# Patient Record
Sex: MALE | Race: WHITE | NOT HISPANIC OR LATINO
[De-identification: names, ages, dates, MRNs, and addresses within clinical notes are randomized per-mention and may not be internally consistent; named-entity substitution may affect disease eponyms.]

---

## 2020-01-22 ENCOUNTER — RESULT REVIEW (OUTPATIENT)
Age: 75
End: 2020-01-22

## 2021-03-21 PROBLEM — Z00.00 ENCOUNTER FOR PREVENTIVE HEALTH EXAMINATION: Status: ACTIVE | Noted: 2021-03-21

## 2021-03-23 ENCOUNTER — APPOINTMENT (OUTPATIENT)
Dept: DISASTER EMERGENCY | Facility: CLINIC | Age: 76
End: 2021-03-23

## 2021-03-23 DIAGNOSIS — Z01.818 ENCOUNTER FOR OTHER PREPROCEDURAL EXAMINATION: ICD-10-CM

## 2021-03-24 LAB — SARS-COV-2 N GENE NPH QL NAA+PROBE: NOT DETECTED

## 2021-03-26 ENCOUNTER — OUTPATIENT (OUTPATIENT)
Dept: OUTPATIENT SERVICES | Facility: HOSPITAL | Age: 76
LOS: 1 days | Discharge: HOME | End: 2021-03-26
Payer: MEDICARE

## 2021-03-26 ENCOUNTER — TRANSCRIPTION ENCOUNTER (OUTPATIENT)
Age: 76
End: 2021-03-26

## 2021-03-26 VITALS
SYSTOLIC BLOOD PRESSURE: 129 MMHG | RESPIRATION RATE: 18 BRPM | HEART RATE: 69 BPM | DIASTOLIC BLOOD PRESSURE: 82 MMHG | OXYGEN SATURATION: 96 %

## 2021-03-26 VITALS — HEART RATE: 64 BPM | SYSTOLIC BLOOD PRESSURE: 118 MMHG | DIASTOLIC BLOOD PRESSURE: 57 MMHG | OXYGEN SATURATION: 100 %

## 2021-03-26 DIAGNOSIS — I27.20 PULMONARY HYPERTENSION, UNSPECIFIED: ICD-10-CM

## 2021-03-26 DIAGNOSIS — Z95.2 PRESENCE OF PROSTHETIC HEART VALVE: Chronic | ICD-10-CM

## 2021-03-26 LAB
ANION GAP SERPL CALC-SCNC: 15 MMOL/L — SIGNIFICANT CHANGE UP (ref 5–17)
APTT BLD: 41.9 SEC — HIGH (ref 27.5–35.5)
BUN SERPL-MCNC: 36 MG/DL — HIGH (ref 8–20)
CALCIUM SERPL-MCNC: 9.4 MG/DL — SIGNIFICANT CHANGE UP (ref 8.6–10.2)
CHLORIDE SERPL-SCNC: 98 MMOL/L — SIGNIFICANT CHANGE UP (ref 98–107)
CO2 SERPL-SCNC: 24 MMOL/L — SIGNIFICANT CHANGE UP (ref 22–29)
CREAT SERPL-MCNC: 1.68 MG/DL — HIGH (ref 0.5–1.3)
GLUCOSE SERPL-MCNC: 112 MG/DL — HIGH (ref 70–99)
HCT VFR BLD CALC: 44.6 % — SIGNIFICANT CHANGE UP (ref 39–50)
HGB BLD-MCNC: 13.9 G/DL — SIGNIFICANT CHANGE UP (ref 13–17)
INR BLD: 1.39 RATIO — HIGH (ref 0.88–1.16)
MCHC RBC-ENTMCNC: 30.4 PG — SIGNIFICANT CHANGE UP (ref 27–34)
MCHC RBC-ENTMCNC: 31.2 GM/DL — LOW (ref 32–36)
MCV RBC AUTO: 97.6 FL — SIGNIFICANT CHANGE UP (ref 80–100)
PLATELET # BLD AUTO: 141 K/UL — LOW (ref 150–400)
POTASSIUM SERPL-MCNC: 3.9 MMOL/L — SIGNIFICANT CHANGE UP (ref 3.5–5.3)
POTASSIUM SERPL-SCNC: 3.9 MMOL/L — SIGNIFICANT CHANGE UP (ref 3.5–5.3)
PROTHROM AB SERPL-ACNC: 15.9 SEC — HIGH (ref 10.6–13.6)
RBC # BLD: 4.57 M/UL — SIGNIFICANT CHANGE UP (ref 4.2–5.8)
RBC # FLD: 17.3 % — HIGH (ref 10.3–14.5)
SODIUM SERPL-SCNC: 137 MMOL/L — SIGNIFICANT CHANGE UP (ref 135–145)
WBC # BLD: 11.89 K/UL — HIGH (ref 3.8–10.5)
WBC # FLD AUTO: 11.89 K/UL — HIGH (ref 3.8–10.5)

## 2021-03-26 PROCEDURE — 93005 ELECTROCARDIOGRAM TRACING: CPT

## 2021-03-26 PROCEDURE — C1887: CPT

## 2021-03-26 PROCEDURE — 99153 MOD SED SAME PHYS/QHP EA: CPT

## 2021-03-26 PROCEDURE — 93453 R&L HRT CATH W/VENTRICLGRPHY: CPT

## 2021-03-26 PROCEDURE — 99152 MOD SED SAME PHYS/QHP 5/>YRS: CPT

## 2021-03-26 PROCEDURE — 93463 DRUG ADMIN & HEMODYNMIC MEAS: CPT

## 2021-03-26 PROCEDURE — C1769: CPT

## 2021-03-26 PROCEDURE — 36415 COLL VENOUS BLD VENIPUNCTURE: CPT

## 2021-03-26 PROCEDURE — 85027 COMPLETE CBC AUTOMATED: CPT

## 2021-03-26 PROCEDURE — 80048 BASIC METABOLIC PNL TOTAL CA: CPT

## 2021-03-26 PROCEDURE — 93010 ELECTROCARDIOGRAM REPORT: CPT

## 2021-03-26 PROCEDURE — 85610 PROTHROMBIN TIME: CPT

## 2021-03-26 PROCEDURE — 85730 THROMBOPLASTIN TIME PARTIAL: CPT

## 2021-03-26 PROCEDURE — C1889: CPT

## 2021-03-26 RX ORDER — FLUTICASONE PROPIONATE AND SALMETEROL 50; 250 UG/1; UG/1
1 POWDER ORAL; RESPIRATORY (INHALATION)
Qty: 0 | Refills: 0 | DISCHARGE

## 2021-03-26 RX ORDER — MIRTAZAPINE 45 MG/1
1 TABLET, ORALLY DISINTEGRATING ORAL
Qty: 0 | Refills: 0 | DISCHARGE

## 2021-03-26 RX ORDER — SPIRONOLACTONE 25 MG/1
1 TABLET, FILM COATED ORAL
Qty: 60 | Refills: 0
Start: 2021-03-26 | End: 2021-04-24

## 2021-03-26 RX ORDER — SPIRONOLACTONE 25 MG/1
25 TABLET, FILM COATED ORAL
Refills: 0 | Status: DISCONTINUED | OUTPATIENT
Start: 2021-03-26 | End: 2021-04-09

## 2021-03-26 RX ORDER — FUROSEMIDE 40 MG
1 TABLET ORAL
Qty: 0 | Refills: 0 | DISCHARGE

## 2021-03-26 NOTE — DISCHARGE NOTE PROVIDER - NSDCCPTREATMENT_GEN_ALL_CORE_FT
PRINCIPAL PROCEDURE  Procedure: Catheterization, heart, right, with cardiac output measurement  Findings and Treatment: pHTN

## 2021-03-26 NOTE — DISCHARGE NOTE PROVIDER - NSDCMRMEDTOKEN_GEN_ALL_CORE_FT
Advair Diskus 100 mcg-50 mcg inhalation powder: 1 puff(s) inhaled 2 times a day  Albuterol (Eqv-Proventil HFA) 90 mcg/inh inhalation aerosol: 2 puff(s) inhaled every 4 hours, As Needed  allopurinol 100 mg oral tablet: 1 tab(s) orally 2 times a day  amLODIPine 2.5 mg oral tablet: 1 tab(s) orally once a day  Aspir 81 oral delayed release tablet: 1 tab(s) orally once a day  atorvastatin 10 mg oral tablet: 1 tab(s) orally once a day  Centrum Silver oral tablet: 1 tab(s) orally once a day  Culturelle Advanced Immune Defense oral capsule: 1 cap(s) orally 2 times a day  furosemide 40 mg oral tablet: 1 tab(s) orally once a day  ipratropium-albuterol 0.5 mg-2.5 mg/3 mLinhalation solution: 3 milliliter(s) inhaled 4 times a day, As Needed  metoprolol tartrate 100 mg oral tablet: 1 tab(s) orally 2 times a day  tamsulosin 0.4 mg oral capsule: 1 cap(s) orally once a day  Vitamin D3 1000 intl units (25 mcg) oral capsule: orally once a day   Advair Diskus 100 mcg-50 mcg inhalation powder: 1 puff(s) inhaled 2 times a day  Albuterol (Eqv-Proventil HFA) 90 mcg/inh inhalation aerosol: 2 puff(s) inhaled every 4 hours, As Needed  allopurinol 100 mg oral tablet: 1 tab(s) orally 2 times a day  amLODIPine 2.5 mg oral tablet: 1 tab(s) orally once a day  Aspir 81 oral delayed release tablet: 1 tab(s) orally once a day  atorvastatin 10 mg oral tablet: 1 tab(s) orally once a day  BMP: Result to Dr. Jameel Arambula Silver oral tablet: 1 tab(s) orally once a day  Culturelle Advanced Immune Defense oral capsule: 1 cap(s) orally 2 times a day  ipratropium-albuterol 0.5 mg-2.5 mg/3 mLinhalation solution: 3 milliliter(s) inhaled 4 times a day, As Needed  metOLazone 2.5 mg oral tablet: 1 tab(s) orally once a day  take 30 minutes prior to torsemide  metoprolol tartrate 100 mg oral tablet: 1 tab(s) orally 2 times a day  spironolactone 25 mg oral tablet: 1 tab(s) orally 2 times a day  tamsulosin 0.4 mg oral capsule: 1 cap(s) orally once a day  torsemide 20 mg oral tablet: 1 tab(s) orally 2 times a day  Vitamin D3 1000 intl units (25 mcg) oral capsule: orally once a day

## 2021-03-26 NOTE — DISCHARGE NOTE PROVIDER - CARE PROVIDER_API CALL
GUZMAN BOATENG  [id number]  [address] [facility] [address]  [place] [address]  Phone: [phone]  Fax: [phone]  Follow Up Time:    GUZMAN BOATENG  [id number]  [address] [facility] [address]  [place] [address]  Phone: [phone]  Fax: [phone]  Follow Up Time:     Herson Jorgensen  Emergency Medicine  [address] [place] [address]  Phone: [phone]  Fax: [phone]  Follow Up Time:

## 2021-03-26 NOTE — DISCHARGE NOTE PROVIDER - PROVIDER TOKENS
PROVIDER:[TOKEN:[[id number]]] PROVIDER:[TOKEN:[[id number]]],PROVIDER:[TOKEN:[[id number]]]

## 2021-03-26 NOTE — H&P PST ADULT - ASSESSMENT
76 year old male with known pulmonary HTN with worsening of symptoms.  For RHC  76 year old male with known pulmonary HTN with worsening of symptoms.  For RHC     Pt had ISRAEL within 2 weeks, awaiting on results     76 year old male with known pulmonary HTN with worsening of symptoms.  For RHC     Pt had ISRAEL within 2 weeks, awaiting on results    ASA 3  Mallampati 2  GFR 39  Cr 1.6  Bleeding risk 1.5%

## 2021-03-26 NOTE — H&P PST ADULT - NSICDXPASTMEDICALHX_GEN_ALL_CORE_FT
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

## 2021-03-26 NOTE — DISCHARGE NOTE NURSING/CASE MANAGEMENT/SOCIAL WORK - PATIENT PORTAL LINK FT
You can access the FollowMyHealth Patient Portal offered by [facility] by registering at the following website: [email or web address]. By joining [facility]’s FollowMyHealth portal, you will also be able to view your health information using other applications (apps) compatible with our system.

## 2021-03-26 NOTE — DISCHARGE NOTE NURSING/CASE MANAGEMENT/SOCIAL WORK - NURSING SECTION COMPLETE
Patient/Caregiver provided printed discharge information. PROVIDER:[TOKEN:[[id number]]] PROVIDER:[TOKEN:[[id number]]],PROVIDER:[TOKEN:[[id number]]]

## 2021-03-26 NOTE — DISCHARGE NOTE PROVIDER - NSDCCPCAREPLAN_GEN_ALL_CORE_FT
PRINCIPAL DISCHARGE DIAGNOSIS  Diagnosis: Pulmonary hypertension  Assessment and Plan of Treatment:        PRINCIPAL DISCHARGE DIAGNOSIS  Diagnosis: Pulmonary hypertension  Assessment and Plan of Treatment: medical management

## 2021-03-26 NOTE — PROGRESS NOTE ADULT - SUBJECTIVE AND OBJECTIVE BOX
Nurse Practitioner Progress note:     INTERVAL HISTORY: 76 year old male with h/o HTN, HLD, aortic valve stenosis h/o TAVR, mod MR,  MCA CVA (9/20) s/p tPA and thrombectomy and pulmonary hypertension.   Last RVSP on ECHO was 71 mmHg.   Pt with worsening of SOB.  Now requiring NC oxygen and pitting LE edema. For RHC    TELEMETRY: SR 61 bpm    T(C): 36.5 (03-26-21 @ 09:02), Max: 36.5 (03-26-21 @ 09:02)  HR: 62 (03-26-21 @ 09:02) (62 - 64)  BP: 119/- (03-26-21 @ 11:30) (118/57 - 119/-)  RR: 28 (03-26-21 @ 09:02) (28 - 28)  SpO2: 99% (03-26-21 @ 09:02) (99% - 100%)  Wt(kg): --    PHYSICAL EXAM:  Appearance: Normal	  Cardiovascular: Normal S1 S2, No JVD, No murmurs, No edema  Respiratory: Lungs clear to auscultation	  Psychiatry: A & O x 3, Mood & affect appropriate  Neurologic: Non-focal, A&O X3.  No neuro deficits  Procedure Site: Right radial band and right brachial sheath benign.  No bleeding/hematoma/ecchymosis. + palp pulses.  Fingers warm and mobile.       MEDICATIONS DURING PROCEDURE:  versed 0.5 mg  fentanyl 25 mcg  heparin 4000 u  lasix 40 mg       PROCEDURE RESULTS:    ASSESSMENT/PLAN: 	  -Radial/brachial precautions  -D/C brachial sheath  -D/C radial band 1-2 hours  -Resume home meds  -Initiate  -Follow up with Dr. Sanchez       Nurse Practitioner Progress note:     INTERVAL HISTORY: 76 year old male with h/o HTN, HLD, aortic valve stenosis h/o TAVR, mod MR,  MCA CVA (9/20) s/p tPA and thrombectomy and pulmonary hypertension.   Last RVSP on ECHO was 71 mmHg.   Pt with worsening of SOB.  Now requiring NC oxygen and pitting LE edema. For RHC    TELEMETRY: SR 61 bpm    T(C): 36.5 (03-26-21 @ 09:02), Max: 36.5 (03-26-21 @ 09:02)  HR: 62 (03-26-21 @ 09:02) (62 - 64)  BP: 119/- (03-26-21 @ 11:30) (118/57 - 119/-)  RR: 28 (03-26-21 @ 09:02) (28 - 28)  SpO2: 99% (03-26-21 @ 09:02) (99% - 100%)  Wt(kg): --    PHYSICAL EXAM:  Appearance: Normal	  Cardiovascular: Normal S1 S2, No JVD, No murmurs, No edema  Respiratory: Lungs clear to auscultation	  Psychiatry: A & O x 3, Mood & affect appropriate  Neurologic: Non-focal, A&O X3.  No neuro deficits  Procedure Site: Right radial band and right brachial sheath benign.  No bleeding/hematoma/ecchymosis. + palp pulses.  Fingers warm and mobile.       MEDICATIONS DURING PROCEDURE:  versed 0.5 mg  fentanyl 25 mcg  heparin 4000 u  lasix 40 mg       PROCEDURE RESULTS:  < from: Cardiac Cath Lab - Adult (03.26.21 @ 10:20) >  HEMODYNAMICS: There is severe biventricular failure. There is severe  pulmonary hypertension. Following administration of inhaled nitric oxide (  80ppm), there was a minimal overall pulmonary hemodynamic response. A  prominant "v" wave is noted on the PCW tracing suggestive of MR or  Diastolic non-compliance.  COMPLICATIONS: No complications occurred during the cath lab visit.  DIAGNOSTIC IMPRESSIONS: Severe Pulmonary Hypertension, likely congestive,  with equivalent response to Shawna at 80ppm- PVR was 2.09WU pre and 1.59 post  Shawna. 2. Severe congestive heart failure. 3. A TAV in the aortic position  was noted moving in consonance with the surrounding structures and with  acceptable hemodynamics (Mean Aortic PG is 16mmHg and an PILAR over 1cm2).  DIAGNOSTIC RECOMMENDATIONS: Aggressive diuresis. 2. Would consider Cardio  MEMS for better fluid management in the out patient setting and to  minimize repeat hospitalizations. 3. Would re-assess severity of the MR  after CHF optimization.  INTERVENTIONAL IMPRESSIONS: Severe Pulmonary Hypertension, likely  congestive, with equivalent response to Shawna at 80ppm- PVR was 2.09WU pre  and 1.59 post Shawna. 2. Severe congestive heart failure. 3. A TAV in the  aortic position was noted moving in consonance with the surrounding  structures and with acceptable hemodynamics (Mean Aortic PG is 16mmHg and  an PILAR over 1cm2).  INTERVENTIONAL RECOMMENDATIONS: Aggressive diuresis. 2. Would consider  Cardio MEMS for better fluid management in the out patient setting and to  minimize repeat hospitalizations. 3. Would re-assess severity of the MR  after CHF optimization.    < end of copied text >      ASSESSMENT/PLAN: 	  -Radial/brachial precautions  -D/C brachial sheath  -D/C radial band 1-2 hours  -Resume home meds  -D/C lasix  -Initiate metolcizone, torsimide and spironolactone  -BMP on Monday if office  -Follow up with Dr. Sanchez/Jameel  -Discharge to home when criteria met

## 2021-03-26 NOTE — DISCHARGE NOTE PROVIDER - HOSPITAL COURSE
76 year old male with h/o HTN, HLD, aortic valve stenosis h/o TAVR, mod MR,  MCA CVA (9/20) s/p tPA and thrombectomy and pulmonary hypertension.   Pt with worsening of SOB.  Now requiring NC oxygen and pitting LE edema. Now s/p RHC which revealed...... 76 year old male with h/o HTN, HLD, aortic valve stenosis h/o TAVR, mod MR,  MCA CVA (9/20) s/p tPA and thrombectomy and pulmonary hypertension.   Pt with worsening of SOB requiring NC oxygen and pitting LE edema. Now s/p RHC which revealed severe pHTN.  Will add metolcizone, torsimide, spironolactone.  D/C lasix.  Will have BMP on monday in office.     < from: Cardiac Cath Lab - Adult (03.26.21 @ 10:20) >    INTERVENTIONAL IMPRESSIONS: Severe Pulmonary Hypertension, likely  congestive, with equivalent response to Shawna at 80ppm- PVR was 2.09WU pre  and 1.59 post Shawna. 2. Severe congestive heart failure. 3. A TAV in the  aortic position was noted moving in consonance with the surrounding  structures and with acceptable hemodynamics (Mean Aortic PG is 16mmHg and  an PILAR over 1cm2).  INTERVENTIONAL RECOMMENDATIONS: Aggressive diuresis. 2. Would consider  Cardio MEMS for better fluid management in the out patient setting and to  minimize repeat hospitalizations. 3. Would re-assess severity of the MR

## 2021-03-26 NOTE — H&P PST ADULT - HISTORY OF PRESENT ILLNESS
76 year old male with h/o HTN, HLD, aortic valve stenosis h/o TAVR, mod MR,  recent MCA CVA s/p tPA and thrombectomy and pulmonary hypertension.  Pt with worsening of SOB.  Last RVSP on ECHO was 71 mmHg.   76 year old male with h/o HTN, HLD, aortic valve stenosis h/o TAVR, mod MR,  MCA CVA (9/20) s/p tPA and thrombectomy and pulmonary hypertension.  Pt with worsening of SOB.  Last RVSP on ECHO was 71 mmHg.      ISRAEL: Mod MR (per chart, attemping to get report) 76 year old male with h/o HTN, HLD, aortic valve stenosis h/o TAVR, mod MR,  MCA CVA (9/20) s/p tPA and thrombectomy and pulmonary hypertension.   Last RVSP on ECHO was 71 mmHg.   Pt with worsening of SOB.  Now requiring NC oxygen and pitting LE edema.     ISRAEL: Mod MR (per chart, attemping to get report) 76 year old male with h/o HTN, HLD, aortic valve stenosis h/o TAVR, mod MR,  MCA CVA (9/20) s/p tPA and thrombectomy and pulmonary hypertension.   Last RVSP on ECHO was 71 mmHg.   Pt with worsening of SOB.  Now requiring NC oxygen and pitting LE edema.     3/8/21TEE:   Normal LVH  Mod MR   prosthetic valve mild regurgitation  No ASD/PFO

## 2021-06-11 PROBLEM — R73.03 PREDIABETES: Chronic | Status: ACTIVE | Noted: 2021-03-26

## 2021-06-11 PROBLEM — M10.9 GOUT, UNSPECIFIED: Chronic | Status: ACTIVE | Noted: 2021-03-26

## 2021-06-11 PROBLEM — Z86.79 PERSONAL HISTORY OF OTHER DISEASES OF THE CIRCULATORY SYSTEM: Chronic | Status: ACTIVE | Noted: 2021-03-26

## 2021-06-11 PROBLEM — I27.20 PULMONARY HYPERTENSION, UNSPECIFIED: Chronic | Status: ACTIVE | Noted: 2021-03-26

## 2021-06-11 PROBLEM — N18.9 CHRONIC KIDNEY DISEASE, UNSPECIFIED: Chronic | Status: ACTIVE | Noted: 2021-03-26

## 2021-06-11 PROBLEM — I25.10 ATHEROSCLEROTIC HEART DISEASE OF NATIVE CORONARY ARTERY WITHOUT ANGINA PECTORIS: Chronic | Status: ACTIVE | Noted: 2021-03-26

## 2021-06-11 PROBLEM — I34.0 NONRHEUMATIC MITRAL (VALVE) INSUFFICIENCY: Chronic | Status: ACTIVE | Noted: 2021-03-26

## 2021-06-11 PROBLEM — I63.9 CEREBRAL INFARCTION, UNSPECIFIED: Chronic | Status: ACTIVE | Noted: 2021-03-26

## 2021-06-11 PROBLEM — N40.0 BENIGN PROSTATIC HYPERPLASIA WITHOUT LOWER URINARY TRACT SYMPTOMS: Chronic | Status: ACTIVE | Noted: 2021-03-26

## 2021-06-11 PROBLEM — E78.5 HYPERLIPIDEMIA, UNSPECIFIED: Chronic | Status: ACTIVE | Noted: 2021-03-26

## 2021-06-11 PROBLEM — I10 ESSENTIAL (PRIMARY) HYPERTENSION: Chronic | Status: ACTIVE | Noted: 2021-03-26

## 2021-06-11 PROBLEM — I71.9 AORTIC ANEURYSM OF UNSPECIFIED SITE, WITHOUT RUPTURE: Chronic | Status: ACTIVE | Noted: 2021-03-26

## 2021-06-12 ENCOUNTER — INPATIENT (INPATIENT)
Facility: HOSPITAL | Age: 76
LOS: 27 days | Discharge: OTHER INSTITUTION | DRG: 266 | End: 2021-07-10
Attending: THORACIC SURGERY (CARDIOTHORACIC VASCULAR SURGERY) | Admitting: THORACIC SURGERY (CARDIOTHORACIC VASCULAR SURGERY)
Payer: MEDICARE

## 2021-06-12 VITALS
WEIGHT: 220.68 LBS | SYSTOLIC BLOOD PRESSURE: 124 MMHG | RESPIRATION RATE: 18 BRPM | OXYGEN SATURATION: 95 % | DIASTOLIC BLOOD PRESSURE: 56 MMHG | HEIGHT: 72 IN | TEMPERATURE: 98 F | HEART RATE: 88 BPM

## 2021-06-12 DIAGNOSIS — I25.10 ATHEROSCLEROTIC HEART DISEASE OF NATIVE CORONARY ARTERY WITHOUT ANGINA PECTORIS: ICD-10-CM

## 2021-06-12 DIAGNOSIS — I63.9 CEREBRAL INFARCTION, UNSPECIFIED: ICD-10-CM

## 2021-06-12 DIAGNOSIS — N40.1 BENIGN PROSTATIC HYPERPLASIA WITH LOWER URINARY TRACT SYMPTOMS: ICD-10-CM

## 2021-06-12 DIAGNOSIS — Z29.9 ENCOUNTER FOR PROPHYLACTIC MEASURES, UNSPECIFIED: ICD-10-CM

## 2021-06-12 DIAGNOSIS — N17.9 ACUTE KIDNEY FAILURE, UNSPECIFIED: ICD-10-CM

## 2021-06-12 DIAGNOSIS — R73.03 PREDIABETES: ICD-10-CM

## 2021-06-12 DIAGNOSIS — Z95.2 PRESENCE OF PROSTHETIC HEART VALVE: Chronic | ICD-10-CM

## 2021-06-12 DIAGNOSIS — T82.03XA LEAKAGE OF HEART VALVE PROSTHESIS, INITIAL ENCOUNTER: ICD-10-CM

## 2021-06-12 DIAGNOSIS — I34.0 NONRHEUMATIC MITRAL (VALVE) INSUFFICIENCY: ICD-10-CM

## 2021-06-12 DIAGNOSIS — Z86.79 PERSONAL HISTORY OF OTHER DISEASES OF THE CIRCULATORY SYSTEM: ICD-10-CM

## 2021-06-12 DIAGNOSIS — I71.9 AORTIC ANEURYSM OF UNSPECIFIED SITE, WITHOUT RUPTURE: ICD-10-CM

## 2021-06-12 LAB
A1C WITH ESTIMATED AVERAGE GLUCOSE RESULT: 5.9 % — HIGH (ref 4–5.6)
ABO RH CONFIRMATION: SIGNIFICANT CHANGE UP
ALBUMIN SERPL ELPH-MCNC: 3.9 G/DL — SIGNIFICANT CHANGE UP (ref 3.3–5.2)
ALP SERPL-CCNC: 141 U/L — HIGH (ref 40–120)
ALT FLD-CCNC: 75 U/L — HIGH
ANION GAP SERPL CALC-SCNC: 19 MMOL/L — HIGH (ref 5–17)
ANION GAP SERPL CALC-SCNC: 22 MMOL/L — HIGH (ref 5–17)
ANISOCYTOSIS BLD QL: SIGNIFICANT CHANGE UP
APTT BLD: 43.8 SEC — HIGH (ref 27.5–35.5)
AST SERPL-CCNC: 52 U/L — HIGH
BASOPHILS # BLD AUTO: 0 K/UL — SIGNIFICANT CHANGE UP (ref 0–0.2)
BASOPHILS NFR BLD AUTO: 0 % — SIGNIFICANT CHANGE UP (ref 0–2)
BILIRUB SERPL-MCNC: 1.8 MG/DL — SIGNIFICANT CHANGE UP (ref 0.4–2)
BLD GP AB SCN SERPL QL: SIGNIFICANT CHANGE UP
BUN SERPL-MCNC: 100.5 MG/DL — HIGH (ref 8–20)
BUN SERPL-MCNC: 97.6 MG/DL — HIGH (ref 8–20)
BURR CELLS BLD QL SMEAR: PRESENT — SIGNIFICANT CHANGE UP
CALCIUM SERPL-MCNC: 10 MG/DL — SIGNIFICANT CHANGE UP (ref 8.6–10.2)
CALCIUM SERPL-MCNC: 10.1 MG/DL — SIGNIFICANT CHANGE UP (ref 8.6–10.2)
CHLORIDE SERPL-SCNC: 97 MMOL/L — LOW (ref 98–107)
CHLORIDE SERPL-SCNC: 98 MMOL/L — SIGNIFICANT CHANGE UP (ref 98–107)
CO2 SERPL-SCNC: 19 MMOL/L — LOW (ref 22–29)
CO2 SERPL-SCNC: 19 MMOL/L — LOW (ref 22–29)
CREAT SERPL-MCNC: 4.21 MG/DL — HIGH (ref 0.5–1.3)
CREAT SERPL-MCNC: 4.44 MG/DL — HIGH (ref 0.5–1.3)
ELLIPTOCYTES BLD QL SMEAR: SLIGHT — SIGNIFICANT CHANGE UP
EOSINOPHIL # BLD AUTO: 0 K/UL — SIGNIFICANT CHANGE UP (ref 0–0.5)
EOSINOPHIL NFR BLD AUTO: 0 % — SIGNIFICANT CHANGE UP (ref 0–6)
ESTIMATED AVERAGE GLUCOSE: 123 MG/DL — HIGH (ref 68–114)
GIANT PLATELETS BLD QL SMEAR: PRESENT — SIGNIFICANT CHANGE UP
GLUCOSE SERPL-MCNC: 104 MG/DL — HIGH (ref 70–99)
GLUCOSE SERPL-MCNC: 95 MG/DL — SIGNIFICANT CHANGE UP (ref 70–99)
HCT VFR BLD CALC: 39.6 % — SIGNIFICANT CHANGE UP (ref 39–50)
HGB BLD-MCNC: 12.4 G/DL — LOW (ref 13–17)
INR BLD: 1.76 RATIO — HIGH (ref 0.88–1.16)
LYMPHOCYTES # BLD AUTO: 1.22 K/UL — SIGNIFICANT CHANGE UP (ref 1–3.3)
LYMPHOCYTES # BLD AUTO: 18.4 % — SIGNIFICANT CHANGE UP (ref 13–44)
MACROCYTES BLD QL: SLIGHT — SIGNIFICANT CHANGE UP
MAGNESIUM SERPL-MCNC: 2.6 MG/DL — SIGNIFICANT CHANGE UP (ref 1.6–2.6)
MANUAL SMEAR VERIFICATION: SIGNIFICANT CHANGE UP
MCHC RBC-ENTMCNC: 30.4 PG — SIGNIFICANT CHANGE UP (ref 27–34)
MCHC RBC-ENTMCNC: 31.3 GM/DL — LOW (ref 32–36)
MCV RBC AUTO: 97.1 FL — SIGNIFICANT CHANGE UP (ref 80–100)
MICROCYTES BLD QL: SLIGHT — SIGNIFICANT CHANGE UP
MONOCYTES # BLD AUTO: 0.35 K/UL — SIGNIFICANT CHANGE UP (ref 0–0.9)
MONOCYTES NFR BLD AUTO: 5.3 % — SIGNIFICANT CHANGE UP (ref 2–14)
NEUTROPHILS # BLD AUTO: 5.06 K/UL — SIGNIFICANT CHANGE UP (ref 1.8–7.4)
NEUTROPHILS NFR BLD AUTO: 76.3 % — SIGNIFICANT CHANGE UP (ref 43–77)
NRBC # BLD: 2 /100 — HIGH (ref 0–0)
NT-PROBNP SERPL-SCNC: HIGH PG/ML (ref 0–300)
PLAT MORPH BLD: NORMAL — SIGNIFICANT CHANGE UP
PLATELET # BLD AUTO: 112 K/UL — LOW (ref 150–400)
POIKILOCYTOSIS BLD QL AUTO: SLIGHT — SIGNIFICANT CHANGE UP
POLYCHROMASIA BLD QL SMEAR: SLIGHT — SIGNIFICANT CHANGE UP
POTASSIUM SERPL-MCNC: 4.3 MMOL/L — SIGNIFICANT CHANGE UP (ref 3.5–5.3)
POTASSIUM SERPL-MCNC: 4.6 MMOL/L — SIGNIFICANT CHANGE UP (ref 3.5–5.3)
POTASSIUM SERPL-SCNC: 4.3 MMOL/L — SIGNIFICANT CHANGE UP (ref 3.5–5.3)
POTASSIUM SERPL-SCNC: 4.6 MMOL/L — SIGNIFICANT CHANGE UP (ref 3.5–5.3)
PREALB SERPL-MCNC: 12 MG/DL — LOW (ref 18–38)
PROT SERPL-MCNC: 7.3 G/DL — SIGNIFICANT CHANGE UP (ref 6.6–8.7)
PROTHROM AB SERPL-ACNC: 19.9 SEC — HIGH (ref 10.6–13.6)
RBC # BLD: 4.08 M/UL — LOW (ref 4.2–5.8)
RBC # FLD: 20.8 % — HIGH (ref 10.3–14.5)
RBC BLD AUTO: ABNORMAL
SODIUM SERPL-SCNC: 136 MMOL/L — SIGNIFICANT CHANGE UP (ref 135–145)
SODIUM SERPL-SCNC: 138 MMOL/L — SIGNIFICANT CHANGE UP (ref 135–145)
TSH SERPL-MCNC: 2.31 UIU/ML — SIGNIFICANT CHANGE UP (ref 0.27–4.2)
WBC # BLD: 6.63 K/UL — SIGNIFICANT CHANGE UP (ref 3.8–10.5)
WBC # FLD AUTO: 6.63 K/UL — SIGNIFICANT CHANGE UP (ref 3.8–10.5)

## 2021-06-12 PROCEDURE — 99222 1ST HOSP IP/OBS MODERATE 55: CPT

## 2021-06-12 PROCEDURE — 93306 TTE W/DOPPLER COMPLETE: CPT | Mod: 26

## 2021-06-12 PROCEDURE — 93010 ELECTROCARDIOGRAM REPORT: CPT

## 2021-06-12 PROCEDURE — 99223 1ST HOSP IP/OBS HIGH 75: CPT

## 2021-06-12 PROCEDURE — 71045 X-RAY EXAM CHEST 1 VIEW: CPT | Mod: 26

## 2021-06-12 RX ORDER — FUROSEMIDE 40 MG
5 TABLET ORAL
Qty: 500 | Refills: 0 | Status: DISCONTINUED | OUTPATIENT
Start: 2021-06-12 | End: 2021-06-16

## 2021-06-12 RX ORDER — ALBUTEROL 90 UG/1
2 AEROSOL, METERED ORAL EVERY 6 HOURS
Refills: 0 | Status: DISCONTINUED | OUTPATIENT
Start: 2021-06-12 | End: 2021-06-23

## 2021-06-12 RX ORDER — MIDODRINE HYDROCHLORIDE 2.5 MG/1
5 TABLET ORAL THREE TIMES A DAY
Refills: 0 | Status: DISCONTINUED | OUTPATIENT
Start: 2021-06-12 | End: 2021-06-23

## 2021-06-12 RX ORDER — BUDESONIDE AND FORMOTEROL FUMARATE DIHYDRATE 160; 4.5 UG/1; UG/1
2 AEROSOL RESPIRATORY (INHALATION)
Refills: 0 | Status: DISCONTINUED | OUTPATIENT
Start: 2021-06-12 | End: 2021-06-23

## 2021-06-12 RX ORDER — FUROSEMIDE 40 MG
40 TABLET ORAL ONCE
Refills: 0 | Status: COMPLETED | OUTPATIENT
Start: 2021-06-12 | End: 2021-06-12

## 2021-06-12 RX ORDER — SODIUM CHLORIDE 9 MG/ML
3 INJECTION INTRAMUSCULAR; INTRAVENOUS; SUBCUTANEOUS EVERY 8 HOURS
Refills: 0 | Status: DISCONTINUED | OUTPATIENT
Start: 2021-06-12 | End: 2021-06-23

## 2021-06-12 RX ORDER — FINASTERIDE 5 MG/1
5 TABLET, FILM COATED ORAL DAILY
Refills: 0 | Status: DISCONTINUED | OUTPATIENT
Start: 2021-06-12 | End: 2021-06-23

## 2021-06-12 RX ORDER — FUROSEMIDE 40 MG
10 TABLET ORAL
Qty: 500 | Refills: 0 | Status: DISCONTINUED | OUTPATIENT
Start: 2021-06-12 | End: 2021-06-12

## 2021-06-12 RX ORDER — TAMSULOSIN HYDROCHLORIDE 0.4 MG/1
0.4 CAPSULE ORAL AT BEDTIME
Refills: 0 | Status: DISCONTINUED | OUTPATIENT
Start: 2021-06-12 | End: 2021-06-23

## 2021-06-12 RX ORDER — ASPIRIN/CALCIUM CARB/MAGNESIUM 324 MG
81 TABLET ORAL DAILY
Refills: 0 | Status: DISCONTINUED | OUTPATIENT
Start: 2021-06-12 | End: 2021-06-16

## 2021-06-12 RX ORDER — ATORVASTATIN CALCIUM 80 MG/1
10 TABLET, FILM COATED ORAL AT BEDTIME
Refills: 0 | Status: DISCONTINUED | OUTPATIENT
Start: 2021-06-12 | End: 2021-06-23

## 2021-06-12 RX ADMIN — SODIUM CHLORIDE 3 MILLILITER(S): 9 INJECTION INTRAMUSCULAR; INTRAVENOUS; SUBCUTANEOUS at 20:37

## 2021-06-12 RX ADMIN — Medication 81 MILLIGRAM(S): at 09:37

## 2021-06-12 RX ADMIN — Medication 2.5 MG/HR: at 21:13

## 2021-06-12 RX ADMIN — MIDODRINE HYDROCHLORIDE 5 MILLIGRAM(S): 2.5 TABLET ORAL at 17:49

## 2021-06-12 RX ADMIN — SODIUM CHLORIDE 3 MILLILITER(S): 9 INJECTION INTRAMUSCULAR; INTRAVENOUS; SUBCUTANEOUS at 12:34

## 2021-06-12 RX ADMIN — BUDESONIDE AND FORMOTEROL FUMARATE DIHYDRATE 2 PUFF(S): 160; 4.5 AEROSOL RESPIRATORY (INHALATION) at 08:36

## 2021-06-12 RX ADMIN — Medication 40 MILLIGRAM(S): at 02:00

## 2021-06-12 RX ADMIN — Medication 2.5 MG/HR: at 16:38

## 2021-06-12 RX ADMIN — ATORVASTATIN CALCIUM 10 MILLIGRAM(S): 80 TABLET, FILM COATED ORAL at 20:58

## 2021-06-12 RX ADMIN — MIDODRINE HYDROCHLORIDE 5 MILLIGRAM(S): 2.5 TABLET ORAL at 12:34

## 2021-06-12 RX ADMIN — Medication 5 MG/HR: at 03:08

## 2021-06-12 RX ADMIN — SODIUM CHLORIDE 3 MILLILITER(S): 9 INJECTION INTRAMUSCULAR; INTRAVENOUS; SUBCUTANEOUS at 05:15

## 2021-06-12 RX ADMIN — BUDESONIDE AND FORMOTEROL FUMARATE DIHYDRATE 2 PUFF(S): 160; 4.5 AEROSOL RESPIRATORY (INHALATION) at 20:20

## 2021-06-12 RX ADMIN — TAMSULOSIN HYDROCHLORIDE 0.4 MILLIGRAM(S): 0.4 CAPSULE ORAL at 20:58

## 2021-06-12 RX ADMIN — FINASTERIDE 5 MILLIGRAM(S): 5 TABLET, FILM COATED ORAL at 09:37

## 2021-06-12 RX ADMIN — MIDODRINE HYDROCHLORIDE 5 MILLIGRAM(S): 2.5 TABLET ORAL at 05:20

## 2021-06-12 NOTE — CONSULT NOTE ADULT - ASSESSMENT
Álvaro on CKD stage II  SCr 1.2 in 2020-> 1.7 in 02/2021 and has progressively worsened ever since  Álvaro on CKD stage II  TAVR failure    SCr 1.2 in 2020-> 1.7 in 02/2021 and has progressively worsened ever since   Álvaro on CKD- CRS   Non oliguric; off lasix  Will monitor off diuretics for now  No indication for RRT at this time  Monitor Scr, lytes UOP

## 2021-06-12 NOTE — H&P ADULT - HISTORY OF PRESENT ILLNESS
76 year old male patient with a medical history of MI in 1995 (angiogram, no stents placed), OPD (2L O2 at home), s/p TAVR (03/2019 at [facility]), gout, CKD, CVA (09/2020), pulmonary HTN, initially presented to [facility] 6/1/21 with RONNELL on CKD (likely cardiorenal syndrome), urinary retention due to noncompliance with medications, with hospital course complicated by cardiogenic shock, acute hypoxemic respiratory failure secondary to metabolic acidosis and respiratory alkalosis. Patient found to have TAVR failure with ISRAEL 6/10/21 showing EF 40-45%, Aortic valve prosthesis with Severe paravalvular leak and valvular AI, mild-moderate aortic stenosis, and moderate MR. Patient was transferred to [facility] under Dr. Campbell for further workup.     Imaging from [facility]:  6/1: CXR shwoing cardiomegaly, small b/l pleural effusions, moderate pulmonary vascular congestion.  6/1: CT Brain ordered for head trauma? showing age-related cerebral and cerebellar volume loss, mild chronic vascular ischemic changes, stable biparietal arachnoid cyst and stable midline posterior fossa arachnoid cyst.   6/1: US Abdomen for elevated LFTs showing mild hepatic steatosis, mild hepatomegaly, sludge in the gallbladder, no discrete gallstones, normal biliary ducts, mild echogenic right kidney which may be seen with medical renal dz, mildly complex Right upper pole 4cm cyst with subtle internal echoes may represent hemorrhagic/proteinaceous cyst, mildly complex right midpole 2cm cyst with thin internal linear septation.   6/3: Kidney and Bladder US showing no hydronephrosis, echogenic kidneys likely from medical renal dz, prostatomegaly, and thickening of the posterior wall of the bladder with trabeculations which could be due to bladder outlet obstruction.   6/7: Kidney and Bladder US: No hydronephrosis or shadowing renal calculi. Stable b/l renal cysts.   6/7: Lower Extremity Venous Doppler with no DVT noted.   6/8: TTE showing EF 47%, dilated IVC, dilation of the ascending aorta of 4.4cm, moderate-severe pulmonary HTN, moderate-severe TR, aortic valve with severe prosthesis regurgitation and moderate prosthesis stenosis  6/9: CT Chest showing emphysema and intersitial lung dz changes, stable b/l small pleural effusions, cardiomegaly.   6/11: ISRAEL showing EF 40-45%, Aortic valve prosthesis with Severe paravalvular leak and valvular AI, mild-moderate aortic stenosis, and moderate MR.  76 year old male patient with a medical history of MI in 1995 (angiogram, no stents placed), COPD (2L O2 at home), s/p TAVR (03/2019 at [facility]), gout, CKD, CVA (09/2020), pulmonary HTN, initially presented to [facility] 6/1/21 with RONNELL on CKD (likely cardiorenal syndrome), urinary retention due to noncompliance with medications, with hospital course complicated by cardiogenic shock, acute hypoxemic respiratory failure secondary to metabolic acidosis and respiratory alkalosis. Patient found to have TAVR failure with ISRAEL 6/10/21 showing EF 40-45%, Aortic valve prosthesis with Severe paravalvular leak and valvular AI, mild-moderate aortic stenosis, and moderate MR. Patient was transferred to [facility] under Dr. Campbell for further workup.     Imaging from [facility]:  6/1: CXR shwoing cardiomegaly, small b/l pleural effusions, moderate pulmonary vascular congestion.  6/1: CT Brain ordered for head trauma? showing age-related cerebral and cerebellar volume loss, mild chronic vascular ischemic changes, stable biparietal arachnoid cyst and stable midline posterior fossa arachnoid cyst.   6/1: US Abdomen for elevated LFTs showing mild hepatic steatosis, mild hepatomegaly, sludge in the gallbladder, no discrete gallstones, normal biliary ducts, mild echogenic right kidney which may be seen with medical renal dz, mildly complex Right upper pole 4cm cyst with subtle internal echoes may represent hemorrhagic/proteinaceous cyst, mildly complex right midpole 2cm cyst with thin internal linear septation.   6/3: Kidney and Bladder US showing no hydronephrosis, echogenic kidneys likely from medical renal dz, prostatomegaly, and thickening of the posterior wall of the bladder with trabeculations which could be due to bladder outlet obstruction.   6/7: Kidney and Bladder US: No hydronephrosis or shadowing renal calculi. Stable b/l renal cysts.   6/7: Lower Extremity Venous Doppler with no DVT noted.   6/8: TTE showing EF 47%, dilated IVC, dilation of the ascending aorta of 4.4cm, moderate-severe pulmonary HTN, moderate-severe TR, aortic valve with severe prosthesis regurgitation and moderate prosthesis stenosis  6/9: CT Chest showing emphysema and intersitial lung dz changes, stable b/l small pleural effusions, cardiomegaly.   6/11: ISRAEL showing EF 40-45%, Aortic valve prosthesis with Severe paravalvular leak and valvular AI, mild-moderate aortic stenosis, and moderate MR.

## 2021-06-12 NOTE — H&P ADULT - PROBLEM SELECTOR PLAN 5
Continue supportive measures.   Continue ASA and Statin for now.   Follow up LFTs, may nj to d/c statin.   Unable to start BB at this time as patient requiring midodrine to maintain BP.

## 2021-06-12 NOTE — H&P ADULT - PROBLEM SELECTOR PLAN 8
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   No residual deficits noted.

## 2021-06-12 NOTE — CHART NOTE - NSCHARTNOTEFT_GEN_A_CORE
pt seen and examined. VS/labs/CXR reviewed with Dr. Nix. Pt remains SOB, lasix gtt d/c'd overnight, however pt still with good urine output. renal and cardiology consults appreciated, will plan for empiric transfer to ICU for close monitoring of BP, urine output and renal function. maintain.  marie for stricts Is/Os. pt with fixed pulmonary HTN as noted on RHC in 3/2021 so jessica palacio will be helpful.  also with known biventricular failure. TTE pending. repeat chemistry this evening.    d/w Dr. Nix

## 2021-06-12 NOTE — H&P ADULT - NSICDXPASTMEDICALHX_GEN_ALL_CORE_FT
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

## 2021-06-12 NOTE — H&P ADULT - NEGATIVE NEUROLOGICAL SYMPTOMS
no transient paralysis/no generalized seizures/no focal seizures/no syncope/no headache/no loss of consciousness/no hemiparesis/no confusion/no facial palsy

## 2021-06-12 NOTE — H&P ADULT - PROBLEM SELECTOR PLAN 10
SCDs for DVT prophylaxis. Chemical AC held in preop setting until plan further discussed with surgeon this AM.     Plan to be discussed / reviewed with CT Surgery attending / team during AM rounds.

## 2021-06-12 NOTE — H&P ADULT - PROBLEM SELECTOR PLAN 6
6/3: Kidney and Bladder US showing no hydronephrosis, echogenic kidneys likely from medical renal dz, prostatomegaly, and thickening of the posterior wall of the bladder with trabeculations which could be due to bladder outlet obstruction.   6/7: Kidney and Bladder US: No hydronephrosis or shadowing renal calculi. Stable b/l renal cysts.   Nephrology consult to be called this AM.   Trend BUN/Cr while on lasix gtt.   Monitor urine output.

## 2021-06-12 NOTE — CONSULT NOTE ADULT - SUBJECTIVE AND OBJECTIVE BOX
[facility] DIVISION OF KIDNEY DISEASES AND HYPERTENSION -- INITIAL CONSULT NOTE  --------------------------------------------------------------------------------  HPI:    "76 year old male patient with a medical history of MI in 1995 (angiogram, no stents placed), COPD (2L O2 at home), s/p TAVR (03/2019 at [facility]), gout, CKD, CVA (09/2020), pulmonary HTN, initially presented to [facility] 6/1/21 with ÁLVARO on CKD (likely cardiorenal syndrome), urinary retention due to noncompliance with medications, with hospital course complicated by cardiogenic shock, acute hypoxemic respiratory failure secondary to metabolic acidosis and respiratory alkalosis. Patient found to have TAVR failure with ISRAEL 6/10/21 showing EF 40-45%, Aortic valve prosthesis with Severe paravalvular leak and valvular AI, mild-moderate aortic stenosis, and moderate MR. Patient was transferred to [facility] under Dr. Campbell for further workup."    Nephrology consulted for Álvaro on CKD.    PAST HISTORY  --------------------------------------------------------------------------------  PAST MEDICAL & SURGICAL HISTORY:  Pulmonary hypertension    Hypertension    Hyperlipidemia    H/O aortic valve stenosis  s/p TAVR 2019 at [facility]    CVA (cerebrovascular accident)  MCA CVA with tPA and thrombectomy    Chronic kidney disease    Prediabetes    Mitral valve regurgitation    CAD in native artery    Aneurysm of aortic root  thoracic aortic aneurysm, without ruptur    Benign prostatic hyperplasia    Gout    History of transcatheter aortic valve replacement (TAVR)      FAMILY HISTORY:  FH: lung cancer      PAST SOCIAL HISTORY:    ALLERGIES & MEDICATIONS  --------------------------------------------------------------------------------  Allergies    No Known Drug Allergies  strawberry (Anaphylaxis)    Intolerances      Standing Inpatient Medications  aspirin enteric coated 81 milliGRAM(s) Oral daily  atorvastatin 10 milliGRAM(s) Oral at bedtime  budesonide 160 MICROgram(s)/formoterol 4.5 MICROgram(s) Inhaler 2 Puff(s) Inhalation two times a day  finasteride 5 milliGRAM(s) Oral daily  midodrine. 5 milliGRAM(s) Oral three times a day  sodium chloride 0.9% lock flush 3 milliLiter(s) IV Push every 8 hours  tamsulosin 0.4 milliGRAM(s) Oral at bedtime    PRN Inpatient Medications  ALBUTerol    90 MICROgram(s) HFA Inhaler 2 Puff(s) Inhalation every 6 hours PRN      REVIEW OF SYSTEMS  --------------------------------------------------------------------------------  Gen: No weight changes, fatigue, fevers/chills, weakness  Skin: No rashes  Head/Eyes/Ears/Mouth: No headache; Normal hearing; Normal vision w/o blurriness; No sinus pain/discomfort, sore throat  Respiratory: No dyspnea, cough, wheezing, hemoptysis  CV: No chest pain, PND, orthopnea  GI: No abdominal pain, diarrhea, constipation, nausea, vomiting, melena, hematochezia  : No increased frequency, dysuria, hematuria, nocturia  MSK: No joint pain/swelling; no back pain; no edema  Neuro: No dizziness/lightheadedness, weakness, seizures, numbness, tingling  Heme: No easy bruising or bleeding  Endo: No heat/cold intolerance  Psych: No significant nervousness, anxiety, stress, depression    All other systems were reviewed and are negative, except as noted.    VITALS/PHYSICAL EXAM  --------------------------------------------------------------------------------  T(C): 36.6 (06-12-21 @ 05:16), Max: 36.6 (06-12-21 @ 05:16)  HR: 81 (06-12-21 @ 05:16) (81 - 88)  BP: 118/54 (06-12-21 @ 05:16) (118/54 - 127/57)  RR: 16 (06-12-21 @ 05:16) (16 - 18)  SpO2: 97% (06-12-21 @ 05:16) (95% - 97%)  Wt(kg): --  Height (cm): 182.9 (06-12-21 @ 00:32)  Weight (kg): 100.1 (06-12-21 @ 00:32)  BMI (kg/m2): 29.9 (06-12-21 @ 00:32)  BSA (m2): 2.22 (06-12-21 @ 00:32)      06-11-21 @ 07:01  -  06-12-21 @ 07:00  --------------------------------------------------------  IN: 120 mL / OUT: 450 mL / NET: -330 mL      Physical Exam:  	Gen: NAD, well-appearing  	HEENT: PERRL, supple neck, clear oropharynx  	Pulm: CTA B/L  	CV: RRR, S1S2; no rub  	Back: No spinal or CVA tenderness; no sacral edema  	Abd: +BS, soft, nontender/nondistended  	: No suprapubic tenderness  	UE: Warm, FROM, no clubbing, intact strength; no edema; no asterixis  	LE: Warm, FROM, no clubbing, intact strength; no edema  	Neuro: No focal deficits, intact gait  	Psych: Normal affect and mood  	Skin: Warm, without rashes  	Vascular access:    LABS/STUDIES  --------------------------------------------------------------------------------              12.4   6.63  >-----------<  112      [06-12-21 @ 01:55]              39.6     136  |  98  |  100.5  ----------------------------<  95      [06-12-21 @ 01:55]  4.6   |  19.0  |  4.44        Ca     10.1     [06-12-21 @ 01:55]    TPro  7.3  /  Alb  3.9  /  TBili  1.8  /  DBili  x   /  AST  52  /  ALT  75  /  AlkPhos  141  [06-12-21 @ 01:55]    PT/INR: PT 19.9 , INR 1.76       [06-12-21 @ 01:55]  PTT: 43.8       [06-12-21 @ 01:55]      Creatinine Trend:  SCr 4.44 [06-12 @ 01:55]        TSH 2.31      [06-12-21 @ 01:55]

## 2021-06-12 NOTE — CONSULT NOTE ADULT - SUBJECTIVE AND OBJECTIVE BOX
[facility] DIVISION OF KIDNEY DISEASES AND HYPERTENSION -- INITIAL CONSULT NOTE  --------------------------------------------------------------------------------  HPI:        PAST HISTORY  --------------------------------------------------------------------------------  PAST MEDICAL & SURGICAL HISTORY:  Pulmonary hypertension    Hypertension    Hyperlipidemia    H/O aortic valve stenosis  s/p TAVR 2019 at [place]    CVA (cerebrovascular accident)  MCA CVA with tPA and thrombectomy    Chronic kidney disease    Prediabetes    Mitral valve regurgitation    CAD in native artery    Aneurysm of aortic root  thoracic aortic aneurysm, without ruptur    Benign prostatic hyperplasia    Gout    History of transcatheter aortic valve replacement (TAVR)      FAMILY HISTORY:  FH: lung cancer      PAST SOCIAL HISTORY:    ALLERGIES & MEDICATIONS  --------------------------------------------------------------------------------  Allergies    No Known Drug Allergies  strawberry (Anaphylaxis)    Intolerances      Standing Inpatient Medications  aspirin enteric coated 81 milliGRAM(s) Oral daily  atorvastatin 10 milliGRAM(s) Oral at bedtime  budesonide 160 MICROgram(s)/formoterol 4.5 MICROgram(s) Inhaler 2 Puff(s) Inhalation two times a day  finasteride 5 milliGRAM(s) Oral daily  midodrine. 5 milliGRAM(s) Oral three times a day  sodium chloride 0.9% lock flush 3 milliLiter(s) IV Push every 8 hours  tamsulosin 0.4 milliGRAM(s) Oral at bedtime    PRN Inpatient Medications  ALBUTerol    90 MICROgram(s) HFA Inhaler 2 Puff(s) Inhalation every 6 hours PRN      REVIEW OF SYSTEMS  --------------------------------------------------------------------------------  Gen: No weight changes, fatigue, fevers/chills, weakness  Skin: No rashes  Head/Eyes/Ears/Mouth: No headache; Normal hearing; Normal vision w/o blurriness; No sinus pain/discomfort, sore throat  Respiratory: No dyspnea, cough, wheezing, hemoptysis  CV: No chest pain, PND, orthopnea  GI: No abdominal pain, diarrhea, constipation, nausea, vomiting, melena, hematochezia  : No increased frequency, dysuria, hematuria, nocturia  MSK: No joint pain/swelling; no back pain; no edema  Neuro: No dizziness/lightheadedness, weakness, seizures, numbness, tingling  Heme: No easy bruising or bleeding  Endo: No heat/cold intolerance  Psych: No significant nervousness, anxiety, stress, depression    All other systems were reviewed and are negative, except as noted.    VITALS/PHYSICAL EXAM  --------------------------------------------------------------------------------  T(C): 36.6 (06-12-21 @ 05:16), Max: 36.6 (06-12-21 @ 05:16)  HR: 81 (06-12-21 @ 05:16) (81 - 88)  BP: 118/54 (06-12-21 @ 05:16) (118/54 - 127/57)  RR: 16 (06-12-21 @ 05:16) (16 - 18)  SpO2: 97% (06-12-21 @ 05:16) (95% - 97%)  Wt(kg): --  Height (cm): 182.9 (06-12-21 @ 00:32)  Weight (kg): 100.1 (06-12-21 @ 00:32)  BMI (kg/m2): 29.9 (06-12-21 @ 00:32)  BSA (m2): 2.22 (06-12-21 @ 00:32)      06-11-21 @ 07:01  -  06-12-21 @ 07:00  --------------------------------------------------------  IN: 120 mL / OUT: 450 mL / NET: -330 mL    06-12-21 @ 07:01  -  06-12-21 @ 11:55  --------------------------------------------------------  IN: 0 mL / OUT: 350 mL / NET: -350 mL      Physical Exam:  	Gen: NAD, well-appearing  	HEENT: PERRL, supple neck, clear oropharynx  	Pulm: CTA B/L  	CV: RRR, S1S2; no rub  	Back: No spinal or CVA tenderness; no sacral edema  	Abd: +BS, soft, nontender/nondistended  	: No suprapubic tenderness  	UE: Warm, FROM, no clubbing, intact strength; no edema; no asterixis  	LE: Warm, FROM, no clubbing, intact strength; no edema  	Neuro: No focal deficits, intact gait  	Psych: Normal affect and mood  	Skin: Warm, without rashes  	Vascular access:    LABS/STUDIES  --------------------------------------------------------------------------------              12.4   6.63  >-----------<  112      [06-12-21 @ 01:55]              39.6     136  |  98  |  100.5  ----------------------------<  95      [06-12-21 @ 01:55]  4.6   |  19.0  |  4.44        Ca     10.1     [06-12-21 @ 01:55]    TPro  7.3  /  Alb  3.9  /  TBili  1.8  /  DBili  x   /  AST  52  /  ALT  75  /  AlkPhos  141  [06-12-21 @ 01:55]    PT/INR: PT 19.9 , INR 1.76       [06-12-21 @ 01:55]  PTT: 43.8       [06-12-21 @ 01:55]      Creatinine Trend:  SCr 4.44 [06-12 @ 01:55]        TSH 2.31      [06-12-21 @ 01:55]

## 2021-06-12 NOTE — CONSULT NOTE ADULT - SUBJECTIVE AND OBJECTIVE BOX
[facility] [id number]            [facility]                          [address] [place] [address]                       Phone: [phone]. Fax:[phone]                      ________________________________________________    HPI:  76 year old male patient with a medical history of MI in  (angiogram, no stents placed), COPD (2L O2 at home), s/p TAVR (2019 at [facility]), gout, CKD, CVA (2020), pulmonary HTN, initially presented to [facility] 21 with RONNELL on CKD (likely cardiorenal syndrome), urinary retention due to noncompliance with medications, with hospital course complicated by cardiogenic shock, acute hypoxemic respiratory failure secondary to metabolic acidosis and respiratory alkalosis. Patient found to have TAVR failure with ISRAEL 6/10/21 showing EF 40-45%, Aortic valve prosthesis with Severe paravalvular leak and valvular AI, mild-moderate aortic stenosis, and moderate MR. Patient was transferred to [facility] under Dr. Campbell for further workup.     Imaging from [facility]:  : CXR shwoing cardiomegaly, small b/l pleural effusions, moderate pulmonary vascular congestion.  : CT Brain ordered for head trauma? showing age-related cerebral and cerebellar volume loss, mild chronic vascular ischemic changes, stable biparietal arachnoid cyst and stable midline posterior fossa arachnoid cyst.   : US Abdomen for elevated LFTs showing mild hepatic steatosis, mild hepatomegaly, sludge in the gallbladder, no discrete gallstones, normal biliary ducts, mild echogenic right kidney which may be seen with medical renal dz, mildly complex Right upper pole 4cm cyst with subtle internal echoes may represent hemorrhagic/proteinaceous cyst, mildly complex right midpole 2cm cyst with thin internal linear septation.   6/3: Kidney and Bladder US showing no hydronephrosis, echogenic kidneys likely from medical renal dz, prostatomegaly, and thickening of the posterior wall of the bladder with trabeculations which could be due to bladder outlet obstruction.   : Kidney and Bladder US: No hydronephrosis or shadowing renal calculi. Stable b/l renal cysts.   : Lower Extremity Venous Doppler with no DVT noted.   : TTE showing EF 47%, dilated IVC, dilation of the ascending aorta of 4.4cm, moderate-severe pulmonary HTN, moderate-severe TR, aortic valve with severe prosthesis regurgitation and moderate prosthesis stenosis  : CT Chest showing emphysema and intersitial lung dz changes, stable b/l small pleural effusions, cardiomegaly.   : ISRAEL showing EF 40-45%, Aortic valve prosthesis with Severe paravalvular leak and valvular AI, mild-moderate aortic stenosis, and moderate MR.  (2021 01:36)    HOME MEDICATIONS:  Advair Diskus 100 mcg-50 mcg inhalation powder: 1 puff(s) inhaled 2 times a day (26 Mar 2021 15:32)  Albuterol (Eqv-Proventil HFA) 90 mcg/inh inhalation aerosol: 2 puff(s) inhaled every 4 hours, As Needed (26 Mar 2021 15:32)  allopurinol 100 mg oral tablet: 1 tab(s) orally 2 times a day (26 Mar 2021 15:32)  amLODIPine 2.5 mg oral tablet: 1 tab(s) orally once a day (26 Mar 2021 15:32)  Aspir 81 oral delayed release tablet: 1 tab(s) orally once a day (26 Mar 2021 15:32)  atorvastatin 10 mg oral tablet: 1 tab(s) orally once a day (26 Mar 2021 15:32)  Centrum Silver oral tablet: 1 tab(s) orally once a day (26 Mar 2021 15:32)  [facility] Advanced Immune Defense oral capsule: 1 cap(s) orally 2 times a day (26 Mar 2021 15:32)  ipratropium-albuterol 0.5 mg-2.5 mg/3 mLinhalation solution: 3 milliliter(s) inhaled 4 times a day, As Needed (26 Mar 2021 15:32)  metoprolol tartrate 100 mg oral tablet: 1 tab(s) orally 2 times a day (26 Mar 2021 15:32)  tamsulosin 0.4 mg oral capsule: 1 cap(s) orally once a day (26 Mar 2021 15:32)  Vitamin D3 1000 intl units (25 mcg) oral capsule: orally once a day (26 Mar 2021 15:32)      ROS: All review of systems negative unless indicated otherwise below.                         PHYSICAL EXAM:    GENERAL: NAD  NECK: severe JVD  NERVOUS SYSTEM:  Alert & Oriented X3, non focal neuro exam.   CHEST/LUNG: diminished lungs, No rales, rhonchi, wheezing, or rubs  HEART: Regular rate and rhythm; s1 and s2 auscultated, No murmurs, rubs, or gallops  ABDOMEN: Soft, Nontender, Nondistended; Bowel sounds present and normoactive.   EXTREMITIES:  2+ Peripheral Pulses, No clubbing, cyanosis. LE Edema +1 B/L        Vital Signs Last 24 Hrs  T(C): 36.4 (2021 12:33), Max: 36.6 (2021 05:16)  T(F): 97.5 (2021 12:33), Max: 97.8 (2021 05:16)  HR: 82 (2021 12:33) (80 - 88)  BP: 115/62 (2021 12:33) (115/62 - 127/57)  BP(mean): --  RR: 18 (2021 12:33) (16 - 18)  SpO2: 97% (2021 12:33) (95% - 97%)                                                   DAILY WEIGHTS - 48 HOUR TREND     Daily Weight in k.5 (2021 05:39)                             INTAKE AND OUTPUT - 48 HOUR TREND     21 @ 07:01  -  21 @ 07:00  --------------------------------------------------------  IN:  Total IN: 0 mL    OUT:    Indwelling Catheter - Urethral (mL): 450 mL  Total OUT: 450 mL    Total NET: -450 mL                                                           LAB RESULTS                 COMPLETE BLOOD COUNT( 2021 01:55 )                            12.4 g/dL<L>  6.63 K/uL )---------------( 112 K/uL<L>                        39.6 %      Automated Differential     Auto Basophil # - 0.00 K/uL  Auto Basophil % - 0.0 %  Auto Eosinophil # - 0.00 K/uL  Auto Eosinophil % - 0.0 %  Auto Immature Granulocyte # - X      Auto Immature Granulocyte % - X      Auto Lymphocyte # - 1.22 K/uL  Auto Lymphocyte % - 18.4 %  Auto Monocyte # - 0.35 K/uL  Auto Monocyte % - 5.3 %  Auto Neutrophil # - 5.06 K/uL  Auto Neutrophil % - 76.3 %                                  CHEMISTRY                 Basic Metabolic Panel (21 @ 01:55)    136  |  98  |  100.5<H>  ----------------------------<  95  4.6   |  19.0<L>  |  4.44<H>    Ca    10.1      2021 01:55                    Liver Functions (21 @ 01:55))  TPro  7.3  /  Alb  3.9  /  TBili  1.8  /  DBili  x   /  AST  52  /  ALT  75  /  AlkPhos  141     PT/INR/PTT ( 2021 01:55 )                        :                       :      19.9         :       43.8                  .        .                   .              .           .       1.76        .                                                                 Cardiac Enzymes   ( 2021 01:55 )  Troponin T  X    ,  CPK  X    , CKMB  X    , BNP 36967<H>                                       Current Admission Active Medications    ALBUTerol    90 MICROgram(s) HFA Inhaler 2 Puff(s) Inhalation every 6 hours PRN Shortness of Breath and/or Wheezing  aspirin enteric coated 81 milliGRAM(s) Oral daily  atorvastatin 10 milliGRAM(s) Oral at bedtime  budesonide 160 MICROgram(s)/formoterol 4.5 MICROgram(s) Inhaler 2 Puff(s) Inhalation two times a day  finasteride 5 milliGRAM(s) Oral daily  midodrine. 5 milliGRAM(s) Oral three times a day  sodium chloride 0.9% lock flush 3 milliLiter(s) IV Push every 8 hours  tamsulosin 0.4 milliGRAM(s) Oral at bedtime                          CARDIOLOGY RESULTS: Official Report/Preliminary Verbal Reports  < from: Cardiac Cath Lab - Adult (21 @ 10:20) >  HEMODYNAMICS: There is severe biventricular failure. There is severe  pulmonary hypertension. Following administration of inhaled nitric oxide (  80ppm), there was a minimal overall pulmonary hemodynamic response. A  prominant "v" wave is noted on the PCW tracing suggestive of MR or  Diastolic non-compliance.  COMPLICATIONS: No complications occurred during the cath lab visit.  DIAGNOSTIC IMPRESSIONS: Severe Pulmonary Hypertension, likely congestive,  with equivalent response to Shawna at 80ppm- PVR was 2.09WU pre and 1.59 post  Shawna. 2. Severe congestive heart failure. 3. A TAV in the aortic position  was noted moving in consonance with the surrounding structures and with  acceptable hemodynamics (Mean Aortic PG is 16mmHg and an PILAR over 1cm2).  DIAGNOSTIC RECOMMENDATIONS: Aggressive diuresis. 2. Would consider Cardio  MEMS for better fluid management in the out patient setting and to  minimize repeat hospitalizations. 3. Would re-assess severity of the MR  after CHF optimization.  INTERVENTIONAL IMPRESSIONS: Severe Pulmonary Hypertension, likely  congestive, with equivalent response to Shawna at 80ppm- PVR was 2.09WU pre  and 1.59 post Shawna. 2. Severe congestive heart failure. 3. A TAV in the  aortic position was noted moving in consonance with the surrounding  structures and with acceptable hemodynamics (Mean Aortic PG is 16mmHg and  an PILAR over 1cm2).  INTERVENTIONAL RECOMMENDATIONS: Aggressive diuresis. 2. Would consider  Cardio MEMS for better fluid management in the out patient setting and to  minimize repeat hospitalizations. 3. Would re-assess severity of the MR  after CHF optimization.  Prepared and signed by  Herson Jorgensen M.D.  Signed 2021 12:01:05    < end of copied text >                          CARDIOLOGY REVIEW: Personally visualized and reviewed  Telemetry:  1st degree 90s

## 2021-06-12 NOTE — H&P ADULT - NSHPSOCIALHISTORY_GEN_ALL_CORE
Lives with wife in a private ranch-style home with 3 stairs to get into home.   Ambulates independently / with a rolling walker PRN at baseline prior to recent hospitalization. Now patient unable to walk due to physical deconditioning / SOB.   Former smoker, quit 1980s, smoked for a total of 25 years, 2.5PPD (50 pack years).  Denies any active alcohol or drug use / abuse.

## 2021-06-12 NOTE — H&P ADULT - PROBLEM SELECTOR PLAN 1
ISRAEL 6/10/21 at [facility] showing EF 40-45%, Aortic valve prosthesis with Severe paravalvular leak and valvular AI, mild-moderate aortic stenosis, and moderate MR.  Admitted to [facility] 6/12/21 with CT Surgery Dr. Campbell.   Continuous telemetry, .   Continue lasix gtt.   Strict I/Os.  Maintain marie for strict urine output monitoring and urinary retention in the setting of BPH.   Continue flomax and proscar.   Supplement electrolytes to maintain K>4 and Mg>2.   Continue midodrine to support BP with aggressive diuresis.   Oxygenating well on 2L O2 via NC (On 2L Home O2).  Follow up CXR, labs, and further preop workup.   Plan to be discussed / reviewed with CT Surgery attending / team during AM rounds.

## 2021-06-12 NOTE — H&P ADULT - ASSESSMENT
76 year old male patient with a medical history of MI in 1995 (angiogram, no stents placed), OPD (2L O2 at home), s/p TAVR (03/2019 at [facility]), gout, CKD, CVA (09/2020), pulmonary HTN, initially presented to [facility] 6/1/21 with RONNELL on CKD (likely cardiorenal syndrome), urinary retention due to noncompliance with medications, with hospital course complicated by cardiogenic shock, acute hypoxemic respiratory failure secondary to metabolic acidosis and respiratory alkalosis. Patient found to have TAVR failure with ISRAEL 6/10/21 showing EF 40-45%, Aortic valve prosthesis with Severe paravalvular leak and valvular AI, mild-moderate aortic stenosis, and moderate MR. Patient was transferred to [facility] under Dr. Campbell for further workup.    76 year old male patient with a medical history of MI in 1995 (angiogram, no stents placed), COPD (2L O2 at home), s/p TAVR (03/2019 at [facility]), gout, CKD, CVA (09/2020), pulmonary HTN, initially presented to [facility] 6/1/21 with RONNELL on CKD (likely cardiorenal syndrome), urinary retention due to noncompliance with medications, with hospital course complicated by cardiogenic shock, acute hypoxemic respiratory failure secondary to metabolic acidosis and respiratory alkalosis. Patient found to have TAVR failure with ISRAEL 6/10/21 showing EF 40-45%, Aortic valve prosthesis with Severe paravalvular leak and valvular AI, mild-moderate aortic stenosis, and moderate MR. Patient was transferred to [facility] under Dr. Campbell for further workup.

## 2021-06-12 NOTE — CONSULT NOTE ADULT - ASSESSMENT
76 year old male patient with a medical history of MI in 1995 (angiogram, no stents placed), COPD (2L O2 at home), s/p TAVR (03/2019 at [facility]), gout, CKD, CVA (09/2020), pulmonary HTN, initially presented to [facility] 6/1/21 with RONNELL on CKD (likely cardiorenal syndrome), urinary retention due to noncompliance with medications, with hospital course complicated by cardiogenic shock, acute hypoxemic respiratory failure secondary to metabolic acidosis and respiratory alkalosis. Patient found to have TAVR failure with ISRAEL 6/10/21 showing EF 40-45%, Aortic valve prosthesis with Severe paravalvular leak and valvular AI, mild-moderate aortic stenosis, and moderate MR. Patient was transferred to [facility] under Dr. Campbell for further workup. Cardiology consulted for decompensated HF and severe pulmonary hypertension.     Imaging from [facility]:  6/1: CXR showing cardiomegaly, small b/l pleural effusions, moderate pulmonary vascular congestion.  6/1: CT Brain ordered for head trauma? showing age-related cerebral and cerebellar volume loss, mild chronic vascular ischemic changes, stable biparietal arachnoid cyst and stable midline posterior fossa arachnoid cyst.   6/1: US Abdomen for elevated LFTs showing mild hepatic steatosis, mild hepatomegaly, sludge in the gallbladder, no discrete gallstones, normal biliary ducts, mild echogenic right kidney which may be seen with medical renal dz, mildly complex Right upper pole 4cm cyst with subtle internal echoes may represent hemorrhagic/proteinaceous cyst, mildly complex right midpole 2cm cyst with thin internal linear septation.   6/3: Kidney and Bladder US showing no hydronephrosis, echogenic kidneys likely from medical renal dz, prostatomegaly, and thickening of the posterior wall of the bladder with trabeculations which could be due to bladder outlet obstruction.   6/7: Kidney and Bladder US: No hydronephrosis or shadowing renal calculi. Stable b/l renal cysts.   6/7: Lower Extremity Venous Doppler with no DVT noted.   6/8: TTE showing EF 47%, dilated IVC, dilation of the ascending aorta of 4.4cm, moderate-severe pulmonary HTN, moderate-severe TR, aortic valve with severe prosthesis regurgitation and moderate prosthesis stenosis  6/9: CT Chest showing emphysema and intersitial lung dz changes, stable b/l small pleural effusions, cardiomegaly.   6/11: ISRAEL showing EF 40-45%, Aortic valve prosthesis with Severe paravalvular leak and valvular AI, mild-moderate aortic stenosis, and moderate MR.     HFrEF  - EF 40-45%  -  Aortic valve prosthesis with Severe paravalvular leak and valvular AI  - volume overloaded on exam  - severe JVD and diminished lungs   - STAT echo pending  - pBNP [id number]  - elevated transaminitis 2/2 to hepatic steatosis   - patient needs diuresis but with severe RONNELL on CKD   - s/p diuresis with decent urine output but still overloaded  - hold further lasix for now as patient got x1 dose IV Lasix this AM   - creatinine 4.44 and GFR 12, recommend nephrology consult  - goal urine output is 1-2L net negative in 24h   - ideally would resume diuresis  - also need GDMT for n/o HFrEF   - start metoprolol 25mg BID   - cannot start ACE/ARB 2/2 RONNELL on CKD  - BP is soft and patient was cardiogenic shock in [place]  - will start metoprolol today and if BP stable will start hydralazine 10 TID w/ isordil 5mg TID tomorrow for afterload reduction    Severe Pulmonary HTN   - confirmed on [facility] march/2021   - likely congestive, with equivalent response to Shawna at 80ppm- PVR was 2.09WU pre  and 1.59 post Shawna.   - Hemodynamic tables   Pressures:  Nitric Oxide  Pressures:  -- Pulmonary Artery (S/D/M): 86/36/55  Pressures:  -- Pulmonary Capillary Wedge: 49/92/48  Pressures:  NO PHASE  Pressures:  -- Pulmonary Artery (S/D/M): 78/32/52  Pressures:  -- Pulmonary Capillary Wedge: 38/55/40  - sees Dr. Sanchez  - has not been on pulm HTN meds  - echo pending   - as per CTS may require transition to ICU for swan     RONNELL on CKD  - good urine output with AM Lasix  - hold diuresis for now  - need nephrology consult  - metabolic acidosis, HCO3 is 19 w/ anion gap   - recheck labs this afternoon     TAVR Prosthesis failure   - aortic valve with severe prosthesis regurgitation and moderate prosthesis stenosis  - CTS following  - will optimize for procedures from cardiac standpoint   - no known procedures as for now, will optimize for clearance when plan is formulated    Dispo  - Will follow  - plan based upon tolerance to meds, nephro consult, and repeat labs        76 year old male patient with a medical history of MI in 1995 (angiogram, no stents placed), COPD (2L O2 at home), s/p TAVR (03/2019 at [facility]), gout, CKD, CVA (09/2020), pulmonary HTN, initially presented to [facility] 6/1/21 with RONNELL on CKD (likely cardiorenal syndrome), urinary retention due to noncompliance with medications, with hospital course complicated by cardiogenic shock, acute hypoxemic respiratory failure secondary to metabolic acidosis and respiratory alkalosis. Patient found to have TAVR failure with ISRAEL 6/10/21 showing EF 40-45%, Aortic valve prosthesis with Severe paravalvular leak and valvular AI, mild-moderate aortic stenosis, and moderate MR. Patient was transferred to [facility] under Dr. Campbell for further workup. Cardiology consulted for decompensated HF and severe pulmonary hypertension.     Imaging from [facility]:  6/1: CXR showing cardiomegaly, small b/l pleural effusions, moderate pulmonary vascular congestion.  6/1: CT Brain ordered for head trauma? showing age-related cerebral and cerebellar volume loss, mild chronic vascular ischemic changes, stable biparietal arachnoid cyst and stable midline posterior fossa arachnoid cyst.   6/1: US Abdomen for elevated LFTs showing mild hepatic steatosis, mild hepatomegaly, sludge in the gallbladder, no discrete gallstones, normal biliary ducts, mild echogenic right kidney which may be seen with medical renal dz, mildly complex Right upper pole 4cm cyst with subtle internal echoes may represent hemorrhagic/proteinaceous cyst, mildly complex right midpole 2cm cyst with thin internal linear septation.   6/3: Kidney and Bladder US showing no hydronephrosis, echogenic kidneys likely from medical renal dz, prostatomegaly, and thickening of the posterior wall of the bladder with trabeculations which could be due to bladder outlet obstruction.   6/7: Kidney and Bladder US: No hydronephrosis or shadowing renal calculi. Stable b/l renal cysts.   6/7: Lower Extremity Venous Doppler with no DVT noted.   6/8: TTE showing EF 47%, dilated IVC, dilation of the ascending aorta of 4.4cm, moderate-severe pulmonary HTN, moderate-severe TR, aortic valve with severe prosthesis regurgitation and moderate prosthesis stenosis  6/9: CT Chest showing emphysema and intersitial lung dz changes, stable b/l small pleural effusions, cardiomegaly.   6/11: ISRAEL showing EF 40-45%, Aortic valve prosthesis with Severe paravalvular leak and valvular AI, mild-moderate aortic stenosis, and moderate MR.     HFrEF  - EF 40-45%  -  Aortic valve prosthesis with Severe paravalvular leak and valvular AI  - volume overloaded on exam  - severe JVD and diminished lungs   - STAT echo pending  - pBNP [id number]  - elevated transaminitis 2/2 to hepatic steatosis   - patient needs diuresis but with severe RONNELL on CKD   - s/p diuresis with decent urine output but still overloaded  - start IV Lasix infusion @ 5mg/hr   - creatinine 4.44 and GFR 12, recommend nephrology consult  - goal urine output is 1-2L net negative in 24h   - ideally would resume diuresis  - also need GDMT for n/o HFrEF   - start metoprolol 25mg BID   - cannot start ACE/ARB 2/2 RONNELL on CKD  - BP is soft and patient was cardiogenic shock in [place]  - will start metoprolol today and if BP stable will start hydralazine 10 TID w/ isordil 5mg TID tomorrow for afterload reduction    Severe Pulmonary HTN   - confirmed on [facility] march/2021   - likely congestive, with equivalent response to Shawna at 80ppm- PVR was 2.09WU pre  and 1.59 post Shawna.   - Hemodynamic tables   Pressures:  Nitric Oxide  Pressures:  -- Pulmonary Artery (S/D/M): 86/36/55  Pressures:  -- Pulmonary Capillary Wedge: 49/92/48  Pressures:  NO PHASE  Pressures:  -- Pulmonary Artery (S/D/M): 78/32/52  Pressures:  -- Pulmonary Capillary Wedge: 38/55/40  - sees Dr. Sanchez  - has not been on pulm HTN meds  - echo pending     RONNELL on CKD  - good urine output with AM Lasix  - start lasix infusion @ 5mg/hr   - need nephrology consult  - metabolic acidosis, HCO3 is 19 w/ anion gap   - recheck labs this afternoon     TAVR Prosthesis failure   - aortic valve with severe prosthesis regurgitation and moderate prosthesis stenosis  - CTS following  - will optimize for procedures from cardiac standpoint   - no known procedures as for now, will optimize for clearance when plan is formulated    Dispo  - Will follow  - plan based upon tolerance to meds, nephro consult, and repeat labs   - start lasix infusion, monitor I/O + electrolytes

## 2021-06-12 NOTE — H&P ADULT - NEGATIVE GASTROINTESTINAL SYMPTOMS
no nausea/no vomiting/no diarrhea/no change in bowel habits/no abdominal pain/no melena/no hematochezia

## 2021-06-12 NOTE — H&P ADULT - NSHPPHYSICALEXAM_GEN_ALL_CORE
Constitutional: NAD, lying in bed on O2 via NC.   Neuro: A+O x 3, non-focal, speech clear and intact  HEENT: NC/AT, PERRL, EOMI, anicteric sclerae, oral mucosa pink and moist  Neck: supple  CV: RRR, +S1S2, +murmur  Pulm/chest: +Scattered crackles throughout b/l lung fields, no accessory muscle use noted  Abd: soft, NT, ND, +BS  Ext: CRONIN x 4, +2 pitting LE edema b/l  Skin: warm, dry, perfused  Psych: calm, appropriate affect  Lemus in place draining light yellow urine.

## 2021-06-13 LAB
ALBUMIN SERPL ELPH-MCNC: 3.9 G/DL — SIGNIFICANT CHANGE UP (ref 3.3–5.2)
ALBUMIN SERPL ELPH-MCNC: 3.9 G/DL — SIGNIFICANT CHANGE UP (ref 3.3–5.2)
ALP SERPL-CCNC: 135 U/L — HIGH (ref 40–120)
ALP SERPL-CCNC: 137 U/L — HIGH (ref 40–120)
ALT FLD-CCNC: 58 U/L — HIGH
ALT FLD-CCNC: 65 U/L — HIGH
ANION GAP SERPL CALC-SCNC: 20 MMOL/L — HIGH (ref 5–17)
ANION GAP SERPL CALC-SCNC: 21 MMOL/L — HIGH (ref 5–17)
AST SERPL-CCNC: 38 U/L — SIGNIFICANT CHANGE UP
AST SERPL-CCNC: 43 U/L — HIGH
BILIRUB DIRECT SERPL-MCNC: 0.8 MG/DL — HIGH (ref 0–0.3)
BILIRUB INDIRECT FLD-MCNC: 1.1 MG/DL — HIGH (ref 0.2–1)
BILIRUB SERPL-MCNC: 2 MG/DL — SIGNIFICANT CHANGE UP (ref 0.4–2)
BILIRUB SERPL-MCNC: 2 MG/DL — SIGNIFICANT CHANGE UP (ref 0.4–2)
BUN SERPL-MCNC: 97.8 MG/DL — HIGH (ref 8–20)
BUN SERPL-MCNC: 99.2 MG/DL — HIGH (ref 8–20)
CALCIUM SERPL-MCNC: 10.1 MG/DL — SIGNIFICANT CHANGE UP (ref 8.6–10.2)
CALCIUM SERPL-MCNC: 10.2 MG/DL — SIGNIFICANT CHANGE UP (ref 8.6–10.2)
CHLORIDE SERPL-SCNC: 98 MMOL/L — SIGNIFICANT CHANGE UP (ref 98–107)
CHLORIDE SERPL-SCNC: 98 MMOL/L — SIGNIFICANT CHANGE UP (ref 98–107)
CO2 SERPL-SCNC: 18 MMOL/L — LOW (ref 22–29)
CO2 SERPL-SCNC: 20 MMOL/L — LOW (ref 22–29)
COVID-19 SPIKE DOMAIN AB INTERP: POSITIVE
COVID-19 SPIKE DOMAIN ANTIBODY RESULT: >250 U/ML — HIGH
CREAT SERPL-MCNC: 3.87 MG/DL — HIGH (ref 0.5–1.3)
CREAT SERPL-MCNC: 3.91 MG/DL — HIGH (ref 0.5–1.3)
GLUCOSE SERPL-MCNC: 112 MG/DL — HIGH (ref 70–99)
GLUCOSE SERPL-MCNC: 121 MG/DL — HIGH (ref 70–99)
HCT VFR BLD CALC: 38.7 % — LOW (ref 39–50)
HCV AB S/CO SERPL IA: 0.16 S/CO — SIGNIFICANT CHANGE UP (ref 0–0.99)
HCV AB SERPL-IMP: SIGNIFICANT CHANGE UP
HGB BLD-MCNC: 12.4 G/DL — LOW (ref 13–17)
INR BLD: 1.8 RATIO — HIGH (ref 0.88–1.16)
MAGNESIUM SERPL-MCNC: 2.5 MG/DL — SIGNIFICANT CHANGE UP (ref 1.6–2.6)
MCHC RBC-ENTMCNC: 30.3 PG — SIGNIFICANT CHANGE UP (ref 27–34)
MCHC RBC-ENTMCNC: 32 GM/DL — SIGNIFICANT CHANGE UP (ref 32–36)
MCV RBC AUTO: 94.6 FL — SIGNIFICANT CHANGE UP (ref 80–100)
MRSA PCR RESULT.: SIGNIFICANT CHANGE UP
NT-PROBNP SERPL-SCNC: HIGH PG/ML (ref 0–300)
PHOSPHATE SERPL-MCNC: 5 MG/DL — HIGH (ref 2.4–4.7)
PLATELET # BLD AUTO: 118 K/UL — LOW (ref 150–400)
POTASSIUM SERPL-MCNC: 3.8 MMOL/L — SIGNIFICANT CHANGE UP (ref 3.5–5.3)
POTASSIUM SERPL-MCNC: 4 MMOL/L — SIGNIFICANT CHANGE UP (ref 3.5–5.3)
POTASSIUM SERPL-SCNC: 3.8 MMOL/L — SIGNIFICANT CHANGE UP (ref 3.5–5.3)
POTASSIUM SERPL-SCNC: 4 MMOL/L — SIGNIFICANT CHANGE UP (ref 3.5–5.3)
PROT SERPL-MCNC: 7.3 G/DL — SIGNIFICANT CHANGE UP (ref 6.6–8.7)
PROT SERPL-MCNC: 7.4 G/DL — SIGNIFICANT CHANGE UP (ref 6.6–8.7)
PROTHROM AB SERPL-ACNC: 20.3 SEC — HIGH (ref 10.6–13.6)
RBC # BLD: 4.09 M/UL — LOW (ref 4.2–5.8)
RBC # FLD: 20.8 % — HIGH (ref 10.3–14.5)
S AUREUS DNA NOSE QL NAA+PROBE: DETECTED
SARS-COV-2 IGG+IGM SERPL QL IA: >250 U/ML — HIGH
SARS-COV-2 IGG+IGM SERPL QL IA: POSITIVE
SODIUM SERPL-SCNC: 136 MMOL/L — SIGNIFICANT CHANGE UP (ref 135–145)
SODIUM SERPL-SCNC: 138 MMOL/L — SIGNIFICANT CHANGE UP (ref 135–145)
WBC # BLD: 6.3 K/UL — SIGNIFICANT CHANGE UP (ref 3.8–10.5)
WBC # FLD AUTO: 6.3 K/UL — SIGNIFICANT CHANGE UP (ref 3.8–10.5)

## 2021-06-13 PROCEDURE — 99232 SBSQ HOSP IP/OBS MODERATE 35: CPT

## 2021-06-13 PROCEDURE — 99233 SBSQ HOSP IP/OBS HIGH 50: CPT

## 2021-06-13 PROCEDURE — 93880 EXTRACRANIAL BILAT STUDY: CPT | Mod: 26

## 2021-06-13 RX ORDER — MULTIVIT-MIN/FERROUS GLUCONATE 9 MG/15 ML
1 LIQUID (ML) ORAL DAILY
Refills: 0 | Status: DISCONTINUED | OUTPATIENT
Start: 2021-06-13 | End: 2021-06-23

## 2021-06-13 RX ORDER — ASCORBIC ACID 60 MG
500 TABLET,CHEWABLE ORAL DAILY
Refills: 0 | Status: DISCONTINUED | OUTPATIENT
Start: 2021-06-13 | End: 2021-06-23

## 2021-06-13 RX ORDER — SODIUM BICARBONATE 1 MEQ/ML
650 SYRINGE (ML) INTRAVENOUS EVERY 12 HOURS
Refills: 0 | Status: DISCONTINUED | OUTPATIENT
Start: 2021-06-13 | End: 2021-06-16

## 2021-06-13 RX ORDER — HEPARIN SODIUM 5000 [USP'U]/ML
5000 INJECTION INTRAVENOUS; SUBCUTANEOUS EVERY 12 HOURS
Refills: 0 | Status: DISCONTINUED | OUTPATIENT
Start: 2021-06-13 | End: 2021-06-16

## 2021-06-13 RX ORDER — POLYETHYLENE GLYCOL 3350 17 G/17G
17 POWDER, FOR SOLUTION ORAL DAILY
Refills: 0 | Status: DISCONTINUED | OUTPATIENT
Start: 2021-06-13 | End: 2021-06-23

## 2021-06-13 RX ORDER — SENNA PLUS 8.6 MG/1
2 TABLET ORAL AT BEDTIME
Refills: 0 | Status: DISCONTINUED | OUTPATIENT
Start: 2021-06-13 | End: 2021-06-23

## 2021-06-13 RX ORDER — PSYLLIUM SEED (WITH DEXTROSE)
1 POWDER (GRAM) ORAL
Refills: 0 | Status: DISCONTINUED | OUTPATIENT
Start: 2021-06-13 | End: 2021-06-23

## 2021-06-13 RX ADMIN — FINASTERIDE 5 MILLIGRAM(S): 5 TABLET, FILM COATED ORAL at 14:25

## 2021-06-13 RX ADMIN — Medication 500 MILLIGRAM(S): at 11:49

## 2021-06-13 RX ADMIN — Medication 1 TABLET(S): at 11:49

## 2021-06-13 RX ADMIN — POLYETHYLENE GLYCOL 3350 17 GRAM(S): 17 POWDER, FOR SOLUTION ORAL at 14:25

## 2021-06-13 RX ADMIN — BUDESONIDE AND FORMOTEROL FUMARATE DIHYDRATE 2 PUFF(S): 160; 4.5 AEROSOL RESPIRATORY (INHALATION) at 07:50

## 2021-06-13 RX ADMIN — SENNA PLUS 2 TABLET(S): 8.6 TABLET ORAL at 21:54

## 2021-06-13 RX ADMIN — Medication 1 PACKET(S): at 11:48

## 2021-06-13 RX ADMIN — MIDODRINE HYDROCHLORIDE 5 MILLIGRAM(S): 2.5 TABLET ORAL at 19:35

## 2021-06-13 RX ADMIN — SODIUM CHLORIDE 3 MILLILITER(S): 9 INJECTION INTRAMUSCULAR; INTRAVENOUS; SUBCUTANEOUS at 05:55

## 2021-06-13 RX ADMIN — ATORVASTATIN CALCIUM 10 MILLIGRAM(S): 80 TABLET, FILM COATED ORAL at 21:56

## 2021-06-13 RX ADMIN — MIDODRINE HYDROCHLORIDE 5 MILLIGRAM(S): 2.5 TABLET ORAL at 06:06

## 2021-06-13 RX ADMIN — Medication 81 MILLIGRAM(S): at 11:49

## 2021-06-13 RX ADMIN — HEPARIN SODIUM 5000 UNIT(S): 5000 INJECTION INTRAVENOUS; SUBCUTANEOUS at 19:35

## 2021-06-13 RX ADMIN — Medication 650 MILLIGRAM(S): at 21:54

## 2021-06-13 RX ADMIN — SODIUM CHLORIDE 3 MILLILITER(S): 9 INJECTION INTRAMUSCULAR; INTRAVENOUS; SUBCUTANEOUS at 20:26

## 2021-06-13 RX ADMIN — SODIUM CHLORIDE 3 MILLILITER(S): 9 INJECTION INTRAMUSCULAR; INTRAVENOUS; SUBCUTANEOUS at 14:29

## 2021-06-13 RX ADMIN — TAMSULOSIN HYDROCHLORIDE 0.4 MILLIGRAM(S): 0.4 CAPSULE ORAL at 21:55

## 2021-06-13 RX ADMIN — MIDODRINE HYDROCHLORIDE 5 MILLIGRAM(S): 2.5 TABLET ORAL at 11:49

## 2021-06-13 NOTE — PROGRESS NOTE ADULT - SUBJECTIVE AND OBJECTIVE BOX
[facility] DIVISION OF KIDNEY DISEASES AND HYPERTENSION -- FOLLOW UP NOTE  --------------------------------------------------------------------------------  Chief Complaint: kathy on ckd    24 hour events/subjective:  no acute event  pt seen and examined; denies any acute complaint      PAST HISTORY  --------------------------------------------------------------------------------  No significant changes to PMH, PSH, FHx, SHx, unless otherwise noted    ALLERGIES & MEDICATIONS  --------------------------------------------------------------------------------  Allergies    No Known Drug Allergies  strawberry (Anaphylaxis)    Intolerances      Standing Inpatient Medications  ascorbic acid 500 milliGRAM(s) Oral daily  aspirin enteric coated 81 milliGRAM(s) Oral daily  atorvastatin 10 milliGRAM(s) Oral at bedtime  budesonide 160 MICROgram(s)/formoterol 4.5 MICROgram(s) Inhaler 2 Puff(s) Inhalation two times a day  finasteride 5 milliGRAM(s) Oral daily  furosemide Infusion 5 mG/Hr IV Continuous <Continuous>  midodrine. 5 milliGRAM(s) Oral three times a day  multivitamin/minerals 1 Tablet(s) Oral daily  psyllium Powder 1 Packet(s) Oral two times a day  senna 2 Tablet(s) Oral at bedtime  sodium chloride 0.9% lock flush 3 milliLiter(s) IV Push every 8 hours  tamsulosin 0.4 milliGRAM(s) Oral at bedtime    PRN Inpatient Medications  ALBUTerol    90 MICROgram(s) HFA Inhaler 2 Puff(s) Inhalation every 6 hours PRN  bisacodyl 5 milliGRAM(s) Oral every 12 hours PRN  polyethylene glycol 3350 17 Gram(s) Oral daily PRN      REVIEW OF SYSTEMS  --------------------------------------------------------------------------------  Gen: No weight changes, fatigue, fevers/chills, weakness  Skin: No rashes  Head/Eyes/Ears/Mouth: No headache; Normal hearing; Normal vision w/o blurriness; No sinus pain/discomfort, sore throat  Respiratory: No dyspnea, cough, wheezing, hemoptysis  CV: No chest pain, PND, orthopnea  GI: No abdominal pain, diarrhea, constipation, nausea, vomiting, melena, hematochezia  : No increased frequency, dysuria, hematuria, nocturia  MSK: No joint pain/swelling; no back pain; no edema  Neuro: No dizziness/lightheadedness, weakness, seizures, numbness, tingling  Heme: No easy bruising or bleeding  Endo: No heat/cold intolerance  Psych: No significant nervousness, anxiety, stress, depression    All other systems were reviewed and are negative, except as noted.    VITALS/PHYSICAL EXAM  --------------------------------------------------------------------------------  T(C): 36.1 (06-13-21 @ 12:00), Max: 36.6 (06-12-21 @ 20:30)  HR: 80 (06-13-21 @ 14:00) (74 - 86)  BP: 126/62 (06-13-21 @ 14:00) (110/51 - 136/59)  RR: 9 (06-13-21 @ 14:00) (9 - 36)  SpO2: 98% (06-13-21 @ 14:00) (93% - 100%)  Wt(kg): --  Height (cm): 182.9 (06-12-21 @ 00:32)  Weight (kg): 100.1 (06-12-21 @ 00:32)  BMI (kg/m2): 29.9 (06-12-21 @ 00:32)  BSA (m2): 2.22 (06-12-21 @ 00:32)      06-12-21 @ 07:01  -  06-13-21 @ 07:00  --------------------------------------------------------  IN: 130 mL / OUT: 3415 mL / NET: -3285 mL    06-13-21 @ 07:01  -  06-13-21 @ 14:30  --------------------------------------------------------  IN: 12.5 mL / OUT: 270 mL / NET: -257.5 mL      Physical Exam:  	Gen: NAD  	HEENT: PERRL, supple neck, clear oropharynx  	Pulm: CTA B/L  	CV: RRR, S1S2; no rub  	Back: No spinal or CVA tenderness; no sacral edema  	Abd: +BS, soft, nontender/nondistended  	: No suprapubic tenderness  	UE: Warm, no edema; no asterixis  	LE: Warm, no edema  	Neuro: No focal deficits, intact gait  	Psych: Normal affect and mood  	Skin: Warm, 	    LABS/STUDIES  --------------------------------------------------------------------------------              12.4   6.30  >-----------<  118      [06-13-21 @ 03:05]              38.7     136  |  98  |  97.8  ----------------------------<  121      [06-13-21 @ 03:05]  4.0   |  18.0  |  3.91        Ca     10.1     [06-13-21 @ 03:05]      Mg     2.5     [06-13-21 @ 03:05]      Phos  5.0     [06-13-21 @ 03:05]    TPro  7.4  /  Alb  3.9  /  TBili  2.0  /  DBili  0.8  /  AST  43  /  ALT  65  /  AlkPhos  137  [06-13-21 @ 03:05]    PT/INR: PT 20.3 , INR 1.80       [06-13-21 @ 03:05]  PTT: 43.8       [06-12-21 @ 01:55]      Creatinine Trend:  SCr 3.91 [06-13 @ 03:05]  SCr 4.21 [06-12 @ 17:57]  SCr 4.44 [06-12 @ 01:55]        TSH 2.31      [06-12-21 @ 01:55]    HCV 0.16, Nonreact      [06-13-21 @ 08:39]

## 2021-06-13 NOTE — PROGRESS NOTE ADULT - PROBLEM SELECTOR PLAN 6
6/3: Kidney and Bladder US showing no hydronephrosis, echogenic kidneys likely from medical renal dz, prostatomegaly, and thickening of the posterior wall of the bladder with trabeculations which could be due to bladder outlet obstruction.   6/7: Kidney and Bladder US: No hydronephrosis or shadowing renal calculi. Stable b/l renal cysts.   Nephrology consulted  Trend BUN/Cr while on Lasix gtt.   Monitor urine output.

## 2021-06-13 NOTE — PROGRESS NOTE ADULT - SUBJECTIVE AND OBJECTIVE BOX
[facility] DIVISION OF KIDNEY DISEASES AND HYPERTENSION -- FOLLOW UP NOTE  --------------------------------------------------------------------------------  Chief Complaint:  kathy on ckd    24 hour events[facility]subjective[facility]  [facility]  [facility] on lasix [facility]tt        PAST HISTORY  --------------------------------------------------------------------------------  No significant changes to PMH, PSH, FHx, SHx, unless otherwise noted    ALLERGIES & MEDICATIONS  --------------------------------------------------------------------------------  Allergies    No Known Drug Allergies  strawberry (Anaphylaxis)    Intolerances      Standing Inpatient Medications  ascorbic acid 500 milliGRAM(s) Oral daily  aspirin enteric coated 81 milliGRAM(s) Oral daily  atorvastatin 10 milliGRAM(s) Oral at bedtime  budesonide 160 MICROgram(s)/formoterol 4.5 MICROgram(s) Inhaler 2 Puff(s) Inhalation two times a day  finasteride 5 milliGRAM(s) Oral daily  furosemide Infusion 5 mG/Hr IV Continuous <Continuous>  midodrine. 5 milliGRAM(s) Oral three times a day  multivitamin/minerals 1 Tablet(s) Oral daily  psyllium Powder 1 Packet(s) Oral two times a day  senna 2 Tablet(s) Oral at bedtime  sodium chloride 0.9% lock flush 3 milliLiter(s) IV Push every 8 hours  tamsulosin 0.4 milliGRAM(s) Oral at bedtime    PRN Inpatient Medications  ALBUTerol    90 MICROgram(s) HFA Inhaler 2 Puff(s) Inhalation every 6 hours PRN  bisacodyl 5 milliGRAM(s) Oral every 12 hours PRN  polyethylene glycol 3350 17 Gram(s) Oral daily PRN      REVIEW OF SYSTEMS  --------------------------------------------------------------------------------  Gen: No weight changes, fatigue, fevers/chills, weakness  Skin: No rashes  Head/Eyes/Ears/Mouth: No headache; Normal hearing; Normal vision w/o blurriness; No sinus pain/discomfort, sore throat  Respiratory: No dyspnea, cough, wheezing, hemoptysis  CV: No chest pain, PND, orthopnea  GI: No abdominal pain, diarrhea, constipation, nausea, vomiting, melena, hematochezia  : No increased frequency, dysuria, hematuria, nocturia  MSK: No joint pain/swelling; no back pain; no edema  Neuro: No dizziness/lightheadedness, weakness, seizures, numbness, tingling  Heme: No easy bruising or bleeding  Endo: No heat/cold intolerance  Psych: No significant nervousness, anxiety, stress, depression    All other systems were reviewed and are negative, except as noted.    VITALS/PHYSICAL EXAM  --------------------------------------------------------------------------------  T(C): 36.1 (06-13-21 @ 07:00), Max: 36.6 (06-12-21 @ 20:30)  HR: 83 (06-13-21 @ 07:51) (77 - 86)  BP: 120/56 (06-13-21 @ 06:00) (115/62 - 136/59)  RR: 22 (06-13-21 @ 06:00) (12 - 22)  SpO2: 100% (06-13-21 @ 07:51) (93% - 100%)  Wt(kg): --  Height (cm): 182.9 (06-12-21 @ 00:32)  Weight (kg): 100.1 (06-12-21 @ 00:32)  BMI (kg/m2): 29.9 (06-12-21 @ 00:32)  BSA (m2): 2.22 (06-12-21 @ 00:32)      06-12-21 @ 07:01  -  06-13-21 @ 07:00  --------------------------------------------------------  IN: 130 mL / OUT: 3415 mL / NET: -3285 mL      Physical Exam:  	Gen: NAD, well-appearing  	HEENT: PERRL, supple neck, clear oropharynx  	Pulm: CTA B/L  	CV: RRR, S1S2; no rub  	Back: No spinal or CVA tenderness; no sacral edema  	Abd: +BS, soft, nontender/nondistended  	: No suprapubic tenderness  	UE: Warm, FROM, no clubbing, intact strength; no edema; no asterixis  	LE: Warm, FROM, no clubbing, intact strength; no edema  	Neuro: No focal deficits, intact gait  	Psych: Normal affect and mood  	Skin: Warm, without rashes  	Vascular access:    LABS/STUDIES  --------------------------------------------------------------------------------              12.4   6.30  >-----------<  118      [06-13-21 @ 03:05]              38.7     136  |  98  |  97.8  ----------------------------<  121      [06-13-21 @ 03:05]  4.0   |  18.0  |  3.91        Ca     10.1     [06-13-21 @ 03:05]      Mg     2.5     [06-13-21 @ 03:05]      Phos  5.0     [06-13-21 @ 03:05]    TPro  7.4  /  Alb  3.9  /  TBili  2.0  /  DBili  0.8  /  AST  43  /  ALT  65  /  AlkPhos  137  [06-13-21 @ 03:05]    PT/INR: PT 20.3 , INR 1.80       [06-13-21 @ 03:05]  PTT: 43.8       [06-12-21 @ 01:55]      Creatinine Trend:  SCr 3.91 [06-13 @ 03:05]  SCr 4.21 [06-12 @ 17:57]  SCr 4.44 [06-12 @ 01:55]        TSH 2.31      [06-12-21 @ 01:55]

## 2021-06-13 NOTE — PROGRESS NOTE ADULT - SUBJECTIVE AND OBJECTIVE BOX
Subjective:  75yo very pleasant M resting comfortable, no c/o pain.      HPI:  76 year old male patient with a medical history of MI in  (angiogram, no stents placed), COPD (2L O2 at home), s/p TAVR (2019 at [facility]), gout, CKD, CVA (2020), pulmonary HTN, initially presented to [facility] 21 with RONNELL on CKD (likely cardiorenal syndrome), urinary retention due to noncompliance with medications, with hospital course complicated by cardiogenic shock, acute hypoxemic respiratory failure secondary to metabolic acidosis and respiratory alkalosis. Patient found to have TAVR failure with ISRAEL 6/10/21 showing EF 40-45%, Aortic valve prosthesis with Severe paravalvular leak and valvular AI, mild-moderate aortic stenosis, and moderate MR. Patient was transferred to [facility] under Dr. Campbell for further workup.     Imaging from [facility]:  : CXR shwoing cardiomegaly, small b/l pleural effusions, moderate pulmonary vascular congestion.  : CT Brain ordered for head trauma? showing age-related cerebral and cerebellar volume loss, mild chronic vascular ischemic changes, stable biparietal arachnoid cyst and stable midline posterior fossa arachnoid cyst.   : US Abdomen for elevated LFTs showing mild hepatic steatosis, mild hepatomegaly, sludge in the gallbladder, no discrete gallstones, normal biliary ducts, mild echogenic right kidney which may be seen with medical renal dz, mildly complex Right upper pole 4cm cyst with subtle internal echoes may represent hemorrhagic/proteinaceous cyst, mildly complex right midpole 2cm cyst with thin internal linear septation.   6/3: Kidney and Bladder US showing no hydronephrosis, echogenic kidneys likely from medical renal dz, prostatomegaly, and thickening of the posterior wall of the bladder with trabeculations which could be due to bladder outlet obstruction.   : Kidney and Bladder US: No hydronephrosis or shadowing renal calculi. Stable b/l renal cysts.   : Lower Extremity Venous Doppler with no DVT noted.   : TTE showing EF 47%, dilated IVC, dilation of the ascending aorta of 4.4cm, moderate-severe pulmonary HTN, moderate-severe TR, aortic valve with severe prosthesis regurgitation and moderate prosthesis stenosis  : CT Chest showing emphysema and intersitial lung dz changes, stable b/l small pleural effusions, cardiomegaly.   : ISRAEL showing EF 40-45%, Aortic valve prosthesis with Severe paravalvular leak and valvular AI, mild-moderate aortic stenosis, and moderate MR.  (2021 01:36)          PAST MEDICAL & SURGICAL HISTORY:  Pulmonary hypertension    Hypertension    Hyperlipidemia    H/O aortic valve stenosis  s/p TAVR 2019 at [place]    CVA (cerebrovascular accident)  MCA CVA with tPA and thrombectomy    Chronic kidney disease    Prediabetes    Mitral valve regurgitation    CAD in native artery    Aneurysm of aortic root  thoracic aortic aneurysm, without ruptur    Benign prostatic hyperplasia    Gout    History of transcatheter aortic valve replacement (TAVR)            MEDICATIONS  (STANDING):  aspirin enteric coated 81 milliGRAM(s) Oral daily  atorvastatin 10 milliGRAM(s) Oral at bedtime  budesonide 160 MICROgram(s)/formoterol 4.5 MICROgram(s) Inhaler 2 Puff(s) Inhalation two times a day  finasteride 5 milliGRAM(s) Oral daily  furosemide Infusion 5 mG/Hr (2.5 mL/Hr) IV Continuous <Continuous>  midodrine. 5 milliGRAM(s) Oral three times a day  sodium chloride 0.9% lock flush 3 milliLiter(s) IV Push every 8 hours  tamsulosin 0.4 milliGRAM(s) Oral at bedtime    MEDICATIONS  (PRN):  ALBUTerol    90 MICROgram(s) HFA Inhaler 2 Puff(s) Inhalation every 6 hours PRN Shortness of Breath and/or Wheezing          Allergies    No Known Drug Allergies  strawberry (Anaphylaxis)    Intolerances          WEIGHTS:  Daily     Daily Weight in k.5 (2021 05:39)  Admit Wt: Drug Dosing Weight  Height (cm): 182.9 (2021 00:32)  Weight (kg): 100.1 (2021 00:32)  BMI (kg/m2): 29.9 (2021 00:32)  BSA (m2): 2.22 (2021 00:32)  I&O's Summary    2021 07:01  -  2021 07:00  --------------------------------------------------------  IN: 120 mL / OUT: 450 mL / NET: -330 mL    2021 07:01  -  2021 02:12  --------------------------------------------------------  IN: 115 mL / OUT: 3000 mL / NET: -2885 mL        VITAL SIGNS:  ICU Vital Signs Last 24 Hrs  T(C): 36.6 (2021 00:17), Max: 36.6 (2021 05:16)  T(F): 97.8 (2021 00:17), Max: 97.8 (2021 05:16)  HR: 78 (:00) (77 - 86)  BP: 120/56 (2021 01:00) (115/62 - 136/59)  BP(mean): 81 (:00) (81 - 88)  ABP: --  ABP(mean): --  RR: 16 (2021 01:00) (15 - 18)  SpO2: 94% (2021 01:00) (93% - 99%)        All laboratory results, radiology and medications reviewed.    LABS:      138  |  97<L>  |  97.6<H>  ----------------------------<  104<H>  4.3   |  19.0<L>  |  4.21<H>    Ca    10.0      2021 17:57  Mg     2.6         TPro  7.3  /  Alb  3.9  /  TBili  1.8  /  DBili  x   /  AST  52<H>  /  ALT  75<H>  /  AlkPhos  141<H>                                   12.4   6.63  )-----------( 112      ( 2021 01:55 )             39.6          PT/INR - ( 2021 01:55 )   PT: 19.9 sec;   INR: 1.76 ratio         PTT - ( 2021 01:55 )  PTT:43.8 sec          CAPILLARY BLOOD GLUCOSE                 PHYSICAL EXAM:  General:  thin, no acute distress  Neurology:  alert and oriented X 4, nonfocal, no gross deficits  Respiratory:  clear to auscultation bilaterally  CV:  regular rate and rhythm, normal S1 S2  Abdomen:  soft, nontender, nondistended, positive bowel sounds  Extremities:  warm, well perfused, no edema +DP pulses

## 2021-06-13 NOTE — PROGRESS NOTE ADULT - PROBLEM SELECTOR PLAN 10
SCDs for DVT prophylaxis. Chemical AC held in preop setting until plan further discussed with surgeon this AM.   No GI prophylaxis required at this time.

## 2021-06-13 NOTE — PROGRESS NOTE ADULT - SUBJECTIVE AND OBJECTIVE BOX
[facility] RNOOPFJELX-AIAE-D            [facility]                          [address] [place] [address]                       Phone: [phone]. Fax:[phone]                      ________________________________________________    HPI:  76 year old male patient with a medical history of MI in  (angiogram, no stents placed), COPD (2L O2 at home), s/p TAVR (2019 at [facility]), gout, CKD, CVA (2020), pulmonary HTN, initially presented to [facility] 21 with RONNELL on CKD (likely cardiorenal syndrome), urinary retention due to noncompliance with medications, with hospital course complicated by cardiogenic shock, acute hypoxemic respiratory failure secondary to metabolic acidosis and respiratory alkalosis. Patient found to have TAVR failure with ISRAEL 6/10/21 showing EF 40-45%, Aortic valve prosthesis with Severe paravalvular leak and valvular AI, mild-moderate aortic stenosis, and moderate MR. Patient was transferred to [facility] under Dr. Campbell for further workup.     Imaging from [facility]:  : CXR shwoing cardiomegaly, small b/l pleural effusions, moderate pulmonary vascular congestion.  : CT Brain ordered for head trauma? showing age-related cerebral and cerebellar volume loss, mild chronic vascular ischemic changes, stable biparietal arachnoid cyst and stable midline posterior fossa arachnoid cyst.   : US Abdomen for elevated LFTs showing mild hepatic steatosis, mild hepatomegaly, sludge in the gallbladder, no discrete gallstones, normal biliary ducts, mild echogenic right kidney which may be seen with medical renal dz, mildly complex Right upper pole 4cm cyst with subtle internal echoes may represent hemorrhagic/proteinaceous cyst, mildly complex right midpole 2cm cyst with thin internal linear septation.   6/3: Kidney and Bladder US showing no hydronephrosis, echogenic kidneys likely from medical renal dz, prostatomegaly, and thickening of the posterior wall of the bladder with trabeculations which could be due to bladder outlet obstruction.   : Kidney and Bladder US: No hydronephrosis or shadowing renal calculi. Stable b/l renal cysts.   : Lower Extremity Venous Doppler with no DVT noted.   : TTE showing EF 47%, dilated IVC, dilation of the ascending aorta of 4.4cm, moderate-severe pulmonary HTN, moderate-severe TR, aortic valve with severe prosthesis regurgitation and moderate prosthesis stenosis  : CT Chest showing emphysema and intersitial lung dz changes, stable b/l small pleural effusions, cardiomegaly.   : ISRAEL showing EF 40-45%, Aortic valve prosthesis with Severe paravalvular leak and valvular AI, mild-moderate aortic stenosis, and moderate MR.  (2021 01:36)    HOME MEDICATIONS:  Advair Diskus 100 mcg-50 mcg inhalation powder: 1 puff(s) inhaled 2 times a day (26 Mar 2021 15:32)  Albuterol (Eqv-Proventil HFA) 90 mcg/inh inhalation aerosol: 2 puff(s) inhaled every 4 hours, As Needed (26 Mar 2021 15:32)  allopurinol 100 mg oral tablet: 1 tab(s) orally 2 times a day (26 Mar 2021 15:32)  amLODIPine 2.5 mg oral tablet: 1 tab(s) orally once a day (26 Mar 2021 15:32)  Aspir 81 oral delayed release tablet: 1 tab(s) orally once a day (26 Mar 2021 15:32)  atorvastatin 10 mg oral tablet: 1 tab(s) orally once a day (26 Mar 2021 15:32)  Centrum Silver oral tablet: 1 tab(s) orally once a day (26 Mar 2021 15:32)  [facility] Advanced Immune Defense oral capsule: 1 cap(s) orally 2 times a day (26 Mar 2021 15:32)  ipratropium-albuterol 0.5 mg-2.5 mg/3 mLinhalation solution: 3 milliliter(s) inhaled 4 times a day, As Needed (26 Mar 2021 15:32)  metoprolol tartrate 100 mg oral tablet: 1 tab(s) orally 2 times a day (26 Mar 2021 15:32)  tamsulosin 0.4 mg oral capsule: 1 cap(s) orally once a day (26 Mar 2021 15:32)  Vitamin D3 1000 intl units (25 mcg) oral capsule: orally once a day (26 Mar 2021 15:32)      ROS: All review of systems negative unless indicated otherwise below.                         PHYSICAL EXAM:    GENERAL: NAD  NECK: severe JVD  NERVOUS SYSTEM:  Alert & Oriented X3, non focal neuro exam.   CHEST/LUNG: diminished lungs, No rales, rhonchi, wheezing, or rubs  HEART: Regular rate and rhythm; s1 and s2 auscultated, No murmurs, rubs, or gallops  ABDOMEN: Soft, Nontender, Nondistended; Bowel sounds present and normoactive.   EXTREMITIES:  2+ Peripheral Pulses, No clubbing, cyanosis. LE Edema +1 B/L      Vital Signs Last 24 Hrs  T(C): 36.1 (2021 12:00), Max: 36.6 (2021 20:30)  T(F): 97 (2021 12:00), Max: 97.8 (2021 20:30)  HR: 76 (2021 16:00) (74 - 85)  BP: 113/58 (2021 16:00) (110/51 - 136/59)  BP(mean): 83 (2021 16:00) (74 - 89)  RR: 32 (2021 16:00) (9 - 38)  SpO2: 99% (2021 16:00) (93% - 100%)                                                   DAILY WEIGHTS - 48 HOUR TREND     Daily Weight in k.5 (2021 05:39)                             INTAKE AND OUTPUT - 48 HOUR TREND     21 @ 07:  -  21 @ 07:00  --------------------------------------------------------  IN:  Total IN: 0 mL    OUT:    Indwelling Catheter - Urethral (mL): 450 mL  Total OUT: 450 mL    Total NET: -450 mL      21 @ 07:01  -  21 @ 07:00  --------------------------------------------------------  IN:  Total IN: 0 mL    OUT:    Indwelling Catheter - Urethral (mL): 3415 mL  Total OUT: 3415 mL    Total NET: -3415 mL                                                           LAB RESULTS                 COMPLETE BLOOD COUNT( 2021 03:05 )                            12.4 g/dL<L>  6.30 K/uL )---------------( 118 K/uL<L>                        38.7 %<L>      Automated Differential     Auto Basophil # - X      Auto Basophil % - X      Auto Eosinophil # - X      Auto Eosinophil % - X      Auto Immature Granulocyte # - X      Auto Immature Granulocyte % - X      Auto Lymphocyte # - X      Auto Lymphocyte % - X      Auto Monocyte # - X      Auto Monocyte % - X      Auto Neutrophil # - X      Auto Neutrophil % - X                                      CHEMISTRY                 Basic Metabolic Panel (21 @ 03:05)    136  |  98  |  97.8<H>  ----------------------------<  121<H>  4.0   |  18.0<L>  |  3.91<H>    Ca    10.1      2021 03:05  Phos  5.0     06-  Mg     2.5     06-13                    Liver Functions (21 @ 03:05))  TPro  7.4  /  Alb  3.9  /  TBili  2.0  /  DBili  0.8  /  AST  43  /  ALT  65  /  AlkPhos  137     PT/INR/PTT ( 2021 03:05 )                        :                       :      20.3         :       X                     .        .                   .              .           .       1.80        .                                                                 Cardiac Enzymes   ( 2021 03:05 )  Troponin T  X    ,  CPK  X    , CKMB  X    , BNP 18735<H>    , ( 2021 01:55 )  Troponin T  X    ,  CPK  X    , CKMB  X    , BNP 30841<H>                             Current Admission Active Medications    ALBUTerol    90 MICROgram(s) HFA Inhaler 2 Puff(s) Inhalation every 6 hours PRN Shortness of Breath and/or Wheezing  ascorbic acid 500 milliGRAM(s) Oral daily  aspirin enteric coated 81 milliGRAM(s) Oral daily  atorvastatin 10 milliGRAM(s) Oral at bedtime  bisacodyl 5 milliGRAM(s) Oral every 12 hours PRN Constipation  budesonide 160 MICROgram(s)/formoterol 4.5 MICROgram(s) Inhaler 2 Puff(s) Inhalation two times a day  finasteride 5 milliGRAM(s) Oral daily  furosemide Infusion 5 mG/Hr (2.5 mL/Hr) IV Continuous <Continuous>  midodrine. 5 milliGRAM(s) Oral three times a day  multivitamin/minerals 1 Tablet(s) Oral daily  polyethylene glycol 3350 17 Gram(s) Oral daily PRN Constipation  psyllium Powder 1 Packet(s) Oral two times a day  senna 2 Tablet(s) Oral at bedtime  sodium chloride 0.9% lock flush 3 milliLiter(s) IV Push every 8 hours  tamsulosin 0.4 milliGRAM(s) Oral at bedtime                           CARDIOLOGY RESULTS: Official Report/Preliminary Verbal Reports  < from: Cardiac Cath Lab - Adult (21 @ 10:20) >  HEMODYNAMICS: There is severe biventricular failure. There is severe  pulmonary hypertension. Following administration of inhaled nitric oxide (  80ppm), there was a minimal overall pulmonary hemodynamic response. A  prominant "v" wave is noted on the PCW tracing suggestive of MR or  Diastolic non-compliance.  COMPLICATIONS: No complications occurred during the cath lab visit.  DIAGNOSTIC IMPRESSIONS: Severe Pulmonary Hypertension, likely congestive,  with equivalent response to Shawna at 80ppm- PVR was 2.09WU pre and 1.59 post  Shawna. 2. Severe congestive heart failure. 3. A TAV in the aortic position  was noted moving in consonance with the surrounding structures and with  acceptable hemodynamics (Mean Aortic PG is 16mmHg and an PILAR over 1cm2).  DIAGNOSTIC RECOMMENDATIONS: Aggressive diuresis. 2. Would consider Cardio  MEMS for better fluid management in the out patient setting and to  minimize repeat hospitalizations. 3. Would re-assess severity of the MR  after CHF optimization.  INTERVENTIONAL IMPRESSIONS: Severe Pulmonary Hypertension, likely  congestive, with equivalent response to Shawna at 80ppm- PVR was 2.09WU pre  and 1.59 post Shawna. 2. Severe congestive heart failure. 3. A TAV in the  aortic position was noted moving in consonance with the surrounding  structures and with acceptable hemodynamics (Mean Aortic PG is 16mmHg and  an PILAR over 1cm2).  INTERVENTIONAL RECOMMENDATIONS: Aggressive diuresis. 2. Would consider  Cardio MEMS for better fluid management in the out patient setting and to  minimize repeat hospitalizations. 3. Would re-assess severity of the MR  after CHF optimization.  Prepared and signed by  Herson Jorgensen M.D.  Signed 2021 12:01:05    < end of copied text >    < from: TTE Echo Complete w/ Contrast w/ Doppler (21 @ 15:04) >    Summary:   1. Left ventricular ejection fraction, by visual estimation, is 30 to 35%.   2. Moderately decreased global left ventricular systolic function.   3. Severely enlarged left atrium.   4. Severely enlarged right atrium.   5. The mitral in-flow pattern reveals no discernable A-wave, therefore no comment on diastolic function can be made.   6. Severely enlarged right ventricle.   7. Severely reduced RV systolic function.   8. Mild mitral annular calcification.   9. Moderate to severe mitral valve regurgitation.  10. The mitral valve leaflets are tethered which is due to reduced systolic function and elevated LVDP.  11. Severe tricuspid regurgitation.  12. Moderate aortic regurgitation.  13. TAVR in the aortic position.  14. Estimated pulmonary artery systolic pressure is 61.5 mmHg assuming a right atrial pressure of 15 mmHg, which is consistent with severe pulmonary hypertension.  15. Mitral valve mean gradient is 4.0 mmHg consistent with mild mitral stenosis.    MD Jareth Electronically signed on 2021 at 7:28:18 PM    < end of copied text >                            CARDIOLOGY REVIEW: Personally visualized and reviewed  Telemetry:  1st degree 90s

## 2021-06-13 NOTE — PROGRESS NOTE ADULT - ASSESSMENT
76 year old male patient with a medical history of MI in 1995 (angiogram, no stents placed), COPD (2L O2 at home), s/p TAVR (03/2019 at [facility]), gout, CKD, CVA (09/2020), pulmonary HTN, initially presented to [facility] 6/1/21 with RONNELL on CKD (likely cardiorenal syndrome), urinary retention due to noncompliance with medications, with hospital course complicated by cardiogenic shock, acute hypoxemic respiratory failure secondary to metabolic acidosis and respiratory alkalosis. Patient found to have TAVR failure with ISRAEL 6/10/21 showing EF 40-45%, Aortic valve prosthesis with Severe paravalvular leak and valvular AI, mild-moderate aortic stenosis, and moderate MR. Patient was transferred to [facility] under Dr. Campbell for further workup.

## 2021-06-13 NOTE — PROGRESS NOTE ADULT - ASSESSMENT
Álvaro on CKD stage II  TAVR failure  metabolic acidosis    SCr 1.2 in 2020-> 1.7 in 02/2021 and has progressively worsened ever since   Álvaro on CKD- CRS   Non oliguric on lasix gtt  Scr improving  Continue lasix gtt  Start sodium bicarb 650 mg bid  Monitor Scr, lytes UOP

## 2021-06-13 NOTE — PROGRESS NOTE ADULT - PROBLEM SELECTOR PLAN 1
ISRAEL 6/10/21 at [facility] showing EF 40-45%, Aortic valve prosthesis with Severe paravalvular leak and valvular AI, mild-moderate aortic stenosis, and moderate MR.  Admitted to [facility] 6/12/21 with CT Surgery Dr. Campbell.   Continuous telemetry, .   Continue Lasix gtt (transferred to ICU for close u/o monitoring)  Maintain Lemus for strict urine output monitoring and urinary retention in the setting of BPH.   Continue Flomax and Proscar.   Supplement electrolytes to maintain K>4 and Mg>2.   Continue midodrine to support BP with aggressive diuresis.   Oxygenating well on 2L O2 via NC (On 2L Home O2).

## 2021-06-13 NOTE — PROGRESS NOTE ADULT - ATTENDING COMMENTS
PT is more comfortable today. Not coughing as much.  Good urine ouput with low co2?? Labs and nephrology eval as per CT surgery.  Pt with paravalvular aortic valve TAVR and severe MR.  Cont with diuretics  Will need ISRAEL images to review ans see if closure of PVL is possible.

## 2021-06-13 NOTE — PROGRESS NOTE ADULT - ASSESSMENT
76 year old male patient with a medical history of MI in  (angiogram, no stents placed), COPD (2L O2 at home), s/p TAVR (2019 at [facility]), gout, CKD, CVA (2020), pulmonary HTN, initially presented to [facility] 21 with RONNELL on CKD (likely cardiorenal syndrome), urinary retention due to noncompliance with medications, with hospital course complicated by cardiogenic shock, acute hypoxemic respiratory failure secondary to metabolic acidosis and respiratory alkalosis. Patient found to have TAVR failure with ISRAEL 6/10/21 showing EF 40-45%, Aortic valve prosthesis with Severe paravalvular leak and valvular AI, mild-moderate aortic stenosis, and moderate MR. Patient was transferred to [facility] under Dr. Campbell for further workup. Cardiology consulted for decompensated HF and severe pulmonary hypertension.     Imaging from [facility]:  : CXR showing cardiomegaly, small b/l pleural effusions, moderate pulmonary vascular congestion.  : CT Brain ordered for head trauma? showing age-related cerebral and cerebellar volume loss, mild chronic vascular ischemic changes, stable biparietal arachnoid cyst and stable midline posterior fossa arachnoid cyst.   : US Abdomen for elevated LFTs showing mild hepatic steatosis, mild hepatomegaly, sludge in the gallbladder, no discrete gallstones, normal biliary ducts, mild echogenic right kidney which may be seen with medical renal dz, mildly complex Right upper pole 4cm cyst with subtle internal echoes may represent hemorrhagic/proteinaceous cyst, mildly complex right midpole 2cm cyst with thin internal linear septation.   6/3: Kidney and Bladder US showing no hydronephrosis, echogenic kidneys likely from medical renal dz, prostatomegaly, and thickening of the posterior wall of the bladder with trabeculations which could be due to bladder outlet obstruction.   : Kidney and Bladder US: No hydronephrosis or shadowing renal calculi. Stable b/l renal cysts.   : Lower Extremity Venous Doppler with no DVT noted.   : TTE showing EF 47%, dilated IVC, dilation of the ascending aorta of 4.4cm, moderate-severe pulmonary HTN, moderate-severe TR, aortic valve with severe prosthesis regurgitation and moderate prosthesis stenosis  : CT Chest showing emphysema and intersitial lung dz changes, stable b/l small pleural effusions, cardiomegaly.   : ISRAEL showing EF 40-45%, Aortic valve prosthesis with Severe paravalvular leak and valvular AI, mild-moderate aortic stenosis, and moderate MR.     HFrEF  - EF 40-45% -- > 30-35% on 21 echo  - severely reduced RV  - mod to severe MR  - Severe TR   -  Aortic valve prosthesis with Severe paravalvular leak and valvular AI  - volume overloaded on exam  - severe JVD and diminished lungs   - pBNP 34776  - elevated transaminitis 2/2 to hepatic steatosis   - patient needs diuresis but with severe RONNELL on CKD   - s/p diuresis with decent urine output but still overloaded  - continue IV Lasix infusion @ 5mg/hr   - goal urine output is 1-2L net negative in 24h   - also need GDMT for n/o HFrEF   - cannot tolerate antihypertensives 2/2 to orthostasis  - continue midodrine, hold afterload reduction and toprol   - patient was cardiogenic shock in [place], if no improvement may consider milrinone vs      Severe Pulmonary HTN   - confirmed on [facility] 2021   - likely congestive, with equivalent response to Shawna at 80ppm- PVR was 2.09WU pre  and 1.59 post Shawna.   - Hemodynamic tables   Pressures:  Nitric Oxide  Pressures:  -- Pulmonary Artery (S/D/M): 86/36/55  Pressures:  -- Pulmonary Capillary Wedge: 49/92/48  Pressures:  NO PHASE  Pressures:  -- Pulmonary Artery (S/D/M): 78/32/52  Pressures:  -- Pulmonary Capillary Wedge: 38/55/40  - sees Dr. Sanchez  - has not been on pulm HTN meds  - echo shows PASP 61.5 consistent with severe pulm HTN    RONNELL on CKD  - good urine output with AM Lasix  - start lasix infusion @ 5mg/hr   - need nephrology consult  - metabolic acidosis, HCO3 is 19 w/ anion gap   - recheck labs this afternoon     TAVR Prosthesis failure   - aortic valve with severe prosthesis regurgitation and moderate prosthesis stenosis  - CTS following  - will optimize for procedures from cardiac standpoint   - no known procedures as for now, will optimize for clearance when plan is formulated

## 2021-06-14 ENCOUNTER — TRANSCRIPTION ENCOUNTER (OUTPATIENT)
Age: 76
End: 2021-06-14

## 2021-06-14 LAB
ALBUMIN SERPL ELPH-MCNC: 3.9 G/DL — SIGNIFICANT CHANGE UP (ref 3.3–5.2)
ALP SERPL-CCNC: 127 U/L — HIGH (ref 40–120)
ALT FLD-CCNC: 62 U/L — HIGH
ANION GAP SERPL CALC-SCNC: 19 MMOL/L — HIGH (ref 5–17)
ANION GAP SERPL CALC-SCNC: 20 MMOL/L — HIGH (ref 5–17)
AST SERPL-CCNC: 67 U/L — HIGH
BILIRUB SERPL-MCNC: 2 MG/DL — SIGNIFICANT CHANGE UP (ref 0.4–2)
BUN SERPL-MCNC: 97.1 MG/DL — HIGH (ref 8–20)
BUN SERPL-MCNC: 98.9 MG/DL — HIGH (ref 8–20)
CALCIUM SERPL-MCNC: 10 MG/DL — SIGNIFICANT CHANGE UP (ref 8.6–10.2)
CALCIUM SERPL-MCNC: 10.2 MG/DL — SIGNIFICANT CHANGE UP (ref 8.6–10.2)
CHLORIDE SERPL-SCNC: 96 MMOL/L — LOW (ref 98–107)
CHLORIDE SERPL-SCNC: 96 MMOL/L — LOW (ref 98–107)
CO2 SERPL-SCNC: 20 MMOL/L — LOW (ref 22–29)
CO2 SERPL-SCNC: 21 MMOL/L — LOW (ref 22–29)
CREAT SERPL-MCNC: 3.81 MG/DL — HIGH (ref 0.5–1.3)
CREAT SERPL-MCNC: 3.85 MG/DL — HIGH (ref 0.5–1.3)
GLUCOSE SERPL-MCNC: 122 MG/DL — HIGH (ref 70–99)
GLUCOSE SERPL-MCNC: 129 MG/DL — HIGH (ref 70–99)
HCT VFR BLD CALC: 39.9 % — SIGNIFICANT CHANGE UP (ref 39–50)
HGB BLD-MCNC: 12.4 G/DL — LOW (ref 13–17)
INR BLD: 1.66 RATIO — HIGH (ref 0.88–1.16)
MAGNESIUM SERPL-MCNC: 2.5 MG/DL — SIGNIFICANT CHANGE UP (ref 1.6–2.6)
MCHC RBC-ENTMCNC: 29.6 PG — SIGNIFICANT CHANGE UP (ref 27–34)
MCHC RBC-ENTMCNC: 31.1 GM/DL — LOW (ref 32–36)
MCV RBC AUTO: 95.2 FL — SIGNIFICANT CHANGE UP (ref 80–100)
PHOSPHATE SERPL-MCNC: 5.1 MG/DL — HIGH (ref 2.4–4.7)
PLATELET # BLD AUTO: 105 K/UL — LOW (ref 150–400)
POTASSIUM SERPL-MCNC: 3.6 MMOL/L — SIGNIFICANT CHANGE UP (ref 3.5–5.3)
POTASSIUM SERPL-MCNC: 5.7 MMOL/L — HIGH (ref 3.5–5.3)
POTASSIUM SERPL-SCNC: 3.6 MMOL/L — SIGNIFICANT CHANGE UP (ref 3.5–5.3)
POTASSIUM SERPL-SCNC: 5.7 MMOL/L — HIGH (ref 3.5–5.3)
PREALB SERPL-MCNC: 14 MG/DL — LOW (ref 18–38)
PROT SERPL-MCNC: 7.6 G/DL — SIGNIFICANT CHANGE UP (ref 6.6–8.7)
PROTHROM AB SERPL-ACNC: 18.8 SEC — HIGH (ref 10.6–13.6)
RBC # BLD: 4.19 M/UL — LOW (ref 4.2–5.8)
RBC # FLD: 20.6 % — HIGH (ref 10.3–14.5)
SODIUM SERPL-SCNC: 136 MMOL/L — SIGNIFICANT CHANGE UP (ref 135–145)
SODIUM SERPL-SCNC: 136 MMOL/L — SIGNIFICANT CHANGE UP (ref 135–145)
WBC # BLD: 5.43 K/UL — SIGNIFICANT CHANGE UP (ref 3.8–10.5)
WBC # FLD AUTO: 5.43 K/UL — SIGNIFICANT CHANGE UP (ref 3.8–10.5)

## 2021-06-14 PROCEDURE — 99233 SBSQ HOSP IP/OBS HIGH 50: CPT

## 2021-06-14 PROCEDURE — 71045 X-RAY EXAM CHEST 1 VIEW: CPT | Mod: 26

## 2021-06-14 PROCEDURE — 99232 SBSQ HOSP IP/OBS MODERATE 35: CPT

## 2021-06-14 RX ORDER — PHYTONADIONE (VIT K1) 5 MG
5 TABLET ORAL DAILY
Refills: 0 | Status: DISCONTINUED | OUTPATIENT
Start: 2021-06-14 | End: 2021-06-23

## 2021-06-14 RX ORDER — MIRTAZAPINE 45 MG/1
7.5 TABLET, ORALLY DISINTEGRATING ORAL DAILY
Refills: 0 | Status: DISCONTINUED | OUTPATIENT
Start: 2021-06-14 | End: 2021-06-21

## 2021-06-14 RX ORDER — POTASSIUM CHLORIDE 20 MEQ
40 PACKET (EA) ORAL ONCE
Refills: 0 | Status: COMPLETED | OUTPATIENT
Start: 2021-06-14 | End: 2021-06-14

## 2021-06-14 RX ORDER — MEGESTROL ACETATE 40 MG/ML
400 SUSPENSION ORAL DAILY
Refills: 0 | Status: DISCONTINUED | OUTPATIENT
Start: 2021-06-14 | End: 2021-06-19

## 2021-06-14 RX ADMIN — Medication 2.5 MG/HR: at 21:22

## 2021-06-14 RX ADMIN — SODIUM CHLORIDE 3 MILLILITER(S): 9 INJECTION INTRAMUSCULAR; INTRAVENOUS; SUBCUTANEOUS at 21:22

## 2021-06-14 RX ADMIN — MIDODRINE HYDROCHLORIDE 5 MILLIGRAM(S): 2.5 TABLET ORAL at 17:43

## 2021-06-14 RX ADMIN — Medication 5 MILLIGRAM(S): at 16:57

## 2021-06-14 RX ADMIN — Medication 81 MILLIGRAM(S): at 11:14

## 2021-06-14 RX ADMIN — TAMSULOSIN HYDROCHLORIDE 0.4 MILLIGRAM(S): 0.4 CAPSULE ORAL at 21:21

## 2021-06-14 RX ADMIN — FINASTERIDE 5 MILLIGRAM(S): 5 TABLET, FILM COATED ORAL at 11:14

## 2021-06-14 RX ADMIN — Medication 1 TABLET(S): at 11:14

## 2021-06-14 RX ADMIN — SODIUM CHLORIDE 3 MILLILITER(S): 9 INJECTION INTRAMUSCULAR; INTRAVENOUS; SUBCUTANEOUS at 05:27

## 2021-06-14 RX ADMIN — Medication 1 PACKET(S): at 05:32

## 2021-06-14 RX ADMIN — HEPARIN SODIUM 5000 UNIT(S): 5000 INJECTION INTRAVENOUS; SUBCUTANEOUS at 17:43

## 2021-06-14 RX ADMIN — SODIUM CHLORIDE 3 MILLILITER(S): 9 INJECTION INTRAMUSCULAR; INTRAVENOUS; SUBCUTANEOUS at 15:09

## 2021-06-14 RX ADMIN — HEPARIN SODIUM 5000 UNIT(S): 5000 INJECTION INTRAVENOUS; SUBCUTANEOUS at 05:31

## 2021-06-14 RX ADMIN — BUDESONIDE AND FORMOTEROL FUMARATE DIHYDRATE 2 PUFF(S): 160; 4.5 AEROSOL RESPIRATORY (INHALATION) at 20:39

## 2021-06-14 RX ADMIN — MIDODRINE HYDROCHLORIDE 5 MILLIGRAM(S): 2.5 TABLET ORAL at 05:57

## 2021-06-14 RX ADMIN — MIDODRINE HYDROCHLORIDE 5 MILLIGRAM(S): 2.5 TABLET ORAL at 11:14

## 2021-06-14 RX ADMIN — BUDESONIDE AND FORMOTEROL FUMARATE DIHYDRATE 2 PUFF(S): 160; 4.5 AEROSOL RESPIRATORY (INHALATION) at 07:38

## 2021-06-14 RX ADMIN — Medication 40 MILLIEQUIVALENT(S): at 19:14

## 2021-06-14 RX ADMIN — Medication 650 MILLIGRAM(S): at 05:58

## 2021-06-14 RX ADMIN — Medication 650 MILLIGRAM(S): at 18:45

## 2021-06-14 RX ADMIN — ATORVASTATIN CALCIUM 10 MILLIGRAM(S): 80 TABLET, FILM COATED ORAL at 21:21

## 2021-06-14 RX ADMIN — Medication 500 MILLIGRAM(S): at 11:14

## 2021-06-14 NOTE — DIETITIAN INITIAL EVALUATION ADULT. - ETIOLOGY
Related to inadequate protein energy intake with increased needs in setting of Aortic valve prosthesis with Severe paravalvular leak and valvular AI, mild-moderate aortic stenosis, and moderate MR

## 2021-06-14 NOTE — DIETITIAN INITIAL EVALUATION ADULT. - PERTINENT LABORATORY DATA
06-14 Na136 mmol/L Glu 122 mg/dL<H> K+ 5.7 mmol/L<H> Cr  3.85 mg/dL<H> BUN 98.9 mg/dL<H> Phos 5.1 mg/dL<H> Alb 3.9 g/dL PAB 14 mg/dL<L>

## 2021-06-14 NOTE — PROGRESS NOTE ADULT - ASSESSMENT
76 year old male patient with a medical history of MI in 1995 (angiogram, no stents placed), COPD (2L O2 at home), s/p TAVR (03/2019 at [facility]), gout, CKD, CVA (09/2020), pulmonary HTN, initially presented to [facility] 6/1/21 with RONNELL on CKD (likely cardiorenal syndrome), urinary retention due to noncompliance with medications, with hospital course complicated by cardiogenic shock, acute hypoxemic respiratory failure secondary to metabolic acidosis and respiratory alkalosis. Patient found to have TAVR failure with ISRAEL 6/10/21 showing EF 40-45%, Aortic valve prosthesis with Severe paravalvular leak and valvular AI, mild-moderate aortic stenosis, and moderate MR. Patient was transferred to [facility] under Dr. Campbell for further workup.

## 2021-06-14 NOTE — PROGRESS NOTE ADULT - ATTENDING COMMENTS
Mr. Aldrich was seen and examined by me.  He is on diuretics. We will need further evaluation for correction of para-valvular leak.  We will perform a ISRAEL for further evaluation and plan for possible PVL closure.  DW CTICU team as well.

## 2021-06-14 NOTE — DIETITIAN NUTRITION RISK NOTIFICATION - UPON NUTRITIONAL ASSESSMENT BY THE REGISTERED DIETITIAN YOUR PATIENT WAS DETERMINED TO MEET CRITERIA/HAS EVIDENCE OF THE FOLLOWING DIAGNOSIS:
Severe protein-calorie malnutrition
Phillip Sorenson (MD)  Cardiovascular Disease; Nuclear Cardiology  [address] [place] [address]  Phone: [phone]  Fax: [phone]  Follow Up Time:

## 2021-06-14 NOTE — PROGRESS NOTE ADULT - ASSESSMENT
76 year old male patient with a medical history of MI in 1995 (angiogram, no stents placed), COPD (2L O2 at home), s/p TAVR (03/2019 at [facility]), gout, CKD, CVA (09/2020), pulmonary HTN, initially presented to [facility] 6/1/21 with RONNELL on CKD (likely cardiorenal syndrome), urinary retention due to noncompliance with medications, with hospital course complicated by cardiogenic shock, acute hypoxemic respiratory failure secondary to metabolic acidosis and respiratory alkalosis. Patient found to have TAVR failure with ISRAEL 6/10/21 showing EF 40-45%, Aortic valve prosthesis with Severe paravalvular leak and valvular AI, mild-moderate aortic stenosis, and moderate MR. Patient was transferred to [facility] under Dr. Campbell for further workup. Cardiology consulted for decompensated HF and severe pulmonary hypertension.     Imaging from [facility]:  6/11: ISRAEL showing EF 40-45%, Aortic valve prosthesis with Severe paravalvular leak and valvular AI, mild-moderate aortic stenosis, and moderate MR.       6/14 Pt denies chest pain, palpitations, SOB. Tele- NSR HR @ 80-90s, with some PVCs. Awaiting ISRAEL imaging from [place] or repeat echo in AM to determine surgical intervention plan. Cont. Lasix gtt.     HFrEF  - EF 40-45% -- > 30-35% on 6/12/21 echo  - severely reduced RV  - mod to severe MR  - Severe TR   -  Aortic valve prosthesis with Severe paravalvular leak and valvular AI  - In contact with [place] [facility] Echo department to get ISRAEL images, in unable to attain, will repeat ISRAEL in AM. Spoke to Maria- [phone]  - severe JVD and diminished lungs sounds  - pBNP [id number]  -Cont. IV Lasix gtt  - goal urine output is 1-2L net negative in 24h   - also need GDMT for n/o HFrEF   - continue midodrine, hold afterload reduction and toprol       Severe Pulmonary HTN   - confirmed on [facility] march/2021   - echo shows PASP 61.5 consistent with severe pulm HTN    RONNELL on CKD  - Cont. lasix infusion @ 5mg/hr   - Nephrology recs appreciated  - metabolic acidosis, HCO3 is 20 w/ anion gap       TAVR Prosthesis failure   - aortic valve with severe prosthesis regurgitation and moderate prosthesis stenosis  - CTS following  - will optimize for procedures from cardiac standpoint   - Awaiting ISRAEL imaging from [facility] or repeat echo in AM to determine surgical intervention plan

## 2021-06-14 NOTE — DIETITIAN NUTRITION RISK NOTIFICATION - TREATMENT: THE FOLLOWING DIET HAS BEEN RECOMMENDED
Diet, DASH/TLC:   Sodium & Cholesterol Restricted  Consistent Carbohydrate {No Snacks} (CSTCHO)  Supplement Feeding Modality:  Oral  Ensure Pudding Cans or Servings Per Day:  3       Frequency:  Three Times a day     Special Instructions for Nursing:  Sodium & Cholesterol Restricted (06-13-21 @ 08:30) [Active]

## 2021-06-14 NOTE — PROGRESS NOTE ADULT - PROBLEM SELECTOR PLAN 1
ISRAEL 6/10/21 at [facility] showing EF 40-45%, Aortic valve prosthesis with Severe paravalvular leak and valvular AI, mild-moderate aortic stenosis, and moderate MR.  Admitted to [facility] 6/12/21 with CT Surgery Dr. Campbell.   Continuous telemetry, .   Continue Lasix gtt (transferred to ICU for close u/o monitoring)  Maintain Lemus for strict urine output monitoring and urinary retention in the setting of BPH.   Continue Flomax and Proscar.   Supplement electrolytes to maintain K>4 and Mg>2.   Continue midodrine to support BP with aggressive diuresis.   Oxygenating well on 2L O2 via NC (On 2L Home O2).

## 2021-06-14 NOTE — PROGRESS NOTE ADULT - PROBLEM SELECTOR PLAN 6
6/3: Kidney and Bladder US showing no hydronephrosis, echogenic kidneys likely from medical renal dz, prostatomegaly, and thickening of the posterior wall of the bladder with trabeculations which could be due to bladder outlet obstruction.   6/7: Kidney and Bladder US: No hydronephrosis or shadowing renal calculi. Stable b/l renal cysts.   Nephrology consulted  Trend BUN/Cr while on Lasix gtt.   Monitor urine output. 6/3: Kidney and Bladder US showing no hydronephrosis, echogenic kidneys likely from medical renal dz, prostatomegaly, and thickening of the posterior wall of the bladder with trabeculations which could be due to bladder outlet obstruction.   6/7: Kidney and Bladder US: No hydronephrosis or shadowing renal calculi. Stable b/l renal cysts.   Nephrology consulted  Trend BUN/Cr while on Lasix gtt.   Monitor urine output.  Nephrology recs appreciate; will started sodium bicarb tabs

## 2021-06-14 NOTE — DIETITIAN INITIAL EVALUATION ADULT. - PROBLEM SELECTOR PLAN 1
ISRAEL 6/10/21 at [facility] showing EF 40-45%, Aortic valve prosthesis with Severe paravalvular leak and valvular AI, mild-moderate aortic stenosis, and moderate MR.  Admitted to [facility] 6/12/21 with CT Surgery Dr. Campbell.   Continuous telemetry, .   Continue lasix gtt.   Strict I/Os.  Maintain marie for strict urine output monitoring and urinary retention in the setting of BPH.   Continue flomax and proscar.   Supplement electrolytes to maintain K>4 and Mg>2.   Continue midodrine to support BP with aggressive diuresis.   Oxygenating well on 2L O2 via NC (On 2L Home O2).  Follow up CXR, labs, and further preop workup.   Plan to be discussed / reviewed with CT Surgery attending / team during AM rounds.

## 2021-06-14 NOTE — PROGRESS NOTE ADULT - SUBJECTIVE AND OBJECTIVE BOX
Brief Hospital Course:   6/11 Transfer from [place] for paravalvular leak. Patient was found to have acute on chronic renal failure, with superimposed cardiorenal syndrome   6/12 ECHO done showing EF 30-35%, reduced RV function, mild MAC, moderate to severe MR, severe TR, moderate AI, and severe PAH,     Significant recent/past 24 hr events: Patient is resting comfortably in bed. Patient does have elevation from baseline creatinine of 1.6, seen by renal. We have started sodium bicarbonate tabs as per their recommendation. Cardiology has seen and evaluated patient. Urine output remains adequate overnight on lasix gtt.      Subjective: no c/o incisional pain at this time. Denies CP, SOB, palpitations, N/V, other c/o.    Review of Systems      Patient is a 76y old  Male who presents with a chief complaint of TAVR Failure (13 Jun 2021 16:07)    HPI:  76 year old male patient with a medical history of MI in 1995 (angiogram, no stents placed), COPD (2L O2 at home), s/p TAVR (03/2019 at [facility]), gout, CKD, CVA (09/2020), pulmonary HTN, initially presented to [facility] 6/1/21 with RONNELL on CKD (likely cardiorenal syndrome), urinary retention due to noncompliance with medications, with hospital course complicated by cardiogenic shock, acute hypoxemic respiratory failure secondary to metabolic acidosis and respiratory alkalosis. Patient found to have TAVR failure with ISRAEL 6/10/21 showing EF 40-45%, Aortic valve prosthesis with Severe paravalvular leak and valvular AI, mild-moderate aortic stenosis, and moderate MR. Patient was transferred to [facility] under Dr. Campbell for further workup.     Imaging from [facility]:  6/1: CXR shwoing cardiomegaly, small b/l pleural effusions, moderate pulmonary vascular congestion.  6/1: CT Brain ordered for head trauma? showing age-related cerebral and cerebellar volume loss, mild chronic vascular ischemic changes, stable biparietal arachnoid cyst and stable midline posterior fossa arachnoid cyst.   6/1: US Abdomen for elevated LFTs showing mild hepatic steatosis, mild hepatomegaly, sludge in the gallbladder, no discrete gallstones, normal biliary ducts, mild echogenic right kidney which may be seen with medical renal dz, mildly complex Right upper pole 4cm cyst with subtle internal echoes may represent hemorrhagic/proteinaceous cyst, mildly complex right midpole 2cm cyst with thin internal linear septation.   6/3: Kidney and Bladder US showing no hydronephrosis, echogenic kidneys likely from medical renal dz, prostatomegaly, and thickening of the posterior wall of the bladder with trabeculations which could be due to bladder outlet obstruction.   6/7: Kidney and Bladder US: No hydronephrosis or shadowing renal calculi. Stable b/l renal cysts.   6/7: Lower Extremity Venous Doppler with no DVT noted.   6/8: TTE showing EF 47%, dilated IVC, dilation of the ascending aorta of 4.4cm, moderate-severe pulmonary HTN, moderate-severe TR, aortic valve with severe prosthesis regurgitation and moderate prosthesis stenosis  6/9: CT Chest showing emphysema and intersitial lung dz changes, stable b/l small pleural effusions, cardiomegaly.   6/11: ISRAEL showing EF 40-45%, Aortic valve prosthesis with Severe paravalvular leak and valvular AI, mild-moderate aortic stenosis, and moderate MR.  (12 Jun 2021 01:36)    PAST MEDICAL & SURGICAL HISTORY:  Pulmonary hypertension    Hypertension    Hyperlipidemia    H/O aortic valve stenosis  s/p TAVR 2019 at [place]    CVA (cerebrovascular accident)  MCA CVA with tPA and thrombectomy    Chronic kidney disease    Prediabetes    Mitral valve regurgitation    CAD in native artery    Aneurysm of aortic root  thoracic aortic aneurysm, without ruptur    Benign prostatic hyperplasia    Gout    History of transcatheter aortic valve replacement (TAVR)      FAMILY HISTORY:  FH: lung cancer        Vitals   ICU Vital Signs Last 24 Hrs  T(C): 36.4 (13 Jun 2021 23:00), Max: 36.4 (13 Jun 2021 23:00)  T(F): 97.5 (13 Jun 2021 23:00), Max: 97.5 (13 Jun 2021 23:00)  HR: 81 (13 Jun 2021 23:00) (74 - 85)  BP: 119/58 (13 Jun 2021 23:00) (108/52 - 130/59)  BP(mean): 84 (13 Jun 2021 23:00) (74 - 89)  ABP: --  ABP(mean): --  RR: 18 (13 Jun 2021 23:00) (9 - 38)  SpO2: 99% (13 Jun 2021 23:00) (87% - 100%)      VENT SETTINGS       I&O's Detail    12 Jun 2021 07:01  -  13 Jun 2021 07:00  --------------------------------------------------------  IN:    Furosemide: 30 mL    Oral Fluid: 100 mL  Total IN: 130 mL    OUT:    Indwelling Catheter - Urethral (mL): 3415 mL  Total OUT: 3415 mL    Total NET: -3285 mL      13 Jun 2021 07:01  -  14 Jun 2021 00:29  --------------------------------------------------------  IN:    Furosemide: 42.5 mL  Total IN: 42.5 mL    OUT:    Indwelling Catheter - Urethral (mL): 955 mL  Total OUT: 955 mL    Total NET: -912.5 mL          LABS                        12.4   6.30  )-----------( 118      ( 13 Jun 2021 03:05 )             38.7     06-13    138  |  98  |  99.2<H>  ----------------------------<  112<H>  3.8   |  20.0<L>  |  3.87<H>    Ca    10.2      13 Jun 2021 21:40  Phos  5.0     06-13  Mg     2.5     06-13    TPro  7.3  /  Alb  3.9  /  TBili  2.0  /  DBili  x   /  AST  38  /  ALT  58<H>  /  AlkPhos  135<H>  06-13    PT/INR - ( 13 Jun 2021 03:05 )   PT: 20.3 sec;   INR: 1.80 ratio         PTT - ( 12 Jun 2021 01:55 )  PTT:43.8 sec                MEDICATIONS  (STANDING):  ascorbic acid 500 milliGRAM(s) Oral daily  aspirin enteric coated 81 milliGRAM(s) Oral daily  atorvastatin 10 milliGRAM(s) Oral at bedtime  budesonide 160 MICROgram(s)/formoterol 4.5 MICROgram(s) Inhaler 2 Puff(s) Inhalation two times a day  finasteride 5 milliGRAM(s) Oral daily  furosemide Infusion 5 mG/Hr (2.5 mL/Hr) IV Continuous <Continuous>  heparin   Injectable 5000 Unit(s) SubCutaneous every 12 hours  midodrine. 5 milliGRAM(s) Oral three times a day  multivitamin/minerals 1 Tablet(s) Oral daily  psyllium Powder 1 Packet(s) Oral two times a day  senna 2 Tablet(s) Oral at bedtime  sodium bicarbonate 650 milliGRAM(s) Oral every 12 hours  sodium chloride 0.9% lock flush 3 milliLiter(s) IV Push every 8 hours  tamsulosin 0.4 milliGRAM(s) Oral at bedtime    MEDICATIONS  (PRN):  ALBUTerol    90 MICROgram(s) HFA Inhaler 2 Puff(s) Inhalation every 6 hours PRN Shortness of Breath and/or Wheezing  bisacodyl 5 milliGRAM(s) Oral every 12 hours PRN Constipation  polyethylene glycol 3350 17 Gram(s) Oral daily PRN Constipation    Allergies:  No Known Drug Allergies  strawberry (Anaphylaxis)      Physical Exam:     Neuro: A+O x 3, non-focal, speech clear and intact  HEENT:  NCAT, PERRL, EOMI. No conjuctival edema or icterus, no thrush  Pulm: CTA, good air entry, equal bilaterally, no rales/rhonchi/wheezing, no accessory muscle use noted  Chest:    CV: regular rate,  rhythm +S1S2, no murmur or rub noted  Abd: soft, NT, ND, + BS  Ext: CRONIN x 4, no edema, no cyanosis or clubbing, distal motor/neuro/circ intact  Skin: warm, dry, well perfused  Incisions: midsternal C/D/I/stable w/ dressing, LLE C/D/I w/ dressing and Ace wrap     Brief Hospital Course:   6/11 Transfer from [place] for paravalvular leak. Patient was found to have acute on chronic renal failure, with superimposed cardiorenal syndrome   6/12 ECHO done showing EF 30-35%, reduced RV function, mild MAC, moderate to severe MR, severe TR, moderate AI, and severe PAH,     Significant recent/past 24 hr events: Patient is resting comfortably in bed. Patient does have elevation from baseline creatinine of 1.6, seen by renal. We have started sodium bicarbonate tabs as per their recommendation. Cardiology has seen and evaluated patient. Urine output remains adequate overnight on lasix gtt.      Subjective: no c/o incisional pain at this time. Denies CP, SOB, palpitations, N/V, other c/o.    Review of Systems      Patient is a 76y old  Male who presents with a chief complaint of TAVR Failure (13 Jun 2021 16:07)    HPI:  76 year old male patient with a medical history of MI in 1995 (angiogram, no stents placed), COPD (2L O2 at home), s/p TAVR (03/2019 at [facility]), gout, CKD, CVA (09/2020), pulmonary HTN, initially presented to [facility] 6/1/21 with RONNELL on CKD (likely cardiorenal syndrome), urinary retention due to noncompliance with medications, with hospital course complicated by cardiogenic shock, acute hypoxemic respiratory failure secondary to metabolic acidosis and respiratory alkalosis. Patient found to have TAVR failure with ISRAEL 6/10/21 showing EF 40-45%, Aortic valve prosthesis with Severe paravalvular leak and valvular AI, mild-moderate aortic stenosis, and moderate MR. Patient was transferred to [facility] under Dr. Campbell for further workup.     Imaging from [facility]:  6/1: CXR shwoing cardiomegaly, small b/l pleural effusions, moderate pulmonary vascular congestion.  6/1: CT Brain ordered for head trauma? showing age-related cerebral and cerebellar volume loss, mild chronic vascular ischemic changes, stable biparietal arachnoid cyst and stable midline posterior fossa arachnoid cyst.   6/1: US Abdomen for elevated LFTs showing mild hepatic steatosis, mild hepatomegaly, sludge in the gallbladder, no discrete gallstones, normal biliary ducts, mild echogenic right kidney which may be seen with medical renal dz, mildly complex Right upper pole 4cm cyst with subtle internal echoes may represent hemorrhagic/proteinaceous cyst, mildly complex right midpole 2cm cyst with thin internal linear septation.   6/3: Kidney and Bladder US showing no hydronephrosis, echogenic kidneys likely from medical renal dz, prostatomegaly, and thickening of the posterior wall of the bladder with trabeculations which could be due to bladder outlet obstruction.   6/7: Kidney and Bladder US: No hydronephrosis or shadowing renal calculi. Stable b/l renal cysts.   6/7: Lower Extremity Venous Doppler with no DVT noted.   6/8: TTE showing EF 47%, dilated IVC, dilation of the ascending aorta of 4.4cm, moderate-severe pulmonary HTN, moderate-severe TR, aortic valve with severe prosthesis regurgitation and moderate prosthesis stenosis  6/9: CT Chest showing emphysema and intersitial lung dz changes, stable b/l small pleural effusions, cardiomegaly.   6/11: ISRAEL showing EF 40-45%, Aortic valve prosthesis with Severe paravalvular leak and valvular AI, mild-moderate aortic stenosis, and moderate MR.  (12 Jun 2021 01:36)    PAST MEDICAL & SURGICAL HISTORY:  Pulmonary hypertension    Hypertension    Hyperlipidemia    H/O aortic valve stenosis  s/p TAVR 2019 at [place]    CVA (cerebrovascular accident)  MCA CVA with tPA and thrombectomy    Chronic kidney disease    Prediabetes    Mitral valve regurgitation    CAD in native artery    Aneurysm of aortic root  thoracic aortic aneurysm, without ruptur    Benign prostatic hyperplasia    Gout    History of transcatheter aortic valve replacement (TAVR)      FAMILY HISTORY:  FH: lung cancer        Vitals   ICU Vital Signs Last 24 Hrs  T(C): 36.4 (13 Jun 2021 23:00), Max: 36.4 (13 Jun 2021 23:00)  T(F): 97.5 (13 Jun 2021 23:00), Max: 97.5 (13 Jun 2021 23:00)  HR: 81 (13 Jun 2021 23:00) (74 - 85)  BP: 119/58 (13 Jun 2021 23:00) (108/52 - 130/59)  BP(mean): 84 (13 Jun 2021 23:00) (74 - 89)  ABP: --  ABP(mean): --  RR: 18 (13 Jun 2021 23:00) (9 - 38)  SpO2: 99% (13 Jun 2021 23:00) (87% - 100%)      VENT SETTINGS       I&O's Detail    12 Jun 2021 07:01  -  13 Jun 2021 07:00  --------------------------------------------------------  IN:    Furosemide: 30 mL    Oral Fluid: 100 mL  Total IN: 130 mL    OUT:    Indwelling Catheter - Urethral (mL): 3415 mL  Total OUT: 3415 mL    Total NET: -3285 mL      13 Jun 2021 07:01  -  14 Jun 2021 00:29  --------------------------------------------------------  IN:    Furosemide: 42.5 mL  Total IN: 42.5 mL    OUT:    Indwelling Catheter - Urethral (mL): 955 mL  Total OUT: 955 mL    Total NET: -912.5 mL          LABS                        12.4   6.30  )-----------( 118      ( 13 Jun 2021 03:05 )             38.7     06-13    138  |  98  |  99.2<H>  ----------------------------<  112<H>  3.8   |  20.0<L>  |  3.87<H>    Ca    10.2      13 Jun 2021 21:40  Phos  5.0     06-13  Mg     2.5     06-13    TPro  7.3  /  Alb  3.9  /  TBili  2.0  /  DBili  x   /  AST  38  /  ALT  58<H>  /  AlkPhos  135<H>  06-13    PT/INR - ( 13 Jun 2021 03:05 )   PT: 20.3 sec;   INR: 1.80 ratio         PTT - ( 12 Jun 2021 01:55 )  PTT:43.8 sec                MEDICATIONS  (STANDING):  ascorbic acid 500 milliGRAM(s) Oral daily  aspirin enteric coated 81 milliGRAM(s) Oral daily  atorvastatin 10 milliGRAM(s) Oral at bedtime  budesonide 160 MICROgram(s)/formoterol 4.5 MICROgram(s) Inhaler 2 Puff(s) Inhalation two times a day  finasteride 5 milliGRAM(s) Oral daily  furosemide Infusion 5 mG/Hr (2.5 mL/Hr) IV Continuous <Continuous>  heparin   Injectable 5000 Unit(s) SubCutaneous every 12 hours  midodrine. 5 milliGRAM(s) Oral three times a day  multivitamin/minerals 1 Tablet(s) Oral daily  psyllium Powder 1 Packet(s) Oral two times a day  senna 2 Tablet(s) Oral at bedtime  sodium bicarbonate 650 milliGRAM(s) Oral every 12 hours  sodium chloride 0.9% lock flush 3 milliLiter(s) IV Push every 8 hours  tamsulosin 0.4 milliGRAM(s) Oral at bedtime    MEDICATIONS  (PRN):  ALBUTerol    90 MICROgram(s) HFA Inhaler 2 Puff(s) Inhalation every 6 hours PRN Shortness of Breath and/or Wheezing  bisacodyl 5 milliGRAM(s) Oral every 12 hours PRN Constipation  polyethylene glycol 3350 17 Gram(s) Oral daily PRN Constipation    Allergies:  No Known Drug Allergies  strawberry (Anaphylaxis)      Physical Exam:     Neuro: A+O x 3, non-focal, speech clear and intact  HEENT:  NCAT, PERRL, EOMI. No conjuctival edema or icterus, no thrush  Pulm: CTA, good air entry, equal bilaterally, no rales/rhonchi/wheezing, no accessory muscle use noted  Chest:    CV: regular rate,  rhythm +S1S2, no murmur or rub noted  Abd: soft, NT, ND, + BS  Ext: CRONIN x 4, no edema, no cyanosis or clubbing, distal motor/neuro/circ intact  Skin: warm, dry, well perfused

## 2021-06-14 NOTE — PROGRESS NOTE ADULT - ASSESSMENT
Álvaro on CKD stage II  TAVR failure  metabolic acidosis    SCr 1.2 in 2020-> 1.7 in 02/2021 and has progressively worsened ever since   Álvaro on CKD- CRS   Non oliguric on lasix gtt  Scr elevated/ stable  Continue lasix gtt  Plan for ISRAEL tomorrow

## 2021-06-14 NOTE — DIETITIAN INITIAL EVALUATION ADULT. - OTHER INFO
Pt is a 76 year old male patient with a medical history of MI in 1995 (angiogram, no stents placed), COPD (2L O2 at home), s/p TAVR (03/2019 at [facility]), gout, CKD, CVA (09/2020), pulmonary HTN, initially presented to [facility] 6/1/21 with RONNELL on CKD (likely cardiorenal syndrome), urinary retention due to noncompliance with medications, with hospital course complicated by cardiogenic shock, acute hypoxemic respiratory failure secondary to metabolic acidosis and respiratory alkalosis. Patient found to have TAVR failure with ISRAEL 6/10/21 showing EF 40-45%, Aortic valve prosthesis with Severe paravalvular leak and valvular AI, mild-moderate aortic stenosis, and moderate MR. Patient was transferred to [facility] under Dr. Campbell for further workup.   Pt reports having persistent lack of appetite and poor PO intake since February (4 months) secondary to "multiple medical problems". Pt reports difficulty swallowing, reports food gets stuck in throat, requires a lot of sauce/gravies (moist foods). Pt endorses 100# weight loss in this time. Pt reports drinking vanilla protein shakes at home, does not like chocolate. Agreeable to trying Vanilla Ensure. NFPE conducted.

## 2021-06-14 NOTE — PROGRESS NOTE ADULT - SUBJECTIVE AND OBJECTIVE BOX
[place] [facility]ARDIOLOGY-[facility]                                                               Office: [address] [address]                                                              Telephone: [phone]. Fax:[phone]                                                                             PROGRESS NOTE  Reason for follow up:   Overnight: No new events.   Update:     Subjective: "  ______________________"      	  Vitals:  T(C): 36.4 (06-14-21 @ 09:00), Max: 36.6 (06-14-21 @ 04:00)  HR: 88 (06-14-21 @ 10:00) (74 - 88)  BP: 126/61 (06-14-21 @ 10:00) (107/57 - 129/60)  RR: 25 (06-14-21 @ 10:00) (9 - 38)  SpO2: 100% (06-14-21 @ 10:00) (87% - 100%)  Wt(kg): --  I&O's Summary    13 Jun 2021 07:01 - 14 Jun 2021 07:00  --------------------------------------------------------  IN: 60 mL / OUT: 1425 mL / NET: -1365 mL    14 Jun 2021 07:01  -  14 Jun 2021 11:47  --------------------------------------------------------  IN: 310 mL / OUT: 325 mL / NET: -15 mL      Weight (kg): 100.1 (06-12 @ 00:32)      PHYSICAL EXAM:  Appearance: Comfortable. No acute distress  HEENT:  Head and neck: Atraumatic. Normocephalic.  Normal oral mucosa, PERRL, Neck is supple. No JVD, No carotid bruit.   Neurologic: A & O x 3, no focal deficits. EOMI.  Lymphatic: No cervical lymphadenopathy  Cardiovascular: Normal S1 S2, No murmur, rubs/gallops. No JVD, No edema  Respiratory: Lungs clear to auscultation  Gastrointestinal:  Soft, Non-tender, + BS  Lower Extremities: No edema  Psychiatry: Patient is calm. No agitation. Mood & affect appropriate  Skin: No rashes/ ecchymoses/cyanosis/ulcers visualized on the face, hands or feet.      CURRENT MEDICATIONS:  furosemide Infusion 5 mG/Hr IV Continuous <Continuous>  midodrine. 5 milliGRAM(s) Oral three times a day  tamsulosin 0.4 milliGRAM(s) Oral at bedtime    budesonide 160 MICROgram(s)/formoterol 4.5 MICROgram(s) Inhaler  psyllium Powder  senna  atorvastatin  finasteride  ascorbic acid  aspirin enteric coated  heparin   Injectable  multivitamin/minerals  sodium bicarbonate  sodium chloride 0.9% lock flush      DIAGNOSTIC TESTING:  [ ] Echocardiogram:   [ ]  Catheterization:  [ ] Stress Test:    OTHER: 	      LABS:	 	  CARDIAC MARKERS ( 13 Jun 2021 03:05 )  x     / x     / x     / x     / x      p-BNP 13 Jun 2021 03:05: 74114 pg/mL, CARDIAC MARKERS ( 12 Jun 2021 01:55 )  x     / x     / x     / x     / x      p-BNP 12 Jun 2021 01:55: 48547 pg/mL                          12.4   5.43  )-----------( 105      ( 14 Jun 2021 02:59 )             39.9     06-14    136  |  96<L>  |  98.9<H>  ----------------------------<  122<H>  5.7<H>   |  20.0<L>  |  3.85<H>    Ca    10.0      14 Jun 2021 02:59  Phos  5.1     06-14  Mg     2.5     06-14    TPro  7.6  /  Alb  3.9  /  TBili  2.0  /  DBili  x   /  AST  67<H>  /  ALT  62<H>  /  AlkPhos  127<H>  06-14    proBNP: Serum Pro-Brain Natriuretic Peptide: 36064 pg/mL (06-13 @ 03:05)  Serum Pro-Brain Natriuretic Peptide: 21523 pg/mL (06-12 @ 01:55)    Lipid Profile:   HgA1c:   TSH: Thyroid Stimulating Hormone, Serum: 2.31 uIU/mL        TELEMETRY: Reviewed    ECG:  Reviewed by me. 	                                                                      [place] [facility]ARDIOLOGY-[facility]                                                               Office: [address], [place] [address]                                                              Telephone: [phone]. Fax:[phone]                                                                             PROGRESS NOTE  Reason for follow up: TAVR Failure  Overnight: No new events.   Update: 6/14 Pt denies chest pain, palpitations, SOB. Tele- NSR HR @ 80-90s, with some PVCs. Awaiting [place] imaging from [place] or repeat echo in AM to determine surgical intervention plan. Cont. Lasix gtt.    Subjective: No new events    	  Vitals:  T(C): 36.4 (06-14-21 @ 09:00), Max: 36.6 (06-14-21 @ 04:00)  HR: 88 (06-14-21 @ 10:00) (74 - 88)  BP: 126/61 (06-14-21 @ 10:00) (107/57 - 129/60)  RR: 25 (06-14-21 @ 10:00) (9 - 38)  SpO2: 100% (06-14-21 @ 10:00) (87% - 100%)    I&O's Summary    13 Jun 2021 07:01  -  14 Jun 2021 07:00  --------------------------------------------------------  IN: 60 mL / OUT: 1425 mL / NET: -1365 mL    14 Jun 2021 07:01  -  14 Jun 2021 11:47  --------------------------------------------------------  IN: 310 mL / OUT: 325 mL / NET: -15 mL      Weight (kg): 100.1 (06-12 @ 00:32)      PHYSICAL EXAM:  Appearance: Comfortable. No acute distress  HEENT:  Head and neck: Atraumatic. Normocephalic.  Normal oral mucosa, PERRL, Neck is supple. No JVD, No carotid bruit.   Neurologic: A & O x 3, no focal deficits. EOMI.  Lymphatic: No cervical lymphadenopathy  Cardiovascular: Normal S1 S2, + sys. murmur  Respiratory: diminished lung sounds bilaterally  Gastrointestinal:  Soft, Non-tender, + BS  Lower Extremities: +1/+1 bilateral lower extremity pitting edema  Psychiatry: Patient is calm. No agitation. Mood & affect appropriate  Skin: No rashes/ ecchymoses/cyanosis/ulcers visualized on the face, hands or feet.      CURRENT MEDICATIONS:  furosemide Infusion 5 mG/Hr IV Continuous <Continuous>  midodrine. 5 milliGRAM(s) Oral three times a day  tamsulosin 0.4 milliGRAM(s) Oral at bedtime  budesonide 160 MICROgram(s)/formoterol 4.5 MICROgram(s) Inhaler  psyllium Powder  senna  atorvastatin  finasteride  ascorbic acid  aspirin enteric coated  heparin   Injectable  multivitamin/minerals  sodium bicarbonate  sodium chloride 0.9% lock flush      DIAGNOSTIC TESTING:    [ ] Echocardiogram: < from: TTE Echo Complete w/ Contrast w/ Doppler (06.12.21 @ 15:04) >  Summary:   1. Left ventricular ejection fraction, by visual estimation, is 30 to 35%.   2. Moderately decreased global left ventricular systolic function.   3. Severely enlarged left atrium.   4. Severely enlarged right atrium.   5. The mitral in-flow pattern reveals no discernable A-wave, therefore no comment on diastolic function can be made.   6. Severely enlarged right ventricle.   7. Severely reduced RV systolic function.   8. Mild mitral annular calcification.   9. Moderate to severe mitral valve regurgitation.  10. The mitral valve leaflets are tethered which is due to reduced systolic function and elevated LVDP.  11. Severe tricuspid regurgitation.  12. Moderate aortic regurgitation.  13. TAVR in the aortic position.  14. Estimated pulmonary artery systolic pressure is 61.5 mmHg assuming a right atrial pressure of 15 mmHg, which is consistent with severe pulmonary hypertension.  15. Mitral valve mean gradient is 4.0 mmHg consistent with mild mitral stenosis.        [ ]  Catheterization: < from: Cardiac Cath Lab - Adult (03.26.21 @ 10:20) >  DIAGNOSTIC IMPRESSIONS: Severe Pulmonary Hypertension, likely congestive,  with equivalent response to Shawna at 80ppm- PVR was 2.09WU pre and 1.59 post  Shawna. 2. Severe congestive heart failure. 3. A TAV in the aortic position  was noted moving in consonance with the surrounding structures and with  acceptable hemodynamics (Mean Aortic PG is 16mmHg and an PILAR over 1cm2).  DIAGNOSTIC RECOMMENDATIONS: Aggressive diuresis. 2. Would consider Cardio  MEMS for better fluid management in the out patient setting and to  minimize repeat hospitalizations. 3. Would re-assess severity of the MR  after CHF optimization.      OTHER: 	  US:< from: US Duplex Carotid Arteries Complete, Bilateral (06.13.21 @ 11:00) >  IMPRESSION:    Mild plaque in the carotid bulbs with no evidence of a hemodynamically significant internal carotid artery stenosis bilaterally.    Reversal of flow during diastole which can be seen in the setting of aortic regurgitation; clinical correlation is recommended.    LABS:	 	  CARDIAC MARKERS ( 13 Jun 2021 03:05 )  x     / x     / x     / x     / x      p-BNP 13 Jun 2021 03:05: 28262 pg/mL, CARDIAC MARKERS ( 12 Jun 2021 01:55 )  x     / x     / x     / x     / x      p-BNP 12 Jun 2021 01:55: 66470 pg/mL                          12.4   5.43  )-----------( 105      ( 14 Jun 2021 02:59 )             39.9     06-14    136  |  96<L>  |  98.9<H>  ----------------------------<  122<H>  5.7<H>   |  20.0<L>  |  3.85<H>    Ca    10.0      14 Jun 2021 02:59  Phos  5.1     06-14  Mg     2.5     06-14    TPro  7.6  /  Alb  3.9  /  TBili  2.0  /  DBili  x   /  AST  67<H>  /  ALT  62<H>  /  AlkPhos  127<H>  06-14    proBNP: Serum Pro-Brain Natriuretic Peptide: 33349 pg/mL (06-13 @ 03:05)  Serum Pro-Brain Natriuretic Peptide: 64901 pg/mL (06-12 @ 01:55)    TSH: Thyroid Stimulating Hormone, Serum: 2.31 uIU/mL        TELEMETRY:  NSR HR @ 80-90s, with some PVCs

## 2021-06-14 NOTE — PROGRESS NOTE ADULT - SUBJECTIVE AND OBJECTIVE BOX
[facility] DIVISION OF KIDNEY DISEASES AND HYPERTENSION -- FOLLOW UP NOTE  --------------------------------------------------------------------------------  Chief Complaint: kathy on ckd    24 hour events/subjective:  on lasix gtt  seen and exmined; denies any acute complaint        PAST HISTORY  --------------------------------------------------------------------------------  No significant changes to PMH, PSH, FHx, SHx, unless otherwise noted    ALLERGIES & MEDICATIONS  --------------------------------------------------------------------------------  Allergies    No Known Drug Allergies  strawberry (Anaphylaxis)    Intolerances      Standing Inpatient Medications  ascorbic acid 500 milliGRAM(s) Oral daily  aspirin enteric coated 81 milliGRAM(s) Oral daily  atorvastatin 10 milliGRAM(s) Oral at bedtime  budesonide 160 MICROgram(s)/formoterol 4.5 MICROgram(s) Inhaler 2 Puff(s) Inhalation two times a day  finasteride 5 milliGRAM(s) Oral daily  furosemide Infusion 5 mG/Hr IV Continuous <Continuous>  heparin   Injectable 5000 Unit(s) SubCutaneous every 12 hours  megestrol 400 milliGRAM(s) Oral daily  midodrine. 5 milliGRAM(s) Oral three times a day  mirtazapine 7.5 milliGRAM(s) Oral daily  multivitamin/minerals 1 Tablet(s) Oral daily  phytonadione   Solution 5 milliGRAM(s) Oral daily  psyllium Powder 1 Packet(s) Oral two times a day  senna 2 Tablet(s) Oral at bedtime  sodium bicarbonate 650 milliGRAM(s) Oral every 12 hours  sodium chloride 0.9% lock flush 3 milliLiter(s) IV Push every 8 hours  tamsulosin 0.4 milliGRAM(s) Oral at bedtime    PRN Inpatient Medications  ALBUTerol    90 MICROgram(s) HFA Inhaler 2 Puff(s) Inhalation every 6 hours PRN  bisacodyl 5 milliGRAM(s) Oral every 12 hours PRN  polyethylene glycol 3350 17 Gram(s) Oral daily PRN      REVIEW OF SYSTEMS  --------------------------------------------------------------------------------  Gen: No weight changes, fatigue, fevers/chills, weakness  Skin: No rashes  Head/Eyes/Ears/Mouth: No headache; Normal hearing; Normal vision w/o blurriness; No sinus pain/discomfort, sore throat  Respiratory: No dyspnea, cough, wheezing, hemoptysis  CV: No chest pain, PND, orthopnea  GI: No abdominal pain, diarrhea, constipation, nausea, vomiting, melena, hematochezia  : No increased frequency, dysuria, hematuria, nocturia  MSK: No joint pain/swelling; no back pain; no edema  Neuro: No dizziness/lightheadedness, weakness, seizures, numbness, tingling  Heme: No easy bruising or bleeding  Endo: No heat/cold intolerance  Psych: No significant nervousness, anxiety, stress, depression    All other systems were reviewed and are negative, except as noted.    VITALS/PHYSICAL EXAM  --------------------------------------------------------------------------------  T(C): 36.4 (06-14-21 @ 16:02), Max: 36.6 (06-14-21 @ 04:00)  HR: 75 (06-14-21 @ 19:00) (75 - 93)  BP: 117/58 (06-14-21 @ 19:00) (101/63 - 129/60)  RR: 34 (06-14-21 @ 19:00) (12 - 34)  SpO2: 99% (06-14-21 @ 19:00) (87% - 100%)  Wt(kg): --        06-13-21 @ 07:01  -  06-14-21 @ 07:00  --------------------------------------------------------  IN: 60 mL / OUT: 1425 mL / NET: -1365 mL    06-14-21 @ 07:01  -  06-14-21 @ 19:36  --------------------------------------------------------  IN: 1050 mL / OUT: 640 mL / NET: 410 mL      Physical Exam:  	Gen: NAD  	HEENT:  supple neck  	Pulm: CTA B/L  	CV: RRR, S1S2; no rub  	Back: No spinal or CVA tenderness; no sacral edema  	Abd: +BS, soft, nontender/nondistended  	: No suprapubic tenderness  	UE: Warm, no edema; no asterixis  	LE: Warm,  edema+  	Neuro: No focal deficits  	Psych: Normal affect and mood  	Skin: Warm,	    LABS/STUDIES  --------------------------------------------------------------------------------              12.4   5.43  >-----------<  105      [06-14-21 @ 02:59]              39.9     136  |  96  |  97.1  ----------------------------<  129      [06-14-21 @ 17:30]  3.6   |  21.0  |  3.81        Ca     10.2     [06-14-21 @ 17:30]      Mg     2.5     [06-14-21 @ 02:59]      Phos  5.1     [06-14-21 @ 02:59]    TPro  7.6  /  Alb  3.9  /  TBili  2.0  /  DBili  x   /  AST  67  /  ALT  62  /  AlkPhos  127  [06-14-21 @ 02:59]    PT/INR: PT 18.8 , INR 1.66       [06-14-21 @ 02:59]      Creatinine Trend:  SCr 3.81 [06-14 @ 17:30]  SCr 3.85 [06-14 @ 02:59]  SCr 3.87 [06-13 @ 21:40]  SCr 3.91 [06-13 @ 03:05]  SCr 4.21 [06-12 @ 17:57]        TSH 2.31      [06-12-21 @ 01:55]    HCV 0.16, Nonreact      [06-13-21 @ 08:39]

## 2021-06-15 ENCOUNTER — TRANSCRIPTION ENCOUNTER (OUTPATIENT)
Age: 76
End: 2021-06-15

## 2021-06-15 ENCOUNTER — RESULT REVIEW (OUTPATIENT)
Age: 76
End: 2021-06-15

## 2021-06-15 LAB
ALBUMIN SERPL ELPH-MCNC: 3.8 G/DL — SIGNIFICANT CHANGE UP (ref 3.3–5.2)
ALP SERPL-CCNC: 133 U/L — HIGH (ref 40–120)
ALT FLD-CCNC: 54 U/L — HIGH
ANION GAP SERPL CALC-SCNC: 19 MMOL/L — HIGH (ref 5–17)
AST SERPL-CCNC: 41 U/L — HIGH
BILIRUB SERPL-MCNC: 2 MG/DL — SIGNIFICANT CHANGE UP (ref 0.4–2)
BUN SERPL-MCNC: 96.3 MG/DL — HIGH (ref 8–20)
CALCIUM SERPL-MCNC: 10.2 MG/DL — SIGNIFICANT CHANGE UP (ref 8.6–10.2)
CHLORIDE SERPL-SCNC: 97 MMOL/L — LOW (ref 98–107)
CO2 SERPL-SCNC: 20 MMOL/L — LOW (ref 22–29)
CREAT SERPL-MCNC: 3.68 MG/DL — HIGH (ref 0.5–1.3)
GLUCOSE SERPL-MCNC: 111 MG/DL — HIGH (ref 70–99)
HCT VFR BLD CALC: 39.3 % — SIGNIFICANT CHANGE UP (ref 39–50)
HGB BLD-MCNC: 12.4 G/DL — LOW (ref 13–17)
INR BLD: 1.66 RATIO — HIGH (ref 0.88–1.16)
MAGNESIUM SERPL-MCNC: 2.5 MG/DL — SIGNIFICANT CHANGE UP (ref 1.6–2.6)
MCHC RBC-ENTMCNC: 29.9 PG — SIGNIFICANT CHANGE UP (ref 27–34)
MCHC RBC-ENTMCNC: 31.6 GM/DL — LOW (ref 32–36)
MCV RBC AUTO: 94.7 FL — SIGNIFICANT CHANGE UP (ref 80–100)
PHOSPHATE SERPL-MCNC: 4.2 MG/DL — SIGNIFICANT CHANGE UP (ref 2.4–4.7)
PLATELET # BLD AUTO: 104 K/UL — LOW (ref 150–400)
POTASSIUM SERPL-MCNC: 3.9 MMOL/L — SIGNIFICANT CHANGE UP (ref 3.5–5.3)
POTASSIUM SERPL-SCNC: 3.9 MMOL/L — SIGNIFICANT CHANGE UP (ref 3.5–5.3)
PROT SERPL-MCNC: 7.6 G/DL — SIGNIFICANT CHANGE UP (ref 6.6–8.7)
PROTHROM AB SERPL-ACNC: 18.8 SEC — HIGH (ref 10.6–13.6)
RBC # BLD: 4.15 M/UL — LOW (ref 4.2–5.8)
RBC # FLD: 20.4 % — HIGH (ref 10.3–14.5)
SODIUM SERPL-SCNC: 136 MMOL/L — SIGNIFICANT CHANGE UP (ref 135–145)
WBC # BLD: 5.72 K/UL — SIGNIFICANT CHANGE UP (ref 3.8–10.5)
WBC # FLD AUTO: 5.72 K/UL — SIGNIFICANT CHANGE UP (ref 3.8–10.5)

## 2021-06-15 PROCEDURE — 43239 EGD BIOPSY SINGLE/MULTIPLE: CPT

## 2021-06-15 PROCEDURE — 99232 SBSQ HOSP IP/OBS MODERATE 35: CPT

## 2021-06-15 PROCEDURE — 99223 1ST HOSP IP/OBS HIGH 75: CPT

## 2021-06-15 PROCEDURE — 99223 1ST HOSP IP/OBS HIGH 75: CPT | Mod: 25

## 2021-06-15 PROCEDURE — 88305 TISSUE EXAM BY PATHOLOGIST: CPT | Mod: 26

## 2021-06-15 PROCEDURE — 93325 DOPPLER ECHO COLOR FLOW MAPG: CPT | Mod: 26

## 2021-06-15 PROCEDURE — 99233 SBSQ HOSP IP/OBS HIGH 50: CPT

## 2021-06-15 PROCEDURE — 93312 ECHO TRANSESOPHAGEAL: CPT | Mod: 26

## 2021-06-15 PROCEDURE — 74176 CT ABD & PELVIS W/O CONTRAST: CPT | Mod: 26

## 2021-06-15 PROCEDURE — 88342 IMHCHEM/IMCYTCHM 1ST ANTB: CPT | Mod: 26

## 2021-06-15 PROCEDURE — 93320 DOPPLER ECHO COMPLETE: CPT | Mod: 26

## 2021-06-15 PROCEDURE — 71045 X-RAY EXAM CHEST 1 VIEW: CPT | Mod: 26

## 2021-06-15 PROCEDURE — 71250 CT THORAX DX C-: CPT | Mod: 26

## 2021-06-15 RX ORDER — SOD SULF/SODIUM/NAHCO3/KCL/PEG
1000 SOLUTION, RECONSTITUTED, ORAL ORAL ONCE
Refills: 0 | Status: DISCONTINUED | OUTPATIENT
Start: 2021-06-15 | End: 2021-06-15

## 2021-06-15 RX ORDER — SOD SULF/SODIUM/NAHCO3/KCL/PEG
2000 SOLUTION, RECONSTITUTED, ORAL ORAL ONCE
Refills: 0 | Status: COMPLETED | OUTPATIENT
Start: 2021-06-15 | End: 2021-06-15

## 2021-06-15 RX ADMIN — TAMSULOSIN HYDROCHLORIDE 0.4 MILLIGRAM(S): 0.4 CAPSULE ORAL at 21:10

## 2021-06-15 RX ADMIN — BUDESONIDE AND FORMOTEROL FUMARATE DIHYDRATE 2 PUFF(S): 160; 4.5 AEROSOL RESPIRATORY (INHALATION) at 08:26

## 2021-06-15 RX ADMIN — Medication 650 MILLIGRAM(S): at 18:11

## 2021-06-15 RX ADMIN — FINASTERIDE 5 MILLIGRAM(S): 5 TABLET, FILM COATED ORAL at 14:58

## 2021-06-15 RX ADMIN — HEPARIN SODIUM 5000 UNIT(S): 5000 INJECTION INTRAVENOUS; SUBCUTANEOUS at 18:11

## 2021-06-15 RX ADMIN — Medication 5 MILLIGRAM(S): at 14:58

## 2021-06-15 RX ADMIN — ATORVASTATIN CALCIUM 10 MILLIGRAM(S): 80 TABLET, FILM COATED ORAL at 21:11

## 2021-06-15 RX ADMIN — Medication 500 MILLIGRAM(S): at 14:58

## 2021-06-15 RX ADMIN — MIDODRINE HYDROCHLORIDE 5 MILLIGRAM(S): 2.5 TABLET ORAL at 21:09

## 2021-06-15 RX ADMIN — MIDODRINE HYDROCHLORIDE 5 MILLIGRAM(S): 2.5 TABLET ORAL at 14:58

## 2021-06-15 RX ADMIN — BUDESONIDE AND FORMOTEROL FUMARATE DIHYDRATE 2 PUFF(S): 160; 4.5 AEROSOL RESPIRATORY (INHALATION) at 20:34

## 2021-06-15 RX ADMIN — Medication 81 MILLIGRAM(S): at 14:57

## 2021-06-15 RX ADMIN — SODIUM CHLORIDE 3 MILLILITER(S): 9 INJECTION INTRAMUSCULAR; INTRAVENOUS; SUBCUTANEOUS at 14:48

## 2021-06-15 RX ADMIN — Medication 1 TABLET(S): at 14:57

## 2021-06-15 RX ADMIN — MIDODRINE HYDROCHLORIDE 5 MILLIGRAM(S): 2.5 TABLET ORAL at 05:49

## 2021-06-15 RX ADMIN — Medication 2000 MILLILITER(S): at 21:10

## 2021-06-15 RX ADMIN — HEPARIN SODIUM 5000 UNIT(S): 5000 INJECTION INTRAVENOUS; SUBCUTANEOUS at 05:49

## 2021-06-15 RX ADMIN — Medication 650 MILLIGRAM(S): at 05:49

## 2021-06-15 RX ADMIN — SODIUM CHLORIDE 3 MILLILITER(S): 9 INJECTION INTRAMUSCULAR; INTRAVENOUS; SUBCUTANEOUS at 21:11

## 2021-06-15 RX ADMIN — SODIUM CHLORIDE 3 MILLILITER(S): 9 INJECTION INTRAMUSCULAR; INTRAVENOUS; SUBCUTANEOUS at 05:47

## 2021-06-15 NOTE — PROGRESS NOTE ADULT - PROBLEM SELECTOR PLAN 1
[facility] 6/10/21 at [facility] showing EF 40-45%, Aortic valve prosthesis with Severe paravalvular leak and valvular AI, mild-moderate aortic stenosis, and moderate MR.  Admitted to [facility] 6/12/21 with CT Surgery Dr. Campbell.   Continuous telemetry, .   Continue Lasix gtt (transferred to ICU for close u/o monitoring)  Maintain Lemus for strict urine output monitoring and urinary retention in the setting of BPH.   Continue Flomax and Proscar.   Supplement electrolytes to maintain K>4 and Mg>2.   Continue midodrine to support BP with aggressive diuresis.   Oxygenating well on 2L O2 via NC (On 2L Home O2).    6/14 NPO after midnight for [place]  TAVR CTA ordered

## 2021-06-15 NOTE — PROGRESS NOTE ADULT - ASSESSMENT
76 year old male patient with a medical history of MI in 1995 (angiogram, no stents placed), COPD (2L O2 at home), s/p TAVR (03/2019 at [facility]), gout, CKD, CVA (09/2020), pulmonary HTN, initially presented to [facility] [place] 6/1/21 with RONNELL on CKD (likely cardiorenal syndrome), urinary retention due to noncompliance with medications, with hospital course complicated by cardiogenic shock, acute hypoxemic respiratory failure secondary to metabolic acidosis and respiratory alkalosis. Patient found to have TAVR failure with [place] 6/10/21 showing EF 40-45%, Aortic valve prosthesis with Severe paravalvular leak and valvular AI, mild-moderate aortic stenosis, and moderate MR. Patient was transferred to [facility] under Dr. Campbell for further workup. Cardiology consulted for decompensated HF and severe pulmonary hypertension.     Imaging from [facility] [place]:  6/11: [place]EL showing EF 40-45%, Aortic valve prosthesis with Severe paravalvular leak and valvular AI, mild-moderate aortic stenosis, and moderate MR.       6/15:  Pt denies chest pain, palpitations, SOB. Tele- NSR HR @ 80s. [facility]EL- preliminary results noted pericarditis. ID evaluation recommended. Blood cultures recommended. Will optimize for surgical intervention for Severe paravalvular leak and valvular AI.     HFrEF  - EF 40-45% -- > 30-35% on 6/12/21 echo  - severely reduced RV  - mod to severe MR  - Severe TR   -  Aortic valve prosthesis with Severe paravalvular leak and valvular AI  - Cont. IV Lasix gtt  - Cont. Strict I+O   -[facility]EL- preliminary results noted pericarditis.   -ID evaluation recommended, Blood cultures recommended  - Also need GDMT for n/o HFrEF   - Continue midodrine, hold afterload reduction and Toprol       Severe Pulmonary HTN   - confirmed on [facility] march/2021   - echo shows PASP 61.5 consistent with severe pulm HTN      RONNELL on CKD  - Cont. lasix infusion @ 5mg/hr   - Nephrology recs appreciated  - metabolic acidosis, HCO3 is 20 w/ anion gap       TAVR Prosthesis failure   - Aortic valve with severe prosthesis regurgitation and moderate prosthesis stenosis  - CTS following    - [place]EL- preliminary results noted pericarditis  -ID evaluation recommended  -Blood cultures recommended  -Will optimize for surgical intervention for Severe paravalvular leak and valvular AI

## 2021-06-15 NOTE — PROGRESS NOTE ADULT - ASSESSMENT
76 year old male patient with a medical history of MI in 1995 (angiogram, no stents placed), COPD (2L O2 at home), s/p TAVR (03/2019 at [facility]), gout, CKD, CVA (09/2020), pulmonary HTN, initially presented to [facility] 6/1/21 with RONNELL on CKD (likely cardiorenal syndrome), urinary retention due to noncompliance with medications, with hospital course complicated by cardiogenic shock, acute hypoxemic respiratory failure secondary to metabolic acidosis and respiratory alkalosis. Patient found to have TAVR failure with [place] 6/10/21 showing EF 40-45%, Aortic valve prosthesis with Severe paravalvular leak and valvular AI, mild-moderate aortic stenosis, and moderate MR. Patient was transferred to [facility] under Dr. Campbell for further workup.

## 2021-06-15 NOTE — PROGRESS NOTE ADULT - ATTENDING COMMENTS
Pt was seen and examined  plan of care dw np.  I spoke with Dr Ricci and Jessica. Pt with prior hx of bacteremia, no endocarditis. Also reports of having TEEs in the past without suggestion of endocarditis. He doesn't  appear septic. We have ordered blood cultures. We will try to get access to [place] for [facility]. He will need colonoscopy with strep Bovis. He is stable to proceed. We will need LHC with risk of having permanent kidney damage and need for HD. He also has RV failure, PVR is important to decide on plan for surgery.   I will follow with you.

## 2021-06-15 NOTE — CONSULT NOTE ADULT - SUBJECTIVE AND OBJECTIVE BOX
HISTORY OF PRESENT ILLNESS: This is a 76y old man with a past medical history significant for an MI in 1995 (angiogram, no stents placed), COPD (2L O2 at home), s/p TAVR (03/2019 at [facility]), gout, CKD, CVA (09/2020), pulmonary HTN, initially presented to [facility] 6/1/21 with RONNELL on CKD (likely cardiorenal syndrome), urinary retention due to noncompliance with medications, with hospital course complicated by cardiogenic shock, acute hypoxemic respiratory failure secondary to metabolic acidosis and respiratory alkalosis.  Patient found to have TAVR failure with ISRAEL 6/10/21 showing EF 40-45%, Aortic valve prosthesis with Severe paravalvular leak and valvular AI, mild-moderate aortic stenosis, and moderate MR. Patient was transferred to [facility] under Dr. Campbell for further workup.     Imaging from [facility]:  6/1: CXR showing cardiomegaly, small b/l pleural effusions, moderate pulmonary vascular congestion.  6/1: CT Brain ordered for head trauma? showing age-related cerebral and cerebellar volume loss, mild chronic vascular ischemic changes, stable biparietal arachnoid cyst and stable midline posterior fossa arachnoid cyst.   6/1: US Abdomen for elevated LFTs showing mild hepatic steatosis, mild hepatomegaly, sludge in the gallbladder, no discrete gallstones, normal biliary ducts, mild echogenic right kidney which may be seen with medical renal dz, mildly complex Right upper pole 4cm cyst with subtle internal echoes may represent hemorrhagic/proteinaceous cyst, mildly complex right midpole 2cm cyst with thin internal linear septation.   6/3: Kidney and Bladder US showing no hydronephrosis, echogenic kidneys likely from medical renal dz, prostatomegaly, and thickening of the posterior wall of the bladder with trabeculations which could be due to bladder outlet obstruction.   6/7: Kidney and Bladder US: No hydronephrosis or shadowing renal calculi. Stable b/l renal cysts.   6/7: Lower Extremity Venous Doppler with no DVT noted.   6/8: TTE showing EF 47%, dilated IVC, dilation of the ascending aorta of 4.4cm, moderate-severe pulmonary HTN, moderate-severe TR, aortic valve with severe prosthesis regurgitation and moderate prosthesis stenosis  6/9: CT Chest showing emphysema and intersitial lung dz changes, stable b/l small pleural effusions, cardiomegaly.   6/11: ISRAEL showing EF 40-45%, Aortic valve prosthesis with Severe paravalvular leak and valvular AI, mild-moderate aortic stenosis, and moderate MR.     GI consulted for a patient reported 100-lb weight loss since February.  Patient reports decreased appetite and dysphagia.  Reports having undergone an EGD in the remote past.  Had a colonoscopy at [facility] for Stre bovis bacteremia with Dr. Erlin Shoemaker that was notable for multiple large tubular adenomas.  Patient denies any melena or rectal bleeding.    ROS: A 14-point review of systems was completed and was otherwise negative save what was reported in the HPI.    PAST MEDICAL/SURGICAL HISTORY:  Pulmonary hypertension    Hypertension    Hyperlipidemia    H/O aortic valve stenosis  s/p TAVR 2019 at [place]    CVA (cerebrovascular accident)  MCA CVA with tPA and thrombectomy    Chronic kidney disease    Prediabetes    Mitral valve regurgitation    CAD in native artery    Aneurysm of aortic root  thoracic aortic aneurysm, without ruptur    Benign prostatic hyperplasia    Gout    History of transcatheter aortic valve replacement (TAVR)      SOCIAL HISTORY:  - TOBACCO: Denies  - ALCOHOL: Denies  - ILLICIT DRUG USE: Denies    FAMILY HISTORY:  No known history of gastrointestinal or liver disease;  FH: lung cancer    HOME MEDICATIONS:  Advair Diskus 100 mcg-50 mcg inhalation powder: 1 puff(s) inhaled 2 times a day (26 Mar 2021 15:32)  Albuterol (Eqv-Proventil HFA) 90 mcg/inh inhalation aerosol: 2 puff(s) inhaled every 4 hours, As Needed (26 Mar 2021 15:32)  allopurinol 100 mg oral tablet: 1 tab(s) orally 2 times a day (26 Mar 2021 15:32)  amLODIPine 2.5 mg oral tablet: 1 tab(s) orally once a day (26 Mar 2021 15:32)  Aspir 81 oral delayed release tablet: 1 tab(s) orally once a day (26 Mar 2021 15:32)  atorvastatin 10 mg oral tablet: 1 tab(s) orally once a day (26 Mar 2021 15:32)  Centrum Silver oral tablet: 1 tab(s) orally once a day (26 Mar 2021 15:32)  Culturelle Advanced Immune Defense oral capsule: 1 cap(s) orally 2 times a day (26 Mar 2021 15:32)  ipratropium-albuterol 0.5 mg-2.5 mg/3 mLinhalation solution: 3 milliliter(s) inhaled 4 times a day, As Needed (26 Mar 2021 15:32)  metoprolol tartrate 100 mg oral tablet: 1 tab(s) orally 2 times a day (26 Mar 2021 15:32)  tamsulosin 0.4 mg oral capsule: 1 cap(s) orally once a day (26 Mar 2021 15:32)  Vitamin D3 1000 intl units (25 mcg) oral capsule: orally once a day (26 Mar 2021 15:32)    INPATIENT MEDICATIONS:  MEDICATIONS  (STANDING):  ascorbic acid 500 milliGRAM(s) Oral daily  aspirin enteric coated 81 milliGRAM(s) Oral daily  atorvastatin 10 milliGRAM(s) Oral at bedtime  budesonide 160 MICROgram(s)/formoterol 4.5 MICROgram(s) Inhaler 2 Puff(s) Inhalation two times a day  finasteride 5 milliGRAM(s) Oral daily  furosemide Infusion 5 mG/Hr (2.5 mL/Hr) IV Continuous <Continuous>  heparin   Injectable 5000 Unit(s) SubCutaneous every 12 hours  megestrol 400 milliGRAM(s) Oral daily  midodrine. 5 milliGRAM(s) Oral three times a day  mirtazapine 7.5 milliGRAM(s) Oral daily  multivitamin/minerals 1 Tablet(s) Oral daily  phytonadione   Solution 5 milliGRAM(s) Oral daily  psyllium Powder 1 Packet(s) Oral two times a day  senna 2 Tablet(s) Oral at bedtime  sodium bicarbonate 650 milliGRAM(s) Oral every 12 hours  sodium chloride 0.9% lock flush 3 milliLiter(s) IV Push every 8 hours  tamsulosin 0.4 milliGRAM(s) Oral at bedtime    MEDICATIONS  (PRN):  ALBUTerol    90 MICROgram(s) HFA Inhaler 2 Puff(s) Inhalation every 6 hours PRN Shortness of Breath and/or Wheezing  bisacodyl 5 milliGRAM(s) Oral every 12 hours PRN Constipation  polyethylene glycol 3350 17 Gram(s) Oral daily PRN Constipation    ALLERGIES:  No Known Drug Allergies  strawberry (Anaphylaxis)    T(C): 36.4 (06-15-21 @ 08:00), Max: 37 (06-15-21 @ 00:00)  HR: 96 (06-15-21 @ 09:00) (75 - 117)  BP: 113/56 (06-15-21 @ 09:00) (96/48 - 129/58)  RR: 31 (06-15-21 @ 09:00) (10 - 34)  SpO2: 96% (06-15-21 @ 09:00) (90% - 100%)    06-14-21 @ 07:01  -  06-15-21 @ 07:00  --------------------------------------------------------  IN: 1080 mL / OUT: 1215 mL / NET: -135 mL    06-15-21 @ 07:01  -  06-15-21 @ 10:11  --------------------------------------------------------  IN: 5 mL / OUT: 90 mL / NET: -85 mL        PHYSICAL EXAM:  Constitutional: Well-developed, slight temporal wasting, in no apparent distress  Eyes: Sclerae anicteric, conjunctivae normal  ENMT: Mucus membranes moist, no oropharyngeal thrush noted  Neck: No thyroid nodules appreciated, no significant cervical or supraclavicular lymphadenopathy  Respiratory: Breathing nonlabored; clear to auscultation  Cardiovascular: Regular rate and rhythm  Gastrointestinal: Soft, nontender, nondistended, normoactive bowel sounds; no hepatosplenomegaly appreciated; no rebound tenderness or involuntary guarding  Extremities: No clubbing, cyanosis or edema  Neurological: Alert and oriented to person, place and time; no asterixis  Skin: No jaundice  Lymph Nodes: No significant lymphadenopathy  Musculoskeletal: No significant peripheral atrophy  Psychiatric: Affect and mood appropriate      LABS:             12.4   5.72  )-----------( 104      ( 06-15 @ 02:59 )             39.3                12.4   5.43  )-----------( 105      ( 06-14 @ 02:59 )             39.9                12.4   6.30  )-----------( 118      ( 06-13 @ 03:05 )             38.7       PT/INR - ( 15 Jhoan 2021 02:59 )   PT: 18.8 sec;   INR: 1.66 ratio           06-15    136  |  97<L>  |  96.3<H>  ----------------------------<  111<H>  3.9   |  20.0<L>  |  3.68<H>    Ca    10.2      15 Jhoan 2021 02:59  Phos  4.2     06-15  Mg     2.5     06-15    TPro  7.6  /  Alb  3.8  /  TBili  2.0  /  DBili  x   /  AST  41<H>  /  ALT  54<H>  /  AlkPhos  133<H>  06-15    LIVER FUNCTIONS - ( 15 Jhoan 2021 02:59 )  Alb: 3.8 g/dL / Pro: 7.6 g/dL / ALK PHOS: 133 U/L / ALT: 54 U/L / AST: 41 U/L / GGT: x

## 2021-06-15 NOTE — CONSULT NOTE ADULT - SUBJECTIVE AND OBJECTIVE BOX
INFECTIOUS DISEASES AND INTERNAL MEDICINE at [place]  =======================================================  Robson Wright MD  Diplomates American Board of Internal Medicine and Infectious Diseases  Telephone [phone]  Fax            [phone]  =======================================================    REYNA [id number]      HPI:  76 year old male patient with a medical history of MI in 1995 (angiogram, no stents placed), COPD (2L O2 at home), s/p TAVR (03/2019 at [facility]), gout, CKD, CVA (09/2020), pulmonary HTN, initially presented to [facility] 6/1/21 with RONNELL on CKD (likely cardiorenal syndrome), urinary retention due to noncompliance with medications, with hospital course complicated by cardiogenic shock, acute hypoxemic respiratory failure secondary to metabolic acidosis and respiratory alkalosis. Patient found to have TAVR failure with ISRAEL 6/10/21 showing EF 40-45%, Aortic valve prosthesis with Severe paravalvular leak and valvular AI, mild-moderate aortic stenosis, and moderate MR. Patient was transferred to [facility] under Dr. Campbell for further workup.     Imaging from [facility]:  6/1: CXR shwoing cardiomegaly, small b/l pleural effusions, moderate pulmonary vascular congestion.  6/1: CT Brain ordered for head trauma? showing age-related cerebral and cerebellar volume loss, mild chronic vascular ischemic changes, stable biparietal arachnoid cyst and stable midline posterior fossa arachnoid cyst.   6/1: US Abdomen for elevated LFTs showing mild hepatic steatosis, mild hepatomegaly, sludge in the gallbladder, no discrete gallstones, normal biliary ducts, mild echogenic right kidney which may be seen with medical renal dz, mildly complex Right upper pole 4cm cyst with subtle internal echoes may represent hemorrhagic/proteinaceous cyst, mildly complex right midpole 2cm cyst with thin internal linear septation.   6/3: Kidney and Bladder US showing no hydronephrosis, echogenic kidneys likely from medical renal dz, prostatomegaly, and thickening of the posterior wall of the bladder with trabeculations which could be due to bladder outlet obstruction.   6/7: Kidney and Bladder US: No hydronephrosis or shadowing renal calculi. Stable b/l renal cysts.   6/7: Lower Extremity Venous Doppler with no DVT noted.   6/8: TTE showing EF 47%, dilated IVC, dilation of the ascending aorta of 4.4cm, moderate-severe pulmonary HTN, moderate-severe TR, aortic valve with severe prosthesis regurgitation and moderate prosthesis stenosis  6/9: CT Chest showing emphysema and intersitial lung dz changes, stable b/l small pleural effusions, cardiomegaly.   6/11: ISRAEL showing EF 40-45%, Aortic valve prosthesis with Severe paravalvular leak and valvular AI, mild-moderate aortic stenosis, and moderate MR.  (12 Jun 2021 01:36)      PAST MEDICAL & SURGICAL HISTORY:  Pulmonary hypertension    Hypertension    Hyperlipidemia    H/O aortic valve stenosis  s/p TAVR 2019 at [place]    CVA (cerebrovascular accident)  MCA CVA with tPA and thrombectomy    Chronic kidney disease    Prediabetes    Mitral valve regurgitation    CAD in native artery    Aneurysm of aortic root  thoracic aortic aneurysm, without ruptur    Benign prostatic hyperplasia    Gout    History of transcatheter aortic valve replacement (TAVR)        ANTIBIOTICS      Allergies    No Known Drug Allergies  strawberry (Anaphylaxis)    Intolerances        SOCIAL HISTORY:     FAMILY HX   FAMILY HISTORY:  FH: lung cancer        Vital Signs Last 24 Hrs  T(C): 36.4 (15 Jhoan 2021 12:00), Max: 37 (15 Jhoan 2021 00:00)  T(F): 97.5 (15 Jhoan 2021 12:00), Max: 98.6 (15 Jhoan 2021 00:00)  HR: 84 (15 Jhoan 2021 14:15) (75 - 117)  BP: 111/56 (15 Jhoan 2021 14:00) (96/48 - 129/58)  BP(mean): 79 (15 Jhoan 2021 14:00) (69 - 86)  RR: 20 (15 Jhoan 2021 14:15) (10 - 34)  SpO2: 100% (15 Jhoan 2021 14:15) (90% - 100%)  Drug Dosing Weight  Height (cm): 182.9 (12 Jun 2021 00:32)  Weight (kg): 100.1 (12 Jun 2021 00:32)  BMI (kg/m2): 29.9 (12 Jun 2021 00:32)  BSA (m2): 2.22 (12 Jun 2021 00:32)      REVIEW OF SYSTEMS:    CONSTITUTIONAL:  As per HPI.    HEENT:  Eyes:  No diplopia or blurred vision. ENT:  No earache, sore throat or runny nose.    CARDIOVASCULAR:  No pressure, squeezing, strangling, tightness, heaviness or aching about the chest, neck, axilla or epigastrium.    RESPIRATORY:  No cough, shortness of breath, PND or orthopnea.    GASTROINTESTINAL:  No nausea, vomiting or diarrhea.    GENITOURINARY:  No dysuria, frequency or urgency.    MUSCULOSKELETAL:  As per HPI.    SKIN:  No change in skin, hair or nails.    NEUROLOGIC:  No paresthesias, fasciculations, seizures or weakness.                  PHYSICAL EXAMINATION:    GENERAL: The patient is a well-developed, well-nourished _____in no apparent distress. ___ is alert and oriented x3.    VITAL SIGNS: T(C): 36.4 (06-15-21 @ 12:00), Max: 37 (06-15-21 @ 00:00)  HR: 84 (06-15-21 @ 14:15) (75 - 117)  BP: 111/56 (06-15-21 @ 14:00) (96/48 - 129/58)  RR: 20 (06-15-21 @ 14:15) (10 - 34)  SpO2: 100% (06-15-21 @ 14:15) (90% - 100%)  Wt(kg): --    HEENT: Head is normocephalic and atraumatic.  ANICTERIC  NECK: Supple. No carotid bruits.  No lymphadenopathy or thyromegaly.    LUNGS:COARSE BREATH SOUNDS    HEART: Regular rate and rhythm without murmur.    ABDOMEN: Soft, nontender, and nondistended.  Positive bowel sounds.  No hepatosplenomegaly was noted. NO REBOUND NO GUARDING    EXTREMITIES: NO EDEMA NO ERYTHEMA    NEUROLOGIC: NON FOCAL      SKIN: No ulceration or induration present. NO RASH        BLOOD CULTURES       URINE CX          LABS:                        12.4   5.72  )-----------( 104      ( 15 Jhoan 2021 02:59 )             39.3     06-15    136  |  97<L>  |  96.3<H>  ----------------------------<  111<H>  3.9   |  20.0<L>  |  3.68<H>    Ca    10.2      15 Jhoan 2021 02:59  Phos  4.2     06-15  Mg     2.5     06-15    TPro  7.6  /  Alb  3.8  /  TBili  2.0  /  DBili  x   /  AST  41<H>  /  ALT  54<H>  /  AlkPhos  133<H>  06-15    PT/INR - ( 15 Jhoan 2021 02:59 )   PT: 18.8 sec;   INR: 1.66 ratio               RADIOLOGY & ADDITIONAL STUDIES:      ASSESSMENT/PLAN  76 year old male patient with a medical history of MI in 1995 (angiogram, no stents placed), COPD (2L O2 at home), s/p TAVR (03/2019 at [facility]), gout, CKD, CVA (09/2020), pulmonary HTN, initially presented to [facility] 6/1/21 with RONNELL on CKD (likely cardiorenal syndrome), urinary retention due to noncompliance with medications, with hospital course complicated by cardiogenic shock, acute hypoxemic respiratory failure secondary to metabolic acidosis and respiratory alkalosis. Patient found to have TAVR failure with ISRAEL 6/10/21 showing EF 40-45%, Aortic valve prosthesis with Severe paravalvular leak and valvular AI, mild-moderate aortic stenosis, and moderate MR. Patient was transferred to [facility] under Dr. Campbell for further workup.   ISRAEL DONE HERE WITH CONCERN FOR ENDOCARDITIS  PT DENIES FEVERS OR SWEATS  BUT HAS HAD SIGNIFICANT WEIGHT LOSS WITHOUT DIETING  PT HAS HX OF STREP BOVIS  BACTEREMIA   LAST YEAR AND RECEIVED   ROCEPHIN AT HOME FOR 6 WEEKS   BLOOD CX X2 6/7 WERE NEGATIVE   BLOOD CX X2 SETS SENT HERE TODAY  WILL D/W WITH CARDIOLOGY FURTHER   IN TERMS OF INITIATING ABX THERAPY  WILL FOLLOW UP WITH FURTHER RECOMMENDATIONS                LOVELY JEAN-BAPTISTE MD

## 2021-06-15 NOTE — PROGRESS NOTE ADULT - PROBLEM SELECTOR PLAN 6
6/3: Kidney and Bladder US showing no hydronephrosis, echogenic kidneys likely from medical renal dz, prostatomegaly, and thickening of the posterior wall of the bladder with trabeculations which could be due to bladder outlet obstruction.   6/7: Kidney and Bladder US: No hydronephrosis or shadowing renal calculi. Stable b/l renal cysts.   Nephrology consulted  Trend BUN/Cr while on Lasix gtt.   Monitor urine output.  Nephrology recs appreciate; will started sodium bicarb tabs

## 2021-06-15 NOTE — PROGRESS NOTE ADULT - SUBJECTIVE AND OBJECTIVE BOX
Brief Hospital Course:   6/11 Transfer from [place] for paravalvular leak. Patient was found to have acute on chronic renal failure, with superimposed cardiorenal syndrome   6/12 ECHO done showing EF 30-35%, reduced RV function, mild MAC, moderate to severe MR, severe TR, moderate AI, and severe PA    Significant recent/past 24 hr events: No events during the day, continue diuresis, making adequate urine. Patient is NPO for ISRAEL today.       Subjective: no c/o incisional pain at this time. Denies CP, SOB, palpitations, N/V, other c/o.    Review of Systems:     Patient is a 76y old  Male who presents with a chief complaint of TAVR Failure (14 Jun 2021 19:34)    HPI:  76 year old male patient with a medical history of MI in 1995 (angiogram, no stents placed), COPD (2L O2 at home), s/p TAVR (03/2019 at [facility]), gout, CKD, CVA (09/2020), pulmonary HTN, initially presented to [facility] 6/1/21 with RONNELL on CKD (likely cardiorenal syndrome), urinary retention due to noncompliance with medications, with hospital course complicated by cardiogenic shock, acute hypoxemic respiratory failure secondary to metabolic acidosis and respiratory alkalosis. Patient found to have TAVR failure with ISRAEL 6/10/21 showing EF 40-45%, Aortic valve prosthesis with Severe paravalvular leak and valvular AI, mild-moderate aortic stenosis, and moderate MR. Patient was transferred to [facility] under Dr. Campbell for further workup.     Imaging from [facility]:  6/1: CXR shwoing cardiomegaly, small b/l pleural effusions, moderate pulmonary vascular congestion.  6/1: CT Brain ordered for head trauma? showing age-related cerebral and cerebellar volume loss, mild chronic vascular ischemic changes, stable biparietal arachnoid cyst and stable midline posterior fossa arachnoid cyst.   6/1: US Abdomen for elevated LFTs showing mild hepatic steatosis, mild hepatomegaly, sludge in the gallbladder, no discrete gallstones, normal biliary ducts, mild echogenic right kidney which may be seen with medical renal dz, mildly complex Right upper pole 4cm cyst with subtle internal echoes may represent hemorrhagic/proteinaceous cyst, mildly complex right midpole 2cm cyst with thin internal linear septation.   6/3: Kidney and Bladder US showing no hydronephrosis, echogenic kidneys likely from medical renal dz, prostatomegaly, and thickening of the posterior wall of the bladder with trabeculations which could be due to bladder outlet obstruction.   6/7: Kidney and Bladder US: No hydronephrosis or shadowing renal calculi. Stable b/l renal cysts.   6/7: Lower Extremity Venous Doppler with no DVT noted.   6/8: TTE showing EF 47%, dilated IVC, dilation of the ascending aorta of 4.4cm, moderate-severe pulmonary HTN, moderate-severe TR, aortic valve with severe prosthesis regurgitation and moderate prosthesis stenosis  6/9: CT Chest showing emphysema and intersitial lung dz changes, stable b/l small pleural effusions, cardiomegaly.   6/11: ISRAEL showing EF 40-45%, Aortic valve prosthesis with Severe paravalvular leak and valvular AI, mild-moderate aortic stenosis, and moderate MR.  (12 Jun 2021 01:36)    PAST MEDICAL & SURGICAL HISTORY:  Pulmonary hypertension    Hypertension    Hyperlipidemia    H/O aortic valve stenosis  s/p TAVR 2019 at [place]    CVA (cerebrovascular accident)  MCA CVA with tPA and thrombectomy    Chronic kidney disease    Prediabetes    Mitral valve regurgitation    CAD in native artery    Aneurysm of aortic root  thoracic aortic aneurysm, without ruptur    Benign prostatic hyperplasia    Gout    History of transcatheter aortic valve replacement (TAVR)      FAMILY HISTORY:  FH: lung cancer        Vitals   ICU Vital Signs Last 24 Hrs  T(C): 37 (15 Jhoan 2021 00:00), Max: 37 (15 Jhoan 2021 00:00)  T(F): 98.6 (15 Jhoan 2021 00:00), Max: 98.6 (15 Jhoan 2021 00:00)  HR: 77 (15 Jhoan 2021 00:00) (75 - 117)  BP: 99/53 (15 Jhoan 2021 00:00) (99/53 - 129/60)  BP(mean): 74 (15 Jhoan 2021 00:00) (73 - 88)  ABP: --  ABP(mean): --  RR: 29 (15 Jhoan 2021 00:00) (12 - 34)  SpO2: 98% (15 Jhoan 2021 00:00) (90% - 100%)      VENT SETTINGS       I&O's Detail    13 Jun 2021 07:01  -  14 Jun 2021 07:00  --------------------------------------------------------  IN:    Furosemide: 60 mL  Total IN: 60 mL    OUT:    Indwelling Catheter - Urethral (mL): 1425 mL  Total OUT: 1425 mL    Total NET: -1365 mL      14 Jun 2021 07:01  -  15 Jun 2021 00:50  --------------------------------------------------------  IN:    Furosemide: 45 mL    Oral Fluid: 1020 mL  Total IN: 1065 mL    OUT:    Indwelling Catheter - Urethral (mL): 845 mL  Total OUT: 845 mL    Total NET: 220 mL          LABS                        12.4   5.43  )-----------( 105      ( 14 Jun 2021 02:59 )             39.9     06-14    136  |  96<L>  |  97.1<H>  ----------------------------<  129<H>  3.6   |  21.0<L>  |  3.81<H>    Ca    10.2      14 Jun 2021 17:30  Phos  5.1     06-14  Mg     2.5     06-14    TPro  7.6  /  Alb  3.9  /  TBili  2.0  /  DBili  x   /  AST  67<H>  /  ALT  62<H>  /  AlkPhos  127<H>  06-14    PT/INR - ( 14 Jun 2021 02:59 )   PT: 18.8 sec;   INR: 1.66 ratio                         MEDICATIONS  (STANDING):  ascorbic acid 500 milliGRAM(s) Oral daily  aspirin enteric coated 81 milliGRAM(s) Oral daily  atorvastatin 10 milliGRAM(s) Oral at bedtime  budesonide 160 MICROgram(s)/formoterol 4.5 MICROgram(s) Inhaler 2 Puff(s) Inhalation two times a day  finasteride 5 milliGRAM(s) Oral daily  furosemide Infusion 5 mG/Hr (2.5 mL/Hr) IV Continuous <Continuous>  heparin   Injectable 5000 Unit(s) SubCutaneous every 12 hours  megestrol 400 milliGRAM(s) Oral daily  midodrine. 5 milliGRAM(s) Oral three times a day  mirtazapine 7.5 milliGRAM(s) Oral daily  multivitamin/minerals 1 Tablet(s) Oral daily  phytonadione   Solution 5 milliGRAM(s) Oral daily  psyllium Powder 1 Packet(s) Oral two times a day  senna 2 Tablet(s) Oral at bedtime  sodium bicarbonate 650 milliGRAM(s) Oral every 12 hours  sodium chloride 0.9% lock flush 3 milliLiter(s) IV Push every 8 hours  tamsulosin 0.4 milliGRAM(s) Oral at bedtime    MEDICATIONS  (PRN):  ALBUTerol    90 MICROgram(s) HFA Inhaler 2 Puff(s) Inhalation every 6 hours PRN Shortness of Breath and/or Wheezing  bisacodyl 5 milliGRAM(s) Oral every 12 hours PRN Constipation  polyethylene glycol 3350 17 Gram(s) Oral daily PRN Constipation    Allergies:  No Known Drug Allergies  strawberry (Anaphylaxis)      Physical Exam:     Neuro: A+O x 3, non-focal, speech clear and intact  HEENT:  NCAT, PERRL, EOMI. No conjuctival edema or icterus, no thrush.  No ETT or NGT/OGT  Neck:  RIJ introducer with site C/D/I. supple, trachea midline, no tracheostomy  Pulm: CTA, good air entry, equal bilaterally, no rales/rhonchi/wheezing, no accessory muscle use noted  Chest:        mediastinal CT and left pleural CT all with dressings intact and no air leak, no subQ emphysema       +PW (settings: VVI, rate: , mA: , threshold: , sensitivity: 2.0 0.8)  CV: regular rate,  rhythm    , +S1S2, no murmur or rub noted  Abd: soft, NT, ND, + BS      Ext: CRONIN x 4, no edema, no cyanosis or clubbing, distal motor/neuro/circ intact  Skin: warm, dry, well perfused  Incisions: midsternal C/D/I/stable w/ dressing, LLE C/D/I w/ dressing and Ace wrap     Brief Hospital Course:   6/11 Transfer from [place] for paravalvular leak. Patient was found to have acute on chronic renal failure, with superimposed cardiorenal syndrome   6/12 ECHO done showing EF 30-35%, reduced RV function, mild MAC, moderate to severe MR, severe TR, moderate AI, and severe PA    Significant recent/past 24 hr events: No events during the day, continue diuresis, making adequate urine. Patient is NPO for ISRAEL today.       Subjective: no c/o incisional pain at this time. Denies CP, SOB, palpitations, N/V, other c/o.    Review of Systems:     Patient is a 76y old  Male who presents with a chief complaint of TAVR Failure (14 Jun 2021 19:34)    HPI:  76 year old male patient with a medical history of MI in 1995 (angiogram, no stents placed), COPD (2L O2 at home), s/p TAVR (03/2019 at [facility]), gout, CKD, CVA (09/2020), pulmonary HTN, initially presented to [facility] 6/1/21 with RONNELL on CKD (likely cardiorenal syndrome), urinary retention due to noncompliance with medications, with hospital course complicated by cardiogenic shock, acute hypoxemic respiratory failure secondary to metabolic acidosis and respiratory alkalosis. Patient found to have TAVR failure with ISRAEL 6/10/21 showing EF 40-45%, Aortic valve prosthesis with Severe paravalvular leak and valvular AI, mild-moderate aortic stenosis, and moderate MR. Patient was transferred to [facility] under Dr. Campbell for further workup.     Imaging from [facility]:  6/1: CXR shwoing cardiomegaly, small b/l pleural effusions, moderate pulmonary vascular congestion.  6/1: CT Brain ordered for head trauma? showing age-related cerebral and cerebellar volume loss, mild chronic vascular ischemic changes, stable biparietal arachnoid cyst and stable midline posterior fossa arachnoid cyst.   6/1: US Abdomen for elevated LFTs showing mild hepatic steatosis, mild hepatomegaly, sludge in the gallbladder, no discrete gallstones, normal biliary ducts, mild echogenic right kidney which may be seen with medical renal dz, mildly complex Right upper pole 4cm cyst with subtle internal echoes may represent hemorrhagic/proteinaceous cyst, mildly complex right midpole 2cm cyst with thin internal linear septation.   6/3: Kidney and Bladder US showing no hydronephrosis, echogenic kidneys likely from medical renal dz, prostatomegaly, and thickening of the posterior wall of the bladder with trabeculations which could be due to bladder outlet obstruction.   6/7: Kidney and Bladder US: No hydronephrosis or shadowing renal calculi. Stable b/l renal cysts.   6/7: Lower Extremity Venous Doppler with no DVT noted.   6/8: TTE showing EF 47%, dilated IVC, dilation of the ascending aorta of 4.4cm, moderate-severe pulmonary HTN, moderate-severe TR, aortic valve with severe prosthesis regurgitation and moderate prosthesis stenosis  6/9: CT Chest showing emphysema and intersitial lung dz changes, stable b/l small pleural effusions, cardiomegaly.   6/11: ISRAEL showing EF 40-45%, Aortic valve prosthesis with Severe paravalvular leak and valvular AI, mild-moderate aortic stenosis, and moderate MR.  (12 Jun 2021 01:36)    PAST MEDICAL & SURGICAL HISTORY:  Pulmonary hypertension    Hypertension    Hyperlipidemia    H/O aortic valve stenosis  s/p TAVR 2019 at [place]    CVA (cerebrovascular accident)  MCA CVA with tPA and thrombectomy    Chronic kidney disease    Prediabetes    Mitral valve regurgitation    CAD in native artery    Aneurysm of aortic root  thoracic aortic aneurysm, without ruptur    Benign prostatic hyperplasia    Gout    History of transcatheter aortic valve replacement (TAVR)      FAMILY HISTORY:  FH: lung cancer        Vitals   ICU Vital Signs Last 24 Hrs  T(C): 37 (15 Jhoan 2021 00:00), Max: 37 (15 Jhoan 2021 00:00)  T(F): 98.6 (15 Jhoan 2021 00:00), Max: 98.6 (15 Jhoan 2021 00:00)  HR: 77 (15 Jhoan 2021 00:00) (75 - 117)  BP: 99/53 (15 Jhoan 2021 00:00) (99/53 - 129/60)  BP(mean): 74 (15 Jhoan 2021 00:00) (73 - 88)  ABP: --  ABP(mean): --  RR: 29 (15 Jhoan 2021 00:00) (12 - 34)  SpO2: 98% (15 Jhoan 2021 00:00) (90% - 100%)      VENT SETTINGS       I&O's Detail    13 Jun 2021 07:01  -  14 Jun 2021 07:00  --------------------------------------------------------  IN:    Furosemide: 60 mL  Total IN: 60 mL    OUT:    Indwelling Catheter - Urethral (mL): 1425 mL  Total OUT: 1425 mL    Total NET: -1365 mL      14 Jun 2021 07:01  -  15 Jun 2021 00:50  --------------------------------------------------------  IN:    Furosemide: 45 mL    Oral Fluid: 1020 mL  Total IN: 1065 mL    OUT:    Indwelling Catheter - Urethral (mL): 845 mL  Total OUT: 845 mL    Total NET: 220 mL          LABS                        12.4   5.43  )-----------( 105      ( 14 Jun 2021 02:59 )             39.9     06-14    136  |  96<L>  |  97.1<H>  ----------------------------<  129<H>  3.6   |  21.0<L>  |  3.81<H>    Ca    10.2      14 Jun 2021 17:30  Phos  5.1     06-14  Mg     2.5     06-14    TPro  7.6  /  Alb  3.9  /  TBili  2.0  /  DBili  x   /  AST  67<H>  /  ALT  62<H>  /  AlkPhos  127<H>  06-14    PT/INR - ( 14 Jun 2021 02:59 )   PT: 18.8 sec;   INR: 1.66 ratio                         MEDICATIONS  (STANDING):  ascorbic acid 500 milliGRAM(s) Oral daily  aspirin enteric coated 81 milliGRAM(s) Oral daily  atorvastatin 10 milliGRAM(s) Oral at bedtime  budesonide 160 MICROgram(s)/formoterol 4.5 MICROgram(s) Inhaler 2 Puff(s) Inhalation two times a day  finasteride 5 milliGRAM(s) Oral daily  furosemide Infusion 5 mG/Hr (2.5 mL/Hr) IV Continuous <Continuous>  heparin   Injectable 5000 Unit(s) SubCutaneous every 12 hours  megestrol 400 milliGRAM(s) Oral daily  midodrine. 5 milliGRAM(s) Oral three times a day  mirtazapine 7.5 milliGRAM(s) Oral daily  multivitamin/minerals 1 Tablet(s) Oral daily  phytonadione   Solution 5 milliGRAM(s) Oral daily  psyllium Powder 1 Packet(s) Oral two times a day  senna 2 Tablet(s) Oral at bedtime  sodium bicarbonate 650 milliGRAM(s) Oral every 12 hours  sodium chloride 0.9% lock flush 3 milliLiter(s) IV Push every 8 hours  tamsulosin 0.4 milliGRAM(s) Oral at bedtime    MEDICATIONS  (PRN):  ALBUTerol    90 MICROgram(s) HFA Inhaler 2 Puff(s) Inhalation every 6 hours PRN Shortness of Breath and/or Wheezing  bisacodyl 5 milliGRAM(s) Oral every 12 hours PRN Constipation  polyethylene glycol 3350 17 Gram(s) Oral daily PRN Constipation    Allergies:  No Known Drug Allergies  strawberry (Anaphylaxis)    Physical Exam:     Neuro: A+O x 3, non-focal, speech clear and intact  HEENT:  NCAT, PERRL, EOMI. No conjuctival edema or icterus, no thrush  Pulm: CTA, good air entry, equal bilaterally, no rales/rhonchi/wheezing, no accessory muscle use noted  Chest:    CV: regular rate,  rhythm +S1S2, no murmur or rub noted  Abd: soft, NT, ND, + BS  Ext: CRONIN x 4, no edema, no cyanosis or clubbing, distal motor/neuro/circ intact  Skin: warm, dry, well perfused

## 2021-06-15 NOTE — PROGRESS NOTE ADULT - SUBJECTIVE AND OBJECTIVE BOX
[place] [facility]                                                               Office: [address] [address]                                                              Telephone: [phone]. Fax:[phone]                                                                             PROGRESS NOTE  Reason for follow up: tavr fAILURE  Overnight: No new events.   Update: 6/15:  Pt denies chest pain, palpitations, SOB. Tele- NSR HR @ 80s. ISRAEL- preliminary results noted pericarditis. ID evaluation recommended. Blood cultures recommended. Will optimize for surgical intervention for Severe paravalvular leak and valvular AI.     Subjective: No new events    	  Vitals:  T(C): 36.4 (06-15-21 @ 12:00), Max: 37 (06-15-21 @ 00:00)  HR: 84 (06-15-21 @ 14:15) (75 - 117)  BP: 111/56 (06-15-21 @ 14:00) (96/48 - 129/58)  RR: 20 (06-15-21 @ 14:15) (10 - 34)  SpO2: 100% (06-15-21 @ 14:15) (90% - 100%)    I&O's Summary    14 Jun 2021 07:01  -  15 Jhoan 2021 07:00  --------------------------------------------------------  IN: 1080 mL / OUT: 1215 mL / NET: -135 mL    15 Jhoan 2021 07:01  -  15 Jhoan 2021 14:49  --------------------------------------------------------  IN: 20 mL / OUT: 345 mL / NET: -325 mL      Weight (kg): 100.1 (06-12 @ 00:32)      PHYSICAL EXAM:  Appearance: Comfortable. No acute distress  HEENT:  Head and neck: Atraumatic. Normocephalic.  Normal oral mucosa, PERRL, Neck is supple. No JVD, No carotid bruit.   Neurologic: A & O x 3, no focal deficits. EOMI.  Lymphatic: No cervical lymphadenopathy  Cardiovascular: Normal S1 S2, + sys. murmur  Respiratory: diminished lung sounds bilaterally  Gastrointestinal:  Soft, Non-tender, + BS  Lower Extremities: +1/+1 bilateral lower extremity pitting edema  Psychiatry: Patient is calm. No agitation. Mood & affect appropriate  Skin: No rashes/ ecchymoses/cyanosis/ulcers visualized on the face, hands or feet    CURRENT MEDICATIONS:  furosemide Infusion 5 mG/Hr IV Continuous <Continuous>  midodrine. 5 milliGRAM(s) Oral three times a day  tamsulosin 0.4 milliGRAM(s) Oral at bedtime  budesonide 160 MICROgram(s)/formoterol 4.5 MICROgram(s) Inhaler  mirtazapine  psyllium Powder  senna  atorvastatin  finasteride  megestrol  ascorbic acid  aspirin enteric coated  heparin   Injectable  multivitamin/minerals  phytonadione   Solution  sodium bicarbonate  sodium chloride 0.9% lock flush      DIAGNOSTIC TESTING:    [ ] Echocardiogram: < from: TTE Echo Complete w/ Contrast w/ Doppler (06.12.21 @ 15:04) >  Summary:   1. Left ventricular ejection fraction, by visual estimation, is 30 to 35%.   2. Moderately decreased global left ventricular systolic function.   3. Severely enlarged left atrium.   4. Severely enlarged right atrium.   5. The mitral in-flow pattern reveals no discernable A-wave, therefore no comment on diastolic function can be made.   6. Severely enlarged right ventricle.   7. Severely reduced RV systolic function.   8. Mild mitral annular calcification.   9. Moderate to severe mitral valve regurgitation.  10. The mitral valve leaflets are tethered which is due to reduced systolic function and elevated LVDP.  11. Severe tricuspid regurgitation.  12. Moderate aortic regurgitation.  13. TAVR in the aortic position.  14. Estimated pulmonary artery systolic pressure is 61.5 mmHg assuming a right atrial pressure of 15 mmHg, which is consistent with severe pulmonary hypertension.  15. Mitral valve mean gradient is 4.0 mmHg consistent with mild mitral stenosis.      OTHER: 	    xRAY:< from: Xray Chest 1 View- PORTABLE-Routine (Xray Chest 1 View- PORTABLE-Routine in AM.) (06.15.21 @ 06:12) >  IMPRESSION:  Mild congestive changes.      LABS:	 	  CARDIAC MARKERS ( 13 Jun 2021 03:05 )  x     / x     / x     / x     / x      p-BNP 13 Jun 2021 03:05: 21176 pg/mL                          12.4   5.72  )-----------( 104      ( 15 Jhoan 2021 02:59 )             39.3     06-15    136  |  97<L>  |  96.3<H>  ----------------------------<  111<H>  3.9   |  20.0<L>  |  3.68<H>    Ca    10.2      15 Jhoan 2021 02:59  Phos  4.2     06-15  Mg     2.5     06-15    TPro  7.6  /  Alb  3.8  /  TBili  2.0  /  DBili  x   /  AST  41<H>  /  ALT  54<H>  /  AlkPhos  133<H>  06-15    proBNP: Serum Pro-Brain Natriuretic Peptide: 89131 pg/mL (06-13 @ 03:05)  Serum Pro-Brain Natriuretic Peptide: 50213 pg/mL (06-12 @ 01:55)      TSH: Thyroid Stimulating Hormone, Serum: 2.31 uIU/mL        TELEMETRY: NSR HR @ 80s

## 2021-06-16 LAB
ALBUMIN SERPL ELPH-MCNC: 4.2 G/DL — SIGNIFICANT CHANGE UP (ref 3.3–5.2)
ALP SERPL-CCNC: 135 U/L — HIGH (ref 40–120)
ALT FLD-CCNC: 39 U/L — SIGNIFICANT CHANGE UP
ALT FLD-CCNC: 48 U/L — HIGH
ANION GAP SERPL CALC-SCNC: 18 MMOL/L — HIGH (ref 5–17)
ANION GAP SERPL CALC-SCNC: 23 MMOL/L — HIGH (ref 5–17)
AST SERPL-CCNC: 36 U/L — SIGNIFICANT CHANGE UP
BILIRUB SERPL-MCNC: 2.1 MG/DL — HIGH (ref 0.4–2)
BUN SERPL-MCNC: 74.9 MG/DL — HIGH (ref 8–20)
BUN SERPL-MCNC: 96.2 MG/DL — HIGH (ref 8–20)
CALCIUM SERPL-MCNC: 10.5 MG/DL — HIGH (ref 8.6–10.2)
CALCIUM SERPL-MCNC: 9.8 MG/DL — SIGNIFICANT CHANGE UP (ref 8.6–10.2)
CHLORIDE SERPL-SCNC: 96 MMOL/L — LOW (ref 98–107)
CHLORIDE SERPL-SCNC: 99 MMOL/L — SIGNIFICANT CHANGE UP (ref 98–107)
CO2 SERPL-SCNC: 20 MMOL/L — LOW (ref 22–29)
CO2 SERPL-SCNC: 22 MMOL/L — SIGNIFICANT CHANGE UP (ref 22–29)
CREAT SERPL-MCNC: 2.85 MG/DL — HIGH (ref 0.5–1.3)
CREAT SERPL-MCNC: 3.54 MG/DL — HIGH (ref 0.5–1.3)
GLUCOSE SERPL-MCNC: 112 MG/DL — HIGH (ref 70–99)
GLUCOSE SERPL-MCNC: 114 MG/DL — HIGH (ref 70–99)
HCT VFR BLD CALC: 41.6 % — SIGNIFICANT CHANGE UP (ref 39–50)
HGB BLD-MCNC: 13.1 G/DL — SIGNIFICANT CHANGE UP (ref 13–17)
INR BLD: 1.47 RATIO — HIGH (ref 0.88–1.16)
MAGNESIUM SERPL-MCNC: 2.4 MG/DL — SIGNIFICANT CHANGE UP (ref 1.6–2.6)
MCHC RBC-ENTMCNC: 29.8 PG — SIGNIFICANT CHANGE UP (ref 27–34)
MCHC RBC-ENTMCNC: 31.5 GM/DL — LOW (ref 32–36)
MCV RBC AUTO: 94.5 FL — SIGNIFICANT CHANGE UP (ref 80–100)
PHOSPHATE SERPL-MCNC: 4.3 MG/DL — SIGNIFICANT CHANGE UP (ref 2.4–4.7)
PLATELET # BLD AUTO: 85 K/UL — LOW (ref 150–400)
POTASSIUM SERPL-MCNC: 3.8 MMOL/L — SIGNIFICANT CHANGE UP (ref 3.5–5.3)
POTASSIUM SERPL-MCNC: 4.1 MMOL/L — SIGNIFICANT CHANGE UP (ref 3.5–5.3)
POTASSIUM SERPL-SCNC: 3.8 MMOL/L — SIGNIFICANT CHANGE UP (ref 3.5–5.3)
POTASSIUM SERPL-SCNC: 4.1 MMOL/L — SIGNIFICANT CHANGE UP (ref 3.5–5.3)
PROT SERPL-MCNC: 8.1 G/DL — SIGNIFICANT CHANGE UP (ref 6.6–8.7)
PROTHROM AB SERPL-ACNC: 16.7 SEC — HIGH (ref 10.6–13.6)
RBC # BLD: 4.4 M/UL — SIGNIFICANT CHANGE UP (ref 4.2–5.8)
RBC # FLD: 20.6 % — HIGH (ref 10.3–14.5)
SODIUM SERPL-SCNC: 139 MMOL/L — SIGNIFICANT CHANGE UP (ref 135–145)
SODIUM SERPL-SCNC: 139 MMOL/L — SIGNIFICANT CHANGE UP (ref 135–145)
WBC # BLD: 7.62 K/UL — SIGNIFICANT CHANGE UP (ref 3.8–10.5)
WBC # FLD AUTO: 7.62 K/UL — SIGNIFICANT CHANGE UP (ref 3.8–10.5)

## 2021-06-16 PROCEDURE — 99232 SBSQ HOSP IP/OBS MODERATE 35: CPT

## 2021-06-16 PROCEDURE — 45378 DIAGNOSTIC COLONOSCOPY: CPT

## 2021-06-16 PROCEDURE — 99233 SBSQ HOSP IP/OBS HIGH 50: CPT

## 2021-06-16 PROCEDURE — 71045 X-RAY EXAM CHEST 1 VIEW: CPT | Mod: 26

## 2021-06-16 PROCEDURE — 71045 X-RAY EXAM CHEST 1 VIEW: CPT | Mod: 26,77

## 2021-06-16 PROCEDURE — 74018 RADEX ABDOMEN 1 VIEW: CPT | Mod: 26

## 2021-06-16 RX ORDER — POTASSIUM CHLORIDE 20 MEQ
20 PACKET (EA) ORAL ONCE
Refills: 0 | Status: COMPLETED | OUTPATIENT
Start: 2021-06-16 | End: 2021-06-16

## 2021-06-16 RX ORDER — HYDROMORPHONE HYDROCHLORIDE 2 MG/ML
0.5 INJECTION INTRAMUSCULAR; INTRAVENOUS; SUBCUTANEOUS ONCE
Refills: 0 | Status: DISCONTINUED | OUTPATIENT
Start: 2021-06-16 | End: 2021-06-16

## 2021-06-16 RX ADMIN — BUDESONIDE AND FORMOTEROL FUMARATE DIHYDRATE 2 PUFF(S): 160; 4.5 AEROSOL RESPIRATORY (INHALATION) at 07:59

## 2021-06-16 RX ADMIN — SODIUM CHLORIDE 3 MILLILITER(S): 9 INJECTION INTRAMUSCULAR; INTRAVENOUS; SUBCUTANEOUS at 05:10

## 2021-06-16 RX ADMIN — TAMSULOSIN HYDROCHLORIDE 0.4 MILLIGRAM(S): 0.4 CAPSULE ORAL at 22:58

## 2021-06-16 RX ADMIN — Medication 20 MILLIEQUIVALENT(S): at 18:51

## 2021-06-16 RX ADMIN — SODIUM CHLORIDE 3 MILLILITER(S): 9 INJECTION INTRAMUSCULAR; INTRAVENOUS; SUBCUTANEOUS at 21:50

## 2021-06-16 RX ADMIN — SODIUM CHLORIDE 3 MILLILITER(S): 9 INJECTION INTRAMUSCULAR; INTRAVENOUS; SUBCUTANEOUS at 13:22

## 2021-06-16 RX ADMIN — BUDESONIDE AND FORMOTEROL FUMARATE DIHYDRATE 2 PUFF(S): 160; 4.5 AEROSOL RESPIRATORY (INHALATION) at 20:54

## 2021-06-16 RX ADMIN — MIDODRINE HYDROCHLORIDE 5 MILLIGRAM(S): 2.5 TABLET ORAL at 22:57

## 2021-06-16 RX ADMIN — ATORVASTATIN CALCIUM 10 MILLIGRAM(S): 80 TABLET, FILM COATED ORAL at 22:58

## 2021-06-16 RX ADMIN — Medication 650 MILLIGRAM(S): at 05:29

## 2021-06-16 RX ADMIN — FINASTERIDE 5 MILLIGRAM(S): 5 TABLET, FILM COATED ORAL at 22:58

## 2021-06-16 RX ADMIN — MIRTAZAPINE 7.5 MILLIGRAM(S): 45 TABLET, ORALLY DISINTEGRATING ORAL at 22:58

## 2021-06-16 RX ADMIN — SENNA PLUS 2 TABLET(S): 8.6 TABLET ORAL at 22:57

## 2021-06-16 NOTE — PROGRESS NOTE ADULT - ASSESSMENT
76 year old male patient with a medical history of MI in 1995 (angiogram, no stents placed), COPD (2L O2 at home), s/p TAVR (03/2019 at [facility]), gout, CKD, CVA (09/2020), pulmonary HTN, initially presented to [facility] 6/1/21 with RONNELL on CKD (likely cardiorenal syndrome), urinary retention due to noncompliance with medications, with hospital course complicated by cardiogenic shock, acute hypoxemic respiratory failure secondary to metabolic acidosis and respiratory alkalosis. Patient found to have TAVR failure with ISRAEL 6/10/21 showing EF 40-45%, Aortic valve prosthesis with Severe paravalvular leak and valvular AI, mild-moderate aortic stenosis, and moderate MR. Patient was transferred to [facility] under Dr. Campbell for further workup.   6/11 Transfer from [place] for paravalvular leak. Patient was found to have acute on chronic renal failure, with superimposed cardiorenal syndrome   6/12 TTE done showing EF 30-35%, reduced RV function, mild MAC, moderate to severe MR, severe TR, moderate AI, and severe PA  6/15 EGD - GI feels there is concern for gastric Ca. 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

## 2021-06-16 NOTE — PROGRESS NOTE ADULT - SUBJECTIVE AND OBJECTIVE BOX
[place] [facility] Practice                                                               Office: [address] [address]                                                              Telephone: [phone]. Fax:[phone]                                                                             PROGRESS NOTE  Reason for follow up:   Overnight: No new events.   Update:     Subjective: No new events    	  Vitals:  T(C): 37 (06-16-21 @ 14:00), Max: 37 (06-16-21 @ 04:00)  HR: 87 (06-16-21 @ 15:00) (80 - 97)  BP: 108/57 (06-16-21 @ 15:00) (104/54 - 129/58)  RR: 19 (06-16-21 @ 15:00) (10 - 45)  SpO2: 97% (06-16-21 @ 15:00) (73% - 100%)    I&O's Summary    15 Jhoan 2021 07:01  -  16 Jun 2021 07:00  --------------------------------------------------------  IN: 2180 mL / OUT: 1260 mL / NET: 920 mL    16 Jun 2021 07:01  -  16 Jun 2021 16:04  --------------------------------------------------------  IN: 20 mL / OUT: 360 mL / NET: -340 mL      Weight (kg): 100.1 (06-12 @ 00:32)      PHYSICAL EXAM:  Appearance: Comfortable. No acute distress  HEENT:  Head and neck: Atraumatic. Normocephalic.  Normal oral mucosa, PERRL, Neck is supple. No JVD, No carotid bruit.   Neurologic: A & O x 3, no focal deficits. EOMI.  Lymphatic: No cervical lymphadenopathy  Cardiovascular: Normal S1 S2, No murmur, rubs/gallops. No JVD, No edema  Respiratory: Lungs clear to auscultation  Gastrointestinal:  Soft, Non-tender, + BS  Lower Extremities: +2/+2 pitting edema  Psychiatry: Patient is calm. No agitation. Mood & affect appropriate  Skin: No rashes/ ecchymoses/cyanosis/ulcers visualized on the face, hands or feet       CURRENT MEDICATIONS:  furosemide Infusion 5 mG/Hr IV Continuous <Continuous>  midodrine. 5 milliGRAM(s) Oral three times a day  tamsulosin 0.4 milliGRAM(s) Oral at bedtime  budesonide 160 MICROgram(s)/formoterol 4.5 MICROgram(s) Inhaler  mirtazapine  psyllium Powder  senna  atorvastatin  finasteride  megestrol  ascorbic acid  multivitamin/minerals  phytonadione   Solution  sodium bicarbonate  sodium chloride 0.9% lock flush      DIAGNOSTIC TESTING:    [ ] Echocardiogram: < from: ISRAEL Echo Doppler (06.15.21 @ 11:34) >    Summary:   1. Technically good study.   2. Moderately decreased global left ventricular systolic function.   3. Left ventricular ejection fraction, by visual estimation, is 30 to 35%.   4. Left atrial enlargement.   5. Moderately enlarged right ventricle.   6. Right atrial enlargement.   7. Mild prolapse of the posterior leaflet, moderate mitral valve regurgitation.   8. S/P TAVR. There is a mobile echo density attached to the ventricular side of the prosthetic aortic valve. Severe aortic valve regurgitation is seen, with what appears to be damage to one of the leaflets. Patient with prior Hx of step Bovis bacteremia. Findings are highly suspicious for endocarditis. Recommend ID consult, blood and urine cultures.   9. Mild-moderate tricuspid regurgitation.  10. Mild pulmonic valve regurgitation.  11. Trivial pericardial effusion.  12. Findings DW MAVERICK Osuna-NP.          [ ]  Catheterization: < from: Cardiac Cath Lab - Adult (03.26.21 @ 10:20) >  INTERVENTIONAL IMPRESSIONS: Severe Pulmonary Hypertension, likely  congestive, with equivalent response to Shawna at 80ppm- PVR was 2.09WU pre  and 1.59 post Shawna. 2. Severe congestive heart failure. 3. A TAV in the  aortic position was noted moving in consonance with the surrounding  structures and with acceptable hemodynamics (Mean Aortic PG is 16mmHg and  an PILAR over 1cm2).  INTERVENTIONAL RECOMMENDATIONS: Aggressive diuresis. 2. Would consider  Cardio MEMS for better fluid management in the out patient setting and to  minimize repeat hospitalizations. 3. Would re-assess severity of the MR  after CHF optimization.            OTHER: 	      LABS:	 	                            13.1   7.62  )-----------( 85       ( 16 Jun 2021 03:13 )             41.6     06-16    139  |  96<L>  |  96.2<H>  ----------------------------<  114<H>  3.8   |  20.0<L>  |  3.54<H>    Ca    10.5<H>      16 Jun 2021 03:13  Phos  4.3     06-16  Mg     2.4     06-16    TPro  8.1  /  Alb  4.2  /  TBili  2.1<H>  /  DBili  x   /  AST  36  /  ALT  48<H>  /  AlkPhos  135<H>  06-16    proBNP: Serum Pro-Brain Natriuretic Peptide: 11838 pg/mL (06-13 @ 03:05)  Serum Pro-Brain Natriuretic Peptide: 94815 pg/mL (06-12 @ 01:55)    Lipid Profile:   HgA1c:   TSH: Thyroid Stimulating Hormone, Serum: 2.31 uIU/mL        TELEMETRY: Reviewed    ECG:  Reviewed by me. 	                                                                      [place] [facility] Practice                                                               Office: [address] [address]                                                              Telephone: [phone]. Fax:[phone]                                                                             PROGRESS NOTE  Reason for follow up: TAVR Failure  Overnight: No new events.   Update:     Subjective: No new events    	  Vitals:  T(C): 37 (06-16-21 @ 14:00), Max: 37 (06-16-21 @ 04:00)  HR: 87 (06-16-21 @ 15:00) (80 - 97)  BP: 108/57 (06-16-21 @ 15:00) (104/54 - 129/58)  RR: 19 (06-16-21 @ 15:00) (10 - 45)  SpO2: 97% (06-16-21 @ 15:00) (73% - 100%)    I&O's Summary    15 Jhoan 2021 07:01  -  16 Jun 2021 07:00  --------------------------------------------------------  IN: 2180 mL / OUT: 1260 mL / NET: 920 mL    16 Jun 2021 07:01  -  16 Jun 2021 16:04  --------------------------------------------------------  IN: 20 mL / OUT: 360 mL / NET: -340 mL      Weight (kg): 100.1 (06-12 @ 00:32)      PHYSICAL EXAM:  Appearance: Comfortable. No acute distress  HEENT:  Head and neck: Atraumatic. Normocephalic.  Normal oral mucosa, PERRL, Neck is supple. No JVD, No carotid bruit.   Neurologic: A & O x 3, no focal deficits. EOMI.  Lymphatic: No cervical lymphadenopathy  Cardiovascular: Normal S1 S2, No murmur, rubs/gallops. No JVD, No edema  Respiratory: Lungs clear to auscultation  Gastrointestinal:  Soft, Non-tender, + BS  Lower Extremities: +2/+2 pitting edema  Psychiatry: Patient is calm. No agitation. Mood & affect appropriate  Skin: No rashes/ ecchymoses/cyanosis/ulcers visualized on the face, hands or feet       CURRENT MEDICATIONS:  furosemide Infusion 5 mG/Hr IV Continuous <Continuous>  midodrine. 5 milliGRAM(s) Oral three times a day  tamsulosin 0.4 milliGRAM(s) Oral at bedtime  budesonide 160 MICROgram(s)/formoterol 4.5 MICROgram(s) Inhaler  mirtazapine  psyllium Powder  senna  atorvastatin  finasteride  megestrol  ascorbic acid  multivitamin/minerals  phytonadione   Solution  sodium bicarbonate  sodium chloride 0.9% lock flush      DIAGNOSTIC TESTING:    [ ] Echocardiogram: < from: ISRAEL Echo Doppler (06.15.21 @ 11:34) >    Summary:   1. Technically good study.   2. Moderately decreased global left ventricular systolic function.   3. Left ventricular ejection fraction, by visual estimation, is 30 to 35%.   4. Left atrial enlargement.   5. Moderately enlarged right ventricle.   6. Right atrial enlargement.   7. Mild prolapse of the posterior leaflet, moderate mitral valve regurgitation.   8. S/P TAVR. There is a mobile echo density attached to the ventricular side of the prosthetic aortic valve. Severe aortic valve regurgitation is seen, with what appears to be damage to one of the leaflets. Patient with prior Hx of step Bovis bacteremia. Findings are highly suspicious for endocarditis. Recommend ID consult, blood and urine cultures.   9. Mild-moderate tricuspid regurgitation.  10. Mild pulmonic valve regurgitation.  11. Trivial pericardial effusion.  12. Findings DW MAVERICK Osuna-NP.          [ ]  Catheterization: < from: Cardiac Cath Lab - Adult (03.26.21 @ 10:20) >  INTERVENTIONAL IMPRESSIONS: Severe Pulmonary Hypertension, likely  congestive, with equivalent response to Shawna at 80ppm- PVR was 2.09WU pre  and 1.59 post Shawna. 2. Severe congestive heart failure. 3. A TAV in the  aortic position was noted moving in consonance with the surrounding  structures and with acceptable hemodynamics (Mean Aortic PG is 16mmHg and  an PILAR over 1cm2).  INTERVENTIONAL RECOMMENDATIONS: Aggressive diuresis. 2. Would consider  Cardio MEMS for better fluid management in the out patient setting and to  minimize repeat hospitalizations. 3. Would re-assess severity of the MR  after CHF optimization.            OTHER:   	  CT:< from: CT Abdomen and Pelvis w/ Oral Cont (06.15.21 @ 16:53) >  IMPRESSION: Focal wall thickening at the level of the cecum may be secondary to colitis. Tumor cannot be excluded and further evaluation is recommended  TAVR.  Emphysema.  Small bilateral pleural effusions  Multiple bilateral renal cysts.  Small amount of pelvic ascites  Moderate vascular calcifications    Xray:< from: Xray Chest 1 View- PORTABLE-Routine (Xray Chest 1 View- PORTABLE-Routine .) (06.16.21 @ 12:20) >  IMPRESSION:  No acute radiographic findings, in particular no pneumoperitoneum or pneumothorax      LABS:	 	                            13.1   7.62  )-----------( 85       ( 16 Jun 2021 03:13 )             41.6     06-16    139  |  96<L>  |  96.2<H>  ----------------------------<  114<H>  3.8   |  20.0<L>  |  3.54<H>    Ca    10.5<H>      16 Jun 2021 03:13  Phos  4.3     06-16  Mg     2.4     06-16    TPro  8.1  /  Alb  4.2  /  TBili  2.1<H>  /  DBili  x   /  AST  36  /  ALT  48<H>  /  AlkPhos  135<H>  06-16    proBNP: Serum Pro-Brain Natriuretic Peptide: 19470 pg/mL (06-13 @ 03:05)  Serum Pro-Brain Natriuretic Peptide: 49196 pg/mL (06-12 @ 01:55)      TSH: Thyroid Stimulating Hormone, Serum: 2.31 uIU/mL        TELEMETRY: NSR HR @ 80s   	                                                                      [place] [facility] Practice                                                               Office: [address], [place] [address]                                                              Telephone: [phone]. Fax:[phone]                                                                             PROGRESS NOTE  Reason for follow up: TAVR Failure  Overnight: No new events.   Update: 6/16:  Pt denies chest pain, palpitations, SOB. Tele- NSR HR @ 80s. Awaiting ISRAEL collaterals from [place] for comparison. Eventual R+LHC when optimized from renal perspective.    Subjective: No new events    	  Vitals:  T(C): 37 (06-16-21 @ 14:00), Max: 37 (06-16-21 @ 04:00)  HR: 87 (06-16-21 @ 15:00) (80 - 97)  BP: 108/57 (06-16-21 @ 15:00) (104/54 - 129/58)  RR: 19 (06-16-21 @ 15:00) (10 - 45)  SpO2: 97% (06-16-21 @ 15:00) (73% - 100%)    I&O's Summary    15 Jhoan 2021 07:01  -  16 Jun 2021 07:00  --------------------------------------------------------  IN: 2180 mL / OUT: 1260 mL / NET: 920 mL    16 Jun 2021 07:01  -  16 Jun 2021 16:04  --------------------------------------------------------  IN: 20 mL / OUT: 360 mL / NET: -340 mL      Weight (kg): 100.1 (06-12 @ 00:32)      PHYSICAL EXAM:  Appearance: Comfortable. No acute distress  HEENT:  Head and neck: Atraumatic. Normocephalic.  Normal oral mucosa, PERRL, Neck is supple. No JVD, No carotid bruit.   Neurologic: A & O x 3, no focal deficits. EOMI.  Lymphatic: No cervical lymphadenopathy  Cardiovascular: Normal S1 S2, No murmur, rubs/gallops. No JVD, No edema  Respiratory: Lungs clear to auscultation  Gastrointestinal:  Soft, Non-tender, + BS  Lower Extremities: +2/+2 pitting edema  Psychiatry: Patient is calm. No agitation. Mood & affect appropriate  Skin: No rashes/ ecchymoses/cyanosis/ulcers visualized on the face, hands or feet       CURRENT MEDICATIONS:  furosemide Infusion 5 mG/Hr IV Continuous <Continuous>  midodrine. 5 milliGRAM(s) Oral three times a day  tamsulosin 0.4 milliGRAM(s) Oral at bedtime  budesonide 160 MICROgram(s)/formoterol 4.5 MICROgram(s) Inhaler  mirtazapine  psyllium Powder  senna  atorvastatin  finasteride  megestrol  ascorbic acid  multivitamin/minerals  phytonadione   Solution  sodium bicarbonate  sodium chloride 0.9% lock flush      DIAGNOSTIC TESTING:    [ ] Echocardiogram: < from: ISRAEL Echo Doppler (06.15.21 @ 11:34) >    Summary:   1. Technically good study.   2. Moderately decreased global left ventricular systolic function.   3. Left ventricular ejection fraction, by visual estimation, is 30 to 35%.   4. Left atrial enlargement.   5. Moderately enlarged right ventricle.   6. Right atrial enlargement.   7. Mild prolapse of the posterior leaflet, moderate mitral valve regurgitation.   8. S/P TAVR. There is a mobile echo density attached to the ventricular side of the prosthetic aortic valve. Severe aortic valve regurgitation is seen, with what appears to be damage to one of the leaflets. Patient with prior Hx of step Bovis bacteremia. Findings are highly suspicious for endocarditis. Recommend ID consult, blood and urine cultures.   9. Mild-moderate tricuspid regurgitation.  10. Mild pulmonic valve regurgitation.  11. Trivial pericardial effusion.  12. Findings DW MAVERICK Osuna-NP.          [ ]  Catheterization: < from: Cardiac Cath Lab - Adult (03.26.21 @ 10:20) >  INTERVENTIONAL IMPRESSIONS: Severe Pulmonary Hypertension, likely  congestive, with equivalent response to Shawna at 80ppm- PVR was 2.09WU pre  and 1.59 post Shawna. 2. Severe congestive heart failure. 3. A TAV in the  aortic position was noted moving in consonance with the surrounding  structures and with acceptable hemodynamics (Mean Aortic PG is 16mmHg and  an PILAR over 1cm2).  INTERVENTIONAL RECOMMENDATIONS: Aggressive diuresis. 2. Would consider  Cardio MEMS for better fluid management in the out patient setting and to  minimize repeat hospitalizations. 3. Would re-assess severity of the MR  after CHF optimization.            OTHER:   	  CT:< from: CT Abdomen and Pelvis w/ Oral Cont (06.15.21 @ 16:53) >  IMPRESSION: Focal wall thickening at the level of the cecum may be secondary to colitis. Tumor cannot be excluded and further evaluation is recommended  TAVR.  Emphysema.  Small bilateral pleural effusions  Multiple bilateral renal cysts.  Small amount of pelvic ascites  Moderate vascular calcifications    Xray:< from: Xray Chest 1 View- PORTABLE-Routine (Xray Chest 1 View- PORTABLE-Routine .) (06.16.21 @ 12:20) >  IMPRESSION:  No acute radiographic findings, in particular no pneumoperitoneum or pneumothorax      LABS:	 	                            13.1   7.62  )-----------( 85       ( 16 Jun 2021 03:13 )             41.6     06-16    139  |  96<L>  |  96.2<H>  ----------------------------<  114<H>  3.8   |  20.0<L>  |  3.54<H>    Ca    10.5<H>      16 Jun 2021 03:13  Phos  4.3     06-16  Mg     2.4     06-16    TPro  8.1  /  Alb  4.2  /  TBili  2.1<H>  /  DBili  x   /  AST  36  /  ALT  48<H>  /  AlkPhos  135<H>  06-16    proBNP: Serum Pro-Brain Natriuretic Peptide: 35468 pg/mL (06-13 @ 03:05)  Serum Pro-Brain Natriuretic Peptide: 52943 pg/mL (06-12 @ 01:55)      TSH: Thyroid Stimulating Hormone, Serum: 2.31 uIU/mL        TELEMETRY: NSR HR @ 80s

## 2021-06-16 NOTE — PROGRESS NOTE ADULT - ASSESSMENT
76 year old male patient with a medical history of MI in 1995 (angiogram, no stents placed), COPD (2L O2 at home), s/p TAVR (03/2019 at [facility]), gout, CKD, CVA (09/2020), pulmonary HTN, initially presented to [facility] 6/1/21 with RONNELL on CKD (likely cardiorenal syndrome), urinary retention due to noncompliance with medications, with hospital course complicated by cardiogenic shock, acute hypoxemic respiratory failure secondary to metabolic acidosis and respiratory alkalosis. Patient found to have TAVR failure with ISRAEL 6/10/21 showing EF 40-45%, Aortic valve prosthesis with Severe paravalvular leak and valvular AI, mild-moderate aortic stenosis, and moderate MR. Patient was transferred to [facility] under Dr. Campbell for further workup.   ISRAEL DONE HERE WITH CONCERN FOR ENDOCARDITIS  PT DENIES FEVERS OR SWEATS  BUT HAS HAD SIGNIFICANT WEIGHT LOSS WITHOUT DIETING  PT HAS HX OF STREP BOVIS  BACTEREMIA   LAST YEAR AND RECEIVED   ROCEPHIN AT HOME FOR 6 WEEKS   BLOOD CX X2 6/7 WERE NEGATIVE   BLOOD CX X2 SETS SENT HERE SO FAR NEG   WILL RECOMMEND REPEAT BLOOD CX X2 SETS AGAIN  PT NON TOXIC AFEBRILE  WILL HOLD ABX   EGD BIOPSIES ARE PENDING  FOR COLONOSCOPY TODAY  WOULD LIKE TO AOVID LONG TERM IV ABX UNLESS  POSS BACTEREMIA  ? MARANTIC ENDOCARDITIS   WILL FOLLOW UP   IF SPOKES THEN VANCO/ROCEPHIN  SPOKE TO CTICU PA     Immediate family member

## 2021-06-16 NOTE — PROGRESS NOTE ADULT - ASSESSMENT
76 year old male patient with a medical history of MI in 1995 (angiogram, no stents placed), COPD (2L O2 at home), s/p TAVR (03/2019 at [facility]), gout, CKD, CVA (09/2020), pulmonary HTN, initially presented to [facility] 6/1/21 with RONNELL on CKD (likely cardiorenal syndrome), urinary retention due to noncompliance with medications, with hospital course complicated by cardiogenic shock, acute hypoxemic respiratory failure secondary to metabolic acidosis and respiratory alkalosis. Patient found to have TAVR failure with ISRAEL 6/10/21 showing EF 40-45%, Aortic valve prosthesis with Severe paravalvular leak and valvular AI, mild-moderate aortic stenosis, and moderate MR. Patient was transferred to [facility] under Dr. Campbell for further workup. Cardiology consulted for decompensated HF and severe pulmonary hypertension.     Imaging from [facility]:  6/11: ISRAEL showing EF 40-45%, Aortic valve prosthesis with Severe paravalvular leak and valvular AI, mild-moderate aortic stenosis, and moderate MR.       6/16:  Pt denies chest pain, palpitations, SOB. Tele- NSR HR @ 80s. ISRAEL- preliminary results noted pericarditis. ID evaluation recommended. Blood cultures recommended. Will optimize for surgical intervention for Severe paravalvular leak and valvular AI.     HFrEF  - EF 40-45% -- > 30-35% on 6/12/21 echo  - severely reduced RV  - mod to severe MR  - Severe TR   -  Aortic valve prosthesis with Severe paravalvular leak and valvular AI  - Cont. IV Lasix gtt  - Cont. Strict I+O   -ISRAEL- preliminary results noted pericarditis.   -ID evaluation recommended, Blood cultures recommended  - Also need GDMT for n/o HFrEF   - Continue midodrine, hold afterload reduction and Toprol       Severe Pulmonary HTN   - confirmed on [facility] march/2021   - echo shows PASP 61.5 consistent with severe pulm HTN      RONNELL on CKD  - Cont. lasix infusion @ 5mg/hr   - Nephrology recs appreciated  - metabolic acidosis, HCO3 is 20 w/ anion gap       TAVR Prosthesis failure   - Aortic valve with severe prosthesis regurgitation and moderate prosthesis stenosis  - CTS following    - ISRAEL- preliminary results noted pericarditis  -ID evaluation recommended  -Blood cultures recommended  -Will optimize for surgical intervention for Severe paravalvular leak and valvular AI        76 year old male patient with a medical history of MI in 1995 (angiogram, no stents placed), COPD (2L O2 at home), s/p TAVR (03/2019 at [facility]), gout, CKD, CVA (09/2020), pulmonary HTN, initially presented to [facility] 6/1/21 with RONNELL on CKD (likely cardiorenal syndrome), urinary retention due to noncompliance with medications, with hospital course complicated by cardiogenic shock, acute hypoxemic respiratory failure secondary to metabolic acidosis and respiratory alkalosis. Patient found to have TAVR failure with ISRAEL 6/10/21 showing EF 40-45%, Aortic valve prosthesis with Severe paravalvular leak and valvular AI, mild-moderate aortic stenosis, and moderate MR. Patient was transferred to [facility] under Dr. Campbell for further workup. Cardiology consulted for decompensated HF and severe pulmonary hypertension.     Imaging from [facility]:  6/11: ISRAEL showing EF 40-45%, Aortic valve prosthesis with Severe paravalvular leak and valvular AI, mild-moderate aortic stenosis, and moderate MR.       6/16:  Pt denies chest pain, palpitations, SOB. Tele- NSR HR @ 80s. Awaiting TTE collaterals from [place]. Eventual R+[facility] when renal optimized    HFrEF  - EF 40-45% -- > 30-35% on 6/12/21 echo  - severely reduced RV  - mod to severe MR  - Severe TR   - Aortic valve prosthesis with Severe paravalvular leak and valvular AI  - Cont. IV Lasix gtt  - Cont. Strict I+O   -ISRAEL- preliminary results noted possible endocarditis  -ID evaluation recommended, Blood cultures recommended  - Also need GDMT for n/o HFrEF   - Continue midodrine, hold afterload reduction and Toprol       Severe Pulmonary HTN   - confirmed on [facility] march/2021   - echo shows PASP 61.5 consistent with severe pulm HTN      RONNELL on CKD  - Cont. lasix infusion @ 5mg/hr   - Nephrology recs appreciated  - metabolic acidosis, HCO3 is 20 w/ anion gap       TAVR Prosthesis failure   - Aortic valve with severe prosthesis regurgitation and moderate prosthesis stenosis  - CTS following   - ISRAEL- preliminary results noted pericarditis  -ID evaluation recommended  -Blood cultures recommended  -Will optimize for surgical intervention for Severe paravalvular leak and valvular AI        76 year old male patient with a medical history of MI in 1995 (angiogram, no stents placed), COPD (2L O2 at home), s/p TAVR (03/2019 at [facility]), gout, CKD, CVA (09/2020), pulmonary HTN, initially presented to [facility] 6/1/21 with RONNELL on CKD (likely cardiorenal syndrome), urinary retention due to noncompliance with medications, with hospital course complicated by cardiogenic shock, acute hypoxemic respiratory failure secondary to metabolic acidosis and respiratory alkalosis. Patient found to have TAVR failure with ISRAEL 6/10/21 showing EF 40-45%, Aortic valve prosthesis with Severe paravalvular leak and valvular AI, mild-moderate aortic stenosis, and moderate MR. Patient was transferred to [facility] under Dr. Campbell for further workup. Cardiology consulted for decompensated HF and severe pulmonary hypertension.     Imaging from [facility]:  6/11: ISRAEL showing EF 40-45%, Aortic valve prosthesis with Severe paravalvular leak and valvular AI, mild-moderate aortic stenosis, and moderate MR.       6/16:  Pt denies chest pain, palpitations, SOB. Tele- NSR HR @ 80s. Awaiting ISRAEL collaterals from [place] for comparison. Eventual R+LHC when optimized from renal perspective.     HFrEF  - EF 40-45% -- > 30-35% on 6/12/21 echo  - severely reduced RV  - mod to severe MR  - Severe TR   - Aortic valve prosthesis with Severe paravalvular leak and valvular AI  - Cont. IV Lasix gtt  - Cont. Strict I+O   -ISRAEL- preliminary results noted possible endocarditis  -ID evaluation recommended, Blood cultures recommended  - Also need GDMT for n/o HFrEF   - Continue midodrine, hold afterload reduction and Toprol       Severe Pulmonary HTN   - confirmed on [facility] march/2021   - Echo shows PASP 61.5 consistent with severe pulm HTN      RONNELL on CKD  - Cont. lasix infusion @ 5mg/hr   - Nephrology recs appreciated  - metabolic acidosis, HCO3 is 20 w/ anion gap       TAVR Prosthesis failure   - Aortic valve with severe prosthesis regurgitation and moderate prosthesis stenosis  - CTS following   -ISRAEL- preliminary results noted endocarditis  -ID evaluation appreciated  -Blood cultures completed, pending results  -Eventual R+LHC, needs to be optimized from renal perspective

## 2021-06-16 NOTE — PROGRESS NOTE ADULT - PROBLEM SELECTOR PLAN 1
ISRAEL 6/10/21 at [facility] showing EF 40-45%, Aortic valve prosthesis with Severe paravalvular leak and valvular AI, mild-moderate aortic stenosis, and moderate MR.  Admitted to [facility] 6/12/21 with CT Surgery Dr. Campbell.   Continuous telemetry, .   Continue Lasix gtt (transferred to ICU for close u/o monitoring)  Maintain Lemus for strict urine output monitoring and urinary retention in the setting of BPH.   Continue Flomax and Proscar.   Supplement electrolytes to maintain K>4 and Mg>2.   Continue midodrine to support BP with aggressive diuresis.   Oxygenating well on 2L O2 via NC (On 2L Home O2).      TAVR CTA ordered

## 2021-06-16 NOTE — PROVIDER CONTACT NOTE (EICU) - BACKGROUND
asked by CT ICU ACP Tanya for K beau 20 meq x2 doses for hypokalemia. order placed as requested. primary team to repeat BMP at interval.
Last BMP was at 3AM. K 3.8, Cr 3.54.  Patient received potassium 20 meq IV at 9AM.  Lasix gtt d/dwain at 4pm today.

## 2021-06-16 NOTE — PROGRESS NOTE ADULT - SUBJECTIVE AND OBJECTIVE BOX
Subjective: Denies CP, SOB, palpitations, N/V, other c/o.    T(C): 36.3 (06-16-21 @ 00:26), Max: 36.9 (06-15-21 @ 04:00)  HR: 87 (06-16-21 @ 00:00) (79 - 97)  BP: 117/56 (06-16-21 @ 00:00) (96/48 - 129/58)  RR: 20 (06-16-21 @ 00:00) (10 - 45)  SpO2: 99% (06-16-21 @ 00:00) (94% - 100%)    I&O's Detail    14 Jun 2021 07:01  -  15 Jhoan 2021 07:00  --------------------------------------------------------  IN:    Furosemide: 60 mL    Oral Fluid: 1020 mL  Total IN: 1080 mL    OUT:    Indwelling Catheter - Urethral (mL): 1215 mL  Total OUT: 1215 mL    Total NET: -135 mL      15 Jhoan 2021 07:01  -  16 Jun 2021 01:39  --------------------------------------------------------  IN:    Furosemide: 47.5 mL    Oral Fluid: 2120 mL  Total IN: 2167.5 mL    OUT:    Indwelling Catheter - Urethral (mL): 890 mL  Total OUT: 890 mL    Total NET: 1277.5 mL          LABS: All Lab data reviewed and analyzed                        12.4   5.72  )-----------( 104      ( 15 Jhoan 2021 02:59 )             39.3     06-15    136  |  97<L>  |  96.3<H>  ----------------------------<  111<H>  3.9   |  20.0<L>  |  3.68<H>    Ca    10.2      15 Jhoan 2021 02:59  Phos  4.2     06-15  Mg     2.5     06-15    TPro  7.6  /  Alb  3.8  /  TBili  2.0  /  DBili  x   /  AST  41<H>  /  ALT  54<H>  /  AlkPhos  133<H>  06-15    PT/INR - ( 15 Jhoan 2021 02:59 )   PT: 18.8 sec;   INR: 1.66 ratio      RADIOLOGY: - Reviewed and analyzed     HOSPITAL MEDICATIONS: All medications reviewed and analyzed    ascorbic acid 500 milliGRAM(s) Oral daily  aspirin enteric coated 81 milliGRAM(s) Oral daily  atorvastatin 10 milliGRAM(s) Oral at bedtime  budesonide 160 MICROgram(s)/formoterol 4.5 MICROgram(s) Inhaler 2 Puff(s) Inhalation two times a day  finasteride 5 milliGRAM(s) Oral daily  furosemide Infusion 5 mG/Hr (2.5 mL/Hr) IV Continuous <Continuous>  heparin   Injectable 5000 Unit(s) SubCutaneous every 12 hours  megestrol 400 milliGRAM(s) Oral daily  midodrine. 5 milliGRAM(s) Oral three times a day  mirtazapine 7.5 milliGRAM(s) Oral daily  multivitamin/minerals 1 Tablet(s) Oral daily  phytonadione   Solution 5 milliGRAM(s) Oral daily  psyllium Powder 1 Packet(s) Oral two times a day  senna 2 Tablet(s) Oral at bedtime  sodium bicarbonate 650 milliGRAM(s) Oral every 12 hours  sodium chloride 0.9% lock flush 3 milliLiter(s) IV Push every 8 hours  tamsulosin 0.4 milliGRAM(s) Oral at bedtime    MEDICATIONS  (PRN):  ALBUTerol    90 MICROgram(s) HFA Inhaler 2 Puff(s) Inhalation every 6 hours PRN Shortness of Breath and/or Wheezing  bisacodyl 5 milliGRAM(s) Oral every 12 hours PRN Constipation  polyethylene glycol 3350 17 Gram(s) Oral daily PRN Constipation    Neuro: A+O x 3, non-focal, speech clear and intact  HEENT: PERRL, EOMI, oral mucosa pink and moist  Neck: supple, no JVD  CV: regular rate, regular rhythm, +S1S2, holosystolic murmur 2+  Pulm/chest: lung sounds CTA and equal bilaterally, no accessory muscle use noted  Abd: soft, NT, ND, +BS  Ext: CRONIN x 4, no C/C/E  Skin: warm, well perfused     Case including assessment/plan of care discussed with   CT surgery attending.  Critical Care time:  35    minutes of noncontinuos critical care time spent evaluating/treating, reviewing imaging, labs, discussing case with multidisciplinary team, discussing plans/goals of care with patient/family to prevent further life threatening depreciation of the patient's condition. Non-inclusive is procedure time.     76yMale with PMH     EGD with biopsy    PAST MEDICAL & SURGICAL HISTORY:  Pulmonary hypertension    Hypertension    Hyperlipidemia    H/O aortic valve stenosis  s/p TAVR 2019 at [place]    CVA (cerebrovascular accident)  MCA CVA with tPA and thrombectomy    Chronic kidney disease    Prediabetes    Mitral valve regurgitation    CAD in native artery    Aneurysm of aortic root  thoracic aortic aneurysm, without ruptur    Benign prostatic hyperplasia    Gout    History of transcatheter aortic valve replacement (TAVR)    76 year old male patient with a medical history of MI in 1995 (angiogram, no stents placed), COPD (2L O2 at home), s/p TAVR (03/2019 at [facility]), gout, CKD, CVA (09/2020), pulmonary HTN, initially presented to [facility] 6/1/21 with RONNELL on CKD (likely cardiorenal syndrome), urinary retention due to noncompliance with medications, with hospital course complicated by cardiogenic shock, acute hypoxemic respiratory failure secondary to metabolic acidosis and respiratory alkalosis. Patient found to have TAVR failure with ISRAEL 6/10/21 showing EF 40-45%, Aortic valve prosthesis with Severe paravalvular leak and valvular AI, mild-moderate aortic stenosis, and moderate MR. Patient was transferred to [facility] under Dr. Campbell for further workup.   6/11 Transfer from [place] for paravalvular leak. Patient was found to have acute on chronic renal failure, with superimposed cardiorenal syndrome   6/12 TTE done showing EF 30-35%, reduced RV function, mild MAC, moderate to severe MR, severe TR, moderate AI, and severe PA  6/15 EGD - GI feels there is concern for gastric Ca. 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  Plan: ISRAEL 6/10/21 at [facility] showing EF 40-45%, Aortic valve prosthesis with Severe paravalvular leak and valvular AI, mild-moderate aortic stenosis, and moderate MR.  Admitted to [facility] 6/12/21 with CT Surgery Dr. Campbell.   Continuous telemetry, .   On  Lasix gtt (transferred to ICU for close u/o monitoring)  Maintain Lemus for strict urine output monitoring and urinary retention in the setting of BPH.   Continue Flomax and Proscar.   Supplement electrolytes to maintain K>4 and Mg>2.   On  midodrine to support BP with aggressive diuresis.   Oxygenating well on 2L O2 via NC (On 2L Home O2).      TAVR CTA ordered.      Problem/Plan - 2:  ·  Problem: H/O aortic valve stenosis.  Plan: S/p TAVR at [facility] in 2019.      Problem/Plan - 3:  ·  Problem: Mitral valve regurgitation.  Plan: Plan as above.      Problem/Plan - 4:  ·  Problem: Aneurysm of aortic root.  Plan: Ascending aorta measuring 4.4cm on TTE at [facility] on 6/8/21.      Problem/Plan - 5:  ·  Problem: CAD in native artery.  Plan: On  supportive measures.   On  ASA and Statin for now.      Problem/Plan - 6:  Problem: Acute kidney injury superimposed on chronic kidney disease. Plan: 6/3: Kidney and Bladder US showing no hydronephrosis, echogenic kidneys likely from medical renal dz, prostatomegaly, and thickening of the posterior wall of the bladder with trabeculations which could be due to bladder outlet obstruction.   6/7: Kidney and Bladder US: No hydronephrosis or shadowing renal calculi. Stable b/l renal cysts.      Problem/Plan - 7:  ·  Problem: Benign prostatic hyperplasia with urinary retention.  Plan: Continue Flomax and Proscar.  Lemus remains in place.      Problem/Plan - 8:  ·  Problem: CVA (cerebrovascular accident).  Plan: CVA (09/2020)  CT Brain 6/1 showing age-related cerebral and cerebellar volume loss, mild chronic vascular ischemic changes, stable biparietal arachnoid cyst and stable midline posterior fossa arachnoid cyst.        Problem/Plan - 9:  ·  Problem: Prediabetes.  Plan: HA1c 5/9.   Diabetic/ consistent carb diet ordered.

## 2021-06-16 NOTE — PROGRESS NOTE ADULT - SUBJECTIVE AND OBJECTIVE BOX
Subjective: Denies CP, SOB, palpitations, N/V, other c/o.    T(C): 36.3 (06-16-21 @ 00:26), Max: 36.9 (06-15-21 @ 04:00)  HR: 87 (06-16-21 @ 00:00) (79 - 97)  BP: 117/56 (06-16-21 @ 00:00) (96/48 - 129/58)  ABP: --  ABP(mean): --  RR: 20 (06-16-21 @ 00:00) (10 - 45)  SpO2: 99% (06-16-21 @ 00:00) (94% - 100%)  Wt(kg): --  CVP(mm Hg): --  CO: --  CI: --  PA: --       I&O's Detail    14 Jun 2021 07:01  -  15 Jhoan 2021 07:00  --------------------------------------------------------  IN:    Furosemide: 60 mL    Oral Fluid: 1020 mL  Total IN: 1080 mL    OUT:    Indwelling Catheter - Urethral (mL): 1215 mL  Total OUT: 1215 mL    Total NET: -135 mL      15 Jhoan 2021 07:01  -  16 Jun 2021 01:39  --------------------------------------------------------  IN:    Furosemide: 47.5 mL    Oral Fluid: 2120 mL  Total IN: 2167.5 mL    OUT:    Indwelling Catheter - Urethral (mL): 890 mL  Total OUT: 890 mL    Total NET: 1277.5 mL          LABS: All Lab data reviewed and analyzed                        12.4   5.72  )-----------( 104      ( 15 Jhoan 2021 02:59 )             39.3     06-15    136  |  97<L>  |  96.3<H>  ----------------------------<  111<H>  3.9   |  20.0<L>  |  3.68<H>    Ca    10.2      15 Jhoan 2021 02:59  Phos  4.2     06-15  Mg     2.5     06-15    TPro  7.6  /  Alb  3.8  /  TBili  2.0  /  DBili  x   /  AST  41<H>  /  ALT  54<H>  /  AlkPhos  133<H>  06-15    PT/INR - ( 15 Jhoan 2021 02:59 )   PT: 18.8 sec;   INR: 1.66 ratio                 CAPILLARY BLOOD GLUCOSE               RADIOLOGY: - Reviewed and analyzed   CXR: pending    HOSPITAL MEDICATIONS: All medications reviewed and analyzed  MEDICATIONS  (STANDING):  ascorbic acid 500 milliGRAM(s) Oral daily  aspirin enteric coated 81 milliGRAM(s) Oral daily  atorvastatin 10 milliGRAM(s) Oral at bedtime  budesonide 160 MICROgram(s)/formoterol 4.5 MICROgram(s) Inhaler 2 Puff(s) Inhalation two times a day  finasteride 5 milliGRAM(s) Oral daily  furosemide Infusion 5 mG/Hr (2.5 mL/Hr) IV Continuous <Continuous>  heparin   Injectable 5000 Unit(s) SubCutaneous every 12 hours  megestrol 400 milliGRAM(s) Oral daily  midodrine. 5 milliGRAM(s) Oral three times a day  mirtazapine 7.5 milliGRAM(s) Oral daily  multivitamin/minerals 1 Tablet(s) Oral daily  phytonadione   Solution 5 milliGRAM(s) Oral daily  psyllium Powder 1 Packet(s) Oral two times a day  senna 2 Tablet(s) Oral at bedtime  sodium bicarbonate 650 milliGRAM(s) Oral every 12 hours  sodium chloride 0.9% lock flush 3 milliLiter(s) IV Push every 8 hours  tamsulosin 0.4 milliGRAM(s) Oral at bedtime    MEDICATIONS  (PRN):  ALBUTerol    90 MICROgram(s) HFA Inhaler 2 Puff(s) Inhalation every 6 hours PRN Shortness of Breath and/or Wheezing  bisacodyl 5 milliGRAM(s) Oral every 12 hours PRN Constipation  polyethylene glycol 3350 17 Gram(s) Oral daily PRN Constipation          Neuro: A+O x 3, non-focal, speech clear and intact  HEENT: PERRL, EOMI, oral mucosa pink and moist  Neck: supple, no JVD  CV: regular rate, regular rhythm, +S1S2, holosystolic murmur 2+  Pulm/chest: lung sounds CTA and equal bilaterally, no accessory muscle use noted  Abd: soft, NT, ND, +BS  Ext: CRONIN x 4, no C/C/E  Skin: warm, well perfused       Case including assessment/plan of care discussed with   CT surgery attending.  Critical Care time:  35    minutes of noncontinuos critical care time spent evaluating/treating, reviewing imaging, labs, discussing case with multidisciplinary team, discussing plans/goals of care with patient/family to prevent further life threatening depreciation of the patient's condition. Non-inclusive is procedure time.       76yMale with PMH     EGD with biopsy        PAST MEDICAL & SURGICAL HISTORY:  Pulmonary hypertension    Hypertension    Hyperlipidemia    H/O aortic valve stenosis  s/p TAVR 2019 at [place]    CVA (cerebrovascular accident)  MCA CVA with tPA and thrombectomy    Chronic kidney disease    Prediabetes    Mitral valve regurgitation    CAD in native artery    Aneurysm of aortic root  thoracic aortic aneurysm, without ruptur    Benign prostatic hyperplasia    Gout    History of transcatheter aortic valve replacement (TAVR)

## 2021-06-16 NOTE — PROGRESS NOTE ADULT - ATTENDING COMMENTS
Spoke with pt and daughter in length.  We are still waiting for CDs from [facility].  Pt for HD today. Will need left and right heart cath.   Cont with diuretics  I agree with [email or web address].

## 2021-06-16 NOTE — PROGRESS NOTE ADULT - SUBJECTIVE AND OBJECTIVE BOX
INFECTIOUS DISEASES AND INTERNAL MEDICINE at [facility]  =======================================================  Robson Wright MD  Diplomates American Board of Internal Medicine and Infectious Diseases  Telephone [phone]  Fax            [phone]  =======================================================    REYNA BOB [id number]    Follow up: ?ENDOCARDITIS    Allergies:  No Known Drug Allergies  strawberry (Anaphylaxis)      Medications:  ALBUTerol    90 MICROgram(s) HFA Inhaler 2 Puff(s) Inhalation every 6 hours PRN  ascorbic acid 500 milliGRAM(s) Oral daily  atorvastatin 10 milliGRAM(s) Oral at bedtime  bisacodyl 5 milliGRAM(s) Oral every 12 hours PRN  budesonide 160 MICROgram(s)/formoterol 4.5 MICROgram(s) Inhaler 2 Puff(s) Inhalation two times a day  finasteride 5 milliGRAM(s) Oral daily  furosemide Infusion 5 mG/Hr IV Continuous <Continuous>  megestrol 400 milliGRAM(s) Oral daily  midodrine. 5 milliGRAM(s) Oral three times a day  mirtazapine 7.5 milliGRAM(s) Oral daily  multivitamin/minerals 1 Tablet(s) Oral daily  phytonadione   Solution 5 milliGRAM(s) Oral daily  polyethylene glycol 3350 17 Gram(s) Oral daily PRN  psyllium Powder 1 Packet(s) Oral two times a day  senna 2 Tablet(s) Oral at bedtime  sodium bicarbonate 650 milliGRAM(s) Oral every 12 hours  sodium chloride 0.9% lock flush 3 milliLiter(s) IV Push every 8 hours  tamsulosin 0.4 milliGRAM(s) Oral at bedtime    SOCIAL       FAMILY   FAMILY HISTORY:  FH: lung cancer      REVIEW OF SYSTEMS:  CONSTITUTIONAL:  No Fever or chills  HEENT:   No diplopia or blurred vision.  No earache, sore throat or runny nose.  CARDIOVASCULAR:  No pressure, squeezing, strangling, tightness, heaviness or aching about the chest, neck, axilla or epigastrium.  RESPIRATORY:  No cough, shortness of breath, PND or orthopnea.  GASTROINTESTINAL:  No nausea, vomiting or diarrhea.  GENITOURINARY:  No dysuria, frequency or urgency. No Blood in urine  MUSCULOSKELETAL:   moves all joints  SKIN:  No change in skin, hair or nails.  NEUROLOGIC:  No paresthesias, fasciculations, seizures or weakness.  PSYCHIATRIC:  No disorder of thought or mood.  ENDOCRINE:  No heat or cold intolerance, polyuria or polydipsia.  HEMATOLOGICAL:  No easy bruising or bleeding.            Physical Exam:  I Vital Signs Last 24 Hrs  T(C): 36.4 (16 Jun 2021 08:00), Max: 37 (16 Jun 2021 04:00)  T(F): 97.5 (16 Jun 2021 08:00), Max: 98.6 (16 Jun 2021 04:00)  HR: 85 (16 Jun 2021 08:01) (80 - 97)  BP: 111/55 (16 Jun 2021 07:00) (102/56 - 129/58)  BP(mean): 77 (16 Jun 2021 07:00) (72 - 86)  RR: 26 (16 Jun 2021 07:00) (10 - 45)  SpO2: 93% (16 Jun 2021 08:01) (73% - 100%)    GEN: NAD,   HEENT: normocephalic and atraumatic. EOMI. EMMIE.    NECK: Supple. No carotid bruits.  No lymphadenopathy or thyromegaly.  LUNGS: Clear to auscultation.  HEART: Regular rate and rhythm without murmur.  ABDOMEN: Soft, nontender, and nondistended.  Positive bowel sounds.    : No CVA tenderness  EXTREMITIES: Without any cyanosis, clubbing, rash, lesions or edema.  MSK: no joint swelling  NEUROLOGIC: Cranial nerves II through XII are grossly intact.  PSYCHIATRIC: Appropriate affect .  SKIN: No ulceration or induration present.        Labs:  Vitals:  =     =======================================================  Current Antibiotics:    Other medications:  ascorbic acid 500 milliGRAM(s) Oral daily  atorvastatin 10 milliGRAM(s) Oral at bedtime  budesonide 160 MICROgram(s)/formoterol 4.5 MICROgram(s) Inhaler 2 Puff(s) Inhalation two times a day  finasteride 5 milliGRAM(s) Oral daily  furosemide Infusion 5 mG/Hr IV Continuous <Continuous>  megestrol 400 milliGRAM(s) Oral daily  midodrine. 5 milliGRAM(s) Oral three times a day  mirtazapine 7.5 milliGRAM(s) Oral daily  multivitamin/minerals 1 Tablet(s) Oral daily  phytonadione   Solution 5 milliGRAM(s) Oral daily  psyllium Powder 1 Packet(s) Oral two times a day  senna 2 Tablet(s) Oral at bedtime  sodium bicarbonate 650 milliGRAM(s) Oral every 12 hours  sodium chloride 0.9% lock flush 3 milliLiter(s) IV Push every 8 hours  tamsulosin 0.4 milliGRAM(s) Oral at bedtime      =======================================================  Labs:                              13.1   7.62  )-----------( 85       ( 16 Jun 2021 03:13 )             41.6   06-16    139  |  96<L>  |  96.2<H>  ----------------------------<  114<H>  3.8   |  20.0<L>  |  3.54<H>    Ca    10.5<H>      16 Jun 2021 03:13  Phos  4.3     06-16  Mg     2.4     06-16    TPro  8.1  /  Alb  4.2  /  TBili  2.1<H>  /  DBili  x   /  AST  36  /  ALT  48<H>  /  AlkPhos  135<H>  06-16

## 2021-06-17 DIAGNOSIS — R79.89 OTHER SPECIFIED ABNORMAL FINDINGS OF BLOOD CHEMISTRY: ICD-10-CM

## 2021-06-17 DIAGNOSIS — K29.60 OTHER GASTRITIS WITHOUT BLEEDING: ICD-10-CM

## 2021-06-17 DIAGNOSIS — R63.4 ABNORMAL WEIGHT LOSS: ICD-10-CM

## 2021-06-17 LAB
ALBUMIN SERPL ELPH-MCNC: 4.1 G/DL — SIGNIFICANT CHANGE UP (ref 3.3–5.2)
ALP SERPL-CCNC: 121 U/L — HIGH (ref 40–120)
ALT FLD-CCNC: 41 U/L — HIGH
ANION GAP SERPL CALC-SCNC: 18 MMOL/L — HIGH (ref 5–17)
AST SERPL-CCNC: 31 U/L — SIGNIFICANT CHANGE UP
BILIRUB SERPL-MCNC: 2.5 MG/DL — HIGH (ref 0.4–2)
BUN SERPL-MCNC: 62.6 MG/DL — HIGH (ref 8–20)
CALCIUM SERPL-MCNC: 10.1 MG/DL — SIGNIFICANT CHANGE UP (ref 8.6–10.2)
CHLORIDE SERPL-SCNC: 97 MMOL/L — LOW (ref 98–107)
CO2 SERPL-SCNC: 22 MMOL/L — SIGNIFICANT CHANGE UP (ref 22–29)
CREAT SERPL-MCNC: 2.77 MG/DL — HIGH (ref 0.5–1.3)
GLUCOSE SERPL-MCNC: 110 MG/DL — HIGH (ref 70–99)
HAV IGM SER-ACNC: SIGNIFICANT CHANGE UP
HBV CORE AB SER-ACNC: SIGNIFICANT CHANGE UP
HBV CORE IGM SER-ACNC: SIGNIFICANT CHANGE UP
HBV SURFACE AB SER-ACNC: <3 MIU/ML — LOW
HBV SURFACE AG SER-ACNC: SIGNIFICANT CHANGE UP
HCT VFR BLD CALC: 40 % — SIGNIFICANT CHANGE UP (ref 39–50)
HCV AB S/CO SERPL IA: 0.15 S/CO — SIGNIFICANT CHANGE UP (ref 0–0.99)
HCV AB SERPL-IMP: SIGNIFICANT CHANGE UP
HGB BLD-MCNC: 12.5 G/DL — LOW (ref 13–17)
INR BLD: 1.58 RATIO — HIGH (ref 0.88–1.16)
MAGNESIUM SERPL-MCNC: 2.3 MG/DL — SIGNIFICANT CHANGE UP (ref 1.6–2.6)
MCHC RBC-ENTMCNC: 30.1 PG — SIGNIFICANT CHANGE UP (ref 27–34)
MCHC RBC-ENTMCNC: 31.3 GM/DL — LOW (ref 32–36)
MCV RBC AUTO: 96.4 FL — SIGNIFICANT CHANGE UP (ref 80–100)
PHOSPHATE SERPL-MCNC: 3 MG/DL — SIGNIFICANT CHANGE UP (ref 2.4–4.7)
PLATELET # BLD AUTO: 90 K/UL — LOW (ref 150–400)
POTASSIUM SERPL-MCNC: 3.9 MMOL/L — SIGNIFICANT CHANGE UP (ref 3.5–5.3)
POTASSIUM SERPL-SCNC: 3.9 MMOL/L — SIGNIFICANT CHANGE UP (ref 3.5–5.3)
PROCALCITONIN SERPL-MCNC: 0.24 NG/ML — HIGH (ref 0.02–0.1)
PROT SERPL-MCNC: 7.8 G/DL — SIGNIFICANT CHANGE UP (ref 6.6–8.7)
PROTHROM AB SERPL-ACNC: 17.9 SEC — HIGH (ref 10.6–13.6)
RBC # BLD: 4.15 M/UL — LOW (ref 4.2–5.8)
RBC # FLD: 20.8 % — HIGH (ref 10.3–14.5)
SODIUM SERPL-SCNC: 137 MMOL/L — SIGNIFICANT CHANGE UP (ref 135–145)
SURGICAL PATHOLOGY STUDY: SIGNIFICANT CHANGE UP
WBC # BLD: 6.56 K/UL — SIGNIFICANT CHANGE UP (ref 3.8–10.5)
WBC # FLD AUTO: 6.56 K/UL — SIGNIFICANT CHANGE UP (ref 3.8–10.5)

## 2021-06-17 PROCEDURE — 70450 CT HEAD/BRAIN W/O DYE: CPT | Mod: 26

## 2021-06-17 PROCEDURE — 99291 CRITICAL CARE FIRST HOUR: CPT

## 2021-06-17 PROCEDURE — 99222 1ST HOSP IP/OBS MODERATE 55: CPT

## 2021-06-17 PROCEDURE — 71045 X-RAY EXAM CHEST 1 VIEW: CPT | Mod: 26

## 2021-06-17 PROCEDURE — 90937 HEMODIALYSIS REPEATED EVAL: CPT

## 2021-06-17 PROCEDURE — 99232 SBSQ HOSP IP/OBS MODERATE 35: CPT

## 2021-06-17 PROCEDURE — 99233 SBSQ HOSP IP/OBS HIGH 50: CPT

## 2021-06-17 RX ORDER — PANTOPRAZOLE SODIUM 20 MG/1
40 TABLET, DELAYED RELEASE ORAL
Refills: 0 | Status: DISCONTINUED | OUTPATIENT
Start: 2021-06-17 | End: 2021-06-23

## 2021-06-17 RX ADMIN — TAMSULOSIN HYDROCHLORIDE 0.4 MILLIGRAM(S): 0.4 CAPSULE ORAL at 22:25

## 2021-06-17 RX ADMIN — SODIUM CHLORIDE 3 MILLILITER(S): 9 INJECTION INTRAMUSCULAR; INTRAVENOUS; SUBCUTANEOUS at 05:58

## 2021-06-17 RX ADMIN — MIDODRINE HYDROCHLORIDE 5 MILLIGRAM(S): 2.5 TABLET ORAL at 07:47

## 2021-06-17 RX ADMIN — Medication 500 MILLIGRAM(S): at 11:48

## 2021-06-17 RX ADMIN — MIRTAZAPINE 7.5 MILLIGRAM(S): 45 TABLET, ORALLY DISINTEGRATING ORAL at 22:25

## 2021-06-17 RX ADMIN — ATORVASTATIN CALCIUM 10 MILLIGRAM(S): 80 TABLET, FILM COATED ORAL at 22:25

## 2021-06-17 RX ADMIN — PANTOPRAZOLE SODIUM 40 MILLIGRAM(S): 20 TABLET, DELAYED RELEASE ORAL at 06:12

## 2021-06-17 RX ADMIN — Medication 1 PACKET(S): at 06:12

## 2021-06-17 RX ADMIN — MIDODRINE HYDROCHLORIDE 5 MILLIGRAM(S): 2.5 TABLET ORAL at 22:25

## 2021-06-17 RX ADMIN — BUDESONIDE AND FORMOTEROL FUMARATE DIHYDRATE 2 PUFF(S): 160; 4.5 AEROSOL RESPIRATORY (INHALATION) at 08:18

## 2021-06-17 RX ADMIN — SODIUM CHLORIDE 3 MILLILITER(S): 9 INJECTION INTRAMUSCULAR; INTRAVENOUS; SUBCUTANEOUS at 16:50

## 2021-06-17 RX ADMIN — SENNA PLUS 2 TABLET(S): 8.6 TABLET ORAL at 22:25

## 2021-06-17 RX ADMIN — MEGESTROL ACETATE 400 MILLIGRAM(S): 40 SUSPENSION ORAL at 11:49

## 2021-06-17 RX ADMIN — Medication 1 TABLET(S): at 11:49

## 2021-06-17 RX ADMIN — Medication 5 MILLIGRAM(S): at 11:49

## 2021-06-17 RX ADMIN — FINASTERIDE 5 MILLIGRAM(S): 5 TABLET, FILM COATED ORAL at 11:48

## 2021-06-17 RX ADMIN — SODIUM CHLORIDE 3 MILLILITER(S): 9 INJECTION INTRAMUSCULAR; INTRAVENOUS; SUBCUTANEOUS at 22:25

## 2021-06-17 NOTE — CHART NOTE - NSCHARTNOTEFT_GEN_A_CORE
Source: Patient [ ]  Family [ ]   other [x ] pt sleeping    Current Diet: Diet, Consistent Carbohydrate Full Liquid (06-17-21 @ 08:01)  Diet, Consistent Carbohydrate/No Snacks:   DASH/TLC {Sodium & Cholesterol Restricted} (DASH) (06-16-21 @ 12:04)    PO intake:  Pt with fair po intake on fulls.  Pt consumed ~50% at lunch per tray observation at bedside.    Current Weight:   (6/12) 219.3 lbs    % Weight Change - no new wt to assess, will continue to monitor.  Aware pt with 2+ mild generalized edema noted per documentation    Pertinent Medications: MEDICATIONS  (STANDING):  ascorbic acid 500 milliGRAM(s) Oral daily  atorvastatin 10 milliGRAM(s) Oral at bedtime  budesonide 160 MICROgram(s)/formoterol 4.5 MICROgram(s) Inhaler 2 Puff(s) Inhalation two times a day  finasteride 5 milliGRAM(s) Oral daily  megestrol 400 milliGRAM(s) Oral daily  midodrine. 5 milliGRAM(s) Oral three times a day  mirtazapine 7.5 milliGRAM(s) Oral daily  multivitamin/minerals 1 Tablet(s) Oral daily  pantoprazole    Tablet 40 milliGRAM(s) Oral two times a day  phytonadione   Solution 5 milliGRAM(s) Oral daily  psyllium Powder 1 Packet(s) Oral two times a day  senna 2 Tablet(s) Oral at bedtime  sodium chloride 0.9% lock flush 3 milliLiter(s) IV Push every 8 hours  tamsulosin 0.4 milliGRAM(s) Oral at bedtime    MEDICATIONS  (PRN):  ALBUTerol    90 MICROgram(s) HFA Inhaler 2 Puff(s) Inhalation every 6 hours PRN Shortness of Breath and/or Wheezing  bisacodyl 5 milliGRAM(s) Oral every 12 hours PRN Constipation  polyethylene glycol 3350 17 Gram(s) Oral daily PRN Constipation    Pertinent Labs: CBC Full  -  ( 17 Jun 2021 03:09 )  WBC Count : 6.56 K/uL  RBC Count : 4.15 M/uL  Hemoglobin : 12.5 g/dL  Hematocrit : 40.0 %  Platelet Count - Automated : 90 K/uL  Mean Cell Volume : 96.4 fl  Mean Cell Hemoglobin : 30.1 pg  Mean Cell Hemoglobin Concentration : 31.3 gm/dL  06-17 Na137 mmol/L Glu 110 mg/dL<H> K+ 3.9 mmol/L Cr  2.77 mg/dL<H> BUN 62.6 mg/dL<H> Phos 3.0 mg/dL Alb 4.1 g/dL PAB n/a       Skin: no skin breakdown noted     Nutrition focused physical exam conducted - found signs of malnutrition [ ]absent [x ]present    Subcutaneous fat loss: [x ] Orbital fat pads region, [x ]Buccal fat region, [ ]Triceps region,  [ ]Ribs region    Muscle wasting: [x ]Temples region, [x ]Clavicle region, [x ]Shoulder region, [ ]Scapula region, [ ]Interosseous region,  [ ]thigh region, [ ]Calf region    Current Nutrition Diagnosis: Pt remains at nutrition risk secondary to severe protein calorie malnutrition (acute on chronic) related to inability to consume sufficient protein energy intake in setting of aortic valve prosthesis with severe paravalvular leak and valvular AI, mild-moderate aortic stenosis, moderate MR and now with erosive gastritis s/p colonoscopy as evidenced by pt meeting <50% estimated energy intake > 5 days and severe muscle wasting and fat loss.   Pt also with 31% unintentional wt loss x 4 months prior to admission noted.  Pt currently receiving full liquids with fair po intake at meals.    Recommendations:   1. Advance diet to DASH/TLC, cons cho when medically feasible  2. RX: Glucerna TID  3. Continue with MVI, Vit C daily  4. Monitor daily wts     Monitoring and Evaluation:   [x ] PO intake [x ] Tolerance to diet prescription [X] Weights  [X] Follow up per protocol [X] Labs:

## 2021-06-17 NOTE — CHART NOTE - NSCHARTNOTEFT_GEN_A_CORE
CTS ACP Addendum    Briefly, 76 year old male patient with a medical history of MI in 1995 (angiogram, no stents placed), COPD (2L O2 at home), s/p TAVR (03/2019 at [facility]), gout, CKD, CVA (09/2020), pulmonary HTN, initially presented to [facility] 6/1/21 with RONNELL on CKD (likely cardiorenal syndrome), urinary retention due to noncompliance with medications, with hospital course complicated by cardiogenic shock, acute hypoxemic respiratory failure secondary to metabolic acidosis and respiratory alkalosis. Patient found to have TAVR failure with ISRAEL 6/10/21 showing EF 40-45%, Aortic valve prosthesis with Severe paravalvular leak and valvular AI, mild-moderate aortic stenosis, and moderate MR. Patient was transferred to [facility] under Dr. Campbell for further workup.   6/11 Transfer from [place] for paravalvular leak. Patient was found to have acute on chronic renal failure, with superimposed cardiorenal syndrome   6/12 TTE done showing EF 30-35%, reduced RV function, mild MAC, moderate to severe MR, severe TR, moderate AI, and severe PA  6/15 EGD - GI feels there is concern for gastric Ca. 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 FIrst HD via L IJ HDC 6/17 noncon head CT: negative    Patient seen and examined. Notes, flowsheets, medications, radiologic images and labs reviewed. VSS, 2L NC, labs stable aside from Plts 90. Creat stable 2.7. s/p first HD yesterday -450 off.     Plan:  - Head CT neg, neuro intact, stable  - Pulm stable on 2 L NC, wean as shen  - Cards for cath tomorrow with Dr Jorgensen  - Midodrine 5 BID, no BB at this time  - Ultimately for open AVR (if can prove active endocarditis) vs TAVR if no sign of active valve infection  - Heme ASA/Lov held with TCP, Vit K daily for elevated INR  - ID Vanco/Roceph if fever and repeat BCx  - BCx 6/15 negative, follow up 6/16 BCx  - Clear diet, adv as shen    Case discussed with Dr. Campbell

## 2021-06-17 NOTE — PROGRESS NOTE ADULT - PROBLEM SELECTOR PLAN 10
Pantoprazole for GI prophylaxis  SCDs for DVT prophylaxis Pantoprazole for GI prophylaxis  SCDs for DVT prophylaxis    11.  Duodinitis  Seen on EGD 6/15 - Pantoprazole started    12.  Diverticulosis  Seen on Colonoscopy 6/16, mild

## 2021-06-17 NOTE — PROGRESS NOTE ADULT - ATTENDING COMMENTS
pt seen and examined. Cultures are negative thus far  I reviewed his ISRAEL images from 2/21, 3/21 as well as last week from [facility].  He did have mild AI but no evidence of endocarditis on imaging from february and march but there is increase in AI and echo density on the aortic valve prosthesis.   Findings nad images were reviewed with Dr. Campbell.

## 2021-06-17 NOTE — PROGRESS NOTE ADULT - SUBJECTIVE AND OBJECTIVE BOX
Subjective:  77yo M resting comfortable, no c/o pain.    HPI:  76 year old male patient with a medical history of MI in 1995 (angiogram, no stents placed), COPD (2L O2 at home), s/p TAVR (03/2019 at [facility]), gout, CKD, CVA (09/2020), pulmonary HTN, initially presented to [facility] 6/1/21 with RONNELL on CKD (likely cardiorenal syndrome), urinary retention due to noncompliance with medications, with hospital course complicated by cardiogenic shock, acute hypoxemic respiratory failure secondary to metabolic acidosis and respiratory alkalosis. Patient found to have TAVR failure with ISRAEL 6/10/21 showing EF 40-45%, Aortic valve prosthesis with Severe paravalvular leak and valvular AI, mild-moderate aortic stenosis, and moderate MR. Patient was transferred to [facility] under Dr. Campbell for further workup.     Imaging from [facility]:    6/1: CXR showing  cardiomegaly, small b/l pleural effusions, moderate pulmonary vascular congestion.  6/1: CT Brain ordered for head trauma? showing age-related cerebral and cerebellar volume loss, mild chronic vascular ischemic changes, stable biparietal arachnoid cyst and stable midline posterior fossa arachnoid cyst.   6/1: US Abdomen for elevated LFTs showing mild hepatic steatosis, mild hepatomegaly, sludge in the gallbladder, no discrete gallstones, normal biliary ducts, mild echogenic right kidney which may be seen with medical renal dz, mildly complex Right upper pole 4cm cyst with subtle internal echoes may represent hemorrhagic/proteinaceous cyst, mildly complex right midpole 2cm cyst with thin internal linear septation.   6/3: Kidney and Bladder US showing no hydronephrosis, echogenic kidneys likely from medical renal dz, prostatomegaly, and thickening of the posterior wall of the bladder with trabeculations which could be due to bladder outlet obstruction.   6/7: Kidney and Bladder US: No hydronephrosis or shadowing renal calculi. Stable b/l renal cysts.   6/7: Lower Extremity Venous Doppler with no DVT noted.   6/8: TTE showing EF 47%, dilated IVC, dilation of the ascending aorta of 4.4cm, moderate-severe pulmonary HTN, moderate-severe TR, aortic valve with severe prosthesis regurgitation and moderate prosthesis stenosis  6/9: CT Chest showing emphysema and intersitial lung dz changes, stable b/l small pleural effusions, cardiomegaly.   6/11: ISRAEL showing EF 40-45%, Aortic valve prosthesis with Severe paravalvular leak and valvular AI, mild-moderate aortic stenosis, and moderate MR.  (12 Jun 2021 01:36)    PAST MEDICAL & SURGICAL HISTORY:    Pulmonary hypertension    Hypertension    Hyperlipidemia    H/O aortic valve stenosis  s/p TAVR 2019 at [place]    CVA (cerebrovascular accident)  MCA CVA with tPA and thrombectomy    Chronic kidney disease    Prediabetes    Mitral valve regurgitation    CAD in native artery    Aneurysm of aortic root  thoracic aortic aneurysm, without ruptur    Benign prostatic hyperplasia    Gout    History of transcatheter aortic valve replacement (TAVR)    MEDICATIONS  (STANDING):  ascorbic acid 500 milliGRAM(s) Oral daily  atorvastatin 10 milliGRAM(s) Oral at bedtime  budesonide 160 MICROgram(s)/formoterol 4.5 MICROgram(s) Inhaler 2 Puff(s) Inhalation two times a day  finasteride 5 milliGRAM(s) Oral daily  megestrol 400 milliGRAM(s) Oral daily  midodrine. 5 milliGRAM(s) Oral three times a day  mirtazapine 7.5 milliGRAM(s) Oral daily  multivitamin/minerals 1 Tablet(s) Oral daily  phytonadione   Solution 5 milliGRAM(s) Oral daily  psyllium Powder 1 Packet(s) Oral two times a day  senna 2 Tablet(s) Oral at bedtime  sodium chloride 0.9% lock flush 3 milliLiter(s) IV Push every 8 hours  tamsulosin 0.4 milliGRAM(s) Oral at bedtime    MEDICATIONS  (PRN):  ALBUTerol    90 MICROgram(s) HFA Inhaler 2 Puff(s) Inhalation every 6 hours PRN Shortness of Breath and/or Wheezing  bisacodyl 5 milliGRAM(s) Oral every 12 hours PRN Constipation  polyethylene glycol 3350 17 Gram(s) Oral daily PRN Constipation    Allergies    No Known Drug Allergies  strawberry (Anaphylaxis)    WEIGHTS:    Admit Wt: Drug Dosing Weight  Height (cm): 182.9 (12 Jun 2021 00:32)  Weight (kg): 100.1 (12 Jun 2021 00:32)  BMI (kg/m2): 29.9 (12 Jun 2021 00:32)  BSA (m2): 2.22 (12 Jun 2021 00:32)    I&O's Summary    15 Jhoan 2021 07:01  -  16 Jun 2021 07:00  --------------------------------------------------------  IN: 2180 mL / OUT: 1260 mL / NET: 920 mL    16 Jun 2021 07:01  -  17 Jun 2021 02:17  --------------------------------------------------------  IN: 122.5 mL / OUT: 1080 mL / NET: -957.5 mL    VITAL SIGNS:    ICU Vital Signs Last 24 Hrs,    T(C): 36.6 (17 Jun 2021 00:05), Max: 37 (16 Jun 2021 04:00)  T(F): 97.8 (17 Jun 2021 00:05), Max: 98.6 (16 Jun 2021 04:00)  HR: 88 (17 Jun 2021 00:00) (81 - 88)  BP: 112/54 (17 Jun 2021 00:00) (98/56 - 129/58)  BP(mean): 78 (17 Jun 2021 00:00) (72 - 84)  RR: 25 (17 Jun 2021 00:00) (10 - 30)  SpO2: 92% (17 Jun 2021 00:00) (89% - 100%)    All laboratory results, radiology and medications reviewed.    LABS:  06-16    139  |  99  |  74.9<H>  ----------------------------<  112<H>  4.1   |  22.0  |  2.85<H>    Ca    9.8      16 Jun 2021 21:20  Phos  4.3     06-16  Mg     2.4     06-16    TPro  x   /  Alb  x   /  TBili  x   /  DBili  x   /  AST  x   /  ALT  39  /  AlkPhos  x   06-16                               13.1   7.62  )-----------( 85       ( 16 Jun 2021 03:13 )             41.6          PT/INR - ( 16 Jun 2021 03:13 )   PT: 16.7 sec;   INR: 1.47 ratio      Bilirubin Total, Serum: 2.1 mg/dL (06-16 @ 03:13)    PHYSICAL EXAM:  General:  well nourished, no acute distress  Neurology:  alert and oriented X 4, nonfocal, no gross deficits  Respiratory:  clear to auscultation bilaterally  CV:  regular rate and rhythm, normal S1 S2  Abdomen:  soft, nontender, nondistended, positive bowel sounds  Extremities:  warm, well perfused, 2+ edema +DP pulses    Review of Systems:   · Negative General Symptoms	no fever  · Skin/Breast	negative  · Ophthalmologic	not applicable  · ENMT	negative  · Negative Respiratory and Thorax Symptoms	no wheezing; no cough  · Negative Cardiovascular Symptoms	no chest pain  · Cardiovascular Symptoms	peripheral edema  · Negative Gastrointestinal Symptoms	no nausea; no vomiting; no diarrhea; no constipation  · Genitourinary	negative  · Musculoskeletal	negative  · Neurological	negative  · Psychiatric	negative  · Hematology/Lymphatics	negative  · Endocrine	negative  · Allergic/ Immunologic:	negative    76 year old male patient with a medical history of MI in 1995 (angiogram, no stents placed), COPD (2L O2 at home), s/p TAVR (03/2019 at [facility]), gout, CKD, CVA (09/2020), pulmonary HTN, initially presented to [facility] 6/1/21 with RONNELL on CKD (likely cardiorenal syndrome), urinary retention due to noncompliance with medications, with hospital course complicated by cardiogenic shock, acute hypoxemic respiratory failure secondary to metabolic acidosis and respiratory alkalosis. Patient found to have TAVR failure with ISRAEL 6/10/21 showing EF 40-45%, Aortic valve prosthesis with Severe paravalvular leak and valvular AI, mild-moderate aortic stenosis, and moderate MR. Patient was transferred to [facility] under Dr. Campbell for further workup.   6/11 Transfer from [place] for paravalvular leak. Patient was found to have acute on chronic renal failure, with superimposed cardiorenal syndrome   6/12 TTE done showing EF 30-35%, reduced RV function, mild MAC, moderate to severe MR, severe TR, moderate AI, and severe PA  6/15 EGD - GI feels there is concern for gastric Ca. 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  Plan: ISRAEL 6/10/21 at [facility] showing EF 40-45%, Aortic valve prosthesis with Severe paravalvular leak and valvular AI, mild-moderate aortic stenosis, and moderate MR.  Admitted to [facility] 6/12/21 with CT Surgery Dr. Campbell.   ISRAEL 6/15/21 concern for a mobile echo density attached to the ventricular side of the prosthetic aortic valve with  Severe aortic valve regurgitation seen.   Maintain Lemus for strict urine output monitoring and urinary retention in the setting of BPH.   Supplement electrolytes to maintain K>4 and Mg>2 mg..   On  midodrine to support BP with aggressive diuresis.   Oxygenating well on 2L O2 via NC (On 2L Home O2).      Problem/Plan - 2:  ·  Problem: H/O aortic valve stenosis.  Plan: S/p TAVR at [facility] in 2019.      Problem/Plan - 3:  ·  Problem: Mitral valve regurgitation.  Plan: Plan as above.      Problem/Plan - 4:  ·  Problem: Aneurysm of aortic root.  Plan: Ascending aorta measuring 4.4cm on TTE at [facility] on 6/8/21.      Problem/Plan - 5:  ·  Problem: CAD in native artery.  Plan: Continue supportive measures.   On ASA and Statin,      Problem/Plan - 6:  Problem: Acute kidney injury superimposed on chronic kidney disease. Plan: 6/16 first HD treatment with 500cc fluid removal and clearance     Problem/Plan - 7:  ·  Problem: Benign prostatic hyperplasia with urinary retention.   Lemus remains in place.      Problem/Plan - 8:  ·  Problem: CVA (cerebrovascular accident).  Plan: CVA (09/2020)  CT Brain 6/1 showing age-related cerebral and cerebellar volume loss, mild chronic vascular ischemic changes, stable biparietal arachnoid cyst and stable midline posterior fossa arachnoid cyst.   No residual deficits noted.      Problem/Plan - 9:  ·  Problem: Prediabetes.  Plan: HA1c 5/9.   Diabetic/ consistent carb diet ordered.       Duodinitis:    Seen on EGD 6/15 - Pantoprazole started    Diverticulosis  Seen on Colonoscopy 6/16, mild.

## 2021-06-17 NOTE — PROGRESS NOTE ADULT - SUBJECTIVE AND OBJECTIVE BOX
INFECTIOUS DISEASES AND INTERNAL MEDICINE at [place]  =======================================================  Robson Wright MD  Diplomates American Board of Internal Medicine and Infectious Diseases  Telephone [phone]  Fax            [phone]  =======================================================    REYNA BOB [id number]    Follow up: ?ENDOCARDITIS    Allergies:  No Known Drug Allergies  strawberry (Anaphylaxis)      Medications:  ALBUTerol    90 MICROgram(s) HFA Inhaler 2 Puff(s) Inhalation every 6 hours PRN  ascorbic acid 500 milliGRAM(s) Oral daily  atorvastatin 10 milliGRAM(s) Oral at bedtime  bisacodyl 5 milliGRAM(s) Oral every 12 hours PRN  budesonide 160 MICROgram(s)/formoterol 4.5 MICROgram(s) Inhaler 2 Puff(s) Inhalation two times a day  finasteride 5 milliGRAM(s) Oral daily  furosemide Infusion 5 mG/Hr IV Continuous <Continuous>  megestrol 400 milliGRAM(s) Oral daily  midodrine. 5 milliGRAM(s) Oral three times a day  mirtazapine 7.5 milliGRAM(s) Oral daily  multivitamin/minerals 1 Tablet(s) Oral daily  phytonadione   Solution 5 milliGRAM(s) Oral daily  polyethylene glycol 3350 17 Gram(s) Oral daily PRN  psyllium Powder 1 Packet(s) Oral two times a day  senna 2 Tablet(s) Oral at bedtime  sodium bicarbonate 650 milliGRAM(s) Oral every 12 hours  sodium chloride 0.9% lock flush 3 milliLiter(s) IV Push every 8 hours  tamsulosin 0.4 milliGRAM(s) Oral at bedtime    SOCIAL       FAMILY   FAMILY HISTORY:  FH: lung cancer      REVIEW OF SYSTEMS:  CONSTITUTIONAL:  No Fever or chills  HEENT:   No diplopia or blurred vision.  No earache, sore throat or runny nose.  CARDIOVASCULAR:  No pressure, squeezing, strangling, tightness, heaviness or aching about the chest, neck, axilla or epigastrium.  RESPIRATORY:  No cough, shortness of breath, PND or orthopnea.  GASTROINTESTINAL:  No nausea, vomiting or diarrhea.  GENITOURINARY:  No dysuria, frequency or urgency. No Blood in urine  MUSCULOSKELETAL:   moves all joints  SKIN:  No change in skin, hair or nails.  NEUROLOGIC:  No paresthesias, fasciculations, seizures or weakness.  PSYCHIATRIC:  No disorder of thought or mood.  ENDOCRINE:  No heat or cold intolerance, polyuria or polydipsia.  HEMATOLOGICAL:  No easy bruising or bleeding.            Physical Exam:  I  Vital Signs Last 24 Hrs  T(C): 36.3 (17 Jun 2021 08:00), Max: 37 (16 Jun 2021 14:00)  T(F): 97.4 (17 Jun 2021 08:00), Max: 98.6 (16 Jun 2021 14:00)  HR: 88 (17 Jun 2021 09:39) (81 - 91)  BP: 117/54 (17 Jun 2021 09:39) (98/56 - 138/59)  BP(mean): 85 (17 Jun 2021 09:00) (72 - 85)  RR: 20 (17 Jun 2021 09:39) (14 - 71)  SpO2: 98% (17 Jun 2021 09:39) (89% - 100%)    GEN: NAD,   HEENT: normocephalic and atraumatic. EOMI. EMMIE.    NECK: Supple. No carotid bruits.  No lymphadenopathy or thyromegaly.  LUNGS: Clear to auscultation.  HEART: Regular rate and rhythm without murmur.  ABDOMEN: Soft, nontender, and nondistended.  Positive bowel sounds.    : No CVA tenderness  EXTREMITIES: Without any cyanosis, clubbing, rash, lesions or edema.  MSK: no joint swelling  NEUROLOGIC: Cranial nerves II through XII are grossly intact.  PSYCHIATRIC: Appropriate affect .  SKIN: No ulceration or induration present.        Labs:  Vitals:  =     =======================================================  Current Antibiotics:    Other medications:  ascorbic acid 500 milliGRAM(s) Oral daily  atorvastatin 10 milliGRAM(s) Oral at bedtime  budesonide 160 MICROgram(s)/formoterol 4.5 MICROgram(s) Inhaler 2 Puff(s) Inhalation two times a day  finasteride 5 milliGRAM(s) Oral daily  furosemide Infusion 5 mG/Hr IV Continuous <Continuous>  megestrol 400 milliGRAM(s) Oral daily  midodrine. 5 milliGRAM(s) Oral three times a day  mirtazapine 7.5 milliGRAM(s) Oral daily  multivitamin/minerals 1 Tablet(s) Oral daily  phytonadione   Solution 5 milliGRAM(s) Oral daily  psyllium Powder 1 Packet(s) Oral two times a day  senna 2 Tablet(s) Oral at bedtime  sodium bicarbonate 650 milliGRAM(s) Oral every 12 hours  sodium chloride 0.9% lock flush 3 milliLiter(s) IV Push every 8 hours  tamsulosin 0.4 milliGRAM(s) Oral at bedtime      =======================================================  Labs:                                                      12.5   6.56  )-----------( 90       ( 17 Jun 2021 03:09 )             40.0   06-17    137  |  97<L>  |  62.6<H>  ----------------------------<  110<H>  3.9   |  22.0  |  2.77<H>    Ca    10.1      17 Jun 2021 03:09  Phos  3.0     06-17  Mg     2.3     06-17    TPro  7.8  /  Alb  4.1  /  TBili  2.5<H>  /  DBili  x   /  AST  31  /  ALT  41<H>  /  AlkPhos  121<H>  06-17

## 2021-06-17 NOTE — PROGRESS NOTE ADULT - PROBLEM SELECTOR PLAN 1
doubt due to GI pathology and probably related to decompensated heart disease with marked renal insufficiency, etc

## 2021-06-17 NOTE — PROGRESS NOTE ADULT - PROBLEM SELECTOR PLAN 1
ISRAEL 6/10/21 at [facility] showing EF 40-45%, Aortic valve prosthesis with Severe paravalvular leak and valvular AI, mild-moderate aortic stenosis, and moderate MR.  Admitted to [facility] 6/12/21 with CT Surgery Dr. Campbell.   ISRAEL 6/15/21 concern for a mobile echo density attached to the ventricular side of the prosthetic aortic valve with  Severe aortic valve regurgitation seen.  Blood and urine Cxs sent, ID consulted.   If pt spikes Temps, start Vanco and Rocephin per ID  Continuous telemetry, .   Maintain Lemus for strict urine output monitoring and urinary retention in the setting of BPH.   Continue Flomax and Proscar.   Supplement electrolytes to maintain K>4 and Mg>2.   Continue midodrine to support BP with aggressive diuresis.   Oxygenating well on 2L O2 via NC (On 2L Home O2).

## 2021-06-17 NOTE — PROGRESS NOTE ADULT - SUBJECTIVE AND OBJECTIVE BOX
Subjective:  77yo M resting comfortable, no c/o pain.      HPI:  76 year old male patient with a medical history of MI in 1995 (angiogram, no stents placed), COPD (2L O2 at home), s/p TAVR (03/2019 at [facility]), gout, CKD, CVA (09/2020), pulmonary HTN, initially presented to [facility] 6/1/21 with RONNELL on CKD (likely cardiorenal syndrome), urinary retention due to noncompliance with medications, with hospital course complicated by cardiogenic shock, acute hypoxemic respiratory failure secondary to metabolic acidosis and respiratory alkalosis. Patient found to have TAVR failure with ISRAEL 6/10/21 showing EF 40-45%, Aortic valve prosthesis with Severe paravalvular leak and valvular AI, mild-moderate aortic stenosis, and moderate MR. Patient was transferred to [facility] under Dr. Campbell for further workup.     Imaging from [facility]:  6/1: CXR shwoing cardiomegaly, small b/l pleural effusions, moderate pulmonary vascular congestion.  6/1: CT Brain ordered for head trauma? showing age-related cerebral and cerebellar volume loss, mild chronic vascular ischemic changes, stable biparietal arachnoid cyst and stable midline posterior fossa arachnoid cyst.   6/1: US Abdomen for elevated LFTs showing mild hepatic steatosis, mild hepatomegaly, sludge in the gallbladder, no discrete gallstones, normal biliary ducts, mild echogenic right kidney which may be seen with medical renal dz, mildly complex Right upper pole 4cm cyst with subtle internal echoes may represent hemorrhagic/proteinaceous cyst, mildly complex right midpole 2cm cyst with thin internal linear septation.   6/3: Kidney and Bladder US showing no hydronephrosis, echogenic kidneys likely from medical renal dz, prostatomegaly, and thickening of the posterior wall of the bladder with trabeculations which could be due to bladder outlet obstruction.   6/7: Kidney and Bladder US: No hydronephrosis or shadowing renal calculi. Stable b/l renal cysts.   6/7: Lower Extremity Venous Doppler with no DVT noted.   6/8: TTE showing EF 47%, dilated IVC, dilation of the ascending aorta of 4.4cm, moderate-severe pulmonary HTN, moderate-severe TR, aortic valve with severe prosthesis regurgitation and moderate prosthesis stenosis  6/9: CT Chest showing emphysema and intersitial lung dz changes, stable b/l small pleural effusions, cardiomegaly.   6/11: ISRAEL showing EF 40-45%, Aortic valve prosthesis with Severe paravalvular leak and valvular AI, mild-moderate aortic stenosis, and moderate MR.  (12 Jun 2021 01:36)          PAST MEDICAL & SURGICAL HISTORY:  Pulmonary hypertension    Hypertension    Hyperlipidemia    H/O aortic valve stenosis  s/p TAVR 2019 at [place]    CVA (cerebrovascular accident)  MCA CVA with tPA and thrombectomy    Chronic kidney disease    Prediabetes    Mitral valve regurgitation    CAD in native artery    Aneurysm of aortic root  thoracic aortic aneurysm, without ruptur    Benign prostatic hyperplasia    Gout    History of transcatheter aortic valve replacement (TAVR)            MEDICATIONS  (STANDING):  ascorbic acid 500 milliGRAM(s) Oral daily  atorvastatin 10 milliGRAM(s) Oral at bedtime  budesonide 160 MICROgram(s)/formoterol 4.5 MICROgram(s) Inhaler 2 Puff(s) Inhalation two times a day  finasteride 5 milliGRAM(s) Oral daily  megestrol 400 milliGRAM(s) Oral daily  midodrine. 5 milliGRAM(s) Oral three times a day  mirtazapine 7.5 milliGRAM(s) Oral daily  multivitamin/minerals 1 Tablet(s) Oral daily  phytonadione   Solution 5 milliGRAM(s) Oral daily  psyllium Powder 1 Packet(s) Oral two times a day  senna 2 Tablet(s) Oral at bedtime  sodium chloride 0.9% lock flush 3 milliLiter(s) IV Push every 8 hours  tamsulosin 0.4 milliGRAM(s) Oral at bedtime    MEDICATIONS  (PRN):  ALBUTerol    90 MICROgram(s) HFA Inhaler 2 Puff(s) Inhalation every 6 hours PRN Shortness of Breath and/or Wheezing  bisacodyl 5 milliGRAM(s) Oral every 12 hours PRN Constipation  polyethylene glycol 3350 17 Gram(s) Oral daily PRN Constipation          Allergies    No Known Drug Allergies  strawberry (Anaphylaxis)    Intolerances          WEIGHTS:  Daily     Daily   Admit Wt: Drug Dosing Weight  Height (cm): 182.9 (12 Jun 2021 00:32)  Weight (kg): 100.1 (12 Jun 2021 00:32)  BMI (kg/m2): 29.9 (12 Jun 2021 00:32)  BSA (m2): 2.22 (12 Jun 2021 00:32)  I&O's Summary    15 Jhoan 2021 07:01  -  16 Jun 2021 07:00  --------------------------------------------------------  IN: 2180 mL / OUT: 1260 mL / NET: 920 mL    16 Jun 2021 07:01  -  17 Jun 2021 02:17  --------------------------------------------------------  IN: 122.5 mL / OUT: 1080 mL / NET: -957.5 mL        VITAL SIGNS:  ICU Vital Signs Last 24 Hrs  T(C): 36.6 (17 Jun 2021 00:05), Max: 37 (16 Jun 2021 04:00)  T(F): 97.8 (17 Jun 2021 00:05), Max: 98.6 (16 Jun 2021 04:00)  HR: 88 (17 Jun 2021 00:00) (81 - 88)  BP: 112/54 (17 Jun 2021 00:00) (98/56 - 129/58)  BP(mean): 78 (17 Jun 2021 00:00) (72 - 84)  ABP: --  ABP(mean): --  RR: 25 (17 Jun 2021 00:00) (10 - 30)  SpO2: 92% (17 Jun 2021 00:00) (89% - 100%)        All laboratory results, radiology and medications reviewed.    LABS:  06-16    139  |  99  |  74.9<H>  ----------------------------<  112<H>  4.1   |  22.0  |  2.85<H>    Ca    9.8      16 Jun 2021 21:20  Phos  4.3     06-16  Mg     2.4     06-16    TPro  x   /  Alb  x   /  TBili  x   /  DBili  x   /  AST  x   /  ALT  39  /  AlkPhos  x   06-16                                 13.1   7.62  )-----------( 85       ( 16 Jun 2021 03:13 )             41.6          PT/INR - ( 16 Jun 2021 03:13 )   PT: 16.7 sec;   INR: 1.47 ratio           Bilirubin Total, Serum: 2.1 mg/dL (06-16 @ 03:13)          CAPILLARY BLOOD GLUCOSE                 PHYSICAL EXAM:  General:  well nourished, no acute distress  Neurology:  alert and oriented X 4, nonfocal, no gross deficits  Respiratory:  clear to auscultation bilaterally  CV:  regular rate and rhythm, normal S1 S2  Abdomen:  soft, nontender, nondistended, positive bowel sounds  Extremities:  warm, well perfused, 2+ edema +DP pulses

## 2021-06-17 NOTE — PROGRESS NOTE ADULT - PROBLEM SELECTOR PLAN 3
continue pantoprazole BID and await path to be certain no neoplasia although no abnormal wall thickening of stomach on cat scan

## 2021-06-17 NOTE — PROGRESS NOTE ADULT - SUBJECTIVE AND OBJECTIVE BOX
[place] [facility]                                                               Office: [address], [place] [address]                                                              Telephone: [phone]. Fax:[phone]                                                                             PROGRESS NOTE  Reason for follow up: TAVR Failure  Overnight: No new events.   Update: 6/17:  Pt denies chest pain, palpitations, SOB. Tele- NSR HR @ 80s. Awaiting ISRAEL collaterals from [facility] for comparison. Eventual R+LHC when optimized from renal perspective.     Subjective: No new events     	  Vitals:  T(C): 36.4 (06-17-21 @ 19:00), Max: 36.6 (06-17-21 @ 00:05)  HR: 88 (06-17-21 @ 19:00) (81 - 91)  BP: 103/55 (06-17-21 @ 19:00) (101/52 - 138/59)  RR: 18 (06-17-21 @ 19:00) (15 - 71)  SpO2: 100% (06-17-21 @ 19:00) (89% - 100%)    I&O's Summary    16 Jun 2021 07:01  -  17 Jun 2021 07:00  --------------------------------------------------------  IN: 122.5 mL / OUT: 1465 mL / NET: -1342.5 mL    17 Jun 2021 07:01  -  17 Jun 2021 19:48  --------------------------------------------------------  IN: 100 mL / OUT: 525 mL / NET: -425 mL    PHYSICAL EXAM:  Appearance: Comfortable. No acute distress  HEENT:  Head and neck: Atraumatic. Normocephalic.  Normal oral mucosa, PERRL, Neck is supple. No JVD, No carotid bruit.   Neurologic: A & O x 3, no focal deficits. EOMI.  Lymphatic: No cervical lymphadenopathy  Cardiovascular: Normal S1 S2, No murmur, rubs/gallops. No JVD, No edema  Respiratory: Lungs clear to auscultation  Gastrointestinal:  Soft, Non-tender, + BS  Lower Extremities: +2/+2 pitting edema  Psychiatry: Patient is calm. No agitation. Mood & affect appropriate  Skin: No rashes/ ecchymoses/cyanosis/ulcers visualized on the face, hands or feet    CURRENT MEDICATIONS:  midodrine. 5 milliGRAM(s) Oral three times a day  tamsulosin 0.4 milliGRAM(s) Oral at bedtime  budesonide 160 MICROgram(s)/formoterol 4.5 MICROgram(s) Inhaler  mirtazapine  pantoprazole    Tablet  psyllium Powder  senna  atorvastatin  finasteride  megestrol  ascorbic acid  multivitamin/minerals  phytonadione   Solution  sodium chloride 0.9% lock flush      DIAGNOSTIC TESTING:    [ ] Echocardiogram: < from: ISRAEL Echo Doppler (06.15.21 @ 11:34) >  Summary:   1. Technically good study.   2. Moderately decreased global left ventricular systolic function.   3. Left ventricular ejection fraction, by visual estimation, is 30 to 35%.   4. Left atrial enlargement.   5. Moderately enlarged right ventricle.   6. Right atrial enlargement.   7. Mild prolapse of the posterior leaflet, moderate mitral valve regurgitation.   8. S/P TAVR. There is a mobile echo density attached to the ventricular side of the prosthetic aortic valve. Severe aortic valve regurgitation is seen, with what appears to be damage to one of the leaflets. Patient with prior Hx of step Bovis bacteremia. Findings are highly suspicious for endocarditis. Recommend ID consult, blood and urine cultures.   9. Mild-moderate tricuspid regurgitation.  10. Mild pulmonic valve regurgitation.  11. Trivial pericardial effusion.  12. Findings DW MAVERICK Osuna-NP.          [ ]  Catheterization: < from: Cardiac Cath Lab - Adult (03.26.21 @ 10:20) >  INTERVENTIONAL IMPRESSIONS: Severe Pulmonary Hypertension, likely  congestive, with equivalent response to Shawna at 80ppm- PVR was 2.09WU pre  and 1.59 post Shawna. 2. Severe congestive heart failure. 3. A TAV in the  aortic position was noted moving in consonance with the surrounding  structures and with acceptable hemodynamics (Mean Aortic PG is 16mmHg and  an PILAR over 1cm2).  INTERVENTIONAL RECOMMENDATIONS: Aggressive diuresis. 2. Would consider  Cardio MEMS for better fluid management in the out patient setting and to  minimize repeat hospitalizations. 3. Would re-assess severity of the MR  after CHF optimization.        OTHER: 	    CT:< from: CT Head No Cont (06.17.21 @ 11:05) >  Impression:  1. Unremarkable noncontrast CT scan of the brain.      LABS:	 	                            12.5   6.56  )-----------( 90       ( 17 Jun 2021 03:09 )             40.0     06-17    137  |  97<L>  |  62.6<H>  ----------------------------<  110<H>  3.9   |  22.0  |  2.77<H>    Ca    10.1      17 Jun 2021 03:09  Phos  3.0     06-17  Mg     2.3     06-17    TPro  7.8  /  Alb  4.1  /  TBili  2.5<H>  /  DBili  x   /  AST  31  /  ALT  41<H>  /  AlkPhos  121<H>  06-17    proBNP: Serum Pro-Brain Natriuretic Peptide: 64662 pg/mL (06-13 @ 03:05)      TSH: Thyroid Stimulating Hormone, Serum: 2.31 uIU/mL        TELEMETRY: NSR HR @ 80s  	                                                                      [place] [facility]                                                               Office: [address] [place] [address]                                                              Telephone: [phone]. Fax:[phone]                                                                             PROGRESS NOTE  Reason for follow up: TAVR Failure  Overnight: No new events.   Update: 6/17:  Pt denies chest pain, palpitations, SOB. Tele- NSR HR @ 80s. Blood cultures- no growth for 48 hours. Eventual R+LHC when optimized from renal perspective. Cont. current medication regimen.     Subjective: No new events     	  Vitals:  T(C): 36.4 (06-17-21 @ 19:00), Max: 36.6 (06-17-21 @ 00:05)  HR: 88 (06-17-21 @ 19:00) (81 - 91)  BP: 103/55 (06-17-21 @ 19:00) (101/52 - 138/59)  RR: 18 (06-17-21 @ 19:00) (15 - 71)  SpO2: 100% (06-17-21 @ 19:00) (89% - 100%)    I&O's Summary    16 Jun 2021 07:01  -  17 Jun 2021 07:00  --------------------------------------------------------  IN: 122.5 mL / OUT: 1465 mL / NET: -1342.5 mL    17 Jun 2021 07:01  -  17 Jun 2021 19:48  --------------------------------------------------------  IN: 100 mL / OUT: 525 mL / NET: -425 mL    PHYSICAL EXAM:  Appearance: Comfortable. No acute distress  HEENT:  Head and neck: Atraumatic. Normocephalic.  Normal oral mucosa, PERRL, Neck is supple. No JVD, No carotid bruit.   Neurologic: A & O x 3, no focal deficits. EOMI.  Lymphatic: No cervical lymphadenopathy  Cardiovascular: Normal S1 S2, No murmur, rubs/gallops. No JVD, No edema  Respiratory: Lungs clear to auscultation  Gastrointestinal:  Soft, Non-tender, + BS  Lower Extremities: +2/+2 pitting edema  Psychiatry: Patient is calm. No agitation. Mood & affect appropriate  Skin: ecchymosis on anterior chest wall    CURRENT MEDICATIONS:  midodrine. 5 milliGRAM(s) Oral three times a day  tamsulosin 0.4 milliGRAM(s) Oral at bedtime  budesonide 160 MICROgram(s)/formoterol 4.5 MICROgram(s) Inhaler  mirtazapine  pantoprazole    Tablet  psyllium Powder  senna  atorvastatin  finasteride  megestrol  ascorbic acid  multivitamin/minerals  phytonadione   Solution  sodium chloride 0.9% lock flush      DIAGNOSTIC TESTING:    [ ] Echocardiogram: < from: ISRAEL Echo Doppler (06.15.21 @ 11:34) >  Summary:   1. Technically good study.   2. Moderately decreased global left ventricular systolic function.   3. Left ventricular ejection fraction, by visual estimation, is 30 to 35%.   4. Left atrial enlargement.   5. Moderately enlarged right ventricle.   6. Right atrial enlargement.   7. Mild prolapse of the posterior leaflet, moderate mitral valve regurgitation.   8. S/P TAVR. There is a mobile echo density attached to the ventricular side of the prosthetic aortic valve. Severe aortic valve regurgitation is seen, with what appears to be damage to one of the leaflets. Patient with prior Hx of step Bovis bacteremia. Findings are highly suspicious for endocarditis. Recommend ID consult, blood and urine cultures.   9. Mild-moderate tricuspid regurgitation.  10. Mild pulmonic valve regurgitation.  11. Trivial pericardial effusion.  12. Findings DW MAVERICK Osuna-NP.          [ ]  Catheterization: < from: Cardiac Cath Lab - Adult (03.26.21 @ 10:20) >  INTERVENTIONAL IMPRESSIONS: Severe Pulmonary Hypertension, likely  congestive, with equivalent response to Shawna at 80ppm- PVR was 2.09WU pre  and 1.59 post Shawna. 2. Severe congestive heart failure. 3. A TAV in the  aortic position was noted moving in consonance with the surrounding  structures and with acceptable hemodynamics (Mean Aortic PG is 16mmHg and  an PILAR over 1cm2).  INTERVENTIONAL RECOMMENDATIONS: Aggressive diuresis. 2. Would consider  Cardio MEMS for better fluid management in the out patient setting and to  minimize repeat hospitalizations. 3. Would re-assess severity of the MR  after CHF optimization.        OTHER: 	    CT:< from: CT Head No Cont (06.17.21 @ 11:05) >  Impression:  1. Unremarkable noncontrast CT scan of the brain.      LABS:	 	                            12.5   6.56  )-----------( 90       ( 17 Jun 2021 03:09 )             40.0     06-17    137  |  97<L>  |  62.6<H>  ----------------------------<  110<H>  3.9   |  22.0  |  2.77<H>    Ca    10.1      17 Jun 2021 03:09  Phos  3.0     06-17  Mg     2.3     06-17    TPro  7.8  /  Alb  4.1  /  TBili  2.5<H>  /  DBili  x   /  AST  31  /  ALT  41<H>  /  AlkPhos  121<H>  06-17    proBNP: Serum Pro-Brain Natriuretic Peptide: 13158 pg/mL (06-13 @ 03:05)      TSH: Thyroid Stimulating Hormone, Serum: 2.31 uIU/mL        TELEMETRY: NSR HR @ 80s

## 2021-06-17 NOTE — BH CONSULTATION LIAISON ASSESSMENT NOTE - SUMMARY
75 yo wwm, lives alone,, retired, no formal PPH, tx psych admissions, PMH  CVA (2020), pulmonary HTN, with hospital course complicated by cardiogenic shock, acute hypoxemic respiratory failure secondary to metabolic acidosis and respiratory alkalosis, referred to psych for depression.  Pt reports he is sad and frustrated with ongoing medical condition and not knowing what the medical teams, plan or "prognosis ", feels he is in dark and is despondent about his condition. Pt made statement that sometimes he feels he wants to be with wife who  in Feb or pancreatic cancer., but claims he would never do that to his grandchildren.  Pt speaks endearingly of family and of his relationship with them who are  very supportive.  Pt on Remeron 7.5 mg hs and discussed increasing to 15 mg and is agreeable.  Pt denies SI/HI/AH delusions.  Pt tearful at times when describing his life with his wife.  No acute condition which requires psych admission.  Psych to follow.  Increase Remeron to 15 hs.

## 2021-06-17 NOTE — BH CONSULTATION LIAISON ASSESSMENT NOTE - NSBHCHARTREVIEWINVESTIGATE_PSY_A_CORE FT
Ventricular Rate 83 BPM    Atrial Rate 83 BPM    P-R Interval 324 ms    QRS Duration 128 ms    Q-T Interval 398 ms    QTC Calculation(Bazett) 467 ms    P Axis 19 degrees    R Axis -24 degrees    T Axis 90 degrees    Diagnosis Line Sinus rhythm [id number] degree A-V block with Premature atrial complexes  Possible Left atrial enlargement  Non-specific intra-ventricular conduction block    Confirmed by EDITA BANKS ([id number]) on 6/13/2021 8:25:18 AM

## 2021-06-17 NOTE — BH CONSULTATION LIAISON ASSESSMENT NOTE - CURRENT MEDICATION
MEDICATIONS  (STANDING):  ascorbic acid 500 milliGRAM(s) Oral daily  atorvastatin 10 milliGRAM(s) Oral at bedtime  budesonide 160 MICROgram(s)/formoterol 4.5 MICROgram(s) Inhaler 2 Puff(s) Inhalation two times a day  finasteride 5 milliGRAM(s) Oral daily  megestrol 400 milliGRAM(s) Oral daily  midodrine. 5 milliGRAM(s) Oral three times a day  mirtazapine 7.5 milliGRAM(s) Oral daily  multivitamin/minerals 1 Tablet(s) Oral daily  pantoprazole    Tablet 40 milliGRAM(s) Oral two times a day  phytonadione   Solution 5 milliGRAM(s) Oral daily  psyllium Powder 1 Packet(s) Oral two times a day  senna 2 Tablet(s) Oral at bedtime  sodium chloride 0.9% lock flush 3 milliLiter(s) IV Push every 8 hours  tamsulosin 0.4 milliGRAM(s) Oral at bedtime    MEDICATIONS  (PRN):  ALBUTerol    90 MICROgram(s) HFA Inhaler 2 Puff(s) Inhalation every 6 hours PRN Shortness of Breath and/or Wheezing  bisacodyl 5 milliGRAM(s) Oral every 12 hours PRN Constipation  polyethylene glycol 3350 17 Gram(s) Oral daily PRN Constipation

## 2021-06-17 NOTE — BH CONSULTATION LIAISON ASSESSMENT NOTE - NSBHREFERDETAILS_PSY_A_CORE_FT
History of Present Illness:  Reason for Admission: TAVR Failure  History of Present Illness:   76 year old male patient with a medical history of MI in 1995 (angiogram, no stents placed), COPD (2L O2 at home), s/p TAVR (03/2019 at [facility]), gout, CKD, CVA (09/2020), pulmonary HTN, initially presented to [facility] 6/1/21 with RONNELL on CKD (likely cardiorenal syndrome), urinary retention due to noncompliance with medications, with hospital course complicated by cardiogenic shock, acute hypoxemic respiratory failure secondary to metabolic acidosis and respiratory alkalosis. Patient found to have TAVR failure with ISRAEL 6/10/21 showing EF 40-45%, Aortic valve prosthesis with Severe paravalvular leak and valvular AI, mild-moderate aortic stenosis, and moderate MR. Patient was transferred to [facility] under Dr. Campbell for further workup.   psych consult requested due to grief and depression

## 2021-06-17 NOTE — PROGRESS NOTE ADULT - ASSESSMENT
76 year old male patient with a medical history of MI in 1995 (angiogram, no stents placed), COPD (2L O2 at home), s/p TAVR (03/2019 at [facility]), gout, CKD, CVA (09/2020), pulmonary HTN, initially presented to [facility] 6/1/21 with RONNELL on CKD (likely cardiorenal syndrome), urinary retention due to noncompliance with medications, with hospital course complicated by cardiogenic shock, acute hypoxemic respiratory failure secondary to metabolic acidosis and respiratory alkalosis. Patient found to have TAVR failure with ISRAEL 6/10/21 showing EF 40-45%, Aortic valve prosthesis with Severe paravalvular leak and valvular AI, mild-moderate aortic stenosis, and moderate MR. Patient was transferred to [facility] under Dr. Campbell for further workup.   ISRAEL DONE HERE WITH CONCERN FOR ENDOCARDITIS  PT DENIES FEVERS OR SWEATS  BUT HAS HAD SIGNIFICANT WEIGHT LOSS WITHOUT DIETING  PT HAS HX OF STREP BOVIS  BACTEREMIA   LAST YEAR AND RECEIVED   ROCEPHIN AT HOME FOR 6 WEEKS   BLOOD CX X2 6/7 WERE NEGATIVE   BLOOD CX X2 SETS SENT HERE   NEG   WILL RECOMMEND REPEAT BLOOD CX 6/16 X pending  PT NON TOXIC AFEBRILE  WILL HOLD ABX   EGD BIOPSIES ARE PENDING  s/p COLONOSCOPY   WOULD LIKE TO AOVID LONG TERM IV ABX UNLESS  POSS BACTEREMIA  ? MARANTIC ENDOCARDITIS   WILL FOLLOW UP   IF SPOKES THEN VANCO/ROCEPHIN  SPOKE TO CT  PA

## 2021-06-17 NOTE — BH CONSULTATION LIAISON ASSESSMENT NOTE - NSBHCHARTREVIEWLAB_PSY_A_CORE FT
Comprehensive Metabolic Panel in AM (06.17.21 @ 03:09)   Sodium, Serum: 137 mmol/L   Potassium, Serum: 3.9 mmol/L   Chloride, Serum: 97 mmol/L   Carbon Dioxide, Serum: 22.0 mmol/L   Anion Gap, Serum: 18 mmol/L   Blood Urea Nitrogen, Serum: 62.6 mg/dL   Creatinine, Serum: 2.77 mg/dL   Glucose, Serum: 110 mg/dL   Calcium, Total Serum: 10.1 mg/dL   Protein Total, Serum: 7.8 g/dL   Albumin, Serum: 4.1 g/dL   Bilirubin Total, Serum: 2.5 mg/dL   Alkaline Phosphatase, Serum: 121 U/L   Aspartate Aminotransferase (AST/SGOT): 31 U/L   Alanine Aminotransferase (ALT/SGPT): 41 U/L   eGFR if Non : 22: Interpretative comment   The units for eGFR are mL/min/1.73M2 (normalized body surface area). The   eGFR is calculated from a serum creatinine using the CKD-EPI equation.   Other variables required for calculation are race, age and sex. Among   patients with chronic kidney disease (CKD), the eGFR is useful in   determining the stage of disease according to KDOQI CKD classification.   All eGFR results are reported numerically with the following   interpretation.   GFR With Without   (ml/min/1.73 m2) Kidney Damage Kidney Damage

## 2021-06-17 NOTE — BH CONSULTATION LIAISON ASSESSMENT NOTE - NSBHCHARTREVIEWVS_PSY_A_CORE FT
Vital Signs Last 24 Hrs  T(C): 36.3 (17 Jun 2021 08:00), Max: 37 (16 Jun 2021 14:00)  T(F): 97.4 (17 Jun 2021 08:00), Max: 98.6 (16 Jun 2021 14:00)  HR: 88 (17 Jun 2021 09:39) (81 - 91)  BP: 117/54 (17 Jun 2021 09:39) (98/56 - 138/59)  BP(mean): 85 (17 Jun 2021 09:00) (72 - 85)  RR: 20 (17 Jun 2021 09:39) (14 - 71)  SpO2: 98% (17 Jun 2021 09:39) (89% - 100%)

## 2021-06-17 NOTE — BH CONSULTATION LIAISON ASSESSMENT NOTE - NSICDXPASTMEDICALHX_GEN_ALL_CORE_FT
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

## 2021-06-17 NOTE — PROGRESS NOTE ADULT - ASSESSMENT
76 year old male patient with a medical history of MI in 1995 (angiogram, no stents placed), COPD (2L O2 at home), s/p TAVR (03/2019 at [facility]), gout, CKD, CVA (09/2020), pulmonary HTN, initially presented to [facility] 6/1/21 with RONNELL on CKD (likely cardiorenal syndrome), urinary retention due to noncompliance with medications, with hospital course complicated by cardiogenic shock, acute hypoxemic respiratory failure secondary to metabolic acidosis and respiratory alkalosis. Patient found to have TAVR failure with ISRAEL 6/10/21 showing EF 40-45%, Aortic valve prosthesis with Severe paravalvular leak and valvular AI, mild-moderate aortic stenosis, and moderate MR. Patient was transferred to [facility] under Dr. Campbell for further workup. Cardiology consulted for decompensated HF and severe pulmonary hypertension.     Imaging from [facility]:  6/11: ISRAEL showing EF 40-45%, Aortic valve prosthesis with Severe paravalvular leak and valvular AI, mild-moderate aortic stenosis, and moderate MR.       6/17:  Pt denies chest pain, palpitations, SOB. Tele- NSR HR @ 80s. Awaiting ISRAEL collaterals from [place] for comparison. Eventual R+LHC when optimized from renal perspective.     HFrEF  - EF 40-45% -- > 30-35% on 6/12/21 echo  - severely reduced RV  - mod to severe MR  - Severe TR   - Aortic valve prosthesis with Severe paravalvular leak and valvular AI  - Cont. IV Lasix gtt  - Cont. Strict I+O   -ISRAEL- preliminary results noted possible endocarditis  -ID evaluation recommended, Blood cultures recommended  - Also need GDMT for n/o HFrEF   - Continue midodrine, hold afterload reduction and Toprol       Severe Pulmonary HTN   - confirmed on C march/2021   - Echo shows PASP 61.5 consistent with severe pulm HTN      RONNELL on CKD  - Cont. lasix infusion @ 5mg/hr   - Nephrology recs appreciated  - metabolic acidosis, HCO3 is 20 w/ anion gap       TAVR Prosthesis failure   - Aortic valve with severe prosthesis regurgitation and moderate prosthesis stenosis  - CTS following   -ISRAEL- preliminary results noted endocarditis  -ID evaluation appreciated  -Blood cultures completed, pending results  -Eventual R+LHC, needs to be optimized from renal perspective       76 year old male patient with a medical history of MI in 1995 (angiogram, no stents placed), COPD (2L O2 at home), s/p TAVR (03/2019 at [facility]), gout, CKD, CVA (09/2020), pulmonary HTN, initially presented to [facility] 6/1/21 with RONNELL on CKD (likely cardiorenal syndrome), urinary retention due to noncompliance with medications, with hospital course complicated by cardiogenic shock, acute hypoxemic respiratory failure secondary to metabolic acidosis and respiratory alkalosis. Patient found to have TAVR failure with ISRAEL 6/10/21 showing EF 40-45%, Aortic valve prosthesis with Severe paravalvular leak and valvular AI, mild-moderate aortic stenosis, and moderate MR. Patient was transferred to [facility] under Dr. Campbell for further workup. Cardiology consulted for decompensated HF and severe pulmonary hypertension.     Imaging from [facility]:  6/11: ISRAEL showing EF 40-45%, Aortic valve prosthesis with Severe paravalvular leak and valvular AI, mild-moderate aortic stenosis, and moderate MR.       6/17:  Pt denies chest pain, palpitations, SOB. Tele- NSR HR @ 80s. Blood cultures- no growth for 48 hours. Eventual R+LHC when optimized from renal perspective. Cont. current medication regimen.     HFrEF  - EF 40-45% -- > 30-35% on 6/12/21 echo  - severely reduced RV  - mod to severe MR  - Severe TR   - Aortic valve prosthesis with Severe paravalvular leak and valvular AI  -ISRAEL- preliminary results noted possible endocarditis  -ID evaluation recommended, Blood cultures recommended  - Also need GDMT for n/o HFrEF   - Continue midodrine, hold afterload reduction and Toprol       Severe Pulmonary HTN   - confirmed on [facility] march/2021   - Echo shows PASP 61.5 consistent with severe pulm HTN      RONNELL on CKD  - Nephrology recs appreciated  - metabolic acidosis, HCO3 is 20 w/ anion gap       TAVR Prosthesis failure   - Aortic valve with severe prosthesis regurgitation and moderate prosthesis stenosis  - CTS following   -ISRAEL- preliminary results noted endocarditis  -ID evaluation appreciated  -Blood cultures completed, pending results  -Eventual R+LHC, needs to be optimized from renal perspective

## 2021-06-17 NOTE — PROGRESS NOTE ADULT - ASSESSMENT
76 year old male patient with a medical history of MI in 1995 (angiogram, no stents placed), COPD (2L O2 at home), s/p TAVR (03/2019 at [facility]), gout, CKD, CVA (09/2020), pulmonary HTN, initially presented to [facility] 6/1/21 with RONNELL on CKD (likely cardiorenal syndrome), urinary retention due to noncompliance with medications, with hospital course complicated by cardiogenic shock, acute hypoxemic respiratory failure secondary to metabolic acidosis and respiratory alkalosis. Patient found to have TAVR failure with ISRAEL 6/10/21 showing EF 40-45%, Aortic valve prosthesis with Severe paravalvular leak and valvular AI, mild-moderate aortic stenosis, and moderate MR. Patient was transferred to [facility] under Dr. Campbell for further workup.   6/11 Transfer from [facility] for paravalvular leak. Patient was found to have acute on chronic renal failure, with superimposed cardiorenal syndrome   6/12 TTE done showing EF 30-35%, reduced RV function, mild MAC, moderate to severe MR, severe TR, moderate AI, and severe PA  6/15 EGD - GI feels there is concern for gastric Ca. 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

## 2021-06-17 NOTE — PROGRESS NOTE ADULT - SUBJECTIVE AND OBJECTIVE BOX
Pt seen and examined f/u for weight loss and elevated LFTs as well as abnormal cat scan of cecum    Yesterday the colonoscopy was normal except for some diverticulosis. Pathology from the EGD showing duodenitis and diffuse erosive gastritis still pending. denies any abdominal pain nausea or vomiting. C/O weakness, malaise and SOB-all ongoing. LFTs without isgnificant change    REVIEW OF SYSTEMS:    CONSTITUTIONAL: No fever, weight loss, or fatigue  EYES: No eye pain, visual disturbances, or discharge  ENMT:  No difficulty hearing, tinnitus, vertigo; No sinus or throat pain  RESPIRATORY: No cough, wheezing, chills or hemoptysis; No shortness of breath  CARDIOVASCULAR: No chest pain, palpitations, dizziness, or leg swelling  GASTROINTESTINAL: as above    MEDICATIONS:  MEDICATIONS  (STANDING):  ascorbic acid 500 milliGRAM(s) Oral daily  atorvastatin 10 milliGRAM(s) Oral at bedtime  budesonide 160 MICROgram(s)/formoterol 4.5 MICROgram(s) Inhaler 2 Puff(s) Inhalation two times a day  finasteride 5 milliGRAM(s) Oral daily  megestrol 400 milliGRAM(s) Oral daily  midodrine. 5 milliGRAM(s) Oral three times a day  mirtazapine 7.5 milliGRAM(s) Oral daily  multivitamin/minerals 1 Tablet(s) Oral daily  pantoprazole    Tablet 40 milliGRAM(s) Oral two times a day  phytonadione   Solution 5 milliGRAM(s) Oral daily  psyllium Powder 1 Packet(s) Oral two times a day  senna 2 Tablet(s) Oral at bedtime  sodium chloride 0.9% lock flush 3 milliLiter(s) IV Push every 8 hours  tamsulosin 0.4 milliGRAM(s) Oral at bedtime    MEDICATIONS  (PRN):  ALBUTerol    90 MICROgram(s) HFA Inhaler 2 Puff(s) Inhalation every 6 hours PRN Shortness of Breath and/or Wheezing  bisacodyl 5 milliGRAM(s) Oral every 12 hours PRN Constipation  polyethylene glycol 3350 17 Gram(s) Oral daily PRN Constipation      Allergies    No Known Drug Allergies  strawberry (Anaphylaxis)    Intolerances        Vital Signs Last 24 Hrs  T(C): 36.3 (17 Jun 2021 08:00), Max: 37 (16 Jun 2021 14:00)  T(F): 97.4 (17 Jun 2021 08:00), Max: 98.6 (16 Jun 2021 14:00)  HR: 88 (17 Jun 2021 09:39) (81 - 91)  BP: 117/54 (17 Jun 2021 09:39) (98/56 - 138/59)  BP(mean): 85 (17 Jun 2021 09:00) (72 - 85)  RR: 20 (17 Jun 2021 09:39) (14 - 71)  SpO2: 98% (17 Jun 2021 09:39) (89% - 100%)    06-16 @ 07:01  -  06-17 @ 07:00  --------------------------------------------------------  IN: 122.5 mL / OUT: 1465 mL / NET: -1342.5 mL    06-17 @ 07:01  -  06-17 @ 11:20  --------------------------------------------------------  IN: 100 mL / OUT: 25 mL / NET: 75 mL        PHYSICAL EXAM:    General: Well developed; well nourished; in no acute distress, slightly tachypneic  HEENT: MMM, conjunctiva and sclera clear  Gastrointestinal:Abdomen: Soft non-tender non-distended; Normal bowel sounds; No hepatosplenomegaly  Extremities: no cyanosis, clubbing or edema.  Skin: Warm and dry. No obvious rash    LABS:      CBC Full  -  ( 17 Jun 2021 03:09 )  WBC Count : 6.56 K/uL  RBC Count : 4.15 M/uL  Hemoglobin : 12.5 g/dL  Hematocrit : 40.0 %  Platelet Count - Automated : 90 K/uL  Mean Cell Volume : 96.4 fl  Mean Cell Hemoglobin : 30.1 pg  Mean Cell Hemoglobin Concentration : 31.3 gm/dL  Auto Neutrophil # : x  Auto Lymphocyte # : x  Auto Monocyte # : x  Auto Eosinophil # : x  Auto Basophil # : x  Auto Neutrophil % : x  Auto Lymphocyte % : x  Auto Monocyte % : x  Auto Eosinophil % : x  Auto Basophil % : x    06-17    137  |  97<L>  |  62.6<H>  ----------------------------<  110<H>  3.9   |  22.0  |  2.77<H>    Ca    10.1      17 Jun 2021 03:09  Phos  3.0     06-17  Mg     2.3     06-17    TPro  7.8  /  Alb  4.1  /  TBili  2.5<H>  /  DBili  x   /  AST  31  /  ALT  41<H>  /  AlkPhos  121<H>  06-17    PT/INR - ( 17 Jun 2021 03:09 )   PT: 17.9 sec;   INR: 1.58 ratio

## 2021-06-17 NOTE — BH CONSULTATION LIAISON ASSESSMENT NOTE - HPI (INCLUDE ILLNESS QUALITY, SEVERITY, DURATION, TIMING, CONTEXT, MODIFYING FACTORS, ASSOCIATED SIGNS AND SYMPTOMS)
77 yo wwm, lives alone,, retired, no formal PPH, tx psych admissions, PMH  CVA (2020), pulmonary HTN, with hospital course complicated by cardiogenic shock, acute hypoxemic respiratory failure secondary to metabolic acidosis and respiratory alkalosis, referred to psych for depression.  Pt reports he is sad and frustrated with ongoing medical condition and not knowing what the medical teams, plan or "prognosis ", feels he is in dark and is despondent about his condition. Pt made statement that sometimes he feels he wants to be with wife who  in Feb or pancreatic cancer., but claims he would never do that to his grandchildren.  Pt speaks endearingly of family and of his relationship with them who are  very supportive.  Pt on Remeron 7.5 mg hs and discussed increasing to 15 mg and is agreeable.  Pt denies SI/HI/AH delusions.  Pt tearful at times when describing his life with his wife.  No acute condition which requires psych admission.  Psych to follow

## 2021-06-18 DIAGNOSIS — R74.01 ELEVATION OF LEVELS OF LIVER TRANSAMINASE LEVELS: ICD-10-CM

## 2021-06-18 LAB
ALBUMIN SERPL ELPH-MCNC: 3.7 G/DL — SIGNIFICANT CHANGE UP (ref 3.3–5.2)
ALP SERPL-CCNC: 108 U/L — SIGNIFICANT CHANGE UP (ref 40–120)
ALT FLD-CCNC: 35 U/L — SIGNIFICANT CHANGE UP
ANION GAP SERPL CALC-SCNC: 15 MMOL/L — SIGNIFICANT CHANGE UP (ref 5–17)
AST SERPL-CCNC: 32 U/L — SIGNIFICANT CHANGE UP
BILIRUB SERPL-MCNC: 2.5 MG/DL — HIGH (ref 0.4–2)
BUN SERPL-MCNC: 42.7 MG/DL — HIGH (ref 8–20)
CALCIUM SERPL-MCNC: 9.8 MG/DL — SIGNIFICANT CHANGE UP (ref 8.6–10.2)
CHLORIDE SERPL-SCNC: 96 MMOL/L — LOW (ref 98–107)
CO2 SERPL-SCNC: 25 MMOL/L — SIGNIFICANT CHANGE UP (ref 22–29)
CREAT SERPL-MCNC: 2.56 MG/DL — HIGH (ref 0.5–1.3)
GLUCOSE SERPL-MCNC: 115 MG/DL — HIGH (ref 70–99)
HCT VFR BLD CALC: 37.6 % — LOW (ref 39–50)
HGB BLD-MCNC: 11.7 G/DL — LOW (ref 13–17)
INR BLD: 1.86 RATIO — HIGH (ref 0.88–1.16)
MAGNESIUM SERPL-MCNC: 2.1 MG/DL — SIGNIFICANT CHANGE UP (ref 1.6–2.6)
MCHC RBC-ENTMCNC: 30.4 PG — SIGNIFICANT CHANGE UP (ref 27–34)
MCHC RBC-ENTMCNC: 31.1 GM/DL — LOW (ref 32–36)
MCV RBC AUTO: 97.7 FL — SIGNIFICANT CHANGE UP (ref 80–100)
PHOSPHATE SERPL-MCNC: 2.9 MG/DL — SIGNIFICANT CHANGE UP (ref 2.4–4.7)
PLATELET # BLD AUTO: 105 K/UL — LOW (ref 150–400)
POTASSIUM SERPL-MCNC: 4.8 MMOL/L — SIGNIFICANT CHANGE UP (ref 3.5–5.3)
POTASSIUM SERPL-SCNC: 4.8 MMOL/L — SIGNIFICANT CHANGE UP (ref 3.5–5.3)
PROT SERPL-MCNC: 7.2 G/DL — SIGNIFICANT CHANGE UP (ref 6.6–8.7)
PROTHROM AB SERPL-ACNC: 20.9 SEC — HIGH (ref 10.6–13.6)
RBC # BLD: 3.85 M/UL — LOW (ref 4.2–5.8)
RBC # FLD: 21 % — HIGH (ref 10.3–14.5)
SARS-COV-2 RNA SPEC QL NAA+PROBE: SIGNIFICANT CHANGE UP
SODIUM SERPL-SCNC: 136 MMOL/L — SIGNIFICANT CHANGE UP (ref 135–145)
WBC # BLD: 6.78 K/UL — SIGNIFICANT CHANGE UP (ref 3.8–10.5)
WBC # FLD AUTO: 6.78 K/UL — SIGNIFICANT CHANGE UP (ref 3.8–10.5)

## 2021-06-18 PROCEDURE — 99233 SBSQ HOSP IP/OBS HIGH 50: CPT

## 2021-06-18 PROCEDURE — 99232 SBSQ HOSP IP/OBS MODERATE 35: CPT

## 2021-06-18 PROCEDURE — 71045 X-RAY EXAM CHEST 1 VIEW: CPT | Mod: 26

## 2021-06-18 RX ADMIN — SODIUM CHLORIDE 3 MILLILITER(S): 9 INJECTION INTRAMUSCULAR; INTRAVENOUS; SUBCUTANEOUS at 20:58

## 2021-06-18 RX ADMIN — Medication 500 MILLIGRAM(S): at 18:35

## 2021-06-18 RX ADMIN — SODIUM CHLORIDE 3 MILLILITER(S): 9 INJECTION INTRAMUSCULAR; INTRAVENOUS; SUBCUTANEOUS at 13:19

## 2021-06-18 RX ADMIN — PANTOPRAZOLE SODIUM 40 MILLIGRAM(S): 20 TABLET, DELAYED RELEASE ORAL at 18:35

## 2021-06-18 RX ADMIN — MIRTAZAPINE 7.5 MILLIGRAM(S): 45 TABLET, ORALLY DISINTEGRATING ORAL at 20:54

## 2021-06-18 RX ADMIN — FINASTERIDE 5 MILLIGRAM(S): 5 TABLET, FILM COATED ORAL at 18:35

## 2021-06-18 RX ADMIN — TAMSULOSIN HYDROCHLORIDE 0.4 MILLIGRAM(S): 0.4 CAPSULE ORAL at 20:54

## 2021-06-18 RX ADMIN — Medication 1 TABLET(S): at 18:35

## 2021-06-18 RX ADMIN — MIDODRINE HYDROCHLORIDE 5 MILLIGRAM(S): 2.5 TABLET ORAL at 08:14

## 2021-06-18 RX ADMIN — MIDODRINE HYDROCHLORIDE 5 MILLIGRAM(S): 2.5 TABLET ORAL at 20:54

## 2021-06-18 RX ADMIN — Medication 5 MILLIGRAM(S): at 20:54

## 2021-06-18 RX ADMIN — SODIUM CHLORIDE 3 MILLILITER(S): 9 INJECTION INTRAMUSCULAR; INTRAVENOUS; SUBCUTANEOUS at 06:12

## 2021-06-18 RX ADMIN — BUDESONIDE AND FORMOTEROL FUMARATE DIHYDRATE 2 PUFF(S): 160; 4.5 AEROSOL RESPIRATORY (INHALATION) at 09:24

## 2021-06-18 RX ADMIN — BUDESONIDE AND FORMOTEROL FUMARATE DIHYDRATE 2 PUFF(S): 160; 4.5 AEROSOL RESPIRATORY (INHALATION) at 21:02

## 2021-06-18 RX ADMIN — ATORVASTATIN CALCIUM 10 MILLIGRAM(S): 80 TABLET, FILM COATED ORAL at 20:54

## 2021-06-18 RX ADMIN — MEGESTROL ACETATE 400 MILLIGRAM(S): 40 SUSPENSION ORAL at 20:53

## 2021-06-18 RX ADMIN — PANTOPRAZOLE SODIUM 40 MILLIGRAM(S): 20 TABLET, DELAYED RELEASE ORAL at 06:11

## 2021-06-18 NOTE — PROGRESS NOTE ADULT - SUBJECTIVE AND OBJECTIVE BOX
INFECTIOUS DISEASES AND INTERNAL MEDICINE at [facility]  =======================================================  Robson Wright MD  Diplomates American Board of Internal Medicine and Infectious Diseases  Telephone [phone]  Fax            [phone]  =======================================================    REYNA BOB [id number]    Follow up: ?ENDOCARDITIS    Allergies:  No Known Drug Allergies  strawberry (Anaphylaxis)      Medications:  ALBUTerol    90 MICROgram(s) HFA Inhaler 2 Puff(s) Inhalation every 6 hours PRN  ascorbic acid 500 milliGRAM(s) Oral daily  atorvastatin 10 milliGRAM(s) Oral at bedtime  bisacodyl 5 milliGRAM(s) Oral every 12 hours PRN  budesonide 160 MICROgram(s)/formoterol 4.5 MICROgram(s) Inhaler 2 Puff(s) Inhalation two times a day  finasteride 5 milliGRAM(s) Oral daily  furosemide Infusion 5 mG/Hr IV Continuous <Continuous>  megestrol 400 milliGRAM(s) Oral daily  midodrine. 5 milliGRAM(s) Oral three times a day  mirtazapine 7.5 milliGRAM(s) Oral daily  multivitamin/minerals 1 Tablet(s) Oral daily  phytonadione   Solution 5 milliGRAM(s) Oral daily  polyethylene glycol 3350 17 Gram(s) Oral daily PRN  psyllium Powder 1 Packet(s) Oral two times a day  senna 2 Tablet(s) Oral at bedtime  sodium bicarbonate 650 milliGRAM(s) Oral every 12 hours  sodium chloride 0.9% lock flush 3 milliLiter(s) IV Push every 8 hours  tamsulosin 0.4 milliGRAM(s) Oral at bedtime    SOCIAL       FAMILY   FAMILY HISTORY:  FH: lung cancer      REVIEW OF SYSTEMS:  CONSTITUTIONAL:  No Fever or chills  HEENT:   No diplopia or blurred vision.  No earache, sore throat or runny nose.  CARDIOVASCULAR:  No pressure, squeezing, strangling, tightness, heaviness or aching about the chest, neck, axilla or epigastrium.  RESPIRATORY:  No cough, shortness of breath, PND or orthopnea.  GASTROINTESTINAL:  No nausea, vomiting or diarrhea.  GENITOURINARY:  No dysuria, frequency or urgency. No Blood in urine  MUSCULOSKELETAL:   moves all joints  SKIN:  No change in skin, hair or nails.  NEUROLOGIC:  No paresthesias, fasciculations, seizures or weakness.  PSYCHIATRIC:  No disorder of thought or mood.  ENDOCRINE:  No heat or cold intolerance, polyuria or polydipsia.  HEMATOLOGICAL:  No easy bruising or bleeding.            Physical Exam:  I   Vital Signs Last 24 Hrs  T(C): 36.4 (18 Jun 2021 08:16), Max: 36.7 (17 Jun 2021 22:24)  T(F): 97.5 (18 Jun 2021 08:16), Max: 98 (17 Jun 2021 22:24)  HR: 88 (18 Jun 2021 08:16) (87 - 90)  BP: 101/57 (18 Jun 2021 08:16) (96/51 - 138/59)  BP(mean): 85 (17 Jun 2021 09:00) (85 - 85)  RR: 19 (18 Jun 2021 08:16) (18 - 21)  SpO2: 97% (18 Jun 2021 08:16) (97% - 100%)    GEN: NAD,   HEENT: normocephalic and atraumatic. EOMI. EMMIE.    NECK: Supple. No carotid bruits.  No lymphadenopathy or thyromegaly.  LUNGS: Clear to auscultation.  HEART: Regular rate and rhythm without murmur.  ABDOMEN: Soft, nontender, and nondistended.  Positive bowel sounds.    : No CVA tenderness  EXTREMITIES: Without any cyanosis, clubbing, rash, lesions or edema.  MSK: no joint swelling  NEUROLOGIC: Cranial nerves II through XII are grossly intact.  PSYCHIATRIC: Appropriate affect .  SKIN: No ulceration or induration present.        Labs:  Vitals:  =     =======================================================  Current Antibiotics:    Other medications:  ascorbic acid 500 milliGRAM(s) Oral daily  atorvastatin 10 milliGRAM(s) Oral at bedtime  budesonide 160 MICROgram(s)/formoterol 4.5 MICROgram(s) Inhaler 2 Puff(s) Inhalation two times a day  finasteride 5 milliGRAM(s) Oral daily  furosemide Infusion 5 mG/Hr IV Continuous <Continuous>  megestrol 400 milliGRAM(s) Oral daily  midodrine. 5 milliGRAM(s) Oral three times a day  mirtazapine 7.5 milliGRAM(s) Oral daily  multivitamin/minerals 1 Tablet(s) Oral daily  phytonadione   Solution 5 milliGRAM(s) Oral daily  psyllium Powder 1 Packet(s) Oral two times a day  senna 2 Tablet(s) Oral at bedtime  sodium bicarbonate 650 milliGRAM(s) Oral every 12 hours  sodium chloride 0.9% lock flush 3 milliLiter(s) IV Push every 8 hours  tamsulosin 0.4 milliGRAM(s) Oral at bedtime      =======================================================  Labs:                                                      12.5   6.56  )-----------( 90       ( 17 Jun 2021 03:09 )             40.0   06-17    137  |  97<L>  |  62.6<H>  ----------------------------<  110<H>  3.9   |  22.0  |  2.77<H>    Ca    10.1      17 Jun 2021 03:09  Phos  3.0     06-17  Mg     2.3     06-17    TPro  7.8  /  Alb  4.1  /  TBili  2.5<H>  /  DBili  x   /  AST  31  /  ALT  41<H>  /  AlkPhos  121<H>  06-17

## 2021-06-18 NOTE — PROGRESS NOTE ADULT - ATTENDING COMMENTS
pt seen and examined  plan of care dw pt and np.  He will have cath in preparation for surgery. Blood cultures negative this AM  Pt understood possibility of needing life long HD.   EDVIN Campbell and CT surgery team.

## 2021-06-18 NOTE — CHART NOTE - NSCHARTNOTEFT_GEN_A_CORE
76 year old male patient with a medical history of MI in 1995 (angiogram, no stents placed), COPD (2L O2 at home), s/p TAVR (03/2019 at [facility]), gout, CKD, CVA (09/2020), pulmonary HTN, initially presented to [facility] 6/1/21 with RONNELL on CKD (likely cardiorenal syndrome), urinary retention due to noncompliance with medications, with hospital course complicated by cardiogenic shock, acute hypoxemic respiratory failure secondary to metabolic acidosis and respiratory alkalosis, and acute on chronic combined diastolic and systolic heart failure. Patient found to have TAVR failure with ISRAEL 6/10/21 showing EF 40-45%, Aortic valve prosthesis with Severe paravalvular leak and valvular AI, mild-moderate aortic stenosis, and moderate MR. Patient was transferred to [facility] under Dr. Campbell for further workup. TTE 6/12 showing EF 30-35%, reduced RV function, mild MAC, moderate to severe MR, severe TR, moderate AI, and severe PA. Patient was challenged with lasix for fluid overload, with worsening RONNELL on CKD and cardiorenal syndrome, ultimately requiring HD via Left IJ dialysis catheter. Concern for recent reported 100lb weight loss, decreased appetite, and dysphagia with EGD showing duodenitis and diffuse erosive gastritis, and colonoscopy showing diverticulosis. Repeat ISRAEL performed during EGD showing an EF of 35%, moderate MR, moderate TR, s/p TAVR with a mobile echo density attached to the ventricular side of the prosthetic aortic valve, Severe AI. With prior Hx of step Bovis bacteremia, findings are highly suspicious for endocarditis. Blood cultures from 6/15 remain negative to date. Plan for Cardiac Cath later today with Dr. Jorgensen.    Plan:  Plan for cath today.  Blood cultures still pending.  Surgical plan to be determined.  Hematology consult called and pending for autoelevated INR (uptrending today 1.86).    Gastric biopsies pending results.  GI following.   Per cardiology, may need dental eval .  Will discuss with Dr. Campbell.

## 2021-06-18 NOTE — PROGRESS NOTE ADULT - SUBJECTIVE AND OBJECTIVE BOX
[facility] DIVISION OF KIDNEY DISEASES AND HYPERTENSION -- FOLLOW UP NOTE  --------------------------------------------------------------------------------  Chief Complaint:  ESRD HD  24 hour events/subjective:  Seen/examined  HD 3 tomorrow;      PAST HISTORY  --------------------------------------------------------------------------------  No significant changes to PMH, PSH, FHx, SHx, unless otherwise noted    ALLERGIES & MEDICATIONS  --------------------------------------------------------------------------------  Allergies    No Known Drug Allergies  strawberry (Anaphylaxis)    Intolerances      Standing Inpatient Medications  ascorbic acid 500 milliGRAM(s) Oral daily  atorvastatin 10 milliGRAM(s) Oral at bedtime  budesonide 160 MICROgram(s)/formoterol 4.5 MICROgram(s) Inhaler 2 Puff(s) Inhalation two times a day  finasteride 5 milliGRAM(s) Oral daily  megestrol 400 milliGRAM(s) Oral daily  midodrine. 5 milliGRAM(s) Oral three times a day  mirtazapine 7.5 milliGRAM(s) Oral daily  multivitamin/minerals 1 Tablet(s) Oral daily  pantoprazole    Tablet 40 milliGRAM(s) Oral two times a day  phytonadione   Solution 5 milliGRAM(s) Oral daily  psyllium Powder 1 Packet(s) Oral two times a day  senna 2 Tablet(s) Oral at bedtime  sodium chloride 0.9% lock flush 3 milliLiter(s) IV Push every 8 hours  tamsulosin 0.4 milliGRAM(s) Oral at bedtime    PRN Inpatient Medications  ALBUTerol    90 MICROgram(s) HFA Inhaler 2 Puff(s) Inhalation every 6 hours PRN  bisacodyl 5 milliGRAM(s) Oral every 12 hours PRN  polyethylene glycol 3350 17 Gram(s) Oral daily PRN      REVIEW OF SYSTEMS  --------------------------------------------------------------------------------  Gen: No weight changes, fatigue, fevers/chills, weakness  Skin: No rashes  Head/Eyes/Ears/Mouth: No headache; Normal hearing; Normal vision w/o blurriness; No sinus pain/discomfort, sore throat  Respiratory: No dyspnea, cough, wheezing, hemoptysis  CV: No chest pain, PND, orthopnea  GI: No abdominal pain, diarrhea, constipation, nausea, vomiting, melena, hematochezia  : No increased frequency, dysuria, hematuria, nocturia  MSK: No joint pain/swelling; no back pain; no edema  Neuro: No dizziness/lightheadedness, weakness, seizures, numbness, tingling  Heme: No easy bruising or bleeding  Endo: No heat/cold intolerance  Psych: No significant nervousness, anxiety, stress, depression    All other systems were reviewed and are negative, except as noted.    VITALS/PHYSICAL EXAM  --------------------------------------------------------------------------------  T(C): 36.9 (06-18-21 @ 12:40), Max: 36.9 (06-18-21 @ 12:40)  HR: 85 (06-18-21 @ 13:11) (85 - 90)  BP: 111/56 (06-18-21 @ 12:40) (96/51 - 122/69)  RR: 18 (06-18-21 @ 13:11) (16 - 19)  SpO2: 97% (06-18-21 @ 13:11) (97% - 100%)  Wt(kg): --        06-17-21 @ 07:01  -  06-18-21 @ 07:00  --------------------------------------------------------  IN: 580 mL / OUT: 525 mL / NET: 55 mL    06-18-21 @ 07:01  -  06-18-21 @ 15:37  --------------------------------------------------------  IN: 0 mL / OUT: 400 mL / NET: -400 mL      Physical Exam:  	Gen: NAD   	HEENT: PERRL, supple neck, clear oropharynx  	Pulm: CTA B/L  	CV: RRR, S1S2; no rub  	Back: No spinal or CVA tenderness; no sacral edema  	Abd: +BS, soft, nontender/nondistended  	: No suprapubic tenderness  	UE: Warm, FROM, no clubbing, intact strength; no edema; no asterixis  	LE: Warm, FROM, no clubbing, intact strength; no edema  	Neuro: No focal deficits, intact gait  	Psych: Normal affect and mood  	Skin: Warm, without rashes  	Vascular access:    LABS/STUDIES  --------------------------------------------------------------------------------              11.7   6.78  >-----------<  105      [06-18-21 @ 06:16]              37.6     136  |  96  |  42.7  ----------------------------<  115      [06-18-21 @ 06:16]  4.8   |  25.0  |  2.56        Ca     9.8     [06-18-21 @ 06:16]      Mg     2.1     [06-18-21 @ 06:16]      Phos  2.9     [06-18-21 @ 06:16]    TPro  7.2  /  Alb  3.7  /  TBili  2.5  /  DBili  x   /  AST  32  /  ALT  35  /  AlkPhos  108  [06-18-21 @ 06:16]    PT/INR: PT 20.9 , INR 1.86       [06-18-21 @ 06:16]      Creatinine Trend:  SCr 2.56 [06-18 @ 06:16]  SCr 2.77 [06-17 @ 03:09]  SCr 2.85 [06-16 @ 21:20]  SCr 3.54 [06-16 @ 03:13]  SCr 3.68 [06-15 @ 02:59]        TSH 2.31      [06-12-21 @ 01:55]    HBsAb <3.0      [06-17-21 @ 05:13]  HBsAg Nonreact      [06-17-21 @ 05:13]  HBcAb Nonreact      [06-17-21 @ 05:13]  HCV 0.15, Nonreact      [06-17-21 @ 05:13]

## 2021-06-18 NOTE — PROGRESS NOTE ADULT - ASSESSMENT
76 year old male patient with a medical history of MI in 1995 (angiogram, no stents placed), COPD (2L O2 at home), s/p TAVR (03/2019 at [facility]), gout, CKD, CVA (09/2020), pulmonary HTN, initially presented to [facility] 6/1/21 with RONNELL on CKD (likely cardiorenal syndrome), urinary retention due to noncompliance with medications, with hospital course complicated by cardiogenic shock, acute hypoxemic respiratory failure secondary to metabolic acidosis and respiratory alkalosis. Patient found to have TAVR failure with ISRAEL 6/10/21 showing EF 40-45%, Aortic valve prosthesis with Severe paravalvular leak and valvular AI, mild-moderate aortic stenosis, and moderate MR. Patient was transferred to [facility] under Dr. Campbell for further workup. Cardiology consulted for decompensated HF and severe pulmonary hypertension.     Imaging from [facility]:  6/11: ISRAEL showing EF 40-45%, Aortic valve prosthesis with Severe paravalvular leak and valvular AI, mild-moderate aortic stenosis, and moderate MR.   6/17:  Pt denies chest pain, palpitations, SOB. Tele- NSR HR @ 80s. Blood cultures- no growth for 48 hours. Eventual R+LHC when optimized from renal perspective. Cont. current medication regimen.   6/18/2021:   Plan for cardiac cath today.  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   Then ISRAEL done here with concern for endocarditis.  ID following as well.    HFrEF  - EF 40-45% -- > 30-35% on 6/12/21 echo  - severely reduced RV  - mod to severe MR  - Severe TR   - Aortic valve prosthesis with Severe paravalvular leak and valvular AI  -ISRAEL- preliminary results noted possible endocarditis  -ID following.     - Continue midodrine, hold afterload reduction and Toprol     Severe Pulmonary HTN   - confirmed on RHC march/2021   - Echo shows PASP 61.5 consistent with severe pulm HTN    RONNELL on CKD - creatinine today 42.7/2.56  - Nephrology recs appreciated  - metabolic acidosis, HCO3 is 20 w/ anion gap   -After surgery may require Long term continuos dialysis.      TAVR Prosthesis failure   - Aortic valve with severe prosthesis regurgitation and moderate prosthesis stenosis  - CTS following   -ISRAEL- preliminary results noted endocarditis  -ID evaluation appreciated  -Blood cultures completed, pending results  -Eventual R+LHC, needs to be optimized from renal perspective - plan is for cardiac cath today.       76 year old male patient with a medical history of MI in 1995 (angiogram, no stents placed), COPD (2L O2 at home), s/p TAVR (03/2019 at [facility]), gout, CKD, CVA (09/2020), pulmonary HTN, initially presented to [facility] 6/1/21 with RONNELL on CKD (likely cardiorenal syndrome), urinary retention due to noncompliance with medications, with hospital course complicated by cardiogenic shock, acute hypoxemic respiratory failure secondary to metabolic acidosis and respiratory alkalosis. Patient found to have TAVR failure with ISRAEL 6/10/21 showing EF 40-45%, Aortic valve prosthesis with Severe paravalvular leak and valvular AI, mild-moderate aortic stenosis, and moderate MR. Patient was transferred to [facility] under Dr. Campbell for further workup. Cardiology consulted for decompensated HF and severe pulmonary hypertension.     Imaging from [facility]:  6/11: ISRAEL showing EF 40-45%, Aortic valve prosthesis with Severe paravalvular leak and valvular AI, mild-moderate aortic stenosis, and moderate MR.   6/17:  Pt denies chest pain, palpitations, SOB. Tele- NSR HR @ 80s. Blood cultures- no growth for 48 hours. Eventual R+LHC when optimized from renal perspective. Cont. current medication regimen.   6/18/2021:   Plan for cardiac cath today.  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   Then ISRAEL done here with concern for endocarditis.  ID following as well.    HFrEF  - EF 40-45% -- > 30-35% on 6/12/21 echo  - severely reduced RV  - mod to severe MR  - Severe TR   - Aortic valve prosthesis with Severe paravalvular leak and valvular AI  -ISRAEL- preliminary results noted possible endocarditis  -ID following.     - Continue midodrine, hold afterload reduction and Toprol     Severe Pulmonary HTN   - confirmed on RHC march/2021   - Echo shows PASP 61.5 consistent with severe pulm HTN    RONNELL on CKD - creatinine today 42.7/2.56  - Nephrology recs appreciated  - metabolic acidosis, HCO3 is 20 w/ anion gap   -After surgery may require Long term continuos dialysis.      TAVR Prosthesis failure   - Aortic valve with severe prosthesis regurgitation and moderate prosthesis stenosis  - CTS following   -ISRAEL- preliminary results noted endocarditis  -ID evaluation appreciated  -Blood cultures completed, pending results  -Eventual R+LHC, needs to be optimized from renal perspective - plan is for cardiac cath today.      Cardiology NP addendum:  -Agree with above   -For RHC/LHC with Dr. Jorgensen today  -Consent to be obtained by MD  ASA 3  Mallampati  2  BRA 2.7%

## 2021-06-18 NOTE — CONSULT NOTE ADULT - ASSESSMENT
76 year old male patient with a medical history of MI in 1995 (angiogram, no stents placed), COPD (2L O2 at home), s/p TAVR (03/2019 at [facility]), gout, CKD, CVA (09/2020), pulmonary HTN, initially presented to [facility] 6/1/21 with RONNELL on CKD (likely cardiorenal syndrome), urinary retention due to noncompliance with medications, with hospital course complicated by cardiogenic shock, acute hypoxemic respiratory failure secondary to metabolic acidosis and respiratory alkalosis. Patient found to have TAVR failure with ISRAEL 6/10/21 showing EF 40-45%, Aortic valve prosthesis with Severe paravalvular leak and valvular AI, mild-moderate aortic stenosis, and moderate MR. Patient was transferred to [facility] under Dr. Campbell for further workup.     Hematology consult requested for coagulopathy.  Per review of labs:  6/16 - PT 16.7 / INR 1.47  6/18 PT 20.9  / INR 1.8  Platelets 80s - 100s range    Assessment:  Elevated PT / INR possibly secondary to underlying liver disease given mild hepatomegaly / hepatic steatosis on 6/1 US abd from [facility], vs. vitamin K deficiency, or factor VII deficiency. However, INR<2, is usually not associated with increased risk of perioperative bleeding.  Thrombocytopenia - also likely in setting of liver disease  Renal disease independently can be associated with platelet dysfunction    Plan:  Already has received 3 doses of vitamin K 5 mg  If INR continues to rise > 2, consider PT based mixing studying       76 year old male patient with a medical history of MI in 1995 (angiogram, no stents placed), COPD (2L O2 at home), s/p TAVR (03/2019 at [facility]), gout, CKD, CVA (09/2020), pulmonary HTN, initially presented to [facility] 6/1/21 with RONNELL on CKD (likely cardiorenal syndrome), urinary retention due to noncompliance with medications, with hospital course complicated by cardiogenic shock, acute hypoxemic respiratory failure secondary to metabolic acidosis and respiratory alkalosis. Patient found to have TAVR failure with ISRAEL 6/10/21 showing EF 40-45%, Aortic valve prosthesis with Severe paravalvular leak and valvular AI, mild-moderate aortic stenosis, and moderate MR. Patient was transferred to [facility] under Dr. Campbell for further workup.     Hematology consult requested for coagulopathy.  Per review of labs:  6/16 - PT 16.7 / INR 1.47  6/18 PT 20.9  / INR 1.8  Platelets 80s - 100s range    Assessment:  Elevated PT / INR possibly secondary to underlying liver disease / passive congestion of liver. He had mild hepatomegaly / hepatic steatosis on 6/1 US abd from [facility]. Other possibilities include vitamin K deficiency, or factor VII deficiency. However, INR<2, is usually not associated with increased risk of perioperative bleeding.  Thrombocytopenia - also likely in setting of liver disease  Renal disease independently can be associated with platelet dysfunction    Plan:  Already has received 3 doses of vitamin K 5 mg  If INR continues to rise > 2, consider PT based mixing studying

## 2021-06-18 NOTE — PROGRESS NOTE ADULT - SUBJECTIVE AND OBJECTIVE BOX
[place] [facility]                                                        Office: [address] [address]                                                       Telephone: [phone]. Fax:[phone]                                                                             PROGRESS NOTE   Reason for follow up:   TAVR Failure                             Overnight: No new events.   Update:   Plan for cardiac cath today.  ISRAEL image:  Aneurysm of aortic root - Ascending aorta measuring 4.4cm on TTE at [facility] on 6/8/21.   Then ISRAEL done here with concern for endocarditis.  ID following as well.     Subjective: "I just found out I am going for a cardiac cath today"   Complains of:  Nothing  Review of symptoms: Cardiac:  No chest pain. No dyspnea. No palpitations.  Respiratory: no cough. No dyspnea  Gastrointestinal: No diarrhea. No abdominal pain. No bleeding.     Past medical history: No updates.   Chronic conditions: PAST MEDICAL & SURGICAL HISTORY:  Pulmonary hypertension  Hypertension  Hyperlipidemia  H/O aortic valve stenosis  s/p TAVR 2019 at [facility]  CVA (cerebrovascular accident)  MCA CVA with tPA and thrombectomy  Chronic kidney disease  Prediabetes  Mitral valve regurgitation  CAD in native artery  Aneurysm of aortic root  thoracic aortic aneurysm, without ruptur  Benign prostatic hyperplasia  Gout  History of transcatheter aortic valve replacement (TAVR)  	  Vitals:  T(C): 36.4 (06-18-21 @ 08:16), Max: 36.7 (06-17-21 @ 22:24)  HR: 87 (06-18-21 @ 09:25) (87 - 90)  BP: 101/57 (06-18-21 @ 08:16) (96/51 - 122/69)  RR: 19 (06-18-21 @ 08:16) (18 - 20)  SpO2: 98% (06-18-21 @ 09:25) (97% - 100%)  I&O's Summary  17 Jun 2021 07:01  -  18 Jun 2021 07:00  --------------------------------------------------------  IN: 580 mL / OUT: 525 mL / NET: 55 mL    PHYSICAL EXAM:  Appearance: Comfortable. No acute distress  HEENT:  Head and neck: Atraumatic.  Left IJ dialysis catheter noted,  Normocephalic.  Normal oral mucosa, PERRL, Neck is supple. No JVD.  Neurologic: A & O x 3, no focal deficits.   Lymphatic: No cervical lymphadenopathy  Cardiovascular: Normal S1 S2, No murmur, rubs/gallops. No JVD, trace edema  Respiratory: Upper airway with slight wheeze on expirations, diminished bilateral bases.    Gastrointestinal:  Soft, Non-tender, + BS  Lower Extremities: trace edema  Psychiatry: Patient is calm. No agitation. Mood & affect appropriate  Skin: No rash, warm and dry.      CURRENT MEDICATIONS:  midodrine. 5 milliGRAM(s) Oral three times a day  tamsulosin 0.4 milliGRAM(s) Oral at bedtime  budesonide 160 MICROgram(s)/formoterol 4.5 MICROgram(s) Inhaler  mirtazapine  pantoprazole    Tablet  psyllium Powder  senna  atorvastatin  finasteride  megestrol  ascorbic acid  multivitamin/minerals  phytonadione   Solution  sodium chloride 0.9% lock flush    LABS:	 	                       11.7   6.78  )-----------( 105      ( 18 Jun 2021 06:16 )             37.6   06-18  136  |  96<L>  |  42.7<H>  ----------------------------<  115<H>  4.8   |  25.0  |  2.56<H>  Ca    9.8      18 Jun 2021 06:16  Phos  2.9     06-18  Mg     2.1     06-18  TPro  7.2  /  Alb  3.7  /  TBili  2.5<H>  /  DBili  x   /  AST  32  /  ALT  35  /  AlkPhos  108  06-18    TELEMETRY: Reviewed

## 2021-06-18 NOTE — PROGRESS NOTE ADULT - PROBLEM SELECTOR PLAN 1
Most likely due to passive congestion secondary to severe cardiac disease, with marked renal insufficiency.   As per Dr Campbell Cardio-thoracic, patient NPO for Cath today  Patient should follow up with GI within the next 4 to 6 weeks.

## 2021-06-18 NOTE — PROGRESS NOTE ADULT - SUBJECTIVE AND OBJECTIVE BOX
Patient is a 76y old Male who presents with a chief complaint of TAVR Failure (17 Jun 2021 19:48)    PAST MEDICAL & SURGICAL HISTORY:  Pulmonary hypertension  Hypertension  Hyperlipidemia  H/O aortic valve stenosis s/p TAVR 2019 at [place]  CVA (cerebrovascular accident)  MCA CVA with tPA and thrombectomy  Chronic kidney disease  Prediabetes  Mitral valve regurgitation  CAD in native artery  Aneurysm of aortic root, thoracic aortic aneurysm, without ruptur  Benign prostatic hyperplasia  Gout  History of transcatheter aortic valve replacement (TAVR)    FAMILY HISTORY:  FH: lung cancer    Brief Hospital Course: Patient initially presented to [facility] 6/1/21 with RONNELL on CKD (likely cardiorenal syndrome), urinary retention due to noncompliance with medications, with hospital course complicated by cardiogenic shock, acute hypoxemic respiratory failure secondary to metabolic acidosis and respiratory alkalosis, and acute on chronic combined diastolic and systolic heart failure. Patient found to have TAVR failure with ISRAEL 6/10/21 showing EF 40-45%, Aortic valve prosthesis with Severe paravalvular leak and valvular AI, mild-moderate aortic stenosis, and moderate MR. Patient was transferred to [facility] under Dr. Campbell for further workup. TTE 6/12 showing EF 30-35%, reduced RV function, mild MAC, moderate to severe MR, severe TR, moderate AI, and severe PA. Patient was challenged with lasix for fluid overload, with worsening RONNELL on CKD and cardiorenal syndrome, ultimately requiring HD via Left IJ dialysis catheter. Concern for recent reported 100lb weight loss, decreased appetite, and dysphagia with EGD showing duodenitis and diffuse erosive gastritis, and colonoscopy showing diverticulosis. Repeat ISRAEL performed during EGD showing an EF of 35%, moderate MR, moderate TR, s/p TAVR with a mobile echo density attached to the ventricular side of the prosthetic aortic valve, Severe AI. With prior Hx of step Bovis bacteremia, findings are highly suspicious for endocarditis. Blood cultures from 6/15 remain negative to date. Plan for Cardiac Cath later today with Dr. Jorgensen.    Significant recent/past 24 hr events: No events reported overnight.     Subjective: Patient sitting up in bed in no acute distress. States he just wants to settle in and rest right now. +SOB. Denies fevers, chills, lightheadedness, dizziness, HA, CP, palpitations, SOB, cough, abdominal pain, N/V, diarrhea, numbness/tingling in extremities, or any other acute complaints.    MEDICATIONS  (STANDING):  ascorbic acid 500 milliGRAM(s) Oral daily  atorvastatin 10 milliGRAM(s) Oral at bedtime  budesonide 160 MICROgram(s)/formoterol 4.5 MICROgram(s) Inhaler 2 Puff(s) Inhalation two times a day  finasteride 5 milliGRAM(s) Oral daily  megestrol 400 milliGRAM(s) Oral daily  midodrine. 5 milliGRAM(s) Oral three times a day  mirtazapine 7.5 milliGRAM(s) Oral daily  multivitamin/minerals 1 Tablet(s) Oral daily  pantoprazole    Tablet 40 milliGRAM(s) Oral two times a day  phytonadione   Solution 5 milliGRAM(s) Oral daily  psyllium Powder 1 Packet(s) Oral two times a day  senna 2 Tablet(s) Oral at bedtime  sodium chloride 0.9% lock flush 3 milliLiter(s) IV Push every 8 hours  tamsulosin 0.4 milliGRAM(s) Oral at bedtime    MEDICATIONS  (PRN):  ALBUTerol    90 MICROgram(s) HFA Inhaler 2 Puff(s) Inhalation every 6 hours PRN Shortness of Breath and/or Wheezing  bisacodyl 5 milliGRAM(s) Oral every 12 hours PRN Constipation  polyethylene glycol 3350 17 Gram(s) Oral daily PRN Constipation    Allergies:  No Known Drug Allergies  strawberry (Anaphylaxis)    Vitals   T(C): 36.7 (17 Jun 2021 22:24), Max: 36.7 (17 Jun 2021 22:24)  T(F): 98 (17 Jun 2021 22:24), Max: 98 (17 Jun 2021 22:24)  HR: 90 (17 Jun 2021 22:24) (84 - 90)  BP: 96/51 (17 Jun 2021 22:24) (96/51 - 138/59)  BP(mean): 85 (17 Jun 2021 09:00) (75 - 85)  RR: 18 (17 Jun 2021 22:24) (18 - 71)  SpO2: 98% (17 Jun 2021 22:24) (92% - 100%)    I&O's Detail    16 Jun 2021 07:01  -  17 Jun 2021 07:00  --------------------------------------------------------  IN:    Furosemide: 22.5 mL    Oral Fluid: 100 mL  Total IN: 122.5 mL    OUT:    Indwelling Catheter - Urethral (mL): 1065 mL    Other (mL): 400 mL  Total OUT: 1465 mL    Total NET: -1342.5 mL      17 Jun 2021 07:01  -  18 Jun 2021 01:46  --------------------------------------------------------  IN:    Oral Fluid: 340 mL  Total IN: 340 mL    OUT:    Indwelling Catheter - Urethral (mL): 25 mL    Other (mL): 500 mL  Total OUT: 525 mL    Total NET: -185 mL    Physical Exam  Neuro: A+O x 3, non-focal, speech clear and intact  HEENT:  NCAT, PERRL, EOMI. No conjuctival edema or icterus, no thrush.    Neck:  Supple, trachea midline, LIJ Dialysis catheter  Pulm: +Diminished BSs b/l, no rales/rhonchi/wheezing, no accessory muscle use noted  CV: regular rate, regular rhythm, +S1S2  Abd: soft, NT, ND, + BS  Ext: CRONIN x 4, +1 LE edema, no cyanosis or clubbing, distal motor/neuro/circ intact  Skin: warm, dry, perfused    LABS                        12.5   6.56  )-----------( 90       ( 17 Jun 2021 03:09 )             40.0     06-17    137  |  97<L>  |  62.6<H>  ----------------------------<  110<H>  3.9   |  22.0  |  2.77<H>    Ca    10.1      17 Jun 2021 03:09  Phos  3.0     06-17  Mg     2.3     06-17    TPro  7.8  /  Alb  4.1  /  TBili  2.5<H>  /  DBili  x   /  AST  31  /  ALT  41<H>  /  AlkPhos  121<H>  06-17    PT/INR - ( 17 Jun 2021 03:09 )   PT: 17.9 sec;   INR: 1.58 ratio        Last CXR:  < from: Xray Chest 1 View- PORTABLE-Routine (06.17.21 @ 07:15) >  FINDINGS:  A LEFT IJ catheter tip in brachycephalic vein.  The lungs show residual LEFT greater than RIGHT pleural effusions layering along posterior thoracic wall. No large airspace consolidation... No pneumothorax.  The  heart is enlarged in transverse diameter. No hilar mass.  Visualized osseous structures are intact.  IMPRESSION:   Residual LEFT greater than RIGHT pleural effusions..  < end of copied text >

## 2021-06-18 NOTE — PROGRESS NOTE ADULT - ASSESSMENT
76 year old male patient with a medical history of MI in 1995 (angiogram, no stents placed), COPD (2L O2 at home), s/p TAVR (03/2019 at [facility]), gout, CKD, CVA (09/2020), pulmonary HTN, initially presented to [facility] 6/1/21 with RONNELL on CKD (likely cardiorenal syndrome), urinary retention due to noncompliance with medications, with hospital course complicated by cardiogenic shock, acute hypoxemic respiratory failure secondary to metabolic acidosis and respiratory alkalosis, and acute on chronic combined diastolic and systolic heart failure. Patient found to have TAVR failure with ISRAEL 6/10/21 showing EF 40-45%, Aortic valve prosthesis with Severe paravalvular leak and valvular AI, mild-moderate aortic stenosis, and moderate MR. Patient was transferred to [facility] under Dr. Campbell for further workup. TTE 6/12 showing EF 30-35%, reduced RV function, mild MAC, moderate to severe MR, severe TR, moderate AI, and severe PA. Patient was challenged with lasix for fluid overload, with worsening RONNELL on CKD and cardiorenal syndrome, ultimately requiring HD via Left IJ dialysis catheter. Concern for recent reported 100lb weight loss, decreased appetite, and dysphagia with EGD showing duodenitis and diffuse erosive gastritis, and colonoscopy showing diverticulosis. Repeat ISRAEL performed during EGD showing an EF of 35%, moderate MR, moderate TR, s/p TAVR with a mobile echo density attached to the ventricular side of the prosthetic aortic valve, Severe AI. With prior Hx of step Bovis bacteremia, findings are highly suspicious for endocarditis. Blood cultures from 6/15 remain negative to date. Plan for Cardiac Cath later today with Dr. Jorgensen.

## 2021-06-18 NOTE — PROGRESS NOTE ADULT - ASSESSMENT
A/P: 76 year old male patient with a medical history of MI in 1995 (angiogram, no stents placed), COPD (2L O2 at home), s/p TAVR (03/2019 at [facility]), gout, CKD, CVA (09/2020), pulmonary HTN, initially presented to [facility] 6/1/21 with RONNELL on CKD (likely cardiorenal syndrome), urinary retention due to noncompliance with medications, with hospital course complicated by cardiogenic shock, acute hypoxemic respiratory failure secondary to metabolic acidosis and respiratory alkalosis. Patient found to have TAVR failure with ISRAEL 6/10/21 showing EF 40-45%, Aortic valve prosthesis with Severe paravalvular leak and valvular AI, mild-moderate aortic stenosis, and moderate MR. Patient was transferred to [facility] under Dr. Campbell for further workup. Cardiology consulted for decompensated HF and severe pulmonary hypertension. Now s/p LHC in preparation for possible TAVIV next week with CT surgery.  LHC revealed Moderate 40-50% stenosis in the proximal LAD, unchanged from 2/2019 (Insignificant iFR in 2/2019); No significant CAD involving the LM, LCX and RCA.; A TAV is noted to be moving in consonance with the surrounding structures.  -Bedrest x 2 hours post removal of RFA #5 fr sheath then OOB as tolerated  -No coronary intervention required  -Additional plan as per CT surgery team

## 2021-06-18 NOTE — PROGRESS NOTE ADULT - PROBLEM SELECTOR PLAN 10
Pantoprazole for GI prophylaxis.  SCDs for DVT prophylaxis.    11.  Duodinitis  Seen on EGD 6/15 - Pantoprazole started.    12.  Diverticulosis  Seen on Colonoscopy 6/16, mild

## 2021-06-18 NOTE — PROGRESS NOTE ADULT - SUBJECTIVE AND OBJECTIVE BOX
Patient is a 76y old  Male who presents with a chief complaint of TAVR Failure (18 Jun 2021 11:25)      INTERVAL HPI/OVERNIGHT EVENTS: Patient seeing and evaluated at bedside, reporting no complains, Dr Campbell at bedside, patient schedule for Cath today. Denies nausea, vomiting, abdominal pain, chest pain, shortness of breath, hematemesis, hematochezia, melena.    MEDICATIONS  (STANDING):  ascorbic acid 500 milliGRAM(s) Oral daily  atorvastatin 10 milliGRAM(s) Oral at bedtime  budesonide 160 MICROgram(s)/formoterol 4.5 MICROgram(s) Inhaler 2 Puff(s) Inhalation two times a day  finasteride 5 milliGRAM(s) Oral daily  megestrol 400 milliGRAM(s) Oral daily  midodrine. 5 milliGRAM(s) Oral three times a day  mirtazapine 7.5 milliGRAM(s) Oral daily  multivitamin/minerals 1 Tablet(s) Oral daily  pantoprazole    Tablet 40 milliGRAM(s) Oral two times a day  phytonadione   Solution 5 milliGRAM(s) Oral daily  psyllium Powder 1 Packet(s) Oral two times a day  senna 2 Tablet(s) Oral at bedtime  sodium chloride 0.9% lock flush 3 milliLiter(s) IV Push every 8 hours  tamsulosin 0.4 milliGRAM(s) Oral at bedtime    MEDICATIONS  (PRN):  ALBUTerol    90 MICROgram(s) HFA Inhaler 2 Puff(s) Inhalation every 6 hours PRN Shortness of Breath and/or Wheezing  bisacodyl 5 milliGRAM(s) Oral every 12 hours PRN Constipation  polyethylene glycol 3350 17 Gram(s) Oral daily PRN Constipation      Allergies    No Known Drug Allergies  strawberry (Anaphylaxis)    Intolerances        Review of Systems:      Vital Signs Last 24 Hrs  T(C): 36.4 (18 Jun 2021 08:16), Max: 36.7 (17 Jun 2021 22:24)  T(F): 97.5 (18 Jun 2021 08:16), Max: 98 (17 Jun 2021 22:24)  HR: 87 (18 Jun 2021 09:25) (87 - 90)  BP: 101/57 (18 Jun 2021 08:16) (96/51 - 122/69)  BP(mean): --  RR: 19 (18 Jun 2021 08:16) (18 - 19)  SpO2: 98% (18 Jun 2021 09:25) (97% - 100%)    PHYSICAL EXAM:      LABS:                        11.7   6.78  )-----------( 105      ( 18 Jun 2021 06:16 )             37.6     06-18    136  |  96<L>  |  42.7<H>  ----------------------------<  115<H>  4.8   |  25.0  |  2.56<H>    Ca    9.8      18 Jun 2021 06:16  Phos  2.9     06-18  Mg     2.1     06-18    TPro  7.2  /  Alb  3.7  /  TBili  2.5<H>  /  DBili  x   /  AST  32  /  ALT  35  /  AlkPhos  108  06-18    PT/INR - ( 18 Jun 2021 06:16 )   PT: 20.9 sec;   INR: 1.86 ratio             LIVER FUNCTIONS - ( 18 Jun 2021 06:16 )  Alb: 3.7 g/dL / Pro: 7.2 g/dL / ALK PHOS: 108 U/L / ALT: 35 U/L / AST: 32 U/L / GGT: x             RADIOLOGY & ADDITIONAL TESTS:   Patient is a 76y old  Male who presents with a chief complaint of TAVR Failure (18 Jun 2021 11:25)      INTERVAL HPI/OVERNIGHT EVENTS: Patient seeing and evaluated at bedside, reporting no complains, Dr Campbell at bedside, patient schedule for Cath today, NPO. Denies nausea, vomiting, abdominal pain, chest pain, shortness of breath, hematemesis, hematochezia, melena.    MEDICATIONS  (STANDING):  ascorbic acid 500 milliGRAM(s) Oral daily  atorvastatin 10 milliGRAM(s) Oral at bedtime  budesonide 160 MICROgram(s)/formoterol 4.5 MICROgram(s) Inhaler 2 Puff(s) Inhalation two times a day  finasteride 5 milliGRAM(s) Oral daily  megestrol 400 milliGRAM(s) Oral daily  midodrine. 5 milliGRAM(s) Oral three times a day  mirtazapine 7.5 milliGRAM(s) Oral daily  multivitamin/minerals 1 Tablet(s) Oral daily  pantoprazole    Tablet 40 milliGRAM(s) Oral two times a day  phytonadione   Solution 5 milliGRAM(s) Oral daily  psyllium Powder 1 Packet(s) Oral two times a day  senna 2 Tablet(s) Oral at bedtime  sodium chloride 0.9% lock flush 3 milliLiter(s) IV Push every 8 hours  tamsulosin 0.4 milliGRAM(s) Oral at bedtime    MEDICATIONS  (PRN):  ALBUTerol    90 MICROgram(s) HFA Inhaler 2 Puff(s) Inhalation every 6 hours PRN Shortness of Breath and/or Wheezing  bisacodyl 5 milliGRAM(s) Oral every 12 hours PRN Constipation  polyethylene glycol 3350 17 Gram(s) Oral daily PRN Constipation    Allergies    No Known Drug Allergies  strawberry (Anaphylaxis)    Intolerances    Review of Systems:  CONSTITUTIONAL: No fever, weight loss, or fatigue  EYES: No eye pain, visual disturbances, or discharge  ENMT:  No difficulty hearing, tinnitus, vertigo; No sinus or throat pain  RESPIRATORY: No cough, wheezing, chills or hemoptysis; No shortness of breath  CARDIOVASCULAR: No chest pain, palpitations, dizziness, or leg swelling  GASTROINTESTINAL: as above    Vital Signs Last 24 Hrs  T(C): 36.4 (18 Jun 2021 08:16), Max: 36.7 (17 Jun 2021 22:24)  T(F): 97.5 (18 Jun 2021 08:16), Max: 98 (17 Jun 2021 22:24)  HR: 87 (18 Jun 2021 09:25) (87 - 90)  BP: 101/57 (18 Jun 2021 08:16) (96/51 - 122/69)  BP(mean): --  RR: 19 (18 Jun 2021 08:16) (18 - 19)  SpO2: 98% (18 Jun 2021 09:25) (97% - 100%)    PHYSICAL EXAM:  General: Elderly looking man, well developed; well nourished; in no acute distress.  HEENT: MMM, conjunctiva and sclera clear  Gastrointestinal: Abdomen: Soft non-tender non-distended; Normal bowel sounds; No hepatosplenomegaly  Extremities: no cyanosis, clubbing or edema.  Skin: Warm and dry. No obvious rash    LABS:                        11.7   6.78  )-----------( 105      ( 18 Jun 2021 06:16 )             37.6     06-18    136  |  96<L>  |  42.7<H>  ----------------------------<  115<H>  4.8   |  25.0  |  2.56<H>    Ca    9.8      18 Jun 2021 06:16  Phos  2.9     06-18  Mg     2.1     06-18    TPro  7.2  /  Alb  3.7  /  TBili  2.5<H>  /  DBili  x   /  AST  32  /  ALT  35  /  AlkPhos  108  06-18    PT/INR - ( 18 Jun 2021 06:16 )   PT: 20.9 sec;   INR: 1.86 ratio         LIVER FUNCTIONS - ( 18 Jun 2021 06:16 )  Alb: 3.7 g/dL / Pro: 7.2 g/dL / ALK PHOS: 108 U/L / ALT: 35 U/L / AST: 32 U/L / GGT: x                Patient is a 76y old  Male who presents with a chief complaint of TAVR Failure (18 Jun 2021 11:25)      INTERVAL HPI/OVERNIGHT EVENTS: Patient seeing and evaluated at bedside, reporting no complains, Dr Campbell at bedside, patient schedule for Cath today, NPO. Denies nausea, vomiting, abdominal pain, chest pain, shortness of breath, hematemesis, hematochezia, melena. The pathology from the EGD was negative for tumor.    MEDICATIONS  (STANDING):  ascorbic acid 500 milliGRAM(s) Oral daily  atorvastatin 10 milliGRAM(s) Oral at bedtime  budesonide 160 MICROgram(s)/formoterol 4.5 MICROgram(s) Inhaler 2 Puff(s) Inhalation two times a day  finasteride 5 milliGRAM(s) Oral daily  megestrol 400 milliGRAM(s) Oral daily  midodrine. 5 milliGRAM(s) Oral three times a day  mirtazapine 7.5 milliGRAM(s) Oral daily  multivitamin/minerals 1 Tablet(s) Oral daily  pantoprazole    Tablet 40 milliGRAM(s) Oral two times a day  phytonadione   Solution 5 milliGRAM(s) Oral daily  psyllium Powder 1 Packet(s) Oral two times a day  senna 2 Tablet(s) Oral at bedtime  sodium chloride 0.9% lock flush 3 milliLiter(s) IV Push every 8 hours  tamsulosin 0.4 milliGRAM(s) Oral at bedtime    MEDICATIONS  (PRN):  ALBUTerol    90 MICROgram(s) HFA Inhaler 2 Puff(s) Inhalation every 6 hours PRN Shortness of Breath and/or Wheezing  bisacodyl 5 milliGRAM(s) Oral every 12 hours PRN Constipation  polyethylene glycol 3350 17 Gram(s) Oral daily PRN Constipation    Allergies    No Known Drug Allergies  strawberry (Anaphylaxis)    Intolerances    Review of Systems:  CONSTITUTIONAL: No fever, weight loss, or fatigue  EYES: No eye pain, visual disturbances, or discharge  ENMT:  No difficulty hearing, tinnitus, vertigo; No sinus or throat pain  RESPIRATORY: No cough, wheezing, chills or hemoptysis; No shortness of breath  CARDIOVASCULAR: No chest pain, palpitations, dizziness, or leg swelling  GASTROINTESTINAL: as above    Vital Signs Last 24 Hrs  T(C): 36.4 (18 Jun 2021 08:16), Max: 36.7 (17 Jun 2021 22:24)  T(F): 97.5 (18 Jun 2021 08:16), Max: 98 (17 Jun 2021 22:24)  HR: 87 (18 Jun 2021 09:25) (87 - 90)  BP: 101/57 (18 Jun 2021 08:16) (96/51 - 122/69)  BP(mean): --  RR: 19 (18 Jun 2021 08:16) (18 - 19)  SpO2: 98% (18 Jun 2021 09:25) (97% - 100%)    PHYSICAL EXAM:  General: Elderly looking man, well developed; well nourished; in no acute distress.  HEENT: MMM, conjunctiva and sclera clear  Gastrointestinal: Abdomen: Soft non-tender non-distended; Normal bowel sounds; No hepatosplenomegaly  Extremities: no cyanosis, clubbing or edema.  Skin: Warm and dry. No obvious rash    LABS:                        11.7   6.78  )-----------( 105      ( 18 Jun 2021 06:16 )             37.6     06-18    136  |  96<L>  |  42.7<H>  ----------------------------<  115<H>  4.8   |  25.0  |  2.56<H>    Ca    9.8      18 Jun 2021 06:16  Phos  2.9     06-18  Mg     2.1     06-18    TPro  7.2  /  Alb  3.7  /  TBili  2.5<H>  /  DBili  x   /  AST  32  /  ALT  35  /  AlkPhos  108  06-18    PT/INR - ( 18 Jun 2021 06:16 )   PT: 20.9 sec;   INR: 1.86 ratio         LIVER FUNCTIONS - ( 18 Jun 2021 06:16 )  Alb: 3.7 g/dL / Pro: 7.2 g/dL / ALK PHOS: 108 U/L / ALT: 35 U/L / AST: 32 U/L / GGT: x

## 2021-06-18 NOTE — PROGRESS NOTE ADULT - ASSESSMENT
76 year old male patient with a medical history of MI in 1995 (angiogram, no stents placed), COPD (2L O2 at home), s/p TAVR (03/2019 at [facility]), gout, CKD, CVA (09/2020), pulmonary HTN, initially presented to [facility] 6/1/21 with RONNELL on CKD (likely cardiorenal syndrome), urinary retention due to noncompliance with medications, with hospital course complicated by cardiogenic shock, acute hypoxemic respiratory failure secondary to metabolic acidosis and respiratory alkalosis. Patient found to have TAVR failure with ISRAEL 6/10/21 showing EF 40-45%, Aortic valve prosthesis with Severe paravalvular leak and valvular AI, mild-moderate aortic stenosis, and moderate MR. Patient was transferred to [facility] under Dr. Campbell for further workup.   ISRAEL DONE HERE WITH CONCERN FOR ENDOCARDITIS  PT DENIES FEVERS OR SWEATS  BUT HAS HAD SIGNIFICANT WEIGHT LOSS WITHOUT DIETING  PT HAS HX OF STREP BOVIS  BACTEREMIA   LAST YEAR AND RECEIVED   ROCEPHIN AT HOME FOR 6 WEEKS   BLOOD CX X2 6/7 WERE NEGATIVE   BLOOD CX X2 SETS SENT HERE 6/15    NEG    REPEAT BLOOD CX PENDING  PT NON TOXIC AFEBRILE  WILL HOLD ABX   EGD BIOPSIES ARE PENDING  s/p COLONOSCOPY   WOULD LIKE TO AVOID LONG TERM IV ABX UNLESS  POSS BACTEREMIA      WILL FOLLOW UP   IF SPOKES THEN VANCO/ROCEPHIN  SPOKE TO JOSETTE SALINAS

## 2021-06-18 NOTE — PROGRESS NOTE ADULT - PROBLEM SELECTOR PLAN 1
ISRAEL 6/10/21 at [facility] showing EF 40-45%, Aortic valve prosthesis with Severe paravalvular leak and valvular AI, mild-moderate aortic stenosis, and moderate MR.  Admitted to [facility] 6/12/21 with CT Surgery Dr. Campbell.   ISRAEL 6/15/21 concern for a mobile echo density attached to the ventricular side of the prosthetic aortic valve with  Severe aortic valve regurgitation seen.  Blood Cultures from 6/7, 6/15 remain negative to date. ID input appreciated.    If pt spikes Temps, start Vanco and Rocephin per ID.   Patient remains nontoxic, afebrile, holding antibiotics. ?Marantic endocarditis?  Plan for Cardiac cath later today with Dr. Jorgensen.   Plan for TAVR vs Open surgery pending cath results.   Continue midodrine to support BP.  Oxygenating well on 2L O2 via NC (On 2L Home O2).

## 2021-06-18 NOTE — PROGRESS NOTE ADULT - PROBLEM SELECTOR PLAN 6
New to HD.  Left IJ HD catheter placed 6/16.   6/16 and 6/17 s/p HD treatment with 500cc fluid removal and clearance.  Renal team following.

## 2021-06-18 NOTE — CONSULT NOTE ADULT - SUBJECTIVE AND OBJECTIVE BOX
REASON FOR CONSULTATION:     HPI:  76 year old male patient with a medical history of MI in 1995 (angiogram, no stents placed), COPD (2L O2 at home), s/p TAVR (03/2019 at [facility]), gout, CKD, CVA (09/2020), pulmonary HTN, initially presented to [facility] 6/1/21 with RONNELL on CKD (likely cardiorenal syndrome), urinary retention due to noncompliance with medications, with hospital course complicated by cardiogenic shock, acute hypoxemic respiratory failure secondary to metabolic acidosis and respiratory alkalosis. Patient found to have TAVR failure with ISRAEL 6/10/21 showing EF 40-45%, Aortic valve prosthesis with Severe paravalvular leak and valvular AI, mild-moderate aortic stenosis, and moderate MR. Patient was transferred to [facility] under Dr. Campbell for further workup.     Imaging from [facility]:  6/1: CXR shwoing cardiomegaly, small b/l pleural effusions, moderate pulmonary vascular congestion.  6/1: CT Brain ordered for head trauma? showing age-related cerebral and cerebellar volume loss, mild chronic vascular ischemic changes, stable biparietal arachnoid cyst and stable midline posterior fossa arachnoid cyst.   6/1: US Abdomen for elevated LFTs showing mild hepatic steatosis, mild hepatomegaly, sludge in the gallbladder, no discrete gallstones, normal biliary ducts, mild echogenic right kidney which may be seen with medical renal dz, mildly complex Right upper pole 4cm cyst with subtle internal echoes may represent hemorrhagic/proteinaceous cyst, mildly complex right midpole 2cm cyst with thin internal linear septation.   6/3: Kidney and Bladder US showing no hydronephrosis, echogenic kidneys likely from medical renal dz, prostatomegaly, and thickening of the posterior wall of the bladder with trabeculations which could be due to bladder outlet obstruction.   6/7: Kidney and Bladder US: No hydronephrosis or shadowing renal calculi. Stable b/l renal cysts.   6/7: Lower Extremity Venous Doppler with no DVT noted.   6/8: TTE showing EF 47%, dilated IVC, dilation of the ascending aorta of 4.4cm, moderate-severe pulmonary HTN, moderate-severe TR, aortic valve with severe prosthesis regurgitation and moderate prosthesis stenosis  6/9: CT Chest showing emphysema and intersitial lung dz changes, stable b/l small pleural effusions, cardiomegaly.   6/11: ISRAEL showing EF 40-45%, Aortic valve prosthesis with Severe paravalvular leak and valvular AI, mild-moderate aortic stenosis, and moderate MR.  (12 Jun 2021 01:36)      REVIEW OF SYSTEMS:  Constitutional, Eyes, ENT, Cardiovascular, Respiratory, Gastrointestinal, Genitourinary, Musculoskeletal, Integumentary, Neurological, Psychiatric, Endocrine, Heme/Lymph, and Allergic/Immunologic review of systems are otherwise negative except as noted in the HPI.    PAST MEDICAL & SURGICAL HISTORY:  Pulmonary hypertension    Hypertension    Hyperlipidemia    H/O aortic valve stenosis  s/p TAVR 2019 at [place]    CVA (cerebrovascular accident)  MCA CVA with tPA and thrombectomy    Chronic kidney disease    Prediabetes    Mitral valve regurgitation    CAD in native artery    Aneurysm of aortic root  thoracic aortic aneurysm, without ruptur    Benign prostatic hyperplasia    Gout    History of transcatheter aortic valve replacement (TAVR)        FAMILY HISTORY:  FH: lung cancer        SOCIAL HISTORY:    Allergies    No Known Drug Allergies  strawberry (Anaphylaxis)    Intolerances        MEDICATIONS  (STANDING):  ascorbic acid 500 milliGRAM(s) Oral daily  atorvastatin 10 milliGRAM(s) Oral at bedtime  budesonide 160 MICROgram(s)/formoterol 4.5 MICROgram(s) Inhaler 2 Puff(s) Inhalation two times a day  finasteride 5 milliGRAM(s) Oral daily  megestrol 400 milliGRAM(s) Oral daily  midodrine. 5 milliGRAM(s) Oral three times a day  mirtazapine 7.5 milliGRAM(s) Oral daily  multivitamin/minerals 1 Tablet(s) Oral daily  pantoprazole    Tablet 40 milliGRAM(s) Oral two times a day  phytonadione   Solution 5 milliGRAM(s) Oral daily  psyllium Powder 1 Packet(s) Oral two times a day  senna 2 Tablet(s) Oral at bedtime  sodium chloride 0.9% lock flush 3 milliLiter(s) IV Push every 8 hours  tamsulosin 0.4 milliGRAM(s) Oral at bedtime    MEDICATIONS  (PRN):  ALBUTerol    90 MICROgram(s) HFA Inhaler 2 Puff(s) Inhalation every 6 hours PRN Shortness of Breath and/or Wheezing  bisacodyl 5 milliGRAM(s) Oral every 12 hours PRN Constipation  polyethylene glycol 3350 17 Gram(s) Oral daily PRN Constipation      Vital Signs Last 24 Hrs  T(C): 36.4 (18 Jun 2021 08:16), Max: 36.7 (17 Jun 2021 22:24)  T(F): 97.5 (18 Jun 2021 08:16), Max: 98 (17 Jun 2021 22:24)  HR: 87 (18 Jun 2021 09:25) (87 - 90)  BP: 101/57 (18 Jun 2021 08:16) (96/51 - 122/69)  BP(mean): --  RR: 19 (18 Jun 2021 08:16) (18 - 19)  SpO2: 98% (18 Jun 2021 09:25) (97% - 100%)    PHYSICAL EXAM:    GENERAL: NAD, well-groomed, well-developed  HEAD:  Atraumatic, Normocephalic  EYES: EOMI, PERRLA, conjunctiva and sclera clear  ENMT: No tonsillar erythema, exudates, or enlargement; Moist mucous membranes, Good dentition, No lesions  NECK: Supple, No JVD, Normal thyroid  NERVOUS SYSTEM:  Alert & Oriented X3, Good concentration; Motor Strength 5/5 B/L upper and lower extremities; DTRs 2+ intact and symmetric  CHEST/LUNG: Clear to auscultation bilaterally; No rales, rhonchi, wheezing, or rubs  HEART: Regular rate and rhythm; No murmurs, rubs, or gallops  ABDOMEN: Soft, Nontender, Nondistended; Bowel sounds present  EXTREMITIES:  2+ Peripheral Pulses, No clubbing, cyanosis, or edema  LYMPH: No lymphadenopathy noted  SKIN: No rashes or lesions      LABS:                        11.7   6.78  )-----------( 105      ( 18 Jun 2021 06:16 )             37.6     06-18    136  |  96<L>  |  42.7<H>  ----------------------------<  115<H>  4.8   |  25.0  |  2.56<H>    Ca    9.8      18 Jun 2021 06:16  Phos  2.9     06-18  Mg     2.1     06-18    TPro  7.2  /  Alb  3.7  /  TBili  2.5<H>  /  DBili  x   /  AST  32  /  ALT  35  /  AlkPhos  108  06-18    PT/INR - ( 18 Jun 2021 06:16 )   PT: 20.9 sec;   INR: 1.86 ratio           RADIOLOGY & ADDITIONAL STUDIES:    < from: CT Chest No Cont (06.15.21 @ 16:52) >   EXAM:  CT ABDOMEN AND PELVIS OC                         EXAM:  CT CHEST                          PROCEDURE DATE:  06/15/2021          INTERPRETATION:  CLINICAL INFORMATION: Large amount of weight loss and valvular heart disease    COMPARISON: Chest x-ray of earlier today. Outside CT chest of 2/15/2021    CONTRAST/COMPLICATIONS:  IV Contrast: NONE  Oral Contrast: Smoothie Readi-Cat 2  Complications: None reported at time of study completion    PROCEDURE:  CT of the Chest, Abdomen and Pelvis wasperformed.  Sagittal and coronal reformats were performed.    FINDINGS:  CHEST:  LUNGS AND LARGE AIRWAYS: Patent central airways. No pulmonary nodules. There is moderate emphysema with bulla.  PLEURA: Small bilateral pleural effusions.  VESSELS: Atherosclerotic changes of the aorta and coronary arteries.  HEART: Cardiomegaly. No pericardial effusion. TAVR  MEDIASTINUM AND MARTI: No lymphadenopathy.  CHEST WALL AND LOWER NECK: Within normal limits.    ABDOMEN AND PELVIS:  LIVER: Within normal limits.  BILE DUCTS: Normal caliber.  GALLBLADDER: Within normal limits.  SPLEEN: Within normal limits.  PANCREAS: Within normal limits.  ADRENALS: Within normal limits.  KIDNEYS/URETERS: Multiple bilateral renal cysts left more than right. Largest cyst juwan very large exophytic left lateral cyst measuring up to 11 cm.    BLADDER: Lemus catheter.  REPRODUCTIVE ORGANS: Prostate is enlarged.    BOWEL: No bowel obstruction. Appendix is normal. There is wall thickening of the cecum suggesting a nonspecificcolitis. Given the history tumor cannot be excluded  PERITONEUM: Small amount of pelvic ascites.  VESSELS: Atherosclerotic changes.  RETROPERITONEUM/LYMPH NODES: No lymphadenopathy.  ABDOMINAL WALL: Within normal limits.  BONES: Degenerative changes.    IMPRESSION: Focal wall thickening at the level of the cecum may be secondary to colitis. Tumor cannot be excluded and further evaluation is recommended  TAVR.  Emphysema.  Small bilateral pleural effusions  Multiple bilateral renal cysts.  Small amount of pelvic ascites  Moderate vascular calcifications    < end of copied text >   REASON FOR CONSULTATION:     HPI:  76 year old male patient with a medical history of MI in 1995 (angiogram, no stents placed), COPD (2L O2 at home), s/p TAVR (03/2019 at [facility]), gout, CKD, CVA (09/2020), pulmonary HTN, initially presented to [facility] 6/1/21 with RONNELL on CKD (likely cardiorenal syndrome), urinary retention due to noncompliance with medications, with hospital course complicated by cardiogenic shock, acute hypoxemic respiratory failure secondary to metabolic acidosis and respiratory alkalosis. Patient found to have TAVR failure with ISRAEL 6/10/21 showing EF 40-45%, Aortic valve prosthesis with Severe paravalvular leak and valvular AI, mild-moderate aortic stenosis, and moderate MR. Patient was transferred to [facility] under Dr. Campbell for further workup.     Imaging from [facility]:  6/1: CXR shwoing cardiomegaly, small b/l pleural effusions, moderate pulmonary vascular congestion.  6/1: CT Brain ordered for head trauma? showing age-related cerebral and cerebellar volume loss, mild chronic vascular ischemic changes, stable biparietal arachnoid cyst and stable midline posterior fossa arachnoid cyst.   6/1: US Abdomen for elevated LFTs showing mild hepatic steatosis, mild hepatomegaly, sludge in the gallbladder, no discrete gallstones, normal biliary ducts, mild echogenic right kidney which may be seen with medical renal dz, mildly complex Right upper pole 4cm cyst with subtle internal echoes may represent hemorrhagic/proteinaceous cyst, mildly complex right midpole 2cm cyst with thin internal linear septation.   6/3: Kidney and Bladder US showing no hydronephrosis, echogenic kidneys likely from medical renal dz, prostatomegaly, and thickening of the posterior wall of the bladder with trabeculations which could be due to bladder outlet obstruction.   6/7: Kidney and Bladder US: No hydronephrosis or shadowing renal calculi. Stable b/l renal cysts.   6/7: Lower Extremity Venous Doppler with no DVT noted.   6/8: TTE showing EF 47%, dilated IVC, dilation of the ascending aorta of 4.4cm, moderate-severe pulmonary HTN, moderate-severe TR, aortic valve with severe prosthesis regurgitation and moderate prosthesis stenosis  6/9: CT Chest showing emphysema and intersitial lung dz changes, stable b/l small pleural effusions, cardiomegaly.   6/11: ISRAEL showing EF 40-45%, Aortic valve prosthesis with Severe paravalvular leak and valvular AI, mild-moderate aortic stenosis, and moderate MR.  (12 Jun 2021 01:36)      REVIEW OF SYSTEMS:  Constitutional, Eyes, ENT, Cardiovascular, Respiratory, Gastrointestinal, Genitourinary, Musculoskeletal, Integumentary, Neurological, Psychiatric, Endocrine, Heme/Lymph, and Allergic/Immunologic review of systems are otherwise negative except as noted in the HPI.    PAST MEDICAL & SURGICAL HISTORY:  Pulmonary hypertension    Hypertension    Hyperlipidemia    H/O aortic valve stenosis  s/p TAVR 2019 at [place]    CVA (cerebrovascular accident)  MCA CVA with tPA and thrombectomy    Chronic kidney disease    Prediabetes    Mitral valve regurgitation    CAD in native artery    Aneurysm of aortic root  thoracic aortic aneurysm, without ruptur    Benign prostatic hyperplasia    Gout    History of transcatheter aortic valve replacement (TAVR)        FAMILY HISTORY:  FH: lung cancer        SOCIAL HISTORY:    Allergies    No Known Drug Allergies  strawberry (Anaphylaxis)    Intolerances        MEDICATIONS  (STANDING):  ascorbic acid 500 milliGRAM(s) Oral daily  atorvastatin 10 milliGRAM(s) Oral at bedtime  budesonide 160 MICROgram(s)/formoterol 4.5 MICROgram(s) Inhaler 2 Puff(s) Inhalation two times a day  finasteride 5 milliGRAM(s) Oral daily  megestrol 400 milliGRAM(s) Oral daily  midodrine. 5 milliGRAM(s) Oral three times a day  mirtazapine 7.5 milliGRAM(s) Oral daily  multivitamin/minerals 1 Tablet(s) Oral daily  pantoprazole    Tablet 40 milliGRAM(s) Oral two times a day  phytonadione   Solution 5 milliGRAM(s) Oral daily  psyllium Powder 1 Packet(s) Oral two times a day  senna 2 Tablet(s) Oral at bedtime  sodium chloride 0.9% lock flush 3 milliLiter(s) IV Push every 8 hours  tamsulosin 0.4 milliGRAM(s) Oral at bedtime    MEDICATIONS  (PRN):  ALBUTerol    90 MICROgram(s) HFA Inhaler 2 Puff(s) Inhalation every 6 hours PRN Shortness of Breath and/or Wheezing  bisacodyl 5 milliGRAM(s) Oral every 12 hours PRN Constipation  polyethylene glycol 3350 17 Gram(s) Oral daily PRN Constipation      Vital Signs Last 24 Hrs  T(C): 36.4 (18 Jun 2021 08:16), Max: 36.7 (17 Jun 2021 22:24)  T(F): 97.5 (18 Jun 2021 08:16), Max: 98 (17 Jun 2021 22:24)  HR: 87 (18 Jun 2021 09:25) (87 - 90)  BP: 101/57 (18 Jun 2021 08:16) (96/51 - 122/69)  BP(mean): --  RR: 19 (18 Jun 2021 08:16) (18 - 19)  SpO2: 98% (18 Jun 2021 09:25) (97% - 100%)    PHYSICAL EXAM:    GENERAL: NAD,   HEAD:  Atraumatic,   EYES: EOMI,  NECK: Supple,  NERVOUS SYSTEM:  awake  CHEST/LUNG: Clear  HEART: S1/S2  ABDOMEN: Soft, Nontender  LYMPH: No lymphadenopathy noted  SKIN: No rashes or lesions      LABS:                        11.7   6.78  )-----------( 105      ( 18 Jun 2021 06:16 )             37.6     06-18    136  |  96<L>  |  42.7<H>  ----------------------------<  115<H>  4.8   |  25.0  |  2.56<H>    Ca    9.8      18 Jun 2021 06:16  Phos  2.9     06-18  Mg     2.1     06-18    TPro  7.2  /  Alb  3.7  /  TBili  2.5<H>  /  DBili  x   /  AST  32  /  ALT  35  /  AlkPhos  108  06-18    PT/INR - ( 18 Jun 2021 06:16 )   PT: 20.9 sec;   INR: 1.86 ratio           RADIOLOGY & ADDITIONAL STUDIES:    < from: CT Chest No Cont (06.15.21 @ 16:52) >   EXAM:  CT ABDOMEN AND PELVIS OC                         EXAM:  CT CHEST                          PROCEDURE DATE:  06/15/2021          INTERPRETATION:  CLINICAL INFORMATION: Large amount of weight loss and valvular heart disease    COMPARISON: Chest x-ray of earlier today. Outside CT chest of 2/15/2021    CONTRAST/COMPLICATIONS:  IV Contrast: NONE  Oral Contrast: Smoothie Readi-Cat 2  Complications: None reported at time of study completion    PROCEDURE:  CT of the Chest, Abdomen and Pelvis wasperformed.  Sagittal and coronal reformats were performed.    FINDINGS:  CHEST:  LUNGS AND LARGE AIRWAYS: Patent central airways. No pulmonary nodules. There is moderate emphysema with bulla.  PLEURA: Small bilateral pleural effusions.  VESSELS: Atherosclerotic changes of the aorta and coronary arteries.  HEART: Cardiomegaly. No pericardial effusion. TAVR  MEDIASTINUM AND MARTI: No lymphadenopathy.  CHEST WALL AND LOWER NECK: Within normal limits.    ABDOMEN AND PELVIS:  LIVER: Within normal limits.  BILE DUCTS: Normal caliber.  GALLBLADDER: Within normal limits.  SPLEEN: Within normal limits.  PANCREAS: Within normal limits.  ADRENALS: Within normal limits.  KIDNEYS/URETERS: Multiple bilateral renal cysts left more than right. Largest cyst juwan very large exophytic left lateral cyst measuring up to 11 cm.    BLADDER: Lemus catheter.  REPRODUCTIVE ORGANS: Prostate is enlarged.    BOWEL: No bowel obstruction. Appendix is normal. There is wall thickening of the cecum suggesting a nonspecificcolitis. Given the history tumor cannot be excluded  PERITONEUM: Small amount of pelvic ascites.  VESSELS: Atherosclerotic changes.  RETROPERITONEUM/LYMPH NODES: No lymphadenopathy.  ABDOMINAL WALL: Within normal limits.  BONES: Degenerative changes.    IMPRESSION: Focal wall thickening at the level of the cecum may be secondary to colitis. Tumor cannot be excluded and further evaluation is recommended  TAVR.  Emphysema.  Small bilateral pleural effusions  Multiple bilateral renal cysts.  Small amount of pelvic ascites  Moderate vascular calcifications    < end of copied text >

## 2021-06-18 NOTE — PROGRESS NOTE ADULT - ATTENDING COMMENTS
The pathology was negative for H Pylori or tumor. He should continue on pantoprazole and f/u with Dr Lopez in 4-6 weeks as outpatient. Will see only prn your request while in hospital.

## 2021-06-18 NOTE — PROGRESS NOTE ADULT - SUBJECTIVE AND OBJECTIVE BOX
Cardiology NP post procedure note:    -s/p LHC via RFA by Dr. Jorgensen:   < from: Cardiac Cath Lab - Adult (21 @ 13:53) >  DIAGNOSTIC IMPRESSIONS: Moderate 40-50% stenosis in the proximal LAD,  unchanged from 2019 (Insignificant iFR in 2019). 2. No significant CAD  involving the LM, LCX and RCA. 3. A TAV is noted to be moving in  consonance with the surrounding structures.   < end of copied text >    MEDICATIONS  (STANDING):  ascorbic acid 500 milliGRAM(s) Oral daily  atorvastatin 10 milliGRAM(s) Oral at bedtime  budesonide 160 MICROgram(s)/formoterol 4.5 MICROgram(s) Inhaler 2 Puff(s) Inhalation two times a day  finasteride 5 milliGRAM(s) Oral daily  megestrol 400 milliGRAM(s) Oral daily  midodrine. 5 milliGRAM(s) Oral three times a day  mirtazapine 7.5 milliGRAM(s) Oral daily  multivitamin/minerals 1 Tablet(s) Oral daily  pantoprazole    Tablet 40 milliGRAM(s) Oral two times a day  phytonadione   Solution 5 milliGRAM(s) Oral daily  psyllium Powder 1 Packet(s) Oral two times a day  senna 2 Tablet(s) Oral at bedtime  sodium chloride 0.9% lock flush 3 milliLiter(s) IV Push every 8 hours  tamsulosin 0.4 milliGRAM(s) Oral at bedtime    MEDICATIONS  (PRN):  ALBUTerol    90 MICROgram(s) HFA Inhaler 2 Puff(s) Inhalation every 6 hours PRN Shortness of Breath and/or Wheezing  bisacodyl 5 milliGRAM(s) Oral every 12 hours PRN Constipation  polyethylene glycol 3350 17 Gram(s) Oral daily PRN Constipation      Allergies:  No Known Drug Allergies  strawberry (Anaphylaxis)      PAST MEDICAL & SURGICAL HISTORY:  Pulmonary hypertension    Hypertension    Hyperlipidemia    H/O aortic valve stenosis  s/p TAVR  at [place]    CVA (cerebrovascular accident)  MCA CVA with tPA and thrombectomy    Chronic kidney disease    Prediabetes    Mitral valve regurgitation    CAD in native artery    Aneurysm of aortic root  thoracic aortic aneurysm, without ruptur    Benign prostatic hyperplasia    Gout    History of transcatheter aortic valve replacement (TAVR)        Vital Signs Last 24 Hrs  T(C): 36.9 (2021 12:40), Max: 36.9 (2021 12:40)  T(F): 98.4 (2021 12:40), Max: 98.4 (2021 12:40)  HR: 85 (2021 13:11) (85 - 90)  BP: 111/56 (2021 12:40) (96/51 - 122/69)  BP(mean): --  RR: 18 (2021 13:11) (16 - 19)  SpO2: 97% (2021 13:11) (97% - 100%)    Physical Exam:  Constitutional: NAD, AAOx3  Cardiovascular: +S1S2 RRR  Pulmonary: CTA b/l, unlabored  GI: soft NTND +BS  Extremities: trace pedal edema, +distal pulses b/l; LIJ dialysis catheter noted  Neuro: non focal, CRONIN x4  Procedure site: RFA site benign without hematoma/bleeding; no bruit; +right PP    LABS:                        11.7   6.78  )-----------( 105      ( 2021 06:16 )             37.6     06-18    136  |  96<L>  |  42.7<H>  ----------------------------<  115<H>  4.8   |  25.0  |  2.56<H>    Ca    9.8      2021 06:16  Phos  2.9     06-18  Mg     2.1     06-18    TPro  7.2  /  Alb  3.7  /  TBili  2.5<H>  /  DBili  x   /  AST  32  /  ALT  35  /  AlkPhos  108  06-18    PT/INR - ( 2021 06:16 )   PT: 20.9 sec;   INR: 1.86 ratio        RADIOLOGY & ADDITIONAL TESTS:  < from: Cardiac Cath Lab - Adult (21 @ 13:53) >    [facility]  Department of Cardiology  [address] [place] [id number]  [phone]  Cath Lab Report -- Comprehensive Report  Patient: REYNA BOB  Study date: 2021  Account number: [id number]  MR number: [id number]  : 1945  Gender: Male  Race: O  Case Physician(s):  Herson Jorgensen M.D.  Referring Physician:  Jarod Campbell  INDICATIONS: Acute on chronic systolic congestive heart failure. Aortic  valve insufficiency. pre-TAVIV evaluation of CAD  HISTORY: The patient has a history of coronary artery disease. The patient  has hypertension, dialysis-treated renal failure, and medication-treated  dyslipidemia.  PROCEDURE:  --  Right coronary angiography.  --  Left coronary angiography.  --  Sonosite.  TECHNIQUE: The risks and alternatives of the procedures and conscious  sedation were explained to the patient and informed consent was obtained.  Cardiac catheterization performed electively. Cardiac catheterization  performed urgently. Cath lab room 1.  Local anesthetic given. Right femoral artery access. Right coronary artery  angiography. The vessel was injected utilizing a catheter. Left coronary  artery angiography. The vessel was injected utilizing a catheter.  Sonosite. RADIATION EXPOSURE: 3.5 min.  CONTRAST GIVEN: Omnipaque 45 ml.  MEDICATIONS GIVEN: Midazolam, 0.5 mg, IV. Fentanyl, 25 mcg, IV. 1%  Lidocaine, 10 ml, subcutaneously. 0.9NS, 50 ml, IV.  VENTRICLES: EF by echo was 35 %.  CORONARY VESSELS: The coronary circulation isright dominant.  LM:   --  LM: Normal.  LAD:   --  Proximal LAD: There was a tubular 50 % stenosis.  CX:   --  Circumflex: Angiography showed minor luminal irregularities with  no flow limiting lesions.  RCA:   --  RCA: Angiography showed minor luminal irregularities with no  flow limiting lesions.  COMPLICATIONS: No complications occurred during the cath lab visit.  DIAGNOSTIC IMPRESSIONS: Moderate 40-50% stenosis in the proximal LAD,  unchanged from 2019 (Insignificant iFR in 2019). 2. No significant CAD  involving the LM, LCX and RCA. 3. A TAV is noted to be moving in  consonance with the surrounding structures. 4. Severe central Prosthetic  valve Aortic Insufficiency is noted on a ISRAEL and the LVEF was estimated  [id number]%.  DIAGNOSTIC RECOMMENDATIONS: GDMT. 2. RFM. 3. Possible TAVIV to address the  prosthetic AI, centrally mediated.  INTERVENTIONAL IMPRESSIONS: Moderate 40-50% stenosis in the proximal LAD,  unchanged from 2019 (Insignificant iFR in 2019). 2. No significant CAD  involving the LM, LCX and RCA. 3. A TAV is noted to be moving in  consonance with the surrounding structures. 4. Severe central Prosthetic  valve Aortic Insufficiency is noted on a ISRAEL and the LVEF was estimated  [id number]%.  INTERVENTIONAL RECOMMENDATIONS: GDMT. 2. RFM. 3. Possible TAVIV to address  the prosthetic AI, centrally mediated.  Prepared and signed by  Herson Jorgensen M.D.  Signed 2021 14:34:38  HEMODYNAMIC TABLES  Pressures:  Baseline  Pressures:  - HR: 68  Pressures:  - Rhythm:  Pressures:  -- Aortic Pressure (S/D/M): 107/53/75  Outputs:  Baseline  Outputs:  -- CALCULATIONS[age]  Outputs:  -- CALCULATIONS: Body Surface Area: 2.22  Outputs:  -- CALCULATIONS: Height in cm: 183.00  Outputs:  -- CALCULATIONS: Sex: Male  Outputs:  -- CALCULATIONS: Weight in k.80  Outputs:  -- OUTPUTS: O2 consumption: 277.45  Outputs:  -- OUTPUTS: Vo2 Indexed: 125.00    < end of copied text >

## 2021-06-19 LAB
ALBUMIN SERPL ELPH-MCNC: 3.7 G/DL — SIGNIFICANT CHANGE UP (ref 3.3–5.2)
ALP SERPL-CCNC: 109 U/L — SIGNIFICANT CHANGE UP (ref 40–120)
ALT FLD-CCNC: 32 U/L — SIGNIFICANT CHANGE UP
ANION GAP SERPL CALC-SCNC: 18 MMOL/L — HIGH (ref 5–17)
AST SERPL-CCNC: 31 U/L — SIGNIFICANT CHANGE UP
BILIRUB SERPL-MCNC: 2.5 MG/DL — HIGH (ref 0.4–2)
BUN SERPL-MCNC: 56.1 MG/DL — HIGH (ref 8–20)
CALCIUM SERPL-MCNC: 9.8 MG/DL — SIGNIFICANT CHANGE UP (ref 8.6–10.2)
CHLORIDE SERPL-SCNC: 97 MMOL/L — LOW (ref 98–107)
CO2 SERPL-SCNC: 20 MMOL/L — LOW (ref 22–29)
CREAT SERPL-MCNC: 3.5 MG/DL — HIGH (ref 0.5–1.3)
GLUCOSE SERPL-MCNC: 107 MG/DL — HIGH (ref 70–99)
HCT VFR BLD CALC: 39.6 % — SIGNIFICANT CHANGE UP (ref 39–50)
HGB BLD-MCNC: 12 G/DL — LOW (ref 13–17)
INR BLD: 1.78 RATIO — HIGH (ref 0.88–1.16)
INR BLD: 1.82 RATIO — HIGH (ref 0.88–1.16)
MAGNESIUM SERPL-MCNC: 2.3 MG/DL — SIGNIFICANT CHANGE UP (ref 1.6–2.6)
MCHC RBC-ENTMCNC: 29.7 PG — SIGNIFICANT CHANGE UP (ref 27–34)
MCHC RBC-ENTMCNC: 30.3 GM/DL — LOW (ref 32–36)
MCV RBC AUTO: 98 FL — SIGNIFICANT CHANGE UP (ref 80–100)
PLATELET # BLD AUTO: 125 K/UL — LOW (ref 150–400)
POTASSIUM SERPL-MCNC: 4.2 MMOL/L — SIGNIFICANT CHANGE UP (ref 3.5–5.3)
POTASSIUM SERPL-SCNC: 4.2 MMOL/L — SIGNIFICANT CHANGE UP (ref 3.5–5.3)
PROT SERPL-MCNC: 7.1 G/DL — SIGNIFICANT CHANGE UP (ref 6.6–8.7)
PROTHROM AB SERPL-ACNC: 20.1 SEC — HIGH (ref 10.6–13.6)
PROTHROM AB SERPL-ACNC: 20.5 SEC — HIGH (ref 10.6–13.6)
RBC # BLD: 4.04 M/UL — LOW (ref 4.2–5.8)
RBC # FLD: 21.4 % — HIGH (ref 10.3–14.5)
SODIUM SERPL-SCNC: 135 MMOL/L — SIGNIFICANT CHANGE UP (ref 135–145)
WBC # BLD: 8.95 K/UL — SIGNIFICANT CHANGE UP (ref 3.8–10.5)
WBC # FLD AUTO: 8.95 K/UL — SIGNIFICANT CHANGE UP (ref 3.8–10.5)

## 2021-06-19 PROCEDURE — 99223 1ST HOSP IP/OBS HIGH 75: CPT

## 2021-06-19 PROCEDURE — 75574 CT ANGIO HRT W/3D IMAGE: CPT | Mod: 26

## 2021-06-19 PROCEDURE — 90937 HEMODIALYSIS REPEATED EVAL: CPT

## 2021-06-19 PROCEDURE — 99232 SBSQ HOSP IP/OBS MODERATE 35: CPT

## 2021-06-19 PROCEDURE — 74174 CTA ABD&PLVS W/CONTRAST: CPT | Mod: 26

## 2021-06-19 PROCEDURE — 99233 SBSQ HOSP IP/OBS HIGH 50: CPT

## 2021-06-19 RX ORDER — MEGESTROL ACETATE 40 MG/ML
400 SUSPENSION ORAL DAILY
Refills: 0 | Status: DISCONTINUED | OUTPATIENT
Start: 2021-06-19 | End: 2021-06-23

## 2021-06-19 RX ORDER — ENOXAPARIN SODIUM 100 MG/ML
30 INJECTION SUBCUTANEOUS DAILY
Refills: 0 | Status: DISCONTINUED | OUTPATIENT
Start: 2021-06-20 | End: 2021-06-21

## 2021-06-19 RX ORDER — ASPIRIN/CALCIUM CARB/MAGNESIUM 324 MG
81 TABLET ORAL DAILY
Refills: 0 | Status: DISCONTINUED | OUTPATIENT
Start: 2021-06-19 | End: 2021-06-23

## 2021-06-19 RX ADMIN — Medication 500 MILLIGRAM(S): at 10:07

## 2021-06-19 RX ADMIN — MIDODRINE HYDROCHLORIDE 5 MILLIGRAM(S): 2.5 TABLET ORAL at 16:09

## 2021-06-19 RX ADMIN — TAMSULOSIN HYDROCHLORIDE 0.4 MILLIGRAM(S): 0.4 CAPSULE ORAL at 21:26

## 2021-06-19 RX ADMIN — FINASTERIDE 5 MILLIGRAM(S): 5 TABLET, FILM COATED ORAL at 10:07

## 2021-06-19 RX ADMIN — SODIUM CHLORIDE 3 MILLILITER(S): 9 INJECTION INTRAMUSCULAR; INTRAVENOUS; SUBCUTANEOUS at 13:21

## 2021-06-19 RX ADMIN — SODIUM CHLORIDE 3 MILLILITER(S): 9 INJECTION INTRAMUSCULAR; INTRAVENOUS; SUBCUTANEOUS at 06:29

## 2021-06-19 RX ADMIN — MIDODRINE HYDROCHLORIDE 5 MILLIGRAM(S): 2.5 TABLET ORAL at 10:07

## 2021-06-19 RX ADMIN — Medication 81 MILLIGRAM(S): at 16:09

## 2021-06-19 RX ADMIN — MEGESTROL ACETATE 400 MILLIGRAM(S): 40 SUSPENSION ORAL at 16:09

## 2021-06-19 RX ADMIN — MIRTAZAPINE 7.5 MILLIGRAM(S): 45 TABLET, ORALLY DISINTEGRATING ORAL at 21:26

## 2021-06-19 RX ADMIN — PANTOPRAZOLE SODIUM 40 MILLIGRAM(S): 20 TABLET, DELAYED RELEASE ORAL at 16:09

## 2021-06-19 RX ADMIN — MIDODRINE HYDROCHLORIDE 5 MILLIGRAM(S): 2.5 TABLET ORAL at 21:26

## 2021-06-19 RX ADMIN — SENNA PLUS 2 TABLET(S): 8.6 TABLET ORAL at 21:26

## 2021-06-19 RX ADMIN — Medication 1 TABLET(S): at 10:07

## 2021-06-19 RX ADMIN — ATORVASTATIN CALCIUM 10 MILLIGRAM(S): 80 TABLET, FILM COATED ORAL at 21:26

## 2021-06-19 RX ADMIN — SODIUM CHLORIDE 3 MILLILITER(S): 9 INJECTION INTRAMUSCULAR; INTRAVENOUS; SUBCUTANEOUS at 21:25

## 2021-06-19 RX ADMIN — BUDESONIDE AND FORMOTEROL FUMARATE DIHYDRATE 2 PUFF(S): 160; 4.5 AEROSOL RESPIRATORY (INHALATION) at 09:46

## 2021-06-19 RX ADMIN — PANTOPRAZOLE SODIUM 40 MILLIGRAM(S): 20 TABLET, DELAYED RELEASE ORAL at 06:29

## 2021-06-19 RX ADMIN — Medication 5 MILLIGRAM(S): at 16:09

## 2021-06-19 NOTE — PROGRESS NOTE ADULT - SUBJECTIVE AND OBJECTIVE BOX
[facility] DIVISION OF KIDNEY DISEASES AND HYPERTENSION -- HEMODIALYSIS NOTE  --------------------------------------------------------------------------------  Chief Complaint: ESRD/Ongoing hemodialysis requirement    24 hour events/subjective:  Seen/examined  On HD today      PAST HISTORY  --------------------------------------------------------------------------------  No significant changes to PMH, PSH, FHx, SHx, unless otherwise noted    ALLERGIES & MEDICATIONS  --------------------------------------------------------------------------------  Allergies    No Known Drug Allergies  strawberry (Anaphylaxis)    Intolerances      Standing Inpatient Medications  ascorbic acid 500 milliGRAM(s) Oral daily  aspirin enteric coated 81 milliGRAM(s) Oral daily  atorvastatin 10 milliGRAM(s) Oral at bedtime  budesonide 160 MICROgram(s)/formoterol 4.5 MICROgram(s) Inhaler 2 Puff(s) Inhalation two times a day  finasteride 5 milliGRAM(s) Oral daily  megestrol Suspension 400 milliGRAM(s) Oral daily  midodrine. 5 milliGRAM(s) Oral three times a day  mirtazapine 7.5 milliGRAM(s) Oral daily  multivitamin/minerals 1 Tablet(s) Oral daily  pantoprazole    Tablet 40 milliGRAM(s) Oral two times a day  phytonadione   Solution 5 milliGRAM(s) Oral daily  psyllium Powder 1 Packet(s) Oral two times a day  senna 2 Tablet(s) Oral at bedtime  sodium chloride 0.9% lock flush 3 milliLiter(s) IV Push every 8 hours  tamsulosin 0.4 milliGRAM(s) Oral at bedtime    PRN Inpatient Medications  ALBUTerol    90 MICROgram(s) HFA Inhaler 2 Puff(s) Inhalation every 6 hours PRN  bisacodyl 5 milliGRAM(s) Oral every 12 hours PRN  polyethylene glycol 3350 17 Gram(s) Oral daily PRN      REVIEW OF SYSTEMS  --------------------------------------------------------------------------------  Gen: No weight changes, fatigue, fevers/chills, weakness  Skin: No rashes  Head/Eyes/Ears/Mouth: No headache; Normal hearing; Normal vision w/o blurriness; No sinus pain/discomfort, sore throat  Respiratory: No dyspnea, cough, wheezing, hemoptysis  CV: No chest pain, PND, orthopnea  GI: No abdominal pain, diarrhea, constipation, nausea, vomiting, melena, hematochezia  : No increased frequency, dysuria, hematuria, nocturia  MSK: No joint pain/swelling; no back pain; no edema  Neuro: No dizziness/lightheadedness, weakness, seizures, numbness, tingling  Heme: No easy bruising or bleeding  Endo: No heat/cold intolerance  Psych: No significant nervousness, anxiety, stress, depression    All other systems were reviewed and are negative, except as noted.    VITALS/PHYSICAL EXAM  --------------------------------------------------------------------------------  T(C): 36.7 (06-19-21 @ 11:14), Max: 36.7 (06-19-21 @ 11:14)  HR: 80 (06-19-21 @ 11:14) (80 - 93)  BP: 104/50 (06-19-21 @ 11:14) (104/50 - 118/57)  RR: 17 (06-19-21 @ 11:14) (9 - 19)  SpO2: 100% (06-19-21 @ 11:14) (96% - 100%)  Wt(kg): --        06-18-21 @ 07:01  -  06-19-21 @ 07:00  --------------------------------------------------------  IN: 240 mL / OUT: 500 mL / NET: -260 mL      Physical Exam:  	Gen: NAD   	HEENT: PERRL, supple neck, clear oropharynx  	Pulm: CTA B/L  	CV: RRR, S1S2; no rub  	Back: No spinal or CVA tenderness; no sacral edema  	Abd: +BS, soft, nontender/nondistended  	: No suprapubic tenderness  	UE: Warm, FROM, no clubbing, intact strength; no edema; no asterixis  	LE: Warm, FROM, no clubbing, intact strength; no edema  	Neuro: No focal deficits, intact gait  	Psych: Normal affect and mood  	Skin: Warm, without rashes  	Vascular access:    LABS/STUDIES  --------------------------------------------------------------------------------              12.0   8.95  >-----------<  125      [06-19-21 @ 06:25]              39.6     135  |  97  |  56.1  ----------------------------<  107      [06-19-21 @ 06:26]  4.2   |  20.0  |  3.50        Ca     9.8     [06-19-21 @ 06:26]      Mg     2.3     [06-19-21 @ 06:26]      Phos  2.9     [06-18-21 @ 06:16]    TPro  7.1  /  Alb  3.7  /  TBili  2.5  /  DBili  x   /  AST  31  /  ALT  32  /  AlkPhos  109  [06-19-21 @ 06:26]    PT/INR: PT 20.5 , INR 1.82       [06-19-21 @ 13:14]      TSH 2.31      [06-12-21 @ 01:55]    HBsAb <3.0      [06-17-21 @ 05:13]  HBsAg Nonreact      [06-17-21 @ 05:13]  HBcAb Nonreact      [06-17-21 @ 05:13]  HCV 0.15, Nonreact      [06-17-21 @ 05:13]

## 2021-06-19 NOTE — PROGRESS NOTE ADULT - PROBLEM SELECTOR PLAN 1
ISRAEL 6/10/21 at [facility] showing EF 40-45%, Aortic valve prosthesis with Severe paravalvular leak and valvular AI, mild-moderate aortic stenosis, and moderate MR.  Admitted to [facility] 6/12/21 with CT Surgery Dr. Campbell.   ISRAEL 6/15/21 concern for a mobile echo density attached to the ventricular side of the prosthetic aortic valve with  Severe aortic valve regurgitation seen.  Blood Cultures from 6/7, 6/15 remain negative to date. ID input appreciated.    If pt spikes Temps, start Vanco and Rocephin per ID.   Otherwise, monitoring patient off of antibiotics as he remains nontoxic, afebrile, without leukocytosis. ?Marantic endocarditis?  Cardiac cath 6/18 without significant CAD.   CT C/A/P ordered for 6/19 in preparation for possible valve-in-valve TAVR 6/23/21.  Cardiology recommending dental evaluation prior to surgery.  Continue midodrine to support BP.  Oxygenating well on 2L O2 via NC (On 2L Home O2).

## 2021-06-19 NOTE — PROGRESS NOTE ADULT - ASSESSMENT
Procedure: Left heart catheterization    1. CAD, S/P LHC: 50% pLAD stenosis  GDMT:        DAPT: Aspirin 81 mg daily.       Statin: Lipitor 10 mg daily       Beta Blocker: Patient hypotensive       Other Antianginals: N/A       Aggressive cardiovascular risk reduction and lifestyle modification.    2. TAVR Prosthesis failure  ·	CT Surgery for possible valve-in-valve TAVR 6/23/21.  ·	Dental evaluation prior to surgery.  ·	All blood cultures negative, possible marantic endocarditis.    3. HFrEF  ·	Unable to initiate GDMT secondary to hypotension, on midodrine now.    4. CRF on HD  ·	HD as per nephrology    5. Hypotension  ·	Midodrine 5 mg TID

## 2021-06-19 NOTE — CONSULT NOTE ADULT - ASSESSMENT
Imp--Pt with possible sleep apnea.  In view of cardiac disease, could have both obstructive and central (Cheyne-enrique)  Probably was worse when he was 100 lbs heavier.    Plan--Would defer empiric rx of sleep-disordered breathing because of uncertainty of exact diagnosis.  Would use supplemental O2.  After TAVR and d/c, will see pt in office to arrange sleep studies.

## 2021-06-19 NOTE — CHART NOTE - NSCHARTNOTEFT_GEN_A_CORE
CTS ACP Addendum    Briefly, 76M h/o MI, COPD, AS s/p TAVR 2019, gout, CKD, CVA, pulm HTN p/w RONNELL on CKD with urinary retention, cardiogenic shock, acute hypoxemic resp failure. ISRAEL at [place] revealed severe AI and mod MR and patient was transferred to [facility] for surgical evaluation. Noting a recent significant weight loss, a GI workup was completed, including endoscopy and colonoscopy which revealed gastritis. Biopsies were negative for malignancy. There was a suspicion of prior/active endocarditis (prior strep bovis infection) but blood cultures thusfar are negative. Cardiac cath yesterday negative for CAD.    Patient seen and examined. Notes, flowsheets, medications, radiologic images and labs reviewed. Vitals WNL,     Plan:  -     Case discussed with  CTS ACP Addendum    Briefly, 76M h/o MI, COPD, AS s/p TAVR 2019, gout, CKD, CVA, pulm HTN p/w RONNELL on CKD with urinary retention, cardiogenic shock, acute hypoxemic resp failure. ISRAEL at [place] revealed severe AI and mod MR and patient was transferred to [facility] for surgical evaluation. Noting a recent significant weight loss, a GI workup was completed, including endoscopy and colonoscopy which revealed gastritis. Biopsies were negative for malignancy. There was a suspicion of prior/active endocarditis (prior strep bovis infection) but blood cultures thusfar are negative. Cardiac cath yesterday negative for CAD.    Patient seen and examined. Notes, flowsheets, medications, radiologic images and labs reviewed. Vitals WNL, Exam: lung clear, HF regular, systolic and diastolic murmur, +1 edema. Labs: Creat 3.5. BCx negative from 6/16    Plan:  - TAVR CTA done today  - Dialyzed today  - Tentative plan for V-in-V TAVR 6/23    Case discussed with Dr. Campbell CTS ACP Addendum    Briefly, 76M h/o MI, COPD, AS s/p TAVR 2019, gout, CKD, CVA, pulm HTN p/w RONNELL on CKD with urinary retention, cardiogenic shock, acute hypoxemic resp failure. ISRAEL at [place] revealed severe AI and mod MR and patient was transferred to [facility] for surgical evaluation. Noting a recent significant weight loss, a GI workup was completed, including endoscopy and colonoscopy which revealed gastritis. Biopsies were negative for malignancy. There was a suspicion of prior/active endocarditis (prior strep bovis infection) but blood cultures thusfar are negative. Cardiac cath yesterday negative for CAD.    Patient seen and examined. Notes, flowsheets, medications, radiologic images and labs reviewed. Vitals WNL, Exam: lung clear, HF regular, systolic and diastolic murmur, +1 edema. Labs: Creat 3.5. BCx negative from 6/16    Plan:  - TAVR CTA done today  - Dialyzed today  - Tentative plan for V-in-V TAVR 6/23  - PT/PTT mixing study pending in [facility] lab  - ASA 81 added, Lovenox tomorrow  - Trend labs    Case discussed with Dr. Campbell

## 2021-06-19 NOTE — PROGRESS NOTE ADULT - PROBLEM SELECTOR PLAN 10
Pantoprazole for GI prophylaxis.  SCDs for DVT prophylaxis.    11.  Duodinitis  Seen on EGD 6/15 - Pantoprazole started.    12.  Diverticulosis  Seen on Colonoscopy 6/16, mild.    13. Autoelevated INR  Hematology consult appreciated for autoelevated INR with Vit K use suggesting possible liver diease, now recommending PT based mixing study if INR >2.    Plan to be discussed / reviewed with CT Surgery attending / team during AM rounds.

## 2021-06-19 NOTE — PROGRESS NOTE ADULT - ASSESSMENT
76 year old male patient with a medical history of MI in 1995 (angiogram, no stents placed), COPD (2L O2 at home), s/p TAVR (03/2019 at [facility]), gout, CKD, CVA (09/2020), pulmonary HTN, initially presented to [facility] 6/1/21 with RONNELL on CKD (likely cardiorenal syndrome), urinary retention due to noncompliance with medications, with hospital course complicated by cardiogenic shock, acute hypoxemic respiratory failure secondary to metabolic acidosis and respiratory alkalosis, and acute on chronic combined diastolic and systolic heart failure. Patient found to have TAVR failure with ISRAEL 6/10/21 showing EF 40-45%, Aortic valve prosthesis with Severe paravalvular leak and valvular AI, mild-moderate aortic stenosis, and moderate MR. Patient was transferred to [facility] under Dr. Campbell for further workup. TTE 6/12 showing EF 30-35%, reduced RV function, mild MAC, moderate to severe MR, severe TR, moderate AI, and severe PA. Patient was challenged with lasix for fluid overload, with worsening RONNELL on CKD and cardiorenal syndrome, ultimately progressing to ESRD requiring HD via Left IJ dialysis catheter. Concern for recent reported 100lb weight loss, decreased appetite, and dysphagia with EGD showing duodenitis and diffuse erosive gastritis, and colonoscopy showing diverticulosis. EGD biopsies negative for H. Pylori or carcinoma. Repeat ISRAEL performed during EGD showing an EF of 35%, moderate MR, moderate TR, s/p TAVR with a mobile echo density attached to the ventricular side of the prosthetic aortic valve, Severe AI. With prior Hx of step Bovis bacteremia, findings are suspicious for endocarditis. Blood cultures from 6,7 and 6/15 remain negative to date. ID monitoring patient off of antibiotics as he remains afebrile, nontoxic appearing, with no leukocytosis. Cardiac Cath 6/18 without significant CAD. CT C/A/P ordered for 6/19 in preparation for possible valve-in-valve TAVR 6/23/21. Cardiology recommending dental evaluation prior to surgery. Hematology consult appreciated for autoelevated INR with Vit K use suggesting possible liver diease now recommending PT based mixing study if INR >2.

## 2021-06-19 NOTE — PROGRESS NOTE ADULT - PROBLEM SELECTOR PLAN 6
ESRD requiring HD.  Left IJ HD catheter placed 6/16.   6/16 and 6/17 s/p HD treatment with 500cc fluid removal and clearance.  Renal team following.  HD later today.

## 2021-06-19 NOTE — CONSULT NOTE ADULT - SUBJECTIVE AND OBJECTIVE BOX
PULMONARY CONSULT NOTE      REYNA [id number]    Patient is a 76y old  Male who presents with a chief complaint of TAVR Failure (19 Jun 2021 04:23)      INTERVAL HPI/OVERNIGHT EVENTS: Patient initially presented to [facility] 6/1/21 with RONNELL on CKD (likely cardiorenal syndrome), urinary retention due to noncompliance with medications, with hospital course complicated by cardiogenic shock, acute hypoxemic respiratory failure secondary to metabolic acidosis and respiratory alkalosis, and acute on chronic combined diastolic and systolic heart failure. Patient found to have TAVR failure with ISRAEL 6/10/21 showing EF 40-45%, Aortic valve prosthesis with Severe paravalvular leak and valvular AI, mild-moderate aortic stenosis, and moderate MR. Patient was transferred to [facility] under Dr. Campbell for further workup. TTE 6/12 showing EF 30-35%, reduced RV function, mild MAC, moderate to severe MR, severe TR, moderate AI, and severe PA. Patient was challenged with lasix for fluid overload, with worsening RONNELL on CKD and cardiorenal syndrome, ultimately progressing to ESRD requiring HD via Left IJ dialysis catheter. Concern for recent reported 100lb weight loss, decreased appetite, and dysphagia with EGD showing duodenitis and diffuse erosive gastritis, and colonoscopy showing diverticulosis. EGD biopsies negative for H. Pylori or carcinoma. Repeat ISRAEL performed during EGD showing an EF of 35%, moderate MR, moderate TR, s/p TAVR with a mobile echo density attached to the ventricular side of the prosthetic aortic valve, Severe AI. With prior Hx of step Bovis bacteremia, findings are suspicious for endocarditis. Blood cultures from 6,7 and 6/15 remain negative to date. ID monitoring patient off of antibiotics as he remains afebrile, nontoxic appearing, with no leukocytosis. Cardiac Cath 6/18 without significant CAD. CT C/A/P ordered for 6/19 in preparation for possible valve-in-valve TAVR 6/23/21. Cardiology recommending dental evaluation prior to surgery. Hematology consult appreciated for autoelevated INR with Vit K use suggesting possible liver disease, now recommending PT based mixing study if INR >2.     Significant recent/past 24 hr events: Patient with likely undiagnosed WILLEM with CPAP started last night for frequent desats with an SpO2 in the 70s.     Pt denies ever knowing about snoring.  Lost his wife 3 months ago.  Reports h/o excessive daytime somnolence.      MEDICATIONS  (STANDING):  ascorbic acid 500 milliGRAM(s) Oral daily  aspirin enteric coated 81 milliGRAM(s) Oral daily  atorvastatin 10 milliGRAM(s) Oral at bedtime  budesonide 160 MICROgram(s)/formoterol 4.5 MICROgram(s) Inhaler 2 Puff(s) Inhalation two times a day  finasteride 5 milliGRAM(s) Oral daily  megestrol 400 milliGRAM(s) Oral daily  midodrine. 5 milliGRAM(s) Oral three times a day  mirtazapine 7.5 milliGRAM(s) Oral daily  multivitamin/minerals 1 Tablet(s) Oral daily  pantoprazole    Tablet 40 milliGRAM(s) Oral two times a day  phytonadione   Solution 5 milliGRAM(s) Oral daily  psyllium Powder 1 Packet(s) Oral two times a day  senna 2 Tablet(s) Oral at bedtime  sodium chloride 0.9% lock flush 3 milliLiter(s) IV Push every 8 hours  tamsulosin 0.4 milliGRAM(s) Oral at bedtime      MEDICATIONS  (PRN):  ALBUTerol    90 MICROgram(s) HFA Inhaler 2 Puff(s) Inhalation every 6 hours PRN Shortness of Breath and/or Wheezing  bisacodyl 5 milliGRAM(s) Oral every 12 hours PRN Constipation  polyethylene glycol 3350 17 Gram(s) Oral daily PRN Constipation      Allergies    No Known Drug Allergies  strawberry (Anaphylaxis)    Intolerances        PAST MEDICAL & SURGICAL HISTORY:  Pulmonary hypertension    Hypertension    Hyperlipidemia    H/O aortic valve stenosis  s/p TAVR 2019 at [place]    CVA (cerebrovascular accident)  MCA CVA with tPA and thrombectomy    Chronic kidney disease    Prediabetes    Mitral valve regurgitation    CAD in native artery    Aneurysm of aortic root  thoracic aortic aneurysm, without ruptur    Benign prostatic hyperplasia    Gout    History of transcatheter aortic valve replacement (TAVR)        FAMILY HISTORY:  FH: lung cancer        SOCIAL HISTORY  Smoking History: nonsmoker    REVIEW OF SYSTEMS:    CONSTITUTIONAL:  As per HPI.    HEENT:  Eyes:  No diplopia or blurred vision. ENT:  No earache, sore throat or runny nose.    CARDIOVASCULAR:  No pressure, squeezing, tightness, heaviness or aching about the chest; no palpitations.    RESPIRATORY:  Per HPI    GASTROINTESTINAL:  No nausea, vomiting or diarrhea.    GENITOURINARY:  No dysuria, frequency or urgency.    MUSCULOSKELETAL:  No joint pains    SKIN:  No new lesions.    NEUROLOGIC:  No paresthesias, fasciculations, seizures or weakness.    PSYCHIATRIC:  No disorder of thought or mood.    ENDOCRINE:  No heat or cold intolerance, polyuria or polydipsia.    HEMATOLOGICAL:  No easy bruising or bleeding.     Vital Signs Last 24 Hrs  T(C): 36.2 (19 Jun 2021 07:42), Max: 36.9 (18 Jun 2021 12:40)  T(F): 97.2 (19 Jun 2021 07:42), Max: 98.4 (18 Jun 2021 12:40)  HR: 89 (19 Jun 2021 09:47) (83 - 93)  BP: 108/49 (19 Jun 2021 07:42) (104/54 - 118/57)  BP(mean): --  RR: 18 (19 Jun 2021 07:42) (9 - 19)  SpO2: 98% (19 Jun 2021 09:47) (96% - 100%)    PHYSICAL EXAMINATION:    GENERAL: The patient is a well-developed, well-nourished _WM____in no apparent distress.     HEENT: Head is normocephalic and atraumatic. Extraocular muscles are intact. Mucous membranes are moist. Shallow post pharynx.    NECK: Supple.     LUNGS: scattered crackles    HEART: Regular rate and rhythm without murmur.    ABDOMEN: Soft, nontender, and nondistended.  No hepatosplenomegaly is noted.    EXTREMITIES: Without any cyanosis, clubbing, rash, lesions or edema.    NEUROLOGIC: Grossly intact.    SKIN: No ulceration or induration present.      LABS:                        12.0   8.95  )-----------( 125      ( 19 Jun 2021 06:25 )             39.6     06-19    135  |  97<L>  |  56.1<H>  ----------------------------<  107<H>  4.2   |  20.0<L>  |  3.50<H>    Ca    9.8      19 Jun 2021 06:26  Phos  2.9     06-18  Mg     2.3     06-19    TPro  7.1  /  Alb  3.7  /  TBili  2.5<H>  /  DBili  x   /  AST  31  /  ALT  32  /  AlkPhos  109  06-19    PT/INR - ( 19 Jun 2021 06:24 )   PT: 20.1 sec;   INR: 1.78 ratio                         Procalcitonin, Serum: 0.24 ng/mL (06-17-21 @ 09:12)      MICROBIOLOGY:    RADIOLOGY & ADDITIONAL STUDIES:< from: Xray Chest 1 View- PORTABLE-Routine (Xray Chest 1 View- PORTABLE-Routine in AM.) (06.18.21 @ 06:18) >  IMPRESSION:  Mild pulmonary venous congestion, unchanged.    Clearing of atelectasis at the bases    < end of copied text >

## 2021-06-19 NOTE — PROGRESS NOTE ADULT - ASSESSMENT
76 year old male patient with a medical history of MI in 1995 (angiogram, no stents placed), COPD (2L O2 at home), s/p TAVR (03/2019 at [facility]), gout, CKD, CVA (09/2020), pulmonary HTN, initially presented to [facility] 6/1/21 with RONNELL on CKD (likely cardiorenal syndrome), urinary retention due to noncompliance with medications, with hospital course complicated by cardiogenic shock, acute hypoxemic respiratory failure secondary to metabolic acidosis and respiratory alkalosis. Patient found to have TAVR failure with ISRAEL 6/10/21 showing EF 40-45%, Aortic valve prosthesis with Severe paravalvular leak and valvular AI, mild-moderate aortic stenosis, and moderate MR. Patient was transferred to [facility] under Dr. Campbell for further workup.   ISRAEL DONE HERE WITH CONCERN FOR ENDOCARDITIS  PT DENIES FEVERS OR SWEATS  BUT HAS HAD SIGNIFICANT WEIGHT LOSS WITHOUT DIETING  PT HAS HX OF STREP BOVIS  BACTEREMIA   LAST YEAR AND RECEIVED   ROCEPHIN AT HOME FOR 6 WEEKS   BLOOD CX X2 6/7 WERE NEGATIVE   BLOOD CX X2 SETS SENT HERE 6/15    NEG    REPEAT BLOOD CX PENDING; negative so far    PT NON TOXIC AFEBRILE  continue to HOLD ABX     EGD BIOPSIES ARE PENDING  s/p COLONOSCOPY     WOULD LIKE TO AVOID LONG TERM IV ABX UNLESS  POSS BACTEREMIA      WILL FOLLOW UP   IF SPIKES THEN VANCO/ROCEPHIN      pending heart surgery on Wednesday; no contraindication from ID perspective   - please send cultures    - pre-op antibiotics as per surgical service.

## 2021-06-19 NOTE — PROGRESS NOTE ADULT - SUBJECTIVE AND OBJECTIVE BOX
Patient is a 76y old Male who presents with a chief complaint of TAVR Failure (17 Jun 2021 19:48)    PAST MEDICAL & SURGICAL HISTORY:  Pulmonary hypertension  Hypertension  Hyperlipidemia  H/O aortic valve stenosis s/p TAVR 2019 at [place]  CVA (cerebrovascular accident)  MCA CVA with tPA and thrombectomy  Chronic kidney disease  Prediabetes  Mitral valve regurgitation  CAD in native artery  Aneurysm of aortic root, thoracic aortic aneurysm, without ruptur  Benign prostatic hyperplasia  Gout  History of transcatheter aortic valve replacement (TAVR)    FAMILY HISTORY:  FH: lung cancer    Brief Hospital Course: Patient initially presented to [facility] 6/1/21 with RONNELL on CKD (likely cardiorenal syndrome), urinary retention due to noncompliance with medications, with hospital course complicated by cardiogenic shock, acute hypoxemic respiratory failure secondary to metabolic acidosis and respiratory alkalosis, and acute on chronic combined diastolic and systolic heart failure. Patient found to have TAVR failure with ISRAEL 6/10/21 showing EF 40-45%, Aortic valve prosthesis with Severe paravalvular leak and valvular AI, mild-moderate aortic stenosis, and moderate MR. Patient was transferred to [facility] under Dr. Campbell for further workup. TTE 6/12 showing EF 30-35%, reduced RV function, mild MAC, moderate to severe MR, severe TR, moderate AI, and severe PA. Patient was challenged with lasix for fluid overload, with worsening RONNELL on CKD and cardiorenal syndrome, ultimately progressing to ESRD requiring HD via Left IJ dialysis catheter. Concern for recent reported 100lb weight loss, decreased appetite, and dysphagia with EGD showing duodenitis and diffuse erosive gastritis, and colonoscopy showing diverticulosis. EGD biopsies negative for H. Pylori or carcinoma. Repeat ISRAEL performed during EGD showing an EF of 35%, moderate MR, moderate TR, s/p TAVR with a mobile echo density attached to the ventricular side of the prosthetic aortic valve, Severe AI. With prior Hx of step Bovis bacteremia, findings are suspicious for endocarditis. Blood cultures from 6,7 and 6/15 remain negative to date. ID monitoring patient off of antibiotics as he remains afebrile, nontoxic appearing, with no leukocytosis. Cardiac Cath 6/18 without significant CAD. CT C/A/P ordered for 6/19 in preparation for possible valve-in-valve TAVR 6/23/21. Cardiology recommending dental evaluation prior to surgery. Hematology consult appreciated for autoelevated INR with Vit K use suggesting possible liver disease, now recommending PT based mixing study if INR >2.     Significant recent/past 24 hr events: Patient with likely undiagnosed WILLEM with CPAP started last night for frequent desats with an SpO2 in the 70s.     Subjective: Patient sitting up in bed in no acute distress. Denies fevers, chills, lightheadedness, dizziness, HA, CP, palpitations, SOB, cough, abdominal pain, N/V, diarrhea, numbness/tingling in extremities, or any other acute complaints.    MEDICATIONS  (STANDING):  ascorbic acid 500 milliGRAM(s) Oral daily  atorvastatin 10 milliGRAM(s) Oral at bedtime  budesonide 160 MICROgram(s)/formoterol 4.5 MICROgram(s) Inhaler 2 Puff(s) Inhalation two times a day  finasteride 5 milliGRAM(s) Oral daily  megestrol 400 milliGRAM(s) Oral daily  midodrine. 5 milliGRAM(s) Oral three times a day  mirtazapine 7.5 milliGRAM(s) Oral daily  multivitamin/minerals 1 Tablet(s) Oral daily  pantoprazole    Tablet 40 milliGRAM(s) Oral two times a day  phytonadione   Solution 5 milliGRAM(s) Oral daily  psyllium Powder 1 Packet(s) Oral two times a day  senna 2 Tablet(s) Oral at bedtime  sodium chloride 0.9% lock flush 3 milliLiter(s) IV Push every 8 hours  tamsulosin 0.4 milliGRAM(s) Oral at bedtime    MEDICATIONS  (PRN):  ALBUTerol    90 MICROgram(s) HFA Inhaler 2 Puff(s) Inhalation every 6 hours PRN Shortness of Breath and/or Wheezing  bisacodyl 5 milliGRAM(s) Oral every 12 hours PRN Constipation  polyethylene glycol 3350 17 Gram(s) Oral daily PRN Constipation    Allergies:  No Known Drug Allergies  strawberry (Anaphylaxis)    Vitals   T(C): 36.2 (18 Jun 2021 20:50), Max: 36.9 (18 Jun 2021 12:40)  T(F): 97.2 (18 Jun 2021 20:50), Max: 98.4 (18 Jun 2021 12:40)  HR: 91 (18 Jun 2021 20:50) (83 - 91)  BP: 109/54 (18 Jun 2021 20:50) (101/57 - 122/69)  RR: 19 (18 Jun 2021 20:50) (9 - 19)  SpO2: 96% (18 Jun 2021 20:50) (96% - 100%)    I&O's Detail    17 Jun 2021 07:01  -  18 Jun 2021 07:00  --------------------------------------------------------  IN:    Oral Fluid: 580 mL  Total IN: 580 mL    OUT:    Indwelling Catheter - Urethral (mL): 25 mL    Other (mL): 500 mL  Total OUT: 525 mL    Total NET: 55 mL      18 Jun 2021 07:01  -  19 Jun 2021 04:23  --------------------------------------------------------  IN:    Oral Fluid: 240 mL  Total IN: 240 mL    OUT:    Voided (mL): 400 mL  Total OUT: 400 mL    Total NET: -160 mL    Physical Exam  Neuro: A+O x 3, non-focal, speech clear and intact  HEENT:  NCAT, PERRL, EOMI. No conjuctival edema or icterus, no thrush.    Neck:  Supple, trachea midline, LIJ Dialysis catheter  Pulm: +Diminished BSs b/l, no rales/rhonchi/wheezing, no accessory muscle use noted  CV: regular rate, regular rhythm, +S1S2  Abd: soft, NT, ND, + BS  Ext: CRONIN x 4, +1 LE edema, no cyanosis or clubbing, distal motor/neuro/circ intact  Skin: warm, dry, perfused    LABS                        11.7   6.78  )-----------( 105      ( 18 Jun 2021 06:16 )             37.6     06-18    136  |  96<L>  |  42.7<H>  ----------------------------<  115<H>  4.8   |  25.0  |  2.56<H>    Ca    9.8      18 Jun 2021 06:16  Phos  2.9     06-18  Mg     2.1     06-18    TPro  7.2  /  Alb  3.7  /  TBili  2.5<H>  /  DBili  x   /  AST  32  /  ALT  35  /  AlkPhos  108  06-18    PT/INR - ( 18 Jun 2021 06:16 )   PT: 20.9 sec;   INR: 1.86 ratio        Last CXR:  < from: Xray Chest 1 View- PORTABLE-Routine (Xray Chest 1 View- PORTABLE-Routine in AM.) (06.18.21 @ 06:18) >  Two expiratory/kyphotic views are compared to 6/17/2021 and demonstrate a left IJ line with the tip in the brachiocephalic vein and no pneumothorax, unchanged.  Mild cardiomegaly and mild, central pulmonary venous congestion with no gross consolidation, effusion, pneumothorax or acute osseous finding.  Patient is post however.  IMPRESSION:  Mild pulmonary venous congestion, unchanged.  Clearing of atelectasis at the bases  < end of copied text >

## 2021-06-19 NOTE — PROGRESS NOTE ADULT - SUBJECTIVE AND OBJECTIVE BOX
Department of Cardiology                                                                  [facility] [id number]                                                            Telephone: [phone]. Fax:[phone]                                                                           Cardiac Catheterization Note       Subjective:  76y  Male who had a left heart catheterization which showed:  VENTRICLES: EF by echo was 35 %.  CORONARY VESSELS: The coronary circulation is right dominant.  LM:     --  LM: Normal.  LAD:     --  Proximal LAD: There was a tubular 50 % stenosis.  CX:     --  Circumflex: Angiography showed minor luminal irregularities with no flow limiting lesions.  RCA:     --  RCA: Angiography showed minor luminal irregularities with no flow limiting lesions.         Access: Right femoral artery       Hemostasis: Manual Compression       Total Contrast: 45 mL Omnipaque    PAST MEDICAL & SURGICAL HISTORY:  Pulmonary hypertension  Hypertension  Hyperlipidemia  H/O aortic valve stenosis: s/p TAVR 2019 at [place]  CVA (cerebrovascular accident): MCA CVA with tPA and thrombectomy  Chronic kidney disease  Prediabetes  Mitral valve regurgitation  CAD in native artery  Aneurysm of aortic root: thoracic aortic aneurysm, without ruptur  Benign prostatic hyperplasia  Gout  History of transcatheter aortic valve replacement (TAVR)    FAMILY HISTORY:  FH: lung cancer    Home Medications:  Advair Diskus 100 mcg-50 mcg inhalation powder: 1 puff(s) inhaled 2 times a day (26 Mar 2021 15:32)  Albuterol (Eqv-Proventil HFA) 90 mcg/inh inhalation aerosol: 2 puff(s) inhaled every 4 hours, As Needed (26 Mar 2021 15:32)  allopurinol 100 mg oral tablet: 1 tab(s) orally 2 times a day (26 Mar 2021 15:32)  amLODIPine 2.5 mg oral tablet: 1 tab(s) orally once a day (26 Mar 2021 15:32)  Aspir 81 oral delayed release tablet: 1 tab(s) orally once a day (26 Mar 2021 15:32)  atorvastatin 10 mg oral tablet: 1 tab(s) orally once a day (26 Mar 2021 15:32)  Centrum Silver oral tablet: 1 tab(s) orally once a day (26 Mar 2021 15:32)  Culturelle Advanced Immune Defense oral capsule: 1 cap(s) orally 2 times a day (26 Mar 2021 15:32)  ipratropium-albuterol 0.5 mg-2.5 mg/3 mLinhalation solution: 3 milliliter(s) inhaled 4 times a day, As Needed (26 Mar 2021 15:32)  metoprolol tartrate 100 mg oral tablet: 1 tab(s) orally 2 times a day (26 Mar 2021 15:32)  tamsulosin 0.4 mg oral capsule: 1 cap(s) orally once a day (26 Mar 2021 15:32)  Vitamin D3 1000 intl units (25 mcg) oral capsule: orally once a day (26 Mar 2021 15:32)    HPI:  76 year old male patient with a medical history of MI in 1995 (angiogram, no stents placed), COPD (2L O2 at home), s/p TAVR (03/2019 at [facility]), gout, CKD, CVA (09/2020), pulmonary HTN, initially presented to [facility] 6/1/21 with RONNELL on CKD (likely cardiorenal syndrome), urinary retention due to noncompliance with medications, with hospital course complicated by cardiogenic shock, acute hypoxemic respiratory failure secondary to metabolic acidosis and respiratory alkalosis. Patient found to have TAVR failure with ISRAEL 6/10/21 showing EF 40-45%, Aortic valve prosthesis with Severe paravalvular leak and valvular AI, mild-moderate aortic stenosis, and moderate MR. Patient was transferred to [facility] under Dr. Campbell for further workup.     Imaging from [facility]:  6/1: CXR showing cardiomegaly, small b/l pleural effusions, moderate pulmonary vascular congestion.  6/1: CT Brain ordered for head trauma? showing age-related cerebral and cerebellar volume loss, mild chronic vascular ischemic changes, stable biparietal arachnoid cyst and stable midline posterior fossa arachnoid cyst.   6/1: US Abdomen for elevated LFTs showing mild hepatic steatosis, mild hepatomegaly, sludge in the gallbladder, no discrete gallstones, normal biliary ducts, mild echogenic right kidney which may be seen with medical renal dz, mildly complex Right upper pole 4cm cyst with subtle internal echoes may represent hemorrhagic/proteinaceous cyst, mildly complex right midpole 2cm cyst with thin internal linear septation.   6/3: Kidney and Bladder US showing no hydronephrosis, echogenic kidneys likely from medical renal dz, prostatomegaly, and thickening of the posterior wall of the bladder with trabeculations which could be due to bladder outlet obstruction.   6/7: Kidney and Bladder US: No hydronephrosis or shadowing renal calculi. Stable b/l renal cysts.   6/7: Lower Extremity Venous Doppler with no DVT noted.   6/8: TTE showing EF 47%, dilated IVC, dilation of the ascending aorta of 4.4cm, moderate-severe pulmonary HTN, moderate-severe TR, aortic valve with severe prosthesis regurgitation and moderate prosthesis stenosis  6/9: CT Chest showing emphysema and intersitial lung dz changes, stable b/l small pleural effusions, cardiomegaly.   6/11: ISRAEL showing EF 40-45%, Aortic valve prosthesis with Severe paravalvular leak and valvular AI, mild-moderate aortic stenosis, and moderate MR.  (12 Jun 2021 01:36)    General: No fatigue, no fevers/chills  Respiratory: No dyspnea, no cough, no wheeze  CV: No chest pain, no palpitations  Abd: No nausea  Neuro: No headache, no dizziness    Allergies:   ·	No Known Drug Allergies  ·	strawberry (Anaphylaxis)    Objective:  Vital Signs Last 24 Hrs  T(C): 36.8 (19 Jun 2021 14:34), Max: 36.8 (19 Jun 2021 14:34)  T(F): 98.2 (19 Jun 2021 14:34), Max: 98.2 (19 Jun 2021 14:34)  HR: 96 (19 Jun 2021 14:34) (80 - 96)  BP: 115/67 (19 Jun 2021 14:34) (104/50 - 115/67)  RR: 18 (19 Jun 2021 14:34) (17 - 19)  SpO2: 99% (19 Jun 2021 14:34) (96% - 100%)    General: Awake, alert, speech clear, in no acute distress.  Chest: CTA, S1, S2, no murmur.  Abdomen, Soft, nondistended  Right Groin: Soft, no bleeding, no hematoma.  Extremities: No edema, + distal pulses.                          12.0   8.95  )-----------( 125      ( 19 Jun 2021 06:25 )             39.6     06-19    135  |  97    |  56.1  ----------------------------<  107  4.2   |  20.0  |  3.50    Ca    9.8      19 Jun 2021 06:26  Phos  2.9     06-18  Mg     2.3     06-19    TPro  7.1  /  Alb  3.7  /  TBili  2.5  /  DBili  x   /  AST  31  /  ALT  32  /  AlkPhos  109  06-19    PT/INR - ( 19 Jun 2021 13:14 )   PT: 20.5 sec;   INR: 1.82 ratio

## 2021-06-19 NOTE — PROGRESS NOTE ADULT - SUBJECTIVE AND OBJECTIVE BOX
INFECTIOUS DISEASES AND INTERNAL MEDICINE at [facility]  =======================================================  Robson Wright MD  Diplomates American Board of Internal Medicine and Infectious Diseases  Telephone [phone]  Fax            [phone]  =======================================================    REYNA BOB [id number]    Follow up: valve vegetation    no fevers  seen at dialysis      Allergies:  No Known Drug Allergies  strawberry (Anaphylaxis)         SOCIAL       FAMILY   FAMILY HISTORY:  FH: lung cancer      REVIEW OF SYSTEMS:  CONSTITUTIONAL:  No Fever or chills  HEENT:   No diplopia or blurred vision.  No earache, sore throat or runny nose.  CARDIOVASCULAR:  No pressure, squeezing, strangling, tightness, heaviness or aching about the chest, neck, axilla or epigastrium.  RESPIRATORY:  No cough, shortness of breath, PND or orthopnea.  GASTROINTESTINAL:  No nausea, vomiting or diarrhea.  GENITOURINARY:  No dysuria, frequency or urgency. No Blood in urine  MUSCULOSKELETAL:   moves all joints  SKIN:  No change in skin, hair or nails.  NEUROLOGIC:  No paresthesias, fasciculations, seizures or weakness.  PSYCHIATRIC:  No disorder of thought or mood.  ENDOCRINE:  No heat or cold intolerance, polyuria or polydipsia.  HEMATOLOGICAL:  No easy bruising or bleeding.           Physical Exam:     GEN: NAD,   HEENT: normocephalic and atraumatic. EOMI. EMMIE.    NECK: Supple. No carotid bruits.  No lymphadenopathy or thyromegaly.  LUNGS: Clear to auscultation.  HEART: Regular rate and rhythm + murmur.  ABDOMEN: Soft, nontender, and nondistended.  Positive bowel sounds.    : No CVA tenderness  EXTREMITIES: Without any cyanosis, clubbing, rash, lesions or edema.  MSK: no joint swelling  NEUROLOGIC: Cranial nerves II through XII are grossly intact.  PSYCHIATRIC: Appropriate affect .  SKIN: No ulceration or induration present.      Vitals:  ============  T(F): 98.2 (19 Jun 2021 14:34), Max: 98.2 (19 Jun 2021 14:34)  HR: 96 (19 Jun 2021 14:34)  BP: 115/67 (19 Jun 2021 14:34)  RR: 18 (19 Jun 2021 14:34)  SpO2: 99% (19 Jun 2021 14:34) (96% - 100%)  temp max in last 48H T(F): , Max: 98.4 (06-18-21 @ 12:40)    =======================================================  Current Antibiotics:    Other medications:  ascorbic acid 500 milliGRAM(s) Oral daily  aspirin enteric coated 81 milliGRAM(s) Oral daily  atorvastatin 10 milliGRAM(s) Oral at bedtime  budesonide 160 MICROgram(s)/formoterol 4.5 MICROgram(s) Inhaler 2 Puff(s) Inhalation two times a day  finasteride 5 milliGRAM(s) Oral daily  megestrol Suspension 400 milliGRAM(s) Oral daily  midodrine. 5 milliGRAM(s) Oral three times a day  mirtazapine 7.5 milliGRAM(s) Oral daily  multivitamin/minerals 1 Tablet(s) Oral daily  pantoprazole    Tablet 40 milliGRAM(s) Oral two times a day  phytonadione   Solution 5 milliGRAM(s) Oral daily  psyllium Powder 1 Packet(s) Oral two times a day  senna 2 Tablet(s) Oral at bedtime  sodium chloride 0.9% lock flush 3 milliLiter(s) IV Push every 8 hours  tamsulosin 0.4 milliGRAM(s) Oral at bedtime      =======================================================  Labs:                        12.0   8.95  )-----------( 125      ( 19 Jun 2021 06:25 )             39.6     06-19    135  |  97<L>  |  56.1<H>  ----------------------------<  107<H>  4.2   |  20.0<L>  |  3.50<H>    Ca    9.8      19 Jun 2021 06:26  Phos  2.9     06-18  Mg     2.3     06-19    TPro  7.1  /  Alb  3.7  /  TBili  2.5<H>  /  DBili  x   /  AST  31  /  ALT  32  /  AlkPhos  109  06-19      Culture - Blood (collected 06-16-21 @ 17:23)  Source: .Blood Blood    Culture - Blood (collected 06-16-21 @ 14:41)  Source: .Blood Blood-Peripheral    Culture - Blood (collected 06-16-21 @ 14:40)  Source: .Blood Blood-Peripheral    Culture - Blood (collected 06-15-21 @ 12:30)  Source: .Blood Blood-Peripheral    Culture - Blood (collected 06-15-21 @ 12:29)  Source: .Blood Blood-Peripheral         Procalcitonin, Serum: 0.24 ng/mL (06-17-21 @ 09:12)      COVID-19 PCR: NotDetec (06-18-21 @ 08:13)

## 2021-06-20 LAB
ANION GAP SERPL CALC-SCNC: 20 MMOL/L — HIGH (ref 5–17)
APTT 50/50 2HOUR INCUB: 33.5 SEC — SIGNIFICANT CHANGE UP (ref 27.5–36.5)
APTT BLD: 33 SEC — SIGNIFICANT CHANGE UP (ref 27.5–36.5)
APTT BLD: 40.8 SEC — HIGH (ref 27.5–35.5)
APTT BLD: 41.5 SEC — HIGH (ref 27.5–35.5)
BUN SERPL-MCNC: 50 MG/DL — HIGH (ref 8–20)
CALCIUM SERPL-MCNC: 9.6 MG/DL — SIGNIFICANT CHANGE UP (ref 8.6–10.2)
CHLORIDE SERPL-SCNC: 94 MMOL/L — LOW (ref 98–107)
CO2 SERPL-SCNC: 19 MMOL/L — LOW (ref 22–29)
CREAT SERPL-MCNC: 3.8 MG/DL — HIGH (ref 0.5–1.3)
CULTURE RESULTS: SIGNIFICANT CHANGE UP
CULTURE RESULTS: SIGNIFICANT CHANGE UP
FIBRINOGEN PPP-MCNC: 360 MG/DL — SIGNIFICANT CHANGE UP (ref 290–520)
GLUCOSE SERPL-MCNC: 108 MG/DL — HIGH (ref 70–99)
HCT VFR BLD CALC: 37.2 % — LOW (ref 39–50)
HGB BLD-MCNC: 11.7 G/DL — LOW (ref 13–17)
INR BLD: 1.93 RATIO — HIGH (ref 0.88–1.16)
MAGNESIUM SERPL-MCNC: 2.3 MG/DL — SIGNIFICANT CHANGE UP (ref 1.6–2.6)
MCHC RBC-ENTMCNC: 29.9 PG — SIGNIFICANT CHANGE UP (ref 27–34)
MCHC RBC-ENTMCNC: 31.5 GM/DL — LOW (ref 32–36)
MCV RBC AUTO: 95.1 FL — SIGNIFICANT CHANGE UP (ref 80–100)
PAT CTL 2H: 32.8 SEC — SIGNIFICANT CHANGE UP (ref 27.5–36.5)
PLATELET # BLD AUTO: 129 K/UL — LOW (ref 150–400)
POTASSIUM SERPL-MCNC: 4.6 MMOL/L — SIGNIFICANT CHANGE UP (ref 3.5–5.3)
POTASSIUM SERPL-SCNC: 4.6 MMOL/L — SIGNIFICANT CHANGE UP (ref 3.5–5.3)
PROTHROM AB SERPL-ACNC: 21.7 SEC — HIGH (ref 10.6–13.6)
PT 100%: 20.1 SEC — HIGH (ref 10.6–13.6)
PT 50/50: 13.4 SEC — SIGNIFICANT CHANGE UP (ref 10.6–14.7)
RBC # BLD: 3.91 M/UL — LOW (ref 4.2–5.8)
RBC # FLD: 21.5 % — HIGH (ref 10.3–14.5)
SODIUM SERPL-SCNC: 133 MMOL/L — LOW (ref 135–145)
SPECIMEN SOURCE: SIGNIFICANT CHANGE UP
SPECIMEN SOURCE: SIGNIFICANT CHANGE UP
THROMBIN TIME: 23.2 SEC — SIGNIFICANT CHANGE UP (ref 16–25)
WBC # BLD: 8.24 K/UL — SIGNIFICANT CHANGE UP (ref 3.8–10.5)
WBC # FLD AUTO: 8.24 K/UL — SIGNIFICANT CHANGE UP (ref 3.8–10.5)

## 2021-06-20 PROCEDURE — 99233 SBSQ HOSP IP/OBS HIGH 50: CPT

## 2021-06-20 PROCEDURE — 71045 X-RAY EXAM CHEST 1 VIEW: CPT | Mod: 26

## 2021-06-20 PROCEDURE — 99232 SBSQ HOSP IP/OBS MODERATE 35: CPT

## 2021-06-20 RX ORDER — FUROSEMIDE 40 MG
80 TABLET ORAL ONCE
Refills: 0 | Status: DISCONTINUED | OUTPATIENT
Start: 2021-06-20 | End: 2021-06-20

## 2021-06-20 RX ADMIN — SODIUM CHLORIDE 3 MILLILITER(S): 9 INJECTION INTRAMUSCULAR; INTRAVENOUS; SUBCUTANEOUS at 05:16

## 2021-06-20 RX ADMIN — SENNA PLUS 2 TABLET(S): 8.6 TABLET ORAL at 22:02

## 2021-06-20 RX ADMIN — ENOXAPARIN SODIUM 30 MILLIGRAM(S): 100 INJECTION SUBCUTANEOUS at 11:47

## 2021-06-20 RX ADMIN — MIRTAZAPINE 7.5 MILLIGRAM(S): 45 TABLET, ORALLY DISINTEGRATING ORAL at 22:01

## 2021-06-20 RX ADMIN — MIDODRINE HYDROCHLORIDE 5 MILLIGRAM(S): 2.5 TABLET ORAL at 22:01

## 2021-06-20 RX ADMIN — PANTOPRAZOLE SODIUM 40 MILLIGRAM(S): 20 TABLET, DELAYED RELEASE ORAL at 05:14

## 2021-06-20 RX ADMIN — Medication 1 TABLET(S): at 11:46

## 2021-06-20 RX ADMIN — SODIUM CHLORIDE 3 MILLILITER(S): 9 INJECTION INTRAMUSCULAR; INTRAVENOUS; SUBCUTANEOUS at 21:50

## 2021-06-20 RX ADMIN — ATORVASTATIN CALCIUM 10 MILLIGRAM(S): 80 TABLET, FILM COATED ORAL at 22:01

## 2021-06-20 RX ADMIN — MIDODRINE HYDROCHLORIDE 5 MILLIGRAM(S): 2.5 TABLET ORAL at 18:54

## 2021-06-20 RX ADMIN — MIDODRINE HYDROCHLORIDE 5 MILLIGRAM(S): 2.5 TABLET ORAL at 10:04

## 2021-06-20 RX ADMIN — SODIUM CHLORIDE 3 MILLILITER(S): 9 INJECTION INTRAMUSCULAR; INTRAVENOUS; SUBCUTANEOUS at 17:44

## 2021-06-20 RX ADMIN — BUDESONIDE AND FORMOTEROL FUMARATE DIHYDRATE 2 PUFF(S): 160; 4.5 AEROSOL RESPIRATORY (INHALATION) at 20:33

## 2021-06-20 RX ADMIN — Medication 500 MILLIGRAM(S): at 11:47

## 2021-06-20 RX ADMIN — Medication 5 MILLIGRAM(S): at 11:48

## 2021-06-20 RX ADMIN — BUDESONIDE AND FORMOTEROL FUMARATE DIHYDRATE 2 PUFF(S): 160; 4.5 AEROSOL RESPIRATORY (INHALATION) at 08:18

## 2021-06-20 RX ADMIN — TAMSULOSIN HYDROCHLORIDE 0.4 MILLIGRAM(S): 0.4 CAPSULE ORAL at 22:01

## 2021-06-20 RX ADMIN — MEGESTROL ACETATE 400 MILLIGRAM(S): 40 SUSPENSION ORAL at 11:47

## 2021-06-20 RX ADMIN — FINASTERIDE 5 MILLIGRAM(S): 5 TABLET, FILM COATED ORAL at 11:47

## 2021-06-20 RX ADMIN — Medication 81 MILLIGRAM(S): at 11:46

## 2021-06-20 RX ADMIN — PANTOPRAZOLE SODIUM 40 MILLIGRAM(S): 20 TABLET, DELAYED RELEASE ORAL at 18:55

## 2021-06-20 NOTE — PROGRESS NOTE ADULT - ATTENDING COMMENTS
76 year old male patient with a medical history of MI in 1995 (angiogram, no stents placed), COPD (2L O2 at home), s/p TAVR (03/2019 at [facility]), gout, CKD, CVA (09/2020), pulmonary HTN, initially presented to [facility] 6/1/21 with RONNELL on CKD (likely cardiorenal syndrome), urinary retention due to noncompliance with medications, with hospital course complicated by cardiogenic shock, acute hypoxemic respiratory failure secondary to metabolic acidosis and respiratory alkalosis. Patient found to have TAVR failure with ISRAEL 6/10/21 showing EF 40-45%, Aortic valve prosthesis with Severe paravalvular leak and valvular AI, mild-moderate aortic stenosis, and moderate MR.     Patients ISRAEL findings and Cath findings noted.    Patient had 4 beat WCT early am tody    Patient seen and examined by me.    I concur with the above note room air

## 2021-06-20 NOTE — PROGRESS NOTE ADULT - SUBJECTIVE AND OBJECTIVE BOX
Patient is a 76y old Male who presents with a chief complaint of TAVR Failure (17 Jun 2021 19:48)    PAST MEDICAL & SURGICAL HISTORY:  Pulmonary hypertension  Hypertension  Hyperlipidemia  H/O aortic valve stenosis s/p TAVR 2019 at [place]  CVA (cerebrovascular accident)  MCA CVA with tPA and thrombectomy  Chronic kidney disease  Prediabetes  Mitral valve regurgitation  CAD in native artery  Aneurysm of aortic root, thoracic aortic aneurysm, without ruptur  Benign prostatic hyperplasia  Gout  History of transcatheter aortic valve replacement (TAVR)    FAMILY HISTORY:  FH: lung cancer    Brief Hospital Course: Patient initially presented to [facility] 6/1/21 with RONNELL on CKD (likely cardiorenal syndrome), urinary retention due to noncompliance with medications, with hospital course complicated by cardiogenic shock, acute hypoxemic respiratory failure secondary to metabolic acidosis and respiratory alkalosis, and acute on chronic combined diastolic and systolic heart failure. Patient found to have TAVR failure with ISRAEL 6/10/21 showing EF 40-45%, Aortic valve prosthesis with Severe paravalvular leak and valvular AI, mild-moderate aortic stenosis, and moderate MR. Patient was transferred to [facility] under Dr. Campbell for further workup. TTE 6/12 showing EF 30-35%, reduced RV function, mild MAC, moderate to severe MR, severe TR, moderate AI, and severe PA. Patient was challenged with lasix for fluid overload, with worsening RONNELL on CKD and cardiorenal syndrome, ultimately progressing to ESRD requiring HD via Left IJ dialysis catheter. Concern for recent reported 100lb weight loss, decreased appetite, and dysphagia with EGD showing duodenitis and diffuse erosive gastritis, and colonoscopy showing diverticulosis. EGD biopsies negative for H. Pylori or carcinoma. Repeat ISRAEL performed during EGD showing an EF of 35%, moderate MR, moderate TR, s/p TAVR with a mobile echo density attached to the ventricular side of the prosthetic aortic valve, Severe AI. With prior Hx of step Bovis bacteremia, findings are suspicious for endocarditis. Blood cultures from 6,7 and 6/15 remain negative to date. ID monitoring patient off of antibiotics as he remains afebrile, nontoxic appearing, with no leukocytosis. Cardiac Cath 6/18 without significant CAD. CT C/A/P completed 6/19 in preparation for possible valve-in-valve TAVR 6/23/21. Dental evaluation deferred until after surgery. Hematology consult appreciated for autoelevated INR with Vit K use suggesting possible liver disease, PT based mixing study sent to lab, pending results. Patient with possible sleep apnea, desatting nightly, with Pulmonary consult recommending supplemental oxygen and sleep study as an outpatient.      Significant recent/past 24 hr events: No events reported overnight.     Subjective: Patient sitting up in bed in no acute distress. "I'm not doing dialysis anymore. This is no way to live." +Fatigue. Denies fevers, chills, lightheadedness, dizziness, HA, CP, palpitations, cough, abdominal pain, N/V, diarrhea, numbness/tingling in extremities, or any other acute complaints.    MEDICATIONS  (STANDING):  ascorbic acid 500 milliGRAM(s) Oral daily  aspirin enteric coated 81 milliGRAM(s) Oral daily  atorvastatin 10 milliGRAM(s) Oral at bedtime  budesonide 160 MICROgram(s)/formoterol 4.5 MICROgram(s) Inhaler 2 Puff(s) Inhalation two times a day  enoxaparin Injectable 30 milliGRAM(s) SubCutaneous daily  finasteride 5 milliGRAM(s) Oral daily  megestrol Suspension 400 milliGRAM(s) Oral daily  midodrine. 5 milliGRAM(s) Oral three times a day  mirtazapine 7.5 milliGRAM(s) Oral daily  multivitamin/minerals 1 Tablet(s) Oral daily  pantoprazole    Tablet 40 milliGRAM(s) Oral two times a day  phytonadione   Solution 5 milliGRAM(s) Oral daily  psyllium Powder 1 Packet(s) Oral two times a day  senna 2 Tablet(s) Oral at bedtime  sodium chloride 0.9% lock flush 3 milliLiter(s) IV Push every 8 hours  tamsulosin 0.4 milliGRAM(s) Oral at bedtime    MEDICATIONS  (PRN):  ALBUTerol    90 MICROgram(s) HFA Inhaler 2 Puff(s) Inhalation every 6 hours PRN Shortness of Breath and/or Wheezing  bisacodyl 5 milliGRAM(s) Oral every 12 hours PRN Constipation  polyethylene glycol 3350 17 Gram(s) Oral daily PRN Constipation    Allergies:  No Known Drug Allergies  strawberry (Anaphylaxis)    Vitals   T(C): 36.3 (19 Jun 2021 21:24), Max: 36.8 (19 Jun 2021 14:34)  T(F): 97.4 (19 Jun 2021 21:24), Max: 98.2 (19 Jun 2021 14:34)  HR: 87 (19 Jun 2021 21:24) (80 - 96)  BP: 103/53 (19 Jun 2021 21:24) (103/53 - 115/67)  RR: 19 (19 Jun 2021 21:24) (17 - 19)  SpO2: 99% (19 Jun 2021 21:24) (97% - 100%)    I&O's Detail    18 Jun 2021 07:01  -  19 Jun 2021 07:00  --------------------------------------------------------  IN:    Oral Fluid: 240 mL  Total IN: 240 mL    OUT:    Voided (mL): 500 mL  Total OUT: 500 mL    Total NET: -260 mL      19 Jun 2021 07:01  -  20 Jun 2021 02:48  --------------------------------------------------------  IN:    Oral Fluid: 240 mL  Total IN: 240 mL    OUT:  Total OUT: 0 mL    Total NET: 240 mL    Physical Exam  Neuro: A+O x 3, non-focal, speech clear and intact  HEENT:  NCAT, PERRL, EOMI. No conjuctival edema or icterus, no thrush.    Neck:  Supple, trachea midline, LIJ Dialysis catheter  Pulm: +Diminished BSs b/l, no rales/rhonchi/wheezing, no accessory muscle use noted  CV: regular rate, regular rhythm, +S1S2  Abd: soft, NT, ND, + BS  Ext: CRONIN x 4, +1 LE edema, no cyanosis or clubbing, distal motor/neuro/circ intact  Skin: warm, dry, perfused    LABS                                   12.0   8.95  )-----------( 125      ( 19 Jun 2021 06:25 )             39.6     06-19    135  |  97<L>  |  56.1<H>  ----------------------------<  107<H>  4.2   |  20.0<L>  |  3.50<H>    Ca    9.8      19 Jun 2021 06:26  Phos  2.9     06-18  Mg     2.3     06-19    TPro  7.1  /  Alb  3.7  /  TBili  2.5<H>  /  DBili  x   /  AST  31  /  ALT  32  /  AlkPhos  109  06-19    PT/INR - ( 19 Jun 2021 21:36 )   PT: 20.5 sec;   INR: 1.82 ratio        Last CXR:  < from: Xray Chest 1 View- PORTABLE-Routine (Xray Chest 1 View- PORTABLE-Routine in AM.) (06.18.21 @ 06:18) >  Two expiratory/kyphotic views are compared to 6/17/2021 and demonstrate a left IJ line with the tip in the brachiocephalic vein and no pneumothorax, unchanged.  Mild cardiomegaly and mild, central pulmonary venous congestion with no gross consolidation, effusion, pneumothorax or acute osseous finding.  Patient is post however.  IMPRESSION:  Mild pulmonary venous congestion, unchanged.  Clearing of atelectasis at the bases  < end of copied text >

## 2021-06-20 NOTE — PROGRESS NOTE ADULT - PROBLEM SELECTOR PLAN 10
Pantoprazole for GI prophylaxis.  SCDs for DVT prophylaxis.    11.  Duodinitis  Seen on EGD 6/15 - Pantoprazole started.    12.  Diverticulosis  Seen on Colonoscopy 6/16, mild.    13. Autoelevated INR  Hematology consult appreciated for autoelevated INR with Vit K use suggesting possible liver diease,  PT based mixing study sent to lab, results pending.     Plan to be discussed / reviewed with CT Surgery attending / team during AM rounds.

## 2021-06-20 NOTE — PROGRESS NOTE ADULT - PROBLEM SELECTOR PLAN 6
ESRD requiring HD.  Left IJ HD catheter placed 6/16.   Renal team following.  Last HD 6/19.  Patient reports that he is not going to continue HD.  Plan to discuss further treatment plans with patient and attending later today.

## 2021-06-20 NOTE — PROGRESS NOTE ADULT - SUBJECTIVE AND OBJECTIVE BOX
[place] [facility]                                                        Office: [address] [address]                                                       Telephone: [phone]. Fax:[phone]                                                                             PROGRESS NOTE   Reason for follow up:  TAVR Failure                            Overnight: No new events.   Update:   6/20/2021:  Telemetry with 1 degree AV block, bun / creatinine 50.8/3.80 -slight increase noted - continue monitoring labs   Subjective: "Can I back to bed, I am tired."   Complains of: Nothing  Review of symptoms: Cardiac:  No chest pain. No dyspnea. No palpitations.  Respiratory: no cough. No dyspnea  Gastrointestinal: No diarrhea. No abdominal pain. No bleeding.     Past medical history: No updates.   Chronic conditions:  PAST MEDICAL & SURGICAL HISTORY:  Pulmonary hypertension  Hypertension  Hyperlipidemia  H/O aortic valve stenosis  s/p TAVR 2019 at [place]  CVA (cerebrovascular accident)  MCA CVA with tPA and thrombectomy  Chronic kidney disease  Prediabetes  Mitral valve regurgitation  CAD in native artery  Aneurysm of aortic root  thoracic aortic aneurysm, without ruptur  Benign prostatic hyperplasia  Gout  History of transcatheter aortic valve replacement (TAVR)  	  Vitals:  T(C): 36.4 (06-20-21 @ 10:56), Max: 36.8 (06-19-21 @ 14:34)  HR: 84 (06-20-21 @ 10:56) (84 - 96)  BP: 96/53 (06-20-21 @ 10:56) (96/53 - 127/53)  RR: 18 (06-20-21 @ 10:56) (18 - 19)  SpO2: 95% (06-20-21 @ 10:56) (95% - 100%)  I&O's Summary  19 Jun 2021 07:01  -  20 Jun 2021 07:00  --------------------------------------------------------  IN: 240 mL / OUT: 100 mL / NET: 140 mL      PHYSICAL EXAM:  Appearance: Comfortable. No acute distress, frail ill appearing elderly male.    HEENT:  Head and neck: Atraumatic. Normocephalic. + for IJ for dialysis wln, normal oral mucosa, PERRL, Neck is supple. No JVD, No carotid bruit.   Neurologic: A & O x 3, no focal deficits.  Lymphatic: No cervical lymphadenopathy  Cardiovascular: Normal S1 S2, No murmur, rubs/gallops. No JVD, +2 pitting edema  Respiratory: Lungs clear to auscultation  Gastrointestinal:  Soft, Non-tender, + BS  Lower Extremities: +  pitting edema  Psychiatry: Patient is calm. No agitation. Mood & affect appropriate  Skin: No rash, warm and dry.      CURRENT MEDICATIONS:  midodrine. 5 milliGRAM(s) Oral three times a day  tamsulosin 0.4 milliGRAM(s) Oral at bedtime  budesonide 160 MICROgram(s)/formoterol 4.5 MICROgram(s) Inhaler  mirtazapine  pantoprazole    Tablet  psyllium Powder  senna  atorvastatin  finasteride  megestrol Suspension  ascorbic acid  aspirin enteric coated  enoxaparin Injectable  multivitamin/minerals  phytonadione   Solution  sodium chloride 0.9% lock flush    LABS:	 	                      11.7   8.24  )-----------( 129      ( 20 Jun 2021 06:46 )             37.2   06-20  133<L>  |  94<L>  |  50.0<H>  ----------------------------<  108<H>  4.6   |  19.0<L>  |  3.80<H>  Ca    9.6      20 Jun 2021 06:46  Mg     2.3     06-20  TPro  7.1  /  Alb  3.7  /  TBili  2.5<H>  /  DBili  x   /  AST  31  /  ALT  32  /  AlkPhos  109  06-19    TELEMETRY: Reviewed                                                                    [place] [facility]                                                        Office: [address] [address]                                                       Telephone: [phone]. Fax:[phone]                                                                             PROGRESS NOTE   Reason for follow up:  TAVR Failure                            Overnight: No new events.   Update:   6/20/2021:  Telemetry with 1 degree AV block, bun / creatinine 50.8/3.80 -slight increase noted - continue monitoring labs   Subjective: "Can I back to bed, I am tired."   Complains of: Nothing  Review of symptoms: Cardiac:  No chest pain. No dyspnea. No palpitations.  Respiratory: no cough. No dyspnea  Gastrointestinal: No diarrhea. No abdominal pain. No bleeding.     Past medical history: No updates.   Chronic conditions:  PAST MEDICAL & SURGICAL HISTORY:  Pulmonary hypertension  Hypertension  Hyperlipidemia  H/O aortic valve stenosis  s/p TAVR 2019 at [place]  CVA (cerebrovascular accident)  MCA CVA with tPA and thrombectomy  Chronic kidney disease  Prediabetes  Mitral valve regurgitation  CAD in native artery  Aneurysm of aortic root  thoracic aortic aneurysm, without ruptur  Benign prostatic hyperplasia  Gout  History of transcatheter aortic valve replacement (TAVR)  	  Vitals:  T(C): 36.4 (06-20-21 @ 10:56), Max: 36.8 (06-19-21 @ 14:34)  HR: 84 (06-20-21 @ 10:56) (84 - 96)  BP: 96/53 (06-20-21 @ 10:56) (96/53 - 127/53)  RR: 18 (06-20-21 @ 10:56) (18 - 19)  SpO2: 95% (06-20-21 @ 10:56) (95% - 100%)  I&O's Summary  19 Jun 2021 07:01  -  20 Jun 2021 07:00  --------------------------------------------------------  IN: 240 mL / OUT: 100 mL / NET: 140 mL      PHYSICAL EXAM:  Appearance: Comfortable. No acute distress, frail ill appearing elderly male.    HEENT:  Head and neck: Atraumatic. Normocephalic. + for IJ for dialysis wln, normal oral mucosa, PERRL, Neck is supple. No JVD, No carotid bruit.   Neurologic: A & O x 3, no focal deficits.  Lymphatic: No cervical lymphadenopathy  Cardiovascular: Normal S1 S2, No murmur, rubs/gallops. No JVD, +2 pitting edema  Respiratory: Lungs clear to auscultation  Gastrointestinal:  Soft, Non-tender, + BS  Lower Extremities: +  pitting edema  Psychiatry: Patient is calm. No agitation. Mood & affect appropriate  Skin: No rash, warm and dry.      CURRENT MEDICATIONS:  midodrine. 5 milliGRAM(s) Oral three times a day  tamsulosin 0.4 milliGRAM(s) Oral at bedtime  budesonide 160 MICROgram(s)/formoterol 4.5 MICROgram(s) Inhaler  mirtazapine  pantoprazole    Tablet  psyllium Powder  senna  atorvastatin  finasteride  megestrol Suspension  ascorbic acid  aspirin enteric coated  enoxaparin Injectable  multivitamin/minerals  phytonadione   Solution  sodium chloride 0.9% lock flush    LABS:	 	                      11.7   8.24  )-----------( 129      ( 20 Jun 2021 06:46 )             37.2   06-20  133<L>  |  94<L>  |  50.0<H>  ----------------------------<  108<H>  4.6   |  19.0<L>  |  3.80<H>  Ca    9.6      20 Jun 2021 06:46  Mg     2.3     06-20  TPro  7.1  /  Alb  3.7  /  TBili  2.5<H>  /  DBili  x   /  AST  31  /  ALT  32  /  AlkPhos  109  06-19    TELEMETRY: Reviewed  - 4 beat WCT

## 2021-06-20 NOTE — PROGRESS NOTE ADULT - ASSESSMENT
76 year old male patient with a medical history of MI in 1995 (angiogram, no stents placed), COPD (2L O2 at home), s/p TAVR (03/2019 at [facility]), gout, CKD, CVA (09/2020), pulmonary HTN, initially presented to [facility] 6/1/21 with RONNELL on CKD (likely cardiorenal syndrome), urinary retention due to noncompliance with medications, with hospital course complicated by cardiogenic shock, acute hypoxemic respiratory failure secondary to metabolic acidosis and respiratory alkalosis, and acute on chronic combined diastolic and systolic heart failure. Patient found to have TAVR failure with ISRAEL 6/10/21 showing EF 40-45%, Aortic valve prosthesis with Severe paravalvular leak and valvular AI, mild-moderate aortic stenosis, and moderate MR. Patient was transferred to [facility] under Dr. Campbell for further workup. TTE 6/12 showing EF 30-35%, reduced RV function, mild MAC, moderate to severe MR, severe TR, moderate AI, and severe PA. Patient was challenged with lasix for fluid overload, with worsening RONNELL on CKD and cardiorenal syndrome, ultimately progressing to ESRD requiring HD via Left IJ dialysis catheter. Concern for recent reported 100lb weight loss, decreased appetite, and dysphagia with EGD showing duodenitis and diffuse erosive gastritis, and colonoscopy showing diverticulosis. EGD biopsies negative for H. Pylori or carcinoma. Repeat ISRAEL performed during EGD showing an EF of 35%, moderate MR, moderate TR, s/p TAVR with a mobile echo density attached to the ventricular side of the prosthetic aortic valve, Severe AI. With prior Hx of step Bovis bacteremia, findings are suspicious for endocarditis. Blood cultures from 6,7 and 6/15 remain negative to date. ID monitoring patient off of antibiotics as he remains afebrile, nontoxic appearing, with no leukocytosis. Cardiac Cath 6/18 without significant CAD. CT C/A/P completed 6/19 in preparation for possible valve-in-valve TAVR 6/23/21. Dental evaluation deferred until after surgery. Hematology consult appreciated for autoelevated INR with Vit K use suggesting possible liver disease, PT based mixing study sent to lab, pending results. Patient with possible sleep apnea, desatting nightly, with Pulmonary consult recommending supplemental oxygen and sleep study as an outpatient.

## 2021-06-20 NOTE — PROGRESS NOTE ADULT - PROBLEM SELECTOR PLAN 1
ISRAEL 6/10/21 at [facility] showing EF 40-45%, Aortic valve prosthesis with Severe paravalvular leak and valvular AI, mild-moderate aortic stenosis, and moderate MR.  Admitted to [facility] 6/12/21 with CT Surgery Dr. Campbell.   ISRAEL 6/15/21 concern for a mobile echo density attached to the ventricular side of the prosthetic aortic valve with  Severe aortic valve regurgitation seen.  Blood Cultures from 6/7, 6/15 remain negative to date. ID input appreciated.    If pt spikes Temps, start Vanco and Rocephin per ID.   Otherwise, monitoring patient off of antibiotics as he remains nontoxic, afebrile, without leukocytosis. ?Marantic endocarditis?  Cardiac cath 6/18 without significant CAD.   CT C/A/P completed 6/19 in preparation for possible valve-in-valve TAVR 6/23/21.  Deferring dental evaluation until after TAVR surgery.  Continue midodrine to support BP.  Oxygenating well on 2L O2 via NC (On 2L Home O2).

## 2021-06-20 NOTE — PROGRESS NOTE ADULT - SUBJECTIVE AND OBJECTIVE BOX
[facility] DIVISION OF KIDNEY DISEASES AND HYPERTENSION -- FOLLOW UP NOTE  --------------------------------------------------------------------------------  Chief Complaint:  ESRD HD  24 hour events/subjective:  Seen/examined  HD Tues      PAST HISTORY  --------------------------------------------------------------------------------  No significant changes to PMH, PSH, FHx, SHx, unless otherwise noted    ALLERGIES & MEDICATIONS  --------------------------------------------------------------------------------  Allergies    No Known Drug Allergies  strawberry (Anaphylaxis)    Intolerances      Standing Inpatient Medications  ascorbic acid 500 milliGRAM(s) Oral daily  aspirin enteric coated 81 milliGRAM(s) Oral daily  atorvastatin 10 milliGRAM(s) Oral at bedtime  budesonide 160 MICROgram(s)/formoterol 4.5 MICROgram(s) Inhaler 2 Puff(s) Inhalation two times a day  enoxaparin Injectable 30 milliGRAM(s) SubCutaneous daily  finasteride 5 milliGRAM(s) Oral daily  megestrol Suspension 400 milliGRAM(s) Oral daily  midodrine. 5 milliGRAM(s) Oral three times a day  mirtazapine 7.5 milliGRAM(s) Oral daily  multivitamin/minerals 1 Tablet(s) Oral daily  pantoprazole    Tablet 40 milliGRAM(s) Oral two times a day  phytonadione   Solution 5 milliGRAM(s) Oral daily  psyllium Powder 1 Packet(s) Oral two times a day  senna 2 Tablet(s) Oral at bedtime  sodium chloride 0.9% lock flush 3 milliLiter(s) IV Push every 8 hours  tamsulosin 0.4 milliGRAM(s) Oral at bedtime    PRN Inpatient Medications  ALBUTerol    90 MICROgram(s) HFA Inhaler 2 Puff(s) Inhalation every 6 hours PRN  bisacodyl 5 milliGRAM(s) Oral every 12 hours PRN  polyethylene glycol 3350 17 Gram(s) Oral daily PRN      REVIEW OF SYSTEMS  --------------------------------------------------------------------------------  Gen: No weight changes, fatigue, fevers/chills, weakness  Skin: No rashes  Head/Eyes/Ears/Mouth: No headache; Normal hearing; Normal vision w/o blurriness; No sinus pain/discomfort, sore throat  Respiratory: No dyspnea, cough, wheezing, hemoptysis  CV: No chest pain, PND, orthopnea  GI: No abdominal pain, diarrhea, constipation, nausea, vomiting, melena, hematochezia  : No increased frequency, dysuria, hematuria, nocturia  MSK: No joint pain/swelling; no back pain; no edema  Neuro: No dizziness/lightheadedness, weakness, seizures, numbness, tingling  Heme: No easy bruising or bleeding  Endo: No heat/cold intolerance  Psych: No significant nervousness, anxiety, stress, depression    All other systems were reviewed and are negative, except as noted.    VITALS/PHYSICAL EXAM  --------------------------------------------------------------------------------  T(C): 36.4 (06-20-21 @ 05:13), Max: 36.8 (06-19-21 @ 14:34)  HR: 86 (06-20-21 @ 05:13) (80 - 96)  BP: 127/53 (06-20-21 @ 05:13) (103/53 - 127/53)  RR: 19 (06-20-21 @ 05:13) (17 - 19)  SpO2: 100% (06-20-21 @ 05:13) (99% - 100%)  Wt(kg): --        06-19-21 @ 07:01  -  06-20-21 @ 07:00  --------------------------------------------------------  IN: 240 mL / OUT: 100 mL / NET: 140 mL      Physical Exam:  	Gen: NAD   	HEENT: PERRL, supple neck, clear oropharynx  	Pulm: CTA B/L  	CV: RRR, S1S2; no rub  	Back: No spinal or CVA tenderness; no sacral edema  	Abd: +BS, soft, nontender/nondistended  	: No suprapubic tenderness  	UE: Warm, FROM, no clubbing, intact strength; no edema; no asterixis  	LE: Warm, FROM, no clubbing, intact strength; no edema  	Neuro: No focal deficits, intact gait  	Psych: Normal affect and mood  	Skin: Warm, without rashes  	Vascular access:    LABS/STUDIES  --------------------------------------------------------------------------------              11.7   8.24  >-----------<  129      [06-20-21 @ 06:46]              37.2     133  |  94  |  50.0  ----------------------------<  108      [06-20-21 @ 06:46]  4.6   |  19.0  |  3.80        Ca     9.6     [06-20-21 @ 06:46]      Mg     2.3     [06-20-21 @ 06:46]    TPro  7.1  /  Alb  3.7  /  TBili  2.5  /  DBili  x   /  AST  31  /  ALT  32  /  AlkPhos  109  [06-19-21 @ 06:26]    PT/INR: PT 21.7 , INR 1.93       [06-20-21 @ 06:46]  PTT: 40.8       [06-20-21 @ 06:46]      Creatinine Trend:  SCr 3.80 [06-20 @ 06:46]  SCr 3.50 [06-19 @ 06:26]  SCr 2.56 [06-18 @ 06:16]  SCr 2.77 [06-17 @ 03:09]  SCr 2.85 [06-16 @ 21:20]        TSH 2.31      [06-12-21 @ 01:55]    HBsAb <3.0      [06-17-21 @ 05:13]  HBsAg Nonreact      [06-17-21 @ 05:13]  HBcAb Nonreact      [06-17-21 @ 05:13]  HCV 0.15, Nonreact      [06-17-21 @ 05:13]

## 2021-06-20 NOTE — PROGRESS NOTE ADULT - ASSESSMENT
76 year old male patient with a medical history of MI in 1995 (angiogram, no stents placed), COPD (2L O2 at home), s/p TAVR (03/2019 at [facility]), gout, CKD, CVA (09/2020), pulmonary HTN, initially presented to [facility] 6/1/21 with RONNELL on CKD (likely cardiorenal syndrome), urinary retention due to noncompliance with medications, with hospital course complicated by cardiogenic shock, acute hypoxemic respiratory failure secondary to metabolic acidosis and respiratory alkalosis. Patient found to have TAVR failure with ISRAEL 6/10/21 showing EF 40-45%, Aortic valve prosthesis with Severe paravalvular leak and valvular AI, mild-moderate aortic stenosis, and moderate MR. Patient was transferred to [facility] under Dr. Campbell for further workup. Cardiology consulted for decompensated HF and severe pulmonary hypertension.     Imaging from [facility]:  6/11: ISRAEL showing EF 40-45%, Aortic valve prosthesis with Severe paravalvular leak and valvular AI, mild-moderate aortic stenosis, and moderate MR.   6/17:  Pt denies chest pain, palpitations, SOB. Tele- NSR HR @ 80s. Blood cultures- no growth for 48 hours. Eventual R+LHC when optimized from renal perspective. Cont. current medication regimen.   6/18/2021:   Plan for cardiac cath today.  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   Then ISRAEL done here with concern for endocarditis.  ID following as well.   6/20/2021:  Telemetry with 1 degree AV block, bun / creatinine 50.8/3.80 -slight increase noted - continue monitoring labs      HFrEF   - EF 40-45% -- > 30-35% on 6/12/21 echo  - severely reduced RV  - mod to severe MR  - Severe TR   - Aortic valve prosthesis with Severe paravalvular leak and valvular AI  -ISRAEL- preliminary results noted possible endocarditis  -ID following.     - Continue midodrine, hold afterload reduction and Toprol     Severe Pulmonary HTN   - confirmed on RHC march/2021   - Echo shows PASP 61.5 consistent with severe pulm HTN    RONNELL on CKD - creatinine today 42.7/2.56  - Nephrology recs appreciated  - metabolic acidosis, HCO3 is 20 w/ anion gap   -After surgery may require Long term continuos dialysis.      TAVR Prosthesis failure   - Aortic valve with severe prosthesis regurgitation and moderate prosthesis stenosis  - CTS following   -ISRAEL- preliminary results noted endocarditis  -ID evaluation appreciated  -S/P cardiac cath.     76 year old male patient with a medical history of MI in 1995 (angiogram, no stents placed), COPD (2L O2 at home), s/p TAVR (03/2019 at [facility]), gout, CKD, CVA (09/2020), pulmonary HTN, initially presented to [facility] 6/1/21 with RONNELL on CKD (likely cardiorenal syndrome), urinary retention due to noncompliance with medications, with hospital course complicated by cardiogenic shock, acute hypoxemic respiratory failure secondary to metabolic acidosis and respiratory alkalosis. Patient found to have TAVR failure with ISRAEL 6/10/21 showing EF 40-45%, Aortic valve prosthesis with Severe paravalvular leak and valvular AI, mild-moderate aortic stenosis, and moderate MR. Patient was transferred to [facility] under Dr. Campbell for further workup. Cardiology consulted for decompensated HF and severe pulmonary hypertension.     Imaging from [facility]:  6/11: ISRAEL showing EF 40-45%, Aortic valve prosthesis with Severe paravalvular leak and valvular AI, mild-moderate aortic stenosis, and moderate MR.   6/17:  Pt denies chest pain, palpitations, SOB. Tele- NSR HR @ 80s. Blood cultures- no growth for 48 hours. Eventual R+LHC when optimized from renal perspective. Cont. current medication regimen.   6/18/2021:   Plan for cardiac cath today.  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   Then ISRAEL done here with concern for endocarditis.  ID following as well.   6/20/2021:  Telemetry with 1 degree AV block, bun / creatinine 50.8/3.80 -slight increase noted - continue monitoring labs      HFrEF   - EF 40-45% -- > 30-35% on 6/12/21 echo  - severely reduced RV  - mod to severe MR  - Severe TR   - Aortic valve prosthesis with Severe paravalvular leak and valvular AI  -ISRAEL- preliminary results noted possible endocarditis  -ID following.     - Continue midodrine, hold afterload reduction and Toprol     Severe Pulmonary HTN   - confirmed on RHC march/2021   - Echo shows PASP 61.5 consistent with severe pulm HTN    RONNELL on CKD - creatinine today 42.7/2.56  - Nephrology recs appreciated  - metabolic acidosis, HCO3 is 20 w/ anion gap   -After surgery may require Long term continuos dialysis.      TAVR Prosthesis failure   - Aortic valve with severe prosthesis regurgitation and moderate prosthesis stenosis  - CTS following   -ISRAEL- preliminary results noted endocarditis  -ID evaluation appreciated  -S/P cardiac cath.      WCT- 4 Beat run early on 6/20/2021

## 2021-06-21 LAB
ALBUMIN SERPL ELPH-MCNC: 3.9 G/DL — SIGNIFICANT CHANGE UP (ref 3.3–5.2)
ALP SERPL-CCNC: 132 U/L — HIGH (ref 40–120)
ALT FLD-CCNC: 42 U/L — HIGH
ANION GAP SERPL CALC-SCNC: 21 MMOL/L — HIGH (ref 5–17)
APTT BLD: 40.8 SEC — HIGH (ref 27.5–35.5)
AST SERPL-CCNC: 54 U/L — HIGH
BILIRUB SERPL-MCNC: 2.7 MG/DL — HIGH (ref 0.4–2)
BUN SERPL-MCNC: 67.7 MG/DL — HIGH (ref 8–20)
CALCIUM SERPL-MCNC: 10.3 MG/DL — HIGH (ref 8.6–10.2)
CHLORIDE SERPL-SCNC: 93 MMOL/L — LOW (ref 98–107)
CO2 SERPL-SCNC: 20 MMOL/L — LOW (ref 22–29)
CREAT SERPL-MCNC: 5.09 MG/DL — HIGH (ref 0.5–1.3)
CULTURE RESULTS: SIGNIFICANT CHANGE UP
GAS PNL BLDA: SIGNIFICANT CHANGE UP
GLUCOSE SERPL-MCNC: 107 MG/DL — HIGH (ref 70–99)
HCT VFR BLD CALC: 38.7 % — LOW (ref 39–50)
HGB BLD-MCNC: 12.3 G/DL — LOW (ref 13–17)
INR BLD: 1.83 RATIO — HIGH (ref 0.88–1.16)
MCHC RBC-ENTMCNC: 30.8 PG — SIGNIFICANT CHANGE UP (ref 27–34)
MCHC RBC-ENTMCNC: 31.8 GM/DL — LOW (ref 32–36)
MCV RBC AUTO: 96.8 FL — SIGNIFICANT CHANGE UP (ref 80–100)
PLATELET # BLD AUTO: 118 K/UL — LOW (ref 150–400)
POTASSIUM SERPL-MCNC: 5.5 MMOL/L — HIGH (ref 3.5–5.3)
POTASSIUM SERPL-SCNC: 5.5 MMOL/L — HIGH (ref 3.5–5.3)
PROT SERPL-MCNC: 7.5 G/DL — SIGNIFICANT CHANGE UP (ref 6.6–8.7)
PROTHROM AB SERPL-ACNC: 20.6 SEC — HIGH (ref 10.6–13.6)
RBC # BLD: 4 M/UL — LOW (ref 4.2–5.8)
RBC # FLD: 21.8 % — HIGH (ref 10.3–14.5)
SODIUM SERPL-SCNC: 134 MMOL/L — LOW (ref 135–145)
SPECIMEN SOURCE: SIGNIFICANT CHANGE UP
WBC # BLD: 8.49 K/UL — SIGNIFICANT CHANGE UP (ref 3.8–10.5)
WBC # FLD AUTO: 8.49 K/UL — SIGNIFICANT CHANGE UP (ref 3.8–10.5)

## 2021-06-21 PROCEDURE — 99231 SBSQ HOSP IP/OBS SF/LOW 25: CPT

## 2021-06-21 PROCEDURE — 99232 SBSQ HOSP IP/OBS MODERATE 35: CPT

## 2021-06-21 PROCEDURE — 71045 X-RAY EXAM CHEST 1 VIEW: CPT | Mod: 26

## 2021-06-21 PROCEDURE — 90937 HEMODIALYSIS REPEATED EVAL: CPT

## 2021-06-21 RX ORDER — ALBUMIN HUMAN 25 %
50 VIAL (ML) INTRAVENOUS
Refills: 0 | Status: COMPLETED | OUTPATIENT
Start: 2021-06-21 | End: 2021-06-21

## 2021-06-21 RX ORDER — CHLORHEXIDINE GLUCONATE 213 G/1000ML
15 SOLUTION TOPICAL
Refills: 0 | Status: DISCONTINUED | OUTPATIENT
Start: 2021-06-21 | End: 2021-06-23

## 2021-06-21 RX ORDER — CALCIUM GLUCONATE 100 MG/ML
2 VIAL (ML) INTRAVENOUS ONCE
Refills: 0 | Status: COMPLETED | OUTPATIENT
Start: 2021-06-21 | End: 2021-06-21

## 2021-06-21 RX ORDER — CEFUROXIME AXETIL 250 MG
1500 TABLET ORAL ONCE
Refills: 0 | Status: COMPLETED | OUTPATIENT
Start: 2021-06-23 | End: 2021-06-23

## 2021-06-21 RX ORDER — MIRTAZAPINE 45 MG/1
15 TABLET, ORALLY DISINTEGRATING ORAL DAILY
Refills: 0 | Status: DISCONTINUED | OUTPATIENT
Start: 2021-06-21 | End: 2021-06-23

## 2021-06-21 RX ORDER — TUBERCULIN PURIFIED PROTEIN DERIVATIVE 5 [IU]/.1ML
5 INJECTION, SOLUTION INTRADERMAL ONCE
Refills: 0 | Status: COMPLETED | OUTPATIENT
Start: 2021-06-21 | End: 2021-06-21

## 2021-06-21 RX ORDER — ACETAMINOPHEN 500 MG
1000 TABLET ORAL ONCE
Refills: 0 | Status: COMPLETED | OUTPATIENT
Start: 2021-06-21 | End: 2021-06-21

## 2021-06-21 RX ORDER — ACETAMINOPHEN 500 MG
650 TABLET ORAL EVERY 6 HOURS
Refills: 0 | Status: DISCONTINUED | OUTPATIENT
Start: 2021-06-21 | End: 2021-06-23

## 2021-06-21 RX ORDER — CHLORHEXIDINE GLUCONATE 213 G/1000ML
1 SOLUTION TOPICAL
Refills: 0 | Status: DISCONTINUED | OUTPATIENT
Start: 2021-06-21 | End: 2021-06-23

## 2021-06-21 RX ADMIN — TAMSULOSIN HYDROCHLORIDE 0.4 MILLIGRAM(S): 0.4 CAPSULE ORAL at 21:01

## 2021-06-21 RX ADMIN — BUDESONIDE AND FORMOTEROL FUMARATE DIHYDRATE 2 PUFF(S): 160; 4.5 AEROSOL RESPIRATORY (INHALATION) at 08:10

## 2021-06-21 RX ADMIN — PANTOPRAZOLE SODIUM 40 MILLIGRAM(S): 20 TABLET, DELAYED RELEASE ORAL at 06:15

## 2021-06-21 RX ADMIN — SODIUM CHLORIDE 3 MILLILITER(S): 9 INJECTION INTRAMUSCULAR; INTRAVENOUS; SUBCUTANEOUS at 06:08

## 2021-06-21 RX ADMIN — Medication 81 MILLIGRAM(S): at 17:38

## 2021-06-21 RX ADMIN — MIDODRINE HYDROCHLORIDE 5 MILLIGRAM(S): 2.5 TABLET ORAL at 17:38

## 2021-06-21 RX ADMIN — BUDESONIDE AND FORMOTEROL FUMARATE DIHYDRATE 2 PUFF(S): 160; 4.5 AEROSOL RESPIRATORY (INHALATION) at 19:58

## 2021-06-21 RX ADMIN — MIDODRINE HYDROCHLORIDE 5 MILLIGRAM(S): 2.5 TABLET ORAL at 21:01

## 2021-06-21 RX ADMIN — Medication 1000 MILLIGRAM(S): at 12:45

## 2021-06-21 RX ADMIN — FINASTERIDE 5 MILLIGRAM(S): 5 TABLET, FILM COATED ORAL at 17:38

## 2021-06-21 RX ADMIN — MIDODRINE HYDROCHLORIDE 5 MILLIGRAM(S): 2.5 TABLET ORAL at 09:38

## 2021-06-21 RX ADMIN — PANTOPRAZOLE SODIUM 40 MILLIGRAM(S): 20 TABLET, DELAYED RELEASE ORAL at 21:01

## 2021-06-21 RX ADMIN — CHLORHEXIDINE GLUCONATE 15 MILLILITER(S): 213 SOLUTION TOPICAL at 21:01

## 2021-06-21 RX ADMIN — SODIUM CHLORIDE 3 MILLILITER(S): 9 INJECTION INTRAMUSCULAR; INTRAVENOUS; SUBCUTANEOUS at 21:01

## 2021-06-21 RX ADMIN — Medication 1 TABLET(S): at 17:37

## 2021-06-21 RX ADMIN — SODIUM CHLORIDE 3 MILLILITER(S): 9 INJECTION INTRAMUSCULAR; INTRAVENOUS; SUBCUTANEOUS at 15:16

## 2021-06-21 RX ADMIN — Medication 200 GRAM(S): at 12:37

## 2021-06-21 RX ADMIN — Medication 400 MILLIGRAM(S): at 12:15

## 2021-06-21 RX ADMIN — Medication 50 MILLILITER(S): at 12:49

## 2021-06-21 RX ADMIN — Medication 50 MILLILITER(S): at 11:59

## 2021-06-21 RX ADMIN — Medication 500 MILLIGRAM(S): at 17:37

## 2021-06-21 RX ADMIN — MEGESTROL ACETATE 400 MILLIGRAM(S): 40 SUSPENSION ORAL at 17:38

## 2021-06-21 RX ADMIN — Medication 50 MILLILITER(S): at 11:00

## 2021-06-21 RX ADMIN — ATORVASTATIN CALCIUM 10 MILLIGRAM(S): 80 TABLET, FILM COATED ORAL at 21:01

## 2021-06-21 RX ADMIN — Medication 1 PACKET(S): at 17:39

## 2021-06-21 RX ADMIN — Medication 5 MILLIGRAM(S): at 18:23

## 2021-06-21 RX ADMIN — CHLORHEXIDINE GLUCONATE 1 APPLICATION(S): 213 SOLUTION TOPICAL at 20:47

## 2021-06-21 RX ADMIN — SENNA PLUS 2 TABLET(S): 8.6 TABLET ORAL at 21:01

## 2021-06-21 NOTE — PROGRESS NOTE ADULT - SUBJECTIVE AND OBJECTIVE BOX
Patient with elevated PT/ PTT     6/21/21:  PT 20.6, PTT 40.8 INR 1.8  PTT 40.8 ,   PT 50/50 mix with correction to 13.4, PTT correction to 33s PTT control at 32    3/26/21: PT 15.9, PTT 41.9, INR 1.3    --- possibility of a factor deficiency vs lupus ac.   ---Send Factor Levels, Factors 2, 7 , 9, 10, 12, ,11, 8, 5, and 13  --- Send Lupus Anticoagulant with ( DRVVT, and silica clotting time)   -- Send Cardiolipin ab Ig M, IgG, IgA, B2 glycoprotein Ig M, IgG, IgA    All of the above tests need patient to be off heparin products

## 2021-06-21 NOTE — PROGRESS NOTE ADULT - PROBLEM SELECTOR PLAN 10
Pantoprazole for GI prophylaxis.  SCDs for DVT prophylaxis.    11.  Duodinitis  Seen on EGD 6/15 - Pantoprazole started.    12.  Diverticulosis  Seen on Colonoscopy 6/16, mild.    13. Autoelevated INR  Hematology consult appreciated for autoelevated INR with Vit K use suggesting possible liver diease,  PT based mixing study sent to lab, resulted. Pending hematology input regarding results.    Plan to be discussed / reviewed with CT Surgery attending / team during AM rounds.

## 2021-06-21 NOTE — PROGRESS NOTE ADULT - ASSESSMENT
76 year old male patient with a medical history of MI in 1995 (angiogram, no stents placed), COPD (2L O2 at home), s/p TAVR (03/2019 at [facility]), gout, CKD, CVA (09/2020), pulmonary HTN, initially presented to [facility] 6/1/21 with RONNELL on CKD (likely cardiorenal syndrome), urinary retention due to noncompliance with medications, with hospital course complicated by cardiogenic shock, acute hypoxemic respiratory failure secondary to metabolic acidosis and respiratory alkalosis, and acute on chronic combined diastolic and systolic heart failure. Patient found to have TAVR failure with ISRAEL 6/10/21 showing EF 40-45%, Aortic valve prosthesis with Severe paravalvular leak and valvular AI, mild-moderate aortic stenosis, and moderate MR. Patient was transferred to [facility] under Dr. Campbell for further workup. TTE 6/12 showing EF 30-35%, reduced RV function, mild MAC, moderate to severe MR, severe TR, moderate AI, and severe PA. Patient was challenged with lasix for fluid overload, with worsening RONNELL on CKD and cardiorenal syndrome, ultimately progressing to ESRD requiring HD via Left IJ dialysis catheter. Concern for recent reported 100lb weight loss, decreased appetite, and dysphagia with EGD showing duodenitis and diffuse erosive gastritis, and colonoscopy showing diverticulosis. EGD biopsies negative for H. Pylori or carcinoma. Repeat ISRAEL performed during EGD showing an EF of 35%, moderate MR, moderate TR, s/p TAVR with a mobile echo density attached to the ventricular side of the prosthetic aortic valve, Severe AI. With prior Hx of step Bovis bacteremia, findings are suspicious for endocarditis. Blood cultures from 6,7 and 6/15 remain negative to date. ID monitoring patient off of antibiotics as he remains afebrile, nontoxic appearing, with no leukocytosis. Cardiac Cath 6/18 without significant CAD. CT C/A/P completed 6/19 in preparation for possible valve-in-valve TAVR 6/23/21. Dental evaluation deferred until after surgery. Hematology consult appreciated for autoelevated INR with Vit K use suggesting possible liver disease, PT based mixing study sent to lab, pending results. Patient with possible sleep apnea, desatting nightly, with Pulmonary consult recommending supplemental oxygen and sleep study as an outpatient.

## 2021-06-21 NOTE — PROGRESS NOTE ADULT - PROBLEM SELECTOR PLAN 1
ISRAEL 6/10/21 at [facility] showing EF 40-45%, Aortic valve prosthesis with Severe paravalvular leak and valvular AI, mild-moderate aortic stenosis, and moderate MR.  Admitted to [facility] 6/12/21 with CT Surgery Dr. Campbell.   ISRAEL 6/15/21 concern for a mobile echo density attached to the ventricular side of the prosthetic aortic valve with  Severe aortic valve regurgitation seen.  Blood Cultures from 6/7, 6/15 remain negative to date. ID input appreciated.    If pt spikes Temps, start Vanco and Rocephin per ID.   Otherwise, monitoring patient off of antibiotics as he remains nontoxic, afebrile, without leukocytosis. ?Marantic endocarditis?  Cardiac cath 6/18 without significant CAD.   CT C/A/P completed 6/19 in preparation for possible valve-in-valve TAVR 6/23/21. Formal radiology read pending.  Deferring dental evaluation until after TAVR surgery.  Continue midodrine to support BP.  Oxygenating well on 2L O2 via NC (On 2L Home O2).

## 2021-06-21 NOTE — PROGRESS NOTE ADULT - SUBJECTIVE AND OBJECTIVE BOX
SUBJECTIVE:  Cardiology NP F/U note:  SP: TAVR Fail  "feels Lousy, "weak"   denies complaints of chest pain/sob/dizziness/palps      	  MEDICATIONS  (STANDING):  ascorbic acid 500 milliGRAM(s) Oral daily  aspirin enteric coated 81 milliGRAM(s) Oral daily  atorvastatin 10 milliGRAM(s) Oral at bedtime  budesonide 160 MICROgram(s)/formoterol 4.5 MICROgram(s) Inhaler 2 Puff(s) Inhalation two times a day  enoxaparin Injectable 30 milliGRAM(s) SubCutaneous daily  finasteride 5 milliGRAM(s) Oral daily  megestrol Suspension 400 milliGRAM(s) Oral daily  midodrine. 5 milliGRAM(s) Oral three times a day  mirtazapine 7.5 milliGRAM(s) Oral daily  multivitamin/minerals 1 Tablet(s) Oral daily  pantoprazole    Tablet 40 milliGRAM(s) Oral two times a day  phytonadione   Solution 5 milliGRAM(s) Oral daily  psyllium Powder 1 Packet(s) Oral two times a day  senna 2 Tablet(s) Oral at bedtime  sodium chloride 0.9% lock flush 3 milliLiter(s) IV Push every 8 hours  tamsulosin 0.4 milliGRAM(s) Oral at bedtime    MEDICATIONS  (PRN):  ALBUTerol    90 MICROgram(s) HFA Inhaler 2 Puff(s) Inhalation every 6 hours PRN Shortness of Breath and/or Wheezing  bisacodyl 5 milliGRAM(s) Oral every 12 hours PRN Constipation  polyethylene glycol 3350 17 Gram(s) Oral daily PRN Constipation      PHYSICAL EXAM:    T(C): 36.3 (21 @ 06:08), Max: 36.6 (21 @ 17:15)  HR: 86 (21 @ 09:38) (83 - 90)  BP: 103/52 (21 @ 09:38) (96/53 - 113/56)  RR: 18 (21 @ 06:08) (18 - 18)  SpO2: 100% (21 @ 08:11) (93% - 100%)  Wt(kg): --    I&O's Summary    2021 07:01  -  2021 07:00  --------------------------------------------------------  IN: 200 mL / OUT: 0 mL / NET: 200 mL        Daily     Daily Weight in k.1 (2021 06:19)    Appearance: Appears weak, NAD comfortable   Cardiovascular: Normal S1 S2RRR 84 , No JVD, 3/6 SHANNAN  murmurs, LSB   Respiratory: Lungs clear to auscultation	Left neck permacath noted in place   Psychiatry: A & O x 3, Mood & affect appropriate  Gastrointestinal:  Soft, Non-tender, + BS	  Skin: warm and dry  Neurologic: Non-focal  Extremities: Normal range of motion,: 1+ edema bilaterally  Vascular: Peripheral pulses palpable 2+ bilaterally    TELEMETRY: RSR first degree AVB 84 no events on tele 	    ECG:  	  RADIOLOGY:   DIAGNOSTIC TESTING:  [ ] Echocardiogram:  < from: ISRAEL Echo Doppler (06.15.21 @ 11:34) >  Technically good study.   2. Moderately decreased global left ventricular systolic function.   3. Left ventricular ejection fraction, by visual estimation, is 30 to 35%.   4. Left atrial enlargement.   5. Moderately enlarged right ventricle.   6. Right atrial enlargement.   7. Mild prolapse of the posterior leaflet, moderate mitral valve regurgitation.   8. S/P TAVR. There is a mobile echo density attached to the ventricular side of the prosthetic aortic valve. Severe aortic valve regurgitation is seen, with what appears to be damage to one of the leaflets. Patient with prior Hx of step Bovis bacteremia. Findings are highly suspicious for endocarditis. Recommend ID consult, blood and urine cultures.   9. Mild-moderate tricuspid regurgitation.  10. Mild pulmonic valve regurgitation.  11. Trivial pericardial effusion.  12. Findings DW MAVERICK Osuna-NP.    [ ]  Catheterization:  < from: Cardiac Cath Lab - Adult (21 @ 13:53) >  DIAGNOSTIC IMPRESSIONS: Moderate 40-50% stenosis in the proximal LAD,  unchanged from 2019 (Insignificant iFR in 2019). 2. No significant CAD  involving the LM, LCX and RCA. 3. A TAV is noted to be moving in  consonance with the surrounding structures. 4. Severe central Prosthetic  valve Aortic Insufficiency is noted on a ISRAEL and the LVEF was estimated  [id number]%.  DIAGNOSTIC RECOMMENDATIONS: GDMT. 2. RFM. 3. Possible TAVIV to address the  prosthetic AI, centrally mediated.  INTERVENTIONAL IMPRESSIONS: Moderate 40-50% stenosis in the proximal LAD,  unchanged from 2019 (Insignificant iFR in 2019). 2. No significant CAD  involving the LM, LCX and RCA. 3. A TAV is noted to be moving in  consonance with the surrounding structures. 4. Severe central Prosthetic  valve Aortic Insufficiency is noted on a ISRAEL and the LVEF was estimated  [id number]%.  INTERVENTIONAL RECOMMENDATIONS: GDMT. 2. RFM. 3. Possible TAVIV to address  the prosthetic AI, centrally mediated.  Prepared and signed by  Herson Jorgensen M.D.  	 	    CARDIAC MARKERS:                                  12.3   8.49  )-----------( 118      ( 2021 05:52 )             38.7         134<L>  |  93<L>  |  67.7<H>  ----------------------------<  107<H>  5.5<H>   |  20.0<L>  |  5.09<H>    Ca    10.3<H>      2021 05:52  Mg     2.3         TPro  7.5  /  Alb  3.9  /  TBili  2.7<H>  /  DBili  x   /  AST  54<H>  /  ALT  42<H>  /  AlkPhos  132<H>        ASSESSMENT:  76 year old male patient with a medical history of MI in  (angiogram, no stents placed), COPD (2L O2 at home), s/p TAVR (2019 at [facility]), gout, CKD, CVA (2020), pulmonary HTN, initially presented to [facility] 21 with RONNELL on CKD (likely cardiorenal syndrome), urinary retention due to noncompliance with medications, with hospital course complicated by cardiogenic shock, acute hypoxemic respiratory failure secondary to metabolic acidosis and respiratory alkalosis. Patient found to have TAVR failure with ISRAEL 6/10/21 showing EF 40-45%, Aortic valve prosthesis with Severe paravalvular leak and valvular AI, mild-moderate aortic stenosis, and moderate MR. Patient was transferred to [facility] under Dr. Shannan for further workup. Cardiology consulted for decompensated HF and severe pulmonary hypertension.       HFrEF   - EF 40-45% -- > 30-35% on 21 echo  - Aortic valve prosthesis with Severe paravalvular leak and valvular AI  Severe Pulmonary HTN   ESRD on HD   TAVR Prosthesis failure   Hemodynamically stable   Plan:  continue current meds and management ASA/ statin/ midrodrine   CTS/ Renal F/u noted  Surgical intervention for failed TAVTR possibly  21

## 2021-06-21 NOTE — PROGRESS NOTE ADULT - SUBJECTIVE AND OBJECTIVE BOX
Patient was seen and evaluated on dialysis.   Patient is tolerating the procedure well.   T(C): 36.6 (06-21-21 @ 09:54), Max: 36.6 (06-20-21 @ 17:15)  HR: 83 (06-21-21 @ 11:15) (83 - 90)  BP: 100/51 (06-21-21 @ 11:15) (97/55 - 113/56)  Continue dialysis:   Dialyzer:   revaclear 300 QB: 400 ml QD: 500ml  Goal UF 2 kg over _3 Hours with sodium modeling and albumin infusion

## 2021-06-21 NOTE — PROGRESS NOTE ADULT - SUBJECTIVE AND OBJECTIVE BOX
INFECTIOUS DISEASES AND INTERNAL MEDICINE at [place]  =======================================================  Robson Wright MD  Diplomates American Board of Internal Medicine and Infectious Diseases  Telephone [phone]  Fax            [phone]  =======================================================    REYNA BOB [id number]    Follow up: valve vegetation    no fevers  transferred to CTICU for further care  getting  dialysis        Allergies:  No Known Drug Allergies  strawberry (Anaphylaxis)         SOCIAL       FAMILY   FAMILY HISTORY:  FH: lung cancer      REVIEW OF SYSTEMS:  CONSTITUTIONAL:  No Fever or chills  HEENT:   No diplopia or blurred vision.  No earache, sore throat or runny nose.  CARDIOVASCULAR:  No pressure, squeezing, strangling, tightness, heaviness or aching about the chest, neck, axilla or epigastrium.  RESPIRATORY:  No cough, shortness of breath, PND or orthopnea.  GASTROINTESTINAL:  No nausea, vomiting or diarrhea.  GENITOURINARY:  No dysuria, frequency or urgency. No Blood in urine  MUSCULOSKELETAL:   moves all joints  SKIN:  No change in skin, hair or nails.  NEUROLOGIC:  No paresthesias, fasciculations, seizures or weakness.  PSYCHIATRIC:  No disorder of thought or mood.  ENDOCRINE:  No heat or cold intolerance, polyuria or polydipsia.  HEMATOLOGICAL:  No easy bruising or bleeding.           Physical Exam:     GEN: NAD,   HEENT: normocephalic and atraumatic. EOMI. EMMIE.    NECK: Supple. No carotid bruits.  No lymphadenopathy or thyromegaly.  LUNGS: Clear to auscultation.  HEART: Regular rate and rhythm + murmur.  ABDOMEN: Soft, nontender, and nondistended.  Positive bowel sounds.    : No CVA tenderness  EXTREMITIES: Without any cyanosis, clubbing, rash, lesions or edema.  MSK: no joint swelling  NEUROLOGIC: Cranial nerves II through XII are grossly intact.  PSYCHIATRIC: Appropriate affect .  SKIN: No ulceration or induration present.         Vitals:  ============  T(F): 97.7 (21 Jun 2021 13:28), Max: 97.9 (20 Jun 2021 17:15)  HR: 84 (21 Jun 2021 15:00)  BP: 99/54 (21 Jun 2021 15:00)  RR: 35 (21 Jun 2021 15:00)  SpO2: 100% (21 Jun 2021 15:00) (92% - 100%)  temp max in last 48H T(F): , Max: 97.9 (06-20-21 @ 17:15)    =======================================================  Current Antibiotics:    Other medications:  acetaminophen  IVPB .. 1000 milliGRAM(s) IV Intermittent once  ascorbic acid 500 milliGRAM(s) Oral daily  aspirin enteric coated 81 milliGRAM(s) Oral daily  atorvastatin 10 milliGRAM(s) Oral at bedtime  budesonide 160 MICROgram(s)/formoterol 4.5 MICROgram(s) Inhaler 2 Puff(s) Inhalation two times a day  calcium gluconate IVPB 2 Gram(s) IV Intermittent once  enoxaparin Injectable 30 milliGRAM(s) SubCutaneous daily  finasteride 5 milliGRAM(s) Oral daily  megestrol Suspension 400 milliGRAM(s) Oral daily  midodrine. 5 milliGRAM(s) Oral three times a day  mirtazapine 15 milliGRAM(s) Oral daily  multivitamin/minerals 1 Tablet(s) Oral daily  pantoprazole    Tablet 40 milliGRAM(s) Oral two times a day  phytonadione   Solution 5 milliGRAM(s) Oral daily  psyllium Powder 1 Packet(s) Oral two times a day  senna 2 Tablet(s) Oral at bedtime  sodium chloride 0.9% lock flush 3 milliLiter(s) IV Push every 8 hours  tamsulosin 0.4 milliGRAM(s) Oral at bedtime      =======================================================  Labs:                        12.3   8.49  )-----------( 118      ( 21 Jun 2021 05:52 )             38.7     06-21    134<L>  |  93<L>  |  67.7<H>  ----------------------------<  107<H>  5.5<H>   |  20.0<L>  |  5.09<H>    Ca    10.3<H>      21 Jun 2021 05:52  Mg     2.3     06-20    TPro  7.5  /  Alb  3.9  /  TBili  2.7<H>  /  DBili  x   /  AST  54<H>  /  ALT  42<H>  /  AlkPhos  132<H>  06-21      Culture - Blood (collected 06-16-21 @ 17:23)  Source: .Blood Blood    Culture - Blood (collected 06-16-21 @ 14:41)  Source: .Blood Blood-Peripheral  Final Report (06-21-21 @ 15:00):    No growth at 5 days.    Culture - Blood (collected 06-16-21 @ 14:40)  Source: .Blood Blood-Peripheral  Final Report (06-21-21 @ 15:00):    No growth at 5 days.    Culture - Blood (collected 06-15-21 @ 12:30)  Source: .Blood Blood-Peripheral  Final Report (06-20-21 @ 13:00):    No growth at 5 days.    Culture - Blood (collected 06-15-21 @ 12:29)  Source: .Blood Blood-Peripheral  Final Report (06-20-21 @ 13:00):    No growth at 5 days.            Procalcitonin, Serum: 0.24 ng/mL (06-17-21 @ 09:12)      COVID-19 PCR: NotDetec (06-18-21 @ 08:13)

## 2021-06-21 NOTE — PROGRESS NOTE ADULT - ATTENDING COMMENTS
pt seen and examined  no significant CAD on [facility].   Plan for TAVR this week.   Pt is volume overloaded, asked ICU staff to initiate him on diuretic therapy.  Blood cultures are negative  We will follow with you.

## 2021-06-21 NOTE — PROGRESS NOTE ADULT - PROBLEM SELECTOR PLAN 6
ESRD requiring HD.  Left IJ HD catheter placed 6/16.   Renal team following.  Last HD 6/19.  Next session scheduled for today 6/21

## 2021-06-21 NOTE — PROGRESS NOTE ADULT - ASSESSMENT
76 year old male patient with a medical history of MI in 1995 (angiogram, no stents placed), COPD (2L O2 at home), s/p TAVR (03/2019 at [facility]), gout, CKD, CVA (09/2020), pulmonary HTN, initially presented to [facility] 6/1/21 with RONNELL on CKD (likely cardiorenal syndrome), urinary retention due to noncompliance with medications, with hospital course complicated by cardiogenic shock, acute hypoxemic respiratory failure secondary to metabolic acidosis and respiratory alkalosis. Patient found to have TAVR failure with ISRAEL 6/10/21 showing EF 40-45%, Aortic valve prosthesis with Severe paravalvular leak and valvular AI, mild-moderate aortic stenosis, and moderate MR. Patient was transferred to [facility] under Dr. Campbell for further workup.   ISRAEL DONE HERE WITH CONCERN FOR ENDOCARDITIS  PT DENIES FEVERS OR SWEATS  BUT HAS HAD SIGNIFICANT WEIGHT LOSS WITHOUT DIETING  PT HAS HX OF STREP BOVIS  BACTEREMIA   LAST YEAR AND RECEIVED   ROCEPHIN AT HOME FOR 6 WEEKS   BLOOD CX X2 6/7 WERE NEGATIVE   BLOOD CX X2 SETS SENT HERE 6/15    NEG    REPEAT BLOOD CX PENDING; negative so far    PT NON TOXIC AFEBRILE  continue to HOLD ABX     EGD BIOPSIES ARE PENDING  s/p COLONOSCOPY     WOULD LIKE TO AVOID LONG TERM IV ABX UNLESS  POSS BACTEREMIA      spoke to MICRO LAB - asked to hold initial blood cultures fro 14 days.     WILL FOLLOW UP   IF SPIKES THEN VANCO/ROCEPHIN      pending heart surgery on Wednesday; no contraindication from ID perspective   - please send cultures    - pre-op antibiotics as per surgical service.

## 2021-06-21 NOTE — PROGRESS NOTE ADULT - SUBJECTIVE AND OBJECTIVE BOX
Subjective - patient seen and evaluated bedside. Sitting comfortably in bed. Denies CP, SOB, HA, dizziness, n/v/d. Noted to have mildly labored appearing breathing. Patient states "I have been breathing like this for over 30 years. My breathing feels good."    Review of Systems: negative x 10 systems except as noted above    Brief summary:  76yMale w/ severe bioprosthetic AI s/p TAVR in 2019, suspicion for prosthetic valve endocarditis. given recent past history of strep bovis infection.     Significant/[id number] events:  INR continues to rise for unknown reason. LFT stable.     PAST MEDICAL & SURGICAL HISTORY:  Pulmonary hypertension    Hypertension    Hyperlipidemia    H/O aortic valve stenosis  s/p TAVR 2019 at [place]    CVA (cerebrovascular accident)  MCA CVA with tPA and thrombectomy    Chronic kidney disease    Prediabetes    Mitral valve regurgitation    CAD in native artery    Aneurysm of aortic root  thoracic aortic aneurysm, without ruptur    Benign prostatic hyperplasia    Gout    History of transcatheter aortic valve replacement (TAVR)          ALBUTerol    90 MICROgram(s) HFA Inhaler 2 Puff(s) Inhalation every 6 hours PRN  ascorbic acid 500 milliGRAM(s) Oral daily  aspirin enteric coated 81 milliGRAM(s) Oral daily  atorvastatin 10 milliGRAM(s) Oral at bedtime  bisacodyl 5 milliGRAM(s) Oral every 12 hours PRN  budesonide 160 MICROgram(s)/formoterol 4.5 MICROgram(s) Inhaler 2 Puff(s) Inhalation two times a day  enoxaparin Injectable 30 milliGRAM(s) SubCutaneous daily  finasteride 5 milliGRAM(s) Oral daily  megestrol Suspension 400 milliGRAM(s) Oral daily  midodrine. 5 milliGRAM(s) Oral three times a day  mirtazapine 7.5 milliGRAM(s) Oral daily  multivitamin/minerals 1 Tablet(s) Oral daily  pantoprazole    Tablet 40 milliGRAM(s) Oral two times a day  phytonadione   Solution 5 milliGRAM(s) Oral daily  polyethylene glycol 3350 17 Gram(s) Oral daily PRN  psyllium Powder 1 Packet(s) Oral two times a day  senna 2 Tablet(s) Oral at bedtime  sodium chloride 0.9% lock flush 3 milliLiter(s) IV Push every 8 hours  tamsulosin 0.4 milliGRAM(s) Oral at bedtime  MEDICATIONS  (PRN):  ALBUTerol    90 MICROgram(s) HFA Inhaler 2 Puff(s) Inhalation every 6 hours PRN Shortness of Breath and/or Wheezing  bisacodyl 5 milliGRAM(s) Oral every 12 hours PRN Constipation  polyethylene glycol 3350 17 Gram(s) Oral daily PRN Constipation      Daily     Daily Weight in k.9 (2021 05:14)                              11.7   8.24  )-----------( 129      ( 2021 06:46 )             37.2   0620    133<L>  |  94<L>  |  50.0<H>  ----------------------------<  108<H>  4.6   |  19.0<L>  |  3.80<H>    Ca    9.6      2021 06:46  Mg     2.3     20    TPro  7.1  /  Alb  3.7  /  TBili  2.5<H>  /  DBili  x   /  AST  31  /  ALT  32  /  AlkPhos  109  -19      PT/INR - ( 2021 06:46 )   PT: 21.7 sec;   INR: 1.93 ratio         PTT - ( 2021 06:46 )  PTT:40.8 sec      Objective:  T(C): 36.6 (21 @ 21:52), Max: 36.6 (21 @ 17:15)  HR: 90 (21 @ 21:52) (83 - 90)  BP: 106/63 (21 @ 21:52) (96/53 - 127/53)  RR: 18 (21 @ 21:52) (18 - 19)  SpO2: 100% (21 @ 21:52) (93% - 100%)  Wt(kg): --CAPILLARY BLOOD GLUCOSE      I&O's Summary    2021 07:01  -  2021 07:00  --------------------------------------------------------  IN: 240 mL / OUT: 100 mL / NET: 140 mL    2021 07:01  -  2021 01:17  --------------------------------------------------------  IN: 200 mL / OUT: 0 mL / NET: 200 mL        Physical Exam  Neuro: A+O x 3, non-focal, speech clear and intact  Pulm: CTA, equal bilaterally. mild appearing labored breathing.   CV: RRR,  +S1S2  Abd: soft, NT, ND, +BS  Ext: +DP Pulses b/l, no edema    Imaging:  CXR:  1< from: Xray Chest 1 View- PORTABLE-Routine (Xray Chest 1 View- PORTABLE-Routine in AM.) (21 @ 04:42) >      INTERPRETATION:  HISTORY: Admitting Dxs: T82.03XA LEAKAGE OF HEART VALVE PROSTHE; preop; preop;  TECHNIQUE: Portable frontal view of the chest, 1 view.  COMPARISON: 2021.  FINDINGS:  Left central line in the innominate vein region.  HEART:  Enlarged  LUNGS: free of consolidation or effusion.  BONES: degenerative changes        IMPRESSION:    clear lungs    < end of copied text >

## 2021-06-22 ENCOUNTER — TRANSCRIPTION ENCOUNTER (OUTPATIENT)
Age: 76
End: 2021-06-22

## 2021-06-22 DIAGNOSIS — G47.33 OBSTRUCTIVE SLEEP APNEA (ADULT) (PEDIATRIC): ICD-10-CM

## 2021-06-22 DIAGNOSIS — R79.1 ABNORMAL COAGULATION PROFILE: ICD-10-CM

## 2021-06-22 DIAGNOSIS — I35.8 OTHER NONRHEUMATIC AORTIC VALVE DISORDERS: ICD-10-CM

## 2021-06-22 DIAGNOSIS — N18.6 END STAGE RENAL DISEASE: ICD-10-CM

## 2021-06-22 DIAGNOSIS — I50.43 ACUTE ON CHRONIC COMBINED SYSTOLIC (CONGESTIVE) AND DIASTOLIC (CONGESTIVE) HEART FAILURE: ICD-10-CM

## 2021-06-22 LAB
ALBUMIN SERPL ELPH-MCNC: 4.2 G/DL — SIGNIFICANT CHANGE UP (ref 3.3–5.2)
ALBUMIN SERPL ELPH-MCNC: 4.3 G/DL — SIGNIFICANT CHANGE UP (ref 3.3–5.2)
ALP SERPL-CCNC: 126 U/L — HIGH (ref 40–120)
ALP SERPL-CCNC: 130 U/L — HIGH (ref 40–120)
ALT FLD-CCNC: 45 U/L — HIGH
ALT FLD-CCNC: 53 U/L — HIGH
ANION GAP SERPL CALC-SCNC: 18 MMOL/L — HIGH (ref 5–17)
ANION GAP SERPL CALC-SCNC: 23 MMOL/L — HIGH (ref 5–17)
APTT BLD: 39.2 SEC — HIGH (ref 27.5–35.5)
AST SERPL-CCNC: 59 U/L — HIGH
AST SERPL-CCNC: 67 U/L — HIGH
BILIRUB SERPL-MCNC: 3.3 MG/DL — HIGH (ref 0.4–2)
BILIRUB SERPL-MCNC: 3.4 MG/DL — HIGH (ref 0.4–2)
BLD GP AB SCN SERPL QL: SIGNIFICANT CHANGE UP
BUN SERPL-MCNC: 34.9 MG/DL — HIGH (ref 8–20)
BUN SERPL-MCNC: 55.3 MG/DL — HIGH (ref 8–20)
CALCIUM SERPL-MCNC: 10.2 MG/DL — SIGNIFICANT CHANGE UP (ref 8.6–10.2)
CALCIUM SERPL-MCNC: 9.5 MG/DL — SIGNIFICANT CHANGE UP (ref 8.6–10.2)
CHLORIDE SERPL-SCNC: 93 MMOL/L — LOW (ref 98–107)
CHLORIDE SERPL-SCNC: 93 MMOL/L — LOW (ref 98–107)
CO2 SERPL-SCNC: 20 MMOL/L — LOW (ref 22–29)
CO2 SERPL-SCNC: 24 MMOL/L — SIGNIFICANT CHANGE UP (ref 22–29)
CREAT SERPL-MCNC: 3.13 MG/DL — HIGH (ref 0.5–1.3)
CREAT SERPL-MCNC: 4.9 MG/DL — HIGH (ref 0.5–1.3)
DRVVT 50/50: 41.6 SEC — SIGNIFICANT CHANGE UP
DRVVT SCREEN TO CONFIRM RATIO: SIGNIFICANT CHANGE UP
FACT II INHIB PPP-ACNC: 77 % — LOW (ref 80–135)
FACT IX PPP CHRO-ACNC: 79 % — LOW (ref 80–165)
FACT V ACT/NOR PPP: 31 % — LOW (ref 50–150)
FACT VII ACT/NOR PPP: 27 % — LOW (ref 50–165)
FACT VIII ACT/NOR PPP: 223 % — HIGH (ref 60–125)
FACT X ACT/NOR PPP: 42 % — LOW (ref 70–170)
FACT XII ACT/NOR PPP: 30 % — LOW (ref 70–145)
FACT XIIA PPP-ACNC: 51 % — SIGNIFICANT CHANGE UP (ref 45–150)
GLUCOSE SERPL-MCNC: 86 MG/DL — SIGNIFICANT CHANGE UP (ref 70–99)
GLUCOSE SERPL-MCNC: 91 MG/DL — SIGNIFICANT CHANGE UP (ref 70–99)
HCT VFR BLD CALC: 38.6 % — LOW (ref 39–50)
HCT VFR BLD CALC: 39.7 % — SIGNIFICANT CHANGE UP (ref 39–50)
HGB BLD-MCNC: 12 G/DL — LOW (ref 13–17)
HGB BLD-MCNC: 12.3 G/DL — LOW (ref 13–17)
INR BLD: 1.89 RATIO — HIGH (ref 0.88–1.16)
LA NT DPL PPP QL: 57.1 SEC — SIGNIFICANT CHANGE UP
MAGNESIUM SERPL-MCNC: 2.2 MG/DL — SIGNIFICANT CHANGE UP (ref 1.6–2.6)
MCHC RBC-ENTMCNC: 29.9 PG — SIGNIFICANT CHANGE UP (ref 27–34)
MCHC RBC-ENTMCNC: 30.2 PG — SIGNIFICANT CHANGE UP (ref 27–34)
MCHC RBC-ENTMCNC: 31 GM/DL — LOW (ref 32–36)
MCHC RBC-ENTMCNC: 31.1 GM/DL — LOW (ref 32–36)
MCV RBC AUTO: 96.3 FL — SIGNIFICANT CHANGE UP (ref 80–100)
MCV RBC AUTO: 97.5 FL — SIGNIFICANT CHANGE UP (ref 80–100)
NORMALIZED SCT PPP-RTO: 0.47 RATIO — SIGNIFICANT CHANGE UP (ref 0–1.16)
NORMALIZED SCT PPP-RTO: SIGNIFICANT CHANGE UP
PLATELET # BLD AUTO: 100 K/UL — LOW (ref 150–400)
PLATELET # BLD AUTO: 110 K/UL — LOW (ref 150–400)
POTASSIUM SERPL-MCNC: 4.2 MMOL/L — SIGNIFICANT CHANGE UP (ref 3.5–5.3)
POTASSIUM SERPL-MCNC: 4.9 MMOL/L — SIGNIFICANT CHANGE UP (ref 3.5–5.3)
POTASSIUM SERPL-SCNC: 4.2 MMOL/L — SIGNIFICANT CHANGE UP (ref 3.5–5.3)
POTASSIUM SERPL-SCNC: 4.9 MMOL/L — SIGNIFICANT CHANGE UP (ref 3.5–5.3)
PROT SERPL-MCNC: 7.6 G/DL — SIGNIFICANT CHANGE UP (ref 6.6–8.7)
PROT SERPL-MCNC: 7.7 G/DL — SIGNIFICANT CHANGE UP (ref 6.6–8.7)
PROTHROM AB SERPL-ACNC: 21.3 SEC — HIGH (ref 10.6–13.6)
RBC # BLD: 4.01 M/UL — LOW (ref 4.2–5.8)
RBC # BLD: 4.07 M/UL — LOW (ref 4.2–5.8)
RBC # FLD: 21.5 % — HIGH (ref 10.3–14.5)
RBC # FLD: 21.9 % — HIGH (ref 10.3–14.5)
SARS-COV-2 RNA SPEC QL NAA+PROBE: SIGNIFICANT CHANGE UP
SODIUM SERPL-SCNC: 135 MMOL/L — SIGNIFICANT CHANGE UP (ref 135–145)
SODIUM SERPL-SCNC: 136 MMOL/L — SIGNIFICANT CHANGE UP (ref 135–145)
WBC # BLD: 8.43 K/UL — SIGNIFICANT CHANGE UP (ref 3.8–10.5)
WBC # BLD: 8.73 K/UL — SIGNIFICANT CHANGE UP (ref 3.8–10.5)
WBC # FLD AUTO: 8.43 K/UL — SIGNIFICANT CHANGE UP (ref 3.8–10.5)
WBC # FLD AUTO: 8.73 K/UL — SIGNIFICANT CHANGE UP (ref 3.8–10.5)

## 2021-06-22 PROCEDURE — 99233 SBSQ HOSP IP/OBS HIGH 50: CPT | Mod: 57

## 2021-06-22 PROCEDURE — 99232 SBSQ HOSP IP/OBS MODERATE 35: CPT

## 2021-06-22 PROCEDURE — 71045 X-RAY EXAM CHEST 1 VIEW: CPT | Mod: 26

## 2021-06-22 PROCEDURE — 90937 HEMODIALYSIS REPEATED EVAL: CPT

## 2021-06-22 RX ORDER — HALOPERIDOL DECANOATE 100 MG/ML
5 INJECTION INTRAMUSCULAR ONCE
Refills: 0 | Status: COMPLETED | OUTPATIENT
Start: 2021-06-22 | End: 2021-06-22

## 2021-06-22 RX ORDER — TUBERCULIN PURIFIED PROTEIN DERIVATIVE 5 [IU]/.1ML
5 INJECTION, SOLUTION INTRADERMAL ONCE
Refills: 0 | Status: COMPLETED | OUTPATIENT
Start: 2021-06-22 | End: 2021-06-22

## 2021-06-22 RX ORDER — OXYCODONE HYDROCHLORIDE 5 MG/1
5 TABLET ORAL EVERY 4 HOURS
Refills: 0 | Status: DISCONTINUED | OUTPATIENT
Start: 2021-06-22 | End: 2021-06-23

## 2021-06-22 RX ORDER — HALOPERIDOL DECANOATE 100 MG/ML
10 INJECTION INTRAMUSCULAR ONCE
Refills: 0 | Status: COMPLETED | OUTPATIENT
Start: 2021-06-22 | End: 2021-06-22

## 2021-06-22 RX ADMIN — HALOPERIDOL DECANOATE 10 MILLIGRAM(S): 100 INJECTION INTRAMUSCULAR at 22:15

## 2021-06-22 RX ADMIN — TUBERCULIN PURIFIED PROTEIN DERIVATIVE 5 UNIT(S): 5 INJECTION, SOLUTION INTRADERMAL at 18:33

## 2021-06-22 RX ADMIN — SODIUM CHLORIDE 3 MILLILITER(S): 9 INJECTION INTRAMUSCULAR; INTRAVENOUS; SUBCUTANEOUS at 21:02

## 2021-06-22 RX ADMIN — CHLORHEXIDINE GLUCONATE 15 MILLILITER(S): 213 SOLUTION TOPICAL at 05:57

## 2021-06-22 RX ADMIN — Medication 650 MILLIGRAM(S): at 06:50

## 2021-06-22 RX ADMIN — Medication 1 TABLET(S): at 11:00

## 2021-06-22 RX ADMIN — Medication 650 MILLIGRAM(S): at 08:00

## 2021-06-22 RX ADMIN — Medication 81 MILLIGRAM(S): at 11:00

## 2021-06-22 RX ADMIN — Medication 500 MILLIGRAM(S): at 11:00

## 2021-06-22 RX ADMIN — FINASTERIDE 5 MILLIGRAM(S): 5 TABLET, FILM COATED ORAL at 11:00

## 2021-06-22 RX ADMIN — PANTOPRAZOLE SODIUM 40 MILLIGRAM(S): 20 TABLET, DELAYED RELEASE ORAL at 05:57

## 2021-06-22 RX ADMIN — OXYCODONE HYDROCHLORIDE 5 MILLIGRAM(S): 5 TABLET ORAL at 16:00

## 2021-06-22 RX ADMIN — MIDODRINE HYDROCHLORIDE 5 MILLIGRAM(S): 2.5 TABLET ORAL at 08:27

## 2021-06-22 RX ADMIN — MEGESTROL ACETATE 400 MILLIGRAM(S): 40 SUSPENSION ORAL at 11:00

## 2021-06-22 RX ADMIN — HALOPERIDOL DECANOATE 5 MILLIGRAM(S): 100 INJECTION INTRAMUSCULAR at 21:34

## 2021-06-22 RX ADMIN — Medication 650 MILLIGRAM(S): at 13:05

## 2021-06-22 RX ADMIN — SODIUM CHLORIDE 3 MILLILITER(S): 9 INJECTION INTRAMUSCULAR; INTRAVENOUS; SUBCUTANEOUS at 05:56

## 2021-06-22 RX ADMIN — CHLORHEXIDINE GLUCONATE 1 APPLICATION(S): 213 SOLUTION TOPICAL at 05:57

## 2021-06-22 RX ADMIN — BUDESONIDE AND FORMOTEROL FUMARATE DIHYDRATE 2 PUFF(S): 160; 4.5 AEROSOL RESPIRATORY (INHALATION) at 08:50

## 2021-06-22 RX ADMIN — SODIUM CHLORIDE 3 MILLILITER(S): 9 INJECTION INTRAMUSCULAR; INTRAVENOUS; SUBCUTANEOUS at 13:05

## 2021-06-22 RX ADMIN — Medication 5 MILLIGRAM(S): at 13:05

## 2021-06-22 RX ADMIN — Medication 1 PACKET(S): at 05:57

## 2021-06-22 RX ADMIN — OXYCODONE HYDROCHLORIDE 5 MILLIGRAM(S): 5 TABLET ORAL at 14:31

## 2021-06-22 RX ADMIN — MIRTAZAPINE 15 MILLIGRAM(S): 45 TABLET, ORALLY DISINTEGRATING ORAL at 11:00

## 2021-06-22 RX ADMIN — Medication 650 MILLIGRAM(S): at 14:31

## 2021-06-22 NOTE — PROGRESS NOTE ADULT - ASSESSMENT
76M with a PMH of MI in 1995 (angiogram, no stents placed), COPD (2L O2 at home), severe AS s/p TAVR (03/2019 at [facility]), gout, CKD, CVA (no deficits, 09/2020), pulmonary HTN, initially presented to [facility] 6/1/21 with RONNELL on CKD, hospital course significant for cardiogenic shock, acute hypoxemic respiratory failure, and acute on chronic combined diastolic and systolic heart failure. ISRAEL 6/10/21 revealed EF 40-45%, Aortic valve prosthesis with Severe paravalvular leak and valvular AI, mild-moderate aortic stenosis, and moderate MR. Patient was transferred to [facility] under Dr. Campbell for further workup of TAVR failure.     Inpatient hospital course has been significant for:  1. 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 RONNELL on CKD progressed to ESRD requiring HD   3. Unintentional Weight loss / Dysphagia / Anorexia work up revealing duodenitis and diffuse erosive gastritis on EGD (biopsies negative for H. Pylori or carcinoma); colonoscopy showing diverticulosis  4. R/O AV Endocarditis   5. Auto-elevated INR   6. R/O WILLEM   7. Adjustment disorder (evaluated by Behavioral Health)    Patient is now preop for a valve in ace TAVR 6/23/21 with Dr. Campbell.  76M with a PMH of MI in 1995 (angiogram, no stents placed), COPD (2L O2 at home), severe AS s/p TAVR (03/2019 at [facility]), gout, CKD, CVA (no deficits, 09/2020), pulmonary HTN, initially presented to [facility] 6/1/21 with RONNELL on CKD, hospital course significant for cardiogenic shock, acute hypoxemic respiratory failure, and acute on chronic combined diastolic and systolic heart failure. ISRAEL revealed severe AI. Patient was transferred to [facility] under Dr. Campbell for further workup of bioprosthetic AI.     Inpatient hospital course has been significant for:  1. 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 RONNELL on CKD progressed to ESRD requiring HD   3. Unintentional Weight loss / Dysphagia / Anorexia work up revealing duodenitis and diffuse erosive gastritis on EGD (biopsies negative for H. Pylori or carcinoma); colonoscopy showing diverticulosis  4. R/O AV Endocarditis   5. Auto-elevated INR   6. R/O WILLEM   7. Adjustment disorder (evaluated by Behavioral Health)    Patient is now preop for a valve in ace TAVR 6/23/21 with Dr. Campbell.

## 2021-06-22 NOTE — PROGRESS NOTE ADULT - PROBLEM SELECTOR PLAN 6
Unintentional 100lb Weight loss / Dysphagia / Anorexia work up revealing-  duodenitis and diffuse erosive gastritis on EGD (biopsies negative for H. Pylori or carcinoma) - on protonix  colonoscopy showing diverticulosis

## 2021-06-22 NOTE — CHART NOTE - NSCHARTNOTEFT_GEN_A_CORE
Source: Patient [x ]  Family [ ]   other [x ] EMR and staff     Current Diet: Diet, NPO:   NPO for Procedure/Test     NPO Start Date: 22-Jun-2021,   NPO Start Time: 23:59  Except Medications (06-21-21 @ 19:48)  Diet, Consistent Carbohydrate/No Snacks:   DASH/TLC {Sodium & Cholesterol Restricted} (DASH) (06-21-21 @ 18:28)    PO intake:  < 50% [x ]   50-75%  [ ]   %  [ ]  other :    Source for PO intake [x ] Patient [ ] family [x ] chart [ ] staff [ ] other    Current Weight:   6/21: 98 kg   6/19: 96.7 kg   6/12: 99Kg     % Weight Change: Slight fluctuations in weight noted; will continue to monitor weights for trends. (Generalized edema noted)     Pertinent Medications: MEDICATIONS  (STANDING):  ascorbic acid 500 milliGRAM(s) Oral daily  aspirin enteric coated 81 milliGRAM(s) Oral daily  atorvastatin 10 milliGRAM(s) Oral at bedtime  budesonide 160 MICROgram(s)/formoterol 4.5 MICROgram(s) Inhaler 2 Puff(s) Inhalation two times a day  chlorhexidine 0.12% Liquid 15 milliLiter(s) Swish and Spit two times a day  chlorhexidine 4% Liquid 1 Application(s) Topical two times a day  finasteride 5 milliGRAM(s) Oral daily  megestrol Suspension 400 milliGRAM(s) Oral daily  midodrine. 5 milliGRAM(s) Oral three times a day  mirtazapine 15 milliGRAM(s) Oral daily  multivitamin/minerals 1 Tablet(s) Oral daily  pantoprazole    Tablet 40 milliGRAM(s) Oral two times a day  phytonadione   Solution 5 milliGRAM(s) Oral daily  psyllium Powder 1 Packet(s) Oral two times a day  senna 2 Tablet(s) Oral at bedtime  sodium chloride 0.9% lock flush 3 milliLiter(s) IV Push every 8 hours  tamsulosin 0.4 milliGRAM(s) Oral at bedtime    MEDICATIONS  (PRN):  acetaminophen   Tablet .. 650 milliGRAM(s) Oral every 6 hours PRN Mild Pain (1 - 3)  ALBUTerol    90 MICROgram(s) HFA Inhaler 2 Puff(s) Inhalation every 6 hours PRN Shortness of Breath and/or Wheezing  bisacodyl 5 milliGRAM(s) Oral every 12 hours PRN Constipation  polyethylene glycol 3350 17 Gram(s) Oral daily PRN Constipation    Pertinent Labs: CBC Full  -  ( 22 Jun 2021 03:10 )  WBC Count : 8.43 K/uL  RBC Count : 4.07 M/uL  Hemoglobin : 12.3 g/dL  Hematocrit : 39.7 %  Platelet Count - Automated : 110 K/uL  Mean Cell Volume : 97.5 fl  Mean Cell Hemoglobin : 30.2 pg  Mean Cell Hemoglobin Concentration : 31.0 gm/dL  Auto Neutrophil # : x  Auto Lymphocyte # : x  Auto Monocyte # : x  Auto Eosinophil # : x  Auto Basophil # : x  Auto Neutrophil % : x  Auto Lymphocyte % : x  Auto Monocyte % : x  Auto Eosinophil % : x  Auto Basophil % : x        Skin: Surgical site R groin, Wound to coccyx noted.     Nutrition focused physical exam previously conducted - found signs of malnutrition [ ]absent [x ]present    Subcutaneous fat loss: Severe [x ] Orbital fat pads region, [x ]Buccal fat region, [x]Triceps region,  [x ]Ribs region    Muscle wasting: Severe [x ]Temples region, [x ]Clavicle region, [x ]Shoulder region, [ ]Scapula region, [ ]Interosseous region,  [ ]thigh region, [ ]Calf region    Estimated Needs:   [x ] no change since previous assessment  [ ] recalculated:     Brief summary:  76 year old male patient with a medical history of MI in 1995 (angiogram, no stents placed), COPD (2L O2 at home), severe AS s/p TAVR (03/2019 at [facility]), gout, CKD, CVA w/o residual deficits (09/2020), pulmonary HTN, initially presented to [facility] 6/1/21 with RONNELL on CKD, urinary retention due to noncompliance with medications, hospital course complicated by cardiogenic shock, acute hypoxemic respiratory failure, and acute on chronic combined diastolic and systolic heart failure. ISRAEL 6/10/21 revealed EF 40-45%, Aortic valve prosthesis with Severe paravalvular leak and valvular AI, mild-moderate aortic stenosis, and moderate MR. Patient was transferred to [facility] under Dr. Campbell for further workup of TAVR failure.   Inpatient hospital course has been significant for:  1. 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 RONNELL on CKD progressed to ESRD requiring HD   3. Unintentional Weight loss / Dysphagia / Anorexia work up revealing duodenitis and diffuse erosive gastritis on EGD (biopsies negative for H. Pylori or carcinoma); colonoscopy showing diverticulosis  4. R/O AV Endocarditis   5. Auto-elevated INR   6. R/O WILLEM   7. Adjustment disorder (evaluated by Behavioral Health)  Patient is now preop for a valve in ace TAVR 6/23/21 with Dr. Campbell.     Current Nutrition Diagnosis:  Pt remains at high nutrition risk secondary to severe protein calorie malnutrition (acute on chronic) related to inability to consume sufficient protein energy intake in setting of aortic valve prosthesis with severe paravalvular leak and valvular AI, mild-moderate aortic stenosis, moderate MR and now with erosive gastritis s/p colonoscopy as evidenced by pt meeting <50% estimated energy intake > 5 days and severe muscle wasting and fat loss.   Pt also with 31% unintentional wt loss x 4 months prior to admission noted. Pt very upset during visit; breakfast tray untouched. Pt reports not eating at all. No preferences offered at this time. Encouragement provided. Recommendations below:     Recommendations:   1. Change MVI to Nephro-melissa daily   2. Check weight daily to monitor trends   3. Encourage with meals   4. Add Nepro shakes TID     Monitoring and Evaluation:   [x ] PO intake [ x] Tolerance to diet prescription [X] Weights  [X] Follow up per protocol [X] Labs:

## 2021-06-22 NOTE — PROGRESS NOTE ADULT - PROBLEM SELECTOR PLAN 1
-ISRAEL 6/10/21 at [facility] showed EF 40-45%, Aortic valve prosthesis with Severe paravalvular leak and valvular AI, mild-moderate aortic stenosis, and moderate MR.  - Transferred to [facility] for surgical evaluation of TAVR failure  -Preop work up completed: Cardiac cath 6/18 without significant CAD. CT C/A/P completed 6/19 (Formal radiology read pending)  -Deferring dental evaluation until after TAVR surgery.  -Continue midodrine to support BP.  -Continue GI ppx with Protonix and Senna, DVT ppx with SCD boots  -Preop orders in for OR 6/23 -ISRAEL 6/10/21 at [facility] showed EF 40-45%, Aortic valve prosthesis with Severe paravalvular leak and valvular AI, mild-moderate aortic stenosis, and moderate MR.  - Transferred to [facility] for surgical evaluation of bioprosthetic AI  -ISRAEL 6/15 showed severe AI, no PVL  -Preop work up completed: Cardiac cath 6/18 without significant CAD. CT C/A/P completed 6/19 (Formal radiology read pending)  -Deferring dental evaluation until after TAVR surgery.  -Continue midodrine to support BP.  -Continue GI ppx with Protonix and Senna, DVT ppx with SCD boots  -Preop orders in for OR 6/23

## 2021-06-22 NOTE — PROGRESS NOTE ADULT - PROBLEM SELECTOR PLAN 10
-Desats overnight as low as 80-82%  -Appears to be a mouth breather - on venti mask overnight to maintain sats  -Seen by Pulm: Would defer empiric rx of sleep-disordered breathing because of uncertainty of exact diagnosis.  Would use supplemental O2. Will require outpatient sleep studies.    Problem 11: Adjustment Disorder   -Increased Remeron per recommendations from Behavioral Health on 6/17    Problem 12: COPD  -On home O2, continue nasal cannula

## 2021-06-22 NOTE — PROGRESS NOTE ADULT - ASSESSMENT
76 year old male patient with a medical history of MI in 1995 (angiogram, no stents placed), COPD (2L O2 at home), s/p TAVR (03/2019 at [facility]), gout, CKD, CVA (09/2020), pulmonary HTN, initially presented to [facility] 6/1/21 with RONNELL on CKD (likely cardiorenal syndrome), urinary retention due to noncompliance with medications, with hospital course complicated by cardiogenic shock, acute hypoxemic respiratory failure secondary to metabolic acidosis and respiratory alkalosis. Patient found to have TAVR failure with ISRAEL 6/10/21 showing EF 40-45%, Aortic valve prosthesis with Severe paravalvular leak and valvular AI, mild-moderate aortic stenosis, and moderate MR. Patient was transferred to [facility] under Dr. Campbell for further workup. Cardiology consulted for decompensated HF and severe pulmonary hypertension.     TAVR Prosthesis failure   - Aortic valve with severe prosthesis regurgitation and moderate prosthesis stenosis  - CTS following   - Blood Cultures negative.   - Plan for TAVR tomorrow.   - NPO after midnight.     HFrEF  - EF 40-45% -- > 30-35% on 6/12/21 echo  - severely reduced RV  - mod to severe MR  - Severe TR   - Aortic valve prosthesis with Severe paravalvular leak and valvular AI  - Continue HD for fluid management.   - Unable to add GDMT due to hypotension requiring midodrine.     Severe Pulmonary HTN   - confirmed on [facility] march/2021   - Echo shows PASP 61.5 consistent with severe pulm HTN    Assessment and recommendations are final when note is signed by the attending.

## 2021-06-22 NOTE — PROGRESS NOTE ADULT - SUBJECTIVE AND OBJECTIVE BOX
[facility]                                                               Office: [address] [address]                                                              Telephone: [phone]. Fax:[phone]                                                                             PROGRESS NOTE  Reason for follow up: TAVR failure  Update: Patient is out of bed to chair, awaiting HD today. Patient states that he feels weak and tired today. Plan for TAVR tomorrow.     Review of symptoms:   Cardiac:  No chest pain. No dyspnea. No palpitations.  Respiratory: No cough. No dyspnea  Gastrointestinal: No diarrhea. No abdominal pain. No bleeding.     Past medical history: No updates.   	  Vitals:  T(C): 36.9 (06-22-21 @ 08:00), Max: 36.9 (06-22-21 @ 08:00)  HR: 82 (06-22-21 @ 10:00) (80 - 94)  BP: 112/64 (06-22-21 @ 10:00) (94/51 - 119/57)  RR: 16 (06-22-21 @ 10:00) (16 - 59)  SpO2: 100% (06-22-21 @ 10:00) (92% - 100%)  Wt(kg): --  I&O's Summary    21 Jun 2021 07:01  -  22 Jun 2021 07:00  --------------------------------------------------------  IN: 680 mL / OUT: 2000 mL / NET: -1320 mL      PHYSICAL EXAM:  Appearance: Appears weak, NAD comfortable   Cardiovascular: Normal S1 S2RRR 84 , No JVD, 3/6 SHANNAN  murmurs, LSB   Respiratory: Coarse rales bilaterally, Left neck permacath noted in place   Psychiatry: A & O x 3, Mood & affect appropriate  Gastrointestinal:  Soft, Non-tender, + BS	  Skin: warm and dry  Neurologic: Non-focal  Extremities: Normal range of motion,: 1+ edema bilaterally  Vascular: Peripheral pulses palpable 2+ bilaterally        CURRENT MEDICATIONS:  midodrine. 5 milliGRAM(s) Oral three times a day  tamsulosin 0.4 milliGRAM(s) Oral at bedtime    budesonide 160 MICROgram(s)/formoterol 4.5 MICROgram(s) Inhaler  mirtazapine  pantoprazole    Tablet  psyllium Powder  senna  atorvastatin  finasteride  megestrol Suspension  ascorbic acid  aspirin enteric coated  chlorhexidine 0.12% Liquid  chlorhexidine 4% Liquid  multivitamin/minerals  phytonadione   Solution  sodium chloride 0.9% lock flush      DIAGNOSTIC TESTING:  [ ] Echocardiogram:   < from: ISRAEL Echo Doppler (06.15.21 @ 11:34) >  PHYSICIAN INTERPRETATION:  Left Ventricle:  Global LV systolic function was moderately decreased. Left ventricular ejection fraction, by visual estimation, is 30 to 35%.  Right Ventricle: The right ventricular size is moderately enlarged.  Left Atrium: Left atrial enlargement.  Right Atrium: Right atrial enlargement.  Pericardium: Trivial pericardial effusion is present.  Mitral Valve: Mild prolapse of the posterior leaflet, moderatemitral valve regurgitation.  Tricuspid Valve: The tricuspid valve is normal in structure. Mild-moderate tricuspid regurgitation is visualized.  Aortic Valve: S/P TAVR. There is a mobile echo density attached to the ventricular side of the prosthetic aortic valve. Severe aortic valve regurgitation is seen, with what appears to be damage to one of the leaflets. Patient with prior Hx of step Bovis bacteremia. Findings are highly suspicious for endocarditis. Recommend ID consult, blood and urine cultures.  Pulmonic Valve: Structurally normal pulmonic valve, with normal leaflet excursion. Mild pulmonic valve regurgitation.  Aorta: Aortic root measured at Sinus of Valsalva is normal.  Shunts: There is no evidence of a patent foramen ovale.      Summary:   1. Technically good study.   2. Moderately decreased global left ventricular systolic function.   3. Left ventricular ejection fraction, by visual estimation, is 30 to 35%.   4. Left atrial enlargement.   5. Moderately enlarged right ventricle.   6. Right atrial enlargement.   7. Mild prolapse of the posterior leaflet, moderate mitral valve regurgitation.   8. S/P TAVR. There is a mobile echo density attached to the ventricular side of the prosthetic aortic valve. Severe aortic valve regurgitation is seen, with what appears to be damage to one of the leaflets. Patient with prior Hx of step Bovis bacteremia. Findings are highly suspicious for endocarditis. Recommend ID consult, blood and urine cultures.   9. Mild-moderate tricuspid regurgitation.  10. Mild pulmonic valve regurgitation.  11. Trivial pericardial effusion.  12. Findings MAVERICK Swan-BELLA.    MD Sharda Electronically signed on 6/15/2021 at 3:01:49 PM    < end of copied text >    [ ]  Catheterization:    < from: Cardiac Cath Lab - Adult (06.18.21 @ 13:53) >  VENTRICLES: EF by echo was 35 %.  CORONARY VESSELS: The coronary circulation isright dominant.  LM:   --  LM: Normal.  LAD:   --  Proximal LAD: There was a tubular 50 % stenosis.  CX:   --  Circumflex: Angiography showed minor luminal irregularities with  no flow limiting lesions.  RCA:   --  RCA: Angiography showed minor luminal irregularities with no  flow limiting lesions.  COMPLICATIONS: No complications occurred during the cath lab visit.  DIAGNOSTIC IMPRESSIONS: Moderate 40-50% stenosis in the proximal LAD,  unchanged from 2/2019 (Insignificant iFR in 2/2019). 2. No significant CAD  involving the LM, LCX and RCA. 3. A TAV is noted to be moving in  consonance with the surrounding structures. 4. Severe central Prosthetic  valve Aortic Insufficiency is noted on a ISRAEL and the LVEF was estimated  [id number]%.  DIAGNOSTIC RECOMMENDATIONS: GDMT. 2. RFM. 3. Possible TAVIV to address the  prosthetic AI, centrally mediated.  INTERVENTIONAL IMPRESSIONS: Moderate 40-50% stenosis in the proximal LAD,  unchanged from 2/2019 (Insignificant iFR in 2/2019). 2. No significant CAD  involving the LM, LCX and RCA. 3. A TAV is noted to be moving in  consonance with the surrounding structures. 4. Severe central Prosthetic  valve Aortic Insufficiency is noted on a ISRAEL and the LVEF was estimated  [id number]%.  INTERVENTIONAL RECOMMENDATIONS: GDMT. 2. RFM. 3. Possible TAVIV to address  the prosthetic AI, centrally mediated.  Prepared and signed by  Herson Jorgensen M.D.  Signed 06/18/2021 14:34:38    < end of copied text >    OTHER: 	      LABS:	 	                            12.3   8.43  )-----------( 110      ( 22 Jun 2021 03:10 )             39.7     06-22    136  |  93<L>  |  55.3<H>  ----------------------------<  91  4.9   |  20.0<L>  |  4.90<H>    Ca    10.2      22 Jun 2021 03:10    TPro  7.6  /  Alb  4.2  /  TBili  3.4<H>  /  DBili  x   /  AST  59<H>  /  ALT  45<H>  /  AlkPhos  130<H>  06-22    proBNP:   Lipid Profile:   HgA1c:   TSH:

## 2021-06-22 NOTE — PROGRESS NOTE ADULT - SUBJECTIVE AND OBJECTIVE BOX
Brief summary:  76 year old male patient with a medical history of MI in  (angiogram, no stents placed), COPD (2L O2 at home), severe AS s/p TAVR (2019 at [facility]), gout, CKD, CVA w/o residual deficits (2020), pulmonary HTN, initially presented to [facility] 21 with RONNELL on CKD, urinary retention due to noncompliance with medications, hospital course complicated by cardiogenic shock, acute hypoxemic respiratory failure, and acute on chronic combined diastolic and systolic heart failure. ISRAEL 6/10/21 revealed EF 40-45%, Aortic valve prosthesis with Severe paravalvular leak and valvular AI, mild-moderate aortic stenosis, and moderate MR. Patient was transferred to [facility] under Dr. Campbell for further workup of TAVR failure.     Overnight events:  Patient desats overnight. Attempted CPAP however did not tolerate. On venti mask overnight without issue.  Patient denies acute pain with radiating or aggravating factors. He states that he has "been better."  He denies chest pain, shortness of breath, palpitations, headache, dizziness, nausea, or vomiting.     PAST MEDICAL & SURGICAL HISTORY:  Pulmonary hypertension    Hypertension    Hyperlipidemia    H/O aortic valve stenosis  s/p TAVR  at [place]    CVA (cerebrovascular accident)  MCA CVA with tPA and thrombectomy    Chronic kidney disease    Prediabetes    Mitral valve regurgitation    CAD in native artery    Aneurysm of aortic root  thoracic aortic aneurysm, without ruptur    Benign prostatic hyperplasia    Gout    History of transcatheter aortic valve replacement (TAVR)      Medications:  acetaminophen   Tablet .. 650 milliGRAM(s) Oral every 6 hours PRN  ALBUTerol    90 MICROgram(s) HFA Inhaler 2 Puff(s) Inhalation every 6 hours PRN  ascorbic acid 500 milliGRAM(s) Oral daily  aspirin enteric coated 81 milliGRAM(s) Oral daily  atorvastatin 10 milliGRAM(s) Oral at bedtime  bisacodyl 5 milliGRAM(s) Oral every 12 hours PRN  budesonide 160 MICROgram(s)/formoterol 4.5 MICROgram(s) Inhaler 2 Puff(s) Inhalation two times a day  chlorhexidine 0.12% Liquid 15 milliLiter(s) Swish and Spit two times a day  chlorhexidine 4% Liquid 1 Application(s) Topical two times a day  finasteride 5 milliGRAM(s) Oral daily  megestrol Suspension 400 milliGRAM(s) Oral daily  midodrine. 5 milliGRAM(s) Oral three times a day  mirtazapine 15 milliGRAM(s) Oral daily  multivitamin/minerals 1 Tablet(s) Oral daily  pantoprazole    Tablet 40 milliGRAM(s) Oral two times a day  phytonadione   Solution 5 milliGRAM(s) Oral daily  polyethylene glycol 3350 17 Gram(s) Oral daily PRN  psyllium Powder 1 Packet(s) Oral two times a day  senna 2 Tablet(s) Oral at bedtime  sodium chloride 0.9% lock flush 3 milliLiter(s) IV Push every 8 hours  tamsulosin 0.4 milliGRAM(s) Oral at bedtime    MEDICATIONS  (PRN):  acetaminophen   Tablet .. 650 milliGRAM(s) Oral every 6 hours PRN Mild Pain (1 - 3)  ALBUTerol    90 MICROgram(s) HFA Inhaler 2 Puff(s) Inhalation every 6 hours PRN Shortness of Breath and/or Wheezing  bisacodyl 5 milliGRAM(s) Oral every 12 hours PRN Constipation  polyethylene glycol 3350 17 Gram(s) Oral daily PRN Constipation    Daily Weight in k.1 (2021 06:19)    ABG - ( 2021 10:55 )  pH, Arterial: 7.440 pH, Blood: x     /  pCO2: 33    /  pO2: 135   / HCO3: 22    / Base Excess: -1.8  /  SaO2: 100.0                           12.3   8.49  )-----------( 118      ( 2021 05:52 )             38.7   06-    134<L>  |  93<L>  |  67.7<H>  ----------------------------<  107<H>  5.5<H>   |  20.0<L>  |  5.09<H>    Ca    10.3<H>      2021 05:52  Mg     2.3     06-20    TPro  7.5  /  Alb  3.9  /  TBili  2.7<H>  /  DBili  x   /  AST  54<H>  /  ALT  42<H>  /  AlkPhos  132<H>      PT/INR - ( 2021 05:52 )   PT: 20.6 sec;   INR: 1.83 ratio    PTT - ( 2021 05:52 )  PTT:40.8 sec    Objective:  T(C): 36.4 (21 @ 16:08), Max: 36.6 (21 @ 09:54)  HR: 93 (21 @ 01:00) (83 - 94)  BP: 112/53 (21 @ 01:00) (96/53 - 119/57)  RR: 29 (21 @ 01:00) (16 - 59)  SpO2: 96% (21 @ 01:00) (92% - 100%)    I&O's Summary    2021 07:01  -  2021 07:00  --------------------------------------------------------  IN: 200 mL / OUT: 0 mL / NET: 200 mL    2021 07:01  -  2021 01:12  --------------------------------------------------------  IN: 440 mL / OUT: 2000 mL / NET: -1560 mL      Physical Exam  Neuro: A+O x 3, non-focal, speech clear and intact  Pulm: Coarse breath sounds bilaterally  CV: RRR, loud systolic ejection murmur, +S1S2  Abd: soft, NT, ND, +BS  Ext: +DP Pulses b/l, +PT pulses, +1 pitting LE edema  Skin: stage 2 coccyx ulcer           Brief summary:  76 year old male patient with a medical history of MI in  (angiogram, no stents placed), COPD (2L O2 at home), severe AS s/p TAVR (2019 at [facility]), gout, CKD, CVA w/o residual deficits (2020), pulmonary HTN, initially presented to [facility] 21 with RONNELL on CKD, urinary retention due to noncompliance with medications, hospital course complicated by cardiogenic shock, acute hypoxemic respiratory failure, and acute on chronic combined diastolic and systolic heart failure. ISRAEL 6/10/21 revealed EF 40-45%, Aortic valve prosthesis with Severe paravalvular leak and valvular AI, mild-moderate aortic stenosis, and moderate MR. Patient was transferred to [facility] under Dr. Campbell for further workup of bioprosthetic AI.     Overnight events:  Patient desats overnight. Attempted CPAP however did not tolerate. On venti mask overnight without issue.  Patient denies acute pain with radiating or aggravating factors. He states that he has "been better."  He denies chest pain, shortness of breath, palpitations, headache, dizziness, nausea, or vomiting.     PAST MEDICAL & SURGICAL HISTORY:  Pulmonary hypertension    Hypertension    Hyperlipidemia    H/O aortic valve stenosis  s/p TAVR  at [place]    CVA (cerebrovascular accident)  MCA CVA with tPA and thrombectomy    Chronic kidney disease    Prediabetes    Mitral valve regurgitation    CAD in native artery    Aneurysm of aortic root  thoracic aortic aneurysm, without ruptur    Benign prostatic hyperplasia    Gout    History of transcatheter aortic valve replacement (TAVR)      Medications:  acetaminophen   Tablet .. 650 milliGRAM(s) Oral every 6 hours PRN  ALBUTerol    90 MICROgram(s) HFA Inhaler 2 Puff(s) Inhalation every 6 hours PRN  ascorbic acid 500 milliGRAM(s) Oral daily  aspirin enteric coated 81 milliGRAM(s) Oral daily  atorvastatin 10 milliGRAM(s) Oral at bedtime  bisacodyl 5 milliGRAM(s) Oral every 12 hours PRN  budesonide 160 MICROgram(s)/formoterol 4.5 MICROgram(s) Inhaler 2 Puff(s) Inhalation two times a day  chlorhexidine 0.12% Liquid 15 milliLiter(s) Swish and Spit two times a day  chlorhexidine 4% Liquid 1 Application(s) Topical two times a day  finasteride 5 milliGRAM(s) Oral daily  megestrol Suspension 400 milliGRAM(s) Oral daily  midodrine. 5 milliGRAM(s) Oral three times a day  mirtazapine 15 milliGRAM(s) Oral daily  multivitamin/minerals 1 Tablet(s) Oral daily  pantoprazole    Tablet 40 milliGRAM(s) Oral two times a day  phytonadione   Solution 5 milliGRAM(s) Oral daily  polyethylene glycol 3350 17 Gram(s) Oral daily PRN  psyllium Powder 1 Packet(s) Oral two times a day  senna 2 Tablet(s) Oral at bedtime  sodium chloride 0.9% lock flush 3 milliLiter(s) IV Push every 8 hours  tamsulosin 0.4 milliGRAM(s) Oral at bedtime    MEDICATIONS  (PRN):  acetaminophen   Tablet .. 650 milliGRAM(s) Oral every 6 hours PRN Mild Pain (1 - 3)  ALBUTerol    90 MICROgram(s) HFA Inhaler 2 Puff(s) Inhalation every 6 hours PRN Shortness of Breath and/or Wheezing  bisacodyl 5 milliGRAM(s) Oral every 12 hours PRN Constipation  polyethylene glycol 3350 17 Gram(s) Oral daily PRN Constipation    Daily Weight in k.1 (2021 06:19)    ABG - ( 2021 10:55 )  pH, Arterial: 7.440 pH, Blood: x     /  pCO2: 33    /  pO2: 135   / HCO3: 22    / Base Excess: -1.8  /  SaO2: 100.0                           12.3   8.49  )-----------( 118      ( 2021 05:52 )             38.7   06-    134<L>  |  93<L>  |  67.7<H>  ----------------------------<  107<H>  5.5<H>   |  20.0<L>  |  5.09<H>    Ca    10.3<H>      2021 05:52  Mg     2.3     06    TPro  7.5  /  Alb  3.9  /  TBili  2.7<H>  /  DBili  x   /  AST  54<H>  /  ALT  42<H>  /  AlkPhos  132<H>      PT/INR - ( 2021 05:52 )   PT: 20.6 sec;   INR: 1.83 ratio    PTT - ( 2021 05:52 )  PTT:40.8 sec    Objective:  T(C): 36.4 (21 @ 16:08), Max: 36.6 (21 @ 09:54)  HR: 93 (21 @ 01:00) (83 - 94)  BP: 112/53 (21 @ 01:00) (96/53 - 119/57)  RR: 29 (21 @ 01:00) (16 - 59)  SpO2: 96% (21 @ 01:00) (92% - 100%)    I&O's Summary    2021 07:  -  2021 07:00  --------------------------------------------------------  IN: 200 mL / OUT: 0 mL / NET: 200 mL    2021 07:01  -  2021 01:12  --------------------------------------------------------  IN: 440 mL / OUT: 2000 mL / NET: -1560 mL      Physical Exam  Neuro: A+O x 3, non-focal, speech clear and intact  Pulm: Coarse breath sounds bilaterally  CV: RRR, loud systolic ejection murmur, +S1S2  Abd: soft, NT, ND, +BS  Ext: +DP Pulses b/l, +PT pulses, +1 pitting LE edema  Skin: stage 2 coccyx ulcer

## 2021-06-22 NOTE — PROGRESS NOTE ADULT - ATTENDING COMMENTS
pt seen and examined.  pt on diuretic therapy. RV dysfunction as well  as LV dilation and dysfunction.   Plan for TAVR in AM.   I agree with a/p.

## 2021-06-22 NOTE — PROGRESS NOTE ADULT - PROBLEM SELECTOR PLAN 3
Allowing for volume removal with HD  Not on beta blocker given Midodrine requirement  Daily CXR while inpatient   Cardiology following

## 2021-06-22 NOTE — PROGRESS NOTE ADULT - ATTENDING COMMENTS
Patient seen and examined   Events over night noted.  Increased lactate this morning.  ? sepsis versus  acute decompensation of his biventricular failure.  S-G placed.  Reviewed by myself, Magnolia Finn and Jameel.  Appears to be decompensation from recent dialysis and relative volume depletion.    Discussed with family at bedside personally this morning     TAVR Today

## 2021-06-22 NOTE — PROGRESS NOTE ADULT - ASSESSMENT
1) Álvaro on CKD  2) TAVR prosthesis failure  3) CHFrEF  4) Anemia of renal dx    Plan for HD today  UF 2- 2.5 kg as tolerated  Plan for TAVR tomorrow

## 2021-06-22 NOTE — PROGRESS NOTE ADULT - PROBLEM SELECTOR PLAN 4
Cardiac cath 6/18 without significant CAD  Continue ASA and Statin   Unable to start BB at this time as patient requiring midodrine to maintain BP  Cardiology following

## 2021-06-22 NOTE — PROGRESS NOTE ADULT - SUBJECTIVE AND OBJECTIVE BOX
[facility] DIVISION OF KIDNEY DISEASES AND HYPERTENSION -- FOLLOW UP NOTE  --------------------------------------------------------------------------------  Chief Complaint: RONNELL/Ongoing hemodialysis requirement    24 hour events/subjective:  s/p HD yesterday;  2 L UF        PAST HISTORY  --------------------------------------------------------------------------------  No significant changes to PMH, PSH, FHx, SHx, unless otherwise noted    ALLERGIES & MEDICATIONS  --------------------------------------------------------------------------------  Allergies    No Known Drug Allergies  strawberry (Anaphylaxis)    Intolerances      Standing Inpatient Medications  ascorbic acid 500 milliGRAM(s) Oral daily  aspirin enteric coated 81 milliGRAM(s) Oral daily  atorvastatin 10 milliGRAM(s) Oral at bedtime  budesonide 160 MICROgram(s)/formoterol 4.5 MICROgram(s) Inhaler 2 Puff(s) Inhalation two times a day  chlorhexidine 0.12% Liquid 15 milliLiter(s) Swish and Spit two times a day  chlorhexidine 4% Liquid 1 Application(s) Topical two times a day  finasteride 5 milliGRAM(s) Oral daily  megestrol Suspension 400 milliGRAM(s) Oral daily  midodrine. 5 milliGRAM(s) Oral three times a day  mirtazapine 15 milliGRAM(s) Oral daily  multivitamin/minerals 1 Tablet(s) Oral daily  pantoprazole    Tablet 40 milliGRAM(s) Oral two times a day  phytonadione   Solution 5 milliGRAM(s) Oral daily  psyllium Powder 1 Packet(s) Oral two times a day  senna 2 Tablet(s) Oral at bedtime  sodium chloride 0.9% lock flush 3 milliLiter(s) IV Push every 8 hours  tamsulosin 0.4 milliGRAM(s) Oral at bedtime    PRN Inpatient Medications  acetaminophen   Tablet .. 650 milliGRAM(s) Oral every 6 hours PRN  ALBUTerol    90 MICROgram(s) HFA Inhaler 2 Puff(s) Inhalation every 6 hours PRN  bisacodyl 5 milliGRAM(s) Oral every 12 hours PRN  polyethylene glycol 3350 17 Gram(s) Oral daily PRN      REVIEW OF SYSTEMS  --------------------------------------------------------------------------------  Gen: No weight changes, fatigue, fevers/chills, weakness  Skin: No rashes  Head/Eyes/Ears/Mouth: No headache  Respiratory: No dyspnea, cough,  CV: No chest pain, orthopnea  GI: No abdominal pain, diarrhea, constipation, nausea, vomiting,  MSK: No joint pain  Neuro: No dizziness/lightheadedness, weakness  Heme: No bleeding  Psych: No significant nervousness, anxiety, stress, depression    All other systems were reviewed and are negative, except as noted.    VITALS/PHYSICAL EXAM  --------------------------------------------------------------------------------  T(C): 36.9 (06-22-21 @ 08:00), Max: 36.9 (06-22-21 @ 08:00)  HR: 83 (06-22-21 @ 11:00) (80 - 94)  BP: 105/51 (06-22-21 @ 11:00) (94/51 - 119/57)  RR: 29 (06-22-21 @ 11:00) (16 - 59)  SpO2: 100% (06-22-21 @ 11:45) (92% - 100%)  Wt(kg): --        06-21-21 @ 07:01  -  06-22-21 @ 07:00  --------------------------------------------------------  IN: 680 mL / OUT: 2000 mL / NET: -1320 mL    06-22-21 @ 07:01  -  06-22-21 @ 12:37  --------------------------------------------------------  IN: 240 mL / OUT: 0 mL / NET: 240 mL      Physical Exam:  	Gen: NAD,   	HEENT: PERRL, supple neck,  	Pulm: CTA B/L  	CV: RRR, S1S2; no rub  	Abd: +BS, soft, nontender  	UE: Warm, intact strength; no asterixis  	LE: Warm, + edema  	Neuro: No focal deficits  	Psych: Normal affect and mood  	Skin: Warm, without rashes  	Vascular access: RIJ non [facility]    LABS/STUDIES  --------------------------------------------------------------------------------              12.3   8.43  >-----------<  110      [06-22-21 @ 03:10]              39.7     136  |  93  |  55.3  ----------------------------<  91      [06-22-21 @ 03:10]  4.9   |  20.0  |  4.90        Ca     10.2     [06-22-21 @ 03:10]    TPro  7.6  /  Alb  4.2  /  TBili  3.4  /  DBili  x   /  AST  59  /  ALT  45  /  AlkPhos  130  [06-22-21 @ 03:10]    PT/INR: PT 21.3 , INR 1.89       [06-22-21 @ 03:10]  PTT: 39.2       [06-22-21 @ 03:10]      TSH 2.31      [06-12-21 @ 01:55]    HBsAb <3.0      [06-17-21 @ 05:13]  HBsAg Nonreact      [06-17-21 @ 05:13]  HBcAb Nonreact      [06-17-21 @ 05:13]  HCV 0.15, Nonreact      [06-17-21 @ 05:13]

## 2021-06-22 NOTE — PROGRESS NOTE ADULT - PROBLEM SELECTOR PROBLEM 1
Paravalvular leak of prosthetic heart valve, initial encounter Nonrheumatic aortic valve insufficiency

## 2021-06-22 NOTE — PROGRESS NOTE ADULT - PROBLEM SELECTOR PLAN 2
-ISRAEL 6/15/21 concern for a mobile echo density attached to the ventricular side of the prosthetic aortic valve with  Severe aortic valve regurgitation seen.  -Given history of strep bovis bacteremia in 2020, patient was worked up to r/o endocarditis of AV  -Blood Cultures from 6/7, 6/15, 6/16 remain negative to date. ID input appreciated.    - Per ID, If pt spikes Temps, start Vanco and Rocephin. Otherwise, monitoring patient off of antibiotics as he remains nontoxic, afebrile, without leukocytosis.   - Low suspicion for endocarditis at this time

## 2021-06-23 ENCOUNTER — APPOINTMENT (OUTPATIENT)
Dept: CARDIOTHORACIC SURGERY | Facility: HOSPITAL | Age: 76
End: 2021-06-23

## 2021-06-23 DIAGNOSIS — I35.1 NONRHEUMATIC AORTIC (VALVE) INSUFFICIENCY: ICD-10-CM

## 2021-06-23 LAB
ALBUMIN SERPL ELPH-MCNC: 3.6 G/DL — SIGNIFICANT CHANGE UP (ref 3.3–5.2)
ALBUMIN SERPL ELPH-MCNC: 4 G/DL — SIGNIFICANT CHANGE UP (ref 3.3–5.2)
ALP SERPL-CCNC: 109 U/L — SIGNIFICANT CHANGE UP (ref 40–120)
ALP SERPL-CCNC: 119 U/L — SIGNIFICANT CHANGE UP (ref 40–120)
ALT FLD-CCNC: 58 U/L — HIGH
ALT FLD-CCNC: 60 U/L — HIGH
ANION GAP SERPL CALC-SCNC: 20 MMOL/L — HIGH (ref 5–17)
ANION GAP SERPL CALC-SCNC: 25 MMOL/L — HIGH (ref 5–17)
APTT BLD: 43.3 SEC — HIGH (ref 27.5–35.5)
APTT BLD: 49.5 SEC — HIGH (ref 27.5–35.5)
AST SERPL-CCNC: 82 U/L — HIGH
AST SERPL-CCNC: 85 U/L — HIGH
BASE EXCESS BLDA CALC-SCNC: 1.4 MMOL/L — SIGNIFICANT CHANGE UP (ref -2–3)
BASE EXCESS BLDV CALC-SCNC: 0.9 MMOL/L — SIGNIFICANT CHANGE UP (ref -2–3)
BASE EXCESS BLDV CALC-SCNC: 2.2 MMOL/L — SIGNIFICANT CHANGE UP (ref -2–3)
BASE EXCESS BLDV CALC-SCNC: 3.6 MMOL/L — HIGH (ref -2–3)
BILIRUB SERPL-MCNC: 3.1 MG/DL — HIGH (ref 0.4–2)
BILIRUB SERPL-MCNC: 3.4 MG/DL — HIGH (ref 0.4–2)
BLD GP AB SCN SERPL QL: SIGNIFICANT CHANGE UP
BLOOD GAS COMMENTS, VENOUS: SIGNIFICANT CHANGE UP
BUN SERPL-MCNC: 48.1 MG/DL — HIGH (ref 8–20)
BUN SERPL-MCNC: 53.1 MG/DL — HIGH (ref 8–20)
CA-I SERPL-SCNC: 1.06 MMOL/L — LOW (ref 1.15–1.33)
CA-I SERPL-SCNC: 1.13 MMOL/L — LOW (ref 1.15–1.33)
CA-I SERPL-SCNC: 1.15 MMOL/L — SIGNIFICANT CHANGE UP (ref 1.15–1.33)
CALCIUM SERPL-MCNC: 9.8 MG/DL — SIGNIFICANT CHANGE UP (ref 8.6–10.2)
CALCIUM SERPL-MCNC: 9.9 MG/DL — SIGNIFICANT CHANGE UP (ref 8.6–10.2)
CARDIOLIPIN AB SER-ACNC: NEGATIVE — SIGNIFICANT CHANGE UP
CHLORIDE BLDV-SCNC: 96 MMOL/L — LOW (ref 98–107)
CHLORIDE BLDV-SCNC: 98 MMOL/L — SIGNIFICANT CHANGE UP (ref 98–107)
CHLORIDE BLDV-SCNC: 98 MMOL/L — SIGNIFICANT CHANGE UP (ref 98–107)
CHLORIDE SERPL-SCNC: 94 MMOL/L — LOW (ref 98–107)
CHLORIDE SERPL-SCNC: 94 MMOL/L — LOW (ref 98–107)
CO2 SERPL-SCNC: 15 MMOL/L — LOW (ref 22–29)
CO2 SERPL-SCNC: 24 MMOL/L — SIGNIFICANT CHANGE UP (ref 22–29)
COHGB MFR BLDA: 2.1 % — SIGNIFICANT CHANGE UP
CREAT SERPL-MCNC: 4.38 MG/DL — HIGH (ref 0.5–1.3)
CREAT SERPL-MCNC: 4.41 MG/DL — HIGH (ref 0.5–1.3)
FACT XIII ACT/NOR PPP CHRO: 57 % — SIGNIFICANT CHANGE UP (ref 51–163)
FIBRINOGEN PPP-MCNC: 194 MG/DL — LOW (ref 290–520)
GAS PNL BLDA: SIGNIFICANT CHANGE UP
GAS PNL BLDV: 133 MMOL/L — LOW (ref 136–145)
GAS PNL BLDV: 135 MMOL/L — LOW (ref 136–145)
GAS PNL BLDV: 135 MMOL/L — LOW (ref 136–145)
GAS PNL BLDV: SIGNIFICANT CHANGE UP
GLUCOSE BLDC GLUCOMTR-MCNC: 108 MG/DL — HIGH (ref 70–99)
GLUCOSE BLDC GLUCOMTR-MCNC: 110 MG/DL — HIGH (ref 70–99)
GLUCOSE BLDC GLUCOMTR-MCNC: 115 MG/DL — HIGH (ref 70–99)
GLUCOSE BLDC GLUCOMTR-MCNC: 119 MG/DL — HIGH (ref 70–99)
GLUCOSE BLDC GLUCOMTR-MCNC: 119 MG/DL — HIGH (ref 70–99)
GLUCOSE BLDC GLUCOMTR-MCNC: 125 MG/DL — HIGH (ref 70–99)
GLUCOSE BLDC GLUCOMTR-MCNC: 135 MG/DL — HIGH (ref 70–99)
GLUCOSE BLDC GLUCOMTR-MCNC: 162 MG/DL — HIGH (ref 70–99)
GLUCOSE BLDV-MCNC: 103 MG/DL — HIGH (ref 70–99)
GLUCOSE BLDV-MCNC: 129 MG/DL — HIGH (ref 70–99)
GLUCOSE BLDV-MCNC: 137 MG/DL — HIGH (ref 70–99)
GLUCOSE SERPL-MCNC: 137 MG/DL — HIGH (ref 70–99)
GLUCOSE SERPL-MCNC: 91 MG/DL — SIGNIFICANT CHANGE UP (ref 70–99)
HCO3 BLDA-SCNC: 26 MMOL/L — SIGNIFICANT CHANGE UP (ref 21–28)
HCO3 BLDV-SCNC: 26 MMOL/L — SIGNIFICANT CHANGE UP (ref 22–29)
HCO3 BLDV-SCNC: 27 MMOL/L — SIGNIFICANT CHANGE UP (ref 22–29)
HCO3 BLDV-SCNC: 28 MMOL/L — SIGNIFICANT CHANGE UP (ref 22–29)
HCT VFR BLD CALC: 34 % — LOW (ref 39–50)
HCT VFR BLD CALC: 36.6 % — LOW (ref 39–50)
HCT VFR BLDA CALC: 32 % — LOW (ref 39–51)
HCT VFR BLDA CALC: 33 % — LOW (ref 39–51)
HCT VFR BLDA CALC: 36 % — LOW (ref 39–51)
HGB BLD CALC-MCNC: 10.6 G/DL — LOW (ref 12.6–17.4)
HGB BLD CALC-MCNC: 11 G/DL — LOW (ref 12.6–17.4)
HGB BLD CALC-MCNC: 11.9 G/DL — LOW (ref 12.6–17.4)
HGB BLD-MCNC: 10.8 G/DL — LOW (ref 13–17)
HGB BLD-MCNC: 12 G/DL — LOW (ref 13–17)
HGB BLDA-MCNC: 11.2 G/DL — LOW (ref 12.6–17.4)
HOROWITZ INDEX BLDA+IHG-RTO: SIGNIFICANT CHANGE UP
HOROWITZ INDEX BLDV+IHG-RTO: SIGNIFICANT CHANGE UP
INR BLD: 2.01 RATIO — HIGH (ref 0.88–1.16)
INR BLD: 2.09 RATIO — HIGH (ref 0.88–1.16)
LACTATE BLDV-MCNC: 1.5 MMOL/L — SIGNIFICANT CHANGE UP (ref 0.5–2)
LACTATE BLDV-MCNC: 1.6 MMOL/L — SIGNIFICANT CHANGE UP (ref 0.5–2)
LACTATE BLDV-MCNC: 2.5 MMOL/L — HIGH (ref 0.5–2)
MAGNESIUM SERPL-MCNC: 2.2 MG/DL — SIGNIFICANT CHANGE UP (ref 1.6–2.6)
MCHC RBC-ENTMCNC: 30.1 PG — SIGNIFICANT CHANGE UP (ref 27–34)
MCHC RBC-ENTMCNC: 31.2 PG — SIGNIFICANT CHANGE UP (ref 27–34)
MCHC RBC-ENTMCNC: 31.8 GM/DL — LOW (ref 32–36)
MCHC RBC-ENTMCNC: 32.8 GM/DL — SIGNIFICANT CHANGE UP (ref 32–36)
MCV RBC AUTO: 94.7 FL — SIGNIFICANT CHANGE UP (ref 80–100)
MCV RBC AUTO: 95.1 FL — SIGNIFICANT CHANGE UP (ref 80–100)
METHGB MFR BLDA: 1.1 % — SIGNIFICANT CHANGE UP
OXYHGB MFR BLDA: 97 % — HIGH (ref 90–95)
PCO2 BLDA: 41 MMHG — SIGNIFICANT CHANGE UP (ref 35–48)
PCO2 BLDV: 41 MMHG — LOW (ref 42–55)
PCO2 BLDV: 44 MMHG — SIGNIFICANT CHANGE UP (ref 42–55)
PCO2 BLDV: 45 MMHG — SIGNIFICANT CHANGE UP (ref 42–55)
PH BLDA: 7.41 — SIGNIFICANT CHANGE UP (ref 7.35–7.45)
PH BLDV: 7.38 — SIGNIFICANT CHANGE UP (ref 7.32–7.43)
PH BLDV: 7.39 — SIGNIFICANT CHANGE UP (ref 7.32–7.43)
PH BLDV: 7.44 — HIGH (ref 7.32–7.43)
PLATELET # BLD AUTO: 63 K/UL — LOW (ref 150–400)
PLATELET # BLD AUTO: 80 K/UL — LOW (ref 150–400)
PO2 BLDA: 220 MMHG — HIGH (ref 83–108)
PO2 BLDV: 58 MMHG — HIGH (ref 25–45)
PO2 BLDV: 62 MMHG — HIGH (ref 25–45)
PO2 BLDV: <42 MMHG — SIGNIFICANT CHANGE UP (ref 25–45)
POTASSIUM BLDV-SCNC: 3.6 MMOL/L — SIGNIFICANT CHANGE UP (ref 3.5–5.1)
POTASSIUM BLDV-SCNC: 3.9 MMOL/L — SIGNIFICANT CHANGE UP (ref 3.5–5.1)
POTASSIUM BLDV-SCNC: 4.2 MMOL/L — SIGNIFICANT CHANGE UP (ref 3.5–5.1)
POTASSIUM SERPL-MCNC: 4 MMOL/L — SIGNIFICANT CHANGE UP (ref 3.5–5.3)
POTASSIUM SERPL-MCNC: 4.5 MMOL/L — SIGNIFICANT CHANGE UP (ref 3.5–5.3)
POTASSIUM SERPL-SCNC: 4 MMOL/L — SIGNIFICANT CHANGE UP (ref 3.5–5.3)
POTASSIUM SERPL-SCNC: 4.5 MMOL/L — SIGNIFICANT CHANGE UP (ref 3.5–5.3)
PROT SERPL-MCNC: 6.6 G/DL — SIGNIFICANT CHANGE UP (ref 6.6–8.7)
PROT SERPL-MCNC: 7.4 G/DL — SIGNIFICANT CHANGE UP (ref 6.6–8.7)
PROTHROM AB SERPL-ACNC: 22.6 SEC — HIGH (ref 10.6–13.6)
PROTHROM AB SERPL-ACNC: 23.4 SEC — HIGH (ref 10.6–13.6)
RBC # BLD: 3.59 M/UL — LOW (ref 4.2–5.8)
RBC # BLD: 3.85 M/UL — LOW (ref 4.2–5.8)
RBC # FLD: 21.2 % — HIGH (ref 10.3–14.5)
RBC # FLD: 21.5 % — HIGH (ref 10.3–14.5)
SAO2 % BLDA: 100 % — HIGH (ref 94–98)
SAO2 % BLDV: 48.4 % — SIGNIFICANT CHANGE UP
SAO2 % BLDV: 89.2 % — SIGNIFICANT CHANGE UP
SAO2 % BLDV: 92 % — SIGNIFICANT CHANGE UP
SODIUM SERPL-SCNC: 134 MMOL/L — LOW (ref 135–145)
SODIUM SERPL-SCNC: 138 MMOL/L — SIGNIFICANT CHANGE UP (ref 135–145)
WBC # BLD: 10.5 K/UL — SIGNIFICANT CHANGE UP (ref 3.8–10.5)
WBC # BLD: 8.93 K/UL — SIGNIFICANT CHANGE UP (ref 3.8–10.5)
WBC # FLD AUTO: 10.5 K/UL — SIGNIFICANT CHANGE UP (ref 3.8–10.5)
WBC # FLD AUTO: 8.93 K/UL — SIGNIFICANT CHANGE UP (ref 3.8–10.5)

## 2021-06-23 PROCEDURE — 71045 X-RAY EXAM CHEST 1 VIEW: CPT | Mod: 26,77

## 2021-06-23 PROCEDURE — 99231 SBSQ HOSP IP/OBS SF/LOW 25: CPT

## 2021-06-23 PROCEDURE — 71045 X-RAY EXAM CHEST 1 VIEW: CPT | Mod: 26,76

## 2021-06-23 PROCEDURE — 36620 INSERTION CATHETER ARTERY: CPT | Mod: LT

## 2021-06-23 PROCEDURE — 99232 SBSQ HOSP IP/OBS MODERATE 35: CPT | Mod: 25

## 2021-06-23 PROCEDURE — 93010 ELECTROCARDIOGRAM REPORT: CPT

## 2021-06-23 PROCEDURE — 93306 TTE W/DOPPLER COMPLETE: CPT | Mod: 26

## 2021-06-23 PROCEDURE — 33362 REPLACE AORTIC VALVE OPEN: CPT | Mod: 62,Q0

## 2021-06-23 PROCEDURE — 99233 SBSQ HOSP IP/OBS HIGH 50: CPT

## 2021-06-23 PROCEDURE — 93355 ECHO TRANSESOPHAGEAL (TEE): CPT

## 2021-06-23 RX ORDER — ALBUMIN HUMAN 25 %
250 VIAL (ML) INTRAVENOUS ONCE
Refills: 0 | Status: COMPLETED | OUTPATIENT
Start: 2021-06-23 | End: 2021-06-23

## 2021-06-23 RX ORDER — PROPOFOL 10 MG/ML
5 INJECTION, EMULSION INTRAVENOUS
Qty: 1000 | Refills: 0 | Status: DISCONTINUED | OUTPATIENT
Start: 2021-06-23 | End: 2021-06-26

## 2021-06-23 RX ORDER — MILRINONE LACTATE 1 MG/ML
0.5 INJECTION, SOLUTION INTRAVENOUS
Qty: 20 | Refills: 0 | Status: DISCONTINUED | OUTPATIENT
Start: 2021-06-23 | End: 2021-06-23

## 2021-06-23 RX ORDER — VASOPRESSIN 20 [USP'U]/ML
0.04 INJECTION INTRAVENOUS
Qty: 50 | Refills: 0 | Status: DISCONTINUED | OUTPATIENT
Start: 2021-06-23 | End: 2021-06-25

## 2021-06-23 RX ORDER — POTASSIUM CHLORIDE 20 MEQ
10 PACKET (EA) ORAL
Refills: 0 | Status: DISCONTINUED | OUTPATIENT
Start: 2021-06-23 | End: 2021-06-23

## 2021-06-23 RX ORDER — DOBUTAMINE HCL 250MG/20ML
2.5 VIAL (ML) INTRAVENOUS
Qty: 500 | Refills: 0 | Status: DISCONTINUED | OUTPATIENT
Start: 2021-06-23 | End: 2021-06-23

## 2021-06-23 RX ORDER — FENTANYL CITRATE 50 UG/ML
50 INJECTION INTRAVENOUS
Refills: 0 | Status: DISCONTINUED | OUTPATIENT
Start: 2021-06-23 | End: 2021-06-23

## 2021-06-23 RX ORDER — POTASSIUM CHLORIDE 20 MEQ
10 PACKET (EA) ORAL ONCE
Refills: 0 | Status: COMPLETED | OUTPATIENT
Start: 2021-06-23 | End: 2021-06-23

## 2021-06-23 RX ORDER — AMIODARONE HYDROCHLORIDE 400 MG/1
1 TABLET ORAL
Qty: 900 | Refills: 0 | Status: COMPLETED | OUTPATIENT
Start: 2021-06-23 | End: 2021-06-24

## 2021-06-23 RX ORDER — NOREPINEPHRINE BITARTRATE/D5W 8 MG/250ML
0.05 PLASTIC BAG, INJECTION (ML) INTRAVENOUS
Qty: 8 | Refills: 0 | Status: DISCONTINUED | OUTPATIENT
Start: 2021-06-23 | End: 2021-06-23

## 2021-06-23 RX ORDER — INSULIN HUMAN 100 [IU]/ML
2 INJECTION, SOLUTION SUBCUTANEOUS
Qty: 50 | Refills: 0 | Status: DISCONTINUED | OUTPATIENT
Start: 2021-06-23 | End: 2021-06-25

## 2021-06-23 RX ORDER — CHLORHEXIDINE GLUCONATE 213 G/1000ML
5 SOLUTION TOPICAL
Refills: 0 | Status: DISCONTINUED | OUTPATIENT
Start: 2021-06-23 | End: 2021-06-29

## 2021-06-23 RX ORDER — CHLORHEXIDINE GLUCONATE 213 G/1000ML
15 SOLUTION TOPICAL EVERY 12 HOURS
Refills: 0 | Status: DISCONTINUED | OUTPATIENT
Start: 2021-06-23 | End: 2021-06-23

## 2021-06-23 RX ORDER — SODIUM CHLORIDE 9 MG/ML
1000 INJECTION INTRAMUSCULAR; INTRAVENOUS; SUBCUTANEOUS
Refills: 0 | Status: DISCONTINUED | OUTPATIENT
Start: 2021-06-23 | End: 2021-07-04

## 2021-06-23 RX ORDER — POTASSIUM CHLORIDE 20 MEQ
10 PACKET (EA) ORAL ONCE
Refills: 0 | Status: DISCONTINUED | OUTPATIENT
Start: 2021-06-23 | End: 2021-06-23

## 2021-06-23 RX ORDER — HALOPERIDOL DECANOATE 100 MG/ML
5 INJECTION INTRAMUSCULAR ONCE
Refills: 0 | Status: DISCONTINUED | OUTPATIENT
Start: 2021-06-23 | End: 2021-06-23

## 2021-06-23 RX ORDER — AMIODARONE HYDROCHLORIDE 400 MG/1
150 TABLET ORAL ONCE
Refills: 0 | Status: COMPLETED | OUTPATIENT
Start: 2021-06-23 | End: 2021-06-23

## 2021-06-23 RX ORDER — DOBUTAMINE HCL 250MG/20ML
5 VIAL (ML) INTRAVENOUS
Qty: 500 | Refills: 0 | Status: DISCONTINUED | OUTPATIENT
Start: 2021-06-23 | End: 2021-06-23

## 2021-06-23 RX ORDER — PANTOPRAZOLE SODIUM 20 MG/1
40 TABLET, DELAYED RELEASE ORAL ONCE
Refills: 0 | Status: COMPLETED | OUTPATIENT
Start: 2021-06-23 | End: 2021-06-23

## 2021-06-23 RX ORDER — LIDOCAINE HCL 20 MG/ML
100 VIAL (ML) INJECTION ONCE
Refills: 0 | Status: COMPLETED | OUTPATIENT
Start: 2021-06-23 | End: 2021-06-23

## 2021-06-23 RX ORDER — NOREPINEPHRINE BITARTRATE/D5W 8 MG/250ML
0.05 PLASTIC BAG, INJECTION (ML) INTRAVENOUS
Qty: 8 | Refills: 0 | Status: DISCONTINUED | OUTPATIENT
Start: 2021-06-23 | End: 2021-06-27

## 2021-06-23 RX ORDER — LIDOCAINE HCL 20 MG/ML
0.5 VIAL (ML) INJECTION
Qty: 2 | Refills: 0 | Status: DISCONTINUED | OUTPATIENT
Start: 2021-06-23 | End: 2021-06-26

## 2021-06-23 RX ORDER — DEXTROSE 50 % IN WATER 50 %
25 SYRINGE (ML) INTRAVENOUS
Refills: 0 | Status: DISCONTINUED | OUTPATIENT
Start: 2021-06-23 | End: 2021-06-25

## 2021-06-23 RX ORDER — MEPERIDINE HYDROCHLORIDE 50 MG/ML
25 INJECTION INTRAMUSCULAR; INTRAVENOUS; SUBCUTANEOUS ONCE
Refills: 0 | Status: DISCONTINUED | OUTPATIENT
Start: 2021-06-23 | End: 2021-06-24

## 2021-06-23 RX ORDER — ACETAMINOPHEN 500 MG
1000 TABLET ORAL ONCE
Refills: 0 | Status: COMPLETED | OUTPATIENT
Start: 2021-06-23 | End: 2021-06-24

## 2021-06-23 RX ORDER — CEFUROXIME AXETIL 250 MG
1500 TABLET ORAL EVERY 8 HOURS
Refills: 0 | Status: COMPLETED | OUTPATIENT
Start: 2021-06-23 | End: 2021-06-24

## 2021-06-23 RX ORDER — PANTOPRAZOLE SODIUM 20 MG/1
40 TABLET, DELAYED RELEASE ORAL DAILY
Refills: 0 | Status: DISCONTINUED | OUTPATIENT
Start: 2021-06-24 | End: 2021-06-24

## 2021-06-23 RX ORDER — SODIUM BICARBONATE 1 MEQ/ML
50 SYRINGE (ML) INTRAVENOUS
Refills: 0 | Status: COMPLETED | OUTPATIENT
Start: 2021-06-23 | End: 2021-06-23

## 2021-06-23 RX ORDER — MILRINONE LACTATE 1 MG/ML
0.4 INJECTION, SOLUTION INTRAVENOUS
Qty: 20 | Refills: 0 | Status: DISCONTINUED | OUTPATIENT
Start: 2021-06-23 | End: 2021-06-24

## 2021-06-23 RX ORDER — DEXTROSE 50 % IN WATER 50 %
50 SYRINGE (ML) INTRAVENOUS
Refills: 0 | Status: DISCONTINUED | OUTPATIENT
Start: 2021-06-23 | End: 2021-06-25

## 2021-06-23 RX ADMIN — Medication 100 MILLIGRAM(S): at 14:17

## 2021-06-23 RX ADMIN — Medication 7.5 MG/MIN: at 15:10

## 2021-06-23 RX ADMIN — PROPOFOL 3 MICROGRAM(S)/KG/MIN: 10 INJECTION, EMULSION INTRAVENOUS at 22:06

## 2021-06-23 RX ADMIN — SODIUM CHLORIDE 10 MILLILITER(S): 9 INJECTION INTRAMUSCULAR; INTRAVENOUS; SUBCUTANEOUS at 15:12

## 2021-06-23 RX ADMIN — Medication 125 MILLILITER(S): at 19:33

## 2021-06-23 RX ADMIN — PROPOFOL 3 MICROGRAM(S)/KG/MIN: 10 INJECTION, EMULSION INTRAVENOUS at 15:11

## 2021-06-23 RX ADMIN — CHLORHEXIDINE GLUCONATE 1 APPLICATION(S): 213 SOLUTION TOPICAL at 06:28

## 2021-06-23 RX ADMIN — INSULIN HUMAN 2 UNIT(S)/HR: 100 INJECTION, SOLUTION SUBCUTANEOUS at 15:11

## 2021-06-23 RX ADMIN — PANTOPRAZOLE SODIUM 40 MILLIGRAM(S): 20 TABLET, DELAYED RELEASE ORAL at 14:21

## 2021-06-23 RX ADMIN — Medication 100 MILLIGRAM(S): at 19:47

## 2021-06-23 RX ADMIN — Medication 50 MILLIEQUIVALENT(S): at 07:44

## 2021-06-23 RX ADMIN — Medication 125 MILLILITER(S): at 21:38

## 2021-06-23 RX ADMIN — Medication 100 MILLIGRAM(S): at 06:36

## 2021-06-23 RX ADMIN — Medication 15 MICROGRAM(S)/KG/MIN: at 06:26

## 2021-06-23 RX ADMIN — MILRINONE LACTATE 12 MICROGRAM(S)/KG/MIN: 1 INJECTION, SOLUTION INTRAVENOUS at 21:00

## 2021-06-23 RX ADMIN — Medication 50 MILLIEQUIVALENT(S): at 09:01

## 2021-06-23 RX ADMIN — INSULIN HUMAN 2 UNIT(S)/HR: 100 INJECTION, SOLUTION SUBCUTANEOUS at 21:37

## 2021-06-23 RX ADMIN — MILRINONE LACTATE 15 MICROGRAM(S)/KG/MIN: 1 INJECTION, SOLUTION INTRAVENOUS at 18:29

## 2021-06-23 RX ADMIN — AMIODARONE HYDROCHLORIDE 618 MILLIGRAM(S): 400 TABLET ORAL at 14:19

## 2021-06-23 RX ADMIN — Medication 9.38 MICROGRAM(S)/KG/MIN: at 15:12

## 2021-06-23 RX ADMIN — CHLORHEXIDINE GLUCONATE 15 MILLILITER(S): 213 SOLUTION TOPICAL at 18:29

## 2021-06-23 RX ADMIN — SODIUM CHLORIDE 3 MILLILITER(S): 9 INJECTION INTRAMUSCULAR; INTRAVENOUS; SUBCUTANEOUS at 06:28

## 2021-06-23 RX ADMIN — SODIUM CHLORIDE 5 MILLILITER(S): 9 INJECTION INTRAMUSCULAR; INTRAVENOUS; SUBCUTANEOUS at 15:12

## 2021-06-23 RX ADMIN — Medication 100 MILLIEQUIVALENT(S): at 15:21

## 2021-06-23 RX ADMIN — Medication 125 MILLILITER(S): at 22:35

## 2021-06-23 RX ADMIN — AMIODARONE HYDROCHLORIDE 33.3 MG/MIN: 400 TABLET ORAL at 15:10

## 2021-06-23 RX ADMIN — CHLORHEXIDINE GLUCONATE 5 MILLILITER(S): 213 SOLUTION TOPICAL at 18:29

## 2021-06-23 RX ADMIN — Medication 125 MILLILITER(S): at 18:28

## 2021-06-23 NOTE — PROGRESS NOTE ADULT - SUBJECTIVE AND OBJECTIVE BOX
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   Patient is planned for TAVR, minimally invasive, less chance of bleeding   If bleeding can give FFP, if severe can consider Sheldon 7

## 2021-06-23 NOTE — BRIEF OPERATIVE NOTE - NSICDXBRIEFPROCEDURE_GEN_ALL_CORE_FT
PROCEDURES:  EGD with biopsy 15-Jhoan-2021 11:44:18  JUDITH Lopez  
PROCEDURES:  Flexible colonoscopy 16-Jun-2021 12:53:34  Jerry Sanchez  
PROCEDURES:  TAVR, open femoral artery approach 23-Jun-2021 13:55:42 Left femoral cutdown 29MM Waters rudy valve in valve, with use of right radial Hatteras Device Jaz Harding

## 2021-06-23 NOTE — PROGRESS NOTE ADULT - ATTENDING COMMENTS
pt seen and examined on 6/23/.  Floated SGC with CTICU-PA.  PADP was 24 mmhg.   Pt had haldol and sleepy.  Plan for TAVR.

## 2021-06-23 NOTE — PROGRESS NOTE ADULT - ASSESSMENT
1) Álvaro on CKD  2) TAVR prosthesis failure  3) CHFrEF  4) Anemia of renal dx    HD tomorrow  TAVR today

## 2021-06-23 NOTE — CHART NOTE - NSCHARTNOTEFT_GEN_A_CORE
Commercial 29mm Waters Hector 3 Ultra TAVR via left femoral artery cutdown  , STS/ACC TVT Registry Patient ID# [id number].

## 2021-06-23 NOTE — PROGRESS NOTE ADULT - SUBJECTIVE AND OBJECTIVE BOX
[place] [facility]                                                               Office: [address] [address]                                                              Telephone: [phone]. Fax:[phone]                                                                             PROGRESS NOTE  Reason for follow up: TAVR Failure  Update: Patient received Haldol overnight, and is still very lethargic this morning. Patient with swan placed overnight, at time of exam CVP 6, PA 56/26/37 with CI 1.5 and CO 3.4. Patient also with short runs of Vtach overnight. Lactate was 5.00 this morning and is now 2.50. Started on dobutamine.     Past medical history: No updates.   	  Vitals:  T(C): 36.3 (06-23-21 @ 09:00), Max: 36.6 (06-22-21 @ 15:50)  HR: 102 (06-23-21 @ 09:00) (81 - 102)  BP: 102/51 (06-23-21 @ 05:00) (92/52 - 119/59)  RR: 25 (06-23-21 @ 09:00) (10 - 60)  SpO2: 99% (06-23-21 @ 09:00) (89% - 100%)  Wt(kg): --  I&O's Summary    22 Jun 2021 07:01  -  23 Jun 2021 07:00  --------------------------------------------------------  IN: 300 mL / OUT: 1500 mL / NET: -1200 mL    23 Jun 2021 07:01  -  23 Jun 2021 10:04  --------------------------------------------------------  IN: 45 mL / OUT: 0 mL / NET: 45 mL      PHYSICAL EXAM:  Appearance: Lethargic  Cardiovascular: Normal S1 S2RRR 84 , No JVD, 3/6 SHANNAN  murmurs, LSB   Respiratory: Coarse rales bilaterally, Left neck permacath noted in place, right swan-devi  Psychiatry: A & O x 1, lethargic   Gastrointestinal:  Soft, Non-tender, + BS	  Skin: warm and dry  Neurologic: Non-focal  Extremities: Normal range of motion,: 1+ edema bilaterally  Vascular: Peripheral pulses palpable 2+ bilaterally        CURRENT MEDICATIONS:  DOBUTamine Infusion 5 MICROgram(s)/kG/Min IV Continuous <Continuous>  midodrine. 5 milliGRAM(s) Oral three times a day  tamsulosin 0.4 milliGRAM(s) Oral at bedtime    budesonide 160 MICROgram(s)/formoterol 4.5 MICROgram(s) Inhaler  mirtazapine  pantoprazole    Tablet  psyllium Powder  senna  atorvastatin  finasteride  megestrol Suspension  ascorbic acid  aspirin enteric coated  chlorhexidine 0.12% Liquid  chlorhexidine 4% Liquid  multivitamin/minerals  phytonadione   Solution  sodium chloride 0.9% lock flush      DIAGNOSTIC TESTING:  DIAGNOSTIC TESTING:  [ ] Echocardiogram:   < from: [facility] Echo Doppler (06.15.21 @ 11:34) >  PHYSICIAN INTERPRETATION:  Left Ventricle:  Global LV systolic function was moderately decreased. Left ventricular ejection fraction, by visual estimation, is 30 to 35%.  Right Ventricle: The right ventricular size is moderately enlarged.  Left Atrium: Left atrial enlargement.  Right Atrium: Right atrial enlargement.  Pericardium: Trivial pericardial effusion is present.  Mitral Valve: Mild prolapse of the posterior leaflet, moderatemitral valve regurgitation.  Tricuspid Valve: The tricuspid valve is normal in structure. Mild-moderate tricuspid regurgitation is visualized.  Aortic Valve: S/P TAVR. There is a mobile echo density attached to the ventricular side of the prosthetic aortic valve. Severe aortic valve regurgitation is seen, with what appears to be damage to one of the leaflets. Patient with prior Hx of step Bovis bacteremia. Findings are highly suspicious for endocarditis. Recommend ID consult, blood and urine cultures.  Pulmonic Valve: Structurally normal pulmonic valve, with normal leaflet excursion. Mild pulmonic valve regurgitation.  Aorta: Aortic root measured at Sinus of Valsalva is normal.  Shunts: There is no evidence of a patent foramen ovale.      Summary:   1. Technically good study.   2. Moderately decreased global left ventricular systolic function.   3. Left ventricular ejection fraction, by visual estimation, is 30 to 35%.   4. Left atrial enlargement.   5. Moderately enlarged right ventricle.   6. Right atrial enlargement.   7. Mild prolapse of the posterior leaflet, moderate mitral valve regurgitation.   8. S/P TAVR. There is a mobile echo density attached to the ventricular side of the prosthetic aortic valve. Severe aortic valve regurgitation is seen, with what appears to be damage to one of the leaflets. Patient with prior Hx of step Bovis bacteremia. Findings are highly suspicious for endocarditis. Recommend ID consult, blood and urine cultures.   9. Mild-moderate tricuspid regurgitation.  10. Mild pulmonic valve regurgitation.  11. Trivial pericardial effusion.  12. Findings DW MAVERICK Osuna-NP.    MD Sharda Electronically signed on 6/15/2021 at 3:01:49 PM    < end of copied text >    [ ]  Catheterization:    < from: Cardiac Cath Lab - Adult (06.18.21 @ 13:53) >  VENTRICLES: EF by echo was 35 %.  CORONARY VESSELS: The coronary circulation isright dominant.  LM:   --  LM: Normal.  LAD:   --  Proximal LAD: There was a tubular 50 % stenosis.  CX:   --  Circumflex: Angiography showed minor luminal irregularities with  no flow limiting lesions.  RCA:   --  RCA: Angiography showed minor luminal irregularities with no  flow limiting lesions.  COMPLICATIONS: No complications occurred during the cath lab visit.  DIAGNOSTIC IMPRESSIONS: Moderate 40-50% stenosis in the proximal LAD,  unchanged from 2/2019 (Insignificant iFR in 2/2019). 2. No significant CAD  involving the LM, LCX and RCA. 3. A TAV is noted to be moving in  consonance with the surrounding structures. 4. Severe central Prosthetic  valve Aortic Insufficiency is noted on a ISRAEL and the LVEF was estimated  [id number]%.  DIAGNOSTIC RECOMMENDATIONS: GDMT. 2. RFM. 3. Possible TAVIV to address the  prosthetic AI, centrally mediated.  INTERVENTIONAL IMPRESSIONS: Moderate 40-50% stenosis in the proximal LAD,  unchanged from 2/2019 (Insignificant iFR in 2/2019). 2. No significant CAD  involving the LM, LCX and RCA. 3. A TAV is noted to be moving in  consonance with the surrounding structures. 4. Severe central Prosthetic  valve Aortic Insufficiency is noted on a ISRAEL and the LVEF was estimated  [id number]%.  INTERVENTIONAL RECOMMENDATIONS: GDMT. 2. RFM. 3. Possible TAVIV to address  the prosthetic AI, centrally mediated.  Prepared and signed by  Herson Jorgensen M.D.  Signed 06/18/2021 14:34:38    < end of copied text >        LABS:	 	                            12.0   10.50 )-----------( 80       ( 23 Jun 2021 06:05 )             36.6     06-23    134<L>  |  94<L>  |  48.1<H>  ----------------------------<  91  4.5   |  15.0<L>  |  4.41<H>    Ca    9.9      23 Jun 2021 06:05  Mg     2.2     06-22    TPro  7.4  /  Alb  4.0  /  TBili  3.4<H>  /  DBili  x   /  AST  85<H>  /  ALT  60<H>  /  AlkPhos  119  06-23    proBNP:   Lipid Profile:   HgA1c:   TSH:       TELEMETRY: Reviewed  SR, PVCs, NSVT

## 2021-06-23 NOTE — BRIEF OPERATIVE NOTE - COMMENTS
No evidence of any colon pathology.  Advance diet as tolerated
Nodular, moderately severe gastritis of unknown etiology with moderately severe duodenitis.  Mucosa biopsied.  Given the appearance of linitis plastica, his weight loss and other complaints, I am concerned this is a gastric cancer.  The tissue was biopsied, but it should be noted that biopsies of these lesions is often superficial, and bite-on-bite biopsies are often needed to make the diagnosis.  This was not done, as the patient had subcutaneous heparin for DVT prophylaxis this morning.  No primary tumor was seen.  - pantoprazole 40 mg BID  - follow up pathology  - based on biopsy results, may require second look EGD when more clinically stable
No qualified resident was available to assist in this case. I have personally first assisted the Cardiothoracic Surgeon listed in this brief op note throughout the entirety of this case.   Patient was transported to CTICU intubated on Nitric oxide of 40 PPM, Primacor, Dobutamine and levophed drips.
1-2 drinks

## 2021-06-23 NOTE — PROGRESS NOTE ADULT - SUBJECTIVE AND OBJECTIVE BOX
[facility] DIVISION OF KIDNEY DISEASES AND HYPERTENSION -- FOLLOW UP NOTE  --------------------------------------------------------------------------------  Chief Complaint:  ESRD HD    24 hour events/subjective:  Pt for TAVR today;      PAST HISTORY  --------------------------------------------------------------------------------  No significant changes to PMH, PSH, FHx, SHx, unless otherwise noted    ALLERGIES & MEDICATIONS  --------------------------------------------------------------------------------  Allergies    No Known Drug Allergies  strawberry (Anaphylaxis)    Intolerances      Standing Inpatient Medications    PRN Inpatient Medications      REVIEW OF SYSTEMS  --------------------------------------------------------------------------------  Unable to obtain    VITALS/PHYSICAL EXAM  --------------------------------------------------------------------------------  T(C): 36.3 (06-23-21 @ 09:00), Max: 36.6 (06-22-21 @ 15:50)  HR: 102 (06-23-21 @ 10:00) (81 - 102)  BP: 102/51 (06-23-21 @ 05:00) (92/52 - 119/59)  RR: 25 (06-23-21 @ 10:00) (10 - 60)  SpO2: 98% (06-23-21 @ 10:00) (89% - 100%)  Wt(kg): --        06-22-21 @ 07:01  -  06-23-21 @ 07:00  --------------------------------------------------------  IN: 300 mL / OUT: 1500 mL / NET: -1200 mL    06-23-21 @ 07:01  -  06-23-21 @ 13:07  --------------------------------------------------------  IN: 45 mL / OUT: 0 mL / NET: 45 mL      Physical Exam:  	Gen: NAD,   	HEENT: PERRL, supple neck,  	Pulm: CTA B/L  	CV: RRR, S1S2; no rub  	Abd: +BS, soft, nontender  	UE: Warm, intact strength; no asterixis  	LE: Warm, + edema  	Neuro: No focal deficits  	Psych: Normal affect and mood  	Skin: Warm, without rashes  	Vascular access: RIJ non [facility]    LABS/STUDIES  --------------------------------------------------------------------------------              12.0   10.50 >-----------<  80       [06-23-21 @ 06:05]              36.6     134  |  94  |  48.1  ----------------------------<  91      [06-23-21 @ 06:05]  4.5   |  15.0  |  4.41        Ca     9.9     [06-23-21 @ 06:05]      Mg     2.2     [06-22-21 @ 20:14]    TPro  7.4  /  Alb  4.0  /  TBili  3.4  /  DBili  x   /  AST  85  /  ALT  60  /  AlkPhos  119  [06-23-21 @ 06:05]    PT/INR: PT 22.6 , INR 2.01       [06-23-21 @ 06:05]  PTT: 43.3       [06-23-21 @ 06:05]      Creatinine Trend:  SCr 4.41 [06-23 @ 06:05]  SCr 3.13 [06-22 @ 20:14]  SCr 4.90 [06-22 @ 03:10]  SCr 5.09 [06-21 @ 05:52]  SCr 3.80 [06-20 @ 06:46]        TSH 2.31      [06-12-21 @ 01:55]    HBsAb <3.0      [06-17-21 @ 05:13]  HBsAg Nonreact      [06-17-21 @ 05:13]  HBcAb Nonreact      [06-17-21 @ 05:13]  HCV 0.15, Nonreact      [06-17-21 @ 05:13]

## 2021-06-23 NOTE — PROGRESS NOTE ADULT - ASSESSMENT
76 year old male patient with a medical history of MI in 1995 (angiogram, no stents placed), COPD (2L O2 at home), s/p TAVR (03/2019 at [facility]), gout, CKD, CVA (09/2020), pulmonary HTN, initially presented to [facility] 6/1/21 with RONNELL on CKD (likely cardiorenal syndrome), urinary retention due to noncompliance with medications, with hospital course complicated by cardiogenic shock, acute hypoxemic respiratory failure secondary to metabolic acidosis and respiratory alkalosis. Patient found to have TAVR failure with ISRAEL 6/10/21 showing EF 40-45%, Aortic valve prosthesis with Severe paravalvular leak and valvular AI, mild-moderate aortic stenosis, and moderate MR. Patient was transferred to [facility] under Dr. Campbell for further workup. Cardiology consulted for decompensated HF and severe pulmonary hypertension.     TAVR Prosthesis failure   - Aortic valve with severe prosthesis regurgitation and moderate prosthesis stenosis  - CTS following   - Blood Cultures negative.   - Plan for TAVR today       HFrEF  - EF 40-45% -- > 30-35% on 6/12/21 echo  - severely reduced RV  - mod to severe MR  - Severe TR   - Aortic valve prosthesis with Severe paravalvular leak and valvular AI.   - Patient decompensated overnight, now on dobutamine.   - Increased ventricular ectopy with dobutamine, continue to monitor.   - Will monitor closely after TAVR procedure.  - Continue HD for fluid management.   - Unable to add GDMT due to hypotension requiring midodrine.     Severe Pulmonary HTN   - confirmed on [facility] march/2021   - Echo shows PASP 61.5 consistent with severe pulm HTN    Assessment and recommendations are final when note is signed by the attending.

## 2021-06-23 NOTE — BRIEF OPERATIVE NOTE - NSICDXBRIEFPOSTOP_GEN_ALL_CORE_FT
POST-OP DIAGNOSIS:  Diverticulosis of colon 16-Jun-2021 12:53:51  Jerry Sanchez  Internal hemorrhoids 16-Jun-2021 12:53:58  Jerry Sanchez  
POST-OP DIAGNOSIS:  Duodenitis 15-Jhoan-2021 11:45:13  JUDITH Lopez  Gastritis 15-Jhoan-2021 11:45:23  JUDITH Lopez  Multiple gastric erosions 15-Jhoan-2021 11:45:37  JUDITH Lopez  
POST-OP DIAGNOSIS:  Duodenitis 15-Jhoan-2021 11:45:13  JUDITH Lopez  Gastritis 15-Jhoan-2021 11:45:23  JUDITH Lopez  Multiple gastric erosions 15-Jhoan-2021 11:45:37  JUDITH Lopez  Diverticulosis of colon 16-Jun-2021 12:53:51  Jerry Sanchez  Internal hemorrhoids 16-Jun-2021 12:53:58  Jerry Sanchez

## 2021-06-24 LAB
ALBUMIN SERPL ELPH-MCNC: 4 G/DL — SIGNIFICANT CHANGE UP (ref 3.3–5.2)
ALP SERPL-CCNC: 91 U/L — SIGNIFICANT CHANGE UP (ref 40–120)
ALT FLD-CCNC: 45 U/L — HIGH
ANION GAP SERPL CALC-SCNC: 16 MMOL/L — SIGNIFICANT CHANGE UP (ref 5–17)
ANION GAP SERPL CALC-SCNC: 19 MMOL/L — HIGH (ref 5–17)
APTT BLD: 35.5 SEC — SIGNIFICANT CHANGE UP (ref 27.5–35.5)
AST SERPL-CCNC: 61 U/L — HIGH
BASE EXCESS BLDV CALC-SCNC: 2.1 MMOL/L — SIGNIFICANT CHANGE UP (ref -2–3)
BILIRUB SERPL-MCNC: 3.9 MG/DL — HIGH (ref 0.4–2)
BLOOD GAS COMMENTS, VENOUS: SIGNIFICANT CHANGE UP
BUN SERPL-MCNC: 60.3 MG/DL — HIGH (ref 8–20)
BUN SERPL-MCNC: 66.2 MG/DL — HIGH (ref 8–20)
CA-I SERPL-SCNC: 1.12 MMOL/L — LOW (ref 1.15–1.33)
CALCIUM SERPL-MCNC: 9.4 MG/DL — SIGNIFICANT CHANGE UP (ref 8.6–10.2)
CALCIUM SERPL-MCNC: 9.6 MG/DL — SIGNIFICANT CHANGE UP (ref 8.6–10.2)
CHLORIDE BLDV-SCNC: 97 MMOL/L — LOW (ref 98–107)
CHLORIDE SERPL-SCNC: 94 MMOL/L — LOW (ref 98–107)
CHLORIDE SERPL-SCNC: 95 MMOL/L — LOW (ref 98–107)
CO2 SERPL-SCNC: 23 MMOL/L — SIGNIFICANT CHANGE UP (ref 22–29)
CO2 SERPL-SCNC: 25 MMOL/L — SIGNIFICANT CHANGE UP (ref 22–29)
CREAT SERPL-MCNC: 4.77 MG/DL — HIGH (ref 0.5–1.3)
CREAT SERPL-MCNC: 5 MG/DL — HIGH (ref 0.5–1.3)
GAS PNL BLDA: SIGNIFICANT CHANGE UP
GAS PNL BLDV: 132 MMOL/L — LOW (ref 136–145)
GAS PNL BLDV: SIGNIFICANT CHANGE UP
GLUCOSE BLDC GLUCOMTR-MCNC: 101 MG/DL — HIGH (ref 70–99)
GLUCOSE BLDC GLUCOMTR-MCNC: 107 MG/DL — HIGH (ref 70–99)
GLUCOSE BLDC GLUCOMTR-MCNC: 108 MG/DL — HIGH (ref 70–99)
GLUCOSE BLDC GLUCOMTR-MCNC: 112 MG/DL — HIGH (ref 70–99)
GLUCOSE BLDC GLUCOMTR-MCNC: 116 MG/DL — HIGH (ref 70–99)
GLUCOSE BLDC GLUCOMTR-MCNC: 118 MG/DL — HIGH (ref 70–99)
GLUCOSE BLDC GLUCOMTR-MCNC: 123 MG/DL — HIGH (ref 70–99)
GLUCOSE BLDC GLUCOMTR-MCNC: 125 MG/DL — HIGH (ref 70–99)
GLUCOSE BLDC GLUCOMTR-MCNC: 129 MG/DL — HIGH (ref 70–99)
GLUCOSE BLDC GLUCOMTR-MCNC: 132 MG/DL — HIGH (ref 70–99)
GLUCOSE BLDC GLUCOMTR-MCNC: 132 MG/DL — HIGH (ref 70–99)
GLUCOSE BLDC GLUCOMTR-MCNC: 136 MG/DL — HIGH (ref 70–99)
GLUCOSE BLDC GLUCOMTR-MCNC: 138 MG/DL — HIGH (ref 70–99)
GLUCOSE BLDC GLUCOMTR-MCNC: 141 MG/DL — HIGH (ref 70–99)
GLUCOSE BLDC GLUCOMTR-MCNC: 143 MG/DL — HIGH (ref 70–99)
GLUCOSE BLDC GLUCOMTR-MCNC: 152 MG/DL — HIGH (ref 70–99)
GLUCOSE BLDC GLUCOMTR-MCNC: 97 MG/DL — SIGNIFICANT CHANGE UP (ref 70–99)
GLUCOSE BLDV-MCNC: 139 MG/DL — HIGH (ref 70–99)
GLUCOSE SERPL-MCNC: 102 MG/DL — HIGH (ref 70–99)
GLUCOSE SERPL-MCNC: 125 MG/DL — HIGH (ref 70–99)
HCO3 BLDV-SCNC: 27 MMOL/L — SIGNIFICANT CHANGE UP (ref 22–29)
HCT VFR BLD CALC: 28.7 % — LOW (ref 39–50)
HCT VFR BLD CALC: 29.8 % — LOW (ref 39–50)
HCT VFR BLDA CALC: 28 % — LOW (ref 39–51)
HGB BLD CALC-MCNC: 9.4 G/DL — LOW (ref 12.6–17.4)
HGB BLD-MCNC: 9.2 G/DL — LOW (ref 13–17)
HGB BLD-MCNC: 9.5 G/DL — LOW (ref 13–17)
HOROWITZ INDEX BLDV+IHG-RTO: SIGNIFICANT CHANGE UP
INR BLD: 1.63 RATIO — HIGH (ref 0.88–1.16)
INR BLD: 1.83 RATIO — HIGH (ref 0.88–1.16)
LACTATE BLDV-MCNC: 1.1 MMOL/L — SIGNIFICANT CHANGE UP (ref 0.5–2)
MAGNESIUM SERPL-MCNC: 2.7 MG/DL — HIGH (ref 1.6–2.6)
MCHC RBC-ENTMCNC: 30.4 PG — SIGNIFICANT CHANGE UP (ref 27–34)
MCHC RBC-ENTMCNC: 30.5 PG — SIGNIFICANT CHANGE UP (ref 27–34)
MCHC RBC-ENTMCNC: 31.9 GM/DL — LOW (ref 32–36)
MCHC RBC-ENTMCNC: 32.1 GM/DL — SIGNIFICANT CHANGE UP (ref 32–36)
MCV RBC AUTO: 95 FL — SIGNIFICANT CHANGE UP (ref 80–100)
MCV RBC AUTO: 95.2 FL — SIGNIFICANT CHANGE UP (ref 80–100)
PCO2 BLDV: 41 MMHG — LOW (ref 42–55)
PH BLDV: 7.42 — SIGNIFICANT CHANGE UP (ref 7.32–7.43)
PLATELET # BLD AUTO: 50 K/UL — LOW (ref 150–400)
PLATELET # BLD AUTO: 55 K/UL — LOW (ref 150–400)
PO2 BLDV: <42 MMHG — SIGNIFICANT CHANGE UP (ref 25–45)
POTASSIUM BLDV-SCNC: 4.1 MMOL/L — SIGNIFICANT CHANGE UP (ref 3.5–5.1)
POTASSIUM SERPL-MCNC: 3.9 MMOL/L — SIGNIFICANT CHANGE UP (ref 3.5–5.3)
POTASSIUM SERPL-MCNC: 4.1 MMOL/L — SIGNIFICANT CHANGE UP (ref 3.5–5.3)
POTASSIUM SERPL-SCNC: 3.9 MMOL/L — SIGNIFICANT CHANGE UP (ref 3.5–5.3)
POTASSIUM SERPL-SCNC: 4.1 MMOL/L — SIGNIFICANT CHANGE UP (ref 3.5–5.3)
PROT SERPL-MCNC: 6.7 G/DL — SIGNIFICANT CHANGE UP (ref 6.6–8.7)
PROTHROM AB SERPL-ACNC: 18.5 SEC — HIGH (ref 10.6–13.6)
PROTHROM AB SERPL-ACNC: 20.6 SEC — HIGH (ref 10.6–13.6)
RBC # BLD: 3.02 M/UL — LOW (ref 4.2–5.8)
RBC # BLD: 3.13 M/UL — LOW (ref 4.2–5.8)
RBC # FLD: 21.2 % — HIGH (ref 10.3–14.5)
RBC # FLD: 21.2 % — HIGH (ref 10.3–14.5)
SAO2 % BLDV: 75.1 % — SIGNIFICANT CHANGE UP
SODIUM SERPL-SCNC: 136 MMOL/L — SIGNIFICANT CHANGE UP (ref 135–145)
SODIUM SERPL-SCNC: 136 MMOL/L — SIGNIFICANT CHANGE UP (ref 135–145)
WBC # BLD: 12.85 K/UL — HIGH (ref 3.8–10.5)
WBC # BLD: 9.68 K/UL — SIGNIFICANT CHANGE UP (ref 3.8–10.5)
WBC # FLD AUTO: 12.85 K/UL — HIGH (ref 3.8–10.5)
WBC # FLD AUTO: 9.68 K/UL — SIGNIFICANT CHANGE UP (ref 3.8–10.5)

## 2021-06-24 PROCEDURE — 99232 SBSQ HOSP IP/OBS MODERATE 35: CPT

## 2021-06-24 PROCEDURE — 99291 CRITICAL CARE FIRST HOUR: CPT

## 2021-06-24 PROCEDURE — 93010 ELECTROCARDIOGRAM REPORT: CPT

## 2021-06-24 PROCEDURE — 99233 SBSQ HOSP IP/OBS HIGH 50: CPT

## 2021-06-24 PROCEDURE — 71045 X-RAY EXAM CHEST 1 VIEW: CPT | Mod: 26

## 2021-06-24 RX ORDER — MAGNESIUM SULFATE 500 MG/ML
2 VIAL (ML) INJECTION ONCE
Refills: 0 | Status: COMPLETED | OUTPATIENT
Start: 2021-06-24 | End: 2021-06-24

## 2021-06-24 RX ORDER — CALCIUM GLUCONATE 100 MG/ML
1 VIAL (ML) INTRAVENOUS ONCE
Refills: 0 | Status: COMPLETED | OUTPATIENT
Start: 2021-06-24 | End: 2021-06-24

## 2021-06-24 RX ORDER — POTASSIUM CHLORIDE 20 MEQ
20 PACKET (EA) ORAL ONCE
Refills: 0 | Status: COMPLETED | OUTPATIENT
Start: 2021-06-24 | End: 2021-06-24

## 2021-06-24 RX ORDER — MIRTAZAPINE 45 MG/1
15 TABLET, ORALLY DISINTEGRATING ORAL DAILY
Refills: 0 | Status: DISCONTINUED | OUTPATIENT
Start: 2021-06-24 | End: 2021-07-06

## 2021-06-24 RX ORDER — PANTOPRAZOLE SODIUM 20 MG/1
40 TABLET, DELAYED RELEASE ORAL EVERY 12 HOURS
Refills: 0 | Status: DISCONTINUED | OUTPATIENT
Start: 2021-06-24 | End: 2021-07-01

## 2021-06-24 RX ORDER — ALBUMIN HUMAN 25 %
250 VIAL (ML) INTRAVENOUS ONCE
Refills: 0 | Status: COMPLETED | OUTPATIENT
Start: 2021-06-24 | End: 2021-06-24

## 2021-06-24 RX ORDER — TAMSULOSIN HYDROCHLORIDE 0.4 MG/1
0.4 CAPSULE ORAL AT BEDTIME
Refills: 0 | Status: DISCONTINUED | OUTPATIENT
Start: 2021-06-24 | End: 2021-06-26

## 2021-06-24 RX ORDER — PSYLLIUM SEED (WITH DEXTROSE)
1 POWDER (GRAM) ORAL
Refills: 0 | Status: DISCONTINUED | OUTPATIENT
Start: 2021-06-24 | End: 2021-07-10

## 2021-06-24 RX ORDER — AMIODARONE HYDROCHLORIDE 400 MG/1
1 TABLET ORAL
Qty: 900 | Refills: 0 | Status: DISCONTINUED | OUTPATIENT
Start: 2021-06-24 | End: 2021-06-25

## 2021-06-24 RX ORDER — MILRINONE LACTATE 1 MG/ML
0.12 INJECTION, SOLUTION INTRAVENOUS
Qty: 20 | Refills: 0 | Status: DISCONTINUED | OUTPATIENT
Start: 2021-06-24 | End: 2021-06-24

## 2021-06-24 RX ORDER — MILRINONE LACTATE 1 MG/ML
0.12 INJECTION, SOLUTION INTRAVENOUS
Qty: 20 | Refills: 0 | Status: DISCONTINUED | OUTPATIENT
Start: 2021-06-24 | End: 2021-07-02

## 2021-06-24 RX ORDER — BUDESONIDE AND FORMOTEROL FUMARATE DIHYDRATE 160; 4.5 UG/1; UG/1
2 AEROSOL RESPIRATORY (INHALATION)
Refills: 0 | Status: DISCONTINUED | OUTPATIENT
Start: 2021-06-24 | End: 2021-07-10

## 2021-06-24 RX ORDER — FINASTERIDE 5 MG/1
5 TABLET, FILM COATED ORAL DAILY
Refills: 0 | Status: DISCONTINUED | OUTPATIENT
Start: 2021-06-24 | End: 2021-06-26

## 2021-06-24 RX ORDER — ALBUTEROL 90 UG/1
2 AEROSOL, METERED ORAL EVERY 6 HOURS
Refills: 0 | Status: DISCONTINUED | OUTPATIENT
Start: 2021-06-24 | End: 2021-07-10

## 2021-06-24 RX ORDER — SENNA PLUS 8.6 MG/1
2 TABLET ORAL AT BEDTIME
Refills: 0 | Status: DISCONTINUED | OUTPATIENT
Start: 2021-06-24 | End: 2021-07-10

## 2021-06-24 RX ORDER — MIDODRINE HYDROCHLORIDE 2.5 MG/1
10 TABLET ORAL EVERY 8 HOURS
Refills: 0 | Status: DISCONTINUED | OUTPATIENT
Start: 2021-06-24 | End: 2021-07-03

## 2021-06-24 RX ORDER — MILRINONE LACTATE 1 MG/ML
0.3 INJECTION, SOLUTION INTRAVENOUS
Qty: 20 | Refills: 0 | Status: DISCONTINUED | OUTPATIENT
Start: 2021-06-24 | End: 2021-06-24

## 2021-06-24 RX ORDER — ATORVASTATIN CALCIUM 80 MG/1
10 TABLET, FILM COATED ORAL AT BEDTIME
Refills: 0 | Status: DISCONTINUED | OUTPATIENT
Start: 2021-06-24 | End: 2021-07-10

## 2021-06-24 RX ORDER — LIDOCAINE HCL 20 MG/ML
1 VIAL (ML) INJECTION
Qty: 2 | Refills: 0 | Status: DISCONTINUED | OUTPATIENT
Start: 2021-06-24 | End: 2021-06-24

## 2021-06-24 RX ORDER — MILRINONE LACTATE 1 MG/ML
0.25 INJECTION, SOLUTION INTRAVENOUS
Qty: 20 | Refills: 0 | Status: DISCONTINUED | OUTPATIENT
Start: 2021-06-24 | End: 2021-06-24

## 2021-06-24 RX ADMIN — Medication 9.38 MICROGRAM(S)/KG/MIN: at 21:40

## 2021-06-24 RX ADMIN — Medication 50 GRAM(S): at 17:15

## 2021-06-24 RX ADMIN — MIRTAZAPINE 15 MILLIGRAM(S): 45 TABLET, ORALLY DISINTEGRATING ORAL at 13:01

## 2021-06-24 RX ADMIN — MIDODRINE HYDROCHLORIDE 10 MILLIGRAM(S): 2.5 TABLET ORAL at 13:01

## 2021-06-24 RX ADMIN — PANTOPRAZOLE SODIUM 40 MILLIGRAM(S): 20 TABLET, DELAYED RELEASE ORAL at 03:58

## 2021-06-24 RX ADMIN — BUDESONIDE AND FORMOTEROL FUMARATE DIHYDRATE 2 PUFF(S): 160; 4.5 AEROSOL RESPIRATORY (INHALATION) at 21:00

## 2021-06-24 RX ADMIN — MIDODRINE HYDROCHLORIDE 10 MILLIGRAM(S): 2.5 TABLET ORAL at 21:41

## 2021-06-24 RX ADMIN — Medication 1 PACKET(S): at 18:38

## 2021-06-24 RX ADMIN — TUBERCULIN PURIFIED PROTEIN DERIVATIVE 5 UNIT(S): 5 INJECTION, SOLUTION INTRADERMAL at 20:48

## 2021-06-24 RX ADMIN — FINASTERIDE 5 MILLIGRAM(S): 5 TABLET, FILM COATED ORAL at 13:01

## 2021-06-24 RX ADMIN — BUDESONIDE AND FORMOTEROL FUMARATE DIHYDRATE 2 PUFF(S): 160; 4.5 AEROSOL RESPIRATORY (INHALATION) at 08:51

## 2021-06-24 RX ADMIN — MILRINONE LACTATE 3.75 MICROGRAM(S)/KG/MIN: 1 INJECTION, SOLUTION INTRAVENOUS at 17:21

## 2021-06-24 RX ADMIN — Medication 7.5 MG/MIN: at 21:40

## 2021-06-24 RX ADMIN — TAMSULOSIN HYDROCHLORIDE 0.4 MILLIGRAM(S): 0.4 CAPSULE ORAL at 21:41

## 2021-06-24 RX ADMIN — Medication 1 PACKET(S): at 10:21

## 2021-06-24 RX ADMIN — Medication 125 MILLILITER(S): at 06:44

## 2021-06-24 RX ADMIN — Medication 100 GRAM(S): at 10:52

## 2021-06-24 RX ADMIN — Medication 100 MILLIEQUIVALENT(S): at 17:24

## 2021-06-24 RX ADMIN — Medication 100 MILLIGRAM(S): at 10:52

## 2021-06-24 RX ADMIN — Medication 100 MILLIGRAM(S): at 04:24

## 2021-06-24 RX ADMIN — MILRINONE LACTATE 9.01 MICROGRAM(S)/KG/MIN: 1 INJECTION, SOLUTION INTRAVENOUS at 04:09

## 2021-06-24 RX ADMIN — Medication 1000 MILLIGRAM(S): at 14:17

## 2021-06-24 RX ADMIN — Medication 7.5 MG/MIN: at 02:40

## 2021-06-24 RX ADMIN — ATORVASTATIN CALCIUM 10 MILLIGRAM(S): 80 TABLET, FILM COATED ORAL at 21:42

## 2021-06-24 RX ADMIN — Medication 400 MILLIGRAM(S): at 13:17

## 2021-06-24 RX ADMIN — Medication 9.38 MICROGRAM(S)/KG/MIN: at 02:26

## 2021-06-24 RX ADMIN — AMIODARONE HYDROCHLORIDE 33.3 MG/MIN: 400 TABLET ORAL at 05:44

## 2021-06-24 RX ADMIN — PANTOPRAZOLE SODIUM 40 MILLIGRAM(S): 20 TABLET, DELAYED RELEASE ORAL at 18:38

## 2021-06-24 RX ADMIN — AMIODARONE HYDROCHLORIDE 33.3 MG/MIN: 400 TABLET ORAL at 21:41

## 2021-06-24 RX ADMIN — Medication 9.38 MICROGRAM(S)/KG/MIN: at 11:37

## 2021-06-24 RX ADMIN — SENNA PLUS 2 TABLET(S): 8.6 TABLET ORAL at 21:42

## 2021-06-24 RX ADMIN — Medication 125 MILLILITER(S): at 04:10

## 2021-06-24 RX ADMIN — PROPOFOL 3 MICROGRAM(S)/KG/MIN: 10 INJECTION, EMULSION INTRAVENOUS at 21:40

## 2021-06-24 RX ADMIN — INSULIN HUMAN 2 UNIT(S)/HR: 100 INJECTION, SOLUTION SUBCUTANEOUS at 05:44

## 2021-06-24 RX ADMIN — MILRINONE LACTATE 12 MICROGRAM(S)/KG/MIN: 1 INJECTION, SOLUTION INTRAVENOUS at 00:04

## 2021-06-24 RX ADMIN — VASOPRESSIN 2.4 UNIT(S)/MIN: 20 INJECTION INTRAVENOUS at 00:04

## 2021-06-24 RX ADMIN — CHLORHEXIDINE GLUCONATE 5 MILLILITER(S): 213 SOLUTION TOPICAL at 06:06

## 2021-06-24 RX ADMIN — VASOPRESSIN 2.4 UNIT(S)/MIN: 20 INJECTION INTRAVENOUS at 11:37

## 2021-06-24 RX ADMIN — CHLORHEXIDINE GLUCONATE 5 MILLILITER(S): 213 SOLUTION TOPICAL at 17:15

## 2021-06-24 NOTE — PROGRESS NOTE ADULT - SUBJECTIVE AND OBJECTIVE BOX
[facility] DIVISION OF KIDNEY DISEASES AND HYPERTENSION -- FOLLOW UP NOTE  --------------------------------------------------------------------------------  Chief Complaint: kathy on ckd    24 hour events/subjective:  s/pTAVR, now requiring both pressor and inotropic support.   recurrent VT requiring IV amiodarone and lidocaine         PAST HISTORY  --------------------------------------------------------------------------------  No significant changes to PMH, PSH, FHx, SHx, unless otherwise noted    ALLERGIES & MEDICATIONS  --------------------------------------------------------------------------------  Allergies    No Known Drug Allergies  strawberry (Anaphylaxis)    Intolerances      Standing Inpatient Medications  aMIOdarone Infusion 1 mG/Min IV Continuous <Continuous>  atorvastatin 10 milliGRAM(s) Oral at bedtime  budesonide 160 MICROgram(s)/formoterol 4.5 MICROgram(s) Inhaler 2 Puff(s) Inhalation two times a day  chlorhexidine 0.12% Liquid 5 milliLiter(s) Oral Mucosa two times a day  dextrose 50% Injectable 50 milliLiter(s) IV Push every 15 minutes  dextrose 50% Injectable 25 milliLiter(s) IV Push every 15 minutes  finasteride 5 milliGRAM(s) Oral daily  insulin regular Infusion 2 Unit(s)/Hr IV Continuous <Continuous>  lidocaine   Infusion 1 mG/Min IV Continuous <Continuous>  meperidine     Injectable 25 milliGRAM(s) IV Push once  midodrine 10 milliGRAM(s) Oral every 8 hours  milrinone Infusion 0.125 MICROgram(s)/kG/Min IV Continuous <Continuous>  mirtazapine 15 milliGRAM(s) Oral daily  norepinephrine Infusion 0.05 MICROgram(s)/kG/Min IV Continuous <Continuous>  pantoprazole  Injectable 40 milliGRAM(s) IV Push every 12 hours  propofol Infusion 5 MICROgram(s)/kG/Min IV Continuous <Continuous>  psyllium Powder 1 Packet(s) Oral two times a day  senna 2 Tablet(s) Oral at bedtime  sodium chloride 0.9%. 1000 milliLiter(s) IV Continuous <Continuous>  sodium chloride 0.9%. 1000 milliLiter(s) IV Continuous <Continuous>  tamsulosin 0.4 milliGRAM(s) Oral at bedtime  vasopressin Infusion 0.04 Unit(s)/Min IV Continuous <Continuous>    PRN Inpatient Medications  ALBUTerol    90 MICROgram(s) HFA Inhaler 2 Puff(s) Inhalation every 6 hours PRN      REVIEW OF SYSTEMS  unable to obtain    VITALS/PHYSICAL EXAM  --------------------------------------------------------------------------------  T(C): 37.2 (06-24-21 @ 11:00), Max: 37.2 (06-24-21 @ 10:00)  HR: 109 (06-24-21 @ 13:15) (79 - 115)  BP: --  RR: 24 (06-24-21 @ 13:15) (10 - 47)  SpO2: 100% (06-24-21 @ 13:15) (89% - 100%)  Wt(kg): --        06-23-21 @ 07:01  -  06-24-21 @ 07:00  --------------------------------------------------------  IN: 3411.1 mL / OUT: 335 mL / NET: 3076.1 mL    06-24-21 @ 07:01  -  06-24-21 @ 13:58  --------------------------------------------------------  IN: 957.4 mL / OUT: 45 mL / NET: 912.4 mL      Physical Exam:  	Gen: intubated/ sedated  	HEENT: ett to vent  	Pulm: mechanical bs  	CV: RRR, S1S2; no rub  	Back: no sacral edema  	Abd: +BS, soft, nontender/nondistended  	: marie+  	UE: Warm, no edema  	LE: Warm, no edema  	Neuro: sedated  	Skin: Warm  	Vascular access:    LABS/STUDIES  --------------------------------------------------------------------------------              9.2    12.85 >-----------<  50       [06-24-21 @ 12:25]              28.7     136  |  94  |  66.2  ----------------------------<  125      [06-24-21 @ 12:25]  3.9   |  23.0  |  5.00        Ca     9.6     [06-24-21 @ 12:25]      Mg     2.7     [06-24-21 @ 03:19]    TPro  6.7  /  Alb  4.0  /  TBili  3.9  /  DBili  x   /  AST  61  /  ALT  45  /  AlkPhos  91  [06-24-21 @ 12:25]    PT/INR: PT 18.5 , INR 1.63       [06-24-21 @ 12:25]  PTT: 35.5       [06-24-21 @ 12:25]      Creatinine Trend:  SCr 5.00 [06-24 @ 12:25]  SCr 4.77 [06-24 @ 03:19]  SCr 4.38 [06-23 @ 14:24]  SCr 4.41 [06-23 @ 06:05]  SCr 3.13 [06-22 @ 20:14]        TSH 2.31      [06-12-21 @ 01:55]    HBsAb <3.0      [06-17-21 @ 05:13]  HBsAg Nonreact      [06-17-21 @ 05:13]  HBcAb Nonreact      [06-17-21 @ 05:13]  HCV 0.15, Nonreact      [06-17-21 @ 05:13]

## 2021-06-24 NOTE — PROGRESS NOTE ADULT - PROBLEM SELECTOR PLAN 10
Currently on full vent support with Shawna    Problem 11: Adjustment Disorder   Will resume Remeron per recommendations from Behavioral Health on 6/17 when able    Problem 12: COPD  On home O2

## 2021-06-24 NOTE — PROGRESS NOTE ADULT - SUBJECTIVE AND OBJECTIVE BOX
[facility]                                                               Office: [address] [address]                                                              Telephone: [phone]. Fax:[phone]                                                                             PROGRESS NOTE  Reason for follow up: TAVR Failure  Update: Patient is POD #1 from TAVR requiring both pressor and inotropic support. Patient with recurrent VT requiring IV amiodarone and lidocaine at this time.       Review of symptoms: Unable to obtain.      Past medical history: No updates.   	  Vitals:  T(C): 37.2 (06-24-21 @ 11:00), Max: 37.2 (06-24-21 @ 10:00)  HR: 109 (06-24-21 @ 11:30) (79 - 115)  BP: --  RR: 24 (06-24-21 @ 11:30) (10 - 47)  SpO2: 100% (06-24-21 @ 11:30) (89% - 100%)  Wt(kg): --  I&O's Summary    23 Jun 2021 07:01  -  24 Jun 2021 07:00  --------------------------------------------------------  IN: 3411.1 mL / OUT: 335 mL / NET: 3076.1 mL    24 Jun 2021 07:01  -  24 Jun 2021 12:03  --------------------------------------------------------  IN: 957.4 mL / OUT: 45 mL / NET: 912.4 mL      PHYSICAL EXAM:  Appearance: Intubated, sedated  HEENT:  Head and neck: Atraumatic. Normocephalic.  Normal oral mucosa, PERRL, Neck is supple. No JVD, No carotid bruit.   Neurologic: Intubated, sedated  Lymphatic: No cervical lymphadenopathy  Cardiovascular: Normal S1 S2, No murmur, rubs/gallops. No JVD, No edema  Respiratory: Coarse breath sounds b/l, Left neck permacath noted in place, right swan-devi  Gastrointestinal:  Soft, Non-tender, + BS  Lower Extremities: + edema  Psychiatry: Patient is calm. No agitation. Mood & affect appropriate  Skin: No rashes/ecchymoses/cyanosis/ulcers visualized on the face, hands or feet.      CURRENT MEDICATIONS:  aMIOdarone Infusion 1 mG/Min IV Continuous <Continuous>  lidocaine   Infusion 1 mG/Min IV Continuous <Continuous>  midodrine 10 milliGRAM(s) Oral every 8 hours  milrinone Infusion 0.125 MICROgram(s)/kG/Min IV Continuous <Continuous>  norepinephrine Infusion 0.05 MICROgram(s)/kG/Min IV Continuous <Continuous>  tamsulosin 0.4 milliGRAM(s) Oral at bedtime    budesonide 160 MICROgram(s)/formoterol 4.5 MICROgram(s) Inhaler  acetaminophen  IVPB ..  meperidine     Injectable  mirtazapine  propofol Infusion  pantoprazole  Injectable  psyllium Powder  senna  atorvastatin  dextrose 50% Injectable  dextrose 50% Injectable  finasteride  insulin regular Infusion  vasopressin Infusion  chlorhexidine 0.12% Liquid  sodium chloride 0.9%.  sodium chloride 0.9%.      DIAGNOSTIC TESTING:  [ ] Echocardiogram:   < from: ISRAEL Echo Doppler (06.15.21 @ 11:34) >  PHYSICIAN INTERPRETATION:  Left Ventricle:  Global LV systolic function was moderately decreased. Left ventricular ejection fraction, by visual estimation, is 30 to 35%.  Right Ventricle: The right ventricular size is moderately enlarged.  Left Atrium: Left atrial enlargement.  Right Atrium: Right atrial enlargement.  Pericardium: Trivial pericardial effusion is present.  Mitral Valve: Mild prolapse of the posterior leaflet, moderatemitral valve regurgitation.  Tricuspid Valve: The tricuspid valve is normal in structure. Mild-moderate tricuspid regurgitation is visualized.  Aortic Valve: S/P TAVR. There is a mobile echo density attached to the ventricular side of the prosthetic aortic valve. Severe aortic valve regurgitation is seen, with what appears to be damage to one of the leaflets. Patient with prior Hx of step Bovis bacteremia. Findings are highly suspicious for endocarditis. Recommend ID consult, blood and urine cultures.  Pulmonic Valve: Structurally normal pulmonic valve, with normal leaflet excursion. Mild pulmonic valve regurgitation.  Aorta: Aortic root measured at Sinus of Valsalva is normal.  Shunts: There is no evidence of a patent foramen ovale.      Summary:   1. Technically good study.   2. Moderately decreased global left ventricular systolic function.   3. Left ventricular ejection fraction, by visual estimation, is 30 to 35%.   4. Left atrial enlargement.   5. Moderately enlarged right ventricle.   6. Right atrial enlargement.   7. Mild prolapse of the posterior leaflet, moderate mitral valve regurgitation.   8. S/P TAVR. There is a mobile echo density attached to the ventricular side of the prosthetic aortic valve. Severe aortic valve regurgitation is seen, with what appears to be damage to one of the leaflets. Patient with prior Hx of step Bovis bacteremia. Findings are highly suspicious for endocarditis. Recommend ID consult, blood and urine cultures.   9. Mild-moderate tricuspid regurgitation.  10. Mild pulmonic valve regurgitation.  11. Trivial pericardial effusion.  12. Findings DW MAVERICK Osuna-NP.    MD Sharda Electronically signed on 6/15/2021 at 3:01:49 PM    < end of copied text >    [ ]  Catheterization:    < from: Cardiac Cath Lab - Adult (06.18.21 @ 13:53) >  VENTRICLES: EF by echo was 35 %.  CORONARY VESSELS: The coronary circulation isright dominant.  LM:   --  LM: Normal.  LAD:   --  Proximal LAD: There was a tubular 50 % stenosis.  CX:   --  Circumflex: Angiography showed minor luminal irregularities with  no flow limiting lesions.  RCA:   --  RCA: Angiography showed minor luminal irregularities with no  flow limiting lesions.  COMPLICATIONS: No complications occurred during the cath lab visit.  DIAGNOSTIC IMPRESSIONS: Moderate 40-50% stenosis in the proximal LAD,  unchanged from 2/2019 (Insignificant iFR in 2/2019). 2. No significant CAD  involving the LM, LCX and RCA. 3. A TAV is noted to be moving in  consonance with the surrounding structures. 4. Severe central Prosthetic  valve Aortic Insufficiency is noted on a ISRAEL and the LVEF was estimated  [id number]%.  DIAGNOSTIC RECOMMENDATIONS: GDMT. 2. RFM. 3. Possible TAVIV to address the  prosthetic AI, centrally mediated.  INTERVENTIONAL IMPRESSIONS: Moderate 40-50% stenosis in the proximal LAD,  unchanged from 2/2019 (Insignificant iFR in 2/2019). 2. No significant CAD  involving the LM, LCX and RCA. 3. A TAV is noted to be moving in  consonance with the surrounding structures. 4. Severe central Prosthetic  valve Aortic Insufficiency is noted on a ISRAEL and the LVEF was estimated  [id number]%.  INTERVENTIONAL RECOMMENDATIONS: GDMT. 2. RFM. 3. Possible TAVIV to address  the prosthetic AI, centrally mediated.  Prepared and signed by  Herson Jorgensen M.D.  Signed 06/18/2021 14:34:38    < end of copied text >      LABS:	 	                            9.5    9.68  )-----------( 55       ( 24 Jun 2021 03:19 )             29.8     06-24    136  |  95<L>  |  60.3<H>  ----------------------------<  102<H>  4.1   |  25.0  |  4.77<H>    Ca    9.4      24 Jun 2021 03:19  Mg     2.7     06-24    TPro  6.6  /  Alb  3.6  /  TBili  3.1<H>  /  DBili  x   /  AST  82<H>  /  ALT  58<H>  /  AlkPhos  109  06-23    proBNP:   Lipid Profile:   HgA1c:   TSH:

## 2021-06-24 NOTE — PROGRESS NOTE ADULT - ASSESSMENT
76 year old male patient with a medical history of MI in 1995 (angiogram, no stents placed), COPD (2L O2 at home), s/p TAVR (03/2019 at [facility]), gout, CKD, CVA (09/2020), pulmonary HTN, initially presented to [facility] 6/1/21 with RONNELL on CKD (likely cardiorenal syndrome), urinary retention due to noncompliance with medications, with hospital course complicated by cardiogenic shock, acute hypoxemic respiratory failure secondary to metabolic acidosis and respiratory alkalosis. Patient found to have TAVR failure with ISRAEL 6/10/21 showing EF 40-45%, Aortic valve prosthesis with Severe paravalvular leak and valvular AI, mild-moderate aortic stenosis, and moderate MR. Patient was transferred to [facility] under Dr. Campbell for further workup.   ISRAEL DONE HERE WITH CONCERN FOR ENDOCARDITIS  PT DENIES FEVERS OR SWEATS  BUT HAS HAD SIGNIFICANT WEIGHT LOSS WITHOUT DIETING  PT HAS HX OF STREP BOVIS  BACTEREMIA   LAST YEAR AND RECEIVED   ROCEPHIN AT HOME FOR 6 WEEKS               s/p COLONOSCOPY     Blood cultures from admission remain negative      spoke to MICRO LAB - asked to hold initial blood cultures fro 14 days.     Will follow    s/p TAVR on 6/23    continue to observe off antibiotics    WILL FOLLOW UP   IF SPIKES THEN VANCO/ROCEPHIN

## 2021-06-24 NOTE — PROGRESS NOTE ADULT - ASSESSMENT
76M with a PMH of MI in 1995 (angiogram, no stents placed), COPD (2L O2 at home), severe AS s/p TAVR (03/2019 at [facility]), gout, CKD, CVA (no deficits, 09/2020), pulmonary HTN, initially presented to [facility] 6/1/21 with RONNELL on CKD, hospital course significant for cardiogenic shock, acute hypoxemic respiratory failure, and acute on chronic combined diastolic and systolic heart failure. ISRAEL revealed severe AI. Patient was transferred to [facility] under Dr. Campbell for further workup of bioprosthetic AI.     Inpatient hospital course has been significant for:  1. 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 RONNELL on CKD progressed to ESRD requiring HD   3. Unintentional Weight loss / Dysphagia / Anorexia work up revealing duodenitis and diffuse erosive esophagitis, gastritis on EGD (biopsies negative for H. Pylori or carcinoma); colonoscopy showing diverticulosis  4. R/O AV Endocarditis   5. Auto-elevated INR (followed by Hematology)  6. R/O WILLEM   7. Adjustment disorder (evaluated by Behavioral Health)    Patient is now s/p valve in valve TAVR on 6/23/21 with Dr. Campbell.

## 2021-06-24 NOTE — PROGRESS NOTE ADULT - SUBJECTIVE AND OBJECTIVE BOX
Subjective:  75yo M s/p TAVR, intubated and sedated on Shawna.      HPI:  76 year old male patient with a medical history of MI in  (angiogram, no stents placed), COPD (2L O2 at home), s/p TAVR (2019 at [facility]), gout, CKD, CVA (2020), pulmonary HTN, initially presented to [facility] 21 with RONNELL on CKD (likely cardiorenal syndrome), urinary retention due to noncompliance with medications, with hospital course complicated by cardiogenic shock, acute hypoxemic respiratory failure secondary to metabolic acidosis and respiratory alkalosis. Patient found to have TAVR failure with ISRAEL 6/10/21 showing EF 40-45%, Aortic valve prosthesis with Severe paravalvular leak and valvular AI, mild-moderate aortic stenosis, and moderate MR. Patient was transferred to [facility] under Dr. Campbell for further workup.     Imaging from [facility]:  : CXR shwoing cardiomegaly, small b/l pleural effusions, moderate pulmonary vascular congestion.  : CT Brain ordered for head trauma? showing age-related cerebral and cerebellar volume loss, mild chronic vascular ischemic changes, stable biparietal arachnoid cyst and stable midline posterior fossa arachnoid cyst.   : US Abdomen for elevated LFTs showing mild hepatic steatosis, mild hepatomegaly, sludge in the gallbladder, no discrete gallstones, normal biliary ducts, mild echogenic right kidney which may be seen with medical renal dz, mildly complex Right upper pole 4cm cyst with subtle internal echoes may represent hemorrhagic/proteinaceous cyst, mildly complex right midpole 2cm cyst with thin internal linear septation.   6/3: Kidney and Bladder US showing no hydronephrosis, echogenic kidneys likely from medical renal dz, prostatomegaly, and thickening of the posterior wall of the bladder with trabeculations which could be due to bladder outlet obstruction.   : Kidney and Bladder US: No hydronephrosis or shadowing renal calculi. Stable b/l renal cysts.   : Lower Extremity Venous Doppler with no DVT noted.   : TTE showing EF 47%, dilated IVC, dilation of the ascending aorta of 4.4cm, moderate-severe pulmonary HTN, moderate-severe TR, aortic valve with severe prosthesis regurgitation and moderate prosthesis stenosis  : CT Chest showing emphysema and intersitial lung dz changes, stable b/l small pleural effusions, cardiomegaly.   : ISRAEL showing EF 40-45%, Aortic valve prosthesis with Severe paravalvular leak and valvular AI, mild-moderate aortic stenosis, and moderate MR.  (2021 01:36)          PAST MEDICAL & SURGICAL HISTORY:  Pulmonary hypertension    Hypertension    Hyperlipidemia    H/O aortic valve stenosis  s/p TAVR 2019 at [place]    CVA (cerebrovascular accident)  [facility] CVA with tPA and thrombectomy    Chronic kidney disease    Prediabetes    Mitral valve regurgitation    CAD in native artery    Aneurysm of aortic root  thoracic aortic aneurysm, without ruptur    Benign prostatic hyperplasia    Gout    History of transcatheter aortic valve replacement (TAVR)            MEDICATIONS  (STANDING):  acetaminophen  IVPB .. 1000 milliGRAM(s) IV Intermittent once  aMIOdarone Infusion 1 mG/Min (33.3 mL/Hr) IV Continuous <Continuous>  cefuroxime  IVPB 1500 milliGRAM(s) IV Intermittent every 8 hours  chlorhexidine 0.12% Liquid 5 milliLiter(s) Oral Mucosa two times a day  dextrose 50% Injectable 25 milliLiter(s) IV Push every 15 minutes  dextrose 50% Injectable 50 milliLiter(s) IV Push every 15 minutes  insulin regular Infusion 2 Unit(s)/Hr (2 mL/Hr) IV Continuous <Continuous>  lidocaine   Infusion 1 mG/Min (7.5 mL/Hr) IV Continuous <Continuous>  meperidine     Injectable 25 milliGRAM(s) IV Push once  milrinone Infusion 0.4 MICROgram(s)/kG/Min (12 mL/Hr) IV Continuous <Continuous>  norepinephrine Infusion 0.05 MICROgram(s)/kG/Min (9.38 mL/Hr) IV Continuous <Continuous>  pantoprazole    Tablet 40 milliGRAM(s) Oral daily  propofol Infusion 5 MICROgram(s)/kG/Min (3 mL/Hr) IV Continuous <Continuous>  sodium chloride 0.9%. 1000 milliLiter(s) (10 mL/Hr) IV Continuous <Continuous>  sodium chloride 0.9%. 1000 milliLiter(s) (5 mL/Hr) IV Continuous <Continuous>  vasopressin Infusion 0.04 Unit(s)/Min (2.4 mL/Hr) IV Continuous <Continuous>    MEDICATIONS  (PRN):          Allergies    No Known Drug Allergies  strawberry (Anaphylaxis)    Intolerances          WEIGHTS:  Daily     Daily Weight in k.1 (2021 14:00)  Admit Wt: Drug Dosing Weight  Height (cm): 182.9 (2021 00:32)  Weight (kg): 100.1 (2021 00:32)  BMI (kg/m2): 29.9 (2021 00:32)  BSA (m2): 2.22 (2021 00:32)  I&O's Summary    2021 07:01  -  2021 07:00  --------------------------------------------------------  IN: 350 mL / OUT: 1500 mL / NET: -1150 mL    2021 07:01  -  2021 02:06  --------------------------------------------------------  IN: 2250.1 mL / OUT: 305 mL / NET: 1945.1 mL        VITAL SIGNS:  ICU Vital Signs Last 24 Hrs  T(C): 35.8 (2021 01:00), Max: 36.9 (2021 20:00)  T(F): 96.4 (2021 01:00), Max: 98.4 (2021 20:00)  HR: 87 (:45) (79 - 115)  BP: 102/51 (2021 05:00) (102/51 - 119/59)  BP(mean): 71 (2021 05:00) (71 - 85)  ABP: 118/50 (2021 01:45) (0/-2 - 171/72)  ABP(mean): 72 (:45) (-1 - 107)  RR: 30 (:45) (10 - 39)  SpO2: 100% (:45) (89% - 100%)        All laboratory results, radiology and medications reviewed.    LABS:      138  |  94<L>  |  53.1<H>  ----------------------------<  137<H>  4.0   |  24.0  |  4.38<H>    Ca    9.8      2021 14:24  Mg     2.2         TPro  6.6  /  Alb  3.6  /  TBili  3.1<H>  /  DBili  x   /  AST  82<H>  /  ALT  58<H>  /  AlkPhos  109                                   10.8   8.93  )-----------( 63       ( 2021 14:24 )             34.0          PT/INR - ( 2021 14:24 )   PT: 23.4 sec;   INR: 2.09 ratio         PTT - ( 2021 14:24 )  PTT:49.5 sec  Bilirubin Total, Serum: 3.1 mg/dL ( @ 14:24)  Bilirubin Total, Serum: 3.4 mg/dL ( @ 06:05)    ABG - ( 2021 16:44 )  pH, Arterial: 7.410 pH, Blood: x     /  pCO2: 43    /  pO2: 232   / HCO3: 27    / Base Excess: 2.7   /  SaO2: 99.7                  CAPILLARY BLOOD GLUCOSE      POCT Blood Glucose.: 97 mg/dL (2021 01:40)  POCT Blood Glucose.: 101 mg/dL (2021 00:25)  POCT Blood Glucose.: 119 mg/dL (2021 23:30)  POCT Blood Glucose.: 110 mg/dL (2021 22:36)  POCT Blood Glucose.: 108 mg/dL (2021 21:29)  POCT Blood Glucose.: 119 mg/dL (2021 20:32)  POCT Blood Glucose.: 115 mg/dL (2021 19:09)  POCT Blood Glucose.: 125 mg/dL (2021 18:16)  POCT Blood Glucose.: 135 mg/dL (2021 16:08)  POCT Blood Glucose.: 162 mg/dL (2021 15:18)             PHYSICAL EXAM:  General:  no acute distress  Neurology:  intubated, sedated, unable to fully assess  Respiratory:  clear to auscultation bilaterally  CV:  sinus tachycardia  Abdomen:  soft, nondistended, positive bowel sounds  Extremities:  warm, well perfused, trace edema +DP pulses

## 2021-06-24 NOTE — PROGRESS NOTE ADULT - PROBLEM SELECTOR PLAN 1
Now s/p Valve in Valve TAVR with Dr. Campbell on 6/23  Post op inotropic and pressor support needed  Weaning Primacor to 0.25 mcg/kg/min  Levo/Vaso to maintain MAP > 65  Continue Shawna, weaning to 20 PPM, PA pressures 50s/20s  Consider resuming midodrine to support BP.  Continue GI ppx with Protonix and Senna, DVT ppx with SCD boots Now s/p Valve in Valve TAVR with Dr. Campbell on 6/23  Post op inotropic and pressor support needed  Runs of SVT post op requiring Lidocaine and Amio gtts for stability  Weaning Primacor to 0.25 mcg/kg/min  Levo/Vaso to maintain MAP > 65  Continue Shawna, weaning to 20 PPM, PA pressures 50s/20s  Consider resuming midodrine to support BP.  Continue GI ppx with Protonix and Senna, DVT ppx with SCD boots

## 2021-06-24 NOTE — PROGRESS NOTE ADULT - SUBJECTIVE AND OBJECTIVE BOX
INFECTIOUS DISEASES AND INTERNAL MEDICINE at [place]  =======================================================  Robson Wright MD  Diplomates American Board of Internal Medicine and Infectious Diseases  Telephone [phone]  Fax            [phone]  =======================================================    REYNA BOB [id number]    Follow up: valve vegetation    s/p TAVR on 6/23/21  no fevers  cultures from blood remains negative      Allergies:  No Known Drug Allergies  strawberry (Anaphylaxis)         SOCIAL       FAMILY   FAMILY HISTORY:  FH: lung cancer      REVIEW OF SYSTEMS:  CONSTITUTIONAL:  No Fever or chills  HEENT:   No diplopia or blurred vision.  No earache, sore throat or runny nose.  CARDIOVASCULAR:  No pressure, squeezing, strangling, tightness, heaviness or aching about the chest, neck, axilla or epigastrium.  RESPIRATORY:  No cough, shortness of breath, PND or orthopnea.  GASTROINTESTINAL:  No nausea, vomiting or diarrhea.  GENITOURINARY:  No dysuria, frequency or urgency. No Blood in urine  MUSCULOSKELETAL:   moves all joints  SKIN:  No change in skin, hair or nails.  NEUROLOGIC:  No paresthesias, fasciculations, seizures or weakness.  PSYCHIATRIC:  No disorder of thought or mood.  ENDOCRINE:  No heat or cold intolerance, polyuria or polydipsia.  HEMATOLOGICAL:  No easy bruising or bleeding.           Physical Exam:     GEN: NAD,   HEENT: normocephalic and atraumatic. EOMI. EMMIE.    NECK: Supple. No carotid bruits.  No lymphadenopathy or thyromegaly.  LUNGS: Clear to auscultation.  HEART: Regular rate and rhythm + murmur.  ABDOMEN: Soft, nontender, and nondistended.  Positive bowel sounds.    : No CVA tenderness  EXTREMITIES: Without any cyanosis, clubbing, rash, lesions or edema.  MSK: no joint swelling  NEUROLOGIC: Cranial nerves II through XII are grossly intact.  PSYCHIATRIC: Appropriate affect .  SKIN: No ulceration or induration present.      Vitals:  ============  T(F): 98.8 (24 Jun 2021 09:00), Max: 98.8 (24 Jun 2021 08:00)  HR: 109 (24 Jun 2021 09:30)  BP: --  RR: 25 (24 Jun 2021 09:30)  SpO2: 100% (24 Jun 2021 09:30) (89% - 100%)  temp max in last 48H T(F): , Max: 98.8 (06-24-21 @ 08:00)    =======================================================  Current Antibiotics:  cefuroxime  IVPB 1500 milliGRAM(s) IV Intermittent every 8 hours    Other medications:  acetaminophen  IVPB .. 1000 milliGRAM(s) IV Intermittent once  aMIOdarone Infusion 1 mG/Min IV Continuous <Continuous>  atorvastatin 10 milliGRAM(s) Oral at bedtime  budesonide 160 MICROgram(s)/formoterol 4.5 MICROgram(s) Inhaler 2 Puff(s) Inhalation two times a day  chlorhexidine 0.12% Liquid 5 milliLiter(s) Oral Mucosa two times a day  dextrose 50% Injectable 50 milliLiter(s) IV Push every 15 minutes  dextrose 50% Injectable 25 milliLiter(s) IV Push every 15 minutes  finasteride 5 milliGRAM(s) Oral daily  insulin regular Infusion 2 Unit(s)/Hr IV Continuous <Continuous>  lidocaine   Infusion 1 mG/Min IV Continuous <Continuous>  meperidine     Injectable 25 milliGRAM(s) IV Push once  milrinone Infusion 0.125 MICROgram(s)/kG/Min IV Continuous <Continuous>  mirtazapine 15 milliGRAM(s) Oral daily  norepinephrine Infusion 0.05 MICROgram(s)/kG/Min IV Continuous <Continuous>  pantoprazole  Injectable 40 milliGRAM(s) IV Push every 12 hours  propofol Infusion 5 MICROgram(s)/kG/Min IV Continuous <Continuous>  psyllium Powder 1 Packet(s) Oral two times a day  senna 2 Tablet(s) Oral at bedtime  sodium chloride 0.9%. 1000 milliLiter(s) IV Continuous <Continuous>  sodium chloride 0.9%. 1000 milliLiter(s) IV Continuous <Continuous>  tamsulosin 0.4 milliGRAM(s) Oral at bedtime  vasopressin Infusion 0.04 Unit(s)/Min IV Continuous <Continuous>      =======================================================  Labs:                        9.5    9.68  )-----------( 55       ( 24 Jun 2021 03:19 )             29.8     06-24    136  |  95<L>  |  60.3<H>  ----------------------------<  102<H>  4.1   |  25.0  |  4.77<H>    Ca    9.4      24 Jun 2021 03:19  Mg     2.7     06-24    TPro  6.6  /  Alb  3.6  /  TBili  3.1<H>  /  DBili  x   /  AST  82<H>  /  ALT  58<H>  /  AlkPhos  109  06-23      Culture - Blood (collected 06-16-21 @ 17:23)  Source: .Blood Blood  Final Report (06-21-21 @ 19:00):    No growth at 5 days.    Culture - Blood (collected 06-16-21 @ 14:41)  Source: .Blood Blood-Peripheral  Final Report (06-21-21 @ 15:00):    No growth at 5 days.    Culture - Blood (collected 06-16-21 @ 14:40)  Source: .Blood Blood-Peripheral  Final Report (06-21-21 @ 15:00):    No growth at 5 days.    Culture - Blood (collected 06-15-21 @ 12:30)  Source: .Blood Blood-Peripheral  Final Report (06-20-21 @ 13:00):    No growth at 5 days.    Culture - Blood (collected 06-15-21 @ 12:29)  Source: .Blood Blood-Peripheral  Final Report (06-20-21 @ 13:00):    No growth at 5 days.     Procalcitonin, Serum: 0.24 ng/mL (06-17-21 @ 09:12)      COVID-19 PCR: NotDetec (06-22-21 @ 13:29)  COVID-19 PCR: NotDetec (06-18-21 @ 08:13)

## 2021-06-24 NOTE — PROGRESS NOTE ADULT - ASSESSMENT
76 year old male patient with a medical history of MI in 1995 (angiogram, no stents placed), COPD (2L O2 at home), s/p TAVR (03/2019 at [facility]), gout, CKD, CVA (09/2020), pulmonary HTN, initially presented to [facility] 6/1/21 with RONNELL on CKD (likely cardiorenal syndrome), urinary retention due to noncompliance with medications, with hospital course complicated by cardiogenic shock, acute hypoxemic respiratory failure secondary to metabolic acidosis and respiratory alkalosis. Patient found to have TAVR failure with ISRAEL 6/10/21 showing EF 40-45%, Aortic valve prosthesis with Severe paravalvular leak and valvular AI, mild-moderate aortic stenosis, and moderate MR. Patient was transferred to [facility] under Dr. Campbell for further workup. Cardiology consulted for decompensated HF and severe pulmonary hypertension.     TAVR Prosthesis failure   - Now POD #1 from repeat TAVR.   - In Cardiogenic shock requiring IV pressor and inotropic support.   - Try to wean off primacor first as pt with Vtach and  it is proarrhythmic.   - Wean levophed as tolerated.   - HD tomorrow.     HFrEF  - EF 40-45% -- > 30-35% on 6/12/21 echo  - severely reduced RV  - mod to severe MR  - Severe TR   - Aortic valve prosthesis with Severe paravalvular leak and valvular AI now s/p repeat TAVR.   - Patient with continuously high PA pressures noted.   - Repeated limited echo to evaluate RV function.   - Continue HD for fluid management.   - Unable to add GDMT due to hypotension.    Severe Pulmonary HTN   - confirmed on [facility] march/2021   - Echo shows PASP 61.5 consistent with severe pulm HTN  - Patient with continuously high PA pressures noted on swan.  - Repeated limited echo to evaluate RV function.     Ventricular Tachycardia  - While on primacor and levophed.   - Continue Amiodarone and lidocaine gtt.   - Wean off primacor first.       Assessment and recommendations are final when note is signed by the attending.

## 2021-06-24 NOTE — PROGRESS NOTE ADULT - PROBLEM SELECTOR PLAN 6
Unintentional 100lb Weight loss / Dysphagia / Anorexia work up revealing-  duodenitis and diffuse erosive esophagitis, gastritis on EGD (biopsies negative for H. Pylori or carcinoma) - on protonix  colonoscopy showing diverticulosis

## 2021-06-24 NOTE — PROGRESS NOTE ADULT - ATTENDING COMMENTS
pt seen and examined on 6/24. Intubated, pt with NSVT on amio and also lidocaine. Advised CTICUPA to DC primacor which can cause arrhythmia, TTE from last night was reviewed as well.  Plan to wake pt and extubate.   Pt with elevated PADP and will need HD.   I will follow with you.

## 2021-06-24 NOTE — PROGRESS NOTE ADULT - ASSESSMENT
1) Álvaro on CKD  2) TAVR prosthesis failure s/p TAVR; POD1  3) CHFrEF  4) Anemia of renal dx    No indication for RRT  Will do CVVHDF if worsening lytes,acidosis    zeke SALINAS

## 2021-06-25 LAB
ANION GAP SERPL CALC-SCNC: 17 MMOL/L — SIGNIFICANT CHANGE UP (ref 5–17)
APTT BLD: 39.8 SEC — HIGH (ref 27.5–35.5)
BASOPHILS # BLD AUTO: 0.02 K/UL — SIGNIFICANT CHANGE UP (ref 0–0.2)
BASOPHILS NFR BLD AUTO: 0.2 % — SIGNIFICANT CHANGE UP (ref 0–2)
BUN SERPL-MCNC: 53.9 MG/DL — HIGH (ref 8–20)
BUN SERPL-MCNC: 70.5 MG/DL — HIGH (ref 8–20)
BUN SERPL-MCNC: 72.1 MG/DL — HIGH (ref 8–20)
CALCIUM SERPL-MCNC: 9.1 MG/DL — SIGNIFICANT CHANGE UP (ref 8.6–10.2)
CALCIUM SERPL-MCNC: 9.1 MG/DL — SIGNIFICANT CHANGE UP (ref 8.6–10.2)
CALCIUM SERPL-MCNC: 9.3 MG/DL — SIGNIFICANT CHANGE UP (ref 8.6–10.2)
CHLORIDE SERPL-SCNC: 93 MMOL/L — LOW (ref 98–107)
CHLORIDE SERPL-SCNC: 94 MMOL/L — LOW (ref 98–107)
CHLORIDE SERPL-SCNC: 98 MMOL/L — SIGNIFICANT CHANGE UP (ref 98–107)
CO2 SERPL-SCNC: 21 MMOL/L — LOW (ref 22–29)
CO2 SERPL-SCNC: 22 MMOL/L — SIGNIFICANT CHANGE UP (ref 22–29)
CO2 SERPL-SCNC: 23 MMOL/L — SIGNIFICANT CHANGE UP (ref 22–29)
CREAT SERPL-MCNC: 3.97 MG/DL — HIGH (ref 0.5–1.3)
CREAT SERPL-MCNC: 5.05 MG/DL — HIGH (ref 0.5–1.3)
CREAT SERPL-MCNC: 5.22 MG/DL — HIGH (ref 0.5–1.3)
EOSINOPHIL # BLD AUTO: 0 K/UL — SIGNIFICANT CHANGE UP (ref 0–0.5)
EOSINOPHIL NFR BLD AUTO: 0 % — SIGNIFICANT CHANGE UP (ref 0–6)
FIBRINOGEN PPP-MCNC: 176 MG/DL — LOW (ref 290–520)
GLUCOSE BLDC GLUCOMTR-MCNC: 136 MG/DL — HIGH (ref 70–99)
GLUCOSE BLDC GLUCOMTR-MCNC: 140 MG/DL — HIGH (ref 70–99)
GLUCOSE BLDC GLUCOMTR-MCNC: 141 MG/DL — HIGH (ref 70–99)
GLUCOSE BLDC GLUCOMTR-MCNC: 141 MG/DL — HIGH (ref 70–99)
GLUCOSE BLDC GLUCOMTR-MCNC: 143 MG/DL — HIGH (ref 70–99)
GLUCOSE BLDC GLUCOMTR-MCNC: 146 MG/DL — HIGH (ref 70–99)
GLUCOSE BLDC GLUCOMTR-MCNC: 77 MG/DL — SIGNIFICANT CHANGE UP (ref 70–99)
GLUCOSE BLDC GLUCOMTR-MCNC: 96 MG/DL — SIGNIFICANT CHANGE UP (ref 70–99)
GLUCOSE SERPL-MCNC: 134 MG/DL — HIGH (ref 70–99)
GLUCOSE SERPL-MCNC: 137 MG/DL — HIGH (ref 70–99)
GLUCOSE SERPL-MCNC: 91 MG/DL — SIGNIFICANT CHANGE UP (ref 70–99)
HCT VFR BLD CALC: 27.5 % — LOW (ref 39–50)
HCT VFR BLD CALC: 27.6 % — LOW (ref 39–50)
HCT VFR BLD CALC: 28.1 % — LOW (ref 39–50)
HCT VFR BLD CALC: 29.2 % — LOW (ref 39–50)
HEPARIN-PF4 AB RESULT: <0.6 U/ML — SIGNIFICANT CHANGE UP (ref 0–0.9)
HGB BLD-MCNC: 8.6 G/DL — LOW (ref 13–17)
HGB BLD-MCNC: 8.7 G/DL — LOW (ref 13–17)
HGB BLD-MCNC: 9.1 G/DL — LOW (ref 13–17)
HGB BLD-MCNC: 9.2 G/DL — LOW (ref 13–17)
IMM GRANULOCYTES NFR BLD AUTO: 0.6 % — SIGNIFICANT CHANGE UP (ref 0–1.5)
INR BLD: 1.56 RATIO — HIGH (ref 0.88–1.16)
LYMPHOCYTES # BLD AUTO: 0.69 K/UL — LOW (ref 1–3.3)
LYMPHOCYTES # BLD AUTO: 5.3 % — LOW (ref 13–44)
MAGNESIUM SERPL-MCNC: 2.8 MG/DL — HIGH (ref 1.6–2.6)
MAGNESIUM SERPL-MCNC: 3.2 MG/DL — HIGH (ref 1.6–2.6)
MCHC RBC-ENTMCNC: 29.8 PG — SIGNIFICANT CHANGE UP (ref 27–34)
MCHC RBC-ENTMCNC: 30.1 PG — SIGNIFICANT CHANGE UP (ref 27–34)
MCHC RBC-ENTMCNC: 30.2 PG — SIGNIFICANT CHANGE UP (ref 27–34)
MCHC RBC-ENTMCNC: 30.5 PG — SIGNIFICANT CHANGE UP (ref 27–34)
MCHC RBC-ENTMCNC: 31.2 GM/DL — LOW (ref 32–36)
MCHC RBC-ENTMCNC: 31.2 GM/DL — LOW (ref 32–36)
MCHC RBC-ENTMCNC: 31.6 GM/DL — LOW (ref 32–36)
MCHC RBC-ENTMCNC: 32.7 GM/DL — SIGNIFICANT CHANGE UP (ref 32–36)
MCV RBC AUTO: 93 FL — SIGNIFICANT CHANGE UP (ref 80–100)
MCV RBC AUTO: 95.2 FL — SIGNIFICANT CHANGE UP (ref 80–100)
MCV RBC AUTO: 95.7 FL — SIGNIFICANT CHANGE UP (ref 80–100)
MCV RBC AUTO: 96.8 FL — SIGNIFICANT CHANGE UP (ref 80–100)
MONOCYTES # BLD AUTO: 1.01 K/UL — HIGH (ref 0–0.9)
MONOCYTES NFR BLD AUTO: 7.7 % — SIGNIFICANT CHANGE UP (ref 2–14)
NEUTROPHILS # BLD AUTO: 11.26 K/UL — HIGH (ref 1.8–7.4)
NEUTROPHILS NFR BLD AUTO: 86.2 % — HIGH (ref 43–77)
PF4 HEPARIN CMPLX AB SER-ACNC: NEGATIVE — SIGNIFICANT CHANGE UP
PHOSPHATE SERPL-MCNC: 3.6 MG/DL — SIGNIFICANT CHANGE UP (ref 2.4–4.7)
PHOSPHATE SERPL-MCNC: 3.9 MG/DL — SIGNIFICANT CHANGE UP (ref 2.4–4.7)
PLATELET # BLD AUTO: 26 K/UL — LOW (ref 150–400)
PLATELET # BLD AUTO: 26 K/UL — LOW (ref 150–400)
PLATELET # BLD AUTO: 35 K/UL — LOW (ref 150–400)
PLATELET # BLD AUTO: 37 K/UL — LOW (ref 150–400)
POTASSIUM SERPL-MCNC: 3.9 MMOL/L — SIGNIFICANT CHANGE UP (ref 3.5–5.3)
POTASSIUM SERPL-MCNC: 4 MMOL/L — SIGNIFICANT CHANGE UP (ref 3.5–5.3)
POTASSIUM SERPL-MCNC: 4.2 MMOL/L — SIGNIFICANT CHANGE UP (ref 3.5–5.3)
POTASSIUM SERPL-SCNC: 3.9 MMOL/L — SIGNIFICANT CHANGE UP (ref 3.5–5.3)
POTASSIUM SERPL-SCNC: 4 MMOL/L — SIGNIFICANT CHANGE UP (ref 3.5–5.3)
POTASSIUM SERPL-SCNC: 4.2 MMOL/L — SIGNIFICANT CHANGE UP (ref 3.5–5.3)
PROTHROM AB SERPL-ACNC: 17.7 SEC — HIGH (ref 10.6–13.6)
RBC # BLD: 2.85 M/UL — LOW (ref 4.2–5.8)
RBC # BLD: 2.89 M/UL — LOW (ref 4.2–5.8)
RBC # BLD: 3.02 M/UL — LOW (ref 4.2–5.8)
RBC # BLD: 3.05 M/UL — LOW (ref 4.2–5.8)
RBC # FLD: 21.1 % — HIGH (ref 10.3–14.5)
RBC # FLD: 21.3 % — HIGH (ref 10.3–14.5)
RBC # FLD: 21.3 % — HIGH (ref 10.3–14.5)
RBC # FLD: 21.6 % — HIGH (ref 10.3–14.5)
SODIUM SERPL-SCNC: 133 MMOL/L — LOW (ref 135–145)
SODIUM SERPL-SCNC: 133 MMOL/L — LOW (ref 135–145)
SODIUM SERPL-SCNC: 136 MMOL/L — SIGNIFICANT CHANGE UP (ref 135–145)
WBC # BLD: 12.82 K/UL — HIGH (ref 3.8–10.5)
WBC # BLD: 13 K/UL — HIGH (ref 3.8–10.5)
WBC # BLD: 13.29 K/UL — HIGH (ref 3.8–10.5)
WBC # BLD: 13.53 K/UL — HIGH (ref 3.8–10.5)
WBC # FLD AUTO: 12.82 K/UL — HIGH (ref 3.8–10.5)
WBC # FLD AUTO: 13 K/UL — HIGH (ref 3.8–10.5)
WBC # FLD AUTO: 13.29 K/UL — HIGH (ref 3.8–10.5)
WBC # FLD AUTO: 13.53 K/UL — HIGH (ref 3.8–10.5)

## 2021-06-25 PROCEDURE — 99232 SBSQ HOSP IP/OBS MODERATE 35: CPT

## 2021-06-25 PROCEDURE — 99233 SBSQ HOSP IP/OBS HIGH 50: CPT

## 2021-06-25 PROCEDURE — 71045 X-RAY EXAM CHEST 1 VIEW: CPT | Mod: 26

## 2021-06-25 PROCEDURE — 93010 ELECTROCARDIOGRAM REPORT: CPT

## 2021-06-25 PROCEDURE — 99291 CRITICAL CARE FIRST HOUR: CPT

## 2021-06-25 RX ORDER — AMIODARONE HYDROCHLORIDE 400 MG/1
0.5 TABLET ORAL
Qty: 900 | Refills: 0 | Status: DISCONTINUED | OUTPATIENT
Start: 2021-06-25 | End: 2021-06-28

## 2021-06-25 RX ORDER — SODIUM CHLORIDE 9 MG/ML
10 INJECTION INTRAMUSCULAR; INTRAVENOUS; SUBCUTANEOUS
Refills: 0 | Status: DISCONTINUED | OUTPATIENT
Start: 2021-06-25 | End: 2021-07-10

## 2021-06-25 RX ORDER — INSULIN LISPRO 100/ML
VIAL (ML) SUBCUTANEOUS EVERY 6 HOURS
Refills: 0 | Status: DISCONTINUED | OUTPATIENT
Start: 2021-06-25 | End: 2021-07-01

## 2021-06-25 RX ORDER — CHLORHEXIDINE GLUCONATE 213 G/1000ML
1 SOLUTION TOPICAL DAILY
Refills: 0 | Status: DISCONTINUED | OUTPATIENT
Start: 2021-06-25 | End: 2021-07-04

## 2021-06-25 RX ORDER — DEXTROSE 50 % IN WATER 50 %
25 SYRINGE (ML) INTRAVENOUS ONCE
Refills: 0 | Status: COMPLETED | OUTPATIENT
Start: 2021-06-25 | End: 2021-06-25

## 2021-06-25 RX ADMIN — MIDODRINE HYDROCHLORIDE 10 MILLIGRAM(S): 2.5 TABLET ORAL at 21:04

## 2021-06-25 RX ADMIN — PROPOFOL 3 MICROGRAM(S)/KG/MIN: 10 INJECTION, EMULSION INTRAVENOUS at 21:04

## 2021-06-25 RX ADMIN — PANTOPRAZOLE SODIUM 40 MILLIGRAM(S): 20 TABLET, DELAYED RELEASE ORAL at 17:31

## 2021-06-25 RX ADMIN — BUDESONIDE AND FORMOTEROL FUMARATE DIHYDRATE 2 PUFF(S): 160; 4.5 AEROSOL RESPIRATORY (INHALATION) at 20:00

## 2021-06-25 RX ADMIN — BUDESONIDE AND FORMOTEROL FUMARATE DIHYDRATE 2 PUFF(S): 160; 4.5 AEROSOL RESPIRATORY (INHALATION) at 08:44

## 2021-06-25 RX ADMIN — MILRINONE LACTATE 3.75 MICROGRAM(S)/KG/MIN: 1 INJECTION, SOLUTION INTRAVENOUS at 18:30

## 2021-06-25 RX ADMIN — Medication 9.38 MICROGRAM(S)/KG/MIN: at 12:17

## 2021-06-25 RX ADMIN — ATORVASTATIN CALCIUM 10 MILLIGRAM(S): 80 TABLET, FILM COATED ORAL at 21:02

## 2021-06-25 RX ADMIN — Medication 9.38 MICROGRAM(S)/KG/MIN: at 21:02

## 2021-06-25 RX ADMIN — Medication 1 PACKET(S): at 05:27

## 2021-06-25 RX ADMIN — PROPOFOL 3 MICROGRAM(S)/KG/MIN: 10 INJECTION, EMULSION INTRAVENOUS at 07:36

## 2021-06-25 RX ADMIN — Medication 25 MILLILITER(S): at 23:33

## 2021-06-25 RX ADMIN — MIDODRINE HYDROCHLORIDE 10 MILLIGRAM(S): 2.5 TABLET ORAL at 05:26

## 2021-06-25 RX ADMIN — PROPOFOL 3 MICROGRAM(S)/KG/MIN: 10 INJECTION, EMULSION INTRAVENOUS at 18:30

## 2021-06-25 RX ADMIN — AMIODARONE HYDROCHLORIDE 16.7 MG/MIN: 400 TABLET ORAL at 21:03

## 2021-06-25 RX ADMIN — CHLORHEXIDINE GLUCONATE 5 MILLILITER(S): 213 SOLUTION TOPICAL at 05:26

## 2021-06-25 RX ADMIN — AMIODARONE HYDROCHLORIDE 16.7 MG/MIN: 400 TABLET ORAL at 10:31

## 2021-06-25 RX ADMIN — PANTOPRAZOLE SODIUM 40 MILLIGRAM(S): 20 TABLET, DELAYED RELEASE ORAL at 05:26

## 2021-06-25 RX ADMIN — CHLORHEXIDINE GLUCONATE 5 MILLILITER(S): 213 SOLUTION TOPICAL at 17:31

## 2021-06-25 RX ADMIN — MIDODRINE HYDROCHLORIDE 10 MILLIGRAM(S): 2.5 TABLET ORAL at 13:17

## 2021-06-25 RX ADMIN — MIRTAZAPINE 15 MILLIGRAM(S): 45 TABLET, ORALLY DISINTEGRATING ORAL at 12:18

## 2021-06-25 RX ADMIN — SENNA PLUS 2 TABLET(S): 8.6 TABLET ORAL at 21:02

## 2021-06-25 RX ADMIN — INSULIN HUMAN 2 UNIT(S)/HR: 100 INJECTION, SOLUTION SUBCUTANEOUS at 05:25

## 2021-06-25 RX ADMIN — FINASTERIDE 5 MILLIGRAM(S): 5 TABLET, FILM COATED ORAL at 12:18

## 2021-06-25 RX ADMIN — Medication 1 TABLET(S): at 13:17

## 2021-06-25 RX ADMIN — MILRINONE LACTATE 3.75 MICROGRAM(S)/KG/MIN: 1 INJECTION, SOLUTION INTRAVENOUS at 21:03

## 2021-06-25 RX ADMIN — Medication 7.5 MG/MIN: at 21:03

## 2021-06-25 RX ADMIN — Medication 1 PACKET(S): at 17:31

## 2021-06-25 RX ADMIN — CHLORHEXIDINE GLUCONATE 1 APPLICATION(S): 213 SOLUTION TOPICAL at 12:20

## 2021-06-25 NOTE — PROGRESS NOTE ADULT - ASSESSMENT
76 year old male patient with a medical history of MI in 1995 (angiogram, no stents placed), COPD (2L O2 at home), s/p TAVR (03/2019 at [facility]), gout, CKD, CVA (09/2020), pulmonary HTN, initially presented to [facility] 6/1/21 with RONNELL on CKD (likely cardiorenal syndrome), urinary retention due to noncompliance with medications, with hospital course complicated by cardiogenic shock, acute hypoxemic respiratory failure secondary to metabolic acidosis and respiratory alkalosis. Patient found to have TAVR failure with ISRAEL 6/10/21 showing EF 40-45%, Aortic valve prosthesis with Severe paravalvular leak and valvular AI, mild-moderate aortic stenosis, and moderate MR.   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     PAtient on HD, now with declining platelet count at 35, HEmoglobin stable   NO over s/s of bleeding   Fibrinogen trending down   - on HD  - Review peripheral smear   - Trend daily cbc with differential D dimer, Fibrinogen,   - SHEREE ab sent   Patient appears to be in DIC. Transfuse platelets as necessary for sign /symptoms of bleeding   - Prognosis guarded

## 2021-06-25 NOTE — PROGRESS NOTE ADULT - SUBJECTIVE AND OBJECTIVE BOX
Patient is a 76y old  Male who presents with a chief complaint of TAVR Failure (25 Jun 2021 01:33)      SUBJECTIVE / OVERNIGHT EVENTS:    Patient intubated, Plt count trending down DOwn to 35 from 50 yesterday     MEDICATIONS  (STANDING):  aMIOdarone Infusion 0.5 mG/Min (16.7 mL/Hr) IV Continuous <Continuous>  atorvastatin 10 milliGRAM(s) Oral at bedtime  budesonide 160 MICROgram(s)/formoterol 4.5 MICROgram(s) Inhaler 2 Puff(s) Inhalation two times a day  chlorhexidine 0.12% Liquid 5 milliLiter(s) Oral Mucosa two times a day  chlorhexidine 2% Cloths 1 Application(s) Topical daily  CRRT Treatment    <Continuous>  finasteride 5 milliGRAM(s) Oral daily  insulin regular Infusion 2 Unit(s)/Hr (2 mL/Hr) IV Continuous <Continuous>  lidocaine   Infusion 1 mG/Min (7.5 mL/Hr) IV Continuous <Continuous>  midodrine 10 milliGRAM(s) Oral every 8 hours  milrinone Infusion 0.125 MICROgram(s)/kG/Min (3.75 mL/Hr) IV Continuous <Continuous>  mirtazapine 15 milliGRAM(s) Oral daily  Nephro-melissa 1 Tablet(s) Oral daily  norepinephrine Infusion 0.05 MICROgram(s)/kG/Min (9.38 mL/Hr) IV Continuous <Continuous>  pantoprazole  Injectable 40 milliGRAM(s) IV Push every 12 hours  Phoxillum Filtration BK 4 / 2.5 5000 milliLiter(s) (1000 mL/Hr) CRRT <Continuous>  Phoxillum Filtration BK 4 / 2.5 5000 milliLiter(s) (500 mL/Hr) CRRT <Continuous>  Phoxillum Filtration BK 4 / 2.5 5000 milliLiter(s) (1500 mL/Hr) CRRT <Continuous>  propofol Infusion 5 MICROgram(s)/kG/Min (3 mL/Hr) IV Continuous <Continuous>  psyllium Powder 1 Packet(s) Oral two times a day  senna 2 Tablet(s) Oral at bedtime  sodium chloride 0.9%. 1000 milliLiter(s) (10 mL/Hr) IV Continuous <Continuous>  sodium chloride 0.9%. 1000 milliLiter(s) (5 mL/Hr) IV Continuous <Continuous>  tamsulosin 0.4 milliGRAM(s) Oral at bedtime  vasopressin Infusion 0.04 Unit(s)/Min (2.4 mL/Hr) IV Continuous <Continuous>    MEDICATIONS  (PRN):  ALBUTerol    90 MICROgram(s) HFA Inhaler 2 Puff(s) Inhalation every 6 hours PRN Shortness of Breath and/or Wheezing    CAPILLARY BLOOD GLUCOSE      POCT Blood Glucose.: 146 mg/dL (25 Jun 2021 09:41)  POCT Blood Glucose.: 141 mg/dL (25 Jun 2021 08:25)  POCT Blood Glucose.: 143 mg/dL (25 Jun 2021 06:18)  POCT Blood Glucose.: 141 mg/dL (25 Jun 2021 04:11)  POCT Blood Glucose.: 140 mg/dL (25 Jun 2021 02:16)  POCT Blood Glucose.: 136 mg/dL (24 Jun 2021 23:21)  POCT Blood Glucose.: 141 mg/dL (24 Jun 2021 22:07)  POCT Blood Glucose.: 118 mg/dL (24 Jun 2021 20:01)  POCT Blood Glucose.: 123 mg/dL (24 Jun 2021 19:01)  POCT Blood Glucose.: 116 mg/dL (24 Jun 2021 17:20)  POCT Blood Glucose.: 108 mg/dL (24 Jun 2021 16:06)  POCT Blood Glucose.: 132 mg/dL (24 Jun 2021 13:23)  POCT Blood Glucose.: 132 mg/dL (24 Jun 2021 12:07)  POCT Blood Glucose.: 138 mg/dL (24 Jun 2021 11:00)    I&O's Summary    24 Jun 2021 07:01  -  25 Jun 2021 07:00  --------------------------------------------------------  IN: 3149.8 mL / OUT: 215 mL / NET: 2934.8 mL    25 Jun 2021 07:01  -  25 Jun 2021 10:46  --------------------------------------------------------  IN: 69.6 mL / OUT: 35 mL / NET: 34.6 mL        PHYSICAL EXAM:  Vital Signs Last 24 Hrs  T(C): 36.2 (25 Jun 2021 00:00), Max: 38 (24 Jun 2021 16:00)  T(F): 97.2 (25 Jun 2021 00:00), Max: 100.4 (24 Jun 2021 16:00)  HR: 82 (25 Jun 2021 08:53) (80 - 110)  BP: --  BP(mean): --  RR: 24 (25 Jun 2021 08:45) (8 - 38)  SpO2: 100% (25 Jun 2021 08:53) (90% - 100%)  GEn: Intubated  RESPIRATORY: COrase breath sounds  CARDIOVASCULAR: S1 S2  ABDOMEN: Nontender to palpation, normoactive bowel sounds,     LABS:                        9.2    13.00 )-----------( 37       ( 25 Jun 2021 09:53 )             28.1     06-25    133<L>  |  94<L>  |  72.1<H>  ----------------------------<  137<H>  4.0   |  22.0  |  5.22<H>    Ca    9.3      25 Jun 2021 09:53  Phos  3.9     06-25  Mg     3.2     06-25    TPro  6.7  /  Alb  4.0  /  TBili  3.9<H>  /  DBili  x   /  AST  61<H>  /  ALT  45<H>  /  AlkPhos  91  06-24    PT/INR - ( 25 Jun 2021 02:40 )   PT: 17.7 sec;   INR: 1.56 ratio         PTT - ( 25 Jun 2021 02:40 )  PTT:39.8 sec          COVID-19 PCR: NotDetec (22 Jun 2021 13:29)  COVID-19 PCR: NotDetec (18 Jun 2021 08:13)      RADIOLOGY & ADDITIONAL TESTS:  Imaging from Last 24 Hours:    Electrocardiogram/QTc Interval:    COORDINATION OF CARE:  Care Discussed with Consultants/Other Providers:

## 2021-06-25 NOTE — PROGRESS NOTE ADULT - PROBLEM SELECTOR PLAN 2
As above  ID following  Observe off ABX, f/u OR cultures 14 days out  Start Vanco/Rocephin if spikes fevers

## 2021-06-25 NOTE — PROGRESS NOTE ADULT - PROBLEM SELECTOR PLAN 10
Currently on full vent support, consider weaning with attempt to extubate later today    Problem 11: Adjustment Disorder   Will resume Remeron per recommendations from Behavioral [facility] on 6/17 when able    Problem 12: COPD  On home O2

## 2021-06-25 NOTE — PROGRESS NOTE ADULT - PROBLEM SELECTOR PLAN 1
Now s/p Valve in Valve TAVR with Dr. Campbell on 6/23  Minimal inotropic support with Primacor @ 0.125 mcg/kg/min  Pressor support Levo/Vaso weaning for MAP > 65  Midodrine 10 mg q 8 resumed (was on pre op)  Runs of SVT post op requiring Lidocaine and Amio gtts for stability  Shawna weaned off, PA pressures 50s/20s unchanged  Continue GI ppx with Protonix and Senna, DVT ppx with SCD boots

## 2021-06-25 NOTE — PROGRESS NOTE ADULT - SUBJECTIVE AND OBJECTIVE BOX
Subjective:  75yo M remains intubated, gently sedated, stable.      HPI:  76 year old male patient with a medical history of MI in 1995 (angiogram, no stents placed), COPD (2L O2 at home), s/p TAVR (03/2019 at [facility]), gout, CKD, CVA (09/2020), pulmonary HTN, initially presented to [facility] 6/1/21 with RONNELL on CKD (likely cardiorenal syndrome), urinary retention due to noncompliance with medications, with hospital course complicated by cardiogenic shock, acute hypoxemic respiratory failure secondary to metabolic acidosis and respiratory alkalosis. Patient found to have TAVR failure with ISRAEL 6/10/21 showing EF 40-45%, Aortic valve prosthesis with Severe paravalvular leak and valvular AI, mild-moderate aortic stenosis, and moderate MR. Patient was transferred to [facility] under Dr. Campbell for further workup.     Imaging from [facility]:  6/1: CXR shwoing cardiomegaly, small b/l pleural effusions, moderate pulmonary vascular congestion.  6/1: CT Brain ordered for head trauma? showing age-related cerebral and cerebellar volume loss, mild chronic vascular ischemic changes, stable biparietal arachnoid cyst and stable midline posterior fossa arachnoid cyst.   6/1: US Abdomen for elevated LFTs showing mild hepatic steatosis, mild hepatomegaly, sludge in the gallbladder, no discrete gallstones, normal biliary ducts, mild echogenic right kidney which may be seen with medical renal dz, mildly complex Right upper pole 4cm cyst with subtle internal echoes may represent hemorrhagic/proteinaceous cyst, mildly complex right midpole 2cm cyst with thin internal linear septation.   6/3: Kidney and Bladder US showing no hydronephrosis, echogenic kidneys likely from medical renal dz, prostatomegaly, and thickening of the posterior wall of the bladder with trabeculations which could be due to bladder outlet obstruction.   6/7: Kidney and Bladder US: No hydronephrosis or shadowing renal calculi. Stable b/l renal cysts.   6/7: Lower Extremity Venous Doppler with no DVT noted.   6/8: TTE showing EF 47%, dilated IVC, dilation of the ascending aorta of 4.4cm, moderate-severe pulmonary HTN, moderate-severe TR, aortic valve with severe prosthesis regurgitation and moderate prosthesis stenosis  6/9: CT Chest showing emphysema and intersitial lung dz changes, stable b/l small pleural effusions, cardiomegaly.   6/11: ISRAEL showing EF 40-45%, Aortic valve prosthesis with Severe paravalvular leak and valvular AI, mild-moderate aortic stenosis, and moderate MR.  (12 Jun 2021 01:36)          PAST MEDICAL & SURGICAL HISTORY:  Pulmonary hypertension    Hypertension    Hyperlipidemia    H/O aortic valve stenosis  s/p TAVR 2019 at [place]    CVA (cerebrovascular accident)  MCA CVA with tPA and thrombectomy    Chronic kidney disease    Prediabetes    Mitral valve regurgitation    CAD in native artery    Aneurysm of aortic root  thoracic aortic aneurysm, without ruptur    Benign prostatic hyperplasia    Gout    History of transcatheter aortic valve replacement (TAVR)            MEDICATIONS  (STANDING):  aMIOdarone Infusion 1 mG/Min (33.3 mL/Hr) IV Continuous <Continuous>  atorvastatin 10 milliGRAM(s) Oral at bedtime  budesonide 160 MICROgram(s)/formoterol 4.5 MICROgram(s) Inhaler 2 Puff(s) Inhalation two times a day  chlorhexidine 0.12% Liquid 5 milliLiter(s) Oral Mucosa two times a day  dextrose 50% Injectable 50 milliLiter(s) IV Push every 15 minutes  dextrose 50% Injectable 25 milliLiter(s) IV Push every 15 minutes  finasteride 5 milliGRAM(s) Oral daily  insulin regular Infusion 2 Unit(s)/Hr (2 mL/Hr) IV Continuous <Continuous>  lidocaine   Infusion 1 mG/Min (7.5 mL/Hr) IV Continuous <Continuous>  midodrine 10 milliGRAM(s) Oral every 8 hours  milrinone Infusion 0.125 MICROgram(s)/kG/Min (3.75 mL/Hr) IV Continuous <Continuous>  mirtazapine 15 milliGRAM(s) Oral daily  norepinephrine Infusion 0.05 MICROgram(s)/kG/Min (9.38 mL/Hr) IV Continuous <Continuous>  pantoprazole  Injectable 40 milliGRAM(s) IV Push every 12 hours  propofol Infusion 5 MICROgram(s)/kG/Min (3 mL/Hr) IV Continuous <Continuous>  psyllium Powder 1 Packet(s) Oral two times a day  senna 2 Tablet(s) Oral at bedtime  sodium chloride 0.9%. 1000 milliLiter(s) (10 mL/Hr) IV Continuous <Continuous>  sodium chloride 0.9%. 1000 milliLiter(s) (5 mL/Hr) IV Continuous <Continuous>  tamsulosin 0.4 milliGRAM(s) Oral at bedtime  vasopressin Infusion 0.04 Unit(s)/Min (2.4 mL/Hr) IV Continuous <Continuous>    MEDICATIONS  (PRN):  ALBUTerol    90 MICROgram(s) HFA Inhaler 2 Puff(s) Inhalation every 6 hours PRN Shortness of Breath and/or Wheezing          Allergies    No Known Drug Allergies  strawberry (Anaphylaxis)    Intolerances          WEIGHTS:  Daily     Daily   Admit Wt: Drug Dosing Weight  Height (cm): 182.9 (12 Jun 2021 00:32)  Weight (kg): 100.1 (12 Jun 2021 00:32)  BMI (kg/m2): 29.9 (12 Jun 2021 00:32)  BSA (m2): 2.22 (12 Jun 2021 00:32)  I&O's Summary    23 Jun 2021 07:01  -  24 Jun 2021 07:00  --------------------------------------------------------  IN: 3411.1 mL / OUT: 335 mL / NET: 3076.1 mL    24 Jun 2021 07:01  -  25 Jun 2021 01:33  --------------------------------------------------------  IN: 2496.7 mL / OUT: 180 mL / NET: 2316.7 mL        VITAL SIGNS:  ICU Vital Signs Last 24 Hrs  T(C): 36.2 (25 Jun 2021 00:00), Max: 38 (24 Jun 2021 16:00)  T(F): 97.2 (25 Jun 2021 00:00), Max: 100.4 (24 Jun 2021 16:00)  HR: 82 (25 Jun 2021 01:00) (82 - 110)  BP: --  BP(mean): --  ABP: 109/55 (25 Jun 2021 01:00) (43/52 - 153/77)  ABP(mean): 71 (25 Jun 2021 01:00) (52 - 99)  RR: 14 (25 Jun 2021 01:00) (8 - 47)  SpO2: 100% (25 Jun 2021 01:00) (89% - 100%)        All laboratory results, radiology and medications reviewed.    LABS:  06-24    136  |  94<L>  |  66.2<H>  ----------------------------<  125<H>  3.9   |  23.0  |  5.00<H>    Ca    9.6      24 Jun 2021 12:25  Mg     2.7     06-24    TPro  6.7  /  Alb  4.0  /  TBili  3.9<H>  /  DBili  x   /  AST  61<H>  /  ALT  45<H>  /  AlkPhos  91  06-24                                 9.2    12.85 )-----------( 50       ( 24 Jun 2021 12:25 )             28.7          PT/INR - ( 24 Jun 2021 12:25 )   PT: 18.5 sec;   INR: 1.63 ratio         PTT - ( 24 Jun 2021 12:25 )  PTT:35.5 sec  Bilirubin Total, Serum: 3.9 mg/dL (06-24 @ 12:25)    ABG - ( 24 Jun 2021 23:42 )  pH, Arterial: 7.400 pH, Blood: x     /  pCO2: 41    /  pO2: 180   / HCO3: 25    / Base Excess: 0.6   /  SaO2: 100.0                 CAPILLARY BLOOD GLUCOSE      POCT Blood Glucose.: 136 mg/dL (24 Jun 2021 23:21)  POCT Blood Glucose.: 141 mg/dL (24 Jun 2021 22:07)  POCT Blood Glucose.: 118 mg/dL (24 Jun 2021 20:01)  POCT Blood Glucose.: 123 mg/dL (24 Jun 2021 19:01)  POCT Blood Glucose.: 116 mg/dL (24 Jun 2021 17:20)  POCT Blood Glucose.: 108 mg/dL (24 Jun 2021 16:06)  POCT Blood Glucose.: 132 mg/dL (24 Jun 2021 13:23)  POCT Blood Glucose.: 132 mg/dL (24 Jun 2021 12:07)  POCT Blood Glucose.: 138 mg/dL (24 Jun 2021 11:00)  POCT Blood Glucose.: 143 mg/dL (24 Jun 2021 10:16)  POCT Blood Glucose.: 152 mg/dL (24 Jun 2021 09:19)  POCT Blood Glucose.: 129 mg/dL (24 Jun 2021 06:17)  POCT Blood Glucose.: 125 mg/dL (24 Jun 2021 05:08)  POCT Blood Glucose.: 112 mg/dL (24 Jun 2021 03:20)  POCT Blood Glucose.: 107 mg/dL (24 Jun 2021 02:21)  POCT Blood Glucose.: 97 mg/dL (24 Jun 2021 01:40)             PHYSICAL EXAM:  General:  no acute distress  Neurology:  intubated, sedated, unable to fully assess  Respiratory:  clear to auscultation bilaterally  CV:  regular rate and rhythm, first degree heart block  Abdomen:  soft, nondistended, positive bowel sounds  Extremities:  warm, well perfused, 2+ edema +DP pulses

## 2021-06-25 NOTE — PROGRESS NOTE ADULT - ATTENDING COMMENTS
pt seen and examined. Spoke to SELVIN OsunaU-NP.  Off NO.   RVSP is lowere now but PADP is >24 mmHg and CVP is close to 18.  Pt will need diuresis, HD  DC primacotr  Cont with amio and lidocaine  plan to extubate after HD.   I agree with a/p.

## 2021-06-25 NOTE — PROGRESS NOTE ADULT - SUBJECTIVE AND OBJECTIVE BOX
[facility] DIVISION OF KIDNEY DISEASES AND HYPERTENSION -- FOLLOW UP NOTE  --------------------------------------------------------------------------------  Chief Complaint:  ATN  24 hour events/subjective:  Seen/examined  On CVVHDF;      PAST HISTORY  --------------------------------------------------------------------------------  No significant changes to PMH, PSH, FHx, SHx, unless otherwise noted    ALLERGIES & MEDICATIONS  --------------------------------------------------------------------------------  Allergies    No Known Drug Allergies  strawberry (Anaphylaxis)    Intolerances      Standing Inpatient Medications  aMIOdarone Infusion 0.5 mG/Min IV Continuous <Continuous>  atorvastatin 10 milliGRAM(s) Oral at bedtime  budesonide 160 MICROgram(s)/formoterol 4.5 MICROgram(s) Inhaler 2 Puff(s) Inhalation two times a day  chlorhexidine 0.12% Liquid 5 milliLiter(s) Oral Mucosa two times a day  chlorhexidine 2% Cloths 1 Application(s) Topical daily  CRRT Treatment    <Continuous>  finasteride 5 milliGRAM(s) Oral daily  insulin regular Infusion 2 Unit(s)/Hr IV Continuous <Continuous>  lidocaine   Infusion 1 mG/Min IV Continuous <Continuous>  midodrine 10 milliGRAM(s) Oral every 8 hours  milrinone Infusion 0.125 MICROgram(s)/kG/Min IV Continuous <Continuous>  mirtazapine 15 milliGRAM(s) Oral daily  Nephro-melissa 1 Tablet(s) Oral daily  norepinephrine Infusion 0.05 MICROgram(s)/kG/Min IV Continuous <Continuous>  pantoprazole  Injectable 40 milliGRAM(s) IV Push every 12 hours  Phoxillum Filtration BK 4 / 2.5 5000 milliLiter(s) CRRT <Continuous>  Phoxillum Filtration BK 4 / 2.5 5000 milliLiter(s) CRRT <Continuous>  Phoxillum Filtration BK 4 / 2.5 5000 milliLiter(s) CRRT <Continuous>  propofol Infusion 5 MICROgram(s)/kG/Min IV Continuous <Continuous>  psyllium Powder 1 Packet(s) Oral two times a day  senna 2 Tablet(s) Oral at bedtime  sodium chloride 0.9%. 1000 milliLiter(s) IV Continuous <Continuous>  sodium chloride 0.9%. 1000 milliLiter(s) IV Continuous <Continuous>  tamsulosin 0.4 milliGRAM(s) Oral at bedtime  vasopressin Infusion 0.04 Unit(s)/Min IV Continuous <Continuous>    PRN Inpatient Medications  ALBUTerol    90 MICROgram(s) HFA Inhaler 2 Puff(s) Inhalation every 6 hours PRN      REVIEW OF SYSTEMS  --------------------------------------------------------------------------------  Unable to obtian    VITALS/PHYSICAL EXAM  --------------------------------------------------------------------------------  T(C): 36.2 (06-25-21 @ 12:00), Max: 38 (06-24-21 @ 16:00)  HR: 79 (06-25-21 @ 12:47) (78 - 110)  BP: --  RR: 11 (06-25-21 @ 12:45) (8 - 38)  SpO2: 100% (06-25-21 @ 12:47) (90% - 100%)  Wt(kg): --        06-24-21 @ 07:01  -  06-25-21 @ 07:00  --------------------------------------------------------  IN: 3149.8 mL / OUT: 215 mL / NET: 2934.8 mL    06-25-21 @ 07:01  -  06-25-21 @ 13:01  --------------------------------------------------------  IN: 515.4 mL / OUT: 80 mL / NET: 435.4 mL      Physical Exam:  GEn: Intubated  RESPIRATORY: COrase breath sounds  CARDIOVASCULAR: S1 S2  ABDOMEN: Nontender to palpation, normoactive bowel sounds,     LABS/STUDIES  --------------------------------------------------------------------------------              9.2    13.00 >-----------<  37       [06-25-21 @ 09:53]              28.1     133  |  94  |  72.1  ----------------------------<  137      [06-25-21 @ 09:53]  4.0   |  22.0  |  5.22        Ca     9.3     [06-25-21 @ 09:53]      Mg     3.2     [06-25-21 @ 02:40]      Phos  3.9     [06-25-21 @ 02:40]    TPro  6.7  /  Alb  4.0  /  TBili  3.9  /  DBili  x   /  AST  61  /  ALT  45  /  AlkPhos  91  [06-24-21 @ 12:25]    PT/INR: PT 17.7 , INR 1.56       [06-25-21 @ 02:40]  PTT: 39.8       [06-25-21 @ 02:40]      Creatinine Trend:  SCr 5.22 [06-25 @ 09:53]  SCr 5.05 [06-25 @ 02:40]  SCr 5.00 [06-24 @ 12:25]  SCr 4.77 [06-24 @ 03:19]  SCr 4.38 [06-23 @ 14:24]        TSH 2.31      [06-12-21 @ 01:55]    HBsAb <3.0      [06-17-21 @ 05:13]  HBsAg Nonreact      [06-17-21 @ 05:13]  HBcAb Nonreact      [06-17-21 @ 05:13]  HCV 0.15, Nonreact      [06-17-21 @ 05:13]

## 2021-06-25 NOTE — PROGRESS NOTE ADULT - ASSESSMENT
1) ATN  2) Acidosis  3) Respiratory failure  4) DM    Continued on CVVHDF  Orders in place  Reevaluated; on machine    dw Enrrique Robb NP

## 2021-06-25 NOTE — PROGRESS NOTE ADULT - ASSESSMENT
76M with a PMH of MI in 1995 (angiogram, no stents placed), COPD (2L O2 at home), severe AS s/p TAVR (03/2019 at [facility]), gout, CKD, CVA (no deficits, 09/2020), pulmonary HTN, initially presented to [facility] 6/1/21 with RONNELL on CKD, hospital course significant for cardiogenic shock, acute hypoxemic respiratory failure, and acute on chronic combined diastolic and systolic heart failure. ISRAEL revealed severe AI. Patient was transferred to [facility] under Dr. Campbell for further workup of bioprosthetic AI.     Inpatient hospital course has been significant for:  1. 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 RONNELL on CKD progressed to ESRD requiring HD   3. Unintentional Weight loss / Dysphagia / Anorexia work up revealing duodenitis and diffuse erosive esophagitis, gastritis on EGD (biopsies negative for H. Pylori or carcinoma); colonoscopy showing diverticulosis  4. R/O AV Endocarditis   5. Auto-elevated INR (followed by Hematology)  6. R/O WILLEM   7. Adjustment disorder (evaluated by Behavioral Health)    Patient is now s/p valve in valve TAVR on 6/23/21 with Dr. Campbell.

## 2021-06-25 NOTE — PROGRESS NOTE ADULT - SUBJECTIVE AND OBJECTIVE BOX
INFECTIOUS DISEASES AND INTERNAL MEDICINE at [place]  =======================================================  Kyle Wright MD  Diplomates American Board of Internal Medicine and Infectious Diseases  Telephone [phone]  Fax            [phone]  =======================================================    REYNA BOB [id number]    Follow up: valve vegetation    s/p TAVR on 6/23/21  no fevers  cultures from blood remains negative  remains intubated    Allergies:  No Known Drug Allergies  strawberry (Anaphylaxis)       FAMILY  FAMILY HISTORY:  FH: lung cancer      REVIEW OF SYSTEMS:  CONSTITUTIONAL:  No Fever or chills  HEENT:   No diplopia or blurred vision.  No earache, sore throat or runny nose.  CARDIOVASCULAR:  No pressure, squeezing, strangling, tightness, heaviness or aching about the chest, neck, axilla or epigastrium.  RESPIRATORY:  No cough, shortness of breath, PND or orthopnea.  GASTROINTESTINAL:  No nausea, vomiting or diarrhea.  GENITOURINARY:  No dysuria, frequency or urgency. No Blood in urine  MUSCULOSKELETAL:   moves all joints  SKIN:  No change in skin, hair or nails.  NEUROLOGIC:  No paresthesias, fasciculations, seizures or weakness.  PSYCHIATRIC:  No disorder of thought or mood.  ENDOCRINE:  No heat or cold intolerance, polyuria or polydipsia.  HEMATOLOGICAL:  No easy bruising or bleeding.       Physical Exam:     GEN: NAD,   HEENT: normocephalic and atraumatic. EOMI. EMMIE.    NECK: Supple. No carotid bruits.  No lymphadenopathy or thyromegaly.  LUNGS: Clear to auscultation.  HEART: Regular rate and rhythm + murmur.  ABDOMEN: Soft, nontender, and nondistended.  Positive bowel sounds.    : No CVA tenderness  EXTREMITIES: Without any cyanosis, clubbing, rash, lesions or edema.  MSK: no joint swelling  NEUROLOGIC: Cranial nerves II through XII are grossly intact.  PSYCHIATRIC: Appropriate affect .  SKIN: No ulceration or induration present.      Vitals:  ============  T(F): 97.2 (25 Jun 2021 00:00), Max: 100.4 (24 Jun 2021 16:00)  HR: 82 (25 Jun 2021 08:53)   RR: 24 (25 Jun 2021 08:45)  SpO2: 100% (25 Jun 2021 08:53) (90% - 100%)  temp max in last 48H T(F): , Max: 100.4 (06-24-21 @ 16:00)    =======================================================  Current Antibiotics:    Other medications:  aMIOdarone Infusion 1 mG/Min IV Continuous <Continuous>  atorvastatin 10 milliGRAM(s) Oral at bedtime  budesonide 160 MICROgram(s)/formoterol 4.5 MICROgram(s) Inhaler 2 Puff(s) Inhalation two times a day  chlorhexidine 0.12% Liquid 5 milliLiter(s) Oral Mucosa two times a day  chlorhexidine 2% Cloths 1 Application(s) Topical daily  CRRT Treatment    <Continuous>  finasteride 5 milliGRAM(s) Oral daily  insulin regular Infusion 2 Unit(s)/Hr IV Continuous <Continuous>  lidocaine   Infusion 1 mG/Min IV Continuous <Continuous>  midodrine 10 milliGRAM(s) Oral every 8 hours  milrinone Infusion 0.125 MICROgram(s)/kG/Min IV Continuous <Continuous>  mirtazapine 15 milliGRAM(s) Oral daily  Nephro-melissa 1 Tablet(s) Oral daily  norepinephrine Infusion 0.05 MICROgram(s)/kG/Min IV Continuous <Continuous>  pantoprazole  Injectable 40 milliGRAM(s) IV Push every 12 hours  Phoxillum Filtration BK 4 / 2.5 5000 milliLiter(s) CRRT <Continuous>  Phoxillum Filtration BK 4 / 2.5 5000 milliLiter(s) CRRT <Continuous>  Phoxillum Filtration BK 4 / 2.5 5000 milliLiter(s) CRRT <Continuous>  propofol Infusion 5 MICROgram(s)/kG/Min IV Continuous <Continuous>  psyllium Powder 1 Packet(s) Oral two times a day  senna 2 Tablet(s) Oral at bedtime  sodium chloride 0.9%. 1000 milliLiter(s) IV Continuous <Continuous>  sodium chloride 0.9%. 1000 milliLiter(s) IV Continuous <Continuous>  tamsulosin 0.4 milliGRAM(s) Oral at bedtime  vasopressin Infusion 0.04 Unit(s)/Min IV Continuous <Continuous>    =======================================================    Labs:                        9.1    13.29 )-----------( 35       ( 25 Jun 2021 02:40 )             29.2     06-25    133<L>  |  93<L>  |  70.5<H>  ----------------------------<  134<H>  3.9   |  23.0  |  5.05<H>    Ca    9.1      25 Jun 2021 02:40  Phos  3.9     06-25  Mg     3.2     06-25    TPro  6.7  /  Alb  4.0  /  TBili  3.9<H>  /  DBili  x   /  AST  61<H>  /  ALT  45<H>  /  AlkPhos  91  06-24      Culture - Blood (collected 06-16-21 @ 17:23)  Source: .Blood Blood  Final Report (06-21-21 @ 19:00):    No growth at 5 days.    Culture - Blood (collected 06-16-21 @ 14:41)  Source: .Blood Blood-Peripheral  Final Report (06-21-21 @ 15:00):    No growth at 5 days.    Culture - Blood (collected 06-16-21 @ 14:40)  Source: .Blood Blood-Peripheral  Final Report (06-21-21 @ 15:00):    No growth at 5 days.    Culture - Blood (collected 06-15-21 @ 12:30)  Source: .Blood Blood-Peripheral  Final Report (06-20-21 @ 13:00):    No growth at 5 days.    Culture - Blood (collected 06-15-21 @ 12:29)  Source: .Blood Blood-Peripheral  Final Report (06-20-21 @ 13:00):    No growth at 5 days.      Procalcitonin, Serum: 0.24 ng/mL (06-17-21 @ 09:12)      COVID-19 PCR: NotDetec (06-22-21 @ 13:29)  COVID-19 PCR: NotDetec (06-18-21 @ 08:13)

## 2021-06-25 NOTE — PROGRESS NOTE ADULT - PROBLEM SELECTOR PLAN 4
Cardiac cath 6/18 without significant CAD  Continue Statin, ASA on hold with thrombocytopenia  Unable to start BB at this time as patient requiring midodrine to maintain BP  Cardiology following

## 2021-06-25 NOTE — PROGRESS NOTE ADULT - ASSESSMENT
76 year old male patient with a medical history of MI in 1995 (angiogram, no stents placed), COPD (2L O2 at home), s/p TAVR (03/2019 at [facility]), gout, CKD, CVA (09/2020), pulmonary HTN, initially presented to [facility] 6/1/21 with RONNELL on CKD (likely cardiorenal syndrome), urinary retention due to noncompliance with medications, with hospital course complicated by cardiogenic shock, acute hypoxemic respiratory failure secondary to metabolic acidosis and respiratory alkalosis. Patient found to have TAVR failure with ISRAEL 6/10/21 showing EF 40-45%, Aortic valve prosthesis with Severe paravalvular leak and valvular AI, mild-moderate aortic stenosis, and moderate MR. Patient was transferred to [facility] under Dr. Campbell for further workup. Cardiology consulted for decompensated HF and severe pulmonary hypertension.     6/25:     TAVR Prosthesis failure   - Now POD #2 from repeat TAVR.   - In Cardiogenic shock requiring IV pressor and inotropic support.   - Try to wean off primacor first as pt with Vtach and  it is proarrhythmic.   - Wean levophed as tolerated.   - HD tomorrow.     HFrEF  - EF 40-45% -- > 30-35% on 6/12/21 echo  - severely reduced RV  - mod to severe MR  - Severe TR   - Aortic valve prosthesis with Severe paravalvular leak and valvular AI now s/p repeat TAVR.   - Patient with continuously high PA pressures noted.   - Repeated limited echo to evaluate RV function.   - Continue HD for fluid management.   - Unable to add GDMT due to hypotension.    Severe Pulmonary HTN   - confirmed on [facility] march/2021   - Echo shows PASP 61.5 consistent with severe pulm HTN  - Patient with continuously high PA pressures noted on swan.  - Repeated limited echo to evaluate RV function.     Ventricular Tachycardia  - While on primacor and levophed.   - Continue Amiodarone and lidocaine gtt.   - Wean off primacor first.       Assessment and recommendations are final when note is signed by the attending.      76 year old male patient with a medical history of MI in 1995 (angiogram, no stents placed), COPD (2L O2 at home), s/p TAVR (03/2019 at [facility]), gout, CKD, CVA (09/2020), pulmonary HTN, initially presented to [facility] 6/1/21 with RONNELL on CKD (likely cardiorenal syndrome), urinary retention due to noncompliance with medications, with hospital course complicated by cardiogenic shock, acute hypoxemic respiratory failure secondary to metabolic acidosis and respiratory alkalosis. Patient found to have TAVR failure with ISRAEL 6/10/21 showing EF 40-45%, Aortic valve prosthesis with Severe paravalvular leak and valvular AI, mild-moderate aortic stenosis, and moderate MR. Patient was transferred to [facility] under Dr. Campbell for further workup. Cardiology consulted for decompensated HF and severe pulmonary hypertension.     6/25: Pt intubated and sedated, Tele-NSR HR @ 80s with episodes of VTach. Pt currently on amio gtt, levo for pressor support, Primacor for inotropic support. CRRT in progress.    TAVR Prosthesis failure   - Now POD #2 from repeat TAVR   - In Cardiogenic shock requiring IV pressor and inotropic support   -  Amio gtt    - Wean levophed as tolerated   - CRRT in progress    HFrEF  - EF 40-45% -- > 30-35% on 6/12/21 echo  - s/p repeat TAVR   - Patient with continuously high PA pressures noted  - Echo 6/23-EF 30-35%, moderately to severely decreased LV sys fx., mildy enlarged RV, severeky enlarged RA, PASP 42.1 mmHg-mild pulm HTN  - Continue CRRT for fluid management   - Unable to add GDMT due to hypotension  - Wean Levo when able     Severe Pulmonary HTN   - confirmed on [facility] march/2021   - Echo 6/23-EF 30-35%, moderately to severely decreased LV sys fx., mildy enlarged RV, severeky enlarged RA, PASP 42.1 mmHg-mild pulm HTN      Ventricular Tachycardia  - While on primacor and levophed  - Continue Amiodarone  - Wean off primacor first       Assessment and recommendations are final when note is signed by the attending.

## 2021-06-25 NOTE — PROGRESS NOTE ADULT - ASSESSMENT
76 year old male patient with a medical history of MI in 1995 (angiogram, no stents placed), COPD (2L O2 at home), s/p TAVR (03/2019 at [facility]), gout, CKD, CVA (09/2020), pulmonary HTN, initially presented to [facility] 6/1/21 with RONNELL on CKD (likely cardiorenal syndrome), urinary retention due to noncompliance with medications, with hospital course complicated by cardiogenic shock, acute hypoxemic respiratory failure secondary to metabolic acidosis and respiratory alkalosis. Patient found to have TAVR failure with ISRAEL 6/10/21 showing EF 40-45%, Aortic valve prosthesis with Severe paravalvular leak and valvular AI, mild-moderate aortic stenosis, and moderate MR. Patient was transferred to [facility] under Dr. Campbell for further workup.   ISRAEL DONE HERE WITH CONCERN FOR ENDOCARDITIS  PT DENIES FEVERS OR SWEATS  BUT HAS HAD SIGNIFICANT WEIGHT LOSS WITHOUT DIETING  PT HAS HX OF STREP BOVIS  BACTEREMIA   LAST YEAR AND RECEIVED   ROCEPHIN AT HOME FOR 6 WEEKS        s/p COLONOSCOPY     Blood cultures from admission remain negative      spoke to MICRO LAB - asked to hold initial blood cultures for14 days.     Will follow    s/p TAVR on 6/23/2021  continue to observe off antibiotics    if cultures remain negative,   may not need to consider antibiotics     Dr. Ricci will follow on Monday

## 2021-06-25 NOTE — PROGRESS NOTE ADULT - SUBJECTIVE AND OBJECTIVE BOX
[facility]                                                               Office: [address] [address]                                                              Telephone: [phone]. Fax:[phone]                                                                             PROGRESS NOTE  Reason for follow up:   Overnight: No new events.   Update:     Subjective: No new events    	  Vitals:  T(C): 36.3 (06-25-21 @ 13:00), Max: 38 (06-24-21 @ 16:00)  HR: 80 (06-25-21 @ 14:00) (78 - 110)  RR: 10 (06-25-21 @ 14:00) (8 - 38)  SpO2: 100% (06-25-21 @ 14:00) (90% - 100%)    I&O's Summary    24 Jun 2021 07:01  -  25 Jun 2021 07:00  --------------------------------------------------------  IN: 3149.8 mL / OUT: 215 mL / NET: 2934.8 mL    25 Jun 2021 07:01  -  25 Jun 2021 14:48  --------------------------------------------------------  IN: 704.3 mL / OUT: 95 mL / NET: 609.3 mL        PHYSICAL EXAM:  Appearance: Comfortable. No acute distress  HEENT:  Head and neck: Atraumatic. Normocephalic.  Normal oral mucosa, PERRL, Neck is supple. No JVD, No carotid bruit.   Neurologic: A & O x 3, no focal deficits. EOMI.  Lymphatic: No cervical lymphadenopathy  Cardiovascular: Normal S1 S2, No murmur, rubs/gallops. No JVD, No edema  Respiratory: Lungs clear to auscultation  Gastrointestinal:  Soft, Non-tender, + BS  Lower Extremities: No edema  Psychiatry: Patient is calm. No agitation. Mood & affect appropriate  Skin: No rashes/ ecchymoses/cyanosis/ulcers visualized on the face, hands or feet.      CURRENT MEDICATIONS:  aMIOdarone Infusion 0.5 mG/Min IV Continuous <Continuous>  lidocaine   Infusion 1 mG/Min IV Continuous <Continuous>  midodrine 10 milliGRAM(s) Oral every 8 hours  milrinone Infusion 0.125 MICROgram(s)/kG/Min IV Continuous <Continuous>  norepinephrine Infusion 0.05 MICROgram(s)/kG/Min IV Continuous <Continuous>  tamsulosin 0.4 milliGRAM(s) Oral at bedtime    budesonide 160 MICROgram(s)/formoterol 4.5 MICROgram(s) Inhaler  mirtazapine  propofol Infusion  pantoprazole  Injectable  psyllium Powder  senna  atorvastatin  finasteride  insulin regular Infusion  vasopressin Infusion  chlorhexidine 0.12% Liquid  chlorhexidine 2% Cloths  Nephro-melissa  sodium chloride 0.9%.  sodium chloride 0.9%.      DIAGNOSTIC TESTING:  [ ] Echocardiogram: < from: TTE Echo Complete w/o Contrast w/ Doppler (06.23.21 @ 17:24) >  Summary:   1. Left ventricular ejection fraction, by visual estimation, is 30 to 35%.   2. Moderately to severely decreased global left ventricular systolic function.   3. There is mild concentric left ventricular hypertrophy.   4. The mitral in-flow pattern reveals no discernable A-wave, therefore no comment on diastolic function can be made.   5. Mildly enlarged rightventricle.   6. Severely enlarged left atrium.   7. Moderately enlarged right atrium.   8. Mild mitral valve regurgitation.   9. Mild thickening of the anterior and posterior mitral valve leaflets.  10. Mild tricuspid regurgitation.  11. Mild aortic regurgitation.  12. Waters Hector 3 ultra Transfemoral TAVR in the aortic position. The max velocity is 2.21 m/s, the PG equals 19.5 mmHg, and MG equals 11.6 mmHg, which are acceptable in the setting of a prosthetic aortic valve. The Dimensionless Index value is 0.38.  13. Mild pulmonic valve regurgitation.  14. Estimated pulmonary artery systolic pressure is 42.1 mmHg assuming a right atrial pressure of 8 mmHg, which is consistent with mild pulmonary hypertension.  15. Dilatation of the ascending aorta to 4cm.  16. Compared with prior study dated 6/15/21, patient has had a repeat TAVR.        [ ]  Catheterization: < from: Cardiac Cath Lab - Adult (06.23.21 @ 10:29) >  DIAGNOSTIC IMPRESSIONS: Successful TAVIV via a Left Femoral Cut-Down  approach using a 29mm Waters-Hector ES3 Ultra TAV. 2. Successful  placement and removal of a TPM. 3. Successful placement and removal of a  [place] Cerebral Protection device. 4. Successful deploymnt of a vascular  closure device: 6Fr Perclose to the R CFA.  DIAGNOSTIC RECOMMENDATIONS: DAPT. 2. May hold BB for 24-48 hrs. 3. TTE for  obtaining the data for NATALI registry submission. 4. CTICU management per  CTICU team.  INTERVENTIONAL IMPRESSIONS: Successful TAVIV via a Left Femoral Cut-Down  approach using a 29mm Waters-Hector ES3 Ultra TAV. 2. Successful  placement and removal of a TPM. 3. Successful placement and removal ofa  [place] Cerebral Protection device. 4. Successful deploymnt of a vascular  closure device: 6Fr Perclose to the R CFA.  INTERVENTIONAL RECOMMENDATIONS: DAPT. 2. May hold BB for 24-48 hrs. 3. TTE  for obtaining the data for NATALI registry submission. 4. CTICU management  per CTICU team.      OTHER: 	    Xray:< from: Xray Chest 1 View- PORTABLE-Routine (06.25.21 @ 06:26) >  IMPRESSION:  Lines and tubes in satisfactory position.    Atelectasis of the left base, unchanged.    Mild pulmonary venous congestion/possible perihilar infiltrates, slightly improved      LABS:	 	                            9.2    13.00 )-----------( 37       ( 25 Jun 2021 09:53 )             28.1     06-25    133<L>  |  94<L>  |  72.1<H>  ----------------------------<  137<H>  4.0   |  22.0  |  5.22<H>    Ca    9.3      25 Jun 2021 09:53  Phos  3.9     06-25  Mg     3.2     06-25    TPro  6.7  /  Alb  4.0  /  TBili  3.9<H>  /  DBili  x   /  AST  61<H>  /  ALT  45<H>  /  AlkPhos  91  06-24      TELEMETRY:    	                                                                      [facility]                                                               Office: [address], [place] [address]                                                              Telephone: [phone]. Fax:[phone]                                                                             PROGRESS NOTE  Reason for follow up: TAVR Failure  Overnight: No new events.   Update: 6/25: Pt intubated and sedated, Tele-NSR HR @ 80s with episodes of VTach. Pt currently on amio gtt, levo for pressor support, Primacor for inotropic support. CRRT in progress.    Subjective: No new events    	  Vitals:  T(C): 36.3 (06-25-21 @ 13:00), Max: 38 (06-24-21 @ 16:00)  HR: 80 (06-25-21 @ 14:00) (78 - 110)  RR: 10 (06-25-21 @ 14:00) (8 - 38)  SpO2: 100% (06-25-21 @ 14:00) (90% - 100%)    I&O's Summary    24 Jun 2021 07:01  -  25 Jun 2021 07:00  --------------------------------------------------------  IN: 3149.8 mL / OUT: 215 mL / NET: 2934.8 mL    25 Jun 2021 07:01  -  25 Jun 2021 14:48  --------------------------------------------------------  IN: 704.3 mL / OUT: 95 mL / NET: 609.3 mL        PHYSICAL EXAM:  Appearance: Comfortable. No acute distress  HEENT:  Head and neck: Atraumatic. Normocephalic.  Normal oral mucosa, PERRL, Neck is supple. No JVD, No carotid bruit.   Neurologic: Pt intubated and sedated  Lymphatic: No cervical lymphadenopathy  Cardiovascular: Normal S1 S2, No murmur, rubs/gallops. No JVD, No edema  Respiratory: upper resp. rhonchi  Gastrointestinal: Soft, Non-tender, + BS  Lower Extremities: bilateral lower extremity +2/+2 edema  Psychiatry: Patient is calm. No agitation.   Skin: No rashes/ ecchymoses/cyanosis/ulcers visualized on the face, hands or feet       CURRENT MEDICATIONS:  aMIOdarone Infusion 0.5 mG/Min IV Continuous <Continuous>  lidocaine Infusion 1 mG/Min IV Continuous <Continuous>  midodrine 10 milliGRAM(s) Oral every 8 hours  milrinone Infusion 0.125 MICROgram(s)/kG/Min IV Continuous <Continuous>  norepinephrine Infusion 0.05 MICROgram(s)/kG/Min IV Continuous <Continuous>  tamsulosin 0.4 milliGRAM(s) Oral at bedtime  budesonide 160 MICROgram(s)/formoterol 4.5 MICROgram(s) Inhaler  mirtazapine  propofol Infusion  pantoprazole  Injectable  psyllium Powder  senna  atorvastatin  finasteride  insulin regular Infusion  vasopressin Infusion  chlorhexidine 0.12% Liquid  chlorhexidine 2% Cloths  Nephro-melissa  sodium chloride 0.9%.  sodium chloride 0.9%.      DIAGNOSTIC TESTING:  [ ] Echocardiogram: < from: TTE Echo Complete w/o Contrast w/ Doppler (06.23.21 @ 17:24) >  Summary:   1. Left ventricular ejection fraction, by visual estimation, is 30 to 35%.   2. Moderately to severely decreased global left ventricular systolic function.   3. There is mild concentric left ventricular hypertrophy.   4. The mitral in-flow pattern reveals no discernable A-wave, therefore no comment on diastolic function can be made.   5. Mildly enlarged rightventricle.   6. Severely enlarged left atrium.   7. Moderately enlarged right atrium.   8. Mild mitral valve regurgitation.   9. Mild thickening of the anterior and posterior mitral valve leaflets.  10. Mild tricuspid regurgitation.  11. Mild aortic regurgitation.  12. Waters Hector 3 ultra Transfemoral TAVR in the aortic position. The max velocity is 2.21 m/s, the PG equals 19.5 mmHg, and MG equals 11.6 mmHg, which are acceptable in the setting of a prosthetic aortic valve. The Dimensionless Index value is 0.38.  13. Mild pulmonic valve regurgitation.  14. Estimated pulmonary artery systolic pressure is 42.1 mmHg assuming a right atrial pressure of 8 mmHg, which is consistent with mild pulmonary hypertension.  15. Dilatation of the ascending aorta to 4cm.  16. Compared with prior study dated 6/15/21, patient has had a repeat TAVR.        [ ]  Catheterization: < from: Cardiac Cath Lab - Adult (06.23.21 @ 10:29) >  DIAGNOSTIC IMPRESSIONS: Successful TAVIV via a Left Femoral Cut-Down  approach using a 29mm Waters-Hector ES3 Ultra TAV. 2. Successful  placement and removal of a TPM. 3. Successful placement and removal of a  [place] Cerebral Protection device. 4. Successful deploymnt of a vascular  closure device: 6Fr Perclose to the R CFA.  DIAGNOSTIC RECOMMENDATIONS: DAPT. 2. May hold BB for 24-48 hrs. 3. TTE for  obtaining the data for NATALI registry submission. 4. CTICU management per  CTICU team.  INTERVENTIONAL IMPRESSIONS: Successful TAVIV via a Left Femoral Cut-Down  approach using a 29mm Waters-Hector ES3 Ultra TAV. 2. Successful  placement and removal of a TPM. 3. Successful placement and removal ofa  [place] Cerebral Protection device. 4. Successful deploymnt of a vascular  closure device: 6Fr Perclose to the R CFA.  INTERVENTIONAL RECOMMENDATIONS: DAPT. 2. May hold BB for 24-48 hrs. 3. TTE  for obtaining the data for NATALI registry submission. 4. CTICU management  per CTICU team.      OTHER: 	    Xray:< from: Xray Chest 1 View- PORTABLE-Routine (06.25.21 @ 06:26) >  IMPRESSION:  Lines and tubes in satisfactory position.    Atelectasis of the left base, unchanged.    Mild pulmonary venous congestion/possible perihilar infiltrates, slightly improved      LABS:	 	                            9.2    13.00 )-----------( 37       ( 25 Jun 2021 09:53 )             28.1     06-25    133<L>  |  94<L>  |  72.1<H>  ----------------------------<  137<H>  4.0   |  22.0  |  5.22<H>    Ca    9.3      25 Jun 2021 09:53  Phos  3.9     06-25  Mg     3.2     06-25    TPro  6.7  /  Alb  4.0  /  TBili  3.9<H>  /  DBili  x   /  AST  61<H>  /  ALT  45<H>  /  AlkPhos  91  06-24      TELEMETRY: NSR HR @ 80s with episodes of SVT

## 2021-06-26 DIAGNOSIS — D69.6 THROMBOCYTOPENIA, UNSPECIFIED: ICD-10-CM

## 2021-06-26 LAB
ANION GAP SERPL CALC-SCNC: 11 MMOL/L — SIGNIFICANT CHANGE UP (ref 5–17)
ANION GAP SERPL CALC-SCNC: 12 MMOL/L — SIGNIFICANT CHANGE UP (ref 5–17)
ANION GAP SERPL CALC-SCNC: 14 MMOL/L — SIGNIFICANT CHANGE UP (ref 5–17)
BASE EXCESS BLDV CALC-SCNC: -0.6 MMOL/L — SIGNIFICANT CHANGE UP (ref -2–3)
BASOPHILS # BLD AUTO: 0.01 K/UL — SIGNIFICANT CHANGE UP (ref 0–0.2)
BASOPHILS NFR BLD AUTO: 0.1 % — SIGNIFICANT CHANGE UP (ref 0–2)
BLOOD GAS COMMENTS, VENOUS: SIGNIFICANT CHANGE UP
BUN SERPL-MCNC: 26.9 MG/DL — HIGH (ref 8–20)
BUN SERPL-MCNC: 35.4 MG/DL — HIGH (ref 8–20)
BUN SERPL-MCNC: 43.5 MG/DL — HIGH (ref 8–20)
CALCIUM SERPL-MCNC: 8.5 MG/DL — LOW (ref 8.6–10.2)
CALCIUM SERPL-MCNC: 8.5 MG/DL — LOW (ref 8.6–10.2)
CALCIUM SERPL-MCNC: 8.6 MG/DL — SIGNIFICANT CHANGE UP (ref 8.6–10.2)
CHLORIDE SERPL-SCNC: 100 MMOL/L — SIGNIFICANT CHANGE UP (ref 98–107)
CHLORIDE SERPL-SCNC: 100 MMOL/L — SIGNIFICANT CHANGE UP (ref 98–107)
CHLORIDE SERPL-SCNC: 101 MMOL/L — SIGNIFICANT CHANGE UP (ref 98–107)
CO2 SERPL-SCNC: 21 MMOL/L — LOW (ref 22–29)
CO2 SERPL-SCNC: 22 MMOL/L — SIGNIFICANT CHANGE UP (ref 22–29)
CO2 SERPL-SCNC: 22 MMOL/L — SIGNIFICANT CHANGE UP (ref 22–29)
CREAT SERPL-MCNC: 1.96 MG/DL — HIGH (ref 0.5–1.3)
CREAT SERPL-MCNC: 2.55 MG/DL — HIGH (ref 0.5–1.3)
CREAT SERPL-MCNC: 3.32 MG/DL — HIGH (ref 0.5–1.3)
D DIMER BLD IA.RAPID-MCNC: 1624 NG/ML DDU — HIGH
EOSINOPHIL # BLD AUTO: 0.01 K/UL — SIGNIFICANT CHANGE UP (ref 0–0.5)
EOSINOPHIL NFR BLD AUTO: 0.1 % — SIGNIFICANT CHANGE UP (ref 0–6)
FIBRINOGEN PPP-MCNC: 192 MG/DL — LOW (ref 290–520)
GAS PNL BLDA: SIGNIFICANT CHANGE UP
GLUCOSE BLDC GLUCOMTR-MCNC: 114 MG/DL — HIGH (ref 70–99)
GLUCOSE BLDC GLUCOMTR-MCNC: 124 MG/DL — HIGH (ref 70–99)
GLUCOSE BLDC GLUCOMTR-MCNC: 140 MG/DL — HIGH (ref 70–99)
GLUCOSE BLDC GLUCOMTR-MCNC: 77 MG/DL — SIGNIFICANT CHANGE UP (ref 70–99)
GLUCOSE BLDC GLUCOMTR-MCNC: 94 MG/DL — SIGNIFICANT CHANGE UP (ref 70–99)
GLUCOSE BLDC GLUCOMTR-MCNC: 94 MG/DL — SIGNIFICANT CHANGE UP (ref 70–99)
GLUCOSE SERPL-MCNC: 101 MG/DL — HIGH (ref 70–99)
GLUCOSE SERPL-MCNC: 79 MG/DL — SIGNIFICANT CHANGE UP (ref 70–99)
GLUCOSE SERPL-MCNC: 80 MG/DL — SIGNIFICANT CHANGE UP (ref 70–99)
GRAM STN FLD: SIGNIFICANT CHANGE UP
HCO3 BLDV-SCNC: 24 MMOL/L — SIGNIFICANT CHANGE UP (ref 22–29)
HCT VFR BLD CALC: 27.6 % — LOW (ref 39–50)
HCT VFR BLD CALC: 28.3 % — LOW (ref 39–50)
HGB BLD-MCNC: 8.6 G/DL — LOW (ref 13–17)
HGB BLD-MCNC: 9.3 G/DL — LOW (ref 13–17)
HOROWITZ INDEX BLDV+IHG-RTO: 40 — SIGNIFICANT CHANGE UP
IMM GRANULOCYTES NFR BLD AUTO: 0.7 % — SIGNIFICANT CHANGE UP (ref 0–1.5)
INR BLD: 1.47 RATIO — HIGH (ref 0.88–1.16)
LYMPHOCYTES # BLD AUTO: 1.15 K/UL — SIGNIFICANT CHANGE UP (ref 1–3.3)
LYMPHOCYTES # BLD AUTO: 8.8 % — LOW (ref 13–44)
MAGNESIUM SERPL-MCNC: 2.5 MG/DL — SIGNIFICANT CHANGE UP (ref 1.6–2.6)
MAGNESIUM SERPL-MCNC: 2.7 MG/DL — HIGH (ref 1.6–2.6)
MAGNESIUM SERPL-MCNC: 2.8 MG/DL — HIGH (ref 1.6–2.6)
MCHC RBC-ENTMCNC: 30.5 PG — SIGNIFICANT CHANGE UP (ref 27–34)
MCHC RBC-ENTMCNC: 31.2 GM/DL — LOW (ref 32–36)
MCHC RBC-ENTMCNC: 31.2 PG — SIGNIFICANT CHANGE UP (ref 27–34)
MCHC RBC-ENTMCNC: 32.9 GM/DL — SIGNIFICANT CHANGE UP (ref 32–36)
MCV RBC AUTO: 95 FL — SIGNIFICANT CHANGE UP (ref 80–100)
MCV RBC AUTO: 97.9 FL — SIGNIFICANT CHANGE UP (ref 80–100)
MONOCYTES # BLD AUTO: 1.24 K/UL — HIGH (ref 0–0.9)
MONOCYTES NFR BLD AUTO: 9.5 % — SIGNIFICANT CHANGE UP (ref 2–14)
NEUTROPHILS # BLD AUTO: 10.61 K/UL — HIGH (ref 1.8–7.4)
NEUTROPHILS NFR BLD AUTO: 80.8 % — HIGH (ref 43–77)
PCO2 BLDV: 39 MMHG — LOW (ref 42–55)
PH BLDV: 7.4 — SIGNIFICANT CHANGE UP (ref 7.32–7.43)
PHOSPHATE SERPL-MCNC: 3 MG/DL — SIGNIFICANT CHANGE UP (ref 2.4–4.7)
PLATELET # BLD AUTO: 22 K/UL — LOW (ref 150–400)
PLATELET # BLD AUTO: 25 K/UL — LOW (ref 150–400)
PO2 BLDV: <42 MMHG — SIGNIFICANT CHANGE UP (ref 25–45)
POTASSIUM SERPL-MCNC: 3.9 MMOL/L — SIGNIFICANT CHANGE UP (ref 3.5–5.3)
POTASSIUM SERPL-MCNC: 4 MMOL/L — SIGNIFICANT CHANGE UP (ref 3.5–5.3)
POTASSIUM SERPL-MCNC: 4.1 MMOL/L — SIGNIFICANT CHANGE UP (ref 3.5–5.3)
POTASSIUM SERPL-SCNC: 3.9 MMOL/L — SIGNIFICANT CHANGE UP (ref 3.5–5.3)
POTASSIUM SERPL-SCNC: 4 MMOL/L — SIGNIFICANT CHANGE UP (ref 3.5–5.3)
POTASSIUM SERPL-SCNC: 4.1 MMOL/L — SIGNIFICANT CHANGE UP (ref 3.5–5.3)
PROTHROM AB SERPL-ACNC: 16.7 SEC — HIGH (ref 10.6–13.6)
RBC # BLD: 2.82 M/UL — LOW (ref 4.2–5.8)
RBC # BLD: 2.98 M/UL — LOW (ref 4.2–5.8)
RBC # FLD: 21.2 % — HIGH (ref 10.3–14.5)
RBC # FLD: 21.6 % — HIGH (ref 10.3–14.5)
SAO2 % BLDV: 53.8 % — SIGNIFICANT CHANGE UP
SODIUM SERPL-SCNC: 134 MMOL/L — LOW (ref 135–145)
SPECIMEN SOURCE: SIGNIFICANT CHANGE UP
WBC # BLD: 11.58 K/UL — HIGH (ref 3.8–10.5)
WBC # BLD: 13.11 K/UL — HIGH (ref 3.8–10.5)
WBC # FLD AUTO: 11.58 K/UL — HIGH (ref 3.8–10.5)
WBC # FLD AUTO: 13.11 K/UL — HIGH (ref 3.8–10.5)

## 2021-06-26 PROCEDURE — 93306 TTE W/DOPPLER COMPLETE: CPT | Mod: 26

## 2021-06-26 PROCEDURE — 76937 US GUIDE VASCULAR ACCESS: CPT | Mod: 26,59

## 2021-06-26 PROCEDURE — 99291 CRITICAL CARE FIRST HOUR: CPT | Mod: 25

## 2021-06-26 PROCEDURE — 71250 CT THORAX DX C-: CPT | Mod: 26

## 2021-06-26 PROCEDURE — 36556 INSERT NON-TUNNEL CV CATH: CPT

## 2021-06-26 PROCEDURE — 99232 SBSQ HOSP IP/OBS MODERATE 35: CPT

## 2021-06-26 PROCEDURE — 74176 CT ABD & PELVIS W/O CONTRAST: CPT | Mod: 26

## 2021-06-26 PROCEDURE — 71045 X-RAY EXAM CHEST 1 VIEW: CPT | Mod: 26

## 2021-06-26 PROCEDURE — 90937 HEMODIALYSIS REPEATED EVAL: CPT

## 2021-06-26 PROCEDURE — 99291 CRITICAL CARE FIRST HOUR: CPT

## 2021-06-26 PROCEDURE — 99292 CRITICAL CARE ADDL 30 MIN: CPT | Mod: 25

## 2021-06-26 PROCEDURE — 93010 ELECTROCARDIOGRAM REPORT: CPT

## 2021-06-26 PROCEDURE — 70450 CT HEAD/BRAIN W/O DYE: CPT | Mod: 26

## 2021-06-26 RX ORDER — DEXTROSE 50 % IN WATER 50 %
50 SYRINGE (ML) INTRAVENOUS ONCE
Refills: 0 | Status: COMPLETED | OUTPATIENT
Start: 2021-06-26 | End: 2021-06-26

## 2021-06-26 RX ORDER — EPINEPHRINE 0.3 MG/.3ML
0.02 INJECTION INTRAMUSCULAR; SUBCUTANEOUS
Qty: 4 | Refills: 0 | Status: DISCONTINUED | OUTPATIENT
Start: 2021-06-26 | End: 2021-07-01

## 2021-06-26 RX ORDER — VANCOMYCIN HCL 1 G
1000 VIAL (EA) INTRAVENOUS ONCE
Refills: 0 | Status: COMPLETED | OUTPATIENT
Start: 2021-06-26 | End: 2021-06-26

## 2021-06-26 RX ORDER — FENTANYL CITRATE 50 UG/ML
50 INJECTION INTRAVENOUS ONCE
Refills: 0 | Status: DISCONTINUED | OUTPATIENT
Start: 2021-06-26 | End: 2021-06-26

## 2021-06-26 RX ORDER — CEFTRIAXONE 500 MG/1
2000 INJECTION, POWDER, FOR SOLUTION INTRAMUSCULAR; INTRAVENOUS EVERY 24 HOURS
Refills: 0 | Status: DISCONTINUED | OUTPATIENT
Start: 2021-06-26 | End: 2021-06-28

## 2021-06-26 RX ORDER — DEXMEDETOMIDINE HYDROCHLORIDE IN 0.9% SODIUM CHLORIDE 4 UG/ML
0.2 INJECTION INTRAVENOUS
Qty: 200 | Refills: 0 | Status: DISCONTINUED | OUTPATIENT
Start: 2021-06-26 | End: 2021-06-29

## 2021-06-26 RX ORDER — ALBUMIN HUMAN 25 %
100 VIAL (ML) INTRAVENOUS ONCE
Refills: 0 | Status: COMPLETED | OUTPATIENT
Start: 2021-06-26 | End: 2021-06-26

## 2021-06-26 RX ADMIN — Medication 1 TABLET(S): at 12:38

## 2021-06-26 RX ADMIN — Medication 1 PACKET(S): at 21:03

## 2021-06-26 RX ADMIN — Medication 50 MILLILITER(S): at 06:39

## 2021-06-26 RX ADMIN — MIDODRINE HYDROCHLORIDE 10 MILLIGRAM(S): 2.5 TABLET ORAL at 12:38

## 2021-06-26 RX ADMIN — PANTOPRAZOLE SODIUM 40 MILLIGRAM(S): 20 TABLET, DELAYED RELEASE ORAL at 05:09

## 2021-06-26 RX ADMIN — DEXMEDETOMIDINE HYDROCHLORIDE IN 0.9% SODIUM CHLORIDE 5.01 MICROGRAM(S)/KG/HR: 4 INJECTION INTRAVENOUS at 06:10

## 2021-06-26 RX ADMIN — MILRINONE LACTATE 7.51 MICROGRAM(S)/KG/MIN: 1 INJECTION, SOLUTION INTRAVENOUS at 21:04

## 2021-06-26 RX ADMIN — CHLORHEXIDINE GLUCONATE 5 MILLILITER(S): 213 SOLUTION TOPICAL at 05:09

## 2021-06-26 RX ADMIN — SENNA PLUS 2 TABLET(S): 8.6 TABLET ORAL at 21:03

## 2021-06-26 RX ADMIN — MIDODRINE HYDROCHLORIDE 10 MILLIGRAM(S): 2.5 TABLET ORAL at 05:08

## 2021-06-26 RX ADMIN — DEXMEDETOMIDINE HYDROCHLORIDE IN 0.9% SODIUM CHLORIDE 5.01 MICROGRAM(S)/KG/HR: 4 INJECTION INTRAVENOUS at 21:03

## 2021-06-26 RX ADMIN — AMIODARONE HYDROCHLORIDE 16.7 MG/MIN: 400 TABLET ORAL at 21:04

## 2021-06-26 RX ADMIN — Medication 9.38 MICROGRAM(S)/KG/MIN: at 21:03

## 2021-06-26 RX ADMIN — BUDESONIDE AND FORMOTEROL FUMARATE DIHYDRATE 2 PUFF(S): 160; 4.5 AEROSOL RESPIRATORY (INHALATION) at 20:35

## 2021-06-26 RX ADMIN — Medication 250 MILLIGRAM(S): at 16:45

## 2021-06-26 RX ADMIN — FENTANYL CITRATE 50 MICROGRAM(S): 50 INJECTION INTRAVENOUS at 22:44

## 2021-06-26 RX ADMIN — CEFTRIAXONE 100 MILLIGRAM(S): 500 INJECTION, POWDER, FOR SOLUTION INTRAMUSCULAR; INTRAVENOUS at 16:44

## 2021-06-26 RX ADMIN — BUDESONIDE AND FORMOTEROL FUMARATE DIHYDRATE 2 PUFF(S): 160; 4.5 AEROSOL RESPIRATORY (INHALATION) at 08:20

## 2021-06-26 RX ADMIN — ATORVASTATIN CALCIUM 10 MILLIGRAM(S): 80 TABLET, FILM COATED ORAL at 21:04

## 2021-06-26 RX ADMIN — MIRTAZAPINE 15 MILLIGRAM(S): 45 TABLET, ORALLY DISINTEGRATING ORAL at 12:38

## 2021-06-26 RX ADMIN — CHLORHEXIDINE GLUCONATE 1 APPLICATION(S): 213 SOLUTION TOPICAL at 12:38

## 2021-06-26 RX ADMIN — Medication 1 PACKET(S): at 05:08

## 2021-06-26 RX ADMIN — EPINEPHRINE 15 MICROGRAM(S)/KG/MIN: 0.3 INJECTION INTRAMUSCULAR; SUBCUTANEOUS at 21:03

## 2021-06-26 RX ADMIN — CHLORHEXIDINE GLUCONATE 5 MILLILITER(S): 213 SOLUTION TOPICAL at 18:40

## 2021-06-26 RX ADMIN — Medication 50 MILLILITER(S): at 06:10

## 2021-06-26 RX ADMIN — FENTANYL CITRATE 50 MICROGRAM(S): 50 INJECTION INTRAVENOUS at 23:00

## 2021-06-26 RX ADMIN — PANTOPRAZOLE SODIUM 40 MILLIGRAM(S): 20 TABLET, DELAYED RELEASE ORAL at 18:40

## 2021-06-26 RX ADMIN — MIDODRINE HYDROCHLORIDE 10 MILLIGRAM(S): 2.5 TABLET ORAL at 21:03

## 2021-06-26 NOTE — PROGRESS NOTE ADULT - PROBLEM SELECTOR PLAN 6
Initially RONNELL on CKD  Now progressed to ESRD requiring HD  Left IJ HD catheter placed 6/16.   RUE has pink band for preservation in anticipation for eventual AVF  Renal team following.  CVVHD started 6/25

## 2021-06-26 NOTE — PROGRESS NOTE ADULT - ASSESSMENT
On CVVHDF - Net Even Fluid Balance,     No Heparin - Low Platelets,    Transfuse 1 Unit PRBC,    Remains oligo - anuric, ANAT Lemus D.C/HERBERTH Osuna ( CTS )

## 2021-06-26 NOTE — PROGRESS NOTE ADULT - PROBLEM SELECTOR PLAN 3
Hematology consulted  Monitor closely with daily CBC with differential, D Dimer and Fibrinogen  Transfuse Plts for bleeding

## 2021-06-26 NOTE — PROGRESS NOTE ADULT - ASSESSMENT
76 year old male patient with a medical history of MI in 1995 (angiogram, no stents placed), COPD (2L O2 at home), s/p TAVR (03/2019 at [facility]), gout, CKD, CVA (09/2020), pulmonary HTN, initially presented to [facility] 6/1/21 with RONNELL on CKD (likely cardiorenal syndrome), urinary retention due to noncompliance with medications, with hospital course complicated by cardiogenic shock, acute hypoxemic respiratory failure secondary to metabolic acidosis and respiratory alkalosis. Patient found to have TAVR failure with ISRAEL 6/10/21 showing EF 40-45%, Aortic valve prosthesis with Severe paravalvular leak and valvular AI, mild-moderate aortic stenosis, and moderate MR. Patient was transferred to [facility] under Dr. Campbell for further workup.   ISRAEL DONE HERE WITH CONCERN FOR ENDOCARDITIS  PT DENIES FEVERS OR SWEATS  BUT HAS HAD SIGNIFICANT WEIGHT LOSS WITHOUT DIETING  PT HAS HX OF STREP BOVIS  BACTEREMIA   LAST YEAR AND RECEIVED   ROCEPHIN AT HOME FOR 6 WEEKS        s/p COLONOSCOPY     Blood cultures from admission remain negative      initial blood cultures being held for14 days.       s/p TAVR on 6/23/2021  continue to observe off antibiotics    if cultures remain negative,   may not need to consider antibiotics     Dr. Ricci will follow on Monday

## 2021-06-26 NOTE — PROGRESS NOTE ADULT - SUBJECTIVE AND OBJECTIVE BOX
INFECTIOUS DISEASES AND INTERNAL MEDICINE at [place]  =======================================================  Kyle Wright MD  Diplomates American Board of Internal Medicine and Infectious Diseases  Telephone [phone]  Fax            [phone]  =======================================================    REYNA BOB [id number]    Follow up: valve vegetation    s/p TAVR on 6/23/21  no fevers  cultures from blood remains negative  remains intubated    Allergies:  No Known Drug Allergies  strawberry (Anaphylaxis)       FAMILY  FAMILY HISTORY:  FH: lung cancer      REVIEW OF SYSTEMS:  Unable to obtain due to medical condition      Physical Exam:  GEN: sedated  HEENT: normocephalic and atraumatic.  Anicteric   NECK: Supple.   LUNGS: diffuse rhonchi   HEART: Regular rate and rhythm   ABDOMEN: Soft, nontender, and nondistended.  Positive bowel sounds.    : Lemus   EXTREMITIES: trace edema.  MSK: no joint swelling  NEUROLOGIC: Sedated   PSYCHIATRIC: sedated   SKIN: No Rash      Vitals:    T(F): 98.8 (26 Jun 2021 08:00), Max: 98.8 (26 Jun 2021 08:00)  HR: 74 (26 Jun 2021 11:15)  BP: --  RR: 20 (26 Jun 2021 11:15)  SpO2: 100% (26 Jun 2021 11:15) (94% - 100%)  temp max in last 48H T(F): , Max: 100.4 (06-24-21 @ 16:00)    Current Antibiotics:    Other medications:  aMIOdarone Infusion 0.5 mG/Min IV Continuous <Continuous>  atorvastatin 10 milliGRAM(s) Oral at bedtime  budesonide 160 MICROgram(s)/formoterol 4.5 MICROgram(s) Inhaler 2 Puff(s) Inhalation two times a day  chlorhexidine 0.12% Liquid 5 milliLiter(s) Oral Mucosa two times a day  chlorhexidine 2% Cloths 1 Application(s) Topical daily  CRRT Treatment    <Continuous>  dexMEDEtomidine Infusion 0.2 MICROgram(s)/kG/Hr IV Continuous <Continuous>  insulin lispro (ADMELOG) corrective regimen sliding scale   SubCutaneous every 6 hours  lidocaine   Infusion 0.5 mG/Min IV Continuous <Continuous>  midodrine 10 milliGRAM(s) Oral every 8 hours  milrinone Infusion 0.125 MICROgram(s)/kG/Min IV Continuous <Continuous>  mirtazapine 15 milliGRAM(s) Oral daily  Nephro-melissa 1 Tablet(s) Oral daily  norepinephrine Infusion 0.05 MICROgram(s)/kG/Min IV Continuous <Continuous>  pantoprazole  Injectable 40 milliGRAM(s) IV Push every 12 hours  Phoxillum Filtration BK 4 / 2.5 5000 milliLiter(s) CRRT <Continuous>  Phoxillum Filtration BK 4 / 2.5 5000 milliLiter(s) CRRT <Continuous>  Phoxillum Filtration BK 4 / 2.5 5000 milliLiter(s) CRRT <Continuous>  psyllium Powder 1 Packet(s) Oral two times a day  senna 2 Tablet(s) Oral at bedtime  sodium chloride 0.9%. 1000 milliLiter(s) IV Continuous <Continuous>  sodium chloride 0.9%. 1000 milliLiter(s) IV Continuous <Continuous>                            8.6    13.11 )-----------( 22       ( 26 Jun 2021 05:43 )             27.6     06-26    134<L>  |  100  |  35.4<H>  ----------------------------<  80  4.0   |  22.0  |  2.55<H>    Ca    8.5<L>      26 Jun 2021 05:45  Phos  3.6     06-25  Mg     2.7     06-26    TPro  6.7  /  Alb  4.0  /  TBili  3.9<H>  /  DBili  x   /  AST  61<H>  /  ALT  45<H>  /  AlkPhos  91  06-24      RECENT CULTURES:  06-16 @ 17:23 .Blood Blood     No growth at 5 days.    06-16 @ 14:41 .Blood Blood-Peripheral     No growth at 5 days.    06-16 @ 14:40 .Blood Blood-Peripheral     No growth at 5 days.    06-15 @ 12:30 .Blood Blood-Peripheral     No growth at 5 days.    06-15 @ 12:29 .Blood Blood-Peripheral     No growth at 5 days.      WBC Count: 13.11 K/uL (06-26-21 @ 05:43)  WBC Count: 12.82 K/uL (06-25-21 @ 23:38)  WBC Count: 13.53 K/uL (06-25-21 @ 17:42)  WBC Count: 13.00 K/uL (06-25-21 @ 09:53)  WBC Count: 13.29 K/uL (06-25-21 @ 02:40)  WBC Count: 12.85 K/uL (06-24-21 @ 12:25)  WBC Count: 9.68 K/uL (06-24-21 @ 03:19)  WBC Count: 8.93 K/uL (06-23-21 @ 14:24)  WBC Count: 10.50 K/uL (06-23-21 @ 06:05)  WBC Count: 8.73 K/uL (06-22-21 @ 20:14)  WBC Count: 8.43 K/uL (06-22-21 @ 03:10)    Creatinine, Serum: 2.55 mg/dL (06-26-21 @ 05:45)  Creatinine, Serum: 3.32 mg/dL (06-25-21 @ 23:38)  Creatinine, Serum: 3.97 mg/dL (06-25-21 @ 17:42)  Creatinine, Serum: 5.22 mg/dL (06-25-21 @ 09:53)  Creatinine, Serum: 5.05 mg/dL (06-25-21 @ 02:40)  Creatinine, Serum: 5.00 mg/dL (06-24-21 @ 12:25)  Creatinine, Serum: 4.77 mg/dL (06-24-21 @ 03:19)  Creatinine, Serum: 4.38 mg/dL (06-23-21 @ 14:24)  Creatinine, Serum: 4.41 mg/dL (06-23-21 @ 06:05)  Creatinine, Serum: 3.13 mg/dL (06-22-21 @ 20:14)  Creatinine, Serum: 4.90 mg/dL (06-22-21 @ 03:10)    Procalcitonin, Serum: 0.24 ng/mL (06-17-21 @ 09:12)     COVID-19 PCR: NotDetec (06-22-21 @ 13:29)  COVID-19 PCR: NotDetec (06-18-21 @ 08:13)

## 2021-06-26 NOTE — PROGRESS NOTE ADULT - SUBJECTIVE AND OBJECTIVE BOX
Subjective:  77yo M intubated, sedated, critical condition.      HPI:  76 year old male patient with a medical history of MI in 1995 (angiogram, no stents placed), COPD (2L O2 at home), s/p TAVR (03/2019 at [facility]), gout, CKD, CVA (09/2020), pulmonary HTN, initially presented to [facility] 6/1/21 with RONNELL on CKD (likely cardiorenal syndrome), urinary retention due to noncompliance with medications, with hospital course complicated by cardiogenic shock, acute hypoxemic respiratory failure secondary to metabolic acidosis and respiratory alkalosis. Patient found to have TAVR failure with ISRAEL 6/10/21 showing EF 40-45%, Aortic valve prosthesis with Severe paravalvular leak and valvular AI, mild-moderate aortic stenosis, and moderate MR. Patient was transferred to [facility] under Dr. Campbell for further workup.     Imaging from [facility]:  6/1: CXR shwoing cardiomegaly, small b/l pleural effusions, moderate pulmonary vascular congestion.  6/1: CT Brain ordered for head trauma? showing age-related cerebral and cerebellar volume loss, mild chronic vascular ischemic changes, stable biparietal arachnoid cyst and stable midline posterior fossa arachnoid cyst.   6/1: US Abdomen for elevated LFTs showing mild hepatic steatosis, mild hepatomegaly, sludge in the gallbladder, no discrete gallstones, normal biliary ducts, mild echogenic right kidney which may be seen with medical renal dz, mildly complex Right upper pole 4cm cyst with subtle internal echoes may represent hemorrhagic/proteinaceous cyst, mildly complex right midpole 2cm cyst with thin internal linear septation.   6/3: Kidney and Bladder US showing no hydronephrosis, echogenic kidneys likely from medical renal dz, prostatomegaly, and thickening of the posterior wall of the bladder with trabeculations which could be due to bladder outlet obstruction.   6/7: Kidney and Bladder US: No hydronephrosis or shadowing renal calculi. Stable b/l renal cysts.   6/7: Lower Extremity Venous Doppler with no DVT noted.   6/8: TTE showing EF 47%, dilated IVC, dilation of the ascending aorta of 4.4cm, moderate-severe pulmonary HTN, moderate-severe TR, aortic valve with severe prosthesis regurgitation and moderate prosthesis stenosis  6/9: CT Chest showing emphysema and intersitial lung dz changes, stable b/l small pleural effusions, cardiomegaly.   6/11: ISRAEL showing EF 40-45%, Aortic valve prosthesis with Severe paravalvular leak and valvular AI, mild-moderate aortic stenosis, and moderate MR.  (12 Jun 2021 01:36)          PAST MEDICAL & SURGICAL HISTORY:  Pulmonary hypertension    Hypertension    Hyperlipidemia    H/O aortic valve stenosis  s/p TAVR 2019 at [facility]    CVA (cerebrovascular accident)  MCA CVA with tPA and thrombectomy    Chronic kidney disease    Prediabetes    Mitral valve regurgitation    CAD in native artery    Aneurysm of aortic root  thoracic aortic aneurysm, without ruptur    Benign prostatic hyperplasia    Gout    History of transcatheter aortic valve replacement (TAVR)            MEDICATIONS  (STANDING):  aMIOdarone Infusion 0.5 mG/Min (16.7 mL/Hr) IV Continuous <Continuous>  atorvastatin 10 milliGRAM(s) Oral at bedtime  budesonide 160 MICROgram(s)/formoterol 4.5 MICROgram(s) Inhaler 2 Puff(s) Inhalation two times a day  chlorhexidine 0.12% Liquid 5 milliLiter(s) Oral Mucosa two times a day  chlorhexidine 2% Cloths 1 Application(s) Topical daily  CRRT Treatment    <Continuous>  finasteride 5 milliGRAM(s) Oral daily  insulin lispro (ADMELOG) corrective regimen sliding scale   SubCutaneous every 6 hours  lidocaine   Infusion 1 mG/Min (7.5 mL/Hr) IV Continuous <Continuous>  midodrine 10 milliGRAM(s) Oral every 8 hours  milrinone Infusion 0.125 MICROgram(s)/kG/Min (3.75 mL/Hr) IV Continuous <Continuous>  mirtazapine 15 milliGRAM(s) Oral daily  Nephro-melissa 1 Tablet(s) Oral daily  norepinephrine Infusion 0.05 MICROgram(s)/kG/Min (9.38 mL/Hr) IV Continuous <Continuous>  pantoprazole  Injectable 40 milliGRAM(s) IV Push every 12 hours  Phoxillum Filtration BK 4 / 2.5 5000 milliLiter(s) (1000 mL/Hr) CRRT <Continuous>  Phoxillum Filtration BK 4 / 2.5 5000 milliLiter(s) (500 mL/Hr) CRRT <Continuous>  Phoxillum Filtration BK 4 / 2.5 5000 milliLiter(s) (1500 mL/Hr) CRRT <Continuous>  propofol Infusion 5 MICROgram(s)/kG/Min (3 mL/Hr) IV Continuous <Continuous>  psyllium Powder 1 Packet(s) Oral two times a day  senna 2 Tablet(s) Oral at bedtime  sodium chloride 0.9%. 1000 milliLiter(s) (10 mL/Hr) IV Continuous <Continuous>  sodium chloride 0.9%. 1000 milliLiter(s) (5 mL/Hr) IV Continuous <Continuous>  tamsulosin 0.4 milliGRAM(s) Oral at bedtime    MEDICATIONS  (PRN):  ALBUTerol    90 MICROgram(s) HFA Inhaler 2 Puff(s) Inhalation every 6 hours PRN Shortness of Breath and/or Wheezing  sodium chloride 0.9% lock flush 10 milliLiter(s) IV Push every 1 hour PRN Pre/post blood products, medications, blood draw, and to maintain line patency          Allergies    No Known Drug Allergies  strawberry (Anaphylaxis)    Intolerances          WEIGHTS:  Daily     Daily   Admit Wt: Drug Dosing Weight  Height (cm): 182.9 (12 Jun 2021 00:32)  Weight (kg): 100.1 (12 Jun 2021 00:32)  BMI (kg/m2): 29.9 (12 Jun 2021 00:32)  BSA (m2): 2.22 (12 Jun 2021 00:32)  I&O's Summary    24 Jun 2021 07:01  -  25 Jun 2021 07:00  --------------------------------------------------------  IN: 3149.8 mL / OUT: 215 mL / NET: 2934.8 mL    25 Jun 2021 07:01  -  26 Jun 2021 01:23  --------------------------------------------------------  IN: 1562.2 mL / OUT: 1310 mL / NET: 252.2 mL        VITAL SIGNS:  ICU Vital Signs Last 24 Hrs  T(C): 37 (26 Jun 2021 00:00), Max: 37 (26 Jun 2021 00:00)  T(F): 98.6 (26 Jun 2021 00:00), Max: 98.6 (26 Jun 2021 00:00)  HR: 75 (26 Jun 2021 01:00) (70 - 86)  BP: --  BP(mean): --  ABP: 103/50 (26 Jun 2021 01:00) (99/57 - 120/62)  ABP(mean): 65 (26 Jun 2021 01:00) (65 - 79)  RR: 15 (26 Jun 2021 01:00) (10 - 27)  SpO2: 100% (26 Jun 2021 01:00) (91% - 100%)        All laboratory results, radiology and medications reviewed.    LABS:  06-25    134<L>  |  100  |  43.5<H>  ----------------------------<  79  4.1   |  21.0<L>  |  3.32<H>    Ca    8.6      25 Jun 2021 23:38  Phos  3.6     06-25  Mg     2.8     06-25    TPro  6.7  /  Alb  4.0  /  TBili  3.9<H>  /  DBili  x   /  AST  61<H>  /  ALT  45<H>  /  AlkPhos  91  06-24                                 8.6    12.82 )-----------( 26       ( 25 Jun 2021 23:38 )             27.6          PT/INR - ( 25 Jun 2021 02:40 )   PT: 17.7 sec;   INR: 1.56 ratio         PTT - ( 25 Jun 2021 02:40 )  PTT:39.8 sec    ABG - ( 24 Jun 2021 23:42 )  pH, Arterial: 7.400 pH, Blood: x     /  pCO2: 41    /  pO2: 180   / HCO3: 25    / Base Excess: 0.6   /  SaO2: 100.0                 CAPILLARY BLOOD GLUCOSE      POCT Blood Glucose.: 114 mg/dL (26 Jun 2021 00:00)  POCT Blood Glucose.: 77 mg/dL (25 Jun 2021 23:26)  POCT Blood Glucose.: 96 mg/dL (25 Jun 2021 17:26)  POCT Blood Glucose.: 136 mg/dL (25 Jun 2021 12:27)  POCT Blood Glucose.: 146 mg/dL (25 Jun 2021 09:41)  POCT Blood Glucose.: 141 mg/dL (25 Jun 2021 08:25)  POCT Blood Glucose.: 143 mg/dL (25 Jun 2021 06:18)  POCT Blood Glucose.: 141 mg/dL (25 Jun 2021 04:11)  POCT Blood Glucose.: 140 mg/dL (25 Jun 2021 02:16)             PHYSICAL EXAM:  General:  no acute distress  Neurology:  intubated, sedated, unable to fully assess  Respiratory:  clear to auscultation bilaterally  CV:  regular rate and rhythm, normal S1 S2  Abdomen:  soft, nondistended, positive bowel sounds  Extremities:  warm, well perfused, 2+ edema +DP pulses

## 2021-06-26 NOTE — PROGRESS NOTE ADULT - PROBLEM SELECTOR PLAN 10
Currently on full vent support with CPAP trials, Rest on SIMV overnight    Problem 11: Adjustment Disorder   Will resume Remeron per recommendations from Behavioral Health on 6/17 when able    Problem 12: COPD  On home O2

## 2021-06-26 NOTE — PROGRESS NOTE ADULT - ASSESSMENT
76 year old PMH of  MI in 1995 (angiogram, no stents placed), COPD (2L O2 at home), s/p TAVR (03/2019 at [facility]), gout, CKD, CVA (09/2020), pulmonary HTN, initially presented to [facility] 6/1/21 with RONNELL on CKD (likely cardiorenal syndrome), urinary retention due to noncompliance with medications, with hospital course complicated by cardiogenic shock, acute hypoxemic respiratory failure secondary to metabolic acidosis and respiratory alkalosis. Patient found to have TAVR failure with ISRAEL 6/10/21 showing EF 40-45%, Aortic valve prosthesis with Severe paravalvular leak and valvular AI, mild-moderate aortic stenosis, and moderate MR.   Decompensated HF and severe pulmonary hypertension.  Currently on amio gtt, levo for pressor support, Primacor for inotropic support. CRRT in progress.    TAVR Prosthesis failure   Now POD #3 from repeat TAVR   In Cardiogenic shock requiring IV pressor and inotropic support   c/w Amio gtt    Wean pressors as tolerated     HFrEF  EF 40-45% -- > 30-35% on 6/12/21 echo  PAD 24  Estimated Pcwp 17  Echo 6/23-EF 30-35%, moderately to severely decreased LV sys fx., mildy enlarged RV, severeky enlarged RA, PASP 42.1 mmHg-mild pulm HTN  Continue CRRT for fluid management   Unable to add GDMT due to hypotension  On midodrine and levophed for b/p support  CRRT for fluid managment    SEVERE PULMONARY HYPERTENSION  Confirmed on RHC march/2021   Echo 6/23-EF 30-35%, moderately to severely decreased LV sys fx., Mildy enlarged RV, severely enlarged RA, PASP 42.1 mmHg-mild pulm HTN    ARRHYTHMIA - TERI TACYCARDIA  While on primacor and levophed  Continue Amiodarone  Wean off primacor first     CARDIAC MEDS:    MIOdarone Infusion 0.5 mG/Min (16.7 mL/Hr) IV Continuous <Continuous>  atorvastatin 10 milliGRAM(s) Oral at bedtime  lidocaine   Infusion 0.5 mG/Min (3.75 mL/Hr) IV Continuous <Continuous>  midodrine 10 milliGRAM(s) Oral every 8 hours  milrinone Infusion 0.125 MICROgram(s)/kG/Min (3.75 mL/Hr) IV Continuous <Continuous>  norepinephrine Infusion 0.05 MICROgram(s)/kG/Min (9.38 mL/Hr) IV Continuous <Continuous>

## 2021-06-26 NOTE — PROGRESS NOTE ADULT - ATTENDING COMMENTS
Patient with TAVR prosthesis failure, s/p repeat TAVR 6/23  TTE today showed LVEF 20-25%, mild to moderate paravalvular aortic regurgitation, severe TR, moderate MR  Remains intubated on mechanical vent support   He is off vasopressin and levophed is being tapered down  Fluid removal via CRRT was attempted; however, CI was noted to be downtrending and CRRT was adjusted to net even for now  Patient needs continued fluid removal via HD or diuresis as tolerated by BP  Milrinone was increased  Patient on precedex at time of exam; per chart, he was encephalopathic despite sedation vacation   Lidocaine was dc'ed as possible contributing factor to encephalopathy; monitor closely for ventricular arrhythmias  Cultures, CT C/A/P pending for further workup     Plan was discussed with CT surgery    Thank you for allowing me to participate in the care of this patient  Will continue to follow

## 2021-06-26 NOTE — PROGRESS NOTE ADULT - ASSESSMENT
76M with a PMH of MI in 1995 (angiogram, no stents placed), COPD (2L O2 at home), severe AS s/p TAVR (03/2019 at [facility]), gout, CKD, CVA (no deficits, 09/2020), pulmonary HTN, initially presented to [facility] 6/1/21 with RONNELL on CKD, hospital course significant for cardiogenic shock, acute hypoxemic respiratory failure, and acute on chronic combined diastolic and systolic heart failure. ISRAEL revealed severe AI. Patient was transferred to [facility] under Dr. Campbell for further workup of bioprosthetic AI.     Inpatient hospital course has been significant for:  1. 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 RONNELL on CKD progressed to ESRD requiring HD   3. Unintentional Weight loss / Dysphagia / Anorexia work up revealing duodenitis and diffuse erosive esophagitis, gastritis on EGD (biopsies negative for H. Pylori or carcinoma); colonoscopy showing diverticulosis  4. R/O AV Endocarditis   5. Auto-elevated INR (followed by Hematology)  6. R/O WILLEM   7. Adjustment disorder (evaluated by Behavioral Health)  8. Thrombocytopenia, Hematology consulted, r/o DIC    Patient is now s/p valve in valve TAVR on 6/23/21 with Dr. Campbell.

## 2021-06-26 NOTE — PROGRESS NOTE ADULT - SUBJECTIVE AND OBJECTIVE BOX
[facility]  [address], [place] [address]    REASON FOR VISIT:  Follow up on CHF  UPDATE:  On CPAP  PAD 24  TELEMETRY MONITORING:  Underlying rhythm NSR  frequent pvc  and nsvt    06-26    134<L>  |  100  |  35.4<H>  ----------------------------<  80  4.0   |  22.0  |  2.55<H>    Ca    8.5<L>      26 Jun 2021 05:45  Phos  3.6     06-25  Mg     2.7     06-26    MEDICATIONS  (STANDING):  aMIOdarone Infusion 0.5 mG/Min (16.7 mL/Hr) IV Continuous <Continuous>  atorvastatin 10 milliGRAM(s) Oral at bedtime  budesonide 160 MICROgram(s)/formoterol 4.5 MICROgram(s) Inhaler 2 Puff(s) Inhalation two times a day  chlorhexidine 0.12% Liquid 5 milliLiter(s) Oral Mucosa two times a day  chlorhexidine 2% Cloths 1 Application(s) Topical daily  CRRT Treatment    <Continuous>  dexMEDEtomidine Infusion 0.2 MICROgram(s)/kG/Hr (5.01 mL/Hr) IV Continuous <Continuous>  insulin lispro (ADMELOG) corrective regimen sliding scale   SubCutaneous every 6 hours  lidocaine   Infusion 0.5 mG/Min (3.75 mL/Hr) IV Continuous <Continuous>  midodrine 10 milliGRAM(s) Oral every 8 hours  milrinone Infusion 0.125 MICROgram(s)/kG/Min (3.75 mL/Hr) IV Continuous <Continuous>  mirtazapine 15 milliGRAM(s) Oral daily  Nephro-melissa 1 Tablet(s) Oral daily  norepinephrine Infusion 0.05 MICROgram(s)/kG/Min (9.38 mL/Hr) IV Continuous <Continuous>  pantoprazole  Injectable 40 milliGRAM(s) IV Push every 12 hours  Phoxillum Filtration BK 4 / 2.5 5000 milliLiter(s) (1500 mL/Hr) CRRT <Continuous>  Phoxillum Filtration BK 4 / 2.5 5000 milliLiter(s) (1000 mL/Hr) CRRT <Continuous>  Phoxillum Filtration BK 4 / 2.5 5000 milliLiter(s) (1000 mL/Hr) CRRT <Continuous>  psyllium Powder 1 Packet(s) Oral two times a day  senna 2 Tablet(s) Oral at bedtime  sodium chloride 0.9%. 1000 milliLiter(s) (10 mL/Hr) IV Continuous <Continuous>  sodium chloride 0.9%. 1000 milliLiter(s) (5 mL/Hr) IV Continuous <Continuous>    ROS:  patient unable to particulate  in ros    Vital Signs Last 24 Hrs  T(C): 37.1 (26 Jun 2021 12:00), Max: 37.1 (26 Jun 2021 08:00)  T(F): 98.8 (26 Jun 2021 12:00), Max: 98.8 (26 Jun 2021 08:00)  HR: 72 (26 Jun 2021 12:45) (67 - 84)  BP: --  BP(mean): --  RR: 15 (26 Jun 2021 12:45) (10 - 28)  SpO2: 100% (26 Jun 2021 12:45) (94% - 100%)  T(C): 37.1 (06-26-21 @ 12:00), Max: 37.1 (06-26-21 @ 08:00)  HR: 72 (06-26-21 @ 12:45) (67 - 84)  BP: --  RR: 15 (06-26-21 @ 12:45) (10 - 28)  SpO2: 100% (06-26-21 @ 12:45) (94% - 100%)    HEENT Head atraumatic eomi, oral mucosa moist  Neck - Omega devi cath  CV S1&S2  Regular  Decreased  RESP  decreased lung sounds  GI  Soft active bowel sounds non tender  EXT  No clubbing/Cyanosis /trace edema  NEURO  Sedated    < from: TTE Echo Complete w/o Contrast w/ Doppler (06.23.21 @ 17:24) >   1. Left ventricular ejection fraction, by visual estimation, is 30 to 35%.   2. Moderately to severely decreased global left ventricular systolic function.   3. There is mild concentric left ventricular hypertrophy.   4. The mitral in-flow pattern reveals no discernable A-wave, therefore no comment on diastolic function can be made.   5. Mildly enlarged right ventricle.   6. Severely enlarged left atrium.   7. Moderately enlarged right atrium.   8. Mild mitral valve regurgitation.   9. Mild thickening of the anterior and posterior mitral valve leaflets.  10. Mild tricuspid regurgitation.  11. Mild aortic regurgitation.  12. Waters Hector 3 ultra Transfemoral TAVR in the aortic position. The max velocity is 2.21 m/s, the PG equals 19.5 mmHg, and MG equals 11.6 mmHg, which are acceptable in the setting of a prosthetic aortic valve. The Dimensionless Index value is 0.38.  13. Mild pulmonic valve regurgitation.  14. Estimated pulmonary artery systolic pressure is 42.1 mmHg assuming a right atrial pressure of 8 mmHg, which is consistent with mild pulmonary hypertension.  15. Dilatation of the ascending aorta to 4cm.  16. Compared with prior study dated 6/15/21, patient has had a repeat TAVR.

## 2021-06-26 NOTE — PROGRESS NOTE ADULT - PROBLEM SELECTOR PLAN 1
Now s/p Valve in Valve TAVR with Dr. Campbell on 6/23  Minimal inotropic support with Primacor @ 0.125 mcg/kg/min  Pressor support Levo/Vaso weaning for MAP > 65  Midodrine 10 mg q 8 resumed (was on pre op)  Runs of SVT post op requiring Lidocaine and Amio gtts for stability, Amio gtt decreased to 0.5 mg/min  Shawna weaned off, PA pressures 50s/20s unchanged  Continue GI ppx with Protonix and Senna, DVT ppx with SCD boots

## 2021-06-26 NOTE — PROCEDURE NOTE - ADDITIONAL PROCEDURE DETAILS
femoral access utilized as pt already has [facility]J HD (to be removed now that new access achieved) and RIJ intro/swan catheters in place. Subclavian access not suitable as pt is thrombcyotpenic  and site not easily compressible especially with large bore catheter.

## 2021-06-26 NOTE — PROGRESS NOTE ADULT - SUBJECTIVE AND OBJECTIVE BOX
· Reason for Admission	TAVR Failure    Subjective:  77yo M intubated, sedated, critical condition.    HPI:  76 year old male patient with a medical history of MI in 1995 (angiogram, no stents placed), COPD (2L O2 at home), s/p TAVR (03/2019 at [facility]), gout, CKD, CVA (09/2020), pulmonary HTN, initially presented to [facility] 6/1/21 with RONNELL on CKD (likely cardiorenal syndrome), urinary retention due to noncompliance with medications, with hospital course complicated by cardiogenic shock, acute hypoxemic respiratory failure secondary to metabolic acidosis and respiratory alkalosis.     Patient found to have TAVR failure with ISRAEL 6/10/21 showing EF 40-45%,     Aortic valve prosthesis with Severe paravalvular leak and valvular AI, mild-moderate aortic stenosis, and moderate MR.     Patient was transferred to [facility] under Dr. Campbell for further workup.     Imaging from [facility]:    6/1: CXR showing  cardiomegaly, small b/l pleural effusions, moderate pulmonary vascular congestion.  6/1: CT Brain ordered for head trauma? showing age-related cerebral and cerebellar volume loss, mild chronic vascular ischemic changes, stable biparietal arachnoid cyst and stable midline posterior fossa arachnoid cyst.   6/1: US Abdomen for elevated LFTs showing mild hepatic steatosis, mild hepatomegaly, sludge in the gallbladder, no discrete gallstones, normal biliary ducts, mild echogenic right kidney which may be seen with medical renal dz, mildly complex Right upper pole 4cm cyst with subtle internal echoes may represent hemorrhagic/proteinaceous cyst, mildly complex right midpole 2cm cyst with thin internal linear septation.   6/3: Kidney and Bladder US showing no hydronephrosis, echogenic kidneys likely from medical renal dz, prostatomegaly, and thickening of the posterior wall of the bladder with trabeculations which could be due to bladder outlet obstruction.   6/7: Kidney and Bladder US: No hydronephrosis or shadowing renal calculi. Stable b/l renal cysts.   6/7: Lower Extremity Venous Doppler with no DVT noted.   6/8: TTE showing EF 47%, dilated IVC, dilation of the ascending aorta of 4.4cm, moderate-severe pulmonary HTN, moderate-severe TR, aortic valve with severe prosthesis regurgitation and moderate prosthesis stenosis  6/9: CT Chest showing emphysema and intersitial lung dz changes, stable b/l small pleural effusions, cardiomegaly.   6/11: ISRAEL showing EF 40-45%, Aortic valve prosthesis with Severe paravalvular leak and valvular AI, mild-moderate aortic stenosis, and moderate MR.  (12 Jun 2021 01:36)    PAST MEDICAL & SURGICAL HISTORY:  Pulmonary hypertension    Hypertension    Hyperlipidemia    H/O aortic valve stenosis  s/p TAVR 2019 at [facility]    CVA (cerebrovascular accident)  MCA CVA with tPA and thrombectomy    Chronic kidney disease    Prediabetes    Mitral valve regurgitation    CAD in native artery    Aneurysm of aortic root  thoracic aortic aneurysm, without ruptur    Benign prostatic hyperplasia    Gout    History of transcatheter aortic valve replacement (TAVR)    MEDICATIONS  (STANDING):  aMIOdarone Infusion 0.5 mG/Min (16.7 mL/Hr) IV Continuous <Continuous>  atorvastatin 10 milliGRAM(s) Oral at bedtime  budesonide 160 MICROgram(s)/formoterol 4.5 MICROgram(s) Inhaler 2 Puff(s) Inhalation two times a day  chlorhexidine 0.12% Liquid 5 milliLiter(s) Oral Mucosa two times a day  chlorhexidine 2% Cloths 1 Application(s) Topical daily  CRRT Treatment    <Continuous>  finasteride 5 milliGRAM(s) Oral daily  insulin lispro (ADMELOG) corrective regimen sliding scale   SubCutaneous every 6 hours  lidocaine   Infusion 1 mG/Min (7.5 mL/Hr) IV Continuous <Continuous>  midodrine 10 milliGRAM(s) Oral every 8 hours  milrinone Infusion 0.125 MICROgram(s)/kG/Min (3.75 mL/Hr) IV Continuous <Continuous>  mirtazapine 15 milliGRAM(s) Oral daily  Nephro-melissa 1 Tablet(s) Oral daily  norepinephrine Infusion 0.05 MICROgram(s)/kG/Min (9.38 mL/Hr) IV Continuous <Continuous>  pantoprazole  Injectable 40 milliGRAM(s) IV Push every 12 hours  Phoxillum Filtration BK 4 / 2.5 5000 milliLiter(s) (1000 mL/Hr) CRRT <Continuous>  Phoxillum Filtration BK 4 / 2.5 5000 milliLiter(s) (500 mL/Hr) CRRT <Continuous>  Phoxillum Filtration BK 4 / 2.5 5000 milliLiter(s) (1500 mL/Hr) CRRT <Continuous>  propofol Infusion 5 MICROgram(s)/kG/Min (3 mL/Hr) IV Continuous <Continuous>  psyllium Powder 1 Packet(s) Oral two times a day  senna 2 Tablet(s) Oral at bedtime  sodium chloride 0.9%. 1000 milliLiter(s) (10 mL/Hr) IV Continuous <Continuous>  sodium chloride 0.9%. 1000 milliLiter(s) (5 mL/Hr) IV Continuous <Continuous>  tamsulosin 0.4 milliGRAM(s) Oral at bedtime    MEDICATIONS  (PRN):  ALBUTerol    90 MICROgram(s) HFA Inhaler 2 Puff(s) Inhalation every 6 hours PRN Shortness of Breath and/or Wheezing  sodium chloride 0.9% lock flush 10 milliLiter(s) IV Push every 1 hour PRN Pre/post blood products, medications, blood draw, and to maintain line patency    Allergies    No Known Drug Allergies  strawberry (Anaphylaxis)    Admit Wt: Drug Dosing Weight  Height (cm): 182.9 (12 Jun 2021 00:32)  Weight (kg): 100.1 (12 Jun 2021 00:32)  BMI (kg/m2): 29.9 (12 Jun 2021 00:32)  BSA (m2): 2.22 (12 Jun 2021 00:32)    24 Jun 2021 07:01  -  25 Jun 2021 07:00  --------------------------------------------------------  IN: 3149.8 mL / OUT: 215 mL / NET: 2934.8 mL    25 Jun 2021 07:01  -  26 Jun 2021 01:23  --------------------------------------------------------  IN: 1562.2 mL / OUT: 1310 mL / NET: 252.2 mL    VITAL SIGNS:  ICU Vital Signs Last 24 Hrs  T(C): 37 (26 Jun 2021 00:00), Max: 37 (26 Jun 2021 00:00)  T(F): 98.6 (26 Jun 2021 00:00), Max: 98.6 (26 Jun 2021 00:00)  HR: 75 (26 Jun 2021 01:00) (70 - 86)  ABP: 103/50 (26 Jun 2021 01:00) (99/57 - 120/62)  ABP(mean): 65 (26 Jun 2021 01:00) (65 - 79)  RR: 15 (26 Jun 2021 01:00) (10 - 27)  SpO2: 100% (26 Jun 2021 01:00) (91% - 100%)    All laboratory results, radiology and medications reviewed.    LABS:  06-25    134<L>  |  100  |  43.5<H>  ----------------------------<  79  4.1   |  21.0<L>  |  3.32<H>    Ca    8.6      25 Jun 2021 23:38  Phos  3.6     06-25  Mg     2.8     06-25    TPro  6.7  /  Alb  4.0  /  TBili  3.9<H>  /  DBili  x   /  AST  61<H>  /  ALT  45<H>  /  AlkPhos  91  06-24                               8.6    12.82 )-----------( 26       ( 25 Jun 2021 23:38 )             27.6          PT/INR - ( 25 Jun 2021 02:40 )   PT: 17.7 sec;   INR: 1.56 ratio      PTT - ( 25 Jun 2021 02:40 )  PTT:39.8 sec    ABG - ( 24 Jun 2021 23:42 )  pH, Arterial: 7.400 pH, Blood: x     /  pCO2: 41    /  pO2: 180   / HCO3: 25    / Base Excess: 0.6   /  SaO2: 100.0     CAPILLARY BLOOD GLUCOSE    POCT Blood Glucose.: 114 mg/dL (26 Jun 2021 00:00)  POCT Blood Glucose.: 77 mg/dL (25 Jun 2021 23:26)  POCT Blood Glucose.: 96 mg/dL (25 Jun 2021 17:26)  POCT Blood Glucose.: 136 mg/dL (25 Jun 2021 12:27)  POCT Blood Glucose.: 146 mg/dL (25 Jun 2021 09:41)  POCT Blood Glucose.: 141 mg/dL (25 Jun 2021 08:25)  POCT Blood Glucose.: 143 mg/dL (25 Jun 2021 06:18)  POCT Blood Glucose.: 141 mg/dL (25 Jun 2021 04:11)  POCT Blood Glucose.: 140 mg/dL (25 Jun 2021 02:16)             PHYSICAL EXAM:  General:  no acute distress  Neurology:  intubated, sedated, unable to fully assess  Respiratory:  clear to auscultation bilaterally  CV:  regular rate and rhythm, normal S1 S2  Abdomen:  soft, nondistended, positive bowel sounds  Extremities:  warm, well perfused, 2+ edema +DP pulses    Review of Systems:   · Negative General Symptoms	no fever  · Skin/Breast	negative  · Ophthalmologic	not applicable  · ENMT	negative  · Negative Respiratory and Thorax Symptoms	no wheezing  · Cardiovascular Symptoms	peripheral edema  · Negative Gastrointestinal Symptoms	no vomiting; no diarrhea  · Genitourinary	negative  · Musculoskeletal	negative  · Neurological	negative  · Psychiatric	negative  · Hematology/Lymphatics	negative  · Endocrine	negative  · Allergic/Immunologic	negative    Assessment and Plan:     76M with a PMH of MI in 1995 (angiogram, no stents placed), COPD (2L O2 at home), severe AS s/p TAVR (03/2019 at [facility]), gout, CKD, CVA (no deficits, 09/2020), pulmonary HTN, initially presented to [facility] 6/1/21 with RONNELL on CKD, hospital course significant for cardiogenic shock, acute hypoxemic respiratory failure, and acute on chronic combined diastolic and systolic heart failure. ISRAEL revealed severe AI. Patient was transferred to [facility] under Dr. Campbell for further workup of bioprosthetic AI.     Inpatient hospital course has been significant for:  1. 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 RONNELL on CKD progressed to ESRD requiring HD   3. Unintentional Weight loss / Dysphagia / Anorexia work up revealing duodenitis and diffuse erosive esophagitis, gastritis on EGD (biopsies negative for H. Pylori or carcinoma); colonoscopy showing diverticulosis  4. R/O AV Endocarditis   5. Auto-elevated INR (followed by Hematology)  6. R/O WILLEM   7. Adjustment disorder (evaluated by Behavioral Health)  8. Thrombocytopenia, Hematology consulted, r/o DIC    Patient is now s/p valve in valve TAVR on 6/23/21 with Dr. Campbell.      Problem/Plan - 1:  ·  Problem: Nonrheumatic aortic valve insufficiency.  Plan: Now s/p Valve in Valve TAVR with Dr. Campbell on 6/23  Minimal inotropic support with Primacor @ 0.125 mcg/kg/min  Pressor support Levo/Vaso weaning for MAP > 65  Midodrine 10 mg q 8 resumed (was on pre op)  Runs of SVT post op requiring Lidocaine and Amio gtts for stability, Amio gtt decreased to 0.5 mg/min  Shawna weaned off, PA pressures 50s/20s unchanged       Problem/Plan - 2:  ·  Problem: Endocarditis of aortic valve.  Plan: As above    Observe off Antibitics ,  f/u OR cultures 14 days out        Problem/Plan - 3:  ·  Problem: Thrombocytopenia.    Monitor closely with daily CBC with differential, D Dimer and Fibrinogen  Transfuse Plts for bleeding.      Problem/Plan - 4:  ·  Problem: Acute on chronic combined systolic and diastolic CHF, NYHA class 3.  Plan: Not on beta blocker given pressor support     Problem/Plan - 5:  ·  Problem: CAD in native artery.  Plan: Cardiac cath 6/18 without significant CAD  Continue Statin, ASA on hold with thrombocytopenia     Problem/Plan - 6:    Left IJ HD catheter placed 6/16.   RUE has pink band for preservation in anticipation for eventual AVF    CVVHDF  started 6/25.     Problem/Plan - 8:  ·  Problem: CVA (cerebrovascular accident).  Plan: CVA (09/2020)   CT Head 6/1 showing age-related cerebral and cerebellar volume loss, mild chronic vascular ischemic changes, stable biparietal arachnoid cyst and stable midline posterior fossa arachnoid cyst.   CT Head 6/17 negative for acute finding.   No residual deficits noted.      Problem/Plan - 9:  Problem: WILLEM (obstructive sleep apnea). Plan; Currently on full vent support with CPAP trials, Rest on SIMV overnight      Problem 12: COPD    On home O2.    Patient was seen and evaluated on dialysis.   Patient is tolerating the procedure well.   T(C): 37.1 (06-26-21 @ 08:00), Max: 37.1 (06-26-21 @ 08:00)  HR: 74 (06-26-21 @ 09:15) (70 - 84)  Continue dialysis: CRRT    Goal UF : Even Fluid Balance - 24 Hours

## 2021-06-26 NOTE — CHART NOTE - NSCHARTNOTEFT_GEN_A_CORE
REYNA BOB   MRN#: [id number]     The patient is a 76y Male who was seen, evaluated, & examined with the CTICU staff with a multidisciplinary care plan formulated & implemented.  All available clinical, laboratory, radiographic, pharmacologic, and electrocardiographic data were reviewed & analyzed.      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   6/24 Shawna weaned off.   6/25 CVVHD started.   6/26 pan cx’d, abx started, CT head, CT C/A/P    The patient remains in the CTICU in critical condition s/p Hector TAVR (valve in valve) via left CFA artery cutdown on 6/23.          to acute shock state, cardiogenic +/- septic component as well, acute renal failure, DIC. acute hypoxic respiratory failure-persistent cardiopulmonary dysfunction, cardiogenic shock-cardiovascular dysfunction, hemodynamically significant anemia/hypovolemia-shock, hyperlactatemia-acidosis, & uncontrolled Type II Diabetes melitus-stress hyperglycemia.      The patient remains in the CTICU in critical condition ...    Plan: ...      Pt's critical illness requires V-V ECMO and full ventilatory support, the following of ABG’s with A-line monitoring, continuous pulse oximetry monitoring, invasive hemodynamic monitoring with a PA catheter for the following of serial CI’s/MVO2’s & continuous MAP/BP monitoring to ensure adequate cardiovascular and pulmonary support and to evaluate for & help prevent further decompensation secondary to persistent cardiopulmonary dysfunction and cardiogenic shock-cardiovascular dysfunction.     Patient required more than the usual postoperative critical care management and I provided 40 minutes of non-continuous care to the patient.  Discussed at length with the CTICU staff and helped coordinate care.    Above discussed with REYNA BOB   MRN#: [id number]     The patient is a 76y Male who was seen, evaluated, & examined with the CTICU staff with a multidisciplinary care plan formulated & implemented.  All available clinical, laboratory, radiographic, pharmacologic, and electrocardiographic data were reviewed & analyzed.      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 Severe pulmonary HTN noted in OR, Shawna started  6/24 Shawna weaned off, on amiodarone and lidocaine infusions for NSVT  6/25 CVVHD started, worsening thrombocytopenia (HIT negative, labs c/w DIC)      6/26 Last night attempts made to start gentle fluid removal via CVVHD (net negative 25 cc/hr) as pt was 6L positive over prior 48 hours but pt remained +350cc for past 24 hours. However, pt's CI noted to be downtrending, MV sat 53.   Pt encephalopathic despite sedation vacation though intermittently able to follow some simple commands (move toes, squeeze hands). Still afebrile, leukocytosis persists but is not significantly changed over past few days.   CVVHD changed back to run at net even. CI remains depressed despite some improvement in BP (off vasopressin in past 24 hours and now on lower dose of norepinephrine but no significant change in other hemodynamics (HR, rhythm, PA pressures, CVP.)       Plan:  Neuro:    remains off propofol    Lidocaine d/c'd as no significant ectopy noted over past 36 hours and it could be contributing to pt's metabolic encephalopathy.     CT head (for encephalopathy and to rule out bleed)    neuro consulted    precedex as needed for agitation and pt safety  CV:    Milrinone increased to 0.25 mcg/kg/min    TTE done (prelim: mild to mod PVL, EF 20-25%, severe TR, mod MR)    continue amiodarone    lidocaine d/c'd as above  Pulm:    continue IMV alternating with PS/CPAP trials as tolerated though is not ready for extubation at this time  GI:    continue trickle feeds    continue with protonix q12 for GI PPX  :    marie d/c'd    bladder scan q shift    straight cath PRN  Renal:    continue CVVHD, net even fluid removal for now    serial labs per guidelines  Heme:    Plts x 1 this am as pt at increased risk for spontaneous ICH (pt does have significant ecchymosis to bilat arms and chest/torso, some scattered petechiae to chest/arms).     PRBC x 1 for post-op anemia coupled with shock state     As d/w Dr Fajardo (heme): no anticoagulation at this time, labs c/w DIC. Does not appear to be HIT with negative HIT Ab nor SHEREE as no obvious signs of thrombosis.     SCDs for DVT PPX (no chemical prophylaxis as above)  ID:    [place] cx’d, broad spectrum abx started for presumed sepsis    CT C/A/P to look for source/abscess    R fem HD catheter placed and LIJ dialysis catheter d/c'd  Endo:    pt with mild hypoglycemia noted, resolved since TF started, continue FS q4 for now  Misc:     continue with supportive ICU care as needed     grandson at bedside, updated on current condition and plan of care    full code    The patient remains in the CTICU in critical condition s/p Rudy TAVR (valve in valve) via left CFA artery cutdown on 6/23. Now with biventricular failure/acute shock state, cardiogenic +/- septic component as well, acute renal failure, DIC, encephalopathy, anemia d/t acute blood loss anemia/critical illness/acute on CKD.       Above discussed with and agreed to by Dr Matta.      Patient required more than the usual postoperative critical care management and I provided 115 minutes of non-continuous care to the patient, independent of procedures.  Discussed at length with the CTICU staff and helped coordinate care.

## 2021-06-27 ENCOUNTER — TRANSCRIPTION ENCOUNTER (OUTPATIENT)
Age: 76
End: 2021-06-27

## 2021-06-27 LAB
ACANTHOCYTES BLD QL SMEAR: SLIGHT — SIGNIFICANT CHANGE UP
ALBUMIN SERPL ELPH-MCNC: 3.6 G/DL — SIGNIFICANT CHANGE UP (ref 3.3–5.2)
ALP SERPL-CCNC: 89 U/L — SIGNIFICANT CHANGE UP (ref 40–120)
ALT FLD-CCNC: 40 U/L — SIGNIFICANT CHANGE UP
ANION GAP SERPL CALC-SCNC: 11 MMOL/L — SIGNIFICANT CHANGE UP (ref 5–17)
ANION GAP SERPL CALC-SCNC: 12 MMOL/L — SIGNIFICANT CHANGE UP (ref 5–17)
ANION GAP SERPL CALC-SCNC: 13 MMOL/L — SIGNIFICANT CHANGE UP (ref 5–17)
ANISOCYTOSIS BLD QL: SIGNIFICANT CHANGE UP
APTT BLD: 35.8 SEC — HIGH (ref 27.5–35.5)
AST SERPL-CCNC: 41 U/L — HIGH
BASOPHILS # BLD AUTO: 0 K/UL — SIGNIFICANT CHANGE UP (ref 0–0.2)
BASOPHILS NFR BLD AUTO: 0 % — SIGNIFICANT CHANGE UP (ref 0–2)
BILIRUB SERPL-MCNC: 2.8 MG/DL — HIGH (ref 0.4–2)
BLD GP AB SCN SERPL QL: SIGNIFICANT CHANGE UP
BUN SERPL-MCNC: 13.8 MG/DL — SIGNIFICANT CHANGE UP (ref 8–20)
BUN SERPL-MCNC: 16.3 MG/DL — SIGNIFICANT CHANGE UP (ref 8–20)
BUN SERPL-MCNC: 22.2 MG/DL — HIGH (ref 8–20)
BURR CELLS BLD QL SMEAR: PRESENT — SIGNIFICANT CHANGE UP
CALCIUM SERPL-MCNC: 8.2 MG/DL — LOW (ref 8.6–10.2)
CALCIUM SERPL-MCNC: 8.3 MG/DL — LOW (ref 8.6–10.2)
CALCIUM SERPL-MCNC: 8.5 MG/DL — LOW (ref 8.6–10.2)
CHLORIDE SERPL-SCNC: 100 MMOL/L — SIGNIFICANT CHANGE UP (ref 98–107)
CHLORIDE SERPL-SCNC: 100 MMOL/L — SIGNIFICANT CHANGE UP (ref 98–107)
CHLORIDE SERPL-SCNC: 102 MMOL/L — SIGNIFICANT CHANGE UP (ref 98–107)
CO2 SERPL-SCNC: 20 MMOL/L — LOW (ref 22–29)
CO2 SERPL-SCNC: 21 MMOL/L — LOW (ref 22–29)
CO2 SERPL-SCNC: 21 MMOL/L — LOW (ref 22–29)
CREAT SERPL-MCNC: 0.97 MG/DL — SIGNIFICANT CHANGE UP (ref 0.5–1.3)
CREAT SERPL-MCNC: 1.23 MG/DL — SIGNIFICANT CHANGE UP (ref 0.5–1.3)
CREAT SERPL-MCNC: 1.51 MG/DL — HIGH (ref 0.5–1.3)
D DIMER BLD IA.RAPID-MCNC: 2013 NG/ML DDU — HIGH
EOSINOPHIL # BLD AUTO: 0 K/UL — SIGNIFICANT CHANGE UP (ref 0–0.5)
EOSINOPHIL NFR BLD AUTO: 0 % — SIGNIFICANT CHANGE UP (ref 0–6)
FIBRINOGEN PPP-MCNC: 291 MG/DL — SIGNIFICANT CHANGE UP (ref 290–520)
GAS PNL BLDA: SIGNIFICANT CHANGE UP
GAS PNL BLDA: SIGNIFICANT CHANGE UP
GIANT PLATELETS BLD QL SMEAR: PRESENT — SIGNIFICANT CHANGE UP
GLUCOSE BLDC GLUCOMTR-MCNC: 78 MG/DL — SIGNIFICANT CHANGE UP (ref 70–99)
GLUCOSE BLDC GLUCOMTR-MCNC: 79 MG/DL — SIGNIFICANT CHANGE UP (ref 70–99)
GLUCOSE SERPL-MCNC: 101 MG/DL — HIGH (ref 70–99)
GLUCOSE SERPL-MCNC: 118 MG/DL — HIGH (ref 70–99)
GLUCOSE SERPL-MCNC: 89 MG/DL — SIGNIFICANT CHANGE UP (ref 70–99)
HCT VFR BLD CALC: 27.5 % — LOW (ref 39–50)
HCT VFR BLD CALC: 28.6 % — LOW (ref 39–50)
HCT VFR BLD CALC: 29.3 % — LOW (ref 39–50)
HGB BLD-MCNC: 8.7 G/DL — LOW (ref 13–17)
HGB BLD-MCNC: 9 G/DL — LOW (ref 13–17)
HGB BLD-MCNC: 9.2 G/DL — LOW (ref 13–17)
HYPOCHROMIA BLD QL: SLIGHT — SIGNIFICANT CHANGE UP
INR BLD: 1.4 RATIO — HIGH (ref 0.88–1.16)
LYMPHOCYTES # BLD AUTO: 1.09 K/UL — SIGNIFICANT CHANGE UP (ref 1–3.3)
LYMPHOCYTES # BLD AUTO: 7.8 % — LOW (ref 13–44)
MACROCYTES BLD QL: SIGNIFICANT CHANGE UP
MAGNESIUM SERPL-MCNC: 2.4 MG/DL — SIGNIFICANT CHANGE UP (ref 1.6–2.6)
MAGNESIUM SERPL-MCNC: 2.5 MG/DL — SIGNIFICANT CHANGE UP (ref 1.6–2.6)
MAGNESIUM SERPL-MCNC: 2.5 MG/DL — SIGNIFICANT CHANGE UP (ref 1.6–2.6)
MANUAL SMEAR VERIFICATION: SIGNIFICANT CHANGE UP
MCHC RBC-ENTMCNC: 30.3 PG — SIGNIFICANT CHANGE UP (ref 27–34)
MCHC RBC-ENTMCNC: 30.3 PG — SIGNIFICANT CHANGE UP (ref 27–34)
MCHC RBC-ENTMCNC: 30.4 PG — SIGNIFICANT CHANGE UP (ref 27–34)
MCHC RBC-ENTMCNC: 31.4 GM/DL — LOW (ref 32–36)
MCHC RBC-ENTMCNC: 31.5 GM/DL — LOW (ref 32–36)
MCHC RBC-ENTMCNC: 31.6 GM/DL — LOW (ref 32–36)
MCV RBC AUTO: 95.8 FL — SIGNIFICANT CHANGE UP (ref 80–100)
MCV RBC AUTO: 96.3 FL — SIGNIFICANT CHANGE UP (ref 80–100)
MCV RBC AUTO: 96.7 FL — SIGNIFICANT CHANGE UP (ref 80–100)
MICROCYTES BLD QL: SLIGHT — SIGNIFICANT CHANGE UP
MONOCYTES # BLD AUTO: 1.09 K/UL — HIGH (ref 0–0.9)
MONOCYTES NFR BLD AUTO: 7.8 % — SIGNIFICANT CHANGE UP (ref 2–14)
NEUTROPHILS # BLD AUTO: 11.82 K/UL — HIGH (ref 1.8–7.4)
NEUTROPHILS NFR BLD AUTO: 84.4 % — HIGH (ref 43–77)
NRBC # BLD: 2 /100 — HIGH (ref 0–0)
OVALOCYTES BLD QL SMEAR: SLIGHT — SIGNIFICANT CHANGE UP
PHOSPHATE SERPL-MCNC: 2.9 MG/DL — SIGNIFICANT CHANGE UP (ref 2.4–4.7)
PHOSPHATE SERPL-MCNC: 3.1 MG/DL — SIGNIFICANT CHANGE UP (ref 2.4–4.7)
PLAT MORPH BLD: NORMAL — SIGNIFICANT CHANGE UP
PLATELET # BLD AUTO: 102 K/UL — LOW (ref 150–400)
PLATELET # BLD AUTO: 47 K/UL — LOW (ref 150–400)
PLATELET # BLD AUTO: 57 K/UL — LOW (ref 150–400)
POIKILOCYTOSIS BLD QL AUTO: SLIGHT — SIGNIFICANT CHANGE UP
POLYCHROMASIA BLD QL SMEAR: SIGNIFICANT CHANGE UP
POTASSIUM SERPL-MCNC: 3.9 MMOL/L — SIGNIFICANT CHANGE UP (ref 3.5–5.3)
POTASSIUM SERPL-MCNC: 3.9 MMOL/L — SIGNIFICANT CHANGE UP (ref 3.5–5.3)
POTASSIUM SERPL-MCNC: 4 MMOL/L — SIGNIFICANT CHANGE UP (ref 3.5–5.3)
POTASSIUM SERPL-SCNC: 3.9 MMOL/L — SIGNIFICANT CHANGE UP (ref 3.5–5.3)
POTASSIUM SERPL-SCNC: 3.9 MMOL/L — SIGNIFICANT CHANGE UP (ref 3.5–5.3)
POTASSIUM SERPL-SCNC: 4 MMOL/L — SIGNIFICANT CHANGE UP (ref 3.5–5.3)
PROT SERPL-MCNC: 6.4 G/DL — LOW (ref 6.6–8.7)
PROTHROM AB SERPL-ACNC: 16 SEC — HIGH (ref 10.6–13.6)
RBC # BLD: 2.87 M/UL — LOW (ref 4.2–5.8)
RBC # BLD: 2.97 M/UL — LOW (ref 4.2–5.8)
RBC # BLD: 3.03 M/UL — LOW (ref 4.2–5.8)
RBC # FLD: 21.1 % — HIGH (ref 10.3–14.5)
RBC # FLD: 21.2 % — HIGH (ref 10.3–14.5)
RBC # FLD: 21.5 % — HIGH (ref 10.3–14.5)
RBC BLD AUTO: ABNORMAL
SCHISTOCYTES BLD QL AUTO: SLIGHT — SIGNIFICANT CHANGE UP
SODIUM SERPL-SCNC: 132 MMOL/L — LOW (ref 135–145)
SODIUM SERPL-SCNC: 133 MMOL/L — LOW (ref 135–145)
SODIUM SERPL-SCNC: 135 MMOL/L — SIGNIFICANT CHANGE UP (ref 135–145)
TARGETS BLD QL SMEAR: SLIGHT — SIGNIFICANT CHANGE UP
VANCOMYCIN TROUGH SERPL-MCNC: 6.4 UG/ML — LOW (ref 10–20)
WBC # BLD: 11.95 K/UL — HIGH (ref 3.8–10.5)
WBC # BLD: 11.99 K/UL — HIGH (ref 3.8–10.5)
WBC # BLD: 14 K/UL — HIGH (ref 3.8–10.5)
WBC # FLD AUTO: 11.95 K/UL — HIGH (ref 3.8–10.5)
WBC # FLD AUTO: 11.99 K/UL — HIGH (ref 3.8–10.5)
WBC # FLD AUTO: 14 K/UL — HIGH (ref 3.8–10.5)

## 2021-06-27 PROCEDURE — 90937 HEMODIALYSIS REPEATED EVAL: CPT

## 2021-06-27 PROCEDURE — 99291 CRITICAL CARE FIRST HOUR: CPT

## 2021-06-27 PROCEDURE — 71045 X-RAY EXAM CHEST 1 VIEW: CPT | Mod: 26

## 2021-06-27 PROCEDURE — 99232 SBSQ HOSP IP/OBS MODERATE 35: CPT

## 2021-06-27 PROCEDURE — 99223 1ST HOSP IP/OBS HIGH 75: CPT

## 2021-06-27 PROCEDURE — 93010 ELECTROCARDIOGRAM REPORT: CPT

## 2021-06-27 RX ORDER — ASCORBIC ACID 60 MG
500 TABLET,CHEWABLE ORAL DAILY
Refills: 0 | Status: DISCONTINUED | OUTPATIENT
Start: 2021-06-27 | End: 2021-07-10

## 2021-06-27 RX ORDER — DESMOPRESSIN ACETATE 0.1 MG/1
30 TABLET ORAL ONCE
Refills: 0 | Status: COMPLETED | OUTPATIENT
Start: 2021-06-27 | End: 2021-06-27

## 2021-06-27 RX ORDER — FENTANYL CITRATE 50 UG/ML
50 INJECTION INTRAVENOUS
Refills: 0 | Status: DISCONTINUED | OUTPATIENT
Start: 2021-06-27 | End: 2021-06-27

## 2021-06-27 RX ORDER — DESMOPRESSIN ACETATE 0.1 MG/1
30 TABLET ORAL ONCE
Refills: 0 | Status: DISCONTINUED | OUTPATIENT
Start: 2021-06-27 | End: 2021-06-27

## 2021-06-27 RX ORDER — FERROUS SULFATE 325(65) MG
325 TABLET ORAL AT BEDTIME
Refills: 0 | Status: DISCONTINUED | OUTPATIENT
Start: 2021-06-27 | End: 2021-07-02

## 2021-06-27 RX ORDER — DESMOPRESSIN ACETATE 0.1 MG/1
7.5 TABLET ORAL ONCE
Refills: 0 | Status: DISCONTINUED | OUTPATIENT
Start: 2021-06-27 | End: 2021-06-27

## 2021-06-27 RX ORDER — VANCOMYCIN HCL 1 G
750 VIAL (EA) INTRAVENOUS EVERY 12 HOURS
Refills: 0 | Status: DISCONTINUED | OUTPATIENT
Start: 2021-06-27 | End: 2021-06-28

## 2021-06-27 RX ORDER — FENTANYL CITRATE 50 UG/ML
50 INJECTION INTRAVENOUS ONCE
Refills: 0 | Status: DISCONTINUED | OUTPATIENT
Start: 2021-06-27 | End: 2021-06-27

## 2021-06-27 RX ORDER — LANOLIN ALCOHOL/MO/W.PET/CERES
5 CREAM (GRAM) TOPICAL AT BEDTIME
Refills: 0 | Status: DISCONTINUED | OUTPATIENT
Start: 2021-06-27 | End: 2021-07-10

## 2021-06-27 RX ORDER — FOLIC ACID 0.8 MG
1 TABLET ORAL DAILY
Refills: 0 | Status: DISCONTINUED | OUTPATIENT
Start: 2021-06-27 | End: 2021-07-10

## 2021-06-27 RX ADMIN — CHLORHEXIDINE GLUCONATE 1 APPLICATION(S): 213 SOLUTION TOPICAL at 12:34

## 2021-06-27 RX ADMIN — MIDODRINE HYDROCHLORIDE 10 MILLIGRAM(S): 2.5 TABLET ORAL at 14:20

## 2021-06-27 RX ADMIN — BUDESONIDE AND FORMOTEROL FUMARATE DIHYDRATE 2 PUFF(S): 160; 4.5 AEROSOL RESPIRATORY (INHALATION) at 08:13

## 2021-06-27 RX ADMIN — DEXMEDETOMIDINE HYDROCHLORIDE IN 0.9% SODIUM CHLORIDE 5.01 MICROGRAM(S)/KG/HR: 4 INJECTION INTRAVENOUS at 12:00

## 2021-06-27 RX ADMIN — SENNA PLUS 2 TABLET(S): 8.6 TABLET ORAL at 21:07

## 2021-06-27 RX ADMIN — FENTANYL CITRATE 50 MICROGRAM(S): 50 INJECTION INTRAVENOUS at 14:50

## 2021-06-27 RX ADMIN — DESMOPRESSIN ACETATE 230 MICROGRAM(S): 0.1 TABLET ORAL at 12:25

## 2021-06-27 RX ADMIN — FENTANYL CITRATE 50 MICROGRAM(S): 50 INJECTION INTRAVENOUS at 15:20

## 2021-06-27 RX ADMIN — CHLORHEXIDINE GLUCONATE 5 MILLILITER(S): 213 SOLUTION TOPICAL at 17:43

## 2021-06-27 RX ADMIN — PANTOPRAZOLE SODIUM 40 MILLIGRAM(S): 20 TABLET, DELAYED RELEASE ORAL at 17:43

## 2021-06-27 RX ADMIN — FENTANYL CITRATE 50 MICROGRAM(S): 50 INJECTION INTRAVENOUS at 13:00

## 2021-06-27 RX ADMIN — EPINEPHRINE 15 MICROGRAM(S)/KG/MIN: 0.3 INJECTION INTRAMUSCULAR; SUBCUTANEOUS at 12:36

## 2021-06-27 RX ADMIN — MIDODRINE HYDROCHLORIDE 10 MILLIGRAM(S): 2.5 TABLET ORAL at 05:26

## 2021-06-27 RX ADMIN — MILRINONE LACTATE 7.51 MICROGRAM(S)/KG/MIN: 1 INJECTION, SOLUTION INTRAVENOUS at 17:42

## 2021-06-27 RX ADMIN — Medication 250 MILLIGRAM(S): at 22:35

## 2021-06-27 RX ADMIN — Medication 1 MILLIGRAM(S): at 21:06

## 2021-06-27 RX ADMIN — Medication 5 MILLIGRAM(S): at 21:12

## 2021-06-27 RX ADMIN — FENTANYL CITRATE 50 MICROGRAM(S): 50 INJECTION INTRAVENOUS at 13:07

## 2021-06-27 RX ADMIN — FENTANYL CITRATE 50 MICROGRAM(S): 50 INJECTION INTRAVENOUS at 23:15

## 2021-06-27 RX ADMIN — Medication 500 MILLIGRAM(S): at 21:06

## 2021-06-27 RX ADMIN — MIDODRINE HYDROCHLORIDE 10 MILLIGRAM(S): 2.5 TABLET ORAL at 21:12

## 2021-06-27 RX ADMIN — Medication 250 MILLIGRAM(S): at 12:32

## 2021-06-27 RX ADMIN — Medication 1 PACKET(S): at 17:44

## 2021-06-27 RX ADMIN — FENTANYL CITRATE 50 MICROGRAM(S): 50 INJECTION INTRAVENOUS at 13:30

## 2021-06-27 RX ADMIN — MIRTAZAPINE 15 MILLIGRAM(S): 45 TABLET, ORALLY DISINTEGRATING ORAL at 14:19

## 2021-06-27 RX ADMIN — DEXMEDETOMIDINE HYDROCHLORIDE IN 0.9% SODIUM CHLORIDE 5.01 MICROGRAM(S)/KG/HR: 4 INJECTION INTRAVENOUS at 16:43

## 2021-06-27 RX ADMIN — Medication 1 PACKET(S): at 05:27

## 2021-06-27 RX ADMIN — FENTANYL CITRATE 50 MICROGRAM(S): 50 INJECTION INTRAVENOUS at 12:37

## 2021-06-27 RX ADMIN — Medication 325 MILLIGRAM(S): at 21:06

## 2021-06-27 RX ADMIN — ATORVASTATIN CALCIUM 10 MILLIGRAM(S): 80 TABLET, FILM COATED ORAL at 21:08

## 2021-06-27 RX ADMIN — FENTANYL CITRATE 50 MICROGRAM(S): 50 INJECTION INTRAVENOUS at 23:00

## 2021-06-27 RX ADMIN — DEXMEDETOMIDINE HYDROCHLORIDE IN 0.9% SODIUM CHLORIDE 5.01 MICROGRAM(S)/KG/HR: 4 INJECTION INTRAVENOUS at 14:00

## 2021-06-27 RX ADMIN — PANTOPRAZOLE SODIUM 40 MILLIGRAM(S): 20 TABLET, DELAYED RELEASE ORAL at 05:26

## 2021-06-27 RX ADMIN — Medication 1 TABLET(S): at 12:34

## 2021-06-27 RX ADMIN — CEFTRIAXONE 100 MILLIGRAM(S): 500 INJECTION, POWDER, FOR SOLUTION INTRAMUSCULAR; INTRAVENOUS at 14:20

## 2021-06-27 RX ADMIN — BUDESONIDE AND FORMOTEROL FUMARATE DIHYDRATE 2 PUFF(S): 160; 4.5 AEROSOL RESPIRATORY (INHALATION) at 19:37

## 2021-06-27 RX ADMIN — CHLORHEXIDINE GLUCONATE 5 MILLILITER(S): 213 SOLUTION TOPICAL at 05:26

## 2021-06-27 NOTE — PROGRESS NOTE ADULT - SUBJECTIVE AND OBJECTIVE BOX
[facility]  [address], [place] [address]    REASON FOR VISIT:  Follow up on chf  UPDATE:  More awake today  Following simple commands  Echo with worsening EF  TELEMETRY MONITORING:  NSR   Having runs of NSVT  on Amiodarone drip    06-27    135  |  102  |  16.3  ----------------------------<  89  3.9   |  20.0<L>  |  1.23    Ca    8.3<L>      27 Jun 2021 08:52  Phos  3.1     06-27  Mg     2.5     06-27    TPro  6.4<L>  /  Alb  3.6  /  TBili  2.8<H>  /  DBili  x   /  AST  41<H>  /  ALT  40  /  AlkPhos  89  06-27    LIVER FUNCTIONS - ( 27 Jun 2021 00:35 )  Alb: 3.6 g/dL / Pro: 6.4 g/dL / ALK PHOS: 89 U/L / ALT: 40 U/L / AST: 41 U/L / GGT: x             MEDICATIONS  (STANDING):  aMIOdarone Infusion 0.5 mG/Min (16.7 mL/Hr) IV Continuous <Continuous>  atorvastatin 10 milliGRAM(s) Oral at bedtime  budesonide 160 MICROgram(s)/formoterol 4.5 MICROgram(s) Inhaler 2 Puff(s) Inhalation two times a day  cefTRIAXone   IVPB 2000 milliGRAM(s) IV Intermittent every 24 hours  chlorhexidine 0.12% Liquid 5 milliLiter(s) Oral Mucosa two times a day  chlorhexidine 2% Cloths 1 Application(s) Topical daily  CRRT Treatment    <Continuous>  dexMEDEtomidine Infusion 0.2 MICROgram(s)/kG/Hr (5.01 mL/Hr) IV Continuous <Continuous>  EPINEPHrine    Infusion 0.04 MICROgram(s)/kG/Min (15 mL/Hr) IV Continuous <Continuous>  insulin lispro (ADMELOG) corrective regimen sliding scale   SubCutaneous every 6 hours  midodrine 10 milliGRAM(s) Oral every 8 hours  milrinone Infusion 0.25 MICROgram(s)/kG/Min (7.51 mL/Hr) IV Continuous <Continuous>  mirtazapine 15 milliGRAM(s) Oral daily  Nephro-melissa 1 Tablet(s) Oral daily  pantoprazole  Injectable 40 milliGRAM(s) IV Push every 12 hours  Phoxillum Filtration BK 4 / 2.5 5000 milliLiter(s) (1500 mL/Hr) CRRT <Continuous>  Phoxillum Filtration BK 4 / 2.5 5000 milliLiter(s) (1000 mL/Hr) CRRT <Continuous>  Phoxillum Filtration BK 4 / 2.5 5000 milliLiter(s) (1000 mL/Hr) CRRT <Continuous>  psyllium Powder 1 Packet(s) Oral two times a day  senna 2 Tablet(s) Oral at bedtime  sodium chloride 0.9%. 1000 milliLiter(s) (10 mL/Hr) IV Continuous <Continuous>  sodium chloride 0.9%. 1000 milliLiter(s) (5 mL/Hr) IV Continuous <Continuous>  vancomycin  IVPB 750 milliGRAM(s) IV Intermittent every 12 hours    ROS:  No fever chills  Cardiac  No cp sob or palp  Resp  no cough no mucus production  Gi  no abd pain no melana  Ext No calf tenderness, no edema    Vital Signs Last 24 Hrs  T(C): 37.6 (27 Jun 2021 08:00), Max: 37.6 (27 Jun 2021 04:00)  T(F): 99.7 (27 Jun 2021 08:00), Max: 99.7 (27 Jun 2021 04:00)  HR: 85 (27 Jun 2021 11:15) (60 - 87)  BP: --  BP(mean): --  RR: 34 (27 Jun 2021 11:15) (11 - 42)  SpO2: 100% (27 Jun 2021 11:15) (91% - 100%)  T(C): 37.6 (06-27-21 @ 08:00), Max: 37.6 (06-27-21 @ 04:00)  HR: 85 (06-27-21 @ 11:15) (60 - 87)  BP: --  RR: 34 (06-27-21 @ 11:15) (11 - 42)  SpO2: 100% (06-27-21 @ 11:15) (91% - 100%)    HEENT Head atraumatic eomi, oral mucosa moist  CV S1&S2  Regular  RESP  decreased breath sounds  GI  Soft active bowel sounds non tender  EXT  No clubbing/Cyanosis /Edema  NEURO  Alert oriented  No gross motor or sensory deficits    < from: TTE Echo Complete w/ Contrast w/ Doppler (06.26.21 @ 10:36) >   1. Endocardial visualization was enhanced with intravenous echo contrast.   2. Left ventricular ejection fraction, by visual estimation, is 20 to 25%.   3. Severely decreased global left ventricular systolic function.   4. Mildly increased LV wall thickness.   5. The mitral in-flow pattern reveals no discernable A-wave, therefore no comment on diastolic function can be made.   6. Mildly enlarged right ventricle.   7. Mild thickening of the anterior and posterior mitral valve leaflets.   8. Moderate mitral valve regurgitation.   9. Severe tricuspid regurgitation.  10. TAVR in the aortic position. Appears well seated with no abnormal rocking motion. Mild to moderate paravalvular aortic regurgitation is seen. Peak velocity is 2.26 m/s, MG is 11.2 mmHg, and PG is 20.5 mmHg, which are acceptable in the setting of a prosethetic aortic valve.  11. Mild pulmonic valve regurgitation.  12. Compared with prior study dated 6/23/21, the LVEF appears decreased to 20-25%. MR now appears moderate and TR appears severe. Results were discussed with CT surgery.

## 2021-06-27 NOTE — PROGRESS NOTE ADULT - ATTENDING COMMENTS
Patient with TAVR prosthesis failure, s/p repeat TAVR 6/23  Repeat TTE showed LVEF 20-25%, mild to moderate paravalvular aortic regurgitation, severe TR, moderate MR  Patient's CI decreased overnight   Currently being managed for presumed sepsis  Remains intubated on mechanical vent support   Milrinone was increased; was also started on epinephrine gtt overnight   Patient needs continued fluid removal via HD or diuresis as tolerated by BP  Lidocaine was dc'ed as possible contributing factor to encephalopathy; monitor closely for ventricular arrhythmias  Continue amiodarone   Monitor electrolytes; keep K>4, Mg >2

## 2021-06-27 NOTE — PROGRESS NOTE ADULT - PROBLEM SELECTOR PLAN 2
As above  ID following  Broad spectrum ABX with Vanco/Rocephin started 6/26 for presumed sepsis  Continue to follow OR cultures for full 14 days

## 2021-06-27 NOTE — PROGRESS NOTE ADULT - PROBLEM SELECTOR PLAN 1
Now s/p Valve in Valve TAVR with Dr. Campbell on 6/23  Primacor increased to 0.25 mcg/kg/min for low CI  Epi gtt started to assist CI > 2.0  Repeat echo revealed EF 20-25% from 30-35% prior with midly enlarged RV, severely decreased LV Fxn, moderate MR, Severe TR and mild Pulm Regurgitation  CT C/A/P revealed b/l pneumonia, ABX started  Pressors:  Vaso off, Levophed weaning off for MAP > 65  Continue Midodrine 10 mg q8   Runs of SVT post op requiring Lidocaine and Amio gtts for stability, Amio gtt decreased to 0.5 mg/min  Lidocaine gtt off  Continue GI ppx with Protonix and Senna, DVT ppx with SCD boots

## 2021-06-27 NOTE — PROGRESS NOTE ADULT - SUBJECTIVE AND OBJECTIVE BOX
Abstract:    Effects of [id number]-D-arginine vasopressin (DDAVP; 0.4 microgram/kg iv) were studied in 11 patients with uremia. Bleeding time, platelet retention on glass beads, factor VIII activities, plasma catecholamine levels, and studies on platelet interaction with the subendothelium were performed before, 1 hour after, and 6 hours after DDAVP infusion. Perfusates consisting of normal washed platelets, uremic platelet-poor plasma (u-PPP) and washed red blood cells were perfused through the Angely perfusion system at a shear rate of 800 sec-1. One hour after DDAVP infusion, a shortening in the bleeding time and an increase in platelet retention on glass beads were noticed in these patients (p less than 0.01). Simultaneously, plasma levels of noradrenaline, factor VIII coagulant (FVIII:C), and von Willebrand factor (vWF) activities were statistically increased. Platelet deposition and platelet aggregate formation on subendothelium were consistently increased (p less than 0.05) in perfusions carried out with blood reconstituted with u-PPP obtained 1 hour after DDAVP. The "in vitro" addition of 1 U/ml vWF or 1 U/ml vWF plus 1 U/ml factor VIII to the pretreatment u-PPP had no significant influence on the parameters that quantify platelet-subendothelium interaction. However, after the addition of 10 ng/ml noradrenaline to a similar system containing basal u-PPP, a clear improvement (p less than 0.05) in platelet deposition was noticed. Our results confirm the hemostatic effectiveness of DDAVP in patients with uremia and reveal an increased platelet interaction with subendothelium mediated by a factor present in uremic plasma after DDAVP administration.(ABSTRACT TRUNCATED  WORDS)    S/P 2 Units Platelet Infusion & DDAVP 30 mcg.,     R- FV Catheter site Bleed,     Tolerating  ml/hour,

## 2021-06-27 NOTE — PROGRESS NOTE ADULT - ASSESSMENT
76 year old PMH of  MI in 1995 (angiogram, no stents placed), COPD (2L O2 at home), s/p TAVR (03/2019 at [facility]), gout, CKD, CVA (09/2020), pulmonary HTN, initially presented to [facility] 6/1/21 with RONNELL on CKD (likely cardiorenal syndrome), urinary retention due to noncompliance with medications, with hospital course complicated by cardiogenic shock, acute hypoxemic respiratory failure secondary to metabolic acidosis and respiratory alkalosis. Patient found to have TAVR failure with ISRAEL 6/10/21 showing EF 40-45%, Aortic valve prosthesis with Severe paravalvular leak and valvular AI, mild-moderate aortic stenosis, and moderate MR. Decompensated HF and severe pulmonary hypertension.  Currently on amio gtt, levo for pressor support, Primacor for inotropic support. CRRT in progress.    TAVR Prosthesis failure  Ef decreased   Now POD #4 from repeat TAVR   In Cardiogenic shock requiring IV pressor and inotropic support   c/w Amio gtt      HFrEF  EF 40-45% -- > 30-35% on 6/12/21 echo  now 25%  Moderate MR  Pad 25  ci 2.1   b/p cant tolerate heart failure meds    ARRHYTHMIA - Eric tachycardia  c/w amiodarone drip  keep k >4 and mg >2  Continue Amiodarone    CARDIAC MEDS  aMIOdarone Infusion 0.5 mG/Min (16.7 mL/Hr) IV Continuous <Continuous>  atorvastatin 10 milliGRAM(s) Oral at bedtime  CRRT Treatment    <Continuous>  EPINEPHrine    Infusion 0.04 MICROgram(s)/kG/Min (15 mL/Hr) IV Continuous <Continuous>  midodrine 10 milliGRAM(s) Oral every 8 hours  milrinone Infusion 0.25 MICROgram(s)/kG/Min (7.51 mL/Hr) IV Continuous <Continuous>

## 2021-06-27 NOTE — PROGRESS NOTE ADULT - SUBJECTIVE AND OBJECTIVE BOX
Subjective:  77yo M intubated, sedated, critical.      HPI:  76 year old male patient with a medical history of MI in 1995 (angiogram, no stents placed), COPD (2L O2 at home), s/p TAVR (03/2019 at [facility]), gout, CKD, CVA (09/2020), pulmonary HTN, initially presented to [facility] 6/1/21 with RONNELL on CKD (likely cardiorenal syndrome), urinary retention due to noncompliance with medications, with hospital course complicated by cardiogenic shock, acute hypoxemic respiratory failure secondary to metabolic acidosis and respiratory alkalosis. Patient found to have TAVR failure with ISRAEL 6/10/21 showing EF 40-45%, Aortic valve prosthesis with Severe paravalvular leak and valvular AI, mild-moderate aortic stenosis, and moderate MR. Patient was transferred to [facility] under Dr. Cmapbell for further workup.     Imaging from [facility]:  6/1: CXR shwoing cardiomegaly, small b/l pleural effusions, moderate pulmonary vascular congestion.  6/1: CT Brain ordered for head trauma? showing age-related cerebral and cerebellar volume loss, mild chronic vascular ischemic changes, stable biparietal arachnoid cyst and stable midline posterior fossa arachnoid cyst.   6/1: US Abdomen for elevated LFTs showing mild hepatic steatosis, mild hepatomegaly, sludge in the gallbladder, no discrete gallstones, normal biliary ducts, mild echogenic right kidney which may be seen with medical renal dz, mildly complex Right upper pole 4cm cyst with subtle internal echoes may represent hemorrhagic/proteinaceous cyst, mildly complex right midpole 2cm cyst with thin internal linear septation.   6/3: Kidney and Bladder US showing no hydronephrosis, echogenic kidneys likely from medical renal dz, prostatomegaly, and thickening of the posterior wall of the bladder with trabeculations which could be due to bladder outlet obstruction.   6/7: Kidney and Bladder US: No hydronephrosis or shadowing renal calculi. Stable b/l renal cysts.   6/7: Lower Extremity Venous Doppler with no DVT noted.   6/8: TTE showing EF 47%, dilated IVC, dilation of the ascending aorta of 4.4cm, moderate-severe pulmonary HTN, moderate-severe TR, aortic valve with severe prosthesis regurgitation and moderate prosthesis stenosis  6/9: CT Chest showing emphysema and intersitial lung dz changes, stable b/l small pleural effusions, cardiomegaly.   6/11: ISRAEL showing EF 40-45%, Aortic valve prosthesis with Severe paravalvular leak and valvular AI, mild-moderate aortic stenosis, and moderate MR.  (12 Jun 2021 01:36)          PAST MEDICAL & SURGICAL HISTORY:  Pulmonary hypertension    Hypertension    Hyperlipidemia    H/O aortic valve stenosis  s/p TAVR 2019 at [place]    CVA (cerebrovascular accident)  MCA CVA with tPA and thrombectomy    Chronic kidney disease    Prediabetes    Mitral valve regurgitation    CAD in native artery    Aneurysm of aortic root  thoracic aortic aneurysm, without ruptur    Benign prostatic hyperplasia    Gout    History of transcatheter aortic valve replacement (TAVR)            MEDICATIONS  (STANDING):  aMIOdarone Infusion 0.5 mG/Min (16.7 mL/Hr) IV Continuous <Continuous>  atorvastatin 10 milliGRAM(s) Oral at bedtime  budesonide 160 MICROgram(s)/formoterol 4.5 MICROgram(s) Inhaler 2 Puff(s) Inhalation two times a day  cefTRIAXone   IVPB 2000 milliGRAM(s) IV Intermittent every 24 hours  chlorhexidine 0.12% Liquid 5 milliLiter(s) Oral Mucosa two times a day  chlorhexidine 2% Cloths 1 Application(s) Topical daily  CRRT Treatment    <Continuous>  dexMEDEtomidine Infusion 0.2 MICROgram(s)/kG/Hr (5.01 mL/Hr) IV Continuous <Continuous>  EPINEPHrine    Infusion 0.04 MICROgram(s)/kG/Min (15 mL/Hr) IV Continuous <Continuous>  insulin lispro (ADMELOG) corrective regimen sliding scale   SubCutaneous every 6 hours  midodrine 10 milliGRAM(s) Oral every 8 hours  milrinone Infusion 0.25 MICROgram(s)/kG/Min (7.51 mL/Hr) IV Continuous <Continuous>  mirtazapine 15 milliGRAM(s) Oral daily  Nephro-melissa 1 Tablet(s) Oral daily  norepinephrine Infusion 0.05 MICROgram(s)/kG/Min (9.38 mL/Hr) IV Continuous <Continuous>  pantoprazole  Injectable 40 milliGRAM(s) IV Push every 12 hours  Phoxillum Filtration BK 4 / 2.5 5000 milliLiter(s) (1500 mL/Hr) CRRT <Continuous>  Phoxillum Filtration BK 4 / 2.5 5000 milliLiter(s) (1000 mL/Hr) CRRT <Continuous>  Phoxillum Filtration BK 4 / 2.5 5000 milliLiter(s) (1000 mL/Hr) CRRT <Continuous>  psyllium Powder 1 Packet(s) Oral two times a day  senna 2 Tablet(s) Oral at bedtime  sodium chloride 0.9%. 1000 milliLiter(s) (10 mL/Hr) IV Continuous <Continuous>  sodium chloride 0.9%. 1000 milliLiter(s) (5 mL/Hr) IV Continuous <Continuous>    MEDICATIONS  (PRN):  ALBUTerol    90 MICROgram(s) HFA Inhaler 2 Puff(s) Inhalation every 6 hours PRN Shortness of Breath and/or Wheezing  sodium chloride 0.9% lock flush 10 milliLiter(s) IV Push every 1 hour PRN Pre/post blood products, medications, blood draw, and to maintain line patency          Allergies    No Known Drug Allergies  strawberry (Anaphylaxis)    Intolerances          WEIGHTS:  Daily     Daily   Admit Wt: Drug Dosing Weight  Height (cm): 182.9 (12 Jun 2021 00:32)  Weight (kg): 100 (26 Jun 2021 07:42)  BMI (kg/m2): 29.9 (26 Jun 2021 07:42)  BSA (m2): 2.22 (26 Jun 2021 07:42)  I&O's Summary    25 Jun 2021 07:01  -  26 Jun 2021 07:00  --------------------------------------------------------  IN: 2298.7 mL / OUT: 1838 mL / NET: 460.7 mL    26 Jun 2021 07:01  -  27 Jun 2021 03:28  --------------------------------------------------------  IN: 2374.2 mL / OUT: 1546 mL / NET: 828.2 mL        VITAL SIGNS:  ICU Vital Signs Last 24 Hrs  T(C): 37 (27 Jun 2021 00:00), Max: 37.1 (26 Jun 2021 08:00)  T(F): 98.6 (27 Jun 2021 00:00), Max: 98.8 (26 Jun 2021 08:00)  HR: 81 (27 Jun 2021 03:00) (60 - 83)  BP: --  BP(mean): --  ABP: 140/68 (27 Jun 2021 03:00) (97/50 - 153/72)  ABP(mean): 88 (27 Jun 2021 03:00) (64 - 92)  RR: 16 (27 Jun 2021 03:00) (11 - 42)  SpO2: 100% (27 Jun 2021 03:00) (91% - 100%)        All laboratory results, radiology and medications reviewed.    LABS:  06-27    132<L>  |  100  |  22.2<H>  ----------------------------<  101<H>  4.0   |  21.0<L>  |  1.51<H>    Ca    8.5<L>      27 Jun 2021 00:35  Phos  3.1     06-27  Mg     2.5     06-27    TPro  6.4<L>  /  Alb  3.6  /  TBili  2.8<H>  /  DBili  x   /  AST  41<H>  /  ALT  40  /  AlkPhos  89  06-27                                 9.2    14.00 )-----------( 57       ( 27 Jun 2021 00:35 )             29.3          PT/INR - ( 27 Jun 2021 03:05 )   PT: 16.0 sec;   INR: 1.40 ratio         PTT - ( 27 Jun 2021 03:05 )  PTT:35.8 sec  Bilirubin Total, Serum: 2.8 mg/dL (06-27 @ 00:35)    ABG - ( 27 Jun 2021 02:12 )  pH, Arterial: 7.440 pH, Blood: x     /  pCO2: 34    /  pO2: 165   / HCO3: 23    / Base Excess: -1.1  /  SaO2: 100.0                 CAPILLARY BLOOD GLUCOSE      POCT Blood Glucose.: 94 mg/dL (26 Jun 2021 23:33)  POCT Blood Glucose.: 124 mg/dL (26 Jun 2021 18:46)  POCT Blood Glucose.: 140 mg/dL (26 Jun 2021 06:50)  POCT Blood Glucose.: 77 mg/dL (26 Jun 2021 05:30)             PHYSICAL EXAM:  General:  no acute distress  Neurology:  intubated, sedated, unable to fully assess  Respiratory:  course to auscultation bilaterally  CV:  regular rate and rhythm, normal S1 S2  Abdomen:  soft, nondistended, positive bowel sounds  Extremities:  warm, well perfused, 2+ edema +DP pulses

## 2021-06-27 NOTE — PROGRESS NOTE ADULT - PROBLEM SELECTOR PLAN 10
Continue with CPAP trials during day, Rest on SIMV overnight    Problem 11: Adjustment Disorder   Will resume Remeron per recommendations from Behavioral Health on 6/17 when able    Problem 12: COPD  On home O2

## 2021-06-27 NOTE — PROGRESS NOTE ADULT - PROBLEM SELECTOR PLAN 6
Initially RONNELL on CKD  Now progressed to ESRD requiring HD  Left IJ HD catheter placed 6/16 and d/c'd on 6/26  new Fem HD cath placed 6/26  RUE has pink band for preservation in anticipation for eventual AVF  Renal team following.  CVVHD started 6/25, current goal net even

## 2021-06-27 NOTE — PROGRESS NOTE ADULT - PROBLEM SELECTOR PLAN 3
Hematology consulted  Broad spectrum ABX started for presumed sepsis/DIC  Transfuse Plts for bleeding and/or Pts < 20

## 2021-06-27 NOTE — PROGRESS NOTE ADULT - ASSESSMENT
76M with a PMH of MI in 1995 (angiogram, no stents placed), COPD (2L O2 at home), severe AS s/p TAVR (03/2019 at [facility]), gout, CKD, CVA (no deficits, 09/2020), pulmonary HTN, initially presented to [facility] 6/1/21 with RONNELL on CKD, hospital course significant for cardiogenic shock, acute hypoxemic respiratory failure, and acute on chronic combined diastolic and systolic heart failure. ISRAEL revealed severe AI. Patient was transferred to [facility] under Dr. Campbell for further workup of bioprosthetic AI.     Inpatient hospital course has been significant for:  1. 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 RONNELL on CKD progressed to ESRD requiring HD   3. Unintentional Weight loss / Dysphagia / Anorexia work up revealing duodenitis and diffuse erosive esophagitis, gastritis on EGD (biopsies negative for H. Pylori or carcinoma); colonoscopy showing diverticulosis  4. R/O AV Endocarditis   5. Auto-elevated INR (followed by Hematology)  6. R/O WILLEM   7. Adjustment disorder (evaluated by Behavioral Health)  8. Thrombocytopenia, Hematology consulted, r/o DIC    Patient is now s/p valve in valve TAVR on 6/23/21 with Dr. Campbell.

## 2021-06-27 NOTE — CONSULT NOTE ADULT - ASSESSMENT
The patient is a 76y Male with multiple medical issues now status post TAVR.     Encephalopathy.   Appears awake on mechanical ventilator now.   Following instructions.  Likely toxic-metabolic encephalopathy with sedation affecting exam yesterday.  Improved per CICU PA.    No further specific neurologic recommendations.     Case discussed with CICU PA for Dr Campbell.

## 2021-06-27 NOTE — PROGRESS NOTE ADULT - SUBJECTIVE AND OBJECTIVE BOX
INFECTIOUS DISEASES AND INTERNAL MEDICINE at [place]  =======================================================  Kyle Wright MD  Diplomates American Board of Internal Medicine and Infectious Diseases  Telephone [phone]  Fax            [phone]  =======================================================    REYNA BOB [id number]    Follow up: valve vegetation    s/p TAVR on 6/23/21  no fevers  cultures from blood remains negative  remains intubated    Allergies:  No Known Drug Allergies  strawberry (Anaphylaxis)       FAMILY  FAMILY HISTORY:  FH: lung cancer      REVIEW OF SYSTEMS:  Unable to obtain due to medical condition      Physical Exam:  GEN: sedated  HEENT: normocephalic and atraumatic.  Anicteric   NECK: Supple.   LUNGS: diffuse rhonchi   HEART: Regular rate and rhythm   ABDOMEN: Soft, nontender, and nondistended.  Positive bowel sounds.    : Lemus   EXTREMITIES: trace edema.  MSK: no joint swelling  NEUROLOGIC: Sedated   PSYCHIATRIC: sedated   SKIN: No Rash      Vitals:  T(F): 99.7 (27 Jun 2021 08:00), Max: 99.7 (27 Jun 2021 04:00)  HR: 85 (27 Jun 2021 08:30)  BP: --  RR: 30 (27 Jun 2021 08:30)  SpO2: 100% (27 Jun 2021 08:30) (91% - 100%)  temp max in last 48H T(F): , Max: 99.7 (06-27-21 @ 04:00)      Current Antibiotics:  cefTRIAXone   IVPB 2000 milliGRAM(s) IV Intermittent every 24 hours    Other medications:  aMIOdarone Infusion 0.5 mG/Min IV Continuous <Continuous>  atorvastatin 10 milliGRAM(s) Oral at bedtime  budesonide 160 MICROgram(s)/formoterol 4.5 MICROgram(s) Inhaler 2 Puff(s) Inhalation two times a day  chlorhexidine 0.12% Liquid 5 milliLiter(s) Oral Mucosa two times a day  chlorhexidine 2% Cloths 1 Application(s) Topical daily  CRRT Treatment    <Continuous>  dexMEDEtomidine Infusion 0.2 MICROgram(s)/kG/Hr IV Continuous <Continuous>  EPINEPHrine    Infusion 0.04 MICROgram(s)/kG/Min IV Continuous <Continuous>  insulin lispro (ADMELOG) corrective regimen sliding scale   SubCutaneous every 6 hours  midodrine 10 milliGRAM(s) Oral every 8 hours  milrinone Infusion 0.25 MICROgram(s)/kG/Min IV Continuous <Continuous>  mirtazapine 15 milliGRAM(s) Oral daily  Nephro-melissa 1 Tablet(s) Oral daily  norepinephrine Infusion 0.05 MICROgram(s)/kG/Min IV Continuous <Continuous>  pantoprazole  Injectable 40 milliGRAM(s) IV Push every 12 hours  Phoxillum Filtration BK 4 / 2.5 5000 milliLiter(s) CRRT <Continuous>  Phoxillum Filtration BK 4 / 2.5 5000 milliLiter(s) CRRT <Continuous>  Phoxillum Filtration BK 4 / 2.5 5000 milliLiter(s) CRRT <Continuous>  psyllium Powder 1 Packet(s) Oral two times a day  senna 2 Tablet(s) Oral at bedtime  sodium chloride 0.9%. 1000 milliLiter(s) IV Continuous <Continuous>  sodium chloride 0.9%. 1000 milliLiter(s) IV Continuous <Continuous>                 9.2    14.00 )-----------( 57       ( 27 Jun 2021 00:35 )             29.3     06-27    132<L>  |  100  |  22.2<H>  ----------------------------<  101<H>  4.0   |  21.0<L>  |  1.51<H>    Ca    8.5<L>      27 Jun 2021 00:35  Phos  3.1     06-27  Mg     2.5     06-27    TPro  6.4<L>  /  Alb  3.6  /  TBili  2.8<H>  /  DBili  x   /  AST  41<H>  /  ALT  40  /  AlkPhos  89  06-27    RECENT CULTURES:  06-26 @ 21:09 .Sputum Sputum     Rare polymorphonuclear leukocytes per low power field  No Squamous epithelial cells per low power field  No organisms seen per oil power field    06-16 @ 17:23 .Blood Blood     No growth at 5 days.    06-16 @ 14:41 .Blood Blood-Peripheral     No growth at 5 days.    06-16 @ 14:40 .Blood Blood-Peripheral     No growth at 5 days.    06-15 @ 12:30 .Blood Blood-Peripheral     No growth at 5 days.    06-15 @ 12:29 .Blood Blood-Peripheral     No growth at 5 days.      WBC Count: 14.00 K/uL (06-27-21 @ 00:35)  WBC Count: 11.58 K/uL (06-26-21 @ 16:52)  WBC Count: 13.11 K/uL (06-26-21 @ 05:43)  WBC Count: 12.82 K/uL (06-25-21 @ 23:38)  WBC Count: 13.53 K/uL (06-25-21 @ 17:42)  WBC Count: 13.00 K/uL (06-25-21 @ 09:53)  WBC Count: 13.29 K/uL (06-25-21 @ 02:40)  WBC Count: 12.85 K/uL (06-24-21 @ 12:25)  WBC Count: 9.68 K/uL (06-24-21 @ 03:19)  WBC Count: 8.93 K/uL (06-23-21 @ 14:24)  WBC Count: 10.50 K/uL (06-23-21 @ 06:05)  WBC Count: 8.73 K/uL (06-22-21 @ 20:14)    Creatinine, Serum: 1.51 mg/dL (06-27-21 @ 00:35)  Creatinine, Serum: 1.96 mg/dL (06-26-21 @ 16:52)  Creatinine, Serum: 2.55 mg/dL (06-26-21 @ 05:45)  Creatinine, Serum: 3.32 mg/dL (06-25-21 @ 23:38)  Creatinine, Serum: 3.97 mg/dL (06-25-21 @ 17:42)  Creatinine, Serum: 5.22 mg/dL (06-25-21 @ 09:53)  Creatinine, Serum: 5.05 mg/dL (06-25-21 @ 02:40)  Creatinine, Serum: 5.00 mg/dL (06-24-21 @ 12:25)  Creatinine, Serum: 4.77 mg/dL (06-24-21 @ 03:19)  Creatinine, Serum: 4.38 mg/dL (06-23-21 @ 14:24)  Creatinine, Serum: 4.41 mg/dL (06-23-21 @ 06:05)  Creatinine, Serum: 3.13 mg/dL (06-22-21 @ 20:14)    Procalcitonin, Serum: 0.24 ng/mL (06-17-21 @ 09:12)     COVID-19 PCR: NotDetec (06-22-21 @ 13:29)  COVID-19 PCR: NotDetec (06-18-21 @ 08:13)

## 2021-06-27 NOTE — CONSULT NOTE ADULT - SUBJECTIVE AND OBJECTIVE BOX
[facility]                                        [facility] [place]                                  Olivia Francis, & [place]                                      [address]                                           [place], [place], [address]                                                [phone]        CC: Encephalopathy    HISTORY:  The patient is a 76y Male who is status post TAVR 6/23. He has remained on the mechanical ventilator and neurology called this morning to evaluate mental status.   He has multiple medical issues that are being addressed.     PAST MEDICAL & SURGICAL HISTORY:  Pulmonary hypertension  Hypertension  Hyperlipidemia  H/O aortic valve stenosis  s/p TAVR 2019 at [place]  CVA (cerebrovascular accident)  MCA CVA with tPA and thrombectomy  Chronic kidney disease  Prediabetes  Mitral valve regurgitation  CAD in native artery  Aneurysm of aortic root  thoracic aortic aneurysm, without ruptur  Benign prostatic hyperplasia  Gout  History of transcatheter aortic valve replacement (TAVR)    MEDICATIONS  (STANDING):  aMIOdarone Infusion 0.5 mG/Min (16.7 mL/Hr) IV Continuous <Continuous>  atorvastatin 10 milliGRAM(s) Oral at bedtime  budesonide 160 MICROgram(s)/formoterol 4.5 MICROgram(s) Inhaler 2 Puff(s) Inhalation two times a day  cefTRIAXone   IVPB 2000 milliGRAM(s) IV Intermittent every 24 hours  chlorhexidine 0.12% Liquid 5 milliLiter(s) Oral Mucosa two times a day  chlorhexidine 2% Cloths 1 Application(s) Topical daily  CRRT Treatment    <Continuous>  dexMEDEtomidine Infusion 0.2 MICROgram(s)/kG/Hr (5.01 mL/Hr) IV Continuous <Continuous>  EPINEPHrine    Infusion 0.04 MICROgram(s)/kG/Min (15 mL/Hr) IV Continuous <Continuous>  insulin lispro (ADMELOG) corrective regimen sliding scale   SubCutaneous every 6 hours  midodrine 10 milliGRAM(s) Oral every 8 hours  milrinone Infusion 0.25 MICROgram(s)/kG/Min (7.51 mL/Hr) IV Continuous <Continuous>  mirtazapine 15 milliGRAM(s) Oral daily  Nephro-melissa 1 Tablet(s) Oral daily  pantoprazole  Injectable 40 milliGRAM(s) IV Push every 12 hours  Phoxillum Filtration BK 4 / 2.5 5000 milliLiter(s) (1000 mL/Hr) CRRT <Continuous>  Phoxillum Filtration BK 4 / 2.5 5000 milliLiter(s) (2000 mL/Hr) CRRT <Continuous>  Phoxillum Filtration BK 4 / 2.5 5000 milliLiter(s) (1000 mL/Hr) CRRT <Continuous>  psyllium Powder 1 Packet(s) Oral two times a day  senna 2 Tablet(s) Oral at bedtime  sodium chloride 0.9%. 1000 milliLiter(s) (10 mL/Hr) IV Continuous <Continuous>  sodium chloride 0.9%. 1000 milliLiter(s) (5 mL/Hr) IV Continuous <Continuous>  vancomycin  IVPB 750 milliGRAM(s) IV Intermittent every 12 hours    MEDICATIONS  (PRN):  ALBUTerol    90 MICROgram(s) HFA Inhaler 2 Puff(s) Inhalation every 6 hours PRN Shortness of Breath and/or Wheezing  sodium chloride 0.9% lock flush 10 milliLiter(s) IV Push every 1 hour PRN Pre/post blood products, medications, blood draw, and to maintain line patency    Allergies  No Known Drug Allergies  strawberry (Anaphylaxis)    SOCIAL HISTORY:  Patient unable to provide.     FAMILY HISTORY:  FH: lung cancer  Patient unable to provide further.     ROS:  Constitutional: Unobtainable due to patient's condition.   Neuro: Unobtainable due to patient's condition.   Eyes: Unobtainable due to patient's condition.   Ears/nose/throat: Unobtainable due to patient's condition.   Cardiac: Unobtainable due to patient's condition.   Respiratory: Unobtainable due to patient's condition.   GI: Unobtainable due to patient's condition.   : Unobtainable due to patient's condition..  Integumentary: Unobtainable due to patient's condition.  Psych: Unobtainable due to patient's condition.  Heme: Unobtainable due to patient's condition.     Exam:  Vital Signs Last 24 Hrs  T(C): 37.7 (27 Jun 2021 12:00), Max: 37.7 (27 Jun 2021 12:00)  T(F): 99.9 (27 Jun 2021 12:00), Max: 99.9 (27 Jun 2021 12:00)  HR: 84 (27 Jun 2021 12:20) (60 - 87)  RR: 13 (27 Jun 2021 12:20) (11 - 42)  SpO2: 100% (27 Jun 2021 12:20) (91% - 100%)  General: NAD.   Carotid bruits absent.     Mental status: On mechanical ventilator. Opens eyes readily to voice. Follows simple instructions.     Cranial nerves: There is no papilledema. Pupils react symmetrically to light. Blinks to threat bilaterally. Tracks with gaze. Facial sensation is intact. Facial musculature is grossly symmetric although an endotracheal tube is present. Palate and tongue cannot be assessed.     Motor: There is normal bulk and tone.  Moves both sides to command. Symmetric strength although diffusely weak.    Sensation: Nods to pin bilaterally.    Reflexes: 1+ throughout and plantar responses are flexor.    Cerebellar: Cannot be assessed.     LABS:                         9.0    11.99 )-----------( 47       ( 27 Jun 2021 08:52 )             28.6       06-27    135  |  102  |  16.3  ----------------------------<  89  3.9   |  20.0<L>  |  1.23    Ca    8.3<L>      27 Jun 2021 08:52  Phos  3.1     06-27  Mg     2.5     06-27    TPro  6.4<L>  /  Alb  3.6  /  TBili  2.8<H>  /  DBili  x   /  AST  41<H>  /  ALT  40  /  AlkPhos  89  06-27      PT/INR - ( 27 Jun 2021 03:05 )   PT: 16.0 sec;   INR: 1.40 ratio         PTT - ( 27 Jun 2021 03:05 )  PTT:35.8 sec    RADIOLOGY   CT head images reviewed (and concur with report): There is no acute pathology.

## 2021-06-28 ENCOUNTER — APPOINTMENT (OUTPATIENT)
Dept: THORACIC SURGERY | Facility: HOSPITAL | Age: 76
End: 2021-06-28
Payer: MEDICARE

## 2021-06-28 DIAGNOSIS — I50.43 ACUTE ON CHRONIC COMBINED SYSTOLIC (CONGESTIVE) AND DIASTOLIC (CONGESTIVE) HEART FAILURE: ICD-10-CM

## 2021-06-28 DIAGNOSIS — I27.20 PULMONARY HYPERTENSION, UNSPECIFIED: ICD-10-CM

## 2021-06-28 LAB
ALBUMIN SERPL ELPH-MCNC: 3.6 G/DL — SIGNIFICANT CHANGE UP (ref 3.3–5.2)
ALP SERPL-CCNC: 111 U/L — SIGNIFICANT CHANGE UP (ref 40–120)
ALT FLD-CCNC: 45 U/L — HIGH
ANION GAP SERPL CALC-SCNC: 12 MMOL/L — SIGNIFICANT CHANGE UP (ref 5–17)
ANION GAP SERPL CALC-SCNC: 13 MMOL/L — SIGNIFICANT CHANGE UP (ref 5–17)
APTT BLD: 32.8 SEC — SIGNIFICANT CHANGE UP (ref 27.5–35.5)
AST SERPL-CCNC: 58 U/L — HIGH
BASE EXCESS BLDA CALC-SCNC: -2.5 MMOL/L — LOW (ref -2–3)
BASE EXCESS BLDMV CALC-SCNC: -2.6 MMOL/L — SIGNIFICANT CHANGE UP (ref -3–3)
BASE EXCESS BLDV CALC-SCNC: -0.4 MMOL/L — SIGNIFICANT CHANGE UP (ref -2–3)
BASOPHILS # BLD AUTO: 0.02 K/UL — SIGNIFICANT CHANGE UP (ref 0–0.2)
BASOPHILS NFR BLD AUTO: 0.2 % — SIGNIFICANT CHANGE UP (ref 0–2)
BILIRUB SERPL-MCNC: 2.2 MG/DL — HIGH (ref 0.4–2)
BLOOD GAS COMMENTS ARTERIAL: SIGNIFICANT CHANGE UP
BUN SERPL-MCNC: 11.5 MG/DL — SIGNIFICANT CHANGE UP (ref 8–20)
BUN SERPL-MCNC: 12.8 MG/DL — SIGNIFICANT CHANGE UP (ref 8–20)
CA-I SERPL-SCNC: 1.01 MMOL/L — LOW (ref 1.15–1.33)
CALCIUM SERPL-MCNC: 8.1 MG/DL — LOW (ref 8.6–10.2)
CALCIUM SERPL-MCNC: 8.3 MG/DL — LOW (ref 8.6–10.2)
CHLORIDE BLDV-SCNC: 109 MMOL/L — HIGH (ref 98–107)
CHLORIDE SERPL-SCNC: 101 MMOL/L — SIGNIFICANT CHANGE UP (ref 98–107)
CHLORIDE SERPL-SCNC: 102 MMOL/L — SIGNIFICANT CHANGE UP (ref 98–107)
CO2 SERPL-SCNC: 20 MMOL/L — LOW (ref 22–29)
CO2 SERPL-SCNC: 21 MMOL/L — LOW (ref 22–29)
COHGB MFR BLDMV: 2.1 % — SIGNIFICANT CHANGE UP
CREAT SERPL-MCNC: 0.82 MG/DL — SIGNIFICANT CHANGE UP (ref 0.5–1.3)
CREAT SERPL-MCNC: 0.89 MG/DL — SIGNIFICANT CHANGE UP (ref 0.5–1.3)
CULTURE RESULTS: SIGNIFICANT CHANGE UP
D DIMER BLD IA.RAPID-MCNC: 2324 NG/ML DDU — HIGH
EOSINOPHIL # BLD AUTO: 0.03 K/UL — SIGNIFICANT CHANGE UP (ref 0–0.5)
EOSINOPHIL NFR BLD AUTO: 0.3 % — SIGNIFICANT CHANGE UP (ref 0–6)
FIBRINOGEN PPP-MCNC: 408 MG/DL — SIGNIFICANT CHANGE UP (ref 290–520)
GAS PNL BLDA: SIGNIFICANT CHANGE UP
GAS PNL BLDV: 139 MMOL/L — SIGNIFICANT CHANGE UP (ref 136–145)
GAS PNL BLDV: SIGNIFICANT CHANGE UP
GAS PNL BLDV: SIGNIFICANT CHANGE UP
GLUCOSE BLDC GLUCOMTR-MCNC: 109 MG/DL — HIGH (ref 70–99)
GLUCOSE BLDC GLUCOMTR-MCNC: 135 MG/DL — HIGH (ref 70–99)
GLUCOSE BLDC GLUCOMTR-MCNC: 145 MG/DL — HIGH (ref 70–99)
GLUCOSE BLDC GLUCOMTR-MCNC: 94 MG/DL — SIGNIFICANT CHANGE UP (ref 70–99)
GLUCOSE BLDV-MCNC: 133 MG/DL — HIGH (ref 70–99)
GLUCOSE SERPL-MCNC: 117 MG/DL — HIGH (ref 70–99)
GLUCOSE SERPL-MCNC: 141 MG/DL — HIGH (ref 70–99)
HCO3 BLDA-SCNC: 22 MMOL/L — SIGNIFICANT CHANGE UP (ref 21–28)
HCO3 BLDMV-SCNC: 22 MMOL/L — SIGNIFICANT CHANGE UP (ref 20–28)
HCO3 BLDV-SCNC: 23 MMOL/L — SIGNIFICANT CHANGE UP (ref 22–29)
HCT VFR BLD CALC: 26.9 % — LOW (ref 39–50)
HCT VFR BLD CALC: 27 % — LOW (ref 39–50)
HCT VFR BLDA CALC: 30 % — LOW (ref 39–51)
HGB BLD CALC-MCNC: 10.1 G/DL — LOW (ref 12.6–17.4)
HGB BLD-MCNC: 8.5 G/DL — LOW (ref 13–17)
HGB BLD-MCNC: 8.7 G/DL — LOW (ref 13–17)
HGB FLD-MCNC: 8.4 G/DL — LOW (ref 12.6–17.4)
HOROWITZ INDEX BLDA+IHG-RTO: 40 — SIGNIFICANT CHANGE UP
HOROWITZ INDEX BLDMV+IHG-RTO: SIGNIFICANT CHANGE UP
IMM GRANULOCYTES NFR BLD AUTO: 0.8 % — SIGNIFICANT CHANGE UP (ref 0–1.5)
INR BLD: 1.55 RATIO — HIGH (ref 0.88–1.16)
LACTATE BLDV-MCNC: 3.4 MMOL/L — HIGH (ref 0.5–2)
LIDOCAIN SERPL-MCNC: 2.6 MG/L — SIGNIFICANT CHANGE UP (ref 1.5–5)
LIDOCAIN SERPL-MCNC: 5.3 MG/L — HIGH (ref 1.5–5)
LYMPHOCYTES # BLD AUTO: 0.85 K/UL — LOW (ref 1–3.3)
LYMPHOCYTES # BLD AUTO: 7.7 % — LOW (ref 13–44)
MAGNESIUM SERPL-MCNC: 2.3 MG/DL — SIGNIFICANT CHANGE UP (ref 1.6–2.6)
MAGNESIUM SERPL-MCNC: 2.4 MG/DL — SIGNIFICANT CHANGE UP (ref 1.6–2.6)
MCHC RBC-ENTMCNC: 30.2 PG — SIGNIFICANT CHANGE UP (ref 27–34)
MCHC RBC-ENTMCNC: 30.5 PG — SIGNIFICANT CHANGE UP (ref 27–34)
MCHC RBC-ENTMCNC: 31.5 GM/DL — LOW (ref 32–36)
MCHC RBC-ENTMCNC: 32.3 GM/DL — SIGNIFICANT CHANGE UP (ref 32–36)
MCV RBC AUTO: 94.4 FL — SIGNIFICANT CHANGE UP (ref 80–100)
MCV RBC AUTO: 96.1 FL — SIGNIFICANT CHANGE UP (ref 80–100)
METHGB MFR BLDMV: 0.4 % — SIGNIFICANT CHANGE UP
MONOCYTES # BLD AUTO: 1.25 K/UL — HIGH (ref 0–0.9)
MONOCYTES NFR BLD AUTO: 11.4 % — SIGNIFICANT CHANGE UP (ref 2–14)
NEUTROPHILS # BLD AUTO: 8.77 K/UL — HIGH (ref 1.8–7.4)
NEUTROPHILS NFR BLD AUTO: 79.6 % — HIGH (ref 43–77)
O2 CT VFR BLD CALC: <42 MMHG — SIGNIFICANT CHANGE UP (ref 30–65)
PCO2 BLDA: 31 MMHG — LOW (ref 35–48)
PCO2 BLDMV: 38 MMHG — SIGNIFICANT CHANGE UP (ref 30–65)
PCO2 BLDV: 32 MMHG — LOW (ref 42–55)
PH BLDA: 7.45 — SIGNIFICANT CHANGE UP (ref 7.35–7.45)
PH BLDMV: 7.38 — SIGNIFICANT CHANGE UP (ref 7.32–7.45)
PH BLDV: 7.47 — HIGH (ref 7.32–7.43)
PHOSPHATE SERPL-MCNC: 3 MG/DL — SIGNIFICANT CHANGE UP (ref 2.4–4.7)
PHOSPHATE SERPL-MCNC: 3.1 MG/DL — SIGNIFICANT CHANGE UP (ref 2.4–4.7)
PLATELET # BLD AUTO: 89 K/UL — LOW (ref 150–400)
PLATELET # BLD AUTO: 93 K/UL — LOW (ref 150–400)
PO2 BLDA: 208 MMHG — HIGH (ref 83–108)
PO2 BLDV: 141 MMHG — HIGH (ref 25–45)
POTASSIUM BLDV-SCNC: 4.4 MMOL/L — SIGNIFICANT CHANGE UP (ref 3.5–5.1)
POTASSIUM SERPL-MCNC: 3.8 MMOL/L — SIGNIFICANT CHANGE UP (ref 3.5–5.3)
POTASSIUM SERPL-MCNC: 3.9 MMOL/L — SIGNIFICANT CHANGE UP (ref 3.5–5.3)
POTASSIUM SERPL-SCNC: 3.8 MMOL/L — SIGNIFICANT CHANGE UP (ref 3.5–5.3)
POTASSIUM SERPL-SCNC: 3.9 MMOL/L — SIGNIFICANT CHANGE UP (ref 3.5–5.3)
PROT SERPL-MCNC: 6.3 G/DL — LOW (ref 6.6–8.7)
PROTHROM AB SERPL-ACNC: 17.6 SEC — HIGH (ref 10.6–13.6)
RBC # BLD: 2.81 M/UL — LOW (ref 4.2–5.8)
RBC # BLD: 2.85 M/UL — LOW (ref 4.2–5.8)
RBC # FLD: 20.9 % — HIGH (ref 10.3–14.5)
RBC # FLD: 21.2 % — HIGH (ref 10.3–14.5)
SAO2 % BLDA: 100 % — HIGH (ref 94–98)
SAO2 % BLDV: 100 % — SIGNIFICANT CHANGE UP
SODIUM SERPL-SCNC: 134 MMOL/L — LOW (ref 135–145)
SODIUM SERPL-SCNC: 135 MMOL/L — SIGNIFICANT CHANGE UP (ref 135–145)
SPECIMEN SOURCE: SIGNIFICANT CHANGE UP
VANCOMYCIN TROUGH SERPL-MCNC: 10.4 UG/ML — SIGNIFICANT CHANGE UP (ref 10–20)
WBC # BLD: 11.01 K/UL — HIGH (ref 3.8–10.5)
WBC # BLD: 11.47 K/UL — HIGH (ref 3.8–10.5)
WBC # FLD AUTO: 11.01 K/UL — HIGH (ref 3.8–10.5)
WBC # FLD AUTO: 11.47 K/UL — HIGH (ref 3.8–10.5)

## 2021-06-28 PROCEDURE — 99233 SBSQ HOSP IP/OBS HIGH 50: CPT

## 2021-06-28 PROCEDURE — 99291 CRITICAL CARE FIRST HOUR: CPT

## 2021-06-28 PROCEDURE — 31645 BRNCHSC W/THER ASPIR 1ST: CPT

## 2021-06-28 PROCEDURE — 71045 X-RAY EXAM CHEST 1 VIEW: CPT | Mod: 26,77

## 2021-06-28 PROCEDURE — ZZZZZ: CPT

## 2021-06-28 PROCEDURE — 90937 HEMODIALYSIS REPEATED EVAL: CPT

## 2021-06-28 PROCEDURE — 71045 X-RAY EXAM CHEST 1 VIEW: CPT | Mod: 26

## 2021-06-28 PROCEDURE — 99232 SBSQ HOSP IP/OBS MODERATE 35: CPT

## 2021-06-28 PROCEDURE — 99291 CRITICAL CARE FIRST HOUR: CPT | Mod: 25

## 2021-06-28 PROCEDURE — 31624 DX BRONCHOSCOPE/LAVAGE: CPT

## 2021-06-28 RX ORDER — AMIODARONE HYDROCHLORIDE 400 MG/1
200 TABLET ORAL DAILY
Refills: 0 | Status: DISCONTINUED | OUTPATIENT
Start: 2021-07-02 | End: 2021-07-10

## 2021-06-28 RX ORDER — FENTANYL CITRATE 50 UG/ML
50 INJECTION INTRAVENOUS ONCE
Refills: 0 | Status: DISCONTINUED | OUTPATIENT
Start: 2021-06-28 | End: 2021-06-28

## 2021-06-28 RX ORDER — AMIODARONE HYDROCHLORIDE 400 MG/1
TABLET ORAL
Refills: 0 | Status: DISCONTINUED | OUTPATIENT
Start: 2021-07-02 | End: 2021-07-10

## 2021-06-28 RX ORDER — FENTANYL CITRATE 50 UG/ML
50 INJECTION INTRAVENOUS EVERY 4 HOURS
Refills: 0 | Status: DISCONTINUED | OUTPATIENT
Start: 2021-06-28 | End: 2021-06-29

## 2021-06-28 RX ORDER — FENTANYL CITRATE 50 UG/ML
100 INJECTION INTRAVENOUS ONCE
Refills: 0 | Status: DISCONTINUED | OUTPATIENT
Start: 2021-06-28 | End: 2021-06-28

## 2021-06-28 RX ORDER — VANCOMYCIN HCL 1 G
1000 VIAL (EA) INTRAVENOUS
Refills: 0 | Status: DISCONTINUED | OUTPATIENT
Start: 2021-06-28 | End: 2021-06-28

## 2021-06-28 RX ORDER — AMIODARONE HYDROCHLORIDE 400 MG/1
400 TABLET ORAL EVERY 8 HOURS
Refills: 0 | Status: COMPLETED | OUTPATIENT
Start: 2021-06-28 | End: 2021-07-01

## 2021-06-28 RX ORDER — PIPERACILLIN AND TAZOBACTAM 4; .5 G/20ML; G/20ML
3.38 INJECTION, POWDER, LYOPHILIZED, FOR SOLUTION INTRAVENOUS EVERY 8 HOURS
Refills: 0 | Status: DISCONTINUED | OUTPATIENT
Start: 2021-06-28 | End: 2021-06-29

## 2021-06-28 RX ORDER — MIDAZOLAM HYDROCHLORIDE 1 MG/ML
2 INJECTION, SOLUTION INTRAMUSCULAR; INTRAVENOUS ONCE
Refills: 0 | Status: DISCONTINUED | OUTPATIENT
Start: 2021-06-28 | End: 2021-06-28

## 2021-06-28 RX ORDER — MIDAZOLAM HYDROCHLORIDE 1 MG/ML
4 INJECTION, SOLUTION INTRAMUSCULAR; INTRAVENOUS ONCE
Refills: 0 | Status: DISCONTINUED | OUTPATIENT
Start: 2021-06-28 | End: 2021-06-28

## 2021-06-28 RX ADMIN — FENTANYL CITRATE 100 MICROGRAM(S): 50 INJECTION INTRAVENOUS at 20:45

## 2021-06-28 RX ADMIN — Medication 500 MILLIGRAM(S): at 12:28

## 2021-06-28 RX ADMIN — PANTOPRAZOLE SODIUM 40 MILLIGRAM(S): 20 TABLET, DELAYED RELEASE ORAL at 05:39

## 2021-06-28 RX ADMIN — AMIODARONE HYDROCHLORIDE 400 MILLIGRAM(S): 400 TABLET ORAL at 17:26

## 2021-06-28 RX ADMIN — FENTANYL CITRATE 50 MICROGRAM(S): 50 INJECTION INTRAVENOUS at 05:55

## 2021-06-28 RX ADMIN — MIDAZOLAM HYDROCHLORIDE 2 MILLIGRAM(S): 1 INJECTION, SOLUTION INTRAMUSCULAR; INTRAVENOUS at 10:10

## 2021-06-28 RX ADMIN — PIPERACILLIN AND TAZOBACTAM 25 GRAM(S): 4; .5 INJECTION, POWDER, LYOPHILIZED, FOR SOLUTION INTRAVENOUS at 22:24

## 2021-06-28 RX ADMIN — Medication 1 PACKET(S): at 05:39

## 2021-06-28 RX ADMIN — BUDESONIDE AND FORMOTEROL FUMARATE DIHYDRATE 2 PUFF(S): 160; 4.5 AEROSOL RESPIRATORY (INHALATION) at 08:09

## 2021-06-28 RX ADMIN — Medication 1 MILLIGRAM(S): at 12:28

## 2021-06-28 RX ADMIN — FENTANYL CITRATE 50 MICROGRAM(S): 50 INJECTION INTRAVENOUS at 02:00

## 2021-06-28 RX ADMIN — MIDODRINE HYDROCHLORIDE 10 MILLIGRAM(S): 2.5 TABLET ORAL at 12:45

## 2021-06-28 RX ADMIN — MIDAZOLAM HYDROCHLORIDE 4 MILLIGRAM(S): 1 INJECTION, SOLUTION INTRAMUSCULAR; INTRAVENOUS at 16:23

## 2021-06-28 RX ADMIN — EPINEPHRINE 15 MICROGRAM(S)/KG/MIN: 0.3 INJECTION INTRAMUSCULAR; SUBCUTANEOUS at 20:31

## 2021-06-28 RX ADMIN — Medication 1 PACKET(S): at 17:26

## 2021-06-28 RX ADMIN — FENTANYL CITRATE 50 MICROGRAM(S): 50 INJECTION INTRAVENOUS at 23:15

## 2021-06-28 RX ADMIN — DEXMEDETOMIDINE HYDROCHLORIDE IN 0.9% SODIUM CHLORIDE 5.01 MICROGRAM(S)/KG/HR: 4 INJECTION INTRAVENOUS at 22:14

## 2021-06-28 RX ADMIN — FENTANYL CITRATE 50 MICROGRAM(S): 50 INJECTION INTRAVENOUS at 14:51

## 2021-06-28 RX ADMIN — MIRTAZAPINE 15 MILLIGRAM(S): 45 TABLET, ORALLY DISINTEGRATING ORAL at 12:26

## 2021-06-28 RX ADMIN — FENTANYL CITRATE 50 MICROGRAM(S): 50 INJECTION INTRAVENOUS at 05:35

## 2021-06-28 RX ADMIN — CHLORHEXIDINE GLUCONATE 5 MILLILITER(S): 213 SOLUTION TOPICAL at 17:26

## 2021-06-28 RX ADMIN — FENTANYL CITRATE 50 MICROGRAM(S): 50 INJECTION INTRAVENOUS at 10:10

## 2021-06-28 RX ADMIN — FENTANYL CITRATE 100 MICROGRAM(S): 50 INJECTION INTRAVENOUS at 16:25

## 2021-06-28 RX ADMIN — FENTANYL CITRATE 100 MICROGRAM(S): 50 INJECTION INTRAVENOUS at 15:40

## 2021-06-28 RX ADMIN — FENTANYL CITRATE 100 MICROGRAM(S): 50 INJECTION INTRAVENOUS at 15:45

## 2021-06-28 RX ADMIN — CHLORHEXIDINE GLUCONATE 1 APPLICATION(S): 213 SOLUTION TOPICAL at 12:26

## 2021-06-28 RX ADMIN — CHLORHEXIDINE GLUCONATE 5 MILLILITER(S): 213 SOLUTION TOPICAL at 05:40

## 2021-06-28 RX ADMIN — MIDODRINE HYDROCHLORIDE 10 MILLIGRAM(S): 2.5 TABLET ORAL at 22:24

## 2021-06-28 RX ADMIN — FENTANYL CITRATE 50 MICROGRAM(S): 50 INJECTION INTRAVENOUS at 07:05

## 2021-06-28 RX ADMIN — FENTANYL CITRATE 50 MICROGRAM(S): 50 INJECTION INTRAVENOUS at 10:25

## 2021-06-28 RX ADMIN — FENTANYL CITRATE 50 MICROGRAM(S): 50 INJECTION INTRAVENOUS at 01:45

## 2021-06-28 RX ADMIN — SENNA PLUS 2 TABLET(S): 8.6 TABLET ORAL at 22:23

## 2021-06-28 RX ADMIN — DEXMEDETOMIDINE HYDROCHLORIDE IN 0.9% SODIUM CHLORIDE 5.01 MICROGRAM(S)/KG/HR: 4 INJECTION INTRAVENOUS at 20:30

## 2021-06-28 RX ADMIN — Medication 250 MILLIGRAM(S): at 11:57

## 2021-06-28 RX ADMIN — FENTANYL CITRATE 50 MICROGRAM(S): 50 INJECTION INTRAVENOUS at 22:53

## 2021-06-28 RX ADMIN — ATORVASTATIN CALCIUM 10 MILLIGRAM(S): 80 TABLET, FILM COATED ORAL at 22:26

## 2021-06-28 RX ADMIN — PANTOPRAZOLE SODIUM 40 MILLIGRAM(S): 20 TABLET, DELAYED RELEASE ORAL at 17:26

## 2021-06-28 RX ADMIN — BUDESONIDE AND FORMOTEROL FUMARATE DIHYDRATE 2 PUFF(S): 160; 4.5 AEROSOL RESPIRATORY (INHALATION) at 21:37

## 2021-06-28 RX ADMIN — Medication 5 MILLIGRAM(S): at 22:23

## 2021-06-28 RX ADMIN — Medication 325 MILLIGRAM(S): at 22:23

## 2021-06-28 RX ADMIN — FENTANYL CITRATE 50 MICROGRAM(S): 50 INJECTION INTRAVENOUS at 16:25

## 2021-06-28 RX ADMIN — AMIODARONE HYDROCHLORIDE 400 MILLIGRAM(S): 400 TABLET ORAL at 22:23

## 2021-06-28 RX ADMIN — MILRINONE LACTATE 7.51 MICROGRAM(S)/KG/MIN: 1 INJECTION, SOLUTION INTRAVENOUS at 20:30

## 2021-06-28 RX ADMIN — CEFTRIAXONE 100 MILLIGRAM(S): 500 INJECTION, POWDER, FOR SOLUTION INTRAMUSCULAR; INTRAVENOUS at 15:30

## 2021-06-28 RX ADMIN — FENTANYL CITRATE 100 MICROGRAM(S): 50 INJECTION INTRAVENOUS at 20:25

## 2021-06-28 RX ADMIN — Medication 1 TABLET(S): at 12:26

## 2021-06-28 RX ADMIN — MIDODRINE HYDROCHLORIDE 10 MILLIGRAM(S): 2.5 TABLET ORAL at 05:39

## 2021-06-28 RX ADMIN — MIDAZOLAM HYDROCHLORIDE 2 MILLIGRAM(S): 1 INJECTION, SOLUTION INTRAMUSCULAR; INTRAVENOUS at 20:30

## 2021-06-28 NOTE — PROGRESS NOTE ADULT - ATTENDING COMMENTS
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

## 2021-06-28 NOTE — PROGRESS NOTE ADULT - SUBJECTIVE AND OBJECTIVE BOX
[place] [facility]                                                               Office: [address] [address]                                                              Telephone: [phone]. Fax:[phone]                                                                             PROGRESS NOTE  Reason for follow up: Heart Failure, AS  Update: epinephrine milrinone, Precedex, CVVHD, amiodarone. tele rates controlled. no NSVT    intubated, sedated on Precedex. Fighting vent.     Vitals:  T(C): 37.6 (06-28-21 @ 09:00), Max: 37.8 (06-28-21 @ 07:15)  HR: 85 (06-28-21 @ 12:45) (80 - 88)  RR: 18 (06-28-21 @ 12:45) (7 - 34)  SpO2: 97% (06-28-21 @ 12:45) (78% - 100%)      I&O's Summary  27 Jun 2021 07:01 - 28 Jun 2021 07:00  --------------------------------------------------------  IN: 3550.2 mL / OUT: 5607 mL / NET: -2056.8 mL    28 Jun 2021 07:01  -  28 Jun 2021 13:08  --------------------------------------------------------  IN: 1030.2 mL / OUT: 635 mL / NET: 395.2 mL      Weight (kg): 100 (06-27 @ 11:40)      PHYSICAL EXAM:  Appearance: intubated, on precedex, fighting vent, copious secretions when suctioned  HEENT:  Atraumatic. Normocephalic.    Neurologic: sedated  Cardiovascular: Normal S1 S2, 3/6 murmur, no rubs/gallops.   Respiratory: vent supported   Gastrointestinal:  Soft, Non-tender, + BS  Lower Extremities: No edema  Psychiatry: Patient is calm. No agitation. Mood & affect appropriate  Skin: No rashes/ ecchymoses/cyanosis/ulcers visualized on the face, hands or feet.      CURRENT MEDICATIONS:  aMIOdarone Infusion 0.5 mG/Min IV Continuous <Continuous>  EPINEPHrine    Infusion 0.04 MICROgram(s)/kG/Min IV Continuous <Continuous>  midodrine 10 milliGRAM(s) Oral every 8 hours  milrinone Infusion 0.25 MICROgram(s)/kG/Min IV Continuous <Continuous>    budesonide 160 MICROgram(s)/formoterol 4.5 MICROgram(s) Inhaler  cefTRIAXone   IVPB  vancomycin  IVPB  dexMEDEtomidine Infusion  melatonin  mirtazapine  pantoprazole  Injectable  psyllium Powder  senna  atorvastatin  insulin lispro (ADMELOG) corrective regimen sliding scale  ascorbic acid  chlorhexidine 0.12% Liquid  chlorhexidine 2% Cloths  ferrous    sulfate  folic acid  Nephro-melissa  sodium chloride 0.9%.      LABS:	 	                   8.7    11.01 )-----------( 89       ( 28 Jun 2021 05:26 )             26.9     06-28    135  |  101  |  11.5  ----------------------------<  117<H>  3.8   |  21.0<L>  |  0.82    Ca    8.1<L>      28 Jun 2021 05:26  Phos  3.0     06-28  Mg     2.3     06-28    TPro  6.3<L>  /  Alb  3.6  /  TBili  2.2<H>  /  DBili  x   /  AST  58<H>  /  ALT  45<H>  /  AlkPhos  111  06-28    TELEMETRY: Reviewed

## 2021-06-28 NOTE — CONSULT NOTE ADULT - PROBLEM SELECTOR RECOMMENDATION 4
Likely cardiorenal etiology  CRRT orders per nephrology  Agree with maintaining net even volume status

## 2021-06-28 NOTE — CONSULT NOTE ADULT - SUBJECTIVE AND OBJECTIVE BOX
HPI:  76 year old male with a medical history of MI in 1995 (angiogram, no stents placed), COPD (O2 dependent 2LNC), s/p TAVR (03/2019 at [facility]), CVA (09/2020), pulmonary HTN, CKD and gout initially presented to [facility] 6/1/21 with RONNELL on CKD (likely cardiorenal syndrome), urinary retention due to noncompliance with medications, with hospital course complicated by cardiogenic shock, acute hypoxemic respiratory failure secondary to metabolic acidosis and respiratory alkalosis. Patient found to have TAVR failure with ISRAEL 6/10/21 showing EF 40-45%, Aortic valve prosthesis with Severe paravalvular leak and valvular AI, mild-moderate aortic stenosis, and moderate MR. Patient was transferred to [facility] under Dr. Campbell for further workup.   He underwent Valve in valve TAVR 6/23/21, repeat TTE 6/26 demonstrated reduction in LVEF 20-25%,     Imaging from [facility]:  6/1: CXR shwoing cardiomegaly, small b/l pleural effusions, moderate pulmonary vascular congestion.  6/1: CT Brain ordered for head trauma? showing age-related cerebral and cerebellar volume loss, mild chronic vascular ischemic changes, stable biparietal arachnoid cyst and stable midline posterior fossa arachnoid cyst.   6/1: US Abdomen for elevated LFTs showing mild hepatic steatosis, mild hepatomegaly, sludge in the gallbladder, no discrete gallstones, normal biliary ducts, mild echogenic right kidney which may be seen with medical renal dz, mildly complex Right upper pole 4cm cyst with subtle internal echoes may represent hemorrhagic/proteinaceous cyst, mildly complex right midpole 2cm cyst with thin internal linear septation.   6/3: Kidney and Bladder US showing no hydronephrosis, echogenic kidneys likely from medical renal dz, prostatomegaly, and thickening of the posterior wall of the bladder with trabeculations which could be due to bladder outlet obstruction.   6/7: Kidney and Bladder US: No hydronephrosis or shadowing renal calculi. Stable b/l renal cysts.   6/7: Lower Extremity Venous Doppler with no DVT noted.   6/8: TTE showing EF 47%, dilated IVC, dilation of the ascending aorta of 4.4cm, moderate-severe pulmonary HTN, moderate-severe TR, aortic valve with severe prosthesis regurgitation and moderate prosthesis stenosis  6/9: CT Chest showing emphysema and intersitial lung dz changes, stable b/l small pleural effusions, cardiomegaly.   6/11: ISRAEL showing EF 40-45%, Aortic valve prosthesis with Severe paravalvular leak and valvular AI, mild-moderate aortic stenosis, and moderate MR.  (12 Jun 2021 01:36)    PAST MEDICAL & SURGICAL HISTORY:  Pulmonary hypertension    Hypertension    Hyperlipidemia    H/O aortic valve stenosis  s/p TAVR 2019 at [place]    CVA (cerebrovascular accident)  MCA CVA with tPA and thrombectomy    Chronic kidney disease    Prediabetes    Mitral valve regurgitation    CAD in native artery    Aneurysm of aortic root  thoracic aortic aneurysm, without ruptur    Benign prostatic hyperplasia    Gout    History of transcatheter aortic valve replacement (TAVR)    REVIEW OF SYSTEMS:  Unable to obtain-pt intubated/sedated    MEDICATIONS  (STANDING):  aMIOdarone    Tablet 400 milliGRAM(s) Oral every 8 hours  aMIOdarone    Tablet   Oral   ascorbic acid 500 milliGRAM(s) Oral daily  atorvastatin 10 milliGRAM(s) Oral at bedtime  budesonide 160 MICROgram(s)/formoterol 4.5 MICROgram(s) Inhaler 2 Puff(s) Inhalation two times a day  chlorhexidine 0.12% Liquid 5 milliLiter(s) Oral Mucosa two times a day  chlorhexidine 2% Cloths 1 Application(s) Topical daily  CRRT Treatment    <Continuous>  dexMEDEtomidine Infusion 0.2 MICROgram(s)/kG/Hr (5.01 mL/Hr) IV Continuous <Continuous>  EPINEPHrine    Infusion 0.04 MICROgram(s)/kG/Min (15 mL/Hr) IV Continuous <Continuous>  fentaNYL    Injectable 100 MICROGram(s) IV Push once  ferrous    sulfate 325 milliGRAM(s) Oral at bedtime  folic acid 1 milliGRAM(s) Oral daily  insulin lispro (ADMELOG) corrective regimen sliding scale   SubCutaneous every 6 hours  melatonin 5 milliGRAM(s) Oral at bedtime  midazolam Injectable 2 milliGRAM(s) IV Push once  midodrine 10 milliGRAM(s) Oral every 8 hours  milrinone Infusion 0.25 MICROgram(s)/kG/Min (7.51 mL/Hr) IV Continuous <Continuous>  mirtazapine 15 milliGRAM(s) Oral daily  Nephro-melissa 1 Tablet(s) Oral daily  pantoprazole  Injectable 40 milliGRAM(s) IV Push every 12 hours  Phoxillum Filtration BK 4 / 2.5 5000 milliLiter(s) (1000 mL/Hr) CRRT <Continuous>  Phoxillum Filtration BK 4 / 2.5 5000 milliLiter(s) (1000 mL/Hr) CRRT <Continuous>  Phoxillum Filtration BK 4 / 2.5 5000 milliLiter(s) (2000 mL/Hr) CRRT <Continuous>  piperacillin/tazobactam IVPB.. 3.375 Gram(s) IV Intermittent every 8 hours  psyllium Powder 1 Packet(s) Oral two times a day  senna 2 Tablet(s) Oral at bedtime  sodium chloride 0.9%. 1000 milliLiter(s) (10 mL/Hr) IV Continuous <Continuous>  sodium chloride 0.9%. 1000 milliLiter(s) (5 mL/Hr) IV Continuous <Continuous>    MEDICATIONS  (PRN):  ALBUTerol    90 MICROgram(s) HFA Inhaler 2 Puff(s) Inhalation every 6 hours PRN Shortness of Breath and/or Wheezing  fentaNYL    Injectable 50 MICROGram(s) IV Push every 4 hours PRN Severe Pain (7 - 10)  sodium chloride 0.9% lock flush 10 milliLiter(s) IV Push every 1 hour PRN Pre/post blood products, medications, blood draw, and to maintain line patency    Home Medications:  Advair Diskus 100 mcg-50 mcg inhalation powder: 1 puff(s) inhaled 2 times a day (26 Mar 2021 15:32)  Albuterol (Eqv-Proventil HFA) 90 mcg/inh inhalation aerosol: 2 puff(s) inhaled every 4 hours, As Needed (26 Mar 2021 15:32)  allopurinol 100 mg oral tablet: 1 tab(s) orally 2 times a day (26 Mar 2021 15:32)  amLODIPine 2.5 mg oral tablet: 1 tab(s) orally once a day (26 Mar 2021 15:32)  Aspir 81 oral delayed release tablet: 1 tab(s) orally once a day (26 Mar 2021 15:32)  atorvastatin 10 mg oral tablet: 1 tab(s) orally once a day (26 Mar 2021 15:32)  Centrum Silver oral tablet: 1 tab(s) orally once a day (26 Mar 2021 15:32)  Culturelle Advanced Immune Defense oral capsule: 1 cap(s) orally 2 times a day (26 Mar 2021 15:32)  ipratropium-albuterol 0.5 mg-2.5 mg/3 mLinhalation solution: 3 milliliter(s) inhaled 4 times a day, As Needed (26 Mar 2021 15:32)  metoprolol tartrate 100 mg oral tablet: 1 tab(s) orally 2 times a day (26 Mar 2021 15:32)  tamsulosin 0.4 mg oral capsule: 1 cap(s) orally once a day (26 Mar 2021 15:32)  Vitamin D3 1000 intl units (25 mcg) oral capsule: orally once a day (26 Mar 2021 15:32)    Allergies:  No Known Drug Allergies  strawberry (Anaphylaxis)    Social History:  Lives with wife in a private ranch-style home with 3 stairs to get into home.   Ambulates independently / with a rolling walker PRN at baseline prior to recent hospitalization. Now patient unable to walk due to physical deconditioning / SOB.   Former smoker, quit 1980s, smoked for a total of 25 years, 2.5PPD (50 pack years).  Denies any active alcohol or drug use / abuse. (12 Jun 2021 01:36)    Family History:  FH: lung cancer    ICU Vital Signs Last 24 Hrs  T(C): 36.6, Max: 37.8 (06-28 @ 07:15)  HR: 84 (82 - 88)  BP: --  BP(mean): --  ABP: 149/72 (132/60 - 169/89)  ABP(mean): 95 (78 - 158)  RR: 10 (7 - 34)  SpO2: 100% (78% - 110%)    I&O's Summary Last 24 Hrs  IN: 3550.2 mL / OUT: 5607 mL / NET: -2056.8 mL    Tele: Sinus rhythm @ 85 bpm, first degree AV block    Physical Exam:  General: Intubated, sedated  HEENT: normocephalic, atraumatic  Neck: Neck supple. JVP not elevated.  Chest: anterior rhonchi  CV: RRR, Normal S1 and S2. III/VI systolic murmur LSB,   Extremities: Pulses palpable, no edema, lukewarm peripherally  Abdomen: Soft, non-distended  Skin: WDI, right IJ Intro  Neuro: PERRL    Labs:  ( 06-28-21 @ 05:26 )               8.7    11.01 )--------( 89                   26.9     ( 06-28-21 @ 05:26 )     135  |  101  |  11.5  ---------------------<  117  3.8  |  21.0  |  0.82    Ca 8.1  Phos 3.0  Mg 2.3    ( 06-28-21 @ 05:26 )  TPro  6.3  /  Alb  3.6  /  TBili  2.2  /  DBili  x   /  AST  58  /  ALT  45  /  AlkPhos  111  ( 06-27-21 @ 00:35 )  TPro  6.4  /  Alb  3.6  /  TBili  2.8  /  DBili  x   /  AST  41  /  ALT  40  /  AlkPhos  89    PTT/PT/INR - ( 06-28-21 @ 05:26 )  PTT: 32.8 sec / PT: 17.6 sec / INR: 1.55 ratio    Serum Pro-Brain Natriuretic Peptide: 09341 (06-13-21 @ 03:05)    VBG - ( 06-28-21 @ 20:05 )  pH: 7.470 / pCO2: 32    / pO2: 141   / Oxygen saturation: 100.0     ABG - ( 06-28-21 @ 06:19 )  pH: 7.430 / pCO2: 33    / pO2: 165   / HCO3: 22    / Base Excess: -2.4  / SaO2: 100.0    Blood Gas Venous - Lactate: 3.40 (06-28-21 @ 20:05)    < from: Cardiac Cath Lab - Adult (03.26.21 @ 10:20) >  DIAGNOSTIC IMPRESSIONS: Severe Pulmonary Hypertension, likely congestive,  with equivalent response to Shawna at 80ppm- PVR was 2.09WU pre and 1.59 post  Shawna. 2. Severe congestive heart failure. 3. A TAV in the aortic position  was noted moving in consonance with the surrounding structures and with  acceptable hemodynamics (Mean Aortic PG is 16mmHg and an PILAR over 1cm2).  DIAGNOSTIC RECOMMENDATIONS: Aggressive diuresis. 2. Would consider Cardio  MEMS for better fluid management in the out patient setting and to  minimize repeat hospitalizations. 3. Would re-assess severity of the MR  after CHF optimization.  INTERVENTIONAL IMPRESSIONS: Severe Pulmonary Hypertension, likely  congestive, with equivalent response to Shanwa at 80ppm- PVR was 2.09WU pre  and 1.59 post Shawna. 2. Severe congestive heart failure. 3. A TAV in the  aortic position was noted moving in consonance with the surrounding  structures and with acceptable hemodynamics (Mean Aortic PG is 16mmHg and  an PILAR over 1cm2).    HEMODYNAMICS: There is severe biventricular failure. There is severe  pulmonary hypertension. Following administration of inhaled nitric oxide (  80ppm), there was a minimal overall pulmonary hemodynamic response. A  prominant "v" wave is noted on the PCW tracing suggestive of MR or  Diastolic non-compliance.    < from: Cardiac Cath Lab - Adult (06.18.21 @ 13:53) >  VENTRICLES: EF by echo was 35 %.  CORONARY VESSELS: The coronary circulation isright dominant.  LM:   --  LM: Normal.  LAD:   --  Proximal LAD: There was a tubular 50 % stenosis.  CX:   --  Circumflex: Angiography showed minor luminal irregularities with  no flow limiting lesions.  RCA:   --  RCA: Angiography showed minor luminal irregularities with no  flow limiting lesions.  COMPLICATIONS: No complications occurred during the cath lab visit.  DIAGNOSTIC IMPRESSIONS: Moderate 40-50% stenosis in the proximal LAD,  unchanged from 2/2019 (Insignificant iFR in 2/2019). 2. No significant CAD  involving the LM, LCX and RCA. 3. A TAV is noted to be moving in  consonance with the surrounding structures. 4. Severe central Prosthetic  valve Aortic Insufficiency is noted on a ISRAEL and the LVEF was estimated  [id number]%.  DIAGNOSTIC RECOMMENDATIONS: GDMT. 2. RFM. 3. Possible TAVIV to address the  prosthetic AI, centrally mediated.  INTERVENTIONAL IMPRESSIONS: Moderate 40-50% stenosis in the proximal LAD,  unchanged from 2/2019 (Insignificant iFR in 2/2019). 2. No significant CAD  involving the LM, LCX and RCA. 3. A TAV is noted to be moving in  consonance with the surrounding structures. 4. Severe central Prosthetic  valve Aortic Insufficiency is noted on a ISRAEL and the LVEF was estimated  [id number]%.  INTERVENTIONAL RECOMMENDATIONS: GDMT. 2. RFM. 3. Possible TAVIV to address  the prosthetic AI, centrally mediated.    < from: TTE Echo Complete w/ Contrast w/ Doppler (06.26.21 @ 10:36) >    Summary:   1. Endocardial visualization was enhanced with intravenous echo contrast.   2. Left ventricular ejection fraction, by visual estimation, is 20 to 25%.   3. Severely decreased global left ventricular systolic function.   4. Mildly increased LV wall thickness.   5. The mitral in-flow pattern reveals no discernable A-wave, therefore no comment on diastolic function can be made.   6. Mildly enlarged right ventricle.   7. Mild thickening of the anterior and posterior mitral valve leaflets.   8. Moderate mitral valve regurgitation.   9. Severe tricuspid regurgitation.  10. TAVR in the aortic position. Appears well seated with no abnormal rocking motion. Mild to moderate paravalvular aortic regurgitation is seen. Peak velocity is 2.26 m/s, MG is 11.2 mmHg, and PG is 20.5 mmHg, which are acceptable in the setting of a prosethetic aortic valve.  11. Mild pulmonic valve regurgitation.  12. Compared with prior study dated 6/23/21, the LVEF appears decreased to 20-25%. MR now appears moderate and TR appears severe. Results were discussed with CT surgery.    < from: Xray Chest 1 View- PORTABLE-Urgent (06.28.21 @ 12:02) >  IMPRESSION:  Right IJ line introducer remains in satisfactory position, [place]-Jae catheter. ET, and NG tube remain in satisfactory position.  Normal pulmonary vasculature. Resolution of mild pulmonary venous congestion    < end of copied text >   HPI:  76 year old male with a medical history of MI in 1995 (angiogram, no stents placed), COPD (O2 dependent 2LNC), s/p TAVR (03/2019 at [facility]), CVA (09/2020), pulmonary HTN, CKD and gout initially presented to [facility] 6/1/21 with RONNELL on CKD (likely cardiorenal syndrome), urinary retention due to noncompliance with medications, with hospital course complicated by cardiogenic shock, acute hypoxemic respiratory failure secondary to metabolic acidosis and respiratory alkalosis. Patient found to have TAVR failure with ISRAEL 6/10/21 showing EF 40-45%, Aortic valve prosthesis with Severe paravalvular leak and valvular AI, mild-moderate aortic stenosis, and moderate MR. Patient was transferred to [facility] under Dr. Campbell for further workup.   He underwent Valve in valve TAVR 6/23/21, repeat TTE 6/26 demonstrated reduction in LVEF 20-25%, mild to moderate paravalvular aortic regurgitation, severe TR, moderate MR.      Imaging from [facility]:  6/1: US Abdomen for elevated LFTs showing mild hepatic steatosis, mild hepatomegaly, sludge in the gallbladder, no discrete gallstones, normal biliary ducts, mild echogenic right kidney which may be seen with medical renal dz, mildly complex Right upper pole 4cm cyst with subtle internal echoes may represent hemorrhagic/proteinaceous cyst, mildly complex right midpole 2cm cyst with thin internal linear septation.   6/3: Kidney and Bladder US showing no hydronephrosis, echogenic kidneys likely from medical renal dz, prostatomegaly, and thickening of the posterior wall of the bladder with trabeculations which could be due to bladder outlet obstruction.   6/7: Kidney and Bladder US: No hydronephrosis or shadowing renal calculi. Stable b/l renal cysts.   6/7: Lower Extremity Venous Doppler with no DVT noted.   6/8: TTE showing EF 47%, dilated IVC, dilation of the ascending aorta of 4.4cm, moderate-severe pulmonary HTN, moderate-severe TR, aortic valve with severe prosthesis regurgitation and moderate prosthesis stenosis  6/9: CT Chest showing emphysema and intersitial lung dz changes, stable b/l small pleural effusions, cardiomegaly.   6/11: ISRAEL showing EF 40-45%, Aortic valve prosthesis with Severe paravalvular leak and valvular AI, mild-moderate aortic stenosis, and moderate MR.  (12 Jun 2021 01:36)    PAST MEDICAL & SURGICAL HISTORY:  Pulmonary hypertension    Hypertension    Hyperlipidemia    H/O aortic valve stenosis  s/p TAVR 2019 at [place]    CVA (cerebrovascular accident)  MCA CVA with tPA and thrombectomy    Chronic kidney disease    Prediabetes    Mitral valve regurgitation    CAD in native artery    Aneurysm of aortic root  thoracic aortic aneurysm, without ruptur    Benign prostatic hyperplasia    Gout    History of transcatheter aortic valve replacement (TAVR)    REVIEW OF SYSTEMS:  Unable to obtain-pt intubated/sedated    MEDICATIONS  (STANDING):  aMIOdarone    Tablet 400 milliGRAM(s) Oral every 8 hours  aMIOdarone    Tablet   Oral   ascorbic acid 500 milliGRAM(s) Oral daily  atorvastatin 10 milliGRAM(s) Oral at bedtime  budesonide 160 MICROgram(s)/formoterol 4.5 MICROgram(s) Inhaler 2 Puff(s) Inhalation two times a day  chlorhexidine 0.12% Liquid 5 milliLiter(s) Oral Mucosa two times a day  chlorhexidine 2% Cloths 1 Application(s) Topical daily  CRRT Treatment    <Continuous>  dexMEDEtomidine Infusion 0.2 MICROgram(s)/kG/Hr (5.01 mL/Hr) IV Continuous <Continuous>  EPINEPHrine    Infusion 0.04 MICROgram(s)/kG/Min (15 mL/Hr) IV Continuous <Continuous>  fentaNYL    Injectable 100 MICROGram(s) IV Push once  ferrous    sulfate 325 milliGRAM(s) Oral at bedtime  folic acid 1 milliGRAM(s) Oral daily  insulin lispro (ADMELOG) corrective regimen sliding scale   SubCutaneous every 6 hours  melatonin 5 milliGRAM(s) Oral at bedtime  midazolam Injectable 2 milliGRAM(s) IV Push once  midodrine 10 milliGRAM(s) Oral every 8 hours  milrinone Infusion 0.25 MICROgram(s)/kG/Min (7.51 mL/Hr) IV Continuous <Continuous>  mirtazapine 15 milliGRAM(s) Oral daily  Nephro-melissa 1 Tablet(s) Oral daily  pantoprazole  Injectable 40 milliGRAM(s) IV Push every 12 hours  Phoxillum Filtration BK 4 / 2.5 5000 milliLiter(s) (1000 mL/Hr) CRRT <Continuous>  Phoxillum Filtration BK 4 / 2.5 5000 milliLiter(s) (1000 mL/Hr) CRRT <Continuous>  Phoxillum Filtration BK 4 / 2.5 5000 milliLiter(s) (2000 mL/Hr) CRRT <Continuous>  piperacillin/tazobactam IVPB.. 3.375 Gram(s) IV Intermittent every 8 hours  psyllium Powder 1 Packet(s) Oral two times a day  senna 2 Tablet(s) Oral at bedtime  sodium chloride 0.9%. 1000 milliLiter(s) (10 mL/Hr) IV Continuous <Continuous>  sodium chloride 0.9%. 1000 milliLiter(s) (5 mL/Hr) IV Continuous <Continuous>    MEDICATIONS  (PRN):  ALBUTerol    90 MICROgram(s) HFA Inhaler 2 Puff(s) Inhalation every 6 hours PRN Shortness of Breath and/or Wheezing  fentaNYL    Injectable 50 MICROGram(s) IV Push every 4 hours PRN Severe Pain (7 - 10)  sodium chloride 0.9% lock flush 10 milliLiter(s) IV Push every 1 hour PRN Pre/post blood products, medications, blood draw, and to maintain line patency    Home Medications:  Advair Diskus 100 mcg-50 mcg inhalation powder: 1 puff(s) inhaled 2 times a day (26 Mar 2021 15:32)  Albuterol (Eqv-Proventil HFA) 90 mcg/inh inhalation aerosol: 2 puff(s) inhaled every 4 hours, As Needed (26 Mar 2021 15:32)  allopurinol 100 mg oral tablet: 1 tab(s) orally 2 times a day (26 Mar 2021 15:32)  amLODIPine 2.5 mg oral tablet: 1 tab(s) orally once a day (26 Mar 2021 15:32)  Aspir 81 oral delayed release tablet: 1 tab(s) orally once a day (26 Mar 2021 15:32)  atorvastatin 10 mg oral tablet: 1 tab(s) orally once a day (26 Mar 2021 15:32)  Centrum Silver oral tablet: 1 tab(s) orally once a day (26 Mar 2021 15:32)  Culturelle Advanced Immune Defense oral capsule: 1 cap(s) orally 2 times a day (26 Mar 2021 15:32)  ipratropium-albuterol 0.5 mg-2.5 mg/3 mLinhalation solution: 3 milliliter(s) inhaled 4 times a day, As Needed (26 Mar 2021 15:32)  metoprolol tartrate 100 mg oral tablet: 1 tab(s) orally 2 times a day (26 Mar 2021 15:32)  tamsulosin 0.4 mg oral capsule: 1 cap(s) orally once a day (26 Mar 2021 15:32)  Vitamin D3 1000 intl units (25 mcg) oral capsule: orally once a day (26 Mar 2021 15:32)    Allergies:  No Known Drug Allergies  strawberry (Anaphylaxis)    Social History:  Lives with wife in a private ranch-style home with 3 stairs to get into home.   Ambulates independently / with a rolling walker PRN at baseline prior to recent hospitalization. Now patient unable to walk due to physical deconditioning / SOB.   Former smoker, quit 1980s, smoked for a total of 25 years, 2.5PPD (50 pack years).  Denies any active alcohol or drug use / abuse. (12 Jun 2021 01:36)    Family History:  FH: lung cancer    ICU Vital Signs Last 24 Hrs  T(C): 36.6, Max: 37.8 (06-28 @ 07:15)  HR: 84 (82 - 88)  BP: --  BP(mean): --  ABP: 149/72 (132/60 - 169/89)  ABP(mean): 95 (78 - 158)  RR: 10 (7 - 34)  SpO2: 100% (78% - 110%)    I&O's Summary Last 24 Hrs  IN: 3550.2 mL / OUT: 5607 mL / NET: -2056.8 mL    Tele: Sinus rhythm @ 85 bpm, first degree AV block    Physical Exam:  General: Intubated, sedated  HEENT: normocephalic, atraumatic  Neck: Neck supple. JVP not elevated.  Chest: anterior rhonchi  CV: RRR, Normal S1 and S2. III/VI systolic murmur LSB,   Extremities: Pulses palpable, no edema, lukewarm peripherally  Abdomen: Soft, non-distended  Skin: WDI, right IJ Intro  Neuro: PERRL    Labs:  ( 06-28-21 @ 05:26 )               8.7    11.01 )--------( 89                   26.9     ( 06-28-21 @ 05:26 )     135  |  101  |  11.5  ---------------------<  117  3.8  |  21.0  |  0.82    Ca 8.1  Phos 3.0  Mg 2.3    ( 06-28-21 @ 05:26 )  TPro  6.3  /  Alb  3.6  /  TBili  2.2  /  DBili  x   /  AST  58  /  ALT  45  /  AlkPhos  111  ( 06-27-21 @ 00:35 )  TPro  6.4  /  Alb  3.6  /  TBili  2.8  /  DBili  x   /  AST  41  /  ALT  40  /  AlkPhos  89    PTT/PT/INR - ( 06-28-21 @ 05:26 )  PTT: 32.8 sec / PT: 17.6 sec / INR: 1.55 ratio    Serum Pro-Brain Natriuretic Peptide: [id number] (06-13-21 @ 03:05)    VBG - ( 06-28-21 @ 20:05 )  pH: 7.470 / pCO2: 32    / pO2: 141   / Oxygen saturation: 100.0     ABG - ( 06-28-21 @ 06:19 )  pH: 7.430 / pCO2: 33    / pO2: 165   / HCO3: 22    / Base Excess: -2.4  / SaO2: 100.0    Blood Gas Venous - Lactate: 3.40 (06-28-21 @ 20:05)    < from: Cardiac Cath Lab - Adult (03.26.21 @ 10:20) >  DIAGNOSTIC IMPRESSIONS: Severe Pulmonary Hypertension, likely congestive,  with equivalent response to Shawna at 80ppm- PVR was 2.09WU pre and 1.59 post  Shawna. 2. Severe congestive heart failure. 3. A TAV in the aortic position  was noted moving in consonance with the surrounding structures and with  acceptable hemodynamics (Mean Aortic PG is 16mmHg and an PILAR over 1cm2).  DIAGNOSTIC RECOMMENDATIONS: Aggressive diuresis. 2. Would consider Cardio  MEMS for better fluid management in the out patient setting and to  minimize repeat hospitalizations. 3. Would re-assess severity of the MR  after CHF optimization.  INTERVENTIONAL IMPRESSIONS: Severe Pulmonary Hypertension, likely  congestive, with equivalent response to Shawna at 80ppm- PVR was 2.09WU pre  and 1.59 post Shawna. 2. Severe congestive heart failure. 3. A TAV in the  aortic position was noted moving in consonance with the surrounding  structures and with acceptable hemodynamics (Mean Aortic PG is 16mmHg and  an PILAR over 1cm2).    HEMODYNAMICS: There is severe biventricular failure. There is severe  pulmonary hypertension. Following administration of inhaled nitric oxide (  80ppm), there was a minimal overall pulmonary hemodynamic response. A  prominant "v" wave is noted on the PCW tracing suggestive of MR or  Diastolic non-compliance.    < from: Cardiac Cath Lab - Adult (06.18.21 @ 13:53) >  VENTRICLES: EF by echo was 35 %.  CORONARY VESSELS: The coronary circulation isright dominant.  LM:   --  LM: Normal.  LAD:   --  Proximal LAD: There was a tubular 50 % stenosis.  CX:   --  Circumflex: Angiography showed minor luminal irregularities with  no flow limiting lesions.  RCA:   --  RCA: Angiography showed minor luminal irregularities with no  flow limiting lesions.  COMPLICATIONS: No complications occurred during the cath lab visit.  DIAGNOSTIC IMPRESSIONS: Moderate 40-50% stenosis in the proximal LAD,  unchanged from 2/2019 (Insignificant iFR in 2/2019). 2. No significant CAD  involving the LM, LCX and RCA. 3. A TAV is noted to be moving in  consonance with the surrounding structures. 4. Severe central Prosthetic  valve Aortic Insufficiency is noted on a ISRAEL and the LVEF was estimated  [id number]%.  DIAGNOSTIC RECOMMENDATIONS: GDMT. 2. RFM. 3. Possible TAVIV to address the  prosthetic AI, centrally mediated.  INTERVENTIONAL IMPRESSIONS: Moderate 40-50% stenosis in the proximal LAD,  unchanged from 2/2019 (Insignificant iFR in 2/2019). 2. No significant CAD  involving the LM, LCX and RCA. 3. A TAV is noted to be moving in  consonance with the surrounding structures. 4. Severe central Prosthetic  valve Aortic Insufficiency is noted on a ISRAEL and the LVEF was estimated  [id number]%.  INTERVENTIONAL RECOMMENDATIONS: GDMT. 2. RFM. 3. Possible TAVIV to address  the prosthetic AI, centrally mediated.    < from: TTE Echo Complete w/ Contrast w/ Doppler (06.26.21 @ 10:36) >    Summary:   1. Endocardial visualization was enhanced with intravenous echo contrast.   2. Left ventricular ejection fraction, by visual estimation, is 20 to 25%.   3. Severely decreased global left ventricular systolic function.   4. Mildly increased LV wall thickness.   5. The mitral in-flow pattern reveals no discernable A-wave, therefore no comment on diastolic function can be made.   6. Mildly enlarged right ventricle.   7. Mild thickening of the anterior and posterior mitral valve leaflets.   8. Moderate mitral valve regurgitation.   9. Severe tricuspid regurgitation.  10. TAVR in the aortic position. Appears well seated with no abnormal rocking motion. Mild to moderate paravalvular aortic regurgitation is seen. Peak velocity is 2.26 m/s, MG is 11.2 mmHg, and PG is 20.5 mmHg, which are acceptable in the setting of a prosethetic aortic valve.  11. Mild pulmonic valve regurgitation.  12. Compared with prior study dated 6/23/21, the LVEF appears decreased to 20-25%. MR now appears moderate and TR appears severe. Results were discussed with CT surgery.    < from: Xray Chest 1 View- PORTABLE-Urgent (06.28.21 @ 12:02) >  IMPRESSION:  Right IJ line introducer remains in satisfactory position, [place]-Jae catheter. ET, and NG tube remain in satisfactory position.  Normal pulmonary vasculature. Resolution of mild pulmonary venous congestion    < end of copied text >

## 2021-06-28 NOTE — CONSULT NOTE ADULT - ASSESSMENT
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 History also notable for CVA s/p thrombectomy, COPD, PH, obesity, MI/CAD without need for intervention.     Currently intubated/sedated. On inotropic support as well as epinephrine to maintain CI >2.0. PA catheter recently discontinued. Receiving CRRT for renal failure. IV Zosyn for B/L CAP. 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 Pt. remains afebrile, BC negative to date. Clinical status is critical and prognosis guarded.     Hemodynamics:  6/28/21: Milrinone 0.25mcg/kg/min & Epineprine 0.04mcg/kg/min   CVP 6, PAP 49/17/28, HR 85, /61/82, Mixed Fi02 40%, TD CO/CI  4.4/2.2.       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 History also notable for CVA s/p thrombectomy, COPD, PH, obesity, MI/CAD without need for intervention.     Currently intubated/sedated. On inotropic support as well as epinephrine to maintain CI >2.0. PA catheter recently discontinued. Receiving CRRT for renal failure. IV Zosyn for B/L CAP. 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 Pt. remains afebrile, BC negative to date. Clinical status is critical and prognosis guarded.     Hemodynamics:  6/28/21: Milrinone 0.25mcg/kg/min & Epineprine 0.04mcg/kg/min   CVP 6, PAP 49/17/28, HR 85, /61/82, Mixed Fi02 40%, TD CO/CI  4.4/2.2.       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 History also notable for CVA s/p thrombectomy, COPD, PH, obesity, MI/CAD without need for intervention.     Currently intubated/sedated. On inotropic support as well as epinephrine to maintain CI >2.0. PA catheter recently discontinued. Receiving CRRT for renal failure. IV Zosyn for B/L CAP. 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 Pt. remains afebrile, BC negative to date. Clinical status is critical and prognosis guarded.     Hemodynamics:  6/28/21: Milrinone 0.25mcg/kg/min & Epineprine 0.04mcg/kg/min   CVP 6, PAP 49/17/28, HR 85, /61/82, TD CO/CI  4.4/2.2.

## 2021-06-28 NOTE — PROGRESS NOTE ADULT - SUBJECTIVE AND OBJECTIVE BOX
INFECTIOUS DISEASES AND INTERNAL MEDICINE at [facility]  =======================================================  Kyle Wright MD  Diplomates American Board of Internal Medicine and Infectious Diseases  Telephone [phone]  Fax            [phone]  =======================================================    REYNA BOB [id number]    Follow up: valve vegetation    s/p TAVR on 6/23/21  no fevers  cultures from blood remains negative  remains intubated    Allergies:  No Known Drug Allergies  strawberry (Anaphylaxis)       FAMILY  FAMILY HISTORY:  FH: lung cancer      REVIEW OF SYSTEMS:  Unable to obtain due to medical condition      Physical Exam:  GEN: sedated  HEENT: normocephalic and atraumatic.  Anicteric   NECK: Supple.   LUNGS: diffuse rhonchi   HEART: Regular rate and rhythm   ABDOMEN: Soft, nontender, and nondistended.  Positive bowel sounds.    : Lemus   EXTREMITIES: trace edema.  MSK: no joint swelling  NEUROLOGIC: Sedated   PSYCHIATRIC: sedated   SKIN: No Rash      Vitals:  T( Vital Signs Last 24 Hrs  T(C): 37.6 (28 Jun 2021 09:00), Max: 37.8 (28 Jun 2021 07:15)  T(F): 99.7 (28 Jun 2021 09:00), Max: 100 (28 Jun 2021 07:15)  HR: 88 (28 Jun 2021 14:30) (80 - 88)  BP: --  BP(mean): --  RR: 28 (28 Jun 2021 14:30) (7 - 34)  SpO2: 99% (28 Jun 2021 14:30) (78% - 110%)    Current Antibiotics:  cefTRIAXone   IVPB 2000 milliGRAM(s) IV Intermittent every 24 hours    Other medications:  aMIOdarone Infusion 0.5 mG/Min IV Continuous <Continuous>  atorvastatin 10 milliGRAM(s) Oral at bedtime  budesonide 160 MICROgram(s)/formoterol 4.5 MICROgram(s) Inhaler 2 Puff(s) Inhalation two times a day  chlorhexidine 0.12% Liquid 5 milliLiter(s) Oral Mucosa two times a day  chlorhexidine 2% Cloths 1 Application(s) Topical daily  CRRT Treatment    <Continuous>  dexMEDEtomidine Infusion 0.2 MICROgram(s)/kG/Hr IV Continuous <Continuous>  EPINEPHrine    Infusion 0.04 MICROgram(s)/kG/Min IV Continuous <Continuous>  insulin lispro (ADMELOG) corrective regimen sliding scale   SubCutaneous every 6 hours  midodrine 10 milliGRAM(s) Oral every 8 hours  milrinone Infusion 0.25 MICROgram(s)/kG/Min IV Continuous <Continuous>  mirtazapine 15 milliGRAM(s) Oral daily  Nephro-melissa 1 Tablet(s) Oral daily  norepinephrine Infusion 0.05 MICROgram(s)/kG/Min IV Continuous <Continuous>  pantoprazole  Injectable 40 milliGRAM(s) IV Push every 12 hours  Phoxillum Filtration BK 4 / 2.5 5000 milliLiter(s) CRRT <Continuous>  Phoxillum Filtration BK 4 / 2.5 5000 milliLiter(s) CRRT <Continuous>  Phoxillum Filtration BK 4 / 2.5 5000 milliLiter(s) CRRT <Continuous>  psyllium Powder 1 Packet(s) Oral two times a day  senna 2 Tablet(s) Oral at bedtime  sodium chloride 0.9%. 1000 milliLiter(s) IV Continuous <Continuous>  sodium chloride 0.9%. 1000 milliLiter(s) IV Continuous <Continuous>                           8.7    11.01 )-----------( 89       ( 28 Jun 2021 05:26 )             26.9   06-28    135  |  101  |  11.5  ----------------------------<  117<H>  3.8   |  21.0<L>  |  0.82    Ca    8.1<L>      28 Jun 2021 05:26  Phos  3.0     06-28  Mg     2.3     06-28    TPro  6.3<L>  /  Alb  3.6  /  TBili  2.2<H>  /  DBili  x   /  AST  58<H>  /  ALT  45<H>  /  AlkPhos  111  06-28    RECENT CULTURES:  06-26 @ 21:09 .Sputum Sputum     Rare polymorphonuclear leukocytes per low power field  No Squamous epithelial cells per low power field  No organisms seen per oil power field    06-16 @ 17:23 .Blood Blood     No growth at 5 days.    06-16 @ 14:41 .Blood Blood-Peripheral     No growth at 5 days.    06-16 @ 14:40 .Blood Blood-Peripheral     No growth at 5 days.    06-15 @ 12:30 .Blood Blood-Peripheral     No growth at 5 days.    06-15 @ 12:29 .Blood Blood-Peripheral     No growth at 5 days.      WBC Count: 14.00 K/uL (06-27-21 @ 00:35)  WBC Count: 11.58 K/uL (06-26-21 @ 16:52)  WBC Count: 13.11 K/uL (06-26-21 @ 05:43)  WBC Count: 12.82 K/uL (06-25-21 @ 23:38)  WBC Count: 13.53 K/uL (06-25-21 @ 17:42)  WBC Count: 13.00 K/uL (06-25-21 @ 09:53)  WBC Count: 13.29 K/uL (06-25-21 @ 02:40)  WBC Count: 12.85 K/uL (06-24-21 @ 12:25)  WBC Count: 9.68 K/uL (06-24-21 @ 03:19)  WBC Count: 8.93 K/uL (06-23-21 @ 14:24)  WBC Count: 10.50 K/uL (06-23-21 @ 06:05)  WBC Count: 8.73 K/uL (06-22-21 @ 20:14)    Creatinine, Serum: 1.51 mg/dL (06-27-21 @ 00:35)  Creatinine, Serum: 1.96 mg/dL (06-26-21 @ 16:52)  Creatinine, Serum: 2.55 mg/dL (06-26-21 @ 05:45)  Creatinine, Serum: 3.32 mg/dL (06-25-21 @ 23:38)  Creatinine, Serum: 3.97 mg/dL (06-25-21 @ 17:42)  Creatinine, Serum: 5.22 mg/dL (06-25-21 @ 09:53)  Creatinine, Serum: 5.05 mg/dL (06-25-21 @ 02:40)  Creatinine, Serum: 5.00 mg/dL (06-24-21 @ 12:25)  Creatinine, Serum: 4.77 mg/dL (06-24-21 @ 03:19)  Creatinine, Serum: 4.38 mg/dL (06-23-21 @ 14:24)  Creatinine, Serum: 4.41 mg/dL (06-23-21 @ 06:05)  Creatinine, Serum: 3.13 mg/dL (06-22-21 @ 20:14)    Procalcitonin, Serum: 0.24 ng/mL (06-17-21 @ 09:12)     COVID-19 PCR: NotDetec (06-22-21 @ 13:29)  COVID-19 PCR: NotDetec (06-18-21 @ 08:13)

## 2021-06-28 NOTE — PROGRESS NOTE ADULT - SUBJECTIVE AND OBJECTIVE BOX
Subjective: pt intubate and resting comfortably in bed,     T(C): 37.6 (06-28-21 @ 01:00), Max: 37.7 (06-27-21 @ 12:00)  HR: 86 (06-28-21 @ 01:00) (77 - 87)  BP: --  ABP: 141/64 (06-28-21 @ 01:00) (101/56 - 152/79)  ABP(mean): 90 (06-28-21 @ 01:00) (68 - 100)  RR: 15 (06-28-21 @ 01:00) (7 - 38)  SpO2: 99% (06-28-21 @ 01:00) (92% - 100%)  Wt(kg): --  CVP(mm Hg): 13 (06-28-21 @ 01:00) (10 - 48)  CO: 5.2 (06-28-21 @ 01:00) (4.3 - 5.6)  CI: 2.3 (06-28-21 @ 01:00) (2 - 2.5)  PA: 61/28 (06-28-21 @ 01:00) (53/22 - 66/30)   Mode: SIMV with PS  RR (machine): 8  TV (machine): 700  FiO2: 40  PEEP: 5  PS: 8  MAP: 9  PIP: 18      I&O's Detail    26 Jun 2021 07:01  -  27 Jun 2021 07:00  --------------------------------------------------------  IN:    Amiodarone: 398.4 mL    Dexmedetomidine: 297.5 mL    Enteral Tube Flush: 150 mL    EPINEPHrine: 165 mL    IV PiggyBack: 250 mL    IV PiggyBack: 50 mL    Lidocaine: 11.4 mL    Lidocaine: 15.2 mL    Milrinone: 127.5 mL    Milrinone: 26.6 mL    Nepro: 250 mL    Norepinephrine: 86 mL    Platelets - Single Donor: 208 mL    PRBCs (Packed Red Blood Cells): 350 mL    sodium chloride 0.9%: 240 mL    sodium chloride 0.9%: 120 mL  Total IN: 2745.6 mL    OUT:    Indwelling Catheter - Urethral (mL): 35 mL    Other (mL): 1953 mL  Total OUT: 1988 mL    Total NET: 757.6 mL      27 Jun 2021 07:01  -  28 Jun 2021 01:43  --------------------------------------------------------  IN:    Amiodarone: 317.3 mL    Dexmedetomidine: 458.9 mL    Enteral Tube Flush: 150 mL    EPINEPHrine: 285 mL    IV PiggyBack: 50 mL    IV PiggyBack: 500 mL    IV PiggyBack: 50 mL    Milrinone: 142.5 mL    Nepro: 380 mL    Platelets - Single Donor: 433 mL    sodium chloride 0.9%: 190 mL    sodium chloride 0.9%: 95 mL  Total IN: 3051.7 mL    OUT:    Intermittent Catheterization - Urethral (mL): 230 mL    Norepinephrine: 0 mL    Other (mL): 2743 mL  Total OUT: 2973 mL    Total NET: 78.7 mL          LABS: All Lab data reviewed and analyzed                        8.5    11.47 )-----------( 93       ( 28 Jun 2021 00:40 )             27.0     06-28    134<L>  |  102  |  12.8  ----------------------------<  141<H>  3.9   |  20.0<L>  |  0.89    Ca    8.3<L>      28 Jun 2021 00:40  Phos  3.1     06-28  Mg     2.4     06-28    TPro  6.4<L>  /  Alb  3.6  /  TBili  2.8<H>  /  DBili  x   /  AST  41<H>  /  ALT  40  /  AlkPhos  89  06-27    PT/INR - ( 27 Jun 2021 03:05 )   PT: 16.0 sec;   INR: 1.40 ratio         PTT - ( 27 Jun 2021 03:05 )  PTT:35.8 sec      ABG - ( 27 Jun 2021 12:25 )  pH, Arterial: 7.470 pH, Blood: x     /  pCO2: 31    /  pO2: 170   / HCO3: 23    / Base Excess: -1.1  /  SaO2: 100.0             CAPILLARY BLOOD GLUCOSE      POCT Blood Glucose.: 135 mg/dL (28 Jun 2021 00:29)  POCT Blood Glucose.: 78 mg/dL (27 Jun 2021 06:51)  POCT Blood Glucose.: 79 mg/dL (27 Jun 2021 05:59)           RADIOLOGY: - Reviewed and analyzed   CXR: pending    HOSPITAL MEDICATIONS: All medications reviewed and analyzed  MEDICATIONS  (STANDING):  aMIOdarone Infusion 0.5 mG/Min (16.7 mL/Hr) IV Continuous <Continuous>  ascorbic acid 500 milliGRAM(s) Oral daily  atorvastatin 10 milliGRAM(s) Oral at bedtime  budesonide 160 MICROgram(s)/formoterol 4.5 MICROgram(s) Inhaler 2 Puff(s) Inhalation two times a day  cefTRIAXone   IVPB 2000 milliGRAM(s) IV Intermittent every 24 hours  chlorhexidine 0.12% Liquid 5 milliLiter(s) Oral Mucosa two times a day  chlorhexidine 2% Cloths 1 Application(s) Topical daily  CRRT Treatment    <Continuous>  dexMEDEtomidine Infusion 0.2 MICROgram(s)/kG/Hr (5.01 mL/Hr) IV Continuous <Continuous>  EPINEPHrine    Infusion 0.04 MICROgram(s)/kG/Min (15 mL/Hr) IV Continuous <Continuous>  fentaNYL    Injectable 50 MICROGram(s) IV Push once  ferrous    sulfate 325 milliGRAM(s) Oral at bedtime  folic acid 1 milliGRAM(s) Oral daily  insulin lispro (ADMELOG) corrective regimen sliding scale   SubCutaneous every 6 hours  melatonin 5 milliGRAM(s) Oral at bedtime  midodrine 10 milliGRAM(s) Oral every 8 hours  milrinone Infusion 0.25 MICROgram(s)/kG/Min (7.51 mL/Hr) IV Continuous <Continuous>  mirtazapine 15 milliGRAM(s) Oral daily  Nephro-melissa 1 Tablet(s) Oral daily  pantoprazole  Injectable 40 milliGRAM(s) IV Push every 12 hours  Phoxillum Filtration BK 4 / 2.5 5000 milliLiter(s) (1000 mL/Hr) CRRT <Continuous>  Phoxillum Filtration BK 4 / 2.5 5000 milliLiter(s) (2000 mL/Hr) CRRT <Continuous>  Phoxillum Filtration BK 4 / 2.5 5000 milliLiter(s) (1000 mL/Hr) CRRT <Continuous>  psyllium Powder 1 Packet(s) Oral two times a day  senna 2 Tablet(s) Oral at bedtime  sodium chloride 0.9%. 1000 milliLiter(s) (10 mL/Hr) IV Continuous <Continuous>  sodium chloride 0.9%. 1000 milliLiter(s) (5 mL/Hr) IV Continuous <Continuous>  vancomycin  IVPB 750 milliGRAM(s) IV Intermittent every 12 hours    MEDICATIONS  (PRN):  ALBUTerol    90 MICROgram(s) HFA Inhaler 2 Puff(s) Inhalation every 6 hours PRN Shortness of Breath and/or Wheezing  sodium chloride 0.9% lock flush 10 milliLiter(s) IV Push every 1 hour PRN Pre/post blood products, medications, blood draw, and to maintain line patency          Neuro: pt intubated and sleeping at this time. earlier was moving all 4 extremities while in bed and intermittently following commands.   HEENT: PERRL, EOMI, oral mucosa pink and moist  Neck: supple, no JVD  CV: regular rate, regular rhythm, +S1S2, no murmurs or rub  Pulm/chest: coarse breath sounds b/l, no accessory muscle use noted  Abd: soft, NT, ND, +BS  Ext: CRONIN x 4, no C/C/E  Skin: warm, well perfused       Case including assessment/plan of care discussed with   CT surgery attending.  Critical Care time:  35   minutes of noncontinuos critical care time spent evaluating/treating, reviewing imaging, labs, discussing case with multidisciplinary team, discussing plans/goals of care with patient/family to prevent further life threatening depreciation of the patient's condition. Non-inclusive is procedure time.       76yMale with PMH     EGD with biopsy    Flexible colonoscopy    Insert CV cath w/o tunnel 5+yr    TAVR, open femoral artery approach        PAST MEDICAL & SURGICAL HISTORY:  Pulmonary hypertension    Hypertension    Hyperlipidemia    H/O aortic valve stenosis  s/p TAVR 2019 at [place]    CVA (cerebrovascular accident)  MCA CVA with tPA and thrombectomy    Chronic kidney disease    Prediabetes    Mitral valve regurgitation    CAD in native artery    Aneurysm of aortic root  thoracic aortic aneurysm, without ruptur    Benign prostatic hyperplasia    Gout    History of transcatheter aortic valve replacement (TAVR)

## 2021-06-28 NOTE — PROGRESS NOTE ADULT - ASSESSMENT
76M with a PMH of MI in 1995 (angiogram, no stents placed), COPD (2L O2 at home), severe AS s/p TAVR (03/2019 at [facility]), gout, CKD, CVA (no deficits, 09/2020), pulmonary HTN, initially presented to [facility] 6/1/21 with RONNELL on CKD, hospital course significant for cardiogenic shock, acute hypoxemic respiratory failure, and acute on chronic combined diastolic and systolic heart failure. ISRAEL revealed severe AI. Patient was transferred to [facility] under Dr. Campbell for further workup of bioprosthetic AI.     Inpatient hospital course has been significant for:  1. 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 RONNELL on CKD progressed to ESRD requiring HD   3. Unintentional Weight loss / Dysphagia / Anorexia work up revealing duodenitis and diffuse erosive esophagitis, gastritis on EGD (biopsies negative for H. Pylori or carcinoma); colonoscopy showing diverticulosis  4. R/O AV Endocarditis   5. Auto-elevated INR (followed by Hematology)  6. R/O WILLEM   7. Adjustment disorder (evaluated by Behavioral Health)  8. Thrombocytopenia, Hematology consulted, r/o DIC    Patient is now s/p valve in valve TAVR on 6/23/21 with Dr. Campbell.     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 pt remains on epi, amio, primacor and is tolerating CVVHD

## 2021-06-28 NOTE — PROGRESS NOTE ADULT - ASSESSMENT
On CVVHDF - Net Even Fluid Balance,     No Heparin - Low Platelets,     Remains oligo - anuric, Allan WILLIS,     bambi CTICU team

## 2021-06-28 NOTE — PROGRESS NOTE ADULT - ASSESSMENT
76y Male with multiple medical issues now status post TAVR.     Encephalopathy.   Appears awake on mechanical ventilator now.   Following instructions.  Likely toxic-metabolic encephalopathy with sedation affecting exam previously.    Continue supportive care.  No further specific neurologic recommendations.

## 2021-06-28 NOTE — PROGRESS NOTE ADULT - ASSESSMENT
76 year old PMH of  MI in 1995 (angiogram, no stents placed), COPD (2L O2 at home), s/p TAVR (03/2019 at [facility]), gout, CKD, CVA (09/2020), pulmonary HTN, initially presented to [facility] 6/1/21 with RONNELL on CKD (likely cardiorenal syndrome), urinary retention due to noncompliance with medications, with hospital course complicated by cardiogenic shock, acute hypoxemic respiratory failure secondary to metabolic acidosis and respiratory alkalosis. Patient found to have TAVR failure with ISRAEL 6/10/21 showing EF 40-45%, Aortic valve prosthesis with Severe paravalvular leak and valvular AI, mild-moderate aortic stenosis, and moderate MR. Decompensated HF and severe pulmonary hypertension.     Aortic stenosis   s/p repeat TAVR 6/23  TAVR Prosthesis failure Ef decreased   wean epinephrine    TTE showed LVEF 20-25%, mild to moderate paravalvular aortic regurgitation, severe TR, moderate MR  Remains intubated on mechanical vent support - possible bronch today, extubation tomorrow     HFrEF  EF 40-45% -- > 30-35% on 6/12/21 echo  now 25%  Moderate MR  Pad 25  ci 2.1   resume HF medications when BP can tolerate   continued fluid removal via HD    ARRHYTHMIA - Eric tachycardia  Continue Amiodarone

## 2021-06-28 NOTE — PROGRESS NOTE ADULT - ASSESSMENT
76 year old male patient with a medical history of MI in 1995 (angiogram, no stents placed), COPD (2L O2 at home), s/p TAVR (03/2019 at [facility]), gout, CKD, CVA (09/2020), pulmonary HTN, initially presented to [facility] 6/1/21 with RONNELL on CKD (likely cardiorenal syndrome), urinary retention due to noncompliance with medications, with hospital course complicated by cardiogenic shock, acute hypoxemic respiratory failure secondary to metabolic acidosis and respiratory alkalosis. Patient found to have TAVR failure with ISRAEL 6/10/21 showing EF 40-45%, Aortic valve prosthesis with Severe paravalvular leak and valvular AI, mild-moderate aortic stenosis, and moderate MR. Patient was transferred to [facility] under Dr. Campbell for further workup.   ISRAEL DONE HERE WITH CONCERN FOR ENDOCARDITIS  PT DENIES FEVERS OR SWEATS  BUT HAS HAD SIGNIFICANT WEIGHT LOSS WITHOUT DIETING  PT HAS HX OF STREP BOVIS  BACTEREMIA   LAST YEAR AND RECEIVED   ROCEPHIN AT HOME FOR 6 WEEKS        s/p COLONOSCOPY     Blood cultures from admission remain negative      initial blood cultures being held for14 days.       s/p TAVR on 6/23/2021    Started on IV Vancomycin and Ceftriaxone by CT ICU 6/26  Blood cultures repeated     ALL  cultures remain negative,   PT WITH THICK SECRETIONS  S/P BRONCH SPECIMENS SENT  WILL D/C VANCO  RX ZOSYN TO COVER FOR ? Pneumonia  FOLLOWUP BRONCH CULTURES

## 2021-06-28 NOTE — PROGRESS NOTE ADULT - SUBJECTIVE AND OBJECTIVE BOX
[facility] DIVISION OF KIDNEY DISEASES AND HYPERTENSION -- FOLLOW UP NOTE  --------------------------------------------------------------------------------  Chief Complaint:  ATN  24 hour events/subjective:  Seen/examined  On CVVHDF      PAST HISTORY  --------------------------------------------------------------------------------  No significant changes to PMH, PSH, FHx, SHx, unless otherwise noted    ALLERGIES & MEDICATIONS  --------------------------------------------------------------------------------  Allergies    No Known Drug Allergies  strawberry (Anaphylaxis)    Intolerances      Standing Inpatient Medications  aMIOdarone Infusion 0.5 mG/Min IV Continuous <Continuous>  ascorbic acid 500 milliGRAM(s) Oral daily  atorvastatin 10 milliGRAM(s) Oral at bedtime  budesonide 160 MICROgram(s)/formoterol 4.5 MICROgram(s) Inhaler 2 Puff(s) Inhalation two times a day  cefTRIAXone   IVPB 2000 milliGRAM(s) IV Intermittent every 24 hours  chlorhexidine 0.12% Liquid 5 milliLiter(s) Oral Mucosa two times a day  chlorhexidine 2% Cloths 1 Application(s) Topical daily  CRRT Treatment    <Continuous>  dexMEDEtomidine Infusion 0.2 MICROgram(s)/kG/Hr IV Continuous <Continuous>  EPINEPHrine    Infusion 0.04 MICROgram(s)/kG/Min IV Continuous <Continuous>  ferrous    sulfate 325 milliGRAM(s) Oral at bedtime  folic acid 1 milliGRAM(s) Oral daily  insulin lispro (ADMELOG) corrective regimen sliding scale   SubCutaneous every 6 hours  melatonin 5 milliGRAM(s) Oral at bedtime  midodrine 10 milliGRAM(s) Oral every 8 hours  milrinone Infusion 0.25 MICROgram(s)/kG/Min IV Continuous <Continuous>  mirtazapine 15 milliGRAM(s) Oral daily  Nephro-melissa 1 Tablet(s) Oral daily  pantoprazole  Injectable 40 milliGRAM(s) IV Push every 12 hours  Phoxillum Filtration BK 4 / 2.5 5000 milliLiter(s) CRRT <Continuous>  Phoxillum Filtration BK 4 / 2.5 5000 milliLiter(s) CRRT <Continuous>  Phoxillum Filtration BK 4 / 2.5 5000 milliLiter(s) CRRT <Continuous>  psyllium Powder 1 Packet(s) Oral two times a day  senna 2 Tablet(s) Oral at bedtime  sodium chloride 0.9%. 1000 milliLiter(s) IV Continuous <Continuous>  sodium chloride 0.9%. 1000 milliLiter(s) IV Continuous <Continuous>  vancomycin  IVPB 1000 milliGRAM(s) IV Intermittent <User Schedule>    PRN Inpatient Medications  ALBUTerol    90 MICROgram(s) HFA Inhaler 2 Puff(s) Inhalation every 6 hours PRN  fentaNYL    Injectable 50 MICROGram(s) IV Push every 4 hours PRN  sodium chloride 0.9% lock flush 10 milliLiter(s) IV Push every 1 hour PRN      REVIEW OF SYSTEMS  --------------------------------------------------------------------------------  Unable to obtain    VITALS/PHYSICAL EXAM  --------------------------------------------------------------------------------  T(C): 37.6 (06-28-21 @ 09:00), Max: 37.8 (06-28-21 @ 07:15)  HR: 86 (06-28-21 @ 10:30) (80 - 88)  BP: --  RR: 12 (06-28-21 @ 10:30) (7 - 34)  SpO2: 100% (06-28-21 @ 10:30) (78% - 100%)  Wt(kg): --    Weight (kg): 100 (06-27-21 @ 11:40)      06-27-21 @ 07:01  -  06-28-21 @ 07:00  --------------------------------------------------------  IN: 3550.2 mL / OUT: 5607 mL / NET: -2056.8 mL    06-28-21 @ 07:01  -  06-28-21 @ 11:43  --------------------------------------------------------  IN: 446.8 mL / OUT: 387 mL / NET: 59.8 mL      Physical Exam:  	Gen: NAD int/sed  	HEENT: PERRL, supple neck, clear oropharynx  	Pulm: vent bs  	CV: RRR, S1S2; no rub  	Back: No spinal or CVA tenderness; no sacral edema  	Abd: +BS, soft, nontender/nondistended  	: No suprapubic tenderness  	UE: Warm, FROM, no clubbing, intact strength; no edema; no asterixis  	LE: Warm, FROM, no clubbing, intact strength; no edema  	Neuro: sed  	Psych:sed  	Skin: Warm, without rashes  	Vascular access:    LABS/STUDIES  --------------------------------------------------------------------------------              8.7    11.01 >-----------<  89       [06-28-21 @ 05:26]              26.9     135  |  101  |  11.5  ----------------------------<  117      [06-28-21 @ 05:26]  3.8   |  21.0  |  0.82        Ca     8.1     [06-28-21 @ 05:26]      Mg     2.3     [06-28-21 @ 05:26]      Phos  3.0     [06-28-21 @ 05:26]    TPro  6.3  /  Alb  3.6  /  TBili  2.2  /  DBili  x   /  AST  58  /  ALT  45  /  AlkPhos  111  [06-28-21 @ 05:26]    PT/INR: PT 17.6 , INR 1.55       [06-28-21 @ 05:26]  PTT: 32.8       [06-28-21 @ 05:26]      Creatinine Trend:  SCr 0.82 [06-28 @ 05:26]  SCr 0.89 [06-28 @ 00:40]  SCr 0.97 [06-27 @ 16:39]  SCr 1.23 [06-27 @ 08:52]  SCr 1.51 [06-27 @ 00:35]        TSH 2.31      [06-12-21 @ 01:55]    HBsAb <3.0      [06-17-21 @ 05:13]  HBsAg Nonreact      [06-17-21 @ 05:13]  HBcAb Nonreact      [06-17-21 @ 05:13]  HCV 0.15, Nonreact      [06-17-21 @ 05:13]

## 2021-06-28 NOTE — PROGRESS NOTE ADULT - SUBJECTIVE AND OBJECTIVE BOX
[facility]                                        Neurology at [place]                                 Olivia Francis, & [address]                                  [address]                                        [place], [place], [address]                                             [phone]        CC: Encephalopathy    HPI:   The patient is a 76y Male who is status post TAVR 6/23. He has remained on the mechanical ventilator and neurology called this morning to evaluate mental status.   He has multiple medical issues that are being addressed.     Interim history:  Remains in [address] [place] [address].  On mechanical ventilator.    ROS:   Unobtainable due to patient's condition.     MEDICATIONS  (STANDING):  aMIOdarone Infusion 0.5 mG/Min (16.7 mL/Hr) IV Continuous <Continuous>  ascorbic acid 500 milliGRAM(s) Oral daily  atorvastatin 10 milliGRAM(s) Oral at bedtime  budesonide 160 MICROgram(s)/formoterol 4.5 MICROgram(s) Inhaler 2 Puff(s) Inhalation two times a day  cefTRIAXone   IVPB 2000 milliGRAM(s) IV Intermittent every 24 hours  chlorhexidine 0.12% Liquid 5 milliLiter(s) Oral Mucosa two times a day  chlorhexidine 2% Cloths 1 Application(s) Topical daily  CRRT Treatment    <Continuous>  dexMEDEtomidine Infusion 0.2 MICROgram(s)/kG/Hr (5.01 mL/Hr) IV Continuous <Continuous>  EPINEPHrine    Infusion 0.04 MICROgram(s)/kG/Min (15 mL/Hr) IV Continuous <Continuous>  ferrous    sulfate 325 milliGRAM(s) Oral at bedtime  folic acid 1 milliGRAM(s) Oral daily  insulin lispro (ADMELOG) corrective regimen sliding scale   SubCutaneous every 6 hours  melatonin 5 milliGRAM(s) Oral at bedtime  midodrine 10 milliGRAM(s) Oral every 8 hours  milrinone Infusion 0.25 MICROgram(s)/kG/Min (7.51 mL/Hr) IV Continuous <Continuous>  mirtazapine 15 milliGRAM(s) Oral daily  Nephro-melissa 1 Tablet(s) Oral daily  pantoprazole  Injectable 40 milliGRAM(s) IV Push every 12 hours  Phoxillum Filtration BK 4 / 2.5 5000 milliLiter(s) (1000 mL/Hr) CRRT <Continuous>  Phoxillum Filtration BK 4 / 2.5 5000 milliLiter(s) (1000 mL/Hr) CRRT <Continuous>  Phoxillum Filtration BK 4 / 2.5 5000 milliLiter(s) (2000 mL/Hr) CRRT <Continuous>  psyllium Powder 1 Packet(s) Oral two times a day  senna 2 Tablet(s) Oral at bedtime  sodium chloride 0.9%. 1000 milliLiter(s) (10 mL/Hr) IV Continuous <Continuous>  sodium chloride 0.9%. 1000 milliLiter(s) (5 mL/Hr) IV Continuous <Continuous>  vancomycin  IVPB 1000 milliGRAM(s) IV Intermittent <User Schedule>      Vital Signs Last 24 Hrs  T(C): 37.6 (28 Jun 2021 09:00), Max: 37.8 (28 Jun 2021 07:15)  T(F): 99.7 (28 Jun 2021 09:00), Max: 100 (28 Jun 2021 07:15)  HR: 86 (28 Jun 2021 10:30) (80 - 88)  RR: 12 (28 Jun 2021 10:30) (7 - 34)  SpO2: 100% (28 Jun 2021 10:30) (78% - 100%)    Detailed Neurologic Exam:    Mental status: On mechanical ventilator. Opens eyes readily to voice. Follows simple instructions.     Cranial nerves: Pupils react symmetrically to light. Blinks to threat bilaterally. Tracks with gaze. Facial sensation is intact. Facial musculature is grossly symmetric although an endotracheal tube is present. Palate and tongue cannot be assessed.     Motor: There is normal bulk and tone.  Moves both sides to command. Symmetric strength although diffusely weak.    Sensation: Nods to pin bilaterally.    Reflexes: 1+ throughout and plantar responses are flexor.    Cerebellar: Cannot be assessed.     Labs:     06-28    135  |  101  |  11.5  ----------------------------<  117<H>  3.8   |  21.0<L>  |  0.82    Ca    8.1<L>      28 Jun 2021 05:26  Phos  3.0     06-28  Mg     2.3     06-28    TPro  6.3<L>  /  Alb  3.6  /  TBili  2.2<H>  /  DBili  x   /  AST  58<H>  /  ALT  45<H>  /  AlkPhos  111  06-28                            8.7    11.01 )-----------( 89       ( 28 Jun 2021 05:26 )             26.9

## 2021-06-28 NOTE — CONSULT NOTE ADULT - ATTENDING COMMENTS
Above noted, pt was seen and examined.  Pt had TAVR in the past and felt better post TAVR with improvement in symptoms. He is now more short of breath.  He has biventricular failure, and is severely volume overloaded with worsening Cr.  A RHC done recently showed elevated LVEDP and also elevated pcwp more than 40 mmHg.  Pt has severe para-valvular and valvular aortic valve regurgitation and moderate MR  He will need aggressive diuretic therapy.  He had a ISRAEL at [facility] which we need to review. CD not available.  Repeat Echo.   Since we know pt's fluid status at this time, no need for SGC at this time.  DW CT surgery PA as well.   We will follow with you.
77 y/o male with h/o chronic systolic HF ACC/AHA stage C, ICMP, CAD s/p MI ’95, s/p TAVR 3/2019 (@ [facility]), S. bovis bacteremia, COPD (home O2 @ 2 lpm), CVA (9/2020), gout, noncompliance who was admitted to [facility] with RONNELL on CKD and urinary retention. His hospital course was complicated by cardiogenic shock, acute hypoxemic respiratory failure likely in setting of bioprosthetic severe paravalvular/valvular AI and decompensated HF. He was subsequently transferred to [facility] for TAVR. 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 ID was following and BxCx were obtained which showed no growth. Eventually, he underwent Amy TAVR on 6/23/21 and has remained on CTU since then.   He remains intubated on CVVH and inotropic support. Currently he is on epinephrine 0.04 mcg/kg/min, milrinone 0.250 mcg/kg/min. His PA catheter was removed this am. His a-line shows /60 and tele shows SR with probable 1st AV block,    Recommend:  #wean epinephrine as tolerated  #continue milrinone gtt  #daily perfusion labs via RIJ introducer  # continue fluid removal via CVVH, goal CVP < 12

## 2021-06-28 NOTE — CONSULT NOTE ADULT - TIME BILLING
Review of hospital charts, including PACS and labs, and office and outside records if applicable.  Discussion of plans with referring service, coordinating respiratory therapy, including, but not limited to, CPAP, NIPPV, O2, Nebs, home devices.
- Review of notes/records, telemetry, vital signs and daily labs.   - General and cardiovascular physical examination.  - Generation of cardiovascular treatment plan.  - Coordination of care with primary team.

## 2021-06-28 NOTE — CONSULT NOTE ADULT - PROBLEM SELECTOR RECOMMENDATION 9
Biventricular failure, LVEF 20-25%, ACC/AHA stage C-D, NYHA class IV  Close to euvolemia, lukewarm extremities  Keep net even volume status via CRRT, Keep CVP <12  Continue Milrinone @ 0.25mcg/kg/min  Recommend weaning Epinephrine, may increase inotropic agent if needed  Unclear need for Midodrine, pt with robust BP and will only increase SVR Biventricular failure, LVEF 20-25%, ACC/AHA stage C-D, NYHA class IV  Close to euvolemia, lukewarm extremities  Keep net even volume status via CRRT, maintaining CVP <12  Continue Milrinone @ 0.25mcg/kg/min  Recommend weaning Epinephrine, may increase inotropic agent if needed  Unclear need for Midodrine, pt with robust BP and will only increase SVR  Maintain amiodarone given prior tachyarrhythmias

## 2021-06-28 NOTE — PROGRESS NOTE ADULT - PROBLEM SELECTOR PLAN 1
Now s/p Valve in Valve TAVR with Dr. Campbell on 6/23  Primacor increased to 0.25 mcg/kg/min for low CI  Epi gtt started to assist CI > 2.0  Repeat echo revealed EF 20-25% from 30-35% prior with midly enlarged RV, severely decreased LV Fxn, moderate MR, Severe TR and mild Pulm Regurgitation  CT C/A/P revealed b/l pneumonia, ABX started  Pressors:  Vaso off, Levophed weaning off  Continue Midodrine 10 mg q8   Runs of SVT post op requiring Lidocaine and Amio gtts for stability, Amio gtt decreased to 0.5 mg/min  Lidocaine gtt off  Continue GI ppx with Protonix and Senna, DVT ppx with SCD boots

## 2021-06-29 LAB
ALBUMIN SERPL ELPH-MCNC: 3.3 G/DL — SIGNIFICANT CHANGE UP (ref 3.3–5.2)
ALP SERPL-CCNC: 113 U/L — SIGNIFICANT CHANGE UP (ref 40–120)
ALT FLD-CCNC: 44 U/L — HIGH
ANION GAP SERPL CALC-SCNC: 13 MMOL/L — SIGNIFICANT CHANGE UP (ref 5–17)
ANION GAP SERPL CALC-SCNC: 15 MMOL/L — SIGNIFICANT CHANGE UP (ref 5–17)
APTT BLD: 34.6 SEC — SIGNIFICANT CHANGE UP (ref 27.5–35.5)
AST SERPL-CCNC: 49 U/L — HIGH
BASOPHILS # BLD AUTO: 0.01 K/UL — SIGNIFICANT CHANGE UP (ref 0–0.2)
BASOPHILS NFR BLD AUTO: 0.1 % — SIGNIFICANT CHANGE UP (ref 0–2)
BILIRUB SERPL-MCNC: 2 MG/DL — SIGNIFICANT CHANGE UP (ref 0.4–2)
BLD GP AB SCN SERPL QL: SIGNIFICANT CHANGE UP
BUN SERPL-MCNC: 10 MG/DL — SIGNIFICANT CHANGE UP (ref 8–20)
BUN SERPL-MCNC: 11.7 MG/DL — SIGNIFICANT CHANGE UP (ref 8–20)
CALCIUM SERPL-MCNC: 8.1 MG/DL — LOW (ref 8.6–10.2)
CALCIUM SERPL-MCNC: 8.2 MG/DL — LOW (ref 8.6–10.2)
CHLORIDE SERPL-SCNC: 100 MMOL/L — SIGNIFICANT CHANGE UP (ref 98–107)
CHLORIDE SERPL-SCNC: 103 MMOL/L — SIGNIFICANT CHANGE UP (ref 98–107)
CO2 SERPL-SCNC: 19 MMOL/L — LOW (ref 22–29)
CO2 SERPL-SCNC: 21 MMOL/L — LOW (ref 22–29)
CREAT SERPL-MCNC: 0.74 MG/DL — SIGNIFICANT CHANGE UP (ref 0.5–1.3)
CREAT SERPL-MCNC: 1.03 MG/DL — SIGNIFICANT CHANGE UP (ref 0.5–1.3)
D DIMER BLD IA.RAPID-MCNC: 2440 NG/ML DDU — HIGH
EOSINOPHIL # BLD AUTO: 0.07 K/UL — SIGNIFICANT CHANGE UP (ref 0–0.5)
EOSINOPHIL NFR BLD AUTO: 0.7 % — SIGNIFICANT CHANGE UP (ref 0–6)
FIBRINOGEN PPP-MCNC: 466 MG/DL — SIGNIFICANT CHANGE UP (ref 290–520)
GAS PNL BLDA: SIGNIFICANT CHANGE UP
GAS PNL BLDA: SIGNIFICANT CHANGE UP
GLUCOSE BLDC GLUCOMTR-MCNC: 117 MG/DL — HIGH (ref 70–99)
GLUCOSE BLDC GLUCOMTR-MCNC: 98 MG/DL — SIGNIFICANT CHANGE UP (ref 70–99)
GLUCOSE BLDC GLUCOMTR-MCNC: 99 MG/DL — SIGNIFICANT CHANGE UP (ref 70–99)
GLUCOSE SERPL-MCNC: 121 MG/DL — HIGH (ref 70–99)
GLUCOSE SERPL-MCNC: 99 MG/DL — SIGNIFICANT CHANGE UP (ref 70–99)
HCT VFR BLD CALC: 26.4 % — LOW (ref 39–50)
HGB BLD-MCNC: 8.3 G/DL — LOW (ref 13–17)
IMM GRANULOCYTES NFR BLD AUTO: 0.9 % — SIGNIFICANT CHANGE UP (ref 0–1.5)
INR BLD: 1.56 RATIO — HIGH (ref 0.88–1.16)
LYMPHOCYTES # BLD AUTO: 1.08 K/UL — SIGNIFICANT CHANGE UP (ref 1–3.3)
LYMPHOCYTES # BLD AUTO: 10.4 % — LOW (ref 13–44)
MAGNESIUM SERPL-MCNC: 2.2 MG/DL — SIGNIFICANT CHANGE UP (ref 1.6–2.6)
MAGNESIUM SERPL-MCNC: 2.3 MG/DL — SIGNIFICANT CHANGE UP (ref 1.6–2.6)
MCHC RBC-ENTMCNC: 30.7 PG — SIGNIFICANT CHANGE UP (ref 27–34)
MCHC RBC-ENTMCNC: 31.4 GM/DL — LOW (ref 32–36)
MCV RBC AUTO: 97.8 FL — SIGNIFICANT CHANGE UP (ref 80–100)
MONOCYTES # BLD AUTO: 1.24 K/UL — HIGH (ref 0–0.9)
MONOCYTES NFR BLD AUTO: 11.9 % — SIGNIFICANT CHANGE UP (ref 2–14)
NEUTROPHILS # BLD AUTO: 7.94 K/UL — HIGH (ref 1.8–7.4)
NEUTROPHILS NFR BLD AUTO: 76 % — SIGNIFICANT CHANGE UP (ref 43–77)
PHOSPHATE SERPL-MCNC: 3.2 MG/DL — SIGNIFICANT CHANGE UP (ref 2.4–4.7)
PLATELET # BLD AUTO: 105 K/UL — LOW (ref 150–400)
POTASSIUM SERPL-MCNC: 3.7 MMOL/L — SIGNIFICANT CHANGE UP (ref 3.5–5.3)
POTASSIUM SERPL-MCNC: 3.9 MMOL/L — SIGNIFICANT CHANGE UP (ref 3.5–5.3)
POTASSIUM SERPL-SCNC: 3.7 MMOL/L — SIGNIFICANT CHANGE UP (ref 3.5–5.3)
POTASSIUM SERPL-SCNC: 3.9 MMOL/L — SIGNIFICANT CHANGE UP (ref 3.5–5.3)
PROT SERPL-MCNC: 6.1 G/DL — LOW (ref 6.6–8.7)
PROTHROM AB SERPL-ACNC: 17.7 SEC — HIGH (ref 10.6–13.6)
RBC # BLD: 2.7 M/UL — LOW (ref 4.2–5.8)
RBC # FLD: 20.6 % — HIGH (ref 10.3–14.5)
SODIUM SERPL-SCNC: 134 MMOL/L — LOW (ref 135–145)
SODIUM SERPL-SCNC: 137 MMOL/L — SIGNIFICANT CHANGE UP (ref 135–145)
WBC # BLD: 10.43 K/UL — SIGNIFICANT CHANGE UP (ref 3.8–10.5)
WBC # FLD AUTO: 10.43 K/UL — SIGNIFICANT CHANGE UP (ref 3.8–10.5)

## 2021-06-29 PROCEDURE — 99232 SBSQ HOSP IP/OBS MODERATE 35: CPT

## 2021-06-29 PROCEDURE — 99291 CRITICAL CARE FIRST HOUR: CPT

## 2021-06-29 PROCEDURE — 99233 SBSQ HOSP IP/OBS HIGH 50: CPT

## 2021-06-29 PROCEDURE — 99233 SBSQ HOSP IP/OBS HIGH 50: CPT | Mod: 24,25

## 2021-06-29 PROCEDURE — 71045 X-RAY EXAM CHEST 1 VIEW: CPT | Mod: 26

## 2021-06-29 PROCEDURE — 36556 INSERT NON-TUNNEL CV CATH: CPT

## 2021-06-29 PROCEDURE — 31624 DX BRONCHOSCOPE/LAVAGE: CPT

## 2021-06-29 PROCEDURE — 90937 HEMODIALYSIS REPEATED EVAL: CPT

## 2021-06-29 RX ORDER — NOREPINEPHRINE BITARTRATE/D5W 8 MG/250ML
0.05 PLASTIC BAG, INJECTION (ML) INTRAVENOUS
Qty: 8 | Refills: 0 | Status: DISCONTINUED | OUTPATIENT
Start: 2021-06-29 | End: 2021-06-30

## 2021-06-29 RX ORDER — TAMSULOSIN HYDROCHLORIDE 0.4 MG/1
0.4 CAPSULE ORAL AT BEDTIME
Refills: 0 | Status: DISCONTINUED | OUTPATIENT
Start: 2021-06-29 | End: 2021-07-10

## 2021-06-29 RX ORDER — PIPERACILLIN AND TAZOBACTAM 4; .5 G/20ML; G/20ML
4.5 INJECTION, POWDER, LYOPHILIZED, FOR SOLUTION INTRAVENOUS EVERY 8 HOURS
Refills: 0 | Status: DISCONTINUED | OUTPATIENT
Start: 2021-06-29 | End: 2021-06-30

## 2021-06-29 RX ORDER — ALBUMIN HUMAN 25 %
250 VIAL (ML) INTRAVENOUS ONCE
Refills: 0 | Status: COMPLETED | OUTPATIENT
Start: 2021-06-29 | End: 2021-06-29

## 2021-06-29 RX ADMIN — Medication 125 MILLILITER(S): at 09:45

## 2021-06-29 RX ADMIN — PANTOPRAZOLE SODIUM 40 MILLIGRAM(S): 20 TABLET, DELAYED RELEASE ORAL at 05:15

## 2021-06-29 RX ADMIN — MILRINONE LACTATE 3.75 MICROGRAM(S)/KG/MIN: 1 INJECTION, SOLUTION INTRAVENOUS at 12:46

## 2021-06-29 RX ADMIN — Medication 1 PACKET(S): at 05:16

## 2021-06-29 RX ADMIN — CHLORHEXIDINE GLUCONATE 1 APPLICATION(S): 213 SOLUTION TOPICAL at 12:47

## 2021-06-29 RX ADMIN — AMIODARONE HYDROCHLORIDE 400 MILLIGRAM(S): 400 TABLET ORAL at 05:18

## 2021-06-29 RX ADMIN — DEXMEDETOMIDINE HYDROCHLORIDE IN 0.9% SODIUM CHLORIDE 5.01 MICROGRAM(S)/KG/HR: 4 INJECTION INTRAVENOUS at 02:09

## 2021-06-29 RX ADMIN — MILRINONE LACTATE 7.51 MICROGRAM(S)/KG/MIN: 1 INJECTION, SOLUTION INTRAVENOUS at 06:34

## 2021-06-29 RX ADMIN — DEXMEDETOMIDINE HYDROCHLORIDE IN 0.9% SODIUM CHLORIDE 5.01 MICROGRAM(S)/KG/HR: 4 INJECTION INTRAVENOUS at 04:13

## 2021-06-29 RX ADMIN — Medication 125 MILLILITER(S): at 10:50

## 2021-06-29 RX ADMIN — DEXMEDETOMIDINE HYDROCHLORIDE IN 0.9% SODIUM CHLORIDE 5.01 MICROGRAM(S)/KG/HR: 4 INJECTION INTRAVENOUS at 06:31

## 2021-06-29 RX ADMIN — FENTANYL CITRATE 50 MICROGRAM(S): 50 INJECTION INTRAVENOUS at 02:30

## 2021-06-29 RX ADMIN — Medication 9.38 MICROGRAM(S)/KG/MIN: at 09:15

## 2021-06-29 RX ADMIN — MIDODRINE HYDROCHLORIDE 10 MILLIGRAM(S): 2.5 TABLET ORAL at 05:15

## 2021-06-29 RX ADMIN — FENTANYL CITRATE 50 MICROGRAM(S): 50 INJECTION INTRAVENOUS at 07:34

## 2021-06-29 RX ADMIN — EPINEPHRINE 15 MICROGRAM(S)/KG/MIN: 0.3 INJECTION INTRAMUSCULAR; SUBCUTANEOUS at 12:46

## 2021-06-29 RX ADMIN — CHLORHEXIDINE GLUCONATE 5 MILLILITER(S): 213 SOLUTION TOPICAL at 05:14

## 2021-06-29 RX ADMIN — FENTANYL CITRATE 50 MICROGRAM(S): 50 INJECTION INTRAVENOUS at 02:08

## 2021-06-29 RX ADMIN — PIPERACILLIN AND TAZOBACTAM 25 GRAM(S): 4; .5 INJECTION, POWDER, LYOPHILIZED, FOR SOLUTION INTRAVENOUS at 05:16

## 2021-06-29 RX ADMIN — PIPERACILLIN AND TAZOBACTAM 25 GRAM(S): 4; .5 INJECTION, POWDER, LYOPHILIZED, FOR SOLUTION INTRAVENOUS at 15:36

## 2021-06-29 RX ADMIN — FENTANYL CITRATE 50 MICROGRAM(S): 50 INJECTION INTRAVENOUS at 08:00

## 2021-06-29 RX ADMIN — PIPERACILLIN AND TAZOBACTAM 25 GRAM(S): 4; .5 INJECTION, POWDER, LYOPHILIZED, FOR SOLUTION INTRAVENOUS at 23:07

## 2021-06-29 RX ADMIN — PANTOPRAZOLE SODIUM 40 MILLIGRAM(S): 20 TABLET, DELAYED RELEASE ORAL at 18:32

## 2021-06-29 RX ADMIN — BUDESONIDE AND FORMOTEROL FUMARATE DIHYDRATE 2 PUFF(S): 160; 4.5 AEROSOL RESPIRATORY (INHALATION) at 19:51

## 2021-06-29 RX ADMIN — BUDESONIDE AND FORMOTEROL FUMARATE DIHYDRATE 2 PUFF(S): 160; 4.5 AEROSOL RESPIRATORY (INHALATION) at 07:40

## 2021-06-29 NOTE — PHARMACOTHERAPY INTERVENTION NOTE - NSPHARMCOMMASP
ASP - Renal dose adjustment
ASP - Renal dose adjustment
ASP - Dose optimization/Non-Renal dose adjustment
ASP - Lab/ test recommended

## 2021-06-29 NOTE — PROGRESS NOTE ADULT - SUBJECTIVE AND OBJECTIVE BOX
[facility]                                                               Office: [address] [address]                                                              Telephone: [phone]. Fax:[phone]                                                                             PROGRESS NOTE  Reason for follow up: Valvular heart disease, Cardiogenic shock  Overnight: No new events.   Update: extubated this morning with drop in BP required albumin in addition of norepinephrine to epinephrine for low DBP and currently with SBP 150s, MAP 80s; remains on milrinone, plan to monitor off CVVHD, remains oligouric       Review of symptoms:   Cardiac:  No chest pain. No dyspnea. No palpitations.  Respiratory: +dyspnea  Gastrointestinal: No diarrhea. No abdominal pain. No bleeding.     Past medical history: No updates.   	  Vital Signs Last 24 Hrs  T(C): 36.8 (06-29-21 @ 12:00), Max: 37.6 (06-28-21 @ 17:00)  T(F): 98.2 (06-29-21 @ 12:00), Max: 99.7 (06-28-21 @ 17:00)  HR: 93 (06-29-21 @ 13:45) (82 - 99)  BP: 123/68 (06-29-21 @ 13:30) (98/57 - 126/65)  BP(mean): 89 (06-29-21 @ 13:30) (74 - 89)  RR: 20 (06-29-21 @ 13:45) (10 - 46)  SpO2: 99% (06-29-21 @ 13:45) (91% - 100%)  I&O's Summary    28 Jun 2021 07:01 - 29 Jun 2021 07:00  --------------------------------------------------------  IN: 3396.1 mL / OUT: 2911 mL / NET: 485.1 mL    29 Jun 2021 07:01  -  29 Jun 2021 15:23  --------------------------------------------------------  IN: 864.4 mL / OUT: 236 mL / NET: 628.4 mL          PHYSICAL EXAM:  Appearance: Comfortable. No acute distress  HEENT:  Head and neck: Atraumatic. Normocephalic  Neurologic: A&Ox 3, no focal deficits  Lymphatic: No cervical lymphadenopathy  Cardiovascular: Normal S1 S2, No murmur, rubs/gallops. No JVD, No edema  Respiratory: coarse anteriorly, +secretions  Gastrointestinal:  Soft, Non-tender, + BS  Lower Extremities: No edema  Psychiatry: Patient is calm. No agitation. Mood & affect appropriate  Skin: No rashes/ecchymoses/cyanosis/ulcers visualized on the face, hands or feet.      CURRENT MEDICATIONS:  MEDICATIONS  (STANDING):  aMIOdarone    Tablet 400 milliGRAM(s) Oral every 8 hours  aMIOdarone    Tablet   Oral   ascorbic acid 500 milliGRAM(s) Oral daily  atorvastatin 10 milliGRAM(s) Oral at bedtime  budesonide 160 MICROgram(s)/formoterol 4.5 MICROgram(s) Inhaler 2 Puff(s) Inhalation two times a day  chlorhexidine 2% Cloths 1 Application(s) Topical daily  CRRT Treatment    <Continuous>  EPINEPHrine    Infusion 0.04 MICROgram(s)/kG/Min (15 mL/Hr) IV Continuous <Continuous>  ferrous    sulfate 325 milliGRAM(s) Oral at bedtime  folic acid 1 milliGRAM(s) Oral daily  insulin lispro (ADMELOG) corrective regimen sliding scale   SubCutaneous every 6 hours  melatonin 5 milliGRAM(s) Oral at bedtime  midodrine 10 milliGRAM(s) Oral every 8 hours  milrinone Infusion 0.125 MICROgram(s)/kG/Min (3.75 mL/Hr) IV Continuous <Continuous>  mirtazapine 15 milliGRAM(s) Oral daily  Nephro-melissa 1 Tablet(s) Oral daily  norepinephrine Infusion 0.05 MICROgram(s)/kG/Min (9.38 mL/Hr) IV Continuous <Continuous>  pantoprazole  Injectable 40 milliGRAM(s) IV Push every 12 hours  Phoxillum Filtration BK 4 / 2.5 5000 milliLiter(s) (1000 mL/Hr) CRRT <Continuous>  Phoxillum Filtration BK 4 / 2.5 5000 milliLiter(s) (2000 mL/Hr) CRRT <Continuous>  Phoxillum Filtration BK 4 / 2.5 5000 milliLiter(s) (1500 mL/Hr) CRRT <Continuous>  piperacillin/tazobactam IVPB.. 4.5 Gram(s) IV Intermittent every 8 hours  psyllium Powder 1 Packet(s) Oral two times a day  senna 2 Tablet(s) Oral at bedtime  sodium chloride 0.9%. 1000 milliLiter(s) (10 mL/Hr) IV Continuous <Continuous>  sodium chloride 0.9%. 1000 milliLiter(s) (5 mL/Hr) IV Continuous <Continuous>  tamsulosin 0.4 milliGRAM(s) Oral at bedtime    MEDICATIONS  (PRN):  ALBUTerol    90 MICROgram(s) HFA Inhaler 2 Puff(s) Inhalation every 6 hours PRN Shortness of Breath and/or Wheezing  sodium chloride 0.9% lock flush 10 milliLiter(s) IV Push every 1 hour PRN Pre/post blood products, medications, blood draw, and to maintain line patency      DIAGNOSTIC TESTING:  [ ] Echocardiogram:   < from: TTE Echo Complete w/ Contrast w/ Doppler (06.26.21 @ 10:36) >  Summary:   1. Endocardial visualization was enhanced with intravenous echo contrast.   2. Left ventricular ejection fraction, by visual estimation, is 20 to 25%.   3. Severely decreased global left ventricular systolic function.   4. Mildly increased LV wall thickness.   5. The mitral in-flow pattern reveals no discernable A-wave, therefore no comment on diastolic function can be made.   6. Mildly enlarged right ventricle.   7. Mild thickening of the anterior and posterior mitral valve leaflets.   8. Moderate mitral valve regurgitation.   9. Severe tricuspid regurgitation.  10. TAVR in the aortic position. Appears well seated with no abnormal rocking motion. Mild to moderate paravalvular aortic regurgitation is seen. Peak velocity is 2.26 m/s, MG is 11.2 mmHg, and PG is 20.5 mmHg, which are acceptable in the setting of a prosethetic aortic valve.  11. Mild pulmonic valve regurgitation.  12. Compared with prior study dated 6/23/21, the LVEF appears decreased to 20-25%. MR now appears moderate and TR appears severe. Results were discussed with CT surgery.    < end of copied text >    [ ]  Catheterization:  < from: Cardiac Cath Lab - Adult (06.18.21 @ 13:53) >  VENTRICLES: EF by echo was 35 %.  CORONARY VESSELS: The coronary circulation isright dominant.  LM:   --  LM: Normal.  LAD:   --  Proximal LAD: There was a tubular 50 % stenosis.  CX:   --  Circumflex: Angiography showed minor luminal irregularities with  no flow limiting lesions.  RCA:   --  RCA: Angiography showed minor luminal irregularities with no  flow limiting lesions.  COMPLICATIONS: No complications occurred during the cath lab visit.  DIAGNOSTIC IMPRESSIONS: Moderate 40-50% stenosis in the proximal LAD,  unchanged from 2/2019 (Insignificant iFR in 2/2019). 2. No significant CAD  involving the LM, LCX and RCA. 3. A TAV is noted to be moving in  consonance with the surrounding structures. 4. Severe central Prosthetic  valve Aortic Insufficiency is noted on a ISRAEL and the LVEF was estimated  [id number]%.    < end of copied text >    [ ] Stress Test:      Labs:                        8.3    10.43 )-----------( 105      ( 29 Jun 2021 03:11 )             26.4     06-29    134<L>  |  100  |  10.0  ----------------------------<  121<H>  3.7   |  21.0<L>  |  0.74    Ca    8.1<L>      29 Jun 2021 03:11  Phos  3.2     06-29  Mg     2.3     06-29    TPro  6.1<L>  /  Alb  3.3  /  TBili  2.0  /  DBili  x   /  AST  49<H>  /  ALT  44<H>  /  AlkPhos  113  06-29        Serum Pro-Brain Natriuretic Peptide: 48923 pg/mL (06-13-21 @ 03:05)  Serum Pro-Brain Natriuretic Peptide: 36961 pg/mL (06-12-21 @ 01:55)      A1C with Estimated Average Glucose Result: 5.9 % (06-12-21 @ 01:55)      TELEMETRY: Afib 90-100s

## 2021-06-29 NOTE — SWALLOW BEDSIDE ASSESSMENT ADULT - PHARYNGEAL PHASE
Wet vocal quality post oral intake/Cough post oral intake/Throat clear post oral intake/Multiple swallows

## 2021-06-29 NOTE — PROGRESS NOTE ADULT - PROBLEM SELECTOR PLAN 3
Likely congestive component  Mgmt as above  Reassess once euvolemic Likely post capillary/Group 2  Mgmt as above  Reassess once euvolemic

## 2021-06-29 NOTE — SWALLOW BEDSIDE ASSESSMENT ADULT - SLP GENERAL OBSERVATIONS
Pt recd upright/in bed. A&Ox3. Pain scale 0/10 pre/post. +4LNC, SpO2 %. Baseline cough/throat clear noted with expectoration of phlegm with additional use of Yankhauer, with removal of thick sputum. Pt's family, RN Maureen & Nurse manager Rick present during evaluation.

## 2021-06-29 NOTE — PROGRESS NOTE ADULT - ASSESSMENT
Extubated,    Alert, remains Oliguric,     On CVVHDF - Even Fluid Balance,  No Blled - R FVC  insertion site,     To consider D.C FVC ( Day # 4 ) , Tunneled Catheter ( R - IJ ) on Thursday ( If Cleared per ID )

## 2021-06-29 NOTE — PROGRESS NOTE ADULT - ATTENDING COMMENTS
Extubated this am.  Pt is awake, alert and following commands.  Remains on epinephrine 0.04 mcg/kg/min and milrinone gtt 0.250 mcg/kg/min.   BP notable for wide pulse pressure, likely due to some AI and vasoplegia.  CVVH for goal CVP 5-12   Wean epinephrine as able.

## 2021-06-29 NOTE — PROGRESS NOTE ADULT - ASSESSMENT
76 year old male patient with a medical history of MI in 1995 (angiogram, no stents placed), COPD (2L O2 at home), s/p TAVR (03/2019 at [facility]), gout, CKD, CVA (09/2020), pulmonary HTN, initially presented to [facility] 6/1/21 with RONNELL on CKD (likely cardiorenal syndrome), urinary retention due to noncompliance with medications, with hospital course complicated by cardiogenic shock, acute hypoxemic respiratory failure secondary to metabolic acidosis and respiratory alkalosis. Patient found to have TAVR failure with ISRAEL 6/10/21 showing EF 40-45%, Aortic valve prosthesis with Severe paravalvular leak and valvular AI, mild-moderate aortic stenosis, and moderate MR.   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 Bleeding less likley w/ factor levels are > 20%.     Anemia  Thrombocytopenia  Elevated PT/PTT    Plan:  Thrombocytopenia continues to improve, 105K today. HIT ab negative.   Anemia -  multifactorial. Transfuse as needed to maintain Hgb > 7  PTT now normal  Minimally elevated PT/INR due to passive congestion   76 year old male patient with a medical history of MI in 1995 (angiogram, no stents placed), COPD (2L O2 at home), s/p TAVR (03/2019 at [facility]), gout, CKD, CVA (09/2020), pulmonary HTN, initially presented to [facility] 6/1/21 with RONNELL on CKD (likely cardiorenal syndrome), urinary retention due to noncompliance with medications, with hospital course complicated by cardiogenic shock, acute hypoxemic respiratory failure secondary to metabolic acidosis and respiratory alkalosis. Patient found to have TAVR failure with ISRAEL 6/10/21 showing EF 40-45%, Aortic valve prosthesis with Severe paravalvular leak and valvular AI, mild-moderate aortic stenosis, and moderate MR.   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 Bleeding less likley w/ factor levels are > 20%.     Anemia  Thrombocytopenia  Elevated PT/PTT    Plan:  Thrombocytopenia continues to improve, 105K today. HIT ab negative.   Anemia -  multifactorial. Transfuse as needed to maintain Hgb > 7  PTT now normal  Minimally elevated PT/INR due to passive congestion, no further work up needed  Will sign off, please call as needed

## 2021-06-29 NOTE — PROGRESS NOTE ADULT - PROBLEM SELECTOR PLAN 4
Likely cardiorenal etiology  CRRT orders per nephrology  Agree with maintaining net even volume status.

## 2021-06-29 NOTE — PROGRESS NOTE ADULT - SUBJECTIVE AND OBJECTIVE BOX
[facility]                                        Neurology at [place]                                 Olivia Francis, & [place]                                  [address]                                        [place], [place], [address]                                             [phone]        CC: Encephalopathy    HPI:   The patient is a 76y Male who is status post TAVR 6/23. He has remained on the mechanical ventilator and neurology called this morning to evaluate mental status.   He has multiple medical issues that are being addressed.     Interim history:  Remains in [address] [place] [address].  Now extubated.    ROS:   Denies headache or dizziness.  Denies chest pain.  Denies shortness of breath.    MEDICATIONS  (STANDING):  aMIOdarone    Tablet 400 milliGRAM(s) Oral every 8 hours  aMIOdarone    Tablet   Oral   ascorbic acid 500 milliGRAM(s) Oral daily  atorvastatin 10 milliGRAM(s) Oral at bedtime  budesonide 160 MICROgram(s)/formoterol 4.5 MICROgram(s) Inhaler 2 Puff(s) Inhalation two times a day  chlorhexidine 0.12% Liquid 5 milliLiter(s) Oral Mucosa two times a day  chlorhexidine 2% Cloths 1 Application(s) Topical daily  CRRT Treatment    <Continuous>  EPINEPHrine    Infusion 0.04 MICROgram(s)/kG/Min (15 mL/Hr) IV Continuous <Continuous>  ferrous    sulfate 325 milliGRAM(s) Oral at bedtime  folic acid 1 milliGRAM(s) Oral daily  insulin lispro (ADMELOG) corrective regimen sliding scale   SubCutaneous every 6 hours  melatonin 5 milliGRAM(s) Oral at bedtime  midodrine 10 milliGRAM(s) Oral every 8 hours  milrinone Infusion 0.125 MICROgram(s)/kG/Min (3.75 mL/Hr) IV Continuous <Continuous>  mirtazapine 15 milliGRAM(s) Oral daily  Nephro-melissa 1 Tablet(s) Oral daily  norepinephrine Infusion 0.05 MICROgram(s)/kG/Min (9.38 mL/Hr) IV Continuous <Continuous>  pantoprazole  Injectable 40 milliGRAM(s) IV Push every 12 hours  Phoxillum Filtration BK 4 / 2.5 5000 milliLiter(s) (1500 mL/Hr) CRRT <Continuous>  Phoxillum Filtration BK 4 / 2.5 5000 milliLiter(s) (1000 mL/Hr) CRRT <Continuous>  Phoxillum Filtration BK 4 / 2.5 5000 milliLiter(s) (2000 mL/Hr) CRRT <Continuous>  piperacillin/tazobactam IVPB.. 4.5 Gram(s) IV Intermittent every 8 hours  psyllium Powder 1 Packet(s) Oral two times a day  senna 2 Tablet(s) Oral at bedtime  sodium chloride 0.9%. 1000 milliLiter(s) (10 mL/Hr) IV Continuous <Continuous>  sodium chloride 0.9%. 1000 milliLiter(s) (5 mL/Hr) IV Continuous <Continuous>      Vital Signs Last 24 Hrs  T(C): 37.2 (29 Jun 2021 08:00), Max: 37.6 (28 Jun 2021 17:00)  T(F): 98.9 (29 Jun 2021 08:00), Max: 99.7 (28 Jun 2021 17:00)  HR: 97 (29 Jun 2021 12:00) (82 - 97)  BP: 124/64 (29 Jun 2021 12:00) (98/57 - 124/64)  BP(mean): 86 (29 Jun 2021 12:00) (74 - 87)  RR: 17 (29 Jun 2021 12:00) (10 - 46)  SpO2: 93% (29 Jun 2021 12:00) (91% - 100%)    Detailed Neurologic Exam:    Mental status: The patient is awake and alert. There is no aphasia. There is no dysarthria.     Cranial nerves: Pupils equal and react symmetrically to light. There is no visual field deficit to threat. Extraocular motion is full with no nystagmus. Facial sensation is intact. Facial musculature is symmetric. Palate elevates symmetrically. Tongue is midline.    Motor: There is normal bulk and tone.  There is no tremor.  Symmetric strength although diffusely weak.    Sensation: Grossly intact to light touch and pin.    Reflexes: 2+ throughout and plantar responses are flexor.    Cerebellar: No dysmetria on finger nose testing.    Labs:     06-29    134<L>  |  100  |  10.0  ----------------------------<  121<H>  3.7   |  21.0<L>  |  0.74    Ca    8.1<L>      29 Jun 2021 03:11  Phos  3.2     06-29  Mg     2.3     06-29    TPro  6.1<L>  /  Alb  3.3  /  TBili  2.0  /  DBili  x   /  AST  49<H>  /  ALT  44<H>  /  AlkPhos  113  06-29                            8.3    10.43 )-----------( 105      ( 29 Jun 2021 03:11 )             26.4

## 2021-06-29 NOTE — PROGRESS NOTE ADULT - ASSESSMENT
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 History also notable for CVA s/p thrombectomy, COPD, PH, obesity, MI/CAD without need for intervention.     Currently intubated/sedated. On inotropic support as well as epinephrine to maintain CI >2.0. PA catheter recently discontinued. Receiving CRRT for renal failure. IV Zosyn for B/L CAP. 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 Pt. remains afebrile, BC negative to date. Clinical status is critical and prognosis guarded.     Hemodynamics:  6/28/21: Milrinone 0.25mcg/kg/min & Epineprine 0.04mcg/kg/min   CVP 6, PAP 49/17/28, HR 85, /61/82, TD CO/CI  4.4/2.2.       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 History also notable for CVA s/p thrombectomy, COPD, PH, obesity, MI/CAD without need for intervention.     Recently extubated with reported drop in DBP. Albumin currently infusing. On Milrinone, Epinephrine and Levophed. Receiving CRRT for renal failure. IV Zosyn for B/L CAP. 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 Pt. remains afebrile, WBC improving, BC negative to date. S/P bronchoscopy 6/28.    Hemodynamics:  6/28/21: Milrinone 0.25mcg/kg/min & Epineprine 0.04mcg/kg/min   CVP 6, PAP 49/17/28, HR 85, /61/82, TD CO/CI  4.4/2.2.

## 2021-06-29 NOTE — PROGRESS NOTE ADULT - PROBLEM SELECTOR PLAN 2
As above  ID following  Broad spectrum ABX with Vanco/Rocephin started 6/26 for presumed sepsis switched to zosyn 6/28  Continue to follow OR cultures for full 14 days

## 2021-06-29 NOTE — PROGRESS NOTE ADULT - SUBJECTIVE AND OBJECTIVE BOX
Subjective: no c/o incisional pain at this time. Denies CP, SOB, palpitations, N/V, other c/o. pt remains intubated and at times is agitated attempting to pull at lines.     T(C): 36.9 (06-29-21 @ 00:42), Max: 37.8 (06-28-21 @ 07:15)  HR: 88 (06-29-21 @ 01:00) (84 - 90)  BP: --  ABP: 154/71 (06-29-21 @ 01:00) (132/60 - 169/89)  ABP(mean): 94 (06-29-21 @ 01:00) (78 - 158)  RR: 13 (06-29-21 @ 01:00) (10 - 28)  SpO2: 100% (06-29-21 @ 01:00) (78% - 110%)  Wt(kg): --  CVP(mm Hg): 7 (06-29-21 @ 01:00) (0 - 30)  CO: 4.4 (06-28-21 @ 07:30) (4.3 - 4.6)  CI: 2.2 (06-28-21 @ 07:30) (1.9 - 2.2)  PA: 57/23 (06-28-21 @ 09:00) (47/14 - 71/39)   Mode: SIMV with PS  RR (machine): 6  TV (machine): 800  FiO2: 40  PEEP: 5  PS: 8  MAP: 9  PIP: 20      I&O's Detail    27 Jun 2021 07:01  -  28 Jun 2021 07:00  --------------------------------------------------------  IN:    Amiodarone: 400.8 mL    Dexmedetomidine: 626.4 mL    Enteral Tube Flush: 150 mL    EPINEPHrine: 360 mL    IV PiggyBack: 50 mL    IV PiggyBack: 50 mL    IV PiggyBack: 500 mL    Milrinone: 180 mL    Nepro: 440 mL    Platelets - Single Donor: 433 mL    sodium chloride 0.9%: 120 mL    sodium chloride 0.9%: 240 mL  Total IN: 3550.2 mL    OUT:    Intermittent Catheterization - Urethral (mL): 230 mL    Norepinephrine: 0 mL    Other (mL): 5377 mL  Total OUT: 5607 mL    Total NET: -2056.8 mL      28 Jun 2021 07:01  -  29 Jun 2021 01:26  --------------------------------------------------------  IN:    Amiodarone: 133.6 mL    Dexmedetomidine: 550 mL    Enteral Tube Flush: 60 mL    EPINEPHrine: 240 mL    IV PiggyBack: 50 mL    IV PiggyBack: 250 mL    IV PiggyBack: 25 mL    Milrinone: 120 mL    Nepro: 520 mL    sodium chloride 0.9%: 80 mL    sodium chloride 0.9%: 160 mL  Total IN: 2188.6 mL    OUT:    Other (mL): 2358 mL  Total OUT: 2358 mL    Total NET: -169.4 mL          LABS: All Lab data reviewed and analyzed                        8.7    11.01 )-----------( 89       ( 28 Jun 2021 05:26 )             26.9     06-28    135  |  101  |  11.5  ----------------------------<  117<H>  3.8   |  21.0<L>  |  0.82    Ca    8.1<L>      28 Jun 2021 05:26  Phos  3.0     06-28  Mg     2.3     06-28    TPro  6.3<L>  /  Alb  3.6  /  TBili  2.2<H>  /  DBili  x   /  AST  58<H>  /  ALT  45<H>  /  AlkPhos  111  06-28    PT/INR - ( 28 Jun 2021 05:26 )   PT: 17.6 sec;   INR: 1.55 ratio         PTT - ( 28 Jun 2021 05:26 )  PTT:32.8 sec      ABG - ( 28 Jun 2021 06:19 )  pH, Arterial: 7.430 pH, Blood: x     /  pCO2: 33    /  pO2: 165   / HCO3: 22    / Base Excess: -2.4  /  SaO2: 100.0             CAPILLARY BLOOD GLUCOSE      POCT Blood Glucose.: 99 mg/dL (29 Jun 2021 00:43)  POCT Blood Glucose.: 109 mg/dL (28 Jun 2021 17:24)  POCT Blood Glucose.: 145 mg/dL (28 Jun 2021 12:34)  POCT Blood Glucose.: 94 mg/dL (28 Jun 2021 06:17)           RADIOLOGY: - Reviewed and analyzed   CXR: pending    HOSPITAL MEDICATIONS: All medications reviewed and analyzed  MEDICATIONS  (STANDING):  aMIOdarone    Tablet 400 milliGRAM(s) Oral every 8 hours  aMIOdarone    Tablet   Oral   ascorbic acid 500 milliGRAM(s) Oral daily  atorvastatin 10 milliGRAM(s) Oral at bedtime  budesonide 160 MICROgram(s)/formoterol 4.5 MICROgram(s) Inhaler 2 Puff(s) Inhalation two times a day  chlorhexidine 0.12% Liquid 5 milliLiter(s) Oral Mucosa two times a day  chlorhexidine 2% Cloths 1 Application(s) Topical daily  CRRT Treatment    <Continuous>  dexMEDEtomidine Infusion 0.2 MICROgram(s)/kG/Hr (5.01 mL/Hr) IV Continuous <Continuous>  EPINEPHrine    Infusion 0.04 MICROgram(s)/kG/Min (15 mL/Hr) IV Continuous <Continuous>  ferrous    sulfate 325 milliGRAM(s) Oral at bedtime  folic acid 1 milliGRAM(s) Oral daily  insulin lispro (ADMELOG) corrective regimen sliding scale   SubCutaneous every 6 hours  melatonin 5 milliGRAM(s) Oral at bedtime  midodrine 10 milliGRAM(s) Oral every 8 hours  milrinone Infusion 0.25 MICROgram(s)/kG/Min (7.51 mL/Hr) IV Continuous <Continuous>  mirtazapine 15 milliGRAM(s) Oral daily  Nephro-melissa 1 Tablet(s) Oral daily  pantoprazole  Injectable 40 milliGRAM(s) IV Push every 12 hours  Phoxillum Filtration BK 4 / 2.5 5000 milliLiter(s) (1000 mL/Hr) CRRT <Continuous>  Phoxillum Filtration BK 4 / 2.5 5000 milliLiter(s) (1000 mL/Hr) CRRT <Continuous>  Phoxillum Filtration BK 4 / 2.5 5000 milliLiter(s) (2000 mL/Hr) CRRT <Continuous>  piperacillin/tazobactam IVPB.. 3.375 Gram(s) IV Intermittent every 8 hours  psyllium Powder 1 Packet(s) Oral two times a day  senna 2 Tablet(s) Oral at bedtime  sodium chloride 0.9%. 1000 milliLiter(s) (10 mL/Hr) IV Continuous <Continuous>  sodium chloride 0.9%. 1000 milliLiter(s) (5 mL/Hr) IV Continuous <Continuous>    MEDICATIONS  (PRN):  ALBUTerol    90 MICROgram(s) HFA Inhaler 2 Puff(s) Inhalation every 6 hours PRN Shortness of Breath and/or Wheezing  fentaNYL    Injectable 50 MICROGram(s) IV Push every 4 hours PRN Severe Pain (7 - 10)  sodium chloride 0.9% lock flush 10 milliLiter(s) IV Push every 1 hour PRN Pre/post blood products, medications, blood draw, and to maintain line patency          Neuro: pt intubated and sleeping at this time.   HEENT: PERRL, EOMI, oral mucosa pink and moist  Neck: supple, no JVD  CV: regular rate, regular rhythm, +S1S2, no murmurs or rub  Pulm/chest: coarse breath sounds b/l, no accessory muscle use noted  Abd: soft, NT, ND, +BS  Ext: CRONIN x 4, right groin HD cath in place   Skin: warm, well perfused         Case including assessment/plan of care discussed with   CT surgery attending.  Critical Care time: 35     minutes of noncontinuos critical care time spent evaluating/treating, reviewing imaging, labs, discussing case with multidisciplinary team, discussing plans/goals of care with patient/family to prevent further life threatening depreciation of the patient's condition. Non-inclusive is procedure time.       76yMale with PMH     EGD with biopsy    Flexible colonoscopy    Insert CV cath w/o tunnel 5+yr    TAVR, open femoral artery approach        PAST MEDICAL & SURGICAL HISTORY:  Pulmonary hypertension    Hypertension    Hyperlipidemia    H/O aortic valve stenosis  s/p TAVR 2019 at [place]    CVA (cerebrovascular accident)  MCA CVA with tPA and thrombectomy    Chronic kidney disease    Prediabetes    Mitral valve regurgitation    CAD in native artery    Aneurysm of aortic root  thoracic aortic aneurysm, without ruptur    Benign prostatic hyperplasia    Gout    History of transcatheter aortic valve replacement (TAVR)

## 2021-06-29 NOTE — SWALLOW BEDSIDE ASSESSMENT ADULT - SWALLOW EVAL: RECOMMENDED DIET
NPO with consideration for short-term, non-oral means of nutrition hydration as per pt/family wishes

## 2021-06-29 NOTE — PROGRESS NOTE ADULT - SUBJECTIVE AND OBJECTIVE BOX
HPI:  76 year old male with a medical history of MI in 1995 (angiogram, no stents placed), COPD (O2 dependent 2LNC), s/p TAVR (03/2019 at [facility]), CVA (09/2020), pulmonary HTN, CKD and gout initially presented to [facility] 6/1/21 with RONNELL on CKD (likely cardiorenal syndrome), urinary retention due to noncompliance with medications, with hospital course complicated by cardiogenic shock, acute hypoxemic respiratory failure secondary to metabolic acidosis and respiratory alkalosis. Patient found to have TAVR failure with ISRAEL 6/10/21 showing EF 40-45%, Aortic valve prosthesis with Severe paravalvular leak and valvular AI, mild-moderate aortic stenosis, and moderate MR. Patient was transferred to [facility] under Dr. Campbell for further workup.   He underwent Valve in valve TAVR 6/23/21, repeat TTE 6/26 demonstrated reduction in LVEF 20-25%, mild to moderate paravalvular aortic regurgitation, severe TR, moderate MR.      Imaging from [facility]:  6/1: US Abdomen for elevated LFTs showing mild hepatic steatosis, mild hepatomegaly, sludge in the gallbladder, no discrete gallstones, normal biliary ducts, mild echogenic right kidney which may be seen with medical renal dz, mildly complex Right upper pole 4cm cyst with subtle internal echoes may represent hemorrhagic/proteinaceous cyst, mildly complex right midpole 2cm cyst with thin internal linear septation.   6/3: Kidney and Bladder US showing no hydronephrosis, echogenic kidneys likely from medical renal dz, prostatomegaly, and thickening of the posterior wall of the bladder with trabeculations which could be due to bladder outlet obstruction.   6/7: Kidney and Bladder US: No hydronephrosis or shadowing renal calculi. Stable b/l renal cysts.   6/7: Lower Extremity Venous Doppler with no DVT noted.   6/8: TTE showing EF 47%, dilated IVC, dilation of the ascending aorta of 4.4cm, moderate-severe pulmonary HTN, moderate-severe TR, aortic valve with severe prosthesis regurgitation and moderate prosthesis stenosis  6/9: CT Chest showing emphysema and intersitial lung dz changes, stable b/l small pleural effusions, cardiomegaly.   6/11: ISRAEL showing EF 40-45%, Aortic valve prosthesis with Severe paravalvular leak and valvular AI, mild-moderate aortic stenosis, and moderate MR.  (12 Jun 2021 01:36)    REVIEW OF SYSTEMS:  14 point ROS negative except if noted above.    Tele: Sinus rhythm @ 85 bpm, first degree AV block    Physical Exam:  General: Alert, in no acute distress, recently extubated  HEENT: EOMs intact  Neck: Neck supple. JVP not elevated.  Chest: anterior rhonchi  CV: RRR, Normal S1 and S2. III/VI systolic murmur LSB,   Extremities: Pulses palpable, no edema, lukewarm peripherally  Abdomen: Soft, non-distended  Skin: WDI, left IJ intro  Labs:  ( 06-28-21 @ 05:26 )               8.7    11.01 )--------( 89                   26.9     ( 06-28-21 @ 05:26 )     135  |  101  |  11.5  ---------------------<  117  3.8  |  21.0  |  0.82    Ca 8.1  Phos 3.0  Mg 2.3    ( 06-28-21 @ 05:26 )  TPro  6.3  /  Alb  3.6  /  TBili  2.2  /  DBili  x   /  AST  58  /  ALT  45  /  AlkPhos  111  ( 06-27-21 @ 00:35 )  TPro  6.4  /  Alb  3.6  /  TBili  2.8  /  DBili  x   /  AST  41  /  ALT  40  /  AlkPhos  89    PTT/PT/INR - ( 06-28-21 @ 05:26 )  PTT: 32.8 sec / PT: 17.6 sec / INR: 1.55 ratio    Serum Pro-Brain Natriuretic Peptide: [id number] (06-13-21 @ 03:05)    VBG - ( 06-28-21 @ 20:05 )  pH: 7.470 / pCO2: 32    / pO2: 141   / Oxygen saturation: 100.0     ABG - ( 06-28-21 @ 06:19 )  pH: 7.430 / pCO2: 33    / pO2: 165   / HCO3: 22    / Base Excess: -2.4  / SaO2: 100.0        < from: Cardiac Cath Lab - Adult (03.26.21 @ 10:20) >  DIAGNOSTIC IMPRESSIONS: Severe Pulmonary Hypertension, likely congestive,  with equivalent response to Shawna at 80ppm- PVR was 2.09WU pre and 1.59 post  Shawna. 2. Severe congestive heart failure. 3. A TAV in the aortic position  was noted moving in consonance with the surrounding structures and with  acceptable hemodynamics (Mean Aortic PG is 16mmHg and an PILAR over 1cm2).  DIAGNOSTIC RECOMMENDATIONS: Aggressive diuresis. 2. Would consider Cardio  MEMS for better fluid management in the out patient setting and to  minimize repeat hospitalizations. 3. Would re-assess severity of the MR  after CHF optimization.  INTERVENTIONAL IMPRESSIONS: Severe Pulmonary Hypertension, likely  congestive, with equivalent response to Shawna at 80ppm- PVR was 2.09WU pre  and 1.59 post Shawna. 2. Severe congestive heart failure. 3. A TAV in the  aortic position was noted moving in consonance with the surrounding  structures and with acceptable hemodynamics (Mean Aortic PG is 16mmHg and  an PILAR over 1cm2).    HEMODYNAMICS: There is severe biventricular failure. There is severe  pulmonary hypertension. Following administration of inhaled nitric oxide (  80ppm), there was a minimal overall pulmonary hemodynamic response. A  prominant "v" wave is noted on the PCW tracing suggestive of MR or  Diastolic non-compliance.    < from: Cardiac Cath Lab - Adult (06.18.21 @ 13:53) >  VENTRICLES: EF by echo was 35 %.  CORONARY VESSELS: The coronary circulation isright dominant.  LM:   --  LM: Normal.  LAD:   --  Proximal LAD: There was a tubular 50 % stenosis.  CX:   --  Circumflex: Angiography showed minor luminal irregularities with  no flow limiting lesions.  RCA:   --  RCA: Angiography showed minor luminal irregularities with no  flow limiting lesions.  COMPLICATIONS: No complications occurred during the cath lab visit.  DIAGNOSTIC IMPRESSIONS: Moderate 40-50% stenosis in the proximal LAD,  unchanged from 2/2019 (Insignificant iFR in 2/2019). 2. No significant CAD  involving the LM, LCX and RCA. 3. A TAV is noted to be moving in  consonance with the surrounding structures. 4. Severe central Prosthetic  valve Aortic Insufficiency is noted on a ISRAEL and the LVEF was estimated  [id number]%.  DIAGNOSTIC RECOMMENDATIONS: GDMT. 2. RFM. 3. Possible TAVIV to address the  prosthetic AI, centrally mediated.  INTERVENTIONAL IMPRESSIONS: Moderate 40-50% stenosis in the proximal LAD,  unchanged from 2/2019 (Insignificant iFR in 2/2019). 2. No significant CAD  involving the LM, LCX and RCA. 3. A TAV is noted to be moving in  consonance with the surrounding structures. 4. Severe central Prosthetic  valve Aortic Insufficiency is noted on a ISRAEL and the LVEF was estimated  [id number]%.  INTERVENTIONAL RECOMMENDATIONS: GDMT. 2. RFM. 3. Possible TAVIV to address  the prosthetic AI, centrally mediated.    < from: TTE Echo Complete w/ Contrast w/ Doppler (06.26.21 @ 10:36) >    Summary:   1. Endocardial visualization was enhanced with intravenous echo contrast.   2. Left ventricular ejection fraction, by visual estimation, is 20 to 25%.   3. Severely decreased global left ventricular systolic function.   4. Mildly increased LV wall thickness.   5. The mitral in-flow pattern reveals no discernable A-wave, therefore no comment on diastolic function can be made.   6. Mildly enlarged right ventricle.   7. Mild thickening of the anterior and posterior mitral valve leaflets.   8. Moderate mitral valve regurgitation.   9. Severe tricuspid regurgitation.  10. TAVR in the aortic position. Appears well seated with no abnormal rocking motion. Mild to moderate paravalvular aortic regurgitation is seen. Peak velocity is 2.26 m/s, MG is 11.2 mmHg, and PG is 20.5 mmHg, which are acceptable in the setting of a prosethetic aortic valve.  11. Mild pulmonic valve regurgitation.  12. Compared with prior study dated 6/23/21, the LVEF appears decreased to 20-25%. MR now appears moderate and TR appears severe. Results were discussed with CT surgery.    < from: Xray Chest 1 View- PORTABLE-Urgent (06.28.21 @ 12:02) >  IMPRESSION:  Right IJ line introducer remains in satisfactory position, [place]-Jae catheter. ET, and NG tube remain in satisfactory position.  Normal pulmonary vasculature. Resolution of mild pulmonary venous congestion    < end of copied text >   HPI:  76 year old male with a medical history of MI in 1995 (angiogram, no stents placed), COPD (O2 dependent 2LNC), s/p TAVR (03/2019 at [facility]), CVA (09/2020), pulmonary HTN, CKD and gout initially presented to [facility] 6/1/21 with RONNELL on CKD (likely cardiorenal syndrome), urinary retention due to noncompliance with medications, with hospital course complicated by cardiogenic shock, acute hypoxemic respiratory failure secondary to metabolic acidosis and respiratory alkalosis. Patient found to have TAVR failure with ISRAEL 6/10/21 showing EF 40-45%, Aortic valve prosthesis with Severe paravalvular leak and valvular AI, mild-moderate aortic stenosis, and moderate MR. Patient was transferred to [facility] under Dr. Campbell for further workup.   He underwent Valve in valve TAVR 6/23/21, repeat TTE 6/26 demonstrated reduction in LVEF 20-25%, mild to moderate paravalvular aortic regurgitation, severe TR, moderate MR.      Imaging from [facility]:  6/1: US Abdomen for elevated LFTs showing mild hepatic steatosis, mild hepatomegaly, sludge in the gallbladder, no discrete gallstones, normal biliary ducts, mild echogenic right kidney which may be seen with medical renal dz, mildly complex Right upper pole 4cm cyst with subtle internal echoes may represent hemorrhagic/proteinaceous cyst, mildly complex right midpole 2cm cyst with thin internal linear septation.   6/3: Kidney and Bladder US showing no hydronephrosis, echogenic kidneys likely from medical renal dz, prostatomegaly, and thickening of the posterior wall of the bladder with trabeculations which could be due to bladder outlet obstruction.   6/7: Kidney and Bladder US: No hydronephrosis or shadowing renal calculi. Stable b/l renal cysts.   6/7: Lower Extremity Venous Doppler with no DVT noted.   6/8: TTE showing EF 47%, dilated IVC, dilation of the ascending aorta of 4.4cm, moderate-severe pulmonary HTN, moderate-severe TR, aortic valve with severe prosthesis regurgitation and moderate prosthesis stenosis  6/9: CT Chest showing emphysema and intersitial lung dz changes, stable b/l small pleural effusions, cardiomegaly.   6/11: ISRAEL showing EF 40-45%, Aortic valve prosthesis with Severe paravalvular leak and valvular AI, mild-moderate aortic stenosis, and moderate MR.  (12 Jun 2021 01:36)    REVIEW OF SYSTEMS:  14 point ROS negative except if noted above.    Tele: Sinus rhythm @ 85 bpm, first degree AV block    Physical Exam:  General: Alert, in no acute distress, recently extubated  HEENT: EOMs intact  Neck: Neck supple. JVP not elevated.  Chest: anterior rhonchi  CV: RRR, Normal S1 and S2. III/VI systolic murmur LSB,   Extremities: Pulses palpable, no edema, lukewarm peripherally  Abdomen: Soft, non-distended  Skin: WDI, left IJ intro  Labs:  ( 06-28-21 @ 05:26 )               8.7    11.01 )--------( 89                   26.9     ( 06-28-21 @ 05:26 )     135  |  101  |  11.5  ---------------------<  117  3.8  |  21.0  |  0.82    Ca 8.1  Phos 3.0  Mg 2.3    ( 06-28-21 @ 05:26 )  TPro  6.3  /  Alb  3.6  /  TBili  2.2  /  DBili  x   /  AST  58  /  ALT  45  /  AlkPhos  111  ( 06-27-21 @ 00:35 )  TPro  6.4  /  Alb  3.6  /  TBili  2.8  /  DBili  x   /  AST  41  /  ALT  40  /  AlkPhos  89    PTT/PT/INR - ( 06-28-21 @ 05:26 )  PTT: 32.8 sec / PT: 17.6 sec / INR: 1.55 ratio    Serum Pro-Brain Natriuretic Peptide: [id number] (06-13-21 @ 03:05)    VBG - ( 06-28-21 @ 20:05 )  pH: 7.470 / pCO2: 32    / pO2: 141   / Oxygen saturation: 100.0     ABG - ( 06-28-21 @ 06:19 )  pH: 7.430 / pCO2: 33    / pO2: 165   / HCO3: 22    / Base Excess: -2.4  / SaO2: 100.0        < from: Cardiac Cath Lab - Adult (03.26.21 @ 10:20) >  DIAGNOSTIC IMPRESSIONS: Severe Pulmonary Hypertension, likely congestive,  with equivalent response to Shawna at 80ppm- PVR was 2.09WU pre and 1.59 post  Shawna. 2. Severe congestive heart failure. 3. A TAV in the aortic position  was noted moving in consonance with the surrounding structures and with  acceptable hemodynamics (Mean Aortic PG is 16mmHg and an PILAR over 1cm2).  DIAGNOSTIC RECOMMENDATIONS: Aggressive diuresis. 2. Would consider Cardio  MEMS for better fluid management in the out patient setting and to  minimize repeat hospitalizations. 3. Would re-assess severity of the MR  after CHF optimization.  INTERVENTIONAL IMPRESSIONS: Severe Pulmonary Hypertension, likely  congestive, with equivalent response to Shawna at 80ppm- PVR was 2.09WU pre  and 1.59 post Shawna. 2. Severe congestive heart failure. 3. A TAV in the  aortic position was noted moving in consonance with the surrounding  structures and with acceptable hemodynamics (Mean Aortic PG is 16mmHg and  an PILAR over 1cm2).    HEMODYNAMICS: There is severe biventricular failure. There is severe  pulmonary hypertension. Following administration of inhaled nitric oxide (  80ppm), there was a minimal overall pulmonary hemodynamic response. A  prominant "v" wave is noted on the PCW tracing suggestive of MR or  Diastolic non-compliance.    < from: Cardiac Cath Lab - Adult (06.18.21 @ 13:53) >  VENTRICLES: EF by echo was 35 %.  CORONARY VESSELS: The coronary circulation isright dominant.  LM:   --  LM: Normal.  LAD:   --  Proximal LAD: There was a tubular 50 % stenosis.  CX:   --  Circumflex: Angiography showed minor luminal irregularities with  no flow limiting lesions.  RCA:   --  RCA: Angiography showed minor luminal irregularities with no  flow limiting lesions.  COMPLICATIONS: No complications occurred during the cath lab visit.  DIAGNOSTIC IMPRESSIONS: Moderate 40-50% stenosis in the proximal LAD,  unchanged from 2/2019 (Insignificant iFR in 2/2019). 2. No significant CAD  involving the LM, LCX and RCA. 3. A TAV is noted to be moving in  consonance with the surrounding structures. 4. Severe central Prosthetic  valve Aortic Insufficiency is noted on a ISRAEL and the LVEF was estimated  [id number]%.  DIAGNOSTIC RECOMMENDATIONS: GDMT. 2. RFM. 3. Possible TAVIV to address the  prosthetic AI, centrally mediated.  INTERVENTIONAL IMPRESSIONS: Moderate 40-50% stenosis in the proximal LAD,  unchanged from 2/2019 (Insignificant iFR in 2/2019). 2. No significant CAD  involving the LM, LCX and RCA. 3. A TAV is noted to be moving in  consonance with the surrounding structures. 4. Severe central Prosthetic  valve Aortic Insufficiency is noted on a ISRAEL and the LVEF was estimated  [id number]%.  INTERVENTIONAL RECOMMENDATIONS: GDMT. 2. RFM. 3. Possible TAVIV to address  the prosthetic AI, centrally mediated.    < from: TTE Echo Complete w/ Contrast w/ Doppler (06.26.21 @ 10:36) >    Summary:   1. Endocardial visualization was enhanced with intravenous echo contrast.   2. Left ventricular ejection fraction, by visual estimation, is 20 to 25%.   3. Severely decreased global left ventricular systolic function.   4. Mildly increased LV wall thickness.   5. The mitral in-flow pattern reveals no discernable A-wave, therefore no comment on diastolic function can be made.   6. Mildly enlarged right ventricle.   7. Mild thickening of the anterior and posterior mitral valve leaflets.   8. Moderate mitral valve regurgitation.   9. Severe tricuspid regurgitation.  10. TAVR in the aortic position. Appears well seated with no abnormal rocking motion. Mild to moderate paravalvular aortic regurgitation is seen. Peak velocity is 2.26 m/s, MG is 11.2 mmHg, and PG is 20.5 mmHg, which are acceptable in the setting of a prosethetic aortic valve.  11. Mild pulmonic valve regurgitation.  12. Compared with prior study dated 6/23/21, the LVEF appears decreased to 20-25%. MR now appears moderate and TR appears severe. Results were discussed with CT surgery.    < from: Xray Chest 1 View- PORTABLE-Urgent (06.28.21 @ 12:02) >  IMPRESSION:  Right IJ line introducer remains in satisfactory position, [place]-Jae catheter. ET, and NG tube remain in satisfactory position.  Normal pulmonary vasculature. Resolution of mild pulmonary venous congestion    < end of copied text >       Tele: Sinus rhythm    Physical Exam:  General: Alert, in no acute distress, recently extubated  HEENT: EOMs intact  Neck: Neck supple. JVP not elevated.  Chest: anterior rhonchi  CV: RRR, Normal S1 and S2. III/VI systolic murmur LSB,   Extremities: Pulses palpable, no edema, lukewarm peripherally  Abdomen: Soft, non-distended  Skin: WDI, left IJ intro  Labs:          < from: Cardiac Cath Lab - Adult (03.26.21 @ 10:20) >  DIAGNOSTIC IMPRESSIONS: Severe Pulmonary Hypertension, likely congestive,  with equivalent response to Shawna at 80ppm- PVR was 2.09WU pre and 1.59 post  Shawna. 2. Severe congestive heart failure. 3. A TAV in the aortic position  was noted moving in consonance with the surrounding structures and with  acceptable hemodynamics (Mean Aortic PG is 16mmHg and an PILAR over 1cm2).  DIAGNOSTIC RECOMMENDATIONS: Aggressive diuresis. 2. Would consider Cardio  MEMS for better fluid management in the out patient setting and to  minimize repeat hospitalizations. 3. Would re-assess severity of the MR  after CHF optimization.  INTERVENTIONAL IMPRESSIONS: Severe Pulmonary Hypertension, likely  congestive, with equivalent response to Shawna at 80ppm- PVR was 2.09WU pre  and 1.59 post Shawna. 2. Severe congestive heart failure. 3. A TAV in the  aortic position was noted moving in consonance with the surrounding  structures and with acceptable hemodynamics (Mean Aortic PG is 16mmHg and  an PILAR over 1cm2).    HEMODYNAMICS: There is severe biventricular failure. There is severe  pulmonary hypertension. Following administration of inhaled nitric oxide (  80ppm), there was a minimal overall pulmonary hemodynamic response. A  prominant "v" wave is noted on the PCW tracing suggestive of MR or  Diastolic non-compliance.    < from: Cardiac Cath Lab - Adult (06.18.21 @ 13:53) >  VENTRICLES: EF by echo was 35 %.  CORONARY VESSELS: The coronary circulation isright dominant.  LM:   --  LM: Normal.  LAD:   --  Proximal LAD: There was a tubular 50 % stenosis.  CX:   --  Circumflex: Angiography showed minor luminal irregularities with  no flow limiting lesions.  RCA:   --  RCA: Angiography showed minor luminal irregularities with no  flow limiting lesions.  COMPLICATIONS: No complications occurred during the cath lab visit.  DIAGNOSTIC IMPRESSIONS: Moderate 40-50% stenosis in the proximal LAD,  unchanged from 2/2019 (Insignificant iFR in 2/2019). 2. No significant CAD  involving the LM, LCX and RCA. 3. A TAV is noted to be moving in  consonance with the surrounding structures. 4. Severe central Prosthetic  valve Aortic Insufficiency is noted on a ISRAEL and the LVEF was estimated  [id number]%.  DIAGNOSTIC RECOMMENDATIONS: GDMT. 2. RFM. 3. Possible TAVIV to address the  prosthetic AI, centrally mediated.  INTERVENTIONAL IMPRESSIONS: Moderate 40-50% stenosis in the proximal LAD,  unchanged from 2/2019 (Insignificant iFR in 2/2019). 2. No significant CAD  involving the LM, LCX and RCA. 3. A TAV is noted to be moving in  consonance with the surrounding structures. 4. Severe central Prosthetic  valve Aortic Insufficiency is noted on a ISRAEL and the LVEF was estimated  [id number]%.  INTERVENTIONAL RECOMMENDATIONS: GDMT. 2. RFM. 3. Possible TAVIV to address  the prosthetic AI, centrally mediated.    < from: TTE Echo Complete w/ Contrast w/ Doppler (06.26.21 @ 10:36) >    Summary:   1. Endocardial visualization was enhanced with intravenous echo contrast.   2. Left ventricular ejection fraction, by visual estimation, is 20 to 25%.   3. Severely decreased global left ventricular systolic function.   4. Mildly increased LV wall thickness.   5. The mitral in-flow pattern reveals no discernable A-wave, therefore no comment on diastolic function can be made.   6. Mildly enlarged right ventricle.   7. Mild thickening of the anterior and posterior mitral valve leaflets.   8. Moderate mitral valve regurgitation.   9. Severe tricuspid regurgitation.  10. TAVR in the aortic position. Appears well seated with no abnormal rocking motion. Mild to moderate paravalvular aortic regurgitation is seen. Peak velocity is 2.26 m/s, MG is 11.2 mmHg, and PG is 20.5 mmHg, which are acceptable in the setting of a prosethetic aortic valve.  11. Mild pulmonic valve regurgitation.  12. Compared with prior study dated 6/23/21, the LVEF appears decreased to 20-25%. MR now appears moderate and TR appears severe. Results were discussed with CT surgery.    < from: Xray Chest 1 View- PORTABLE-Urgent (06.28.21 @ 12:02) >  IMPRESSION:  Right IJ line introducer remains in satisfactory position, [place]-Jae catheter. ET, and NG tube remain in satisfactory position.  Normal pulmonary vasculature. Resolution of mild pulmonary venous congestion    < end of copied text >         Subjective: NAEO, pt extubated and alert     Medications:  ALBUTerol    90 MICROgram(s) HFA Inhaler 2 Puff(s) Inhalation every 6 hours PRN  aMIOdarone    Tablet 400 milliGRAM(s) Oral every 8 hours  aMIOdarone    Tablet   Oral   ascorbic acid 500 milliGRAM(s) Oral daily  atorvastatin 10 milliGRAM(s) Oral at bedtime  budesonide 160 MICROgram(s)/formoterol 4.5 MICROgram(s) Inhaler 2 Puff(s) Inhalation two times a day  chlorhexidine 2% Cloths 1 Application(s) Topical daily  CRRT Treatment    <Continuous>  EPINEPHrine    Infusion 0.04 MICROgram(s)/kG/Min IV Continuous <Continuous>  ferrous    sulfate 325 milliGRAM(s) Oral at bedtime  folic acid 1 milliGRAM(s) Oral daily  insulin lispro (ADMELOG) corrective regimen sliding scale   SubCutaneous every 6 hours  melatonin 5 milliGRAM(s) Oral at bedtime  midodrine 10 milliGRAM(s) Oral every 8 hours  milrinone Infusion 0.125 MICROgram(s)/kG/Min IV Continuous <Continuous>  mirtazapine 15 milliGRAM(s) Oral daily  Nephro-melissa 1 Tablet(s) Oral daily  norepinephrine Infusion 0.05 MICROgram(s)/kG/Min IV Continuous <Continuous>  pantoprazole  Injectable 40 milliGRAM(s) IV Push every 12 hours  Phoxillum Filtration BK 4 / 2.5 5000 milliLiter(s) CRRT <Continuous>  Phoxillum Filtration BK 4 / 2.5 5000 milliLiter(s) CRRT <Continuous>  Phoxillum Filtration BK 4 / 2.5 5000 milliLiter(s) CRRT <Continuous>  piperacillin/tazobactam IVPB.. 4.5 Gram(s) IV Intermittent every 8 hours  psyllium Powder 1 Packet(s) Oral two times a day  senna 2 Tablet(s) Oral at bedtime  sodium chloride 0.9% lock flush 10 milliLiter(s) IV Push every 1 hour PRN  sodium chloride 0.9%. 1000 milliLiter(s) IV Continuous <Continuous>  sodium chloride 0.9%. 1000 milliLiter(s) IV Continuous <Continuous>  tamsulosin 0.4 milliGRAM(s) Oral at bedtime    Vitals:  Vital Signs Last 24 Hours  T(C): 37.5 (06-29-21 @ 16:25), Max: 37.6 (06-28-21 @ 17:00)  HR: 97 (06-29-21 @ 16:30) (82 - 99)  BP: 117/57 (06-29-21 @ 16:00) (98/57 - 126/65)  RR: 17 (06-29-21 @ 16:30) (10 - 46)  SpO2: 96% (06-29-21 @ 16:30) (91% - 100%)    I&O's Summary    28 Jun 2021 07:01  -  29 Jun 2021 07:00  --------------------------------------------------------  IN: 3396.1 mL / OUT: 2911 mL / NET: 485.1 mL    29 Jun 2021 07:01  -  29 Jun 2021 16:54  --------------------------------------------------------  IN: 1152 mL / OUT: 646 mL / NET: 506 mL      Labs:                        8.3    10.43 )-----------( 105      ( 29 Jun 2021 03:11 )             26.4     06-29    134<L>  |  100  |  10.0  ----------------------------<  121<H>  3.7   |  21.0<L>  |  0.74    Ca    8.1<L>      29 Jun 2021 03:11  Phos  3.2     06-29  Mg     2.3     06-29    TPro  6.1<L>  /  Alb  3.3  /  TBili  2.0  /  DBili  x   /  AST  49<H>  /  ALT  44<H>  /  AlkPhos  113  06-29    PT/INR - ( 29 Jun 2021 03:11 )   PT: 17.7 sec;   INR: 1.56 ratio         PTT - ( 29 Jun 2021 03:11 )  PTT:34.6 sec    Tele: Sinus rhythm    Physical Exam:  General: Alert, in no acute distress, recently extubated  HEENT: EOMs intact  Neck: Neck supple. JVP not elevated.  Chest: anterior rhonchi  CV: RRR, Normal S1 and S2. III/VI systolic murmur LSB,   Extremities: Pulses palpable, no edema, lukewarm peripherally  Abdomen: Soft, non-distended  Skin: WDI, left IJ intro    < from: Cardiac Cath Lab - Adult (03.26.21 @ 10:20) >  DIAGNOSTIC IMPRESSIONS: Severe Pulmonary Hypertension, likely congestive,  with equivalent response to Shawna at 80ppm- PVR was 2.09WU pre and 1.59 post  Shawna. 2. Severe congestive heart failure. 3. A TAV in the aortic position  was noted moving in consonance with the surrounding structures and with  acceptable hemodynamics (Mean Aortic PG is 16mmHg and an PILAR over 1cm2).  DIAGNOSTIC RECOMMENDATIONS: Aggressive diuresis. 2. Would consider Cardio  MEMS for better fluid management in the out patient setting and to  minimize repeat hospitalizations. 3. Would re-assess severity of the MR  after CHF optimization.  INTERVENTIONAL IMPRESSIONS: Severe Pulmonary Hypertension, likely  congestive, with equivalent response to Shawna at 80ppm- PVR was 2.09WU pre  and 1.59 post Shawna. 2. Severe congestive heart failure. 3. A TAV in the  aortic position was noted moving in consonance with the surrounding  structures and with acceptable hemodynamics (Mean Aortic PG is 16mmHg and  an PILAR over 1cm2).    HEMODYNAMICS: There is severe biventricular failure. There is severe  pulmonary hypertension. Following administration of inhaled nitric oxide (  80ppm), there was a minimal overall pulmonary hemodynamic response. A  prominant "v" wave is noted on the PCW tracing suggestive of MR or  Diastolic non-compliance.    < from: Cardiac Cath Lab - Adult (06.18.21 @ 13:53) >  VENTRICLES: EF by echo was 35 %.  CORONARY VESSELS: The coronary circulation isright dominant.  LM:   --  LM: Normal.  LAD:   --  Proximal LAD: There was a tubular 50 % stenosis.  CX:   --  Circumflex: Angiography showed minor luminal irregularities with  no flow limiting lesions.  RCA:   --  RCA: Angiography showed minor luminal irregularities with no  flow limiting lesions.  COMPLICATIONS: No complications occurred during the cath lab visit.  DIAGNOSTIC IMPRESSIONS: Moderate 40-50% stenosis in the proximal LAD,  unchanged from 2/2019 (Insignificant iFR in 2/2019). 2. No significant CAD  involving the LM, LCX and RCA. 3. A TAV is noted to be moving in  consonance with the surrounding structures. 4. Severe central Prosthetic  valve Aortic Insufficiency is noted on a ISRAEL and the LVEF was estimated  [id number]%.  DIAGNOSTIC RECOMMENDATIONS: GDMT. 2. RFM. 3. Possible TAVIV to address the  prosthetic AI, centrally mediated.  INTERVENTIONAL IMPRESSIONS: Moderate 40-50% stenosis in the proximal LAD,  unchanged from 2/2019 (Insignificant iFR in 2/2019). 2. No significant CAD  involving the LM, LCX and RCA. 3. A TAV is noted to be moving in  consonance with the surrounding structures. 4. Severe central Prosthetic  valve Aortic Insufficiency is noted on a ISRAEL and the LVEF was estimated  [id number]%.  INTERVENTIONAL RECOMMENDATIONS: GDMT. 2. RFM. 3. Possible TAVIV to address  the prosthetic AI, centrally mediated.    < from: TTE Echo Complete w/ Contrast w/ Doppler (06.26.21 @ 10:36) >    Summary:   1. Endocardial visualization was enhanced with intravenous echo contrast.   2. Left ventricular ejection fraction, by visual estimation, is 20 to 25%.   3. Severely decreased global left ventricular systolic function.   4. Mildly increased LV wall thickness.   5. The mitral in-flow pattern reveals no discernable A-wave, therefore no comment on diastolic function can be made.   6. Mildly enlarged right ventricle.   7. Mild thickening of the anterior and posterior mitral valve leaflets.   8. Moderate mitral valve regurgitation.   9. Severe tricuspid regurgitation.  10. TAVR in the aortic position. Appears well seated with no abnormal rocking motion. Mild to moderate paravalvular aortic regurgitation is seen. Peak velocity is 2.26 m/s, MG is 11.2 mmHg, and PG is 20.5 mmHg, which are acceptable in the setting of a prosethetic aortic valve.  11. Mild pulmonic valve regurgitation.  12. Compared with prior study dated 6/23/21, the LVEF appears decreased to 20-25%. MR now appears moderate and TR appears severe. Results were discussed with CT surgery.    < from: Xray Chest 1 View- PORTABLE-Urgent (06.28.21 @ 12:02) >  IMPRESSION:  Right IJ line introducer remains in satisfactory position, [place]-Jae catheter. ET, and NG tube remain in satisfactory position.  Normal pulmonary vasculature. Resolution of mild pulmonary venous congestion    < end of copied text >

## 2021-06-29 NOTE — PROGRESS NOTE ADULT - PROBLEM SELECTOR PLAN 1
Biventricular failure, LVEF 20-25%, ACC/AHA stage C-D, NYHA class III-IV  Appears euvolemic on exam, lukewarm extremities  Keep net even volume status via CRRT, maintaining CVP <12  Strict I&O monitoring  Continue Milrinone @ 0.25mcg/kg/min.  Monitor markers of perfusion daily including Lactate, LFTs, mixed venous 02 sat  Recommend weaning Epinephrine, may increase inotropic agent if needed  Unclear need for Midodrine, pt with robust BP and will only increase SVR  Maintain amiodarone given prior tachyarrhythmias. Biventricular failure, LVEF 20-25%, ACC/AHA stage C-D, NYHA class III-IV  Appears euvolemic on exam, lukewarm extremities  Keep net even volume status via CRRT, maintaining CVP <12  Strict I&O monitoring  Continue Milrinone @ 0.25mcg/kg/min.  Monitor markers of perfusion daily including Lactate, LFTs, mixed venous 02 sat  Recommend weaning Epinephrine as able  Maintain amiodarone given prior tachyarrhythmias.

## 2021-06-29 NOTE — PROGRESS NOTE ADULT - PROBLEM SELECTOR PLAN 1
Now s/p Valve in Valve TAVR with Dr. Campbell on 6/23  Primacor increased to 0.25 mcg/kg/min for low CI  Epi gtt started to assist CI > 2.0  Repeat echo revealed EF 20-25% from 30-35% prior with midly enlarged RV, severely decreased LV Fxn, moderate MR, Severe TR and mild Pulm Regurgitation  CT C/A/P revealed b/l pneumonia, ABX started  Pressors:  Vaso off, Levophed off  Continue Midodrine 10 mg q8   Runs of SVT post op requiring Lidocaine and Amio gtts for stability, Amio PO load  Lidocaine gtt off  Continue GI ppx with Protonix and Senna, DVT ppx with SCD boots

## 2021-06-29 NOTE — PHARMACOTHERAPY INTERVENTION NOTE - COMMENTS
Patient on CVVHDF- Vancomycin level= 6.4, vancomycin 750mg q12h, level ordered for tomorrow morning
Spoke with lab, morning vancomycin level needs to be redrawn (incorrect tube sent). vancomycin level re-ordered
cvvhd dosing

## 2021-06-29 NOTE — PROGRESS NOTE ADULT - ASSESSMENT
76y Male with multiple medical issues now status post TAVR.     Encephalopathy.   Appears awake on mechanical ventilator now.   Following instructions.  Likely toxic-metabolic encephalopathy with sedation affecting exam previously.    Continue supportive care.    No further specific neurologic recommendations. Will be available as needed.

## 2021-06-29 NOTE — CHART NOTE - NSCHARTNOTEFT_GEN_A_CORE
Source: Patient [ ]  Family [ ]   other [x ] EMR and staff     Enteral /Parenteral Nutrition:  currently on hold secondary to extubation     Current Weight:   6/23: 100. kg   6/21: 98 kg   6/19: 96.7 kg   6/12: 99Kg       % Weight Change: Slight fluctuations in weight may be secondary to Generalized edema.     Pertinent Medications: MEDICATIONS  (STANDING):  albumin human  5% IVPB 250 milliLiter(s) IV Intermittent once  albumin human  5% IVPB 250 milliLiter(s) IV Intermittent once  aMIOdarone    Tablet 400 milliGRAM(s) Oral every 8 hours  aMIOdarone    Tablet   Oral   ascorbic acid 500 milliGRAM(s) Oral daily  atorvastatin 10 milliGRAM(s) Oral at bedtime  budesonide 160 MICROgram(s)/formoterol 4.5 MICROgram(s) Inhaler 2 Puff(s) Inhalation two times a day  chlorhexidine 0.12% Liquid 5 milliLiter(s) Oral Mucosa two times a day  chlorhexidine 2% Cloths 1 Application(s) Topical daily  CRRT Treatment    <Continuous>  EPINEPHrine    Infusion 0.04 MICROgram(s)/kG/Min (15 mL/Hr) IV Continuous <Continuous>  ferrous    sulfate 325 milliGRAM(s) Oral at bedtime  folic acid 1 milliGRAM(s) Oral daily  insulin lispro (ADMELOG) corrective regimen sliding scale   SubCutaneous every 6 hours  melatonin 5 milliGRAM(s) Oral at bedtime  midodrine 10 milliGRAM(s) Oral every 8 hours  milrinone Infusion 0.125 MICROgram(s)/kG/Min (3.75 mL/Hr) IV Continuous <Continuous>  mirtazapine 15 milliGRAM(s) Oral daily  Nephro-melissa 1 Tablet(s) Oral daily  norepinephrine Infusion 0.05 MICROgram(s)/kG/Min (9.38 mL/Hr) IV Continuous <Continuous>  pantoprazole  Injectable 40 milliGRAM(s) IV Push every 12 hours  Phoxillum Filtration BK 4 / 2.5 5000 milliLiter(s) (1500 mL/Hr) CRRT <Continuous>  Phoxillum Filtration BK 4 / 2.5 5000 milliLiter(s) (1000 mL/Hr) CRRT <Continuous>  Phoxillum Filtration BK 4 / 2.5 5000 milliLiter(s) (2000 mL/Hr) CRRT <Continuous>  piperacillin/tazobactam IVPB.. 4.5 Gram(s) IV Intermittent every 8 hours  psyllium Powder 1 Packet(s) Oral two times a day  senna 2 Tablet(s) Oral at bedtime  sodium chloride 0.9%. 1000 milliLiter(s) (10 mL/Hr) IV Continuous <Continuous>  sodium chloride 0.9%. 1000 milliLiter(s) (5 mL/Hr) IV Continuous <Continuous>    MEDICATIONS  (PRN):  ALBUTerol    90 MICROgram(s) HFA Inhaler 2 Puff(s) Inhalation every 6 hours PRN Shortness of Breath and/or Wheezing  fentaNYL    Injectable 50 MICROGram(s) IV Push every 4 hours PRN Severe Pain (7 - 10)  sodium chloride 0.9% lock flush 10 milliLiter(s) IV Push every 1 hour PRN Pre/post blood products, medications, blood draw, and to maintain line patency    Pertinent Labs: CBC Full  -  ( 29 Jun 2021 03:11 )  WBC Count : 10.43 K/uL  RBC Count : 2.70 M/uL  Hemoglobin : 8.3 g/dL  Hematocrit : 26.4 %  Platelet Count - Automated : 105 K/uL  Mean Cell Volume : 97.8 fl  Mean Cell Hemoglobin : 30.7 pg  Mean Cell Hemoglobin Concentration : 31.4 gm/dL  Auto Neutrophil # : 7.94 K/uL  Auto Lymphocyte # : 1.08 K/uL  Auto Monocyte # : 1.24 K/uL  Auto Eosinophil # : 0.07 K/uL  Auto Basophil # : 0.01 K/uL  Auto Neutrophil % : 76.0 %  Auto Lymphocyte % : 10.4 %  Auto Monocyte % : 11.9 %  Auto Eosinophil % : 0.7 %  Auto Basophil % : 0.1 %        Skin: R groin surgical, L groin surgical site, B/L arm skin tears, wound to coccyx noted.       Nutrition focused physical exam previously conducted - found signs of malnutrition [ ]absent [x ]present    Subcutaneous fat loss: Severe [x ] Orbital fat pads region, [x ]Buccal fat region, [x]Triceps region,  [x ]Ribs region    Muscle wasting: Severe [x ]Temples region, [x ]Clavicle region, [x ]Shoulder region, [ ]Scapula region, [ ]Interosseous region,  [ ]thigh region, [ ]Calf region    Estimated Needs:   [x ] no change since previous assessment  [ ] recalculated:     Hospital Course:   76M with a PMH of MI in 1995 (angiogram, no stents placed), COPD (2L O2 at home), severe AS s/p TAVR (03/2019 at [facility]), gout, CKD, CVA (no deficits, 09/2020), pulmonary HTN, initially presented to [facility] 6/1/21 with RONNELL on CKD, hospital course significant for cardiogenic shock, acute hypoxemic respiratory failure, and acute on chronic combined diastolic and systolic heart failure. ISRAEL revealed severe AI. Patient was transferred to [facility] under Dr. Campbell for further workup of bioprosthetic AI.   6/28 s/p BAL, sputum samples sent m ax switched to [facility]   Inpatient hospital course has been significant for:  1. 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 RONNELL on CKD progressed to ESRD requiring HD   3. Unintentional Weight loss / Dysphagia / Anorexia work up revealing duodenitis and diffuse erosive esophagitis, gastritis on EGD (biopsies negative for H. Pylori or carcinoma); colonoscopy showing diverticulosis  4. R/O AV Endocarditis   5. Auto-elevated INR (followed by Hematology)  6. R/O WILLEM   7. Adjustment disorder (evaluated by Behavioral Health)  8. Thrombocytopenia, Hematology consulted, r/o DIC  Patient is now s/p valve in valve TAVR on 6/23/21 with Dr. Campbell.   6/28 HIT (-), transfused plt x1 for groin oozing and plt in 20's. pt remains on epi, amio, primacor and is tolerating CVVHD      Current Nutrition Diagnosis:  Pt remains at high nutrition risk secondary to severe protein calorie malnutrition (acute on chronic) related to inability to consume sufficient protein energy intake in setting of aortic valve prosthesis with severe paravalvular leak and valvular AI, mild-moderate aortic stenosis, moderate MR and now with erosive gastritis s/p colonoscopy as evidenced by pt meeting <50% estimated energy intake > 5 days and severe muscle wasting and fat loss.   Pt also with 31% unintentional wt loss x 4 months prior to admission noted. Pt is now s/p Valve in valve TAVR, post op requiring vent support and CVVHD. Pt extubated this morning. NPO status maintained at this time. Recommendations below;     Recommendations:   1. RX: Nephro-melissa and folic acid daily via tolerated route   2. Check weight daily to monitor trends   3. Monitor Renal labs  4. Consider formal swallow evaluation prior to feeding pt PO     Monitoring and Evaluation:   [ ] PO intake [x ] Tolerance to diet prescription [X] Weights  [X] Follow up per protocol [X] Labs:

## 2021-06-29 NOTE — PROGRESS NOTE ADULT - SUBJECTIVE AND OBJECTIVE BOX
INFECTIOUS DISEASES AND INTERNAL MEDICINE at [place]  =======================================================  Kyle Wright MD  Diplomates American Board of Internal Medicine and Infectious Diseases  Telephone [phone]  Fax            [phone]  =======================================================    REYNA BOB [id number]    Follow up: valve vegetation    s/p TAVR on 6/23/21  no fevers  cultures from blood remains negative  EXTUBATED AWAKE    Allergies:  No Known Drug Allergies  strawberry (Anaphylaxis)       FAMILY  FAMILY HISTORY:  FH: lung cancer      REVIEW OF SYSTEMS:  Unable to obtain due to medical condition      Physical Exam:  GEN: AWAKE ALERT  HEENT: normocephalic and atraumatic.  Anicteric   NECK: Supple.   LUNGS: diffuse rhonchi   HEART: Regular rate and rhythm   ABDOMEN: Soft, nontender, and nondistended.  Positive bowel sounds.    : Lemus   EXTREMITIES: trace edema.  MSK: no joint swelling  NEUROLOGIC:AWAKE ALERT      Vitals:  T( Vital Signs Last 24 Hrs  T(C): 37.6 (28 Jun 2021 09:00), Max: 37.8 (28 Jun 2021 07:15)  T(F): 99.7 (28 Jun 2021 09:00), Max: 100 (28 Jun 2021 07:15)  HR: 88 (28 Jun 2021 14:30) (80 - 88)  BP: --  BP(mean): --  RR: 28 (28 Jun 2021 14:30) (7 - 34)  SpO2: 99% (28 Jun 2021 14:30) (78% - 110%)    Current Antibiotics:  cefTRIAXone   IVPB 2000 milliGRAM(s) IV Intermittent every 24 hours    Other medications:  aMIOdarone Infusion 0.5 mG/Min IV Continuous <Continuous>  atorvastatin 10 milliGRAM(s) Oral at bedtime  budesonide 160 MICROgram(s)/formoterol 4.5 MICROgram(s) Inhaler 2 Puff(s) Inhalation two times a day  chlorhexidine 0.12% Liquid 5 milliLiter(s) Oral Mucosa two times a day  chlorhexidine 2% Cloths 1 Application(s) Topical daily  CRRT Treatment    <Continuous>  dexMEDEtomidine Infusion 0.2 MICROgram(s)/kG/Hr IV Continuous <Continuous>  EPINEPHrine    Infusion 0.04 MICROgram(s)/kG/Min IV Continuous <Continuous>  insulin lispro (ADMELOG) corrective regimen sliding scale   SubCutaneous every 6 hours  midodrine 10 milliGRAM(s) Oral every 8 hours  milrinone Infusion 0.25 MICROgram(s)/kG/Min IV Continuous <Continuous>  mirtazapine 15 milliGRAM(s) Oral daily  Nephro-melissa 1 Tablet(s) Oral daily  norepinephrine Infusion 0.05 MICROgram(s)/kG/Min IV Continuous <Continuous>  pantoprazole  Injectable 40 milliGRAM(s) IV Push every 12 hours  Phoxillum Filtration BK 4 / 2.5 5000 milliLiter(s) CRRT <Continuous>  Phoxillum Filtration BK 4 / 2.5 5000 milliLiter(s) CRRT <Continuous>  Phoxillum Filtration BK 4 / 2.5 5000 milliLiter(s) CRRT <Continuous>  psyllium Powder 1 Packet(s) Oral two times a day  senna 2 Tablet(s) Oral at bedtime  sodium chloride 0.9%. 1000 milliLiter(s) IV Continuous <Continuous>  sodium chloride 0.9%. 1000 milliLiter(s) IV Continuous <Continuous>                                     8.3    10.43 )-----------( 105      ( 29 Jun 2021 03:11 )             26.4   06-29    134<L>  |  100  |  10.0  ----------------------------<  121<H>  3.7   |  21.0<L>  |  0.74    Ca    8.1<L>      29 Jun 2021 03:11  Phos  3.2     06-29  Mg     2.3     06-29    TPro  6.1<L>  /  Alb  3.3  /  TBili  2.0  /  DBili  x   /  AST  49<H>  /  ALT  44<H>  /  AlkPhos  113  06-29      /  AlkPhos  111  06-28    RECENT CULTURES:  06-26 @ 21:09 .Sputum Sputum     Rare polymorphonuclear leukocytes per low power field  No Squamous epithelial cells per low power field  No organisms seen per oil power field    06-16 @ 17:23 .Blood Blood     No growth at 5 days.    06-16 @ 14:41 .Blood Blood-Peripheral     No growth at 5 days.    06-16 @ 14:40 .Blood Blood-Peripheral     No growth at 5 days.    06-15 @ 12:30 .Blood Blood-Peripheral     No growth at 5 days.    06-15 @ 12:29 .Blood Blood-Peripheral     No growth at 5 days.      WBC Count: 14.00 K/uL (06-27-21 @ 00:35)  WBC Count: 11.58 K/uL (06-26-21 @ 16:52)  WBC Count: 13.11 K/uL (06-26-21 @ 05:43)  WBC Count: 12.82 K/uL (06-25-21 @ 23:38)  WBC Count: 13.53 K/uL (06-25-21 @ 17:42)  WBC Count: 13.00 K/uL (06-25-21 @ 09:53)  WBC Count: 13.29 K/uL (06-25-21 @ 02:40)  WBC Count: 12.85 K/uL (06-24-21 @ 12:25)  WBC Count: 9.68 K/uL (06-24-21 @ 03:19)  WBC Count: 8.93 K/uL (06-23-21 @ 14:24)  WBC Count: 10.50 K/uL (06-23-21 @ 06:05)  WBC Count: 8.73 K/uL (06-22-21 @ 20:14)    Creatinine, Serum: 1.51 mg/dL (06-27-21 @ 00:35)  Creatinine, Serum: 1.96 mg/dL (06-26-21 @ 16:52)  Creatinine, Serum: 2.55 mg/dL (06-26-21 @ 05:45)  Creatinine, Serum: 3.32 mg/dL (06-25-21 @ 23:38)  Creatinine, Serum: 3.97 mg/dL (06-25-21 @ 17:42)  Creatinine, Serum: 5.22 mg/dL (06-25-21 @ 09:53)  Creatinine, Serum: 5.05 mg/dL (06-25-21 @ 02:40)  Creatinine, Serum: 5.00 mg/dL (06-24-21 @ 12:25)  Creatinine, Serum: 4.77 mg/dL (06-24-21 @ 03:19)  Creatinine, Serum: 4.38 mg/dL (06-23-21 @ 14:24)  Creatinine, Serum: 4.41 mg/dL (06-23-21 @ 06:05)  Creatinine, Serum: 3.13 mg/dL (06-22-21 @ 20:14)    Procalcitonin, Serum: 0.24 ng/mL (06-17-21 @ 09:12)     COVID-19 PCR: NotDetec (06-22-21 @ 13:29)  COVID-19 PCR: NotDetec (06-18-21 @ 08:13)

## 2021-06-29 NOTE — SWALLOW BEDSIDE ASSESSMENT ADULT - SLP PERTINENT HISTORY OF CURRENT PROBLEM
As per MD note, "76 year old male patient with a medical history of MI in 1995 (angiogram, no stents placed), COPD (2L O2 at home), s/p TAVR (03/2019 at [facility]), gout, CKD, CVA (09/2020), pulmonary HTN, initially presented to [facility] 6/1/21 with RONNELL on CKD (likely cardiorenal syndrome), urinary retention due to noncompliance with medications, with hospital course complicated by cardiogenic shock, acute hypoxemic respiratory failure secondary to metabolic acidosis and respiratory alkalosis. Patient found to have TAVR failure with ISRAEL 6/10/21 showing EF 40-45%, Aortic valve prosthesis with Severe paravalvular leak and valvular AI, mild-moderate aortic stenosis, and moderate MR". Status post TAVR 6/23. Extubated this AM 6/29.

## 2021-06-29 NOTE — PROGRESS NOTE ADULT - SUBJECTIVE AND OBJECTIVE BOX
Patient seen and examined    I&O's Summary    28 Jun 2021 07:01  -  29 Jun 2021 07:00  --------------------------------------------------------  IN: 3396.1 mL / OUT: 2911 mL / NET: 485.1 mL    29 Jun 2021 07:01  -  29 Jun 2021 12:10  --------------------------------------------------------  IN: 785.8 mL / OUT: 166 mL / NET: 619.8 mL    REVIEW OF SYSTEMS:    CONSTITUTIONAL: No F/C  RESPIRATORY: + cough , No SOB  CARDIOVASCULAR: No CP/palpitations,    GASTROINTESTINAL: No abdominal pain , NVD   GENITOURINARY: No UTI sx  NEUROLOGICAL: No headaches/wk/numbness  MUSCULOSKELETAL:  No joint pain/swelling; No LBP  EXTREMITIES : + swelling,    Vital Signs Last 24 Hrs  T(C): 37.2 (29 Jun 2021 08:00), Max: 37.6 (28 Jun 2021 17:00)  T(F): 98.9 (29 Jun 2021 08:00), Max: 99.7 (28 Jun 2021 17:00)  HR: 97 (29 Jun 2021 12:00) (82 - 97)  BP: 124/64 (29 Jun 2021 12:00) (98/57 - 124/64)  BP(mean): 86 (29 Jun 2021 12:00) (74 - 87)  RR: 17 (29 Jun 2021 12:00) (10 - 46)  SpO2: 93% (29 Jun 2021 12:00) (91% - 100%)    PHYSICAL EXAM:    GENERAL: NAD, Pale, Responsive,   EYES:  conjunctiva and sclera clear  NECK: Supple, No JVD/Bruit  NERVOUS SYSTEM:  A/ O x 2  CHEST:  Coarse BS ,No rales or rhonchi  HEART:  RRR, + murmur,  ABDOMEN: Soft, NT/ND BS+  EXTREMITIES:  No Edema;  SKIN: No rashes    LABS:                         8.3    10.43 )-----------( 105      ( 29 Jun 2021 03:11 )             26.4     06-29    134<L>  |  100  |  10.0  ----------------------------<  121<H>  3.7   |  21.0<L>  |  0.74    Ca    8.1<L>      29 Jun 2021 03:11  Phos  3.2     06-29  Mg     2.3     06-29    TPro  6.1<L>  /  Alb  3.3  /  TBili  2.0  /  DBili  x   /  AST  49<H>  /  ALT  44<H>  /  AlkPhos  113  06-29      MEDICATIONS  (STANDING):  ALBUTerol    90 MICROgram(s) HFA Inhaler PRN  aMIOdarone    Tablet  aMIOdarone    Tablet  ascorbic acid  atorvastatin  budesonide 160 MICROgram(s)/formoterol 4.5 MICROgram(s) Inhaler  chlorhexidine 0.12% Liquid  chlorhexidine 2% Cloths  CRRT Treatment  EPINEPHrine    Infusion  fentaNYL    Injectable PRN  ferrous    sulfate  folic acid  insulin lispro (ADMELOG) corrective regimen sliding scale  melatonin  midodrine  milrinone Infusion  mirtazapine  Nephro-melissa  norepinephrine Infusion  pantoprazole  Injectable  Phoxillum Filtration BK 4 / 2.5  Phoxillum Filtration BK 4 / 2.5  Phoxillum Filtration BK 4 / 2.5  piperacillin/tazobactam IVPB..  psyllium Powder  senna  sodium chloride 0.9% lock flush PRN  sodium chloride 0.9%.  sodium chloride 0.9%.                76 year old male patient with a medical history of MI in 1995 (angiogram, no stents placed), COPD (2L O2 at home), s/p TAVR (03/2019 at [facility]), gout, CKD, CVA (09/2020), pulmonary HTN, initially presented to [facility] 6/1/21 with RONNELL on CKD (likely cardiorenal syndrome), urinary retention due to noncompliance with medications, with hospital course complicated by cardiogenic shock, acute hypoxemic respiratory failure secondary to metabolic acidosis and respiratory alkalosis. Patient found to have TAVR failure with ISRAEL 6/10/21 showing EF 40-45%, Aortic valve prosthesis with Severe paravalvular leak and valvular AI, mild-moderate aortic stenosis, and moderate MR. Patient was transferred to [facility] under Dr. Campbell for further workup.     ISRAEL : ? ENDOCARDITIS    PT HAS HX OF STREP BOVIS  BACTEREMIA   LAST YEAR AND RECEIVED   ROCEPHIN AT HOME FOR 6 WEEKS     Blood cultures since  admission remain negative      The initial blood cultures being held for14 days.     s/p TAVR on 6/23/2021    Started on IV Vancomycin and Ceftriaxone by CT ICU 6/26  Blood cultures repeated    ALL  cultures remain negative,     PT WITH THICK SECRETIONS  RX ZOSYN TO COVER FOR ? Pneumonia  FOLLOWUP BRONCHOSCOPY  CULTURES

## 2021-06-29 NOTE — PROGRESS NOTE ADULT - ASSESSMENT
76M h/o CAD MI in 1995 (angiogram, no stents placed), COPD (2L O2 at home), s/p TAVR (03/2019 at [facility]), gout, CKD, CVA (09/2020), pulmonary HTN, initially presented to [facility] 6/1/21 with RONNELL on CKD (likely cardiorenal syndrome), urinary retention due to noncompliance with medications, with hospital course complicated by cardiogenic shock, acute hypoxemic respiratory failure secondary to metabolic acidosis and respiratory alkalosis. Patient found to have TAVR failure with ISRAEL 6/10/21 showing initial EF 40-45%, Aortic valve prosthesis with Severe paravalvular leak and valvular AI, mild-moderate aortic stenosis, and moderate MR. Decompensated HF and severe pulmonary hypertension, underwent valve-in-valve TAVR 6/23/21, course complicated by worsening ADHF/cardiogenic shock with VT, intubated and RONNELL on CKD required CVVHD, repeat TTE with worsening LV EF 20-25%.     Cardiogenic shock, ADHF  -wean off epinephrine first and then norepinephrine, maintain MAP >65  -monitor I/O, BiPAP and IV diuresis as needed   -eventual addition of GDMT  -CHF following  -gradual worsening LV EF in setting of VT and AFib- repeat TTE next week to reassess, currently on amiodarone, would benefit from rhythm control but once off pressor/inotrope       TAVR failure s/p Amy  -add ASA 81mg      VT, Afib  -on amiodarone  -CHADSVasc 4- need long term anticoagulation, H/H and PLT been stable       RONNELL on CKD, oligouria  -monitor I/O off CVVHD        Taz Carter DO, [facility]  Faculty Non-Invasive Cardiologist  [phone] 76M h/o CAD MI in 1995 (angiogram, no stents placed), COPD (2L O2 at home), s/p TAVR (03/2019 at [facility]), gout, CKD, CVA (09/2020), pulmonary HTN, initially presented to [facility] 6/1/21 with RONNELL on CKD (likely cardiorenal syndrome), urinary retention due to noncompliance with medications, with hospital course complicated by cardiogenic shock, acute hypoxemic respiratory failure secondary to metabolic acidosis and respiratory alkalosis. Patient found to have TAVR failure with ISRAEL 6/10/21 showing initial EF 40-45%, Aortic valve prosthesis with Severe paravalvular leak and valvular AI, mild-moderate aortic stenosis, and moderate MR. Decompensated HF and severe pulmonary hypertension, underwent valve-in-valve TAVR 6/23/21, course complicated by worsening ADHF/cardiogenic shock with VT, intubated and RONNELL on CKD required CVVHD, repeat TTE with worsening LV EF 20-25%.     Cardiogenic shock, ADHF  -wean off epinephrine first and then norepinephrine, maintain MAP >65  -monitor I/O, BiPAP and IV diuresis as needed   -eventual addition of GDMT  -CHF following  -gradual worsening LV EF in setting of VT and AFib- repeat TTE next week to reassess, currently on amiodarone, would benefit from rhythm control but once off pressor/inotrope       TAVR failure s/p Amy  -add ASA 81mg      VT, Afib  -on amiodarone  -CHADSVasc 4- need long term anticoagulation, H/H and PLT been stable       RONNELL on CKD, oligouria  -monitor I/O off CVVHD      Covering for Dr. Meier,  Taz Carter DO, [facility]  Faculty Non-Invasive Cardiologist  [phone] 76M h/o CAD MI in 1995 (angiogram, no stents placed), COPD (2L O2 at home), s/p TAVR (03/2019 at [facility]), gout, CKD, CVA (09/2020), pulmonary HTN, initially presented to [facility] 6/1/21 with RONNELL on CKD (likely cardiorenal syndrome), urinary retention due to noncompliance with medications, with hospital course complicated by cardiogenic shock, acute hypoxemic respiratory failure secondary to metabolic acidosis and respiratory alkalosis. Patient found to have TAVR failure with ISRAEL 6/10/21 showing initial EF 40-45%, Aortic valve prosthesis with Severe paravalvular leak and valvular AI, mild-moderate aortic stenosis, and moderate MR. Decompensated HF and severe pulmonary hypertension, underwent valve-in-valve TAVR 6/23/21, course complicated by worsening ADHF/cardiogenic shock with VT, intubated and RONNELL on CKD required CVVHD, repeat TTE with worsening LV EF 20-25%.     Cardiogenic shock, ADHF  -wean off epinephrine first and then norepinephrine, maintain MAP >65  -monitor I/O, IV diuresis as needed   -eventual addition of GDMT  -CHF following  -gradual worsening LV EF in setting of VT and AFib- repeat TTE next week to reassess, currently on amiodarone, would benefit from rhythm control but once off pressor/inotrope       TAVR failure s/p Amy  -add ASA 81mg      VT, Afib  -on amiodarone  -CHADSVasc 4- need long term anticoagulation, H/H and PLT been stable       RONNELL on CKD, oligouria  -monitor I/O off CVVHD    Respiratory failure, COPD  -pulmonary toilet, BiPAP as needed        Covering for Dr. Meier,  Taz Carter DO, [facility]  Faculty Non-Invasive Cardiologist  [phone] 76M h/o CAD MI in 1995 (angiogram, no stents placed), COPD (2L O2 at home), s/p TAVR (03/2019 at [facility]), gout, CKD, CVA (09/2020), pulmonary HTN, initially presented to [facility] 6/1/21 with RONNELL on CKD (likely cardiorenal syndrome), urinary retention due to noncompliance with medications, with hospital course complicated by cardiogenic shock, acute hypoxemic respiratory failure secondary to metabolic acidosis and respiratory alkalosis. Patient found to have TAVR failure with ISRAEL 6/10/21 showing initial EF 40-45%, Aortic valve prosthesis with Severe paravalvular leak and valvular AI, mild-moderate aortic stenosis, and moderate MR. Decompensated HF and severe pulmonary hypertension, underwent valve-in-valve TAVR 6/23/21, course complicated by worsening ADHF/cardiogenic shock with VT, intubated and RONNELL on CKD required CVVHD, repeat TTE with worsening LV EF 20-25%.     Cardiogenic shock, AD-biV failure  -need afterload reduction, wean off epinephrine first and then norepinephrine, maintain MAP goal ~65 rather than targeting DBP  -monitor I/O, IV diuresis as needed   -eventual addition of GDMT  -CHF following  -gradual worsening LV EF in setting of VT and AFib- repeat TTE next week to reassess, currently on amiodarone, would benefit from rhythm control but once off pressor/inotrope       TAVR failure s/p Amy  -add ASA 81mg      VT, Afib  -on amiodarone  -CHADSVasc 4- need long term anticoagulation, H/H and PLT been stable       RONNELL on CKD, oligouria  -monitor I/O off CVVHD    Respiratory failure, COPD  -pulmonary toilet, BiPAP as needed        Covering for Dr. Meier,  Taz Carter DO, [facility]  Faculty Non-Invasive Cardiologist  [phone]

## 2021-06-29 NOTE — PROGRESS NOTE ADULT - ASSESSMENT
76 year old male patient with a medical history of MI in 1995 (angiogram, no stents placed), COPD (2L O2 at home), s/p TAVR (03/2019 at [facility]), gout, CKD, CVA (09/2020), pulmonary HTN, initially presented to [facility] 6/1/21 with RONNELL on CKD (likely cardiorenal syndrome), urinary retention due to noncompliance with medications, with hospital course complicated by cardiogenic shock, acute hypoxemic respiratory failure secondary to metabolic acidosis and respiratory alkalosis. Patient found to have TAVR failure with ISRAEL 6/10/21 showing EF 40-45%, Aortic valve prosthesis with Severe paravalvular leak and valvular AI, mild-moderate aortic stenosis, and moderate MR. Patient was transferred to [facility] under Dr. Campbell for further workup.   ISRAEL DONE HERE WITH CONCERN FOR ENDOCARDITIS  PT DENIES FEVERS OR SWEATS  BUT HAS HAD SIGNIFICANT WEIGHT LOSS WITHOUT DIETING  PT HAS HX OF STREP BOVIS  BACTEREMIA   LAST YEAR AND RECEIVED   ROCEPHIN AT HOME FOR 6 WEEKS        s/p COLONOSCOPY     Blood cultures from admission remain negative      initial blood cultures being held for14 days.       s/p TAVR on 6/23/2021    Started on IV Vancomycin and Ceftriaxone by CT ICU 6/26  Blood cultures repeated     ALL  cultures remain negative,   PT WITH THICK SECRETIONS  S/P BRONCH SPECIMENS SENT     RX ZOSYN TO COVER FOR ? Pneumonia  FOLLOWUP BRONCH CULTURES   EXTUBATED AWAKEANDALERT

## 2021-06-29 NOTE — PROGRESS NOTE ADULT - ASSESSMENT
76M with a PMH of MI in 1995 (angiogram, no stents placed), COPD (2L O2 at home), severe AS s/p TAVR (03/2019 at [facility]), gout, CKD, CVA (no deficits, 09/2020), pulmonary HTN, initially presented to [facility] 6/1/21 with RONNELL on CKD, hospital course significant for cardiogenic shock, acute hypoxemic respiratory failure, and acute on chronic combined diastolic and systolic heart failure. ISRAEL revealed severe AI. Patient was transferred to [facility] under Dr. Campbell for further workup of bioprosthetic AI.     6/28 s/p BAL, sputum samples sent m ax switched to [facility]     Inpatient hospital course has been significant for:  1. 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 RONNELL on CKD progressed to ESRD requiring HD   3. Unintentional Weight loss / Dysphagia / Anorexia work up revealing duodenitis and diffuse erosive esophagitis, gastritis on EGD (biopsies negative for H. Pylori or carcinoma); colonoscopy showing diverticulosis  4. R/O AV Endocarditis   5. Auto-elevated INR (followed by Hematology)  6. R/O WILLEM   7. Adjustment disorder (evaluated by Behavioral Health)  8. Thrombocytopenia, Hematology consulted, r/o DIC    Patient is now s/p valve in valve TAVR on 6/23/21 with Dr. Campbell.     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 pt remains on epi, amio, primacor and is tolerating CVVHD

## 2021-06-29 NOTE — PROGRESS NOTE ADULT - SUBJECTIVE AND OBJECTIVE BOX
Patient is a 76y old  Male who presents with a chief complaint of TAVR Failure (25 Jun 2021 01:33)      SUBJECTIVE / OVERNIGHT EVENTS:  Extubated today  Platelets 105K  heparin ab negative    MEDICATIONS  (STANDING):  aMIOdarone    Tablet   Oral   aMIOdarone    Tablet 400 milliGRAM(s) Oral every 8 hours  ascorbic acid 500 milliGRAM(s) Oral daily  atorvastatin 10 milliGRAM(s) Oral at bedtime  budesonide 160 MICROgram(s)/formoterol 4.5 MICROgram(s) Inhaler 2 Puff(s) Inhalation two times a day  chlorhexidine 0.12% Liquid 5 milliLiter(s) Oral Mucosa two times a day  chlorhexidine 2% Cloths 1 Application(s) Topical daily  CRRT Treatment    <Continuous>  EPINEPHrine    Infusion 0.04 MICROgram(s)/kG/Min (15 mL/Hr) IV Continuous <Continuous>  ferrous    sulfate 325 milliGRAM(s) Oral at bedtime  folic acid 1 milliGRAM(s) Oral daily  insulin lispro (ADMELOG) corrective regimen sliding scale   SubCutaneous every 6 hours  melatonin 5 milliGRAM(s) Oral at bedtime  midodrine 10 milliGRAM(s) Oral every 8 hours  milrinone Infusion 0.125 MICROgram(s)/kG/Min (3.75 mL/Hr) IV Continuous <Continuous>  mirtazapine 15 milliGRAM(s) Oral daily  Nephro-melissa 1 Tablet(s) Oral daily  norepinephrine Infusion 0.05 MICROgram(s)/kG/Min (9.38 mL/Hr) IV Continuous <Continuous>  pantoprazole  Injectable 40 milliGRAM(s) IV Push every 12 hours  Phoxillum Filtration BK 4 / 2.5 5000 milliLiter(s) (1500 mL/Hr) CRRT <Continuous>  Phoxillum Filtration BK 4 / 2.5 5000 milliLiter(s) (1000 mL/Hr) CRRT <Continuous>  Phoxillum Filtration BK 4 / 2.5 5000 milliLiter(s) (2000 mL/Hr) CRRT <Continuous>  piperacillin/tazobactam IVPB.. 4.5 Gram(s) IV Intermittent every 8 hours  psyllium Powder 1 Packet(s) Oral two times a day  senna 2 Tablet(s) Oral at bedtime  sodium chloride 0.9%. 1000 milliLiter(s) (10 mL/Hr) IV Continuous <Continuous>  sodium chloride 0.9%. 1000 milliLiter(s) (5 mL/Hr) IV Continuous <Continuous>    MEDICATIONS  (PRN):  ALBUTerol    90 MICROgram(s) HFA Inhaler 2 Puff(s) Inhalation every 6 hours PRN Shortness of Breath and/or Wheezing  fentaNYL    Injectable 50 MICROGram(s) IV Push every 4 hours PRN Severe Pain (7 - 10)  sodium chloride 0.9% lock flush 10 milliLiter(s) IV Push every 1 hour PRN Pre/post blood products, medications, blood draw, and to maintain line patency    PHYSICAL EXAM:  Vital Signs Last 24 Hrs  T(C): 37.2 (29 Jun 2021 08:00), Max: 37.6 (28 Jun 2021 17:00)  T(F): 98.9 (29 Jun 2021 08:00), Max: 99.7 (28 Jun 2021 17:00)  HR: 93 (29 Jun 2021 11:00) (82 - 93)  BP: 119/67 (29 Jun 2021 10:00) (98/57 - 119/67)  BP(mean): 87 (29 Jun 2021 10:00) (74 - 87)  RR: 20 (29 Jun 2021 11:00) (10 - 46)  SpO2: 92% (29 Jun 2021 11:00) (91% - 100%)  GEn: Intubated  RESPIRATORY: COrase breath sounds  CARDIOVASCULAR: S1 S2  ABDOMEN: Nontender to palpation, normoactive bowel sounds,     LABS:                                   8.3    10.43 )-----------( 105      ( 29 Jun 2021 03:11 )             26.4     06-29    134<L>  |  100  |  10.0  ----------------------------<  121<H>  3.7   |  21.0<L>  |  0.74    Ca    8.1<L>      29 Jun 2021 03:11  Phos  3.2     06-29  Mg     2.3     06-29    TPro  6.1<L>  /  Alb  3.3  /  TBili  2.0  /  DBili  x   /  AST  49<H>  /  ALT  44<H>  /  AlkPhos  113  06-29             Patient is a 76y old  Male who presents with a chief complaint of TAVR Failure (25 Jun 2021 01:33)      SUBJECTIVE / OVERNIGHT EVENTS:  Extubated today  Platelets 105K  heparin ab negative    MEDICATIONS  (STANDING):  aMIOdarone    Tablet   Oral   aMIOdarone    Tablet 400 milliGRAM(s) Oral every 8 hours  ascorbic acid 500 milliGRAM(s) Oral daily  atorvastatin 10 milliGRAM(s) Oral at bedtime  budesonide 160 MICROgram(s)/formoterol 4.5 MICROgram(s) Inhaler 2 Puff(s) Inhalation two times a day  chlorhexidine 0.12% Liquid 5 milliLiter(s) Oral Mucosa two times a day  chlorhexidine 2% Cloths 1 Application(s) Topical daily  CRRT Treatment    <Continuous>  EPINEPHrine    Infusion 0.04 MICROgram(s)/kG/Min (15 mL/Hr) IV Continuous <Continuous>  ferrous    sulfate 325 milliGRAM(s) Oral at bedtime  folic acid 1 milliGRAM(s) Oral daily  insulin lispro (ADMELOG) corrective regimen sliding scale   SubCutaneous every 6 hours  melatonin 5 milliGRAM(s) Oral at bedtime  midodrine 10 milliGRAM(s) Oral every 8 hours  milrinone Infusion 0.125 MICROgram(s)/kG/Min (3.75 mL/Hr) IV Continuous <Continuous>  mirtazapine 15 milliGRAM(s) Oral daily  Nephro-melissa 1 Tablet(s) Oral daily  norepinephrine Infusion 0.05 MICROgram(s)/kG/Min (9.38 mL/Hr) IV Continuous <Continuous>  pantoprazole  Injectable 40 milliGRAM(s) IV Push every 12 hours  Phoxillum Filtration BK 4 / 2.5 5000 milliLiter(s) (1500 mL/Hr) CRRT <Continuous>  Phoxillum Filtration BK 4 / 2.5 5000 milliLiter(s) (1000 mL/Hr) CRRT <Continuous>  Phoxillum Filtration BK 4 / 2.5 5000 milliLiter(s) (2000 mL/Hr) CRRT <Continuous>  piperacillin/tazobactam IVPB.. 4.5 Gram(s) IV Intermittent every 8 hours  psyllium Powder 1 Packet(s) Oral two times a day  senna 2 Tablet(s) Oral at bedtime  sodium chloride 0.9%. 1000 milliLiter(s) (10 mL/Hr) IV Continuous <Continuous>  sodium chloride 0.9%. 1000 milliLiter(s) (5 mL/Hr) IV Continuous <Continuous>    MEDICATIONS  (PRN):  ALBUTerol    90 MICROgram(s) HFA Inhaler 2 Puff(s) Inhalation every 6 hours PRN Shortness of Breath and/or Wheezing  fentaNYL    Injectable 50 MICROGram(s) IV Push every 4 hours PRN Severe Pain (7 - 10)  sodium chloride 0.9% lock flush 10 milliLiter(s) IV Push every 1 hour PRN Pre/post blood products, medications, blood draw, and to maintain line patency    PHYSICAL EXAM:  Vital Signs Last 24 Hrs  T(C): 37.2 (29 Jun 2021 08:00), Max: 37.6 (28 Jun 2021 17:00)  T(F): 98.9 (29 Jun 2021 08:00), Max: 99.7 (28 Jun 2021 17:00)  HR: 93 (29 Jun 2021 11:00) (82 - 93)  BP: 119/67 (29 Jun 2021 10:00) (98/57 - 119/67)  BP(mean): 87 (29 Jun 2021 10:00) (74 - 87)  RR: 20 (29 Jun 2021 11:00) (10 - 46)  SpO2: 92% (29 Jun 2021 11:00) (91% - 100%)  GEn:extubated  RESPIRATORY: COrase breath sounds  CARDIOVASCULAR: S1 S2  ABDOMEN: Nontender to palpation, normoactive bowel sounds,     LABS:                                   8.3    10.43 )-----------( 105      ( 29 Jun 2021 03:11 )             26.4     06-29    134<L>  |  100  |  10.0  ----------------------------<  121<H>  3.7   |  21.0<L>  |  0.74    Ca    8.1<L>      29 Jun 2021 03:11  Phos  3.2     06-29  Mg     2.3     06-29    TPro  6.1<L>  /  Alb  3.3  /  TBili  2.0  /  DBili  x   /  AST  49<H>  /  ALT  44<H>  /  AlkPhos  113  06-29

## 2021-06-29 NOTE — SWALLOW BEDSIDE ASSESSMENT ADULT - SWALLOW EVAL: DIAGNOSIS
Oral stage deemed WFL. Suspected pharyngeal dysphagia for small amounts of puree, marked by multiple swallows (3-4X) with immediate/delayed cough and wet vocal quality following each trial of puree (X3/3), episode of rise in RR to 27-28.

## 2021-06-30 ENCOUNTER — NON-APPOINTMENT (OUTPATIENT)
Age: 76
End: 2021-06-30

## 2021-06-30 LAB
ALBUMIN SERPL ELPH-MCNC: 3.3 G/DL — SIGNIFICANT CHANGE UP (ref 3.3–5.2)
ALP SERPL-CCNC: 107 U/L — SIGNIFICANT CHANGE UP (ref 40–120)
ALT FLD-CCNC: 43 U/L — HIGH
ANION GAP SERPL CALC-SCNC: 15 MMOL/L — SIGNIFICANT CHANGE UP (ref 5–17)
APTT BLD: 35.8 SEC — HIGH (ref 27.5–35.5)
AST SERPL-CCNC: 50 U/L — HIGH
BASOPHILS # BLD AUTO: 0.03 K/UL — SIGNIFICANT CHANGE UP (ref 0–0.2)
BASOPHILS NFR BLD AUTO: 0.3 % — SIGNIFICANT CHANGE UP (ref 0–2)
BILIRUB SERPL-MCNC: 3.1 MG/DL — HIGH (ref 0.4–2)
BUN SERPL-MCNC: 10.3 MG/DL — SIGNIFICANT CHANGE UP (ref 8–20)
BUN SERPL-MCNC: 14.8 MG/DL — SIGNIFICANT CHANGE UP (ref 8–20)
CALCIUM SERPL-MCNC: 8.4 MG/DL — LOW (ref 8.6–10.2)
CHLORIDE SERPL-SCNC: 104 MMOL/L — SIGNIFICANT CHANGE UP (ref 98–107)
CO2 SERPL-SCNC: 19 MMOL/L — LOW (ref 22–29)
CREAT SERPL-MCNC: 1.4 MG/DL — HIGH (ref 0.5–1.3)
EOSINOPHIL # BLD AUTO: 0.04 K/UL — SIGNIFICANT CHANGE UP (ref 0–0.5)
EOSINOPHIL NFR BLD AUTO: 0.3 % — SIGNIFICANT CHANGE UP (ref 0–6)
GAS PNL BLDA: SIGNIFICANT CHANGE UP
GLUCOSE BLDC GLUCOMTR-MCNC: 100 MG/DL — HIGH (ref 70–99)
GLUCOSE BLDC GLUCOMTR-MCNC: 101 MG/DL — HIGH (ref 70–99)
GLUCOSE BLDC GLUCOMTR-MCNC: 101 MG/DL — HIGH (ref 70–99)
GLUCOSE BLDC GLUCOMTR-MCNC: 105 MG/DL — HIGH (ref 70–99)
GLUCOSE SERPL-MCNC: 100 MG/DL — HIGH (ref 70–99)
HCT VFR BLD CALC: 25.4 % — LOW (ref 39–50)
HGB BLD-MCNC: 8 G/DL — LOW (ref 13–17)
IMM GRANULOCYTES NFR BLD AUTO: 0.8 % — SIGNIFICANT CHANGE UP (ref 0–1.5)
INR BLD: 1.63 RATIO — HIGH (ref 0.88–1.16)
LYMPHOCYTES # BLD AUTO: 0.78 K/UL — LOW (ref 1–3.3)
LYMPHOCYTES # BLD AUTO: 6.7 % — LOW (ref 13–44)
MAGNESIUM SERPL-MCNC: 2.2 MG/DL — SIGNIFICANT CHANGE UP (ref 1.6–2.6)
MCHC RBC-ENTMCNC: 30 PG — SIGNIFICANT CHANGE UP (ref 27–34)
MCHC RBC-ENTMCNC: 31.5 GM/DL — LOW (ref 32–36)
MCV RBC AUTO: 95.1 FL — SIGNIFICANT CHANGE UP (ref 80–100)
MONOCYTES # BLD AUTO: 1.15 K/UL — HIGH (ref 0–0.9)
MONOCYTES NFR BLD AUTO: 9.8 % — SIGNIFICANT CHANGE UP (ref 2–14)
NEUTROPHILS # BLD AUTO: 9.63 K/UL — HIGH (ref 1.8–7.4)
NEUTROPHILS NFR BLD AUTO: 82.1 % — HIGH (ref 43–77)
PHOSPHATE SERPL-MCNC: 2.8 MG/DL — SIGNIFICANT CHANGE UP (ref 2.4–4.7)
PLATELET # BLD AUTO: 110 K/UL — LOW (ref 150–400)
POTASSIUM SERPL-MCNC: 3.7 MMOL/L — SIGNIFICANT CHANGE UP (ref 3.5–5.3)
POTASSIUM SERPL-SCNC: 3.7 MMOL/L — SIGNIFICANT CHANGE UP (ref 3.5–5.3)
PROT SERPL-MCNC: 5.9 G/DL — LOW (ref 6.6–8.7)
PROTHROM AB SERPL-ACNC: 18.5 SEC — HIGH (ref 10.6–13.6)
RBC # BLD: 2.67 M/UL — LOW (ref 4.2–5.8)
RBC # FLD: 21.6 % — HIGH (ref 10.3–14.5)
SODIUM SERPL-SCNC: 137 MMOL/L — SIGNIFICANT CHANGE UP (ref 135–145)
WBC # BLD: 11.72 K/UL — HIGH (ref 3.8–10.5)
WBC # FLD AUTO: 11.72 K/UL — HIGH (ref 3.8–10.5)

## 2021-06-30 PROCEDURE — 90937 HEMODIALYSIS REPEATED EVAL: CPT

## 2021-06-30 PROCEDURE — 71045 X-RAY EXAM CHEST 1 VIEW: CPT | Mod: 26,76

## 2021-06-30 PROCEDURE — 99232 SBSQ HOSP IP/OBS MODERATE 35: CPT

## 2021-06-30 PROCEDURE — 99291 CRITICAL CARE FIRST HOUR: CPT

## 2021-06-30 PROCEDURE — 36556 INSERT NON-TUNNEL CV CATH: CPT

## 2021-06-30 RX ORDER — PIPERACILLIN AND TAZOBACTAM 4; .5 G/20ML; G/20ML
3.38 INJECTION, POWDER, LYOPHILIZED, FOR SOLUTION INTRAVENOUS EVERY 12 HOURS
Refills: 0 | Status: DISCONTINUED | OUTPATIENT
Start: 2021-06-30 | End: 2021-07-01

## 2021-06-30 RX ADMIN — BUDESONIDE AND FORMOTEROL FUMARATE DIHYDRATE 2 PUFF(S): 160; 4.5 AEROSOL RESPIRATORY (INHALATION) at 07:08

## 2021-06-30 RX ADMIN — PANTOPRAZOLE SODIUM 40 MILLIGRAM(S): 20 TABLET, DELAYED RELEASE ORAL at 20:28

## 2021-06-30 RX ADMIN — EPINEPHRINE 15 MICROGRAM(S)/KG/MIN: 0.3 INJECTION INTRAMUSCULAR; SUBCUTANEOUS at 05:46

## 2021-06-30 RX ADMIN — SODIUM CHLORIDE 5 MILLILITER(S): 9 INJECTION INTRAMUSCULAR; INTRAVENOUS; SUBCUTANEOUS at 12:39

## 2021-06-30 RX ADMIN — PIPERACILLIN AND TAZOBACTAM 25 GRAM(S): 4; .5 INJECTION, POWDER, LYOPHILIZED, FOR SOLUTION INTRAVENOUS at 20:28

## 2021-06-30 RX ADMIN — BUDESONIDE AND FORMOTEROL FUMARATE DIHYDRATE 2 PUFF(S): 160; 4.5 AEROSOL RESPIRATORY (INHALATION) at 20:38

## 2021-06-30 RX ADMIN — MILRINONE LACTATE 3.75 MICROGRAM(S)/KG/MIN: 1 INJECTION, SOLUTION INTRAVENOUS at 05:38

## 2021-06-30 RX ADMIN — CHLORHEXIDINE GLUCONATE 1 APPLICATION(S): 213 SOLUTION TOPICAL at 12:37

## 2021-06-30 RX ADMIN — SODIUM CHLORIDE 10 MILLILITER(S): 9 INJECTION INTRAMUSCULAR; INTRAVENOUS; SUBCUTANEOUS at 12:39

## 2021-06-30 RX ADMIN — PANTOPRAZOLE SODIUM 40 MILLIGRAM(S): 20 TABLET, DELAYED RELEASE ORAL at 05:38

## 2021-06-30 RX ADMIN — PIPERACILLIN AND TAZOBACTAM 25 GRAM(S): 4; .5 INJECTION, POWDER, LYOPHILIZED, FOR SOLUTION INTRAVENOUS at 05:38

## 2021-06-30 NOTE — PROGRESS NOTE ADULT - SUBJECTIVE AND OBJECTIVE BOX
INFECTIOUS DISEASES AND INTERNAL MEDICINE at [place]  =======================================================  Kyle Wright MD  Diplomates American Board of Internal Medicine and Infectious Diseases  Telephone [phone]  Fax            [phone]  =======================================================    REYNA BOB [id number]    Follow up: valve vegetation    s/p TAVR on 6/23/21  no fevers  cultures from blood remains negative  EXTUBATED AWAKE ALERT    Allergies:  No Known Drug Allergies  strawberry (Anaphylaxis)       FAMILY  FAMILY HISTORY:  FH: lung cancer      REVIEW OF SYSTEMS:  Unable to obtain due to medical condition      Physical Exam:  GEN: AWAKE ALERT  HEENT: normocephalic and atraumatic.  Anicteric   NECK: Supple.   LUNGS: diffuse rhonchi   HEART: Regular rate and rhythm   ABDOMEN: Soft, nontender, and nondistended.  Positive bowel sounds.    : Lemus   EXTREMITIES: trace edema.  MSK: no joint swelling  NEUROLOGIC:AWAKE ALERT      Vitals:   Vital Signs Last 24 Hrs  T(C): 37.7 (30 Jun 2021 07:25), Max: 38 (30 Jun 2021 02:00)  T(F): 99.9 (30 Jun 2021 07:25), Max: 100.4 (30 Jun 2021 02:00)  HR: 93 (30 Jun 2021 08:00) (82 - 102)  BP: 118/69 (30 Jun 2021 08:00) (96/56 - 131/65)  BP(mean): 89 (30 Jun 2021 08:00) (66 - 99)  RR: 20 (30 Jun 2021 08:00) (14 - 34)  SpO2: 99% (30 Jun 2021 08:00) (86% - 100%)    Current Antibiotics:  cefTRIAXone   IVPB 2000 milliGRAM(s) IV Intermittent every 24 hours    Other medications:  aMIOdarone Infusion 0.5 mG/Min IV Continuous <Continuous>  atorvastatin 10 milliGRAM(s) Oral at bedtime  budesonide 160 MICROgram(s)/formoterol 4.5 MICROgram(s) Inhaler 2 Puff(s) Inhalation two times a day  chlorhexidine 0.12% Liquid 5 milliLiter(s) Oral Mucosa two times a day  chlorhexidine 2% Cloths 1 Application(s) Topical daily  CRRT Treatment    <Continuous>  dexMEDEtomidine Infusion 0.2 MICROgram(s)/kG/Hr IV Continuous <Continuous>  EPINEPHrine    Infusion 0.04 MICROgram(s)/kG/Min IV Continuous <Continuous>  insulin lispro (ADMELOG) corrective regimen sliding scale   SubCutaneous every 6 hours  midodrine 10 milliGRAM(s) Oral every 8 hours  milrinone Infusion 0.25 MICROgram(s)/kG/Min IV Continuous <Continuous>  mirtazapine 15 milliGRAM(s) Oral daily  Nephro-melissa 1 Tablet(s) Oral daily  norepinephrine Infusion 0.05 MICROgram(s)/kG/Min IV Continuous <Continuous>  pantoprazole  Injectable 40 milliGRAM(s) IV Push every 12 hours  Phoxillum Filtration BK 4 / 2.5 5000 milliLiter(s) CRRT <Continuous>  Phoxillum Filtration BK 4 / 2.5 5000 milliLiter(s) CRRT <Continuous>  Phoxillum Filtration BK 4 / 2.5 5000 milliLiter(s) CRRT <Continuous>  psyllium Powder 1 Packet(s) Oral two times a day  senna 2 Tablet(s) Oral at bedtime  sodium chloride 0.9%. 1000 milliLiter(s) IV Continuous <Continuous>  sodium chloride 0.9%. 1000 milliLiter(s) IV Continuous <Continuous>                                     8.0    11.72 )-----------( 110      ( 30 Jun 2021 02:39 )             25.4   06-30    137  |  104  |  14.8  ----------------------------<  100<H>  3.7   |  19.0<L>  |  1.40<H>    Ca    8.4<L>      30 Jun 2021 02:39  Phos  2.8     06-30  Mg     2.2     06-30    TPro  5.9<L>  /  Alb  3.3  /  TBili  3.1<H>  /  DBili  x   /  AST  50<H>  /  ALT  43<H>  /  AlkPhos  107  06-30      RECENT CULTURES:  06-26 @ 21:09 .Sputum Sputum     Rare polymorphonuclear leukocytes per low power field  No Squamous epithelial cells per low power field  No organisms seen per oil power field    06-16 @ 17:23 .Blood Blood     No growth at 5 days.    06-16 @ 14:41 .Blood Blood-Peripheral     No growth at 5 days.    06-16 @ 14:40 .Blood Blood-Peripheral     No growth at 5 days.    06-15 @ 12:30 .Blood Blood-Peripheral     No growth at 5 days.    06-15 @ 12:29 .Blood Blood-Peripheral     No growth at 5 days.      WBC Count: 14.00 K/uL (06-27-21 @ 00:35)  WBC Count: 11.58 K/uL (06-26-21 @ 16:52)  WBC Count: 13.11 K/uL (06-26-21 @ 05:43)  WBC Count: 12.82 K/uL (06-25-21 @ 23:38)  WBC Count: 13.53 K/uL (06-25-21 @ 17:42)  WBC Count: 13.00 K/uL (06-25-21 @ 09:53)  WBC Count: 13.29 K/uL (06-25-21 @ 02:40)  WBC Count: 12.85 K/uL (06-24-21 @ 12:25)  WBC Count: 9.68 K/uL (06-24-21 @ 03:19)  WBC Count: 8.93 K/uL (06-23-21 @ 14:24)  WBC Count: 10.50 K/uL (06-23-21 @ 06:05)  WBC Count: 8.73 K/uL (06-22-21 @ 20:14)    Creatinine, Serum: 1.51 mg/dL (06-27-21 @ 00:35)  Creatinine, Serum: 1.96 mg/dL (06-26-21 @ 16:52)  Creatinine, Serum: 2.55 mg/dL (06-26-21 @ 05:45)  Creatinine, Serum: 3.32 mg/dL (06-25-21 @ 23:38)  Creatinine, Serum: 3.97 mg/dL (06-25-21 @ 17:42)  Creatinine, Serum: 5.22 mg/dL (06-25-21 @ 09:53)  Creatinine, Serum: 5.05 mg/dL (06-25-21 @ 02:40)  Creatinine, Serum: 5.00 mg/dL (06-24-21 @ 12:25)  Creatinine, Serum: 4.77 mg/dL (06-24-21 @ 03:19)  Creatinine, Serum: 4.38 mg/dL (06-23-21 @ 14:24)  Creatinine, Serum: 4.41 mg/dL (06-23-21 @ 06:05)  Creatinine, Serum: 3.13 mg/dL (06-22-21 @ 20:14)    Procalcitonin, Serum: 0.24 ng/mL (06-17-21 @ 09:12)     COVID-19 PCR: NotDetec (06-22-21 @ 13:29)  COVID-19 PCR: NotDetec (06-18-21 @ 08:13)

## 2021-06-30 NOTE — PROGRESS NOTE ADULT - ASSESSMENT
3Hrs HD tolerated well.     ml.,     1unit of prbc transfused with no reaction.    rt ij catheter working well as .    no fluid removal.    report given to primary BETZY Cummins.

## 2021-06-30 NOTE — PROGRESS NOTE ADULT - SUBJECTIVE AND OBJECTIVE BOX
[place] [facility]                                                               Office: [address] [address]                                                              Telephone: [phone]. Fax:[phone]                                                                             PROGRESS NOTE  Reason for follow up:  Valvular heart disease, Cardiogenic shock  Update: Patient weaned off levophed, but still requiring milrinone and epinephrine. Plans to restart HD today, was trialed off CVVHD, but is visibly tachypneic an oliguric.      Review of symptoms:   Cardiac:  No chest pain. No dyspnea. No palpitations.  Respiratory: No cough. No dyspnea  Gastrointestinal: No diarrhea. No abdominal pain. No bleeding.     Past medical history: No updates.   	  Vitals:  T(C): 37.7 (06-30-21 @ 07:25), Max: 38 (06-30-21 @ 02:00)  HR: 98 (06-30-21 @ 12:00) (91 - 102)  BP: 120/71 (06-30-21 @ 12:00) (96/56 - 131/65)  RR: 18 (06-30-21 @ 12:00) (16 - 34)  SpO2: 100% (06-30-21 @ 12:00) (86% - 100%)  Wt(kg): --  I&O's Summary    29 Jun 2021 07:01 - 30 Jun 2021 07:00  --------------------------------------------------------  IN: 2257.4 mL / OUT: 761 mL / NET: 1496.4 mL    30 Jun 2021 07:01 - 30 Jun 2021 12:28  --------------------------------------------------------  IN: 105.2 mL / OUT: 35 mL / NET: 70.2 mL      Weight (kg): 100 (06-29 @ 08:12)      PHYSICAL EXAM:  Appearance: Comfortable. No acute distress  HEENT:  Head and neck: Atraumatic. Normocephalic.  Normal oral mucosa, PERRL, Neck is supple. No JVD, No carotid bruit.   Neurologic: A&Ox 3, no focal deficits. EOMI, Cranial nerves are intact.  Lymphatic: No cervical lymphadenopathy  Cardiovascular: Normal S1 S2, No rubs/gallops. No JVD, III/VI systolic murmur  Respiratory: Coarse breath sounds anteriorly   Gastrointestinal:  Soft, Non-tender, + BS  Lower Extremities: No edema  Psychiatry: Patient is calm. No agitation. Mood & affect appropriate  Skin: No rashes/ecchymoses/cyanosis/ulcers visualized on the face, hands or feet.      CURRENT MEDICATIONS:  aMIOdarone    Tablet 400 milliGRAM(s) Oral every 8 hours  aMIOdarone    Tablet   Oral   EPINEPHrine    Infusion 0.04 MICROgram(s)/kG/Min IV Continuous <Continuous>  midodrine 10 milliGRAM(s) Oral every 8 hours  milrinone Infusion 0.125 MICROgram(s)/kG/Min IV Continuous <Continuous>  norepinephrine Infusion 0.05 MICROgram(s)/kG/Min IV Continuous <Continuous>  tamsulosin 0.4 milliGRAM(s) Oral at bedtime    budesonide 160 MICROgram(s)/formoterol 4.5 MICROgram(s) Inhaler  piperacillin/tazobactam IVPB..  melatonin  mirtazapine  pantoprazole  Injectable  psyllium Powder  senna  atorvastatin  insulin lispro (ADMELOG) corrective regimen sliding scale  ascorbic acid  chlorhexidine 2% Cloths  ferrous    sulfate  folic acid  Nephro-melissa  sodium chloride 0.9%.  sodium chloride 0.9%.      DIAGNOSTIC TESTING:  [ ] Echocardiogram:   < from: TTE Echo Complete w/ Contrast w/ Doppler (06.26.21 @ 10:36) >    PHYSICIAN INTERPRETATION:  Left Ventricle:Endocardial visualization was enhanced with intravenous echo contrast. The left ventricular internal cavity size is normal. Left ventricular wall thickness is mildly increased.  Global LV systolic function was severely decreased. Left ventricular ejection fraction, by visual estimation, is 20 to 25%. The mitral in-flow pattern reveals no discernable A-wave, therefore no comment on diastolic function can be made.  Right Ventricle: The right ventricular size is mildly enlarged.  Pericardium: Trivial pericardial effusion is present. The pericardial effusion is posterior and lateral to the left ventricle.  Mitral Valve: Mild thickening of the anterior and posterior mitral valve leaflets. Moderate mitral valve regurgitation is seen.  Tricuspid Valve: The tricuspid valve is normal in structure. Severe tricuspid regurgitation is visualized.  Aortic Valve: Mild to moderate aortic valve regurgitation is seen. The Dimesionless Index value is 0.26.  Pulmonic Valve: The pulmonic valve is normal. Mildpulmonic valve regurgitation.  In comparison to the previous echocardiogram(s): Prior examinations are available and were reviewed for comparison purposes. Compared with prior study dated 6/23/21, the LVEF appears decreased to 20-25%. MR now appears moderate and TR appears severe. Results were discussed with CT surgery.      Summary:   1. Endocardial visualization was enhanced with intravenous echo contrast.   2. Left ventricular ejection fraction, by visual estimation, is 20 to 25%.   3. Severely decreased global left ventricular systolic function.   4. Mildly increased LV wall thickness.   5. The mitral in-flow pattern reveals no discernable A-wave, therefore no comment on diastolic function can be made.   6. Mildly enlarged right ventricle.   7. Mild thickening of the anterior and posterior mitral valve leaflets.   8. Moderate mitral valve regurgitation.   9. Severe tricuspid regurgitation.  10. TAVR in the aortic position. Appears well seated with no abnormal rocking motion. Mild to moderate paravalvular aortic regurgitation is seen. Peak velocity is 2.26 m/s, MG is 11.2 mmHg, and PG is 20.5 mmHg, which are acceptable in the setting of a prosethetic aortic valve.  11. Mild pulmonic valve regurgitation.  12. Compared with prior study dated 6/23/21, the LVEF appears decreased to 20-25%. MR now appears moderate and TR appears severe. Results were discussed with CT surgery.    LAMAR Calero Electronically signed on 6/26/2021 at 6:23:07 PM    < end of copied text >    [ ]  Catheterization:  < from: Cardiac Cath Lab - Adult (06.18.21 @ 13:53) >  VENTRICLES: EF by echo was 35 %.  CORONARY VESSELS: The coronary circulation isright dominant.  LM:   --  LM: Normal.  LAD:   --  Proximal LAD: There was a tubular 50 % stenosis.  CX:   --  Circumflex: Angiography showed minor luminal irregularities with  no flow limiting lesions.  RCA:   --  RCA: Angiography showed minor luminal irregularities with no  flow limiting lesions.  COMPLICATIONS: No complications occurred during the cath lab visit.  DIAGNOSTIC IMPRESSIONS: Moderate 40-50% stenosis in the proximal LAD,  unchanged from 2/2019 (Insignificant iFR in 2/2019). 2. No significant CAD  involving the LM, LCX and RCA. 3. A TAV is noted to be moving in  consonance with the surrounding structures. 4. Severe central Prosthetic  valve Aortic Insufficiency is noted on a ISRAEL and the LVEF was estimated  [id number]%.  DIAGNOSTIC RECOMMENDATIONS: GDMT. 2. RFM. 3. Possible TAVIV to address the  prosthetic AI, centrally mediated.  INTERVENTIONAL IMPRESSIONS: Moderate 40-50% stenosis in the proximal LAD,  unchanged from 2/2019 (Insignificant iFR in 2/2019). 2. No significant CAD  involving the LM, LCX and RCA. 3. A TAV is noted to be moving in  consonance with the surrounding structures. 4. Severe central Prosthetic  valve Aortic Insufficiency is noted on a ISRAEL and the LVEF was estimated  [id number]%.  INTERVENTIONAL RECOMMENDATIONS: GDMT. 2. RFM. 3. Possible TAVIV to address  the prosthetic AI, centrally mediated.  Prepared and signed by  Herson Jorgensen M.D.  Signed 06/18/2021 14:34:38    < end of copied text >    OTHER: 	      LABS:	 	                            8.0    11.72 )-----------( 110      ( 30 Jun 2021 02:39 )             25.4     06-30    137  |  104  |  14.8  ----------------------------<  100<H>  3.7   |  19.0<L>  |  1.40<H>    Ca    8.4<L>      30 Jun 2021 02:39  Phos  2.8     06-30  Mg     2.2     06-30    TPro  5.9<L>  /  Alb  3.3  /  TBili  3.1<H>  /  DBili  x   /  AST  50<H>  /  ALT  43<H>  /  AlkPhos  107  06-30    proBNP:   Lipid Profile:   HgA1c:   TSH:       TELEMETRY: Reviewed  SR 1st degree AV block, PACs  ECG:  Reviewed by me. 	SR 1st degree AV block, PACs

## 2021-06-30 NOTE — PROGRESS NOTE ADULT - PROBLEM SELECTOR PLAN 1
Now s/p Valve in Valve TAVR with Dr. Campbell on 6/23  Primacor increased to 0.25 mcg/kg/min for low CI  Epi gtt started to assist CI > 2.0  Repeat echo revealed EF 20-25% from 30-35% prior with midly enlarged RV, severely decreased LV Fxn, moderate MR, Severe TR and mild Pulm Regurgitation  CT C/A/P revealed b/l pneumonia, ABX started  Pressors:  Vaso off, Levophophed continues at low does  Continue Midodrine 10 mg q8 to wean off pressors.   Runs of SVT post op requiring Lidocaine and Amio gtts for stability, now complete. Amio PO load  Lidocaine gtt off  Continue GI ppx with Protonix and Senna, DVT ppx with SCD boots

## 2021-06-30 NOTE — PROGRESS NOTE ADULT - ASSESSMENT
76M h/o CAD MI in 1995 (angiogram, no stents placed), COPD (2L O2 at home), s/p TAVR (03/2019 at [facility]), gout, CKD, CVA (09/2020), pulmonary HTN, initially presented to [facility] 6/1/21 with RONNELL on CKD (likely cardiorenal syndrome), urinary retention due to noncompliance with medications, with hospital course complicated by cardiogenic shock, acute hypoxemic respiratory failure secondary to metabolic acidosis and respiratory alkalosis. Patient found to have TAVR failure with ISRAEL 6/10/21 showing initial EF 40-45%, Aortic valve prosthesis with Severe paravalvular leak and valvular AI, mild-moderate aortic stenosis, and moderate MR. Decompensated HF and severe pulmonary hypertension, underwent valve-in-valve TAVR 6/23/21, course complicated by worsening ADHF/cardiogenic shock with VT, intubated and RONNELL on CKD required CVVHD, repeat TTE with worsening LV EF 20-25%.     Cardiogenic shock, AD-biV failure  -Still on epinephrine, would continue to wean with MAP goal of 65.   - Wean off milrinone as tolerated.   - On midodrine.  - CVVHD today for fluid removal as patient is still tachypneic.   - Strict Is and Os.   - Repeat echo next week to reassess worsening EF   - No further VT on telemetry.     TAVR failure s/p Amy  -add ASA 81mg    VT  -on amiodarone  -Monitor for Afib, currently SR 1st degree with PACs      RONNELL on CKD, oligouria  - Restarting HD.    Respiratory failure, COPD  -pulmonary toilet, BiPAP as needed    Assessment and recommendations are final when note is signed by the attending.

## 2021-06-30 NOTE — PROGRESS NOTE ADULT - NSPROGADDITIONALINFOA_GEN_ALL_CORE
-oligouric and  tachypenic this morning need to start HD  -wean off epinephrine and can use norepinephrine for BP support and inotropy, not ideal with milrinone in setting of renal failure; aim for MAP ~65 rather than titrating DBP  -eventual GDMT once off pressor/inotrope  -repeat TTE next week to reassess LV EF given continual drop since repeat TAVR  -EKG and tele today sinus rhythm, at risk for pAfib      Taz Carter DO, [facility]  Faculty Non-Invasive Cardiologist  [phone]

## 2021-06-30 NOTE — PROGRESS NOTE ADULT - SUBJECTIVE AND OBJECTIVE BOX
Reason for follow up:  Valvular heart disease, Cardiogenic shock, ATN,  Update: Patient weaned off levophed, but still requiring milrinone and epinephrine. Plans to restart HD today, was trialed off CVVHD, but is visibly tachypneic an oliguric.      Review of symptoms:     Cardiac:  No chest pain. No dyspnea. No palpitations.  Respiratory: No cough. No dyspnea  Gastrointestinal: No diarrhea. No abdominal pain. No bleeding.   	  Vitals:    T(C): 37.7 (06-30-21 @ 07:25), Max: 38 (06-30-21 @ 02:00)  HR: 98 (06-30-21 @ 12:00) (91 - 102)  BP: 120/71 (06-30-21 @ 12:00) (96/56 - 131/65)  RR: 18 (06-30-21 @ 12:00) (16 - 34)  SpO2: 100% (06-30-21 @ 12:00) (86% - 100%)  Wt(kg): -- NA,    I&O's Summary    29 Jun 2021 07:01  -  30 Jun 2021 07:00  --------------------------------------------------------  IN: 2257.4 mL / OUT: 761 mL / NET: 1496.4 mL    30 Jun 2021 07:01  -  30 Jun 2021 12:28  --------------------------------------------------------  IN: 105.2 mL / OUT: 35 mL / NET: 70.2 mL    Weight (kg): 100 (06-29 @ 08:12)    PHYSICAL EXAM:  Appearance: Comfortable. No acute distress  HEENT:  Head and neck: Atraumatic. Normocephalic.  Normal oral mucosa, PERRL, Neck is supple. No JVD, No carotid bruit.   Neurologic: A&Ox 3, no focal deficits. EOMI, Cranial nerves are intact.  Lymphatic: No cervical lymphadenopathy  Cardiovascular: Normal S1 S2, No rubs/gallops. No JVD, III/VI systolic murmur  Respiratory: Coarse breath sounds anteriorly   Gastrointestinal:  Soft, Non-tender, + BS  Lower Extremities: No edema  Psychiatry: Patient is calm. No agitation. Mood & affect appropriate  Skin: No rashes/ecchymoses/cyanosis/ulcers visualized on the face, hands or feet.    CURRENT MEDICATIONS:    aMIOdarone    Tablet 400 milliGRAM(s) Oral every 8 hours  aMIOdarone    Tablet   Oral   EPINEPHrine    Infusion 0.04 MICROgram(s)/kG/Min IV Continuous <Continuous>  midodrine 10 milliGRAM(s) Oral every 8 hours  milrinone Infusion 0.125 MICROgram(s)/kG/Min IV Continuous <Continuous>  norepinephrine Infusion 0.05 MICROgram(s)/kG/Min IV Continuous <Continuous>  tamsulosin 0.4 milliGRAM(s) Oral at bedtime  budesonide 160 MICROgram(s)/formoterol 4.5 MICROgram(s) Inhaler  piperacillin/tazobactam IVPB..  melatonin  mirtazapine  pantoprazole  Injectable  psyllium Powder  senna  atorvastatin  insulin lispro (ADMELOG) corrective regimen sliding scale  ascorbic acid  chlorhexidine 2% Cloths  ferrous    sulfate  folic acid  Nephro-melissa  sodium chloride 0.9%.  sodium chloride 0.9%.      DIAGNOSTIC TESTING:  [ ] Echocardiogram:     TTE Echo Complete w/ Contrast w/ Doppler (06.26.21 @ 10:36)     PHYSICIAN INTERPRETATION:  Left Ventricle:Endocardial visualization was enhanced with intravenous echo contrast. The left ventricular internal cavity size is normal. Left ventricular wall thickness is mildly increased.  Global LV systolic function was severely decreased. Left ventricular ejection fraction, by visual estimation, is 20 to 25%. The mitral in-flow pattern reveals no discernable A-wave, therefore no comment on diastolic function can be made.  Right Ventricle: The right ventricular size is mildly enlarged.  Pericardium: Trivial pericardial effusion is present. The pericardial effusion is posterior and lateral to the left ventricle.  Mitral Valve: Mild thickening of the anterior and posterior mitral valve leaflets. Moderate mitral valve regurgitation is seen.  Tricuspid Valve: The tricuspid valve is normal in structure. Severe tricuspid regurgitation is visualized.  Aortic Valve: Mild to moderate aortic valve regurgitation is seen. The Dimesionless Index value is 0.26.  Pulmonic Valve: The pulmonic valve is normal. Mildpulmonic valve regurgitation.  In comparison to the previous echocardiogram(s): Prior examinations are available and were reviewed for comparison purposes. Compared with prior study dated 6/23/21, the LVEF appears decreased to 20-25%. MR now appears moderate and TR appears severe. Results were discussed with CT surgery.      Summary:   1. Endocardial visualization was enhanced with intravenous echo contrast.   2. Left ventricular ejection fraction, by visual estimation, is 20 to 25%.   3. Severely decreased global left ventricular systolic function.   4. Mildly increased LV wall thickness.   5. The mitral in-flow pattern reveals no discernable A-wave, therefore no comment on diastolic function can be made.   6. Mildly enlarged right ventricle.   7. Mild thickening of the anterior and posterior mitral valve leaflets.   8. Moderate mitral valve regurgitation.   9. Severe tricuspid regurgitation.  10. TAVR in the aortic position. Appears well seated with no abnormal rocking motion. Mild to moderate paravalvular aortic regurgitation is seen. Peak velocity is 2.26 m/s, MG is 11.2 mmHg, and PG is 20.5 mmHg, which are acceptable in the setting of a prosethetic aortic valve.  11. Mild pulmonic valve regurgitation.  12. Compared with prior study dated 6/23/21, the LVEF appears decreased to 20-25%. MR now appears moderate and TR appears severe. Results were discussed with CT surgery.    LAMAR Calero Electronically signed on 6/26/2021 at 6:23:07 PM    [ ]  Catheterization:    Cardiac Cath Lab - Adult (06.18.21 @ 13:53)   VENTRICLES: EF by echo was 35 %.  CORONARY VESSELS: The coronary circulation isright dominant.  LM:   --  LM: Normal.  LAD:   --  Proximal LAD: There was a tubular 50 % stenosis.  CX:   --  Circumflex: Angiography showed minor luminal irregularities with  no flow limiting lesions.  RCA:   --  RCA: Angiography showed minor luminal irregularities with no  flow limiting lesions.  COMPLICATIONS: No complications occurred during the cath lab visit.  DIAGNOSTIC IMPRESSIONS: Moderate 40-50% stenosis in the proximal LAD,  unchanged from 2/2019 (Insignificant iFR in 2/2019). 2. No significant CAD  involving the LM, LCX and RCA. 3. A TAV is noted to be moving in  consonance with the surrounding structures. 4. Severe central Prosthetic  valve Aortic Insufficiency is noted on a ISRAEL and the LVEF was estimated  [id number]%.  DIAGNOSTIC RECOMMENDATIONS: GDMT. 2. RFM. 3. Possible TAVIV to address the  prosthetic AI, centrally mediated.  INTERVENTIONAL IMPRESSIONS: Moderate 40-50% stenosis in the proximal LAD,  unchanged from 2/2019 (Insignificant iFR in 2/2019). 2. No significant CAD  involving the LM, LCX and RCA. 3. A TAV is noted to be moving in  consonance with the surrounding structures. 4. Severe central Prosthetic  valve Aortic Insufficiency is noted on a ISRAEL and the LVEF was estimated  [id number]%.  INTERVENTIONAL RECOMMENDATIONS: GDMT. 2. RFM. 3. Possible TAVIV to address  the prosthetic AI, centrally mediated.    Prepared and signed by  Herson Jorgensen M.D.  Signed 06/18/2021 14:34:38    OTHER: 	      LABS:	 	                        8.0    11.72 )-----------( 110      ( 30 Jun 2021 02:39 )             25.4     06-30    137  |  104  |  14.8  ----------------------------<  100<H>  3.7   |  19.0<L>  |  1.40<H>    Ca    8.4<L>      30 Jun 2021 02:39  Phos  2.8     06-30  Mg     2.2     06-30    TPro  5.9<L>  /  Alb  3.3  /  TBili  3.1<H>  /  DBili  x   /  AST  50<H>  /  ALT  43<H>  /  AlkPhos  107  06-30    TELEMETRY: Reviewed  SR 1st degree AV block, PACs  ECG:  Reviewed , SR 1st degree AV block, PACs      76M h/o CAD MI in 1995 (angiogram, no stents placed), COPD (2L O2 at home), s/p TAVR (03/2019 at [facility]), gout, CKD, CVA (09/2020), pulmonary HTN, initially presented to [facility] 6/1/21 with RONNELL on CKD (likely cardiorenal syndrome), urinary retention due to noncompliance with medications, with hospital course complicated by cardiogenic shock, acute hypoxemic respiratory failure secondary to metabolic acidosis and respiratory alkalosis. Patient found to have TAVR failure with ISRAEL 6/10/21 showing initial EF 40-45%, Aortic valve prosthesis with Severe paravalvular leak and valvular AI, mild-moderate aortic stenosis, and moderate MR. Decompensated HF and severe pulmonary hypertension, underwent valve-in-valve TAVR 6/23/21, course complicated by worsening ADHF/cardiogenic shock with VT, intubated and RONNELL on CKD required CVVHD, repeat TTE with worsening LV EF 20-25%.     Cardiogenic shock, Bi - V failure  - Still on epinephrine, would continue to wean with MAP goal of 65.   - On midodrine.  - Strict Is and Os.   - Repeat echo next week to reassess worsening EF   - No further VT on telemetry.     TAVR failure s/p Amy  -On  ASA 81mg    VT  -on amiodarone  -Monitor for Afib, currently SR 1st degree with PACs    RONNELL on CKD, oligouria  - Restarting HD.    Respiratory failure, COPD  -pulmonary toilet, BiPAP as needed

## 2021-06-30 NOTE — PROCEDURAL SAFETY CHECKLIST WITH OR WITHOUT SEDATION - NSPTSPECIFCONCERNSD_GEN_ALL_CORE
Pt groining in pain, pointing to her R hip. Pt states that she did not fall.      Yris Rosenthal, RN  04/05/20 [id number]     no

## 2021-06-30 NOTE — PROGRESS NOTE ADULT - ASSESSMENT
76 year old male patient with a medical history of MI in 1995 (angiogram, no stents placed), COPD (2L O2 at home), s/p TAVR (03/2019 at [facility]), gout, CKD, CVA (09/2020), pulmonary HTN, initially presented to [facility] 6/1/21 with RONNELL on CKD (likely cardiorenal syndrome), urinary retention due to noncompliance with medications, with hospital course complicated by cardiogenic shock, acute hypoxemic respiratory failure secondary to metabolic acidosis and respiratory alkalosis. Patient found to have TAVR failure with ISRAEL 6/10/21 showing EF 40-45%, Aortic valve prosthesis with Severe paravalvular leak and valvular AI, mild-moderate aortic stenosis, and moderate MR. Patient was transferred to [facility] under Dr. Campbell for further workup.   ISRAEL DONE HERE WITH CONCERN FOR ENDOCARDITIS  PT DENIES FEVERS OR SWEATS  BUT HAS HAD SIGNIFICANT WEIGHT LOSS WITHOUT DIETING  PT HAS HX OF STREP BOVIS  BACTEREMIA   LAST YEAR AND RECEIVED   ROCEPHIN AT HOME FOR 6 WEEKS        s/p COLONOSCOPY     Blood cultures from admission remain negative      initial blood cultures being held for14 days.       s/p TAVR on 6/23/2021    Started on IV Vancomycin and Ceftriaxone by CT ICU 6/26  Blood cultures repeated     ALL  cultures remain negative,   PT WITH THICK SECRETIONS  S/P BRONCH SPECIMENS SENT     RX ZOSYN TO COVER FOR ? Pneumonia  DIFFICULT TO DISTINGUISH ON CXR BUT   WOULD TREAT SHORT COURSE 5 DAYS ZOSYN  WILL FOLLOW UP 76 year old male patient with a medical history of MI in 1995 (angiogram, no stents placed), COPD (2L O2 at home), s/p TAVR (03/2019 at [facility]), gout, CKD, CVA (09/2020), pulmonary HTN, [facility] with RONNELL on CKD (likely cardiorenal syndrome), urinary retention due to noncompliance with medications, with hospital course complicated by cardiogenic shock, acute hypoxemic respiratory failure secondary to metabolic acidosis and respiratory alkalosis. Patient found to have TAVR failure with ISRAEL 6/10/21 showing EF 40-45%, Aortic valve prosthesis with Severe paravalvular leak and valvular AI, mild-moderate aortic stenosis, and moderate MR. Patient was transferred to [facility] under Dr. Campbell for further workup.   ISRAEL DONE HERE WITH CONCERN FOR ENDOCARDITIS  PT DENIES FEVERS OR SWEATS  BUT HAS HAD SIGNIFICANT WEIGHT LOSS WITHOUT DIETING  PT HAS HX OF STREP BOVIS  BACTEREMIA   LAST YEAR AND RECEIVED   ROCEPHIN AT HOME FOR 6 WEEKS        s/p COLONOSCOPY     Blood cultures from admission remain negative      initial blood cultures being held for14 days.       s/p TAVR on 6/23/2021    Started on IV Vancomycin and Ceftriaxone by CT ICU 6/26  Blood cultures repeated     ALL  cultures remain negative,   PT WITH THICK SECRETIONS  S/P BRONCH SPECIMENS SENT     RX ZOSYN TO COVER FOR ? Pneumonia  DIFFICULT TO DISTINGUISH ON CXR BUT   WOULD TREAT SHORT COURSE 5 DAYS IV ABX  WILL FOLLOW UP

## 2021-06-30 NOTE — PROGRESS NOTE ADULT - ASSESSMENT
76M with a PMH of MI in 1995 (angiogram, no stents placed), COPD (2L O2 at home), severe AS s/p TAVR (03/2019 at [facility]), gout, CKD, CVA (no deficits, 09/2020), pulmonary HTN, initially presented to [facility] 6/1/21 with RONNELL on CKD, hospital course significant for cardiogenic shock, acute hypoxemic respiratory failure, and acute on chronic combined diastolic and systolic heart failure. ISRAEL revealed severe AI. Patient was transferred to [facility] under Dr. Campbell for further workup of bioprosthetic AI.     6/28 s/p BAL, sputum samples sent m ax switched to [facility]     Inpatient hospital course has been significant for:  1. 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 RONNELL on CKD progressed to ESRD requiring HD   3. Unintentional Weight loss / Dysphagia / Anorexia work up revealing duodenitis and diffuse erosive esophagitis, gastritis on EGD (biopsies negative for H. Pylori or carcinoma); colonoscopy showing diverticulosis  4. R/O AV Endocarditis   5. Auto-elevated INR (followed by Hematology)  6. R/O WILLEM   7. Adjustment disorder (evaluated by Behavioral Health)  8. Thrombocytopenia, Hematology consulted, r/o DIC    Patient is now s/p valve in valve TAVR on 6/23/21 with Dr. Campbell.     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 pt remains on epi, amio, primacor and is tolerating CVVHD

## 2021-06-30 NOTE — PROGRESS NOTE ADULT - PROBLEM SELECTOR PLAN 10
Extubated. Bipap as needed.     Problem 11: Adjustment Disorder   Remeron resumed.     Problem 12: COPD  On home O2

## 2021-06-30 NOTE — PROGRESS NOTE ADULT - PROBLEM SELECTOR PLAN 3
Hematology consulted  Broad spectrum ABX started for presumed sepsis/DIC  Transfuse Plts for bleeding and/or Pts < 20  HIT negative, ALEXIS Pending  Platelet count recovering

## 2021-06-30 NOTE — PROGRESS NOTE ADULT - SUBJECTIVE AND OBJECTIVE BOX
Subjective - patient seen and evaluated bedside. Sitting comfortably in bed. States " I feel much better than yesterday." Denies CP, SOB, HA, dizziness, n/v/d    Review of Systems: negative x 10 systems except as noted above    Brief summary:  76yMale s/p valve in valve TAVR    Significant/[id number] events: Extubated. CVVHD completed, HD catheter removed.       PAST MEDICAL & SURGICAL HISTORY:  Pulmonary hypertension    Hypertension    Hyperlipidemia    H/O aortic valve stenosis  s/p TAVR 2019 at [place]    CVA (cerebrovascular accident)  MCA CVA with tPA and thrombectomy    Chronic kidney disease    Prediabetes    Mitral valve regurgitation    CAD in native artery    Aneurysm of aortic root  thoracic aortic aneurysm, without ruptur    Benign prostatic hyperplasia    Gout    History of transcatheter aortic valve replacement (TAVR)          ALBUTerol    90 MICROgram(s) HFA Inhaler 2 Puff(s) Inhalation every 6 hours PRN  aMIOdarone    Tablet 400 milliGRAM(s) Oral every 8 hours  aMIOdarone    Tablet   Oral   ascorbic acid 500 milliGRAM(s) Oral daily  atorvastatin 10 milliGRAM(s) Oral at bedtime  budesonide 160 MICROgram(s)/formoterol 4.5 MICROgram(s) Inhaler 2 Puff(s) Inhalation two times a day  chlorhexidine 2% Cloths 1 Application(s) Topical daily  CRRT Treatment    <Continuous>  EPINEPHrine    Infusion 0.04 MICROgram(s)/kG/Min IV Continuous <Continuous>  ferrous    sulfate 325 milliGRAM(s) Oral at bedtime  folic acid 1 milliGRAM(s) Oral daily  insulin lispro (ADMELOG) corrective regimen sliding scale   SubCutaneous every 6 hours  melatonin 5 milliGRAM(s) Oral at bedtime  midodrine 10 milliGRAM(s) Oral every 8 hours  milrinone Infusion 0.125 MICROgram(s)/kG/Min IV Continuous <Continuous>  mirtazapine 15 milliGRAM(s) Oral daily  Nephro-melissa 1 Tablet(s) Oral daily  norepinephrine Infusion 0.05 MICROgram(s)/kG/Min IV Continuous <Continuous>  pantoprazole  Injectable 40 milliGRAM(s) IV Push every 12 hours  Phoxillum Filtration BK 4 / 2.5 5000 milliLiter(s) CRRT <Continuous>  Phoxillum Filtration BK 4 / 2.5 5000 milliLiter(s) CRRT <Continuous>  Phoxillum Filtration BK 4 / 2.5 5000 milliLiter(s) CRRT <Continuous>  piperacillin/tazobactam IVPB.. 4.5 Gram(s) IV Intermittent every 8 hours  psyllium Powder 1 Packet(s) Oral two times a day  senna 2 Tablet(s) Oral at bedtime  sodium chloride 0.9% lock flush 10 milliLiter(s) IV Push every 1 hour PRN  sodium chloride 0.9%. 1000 milliLiter(s) IV Continuous <Continuous>  sodium chloride 0.9%. 1000 milliLiter(s) IV Continuous <Continuous>  tamsulosin 0.4 milliGRAM(s) Oral at bedtime  MEDICATIONS  (PRN):  ALBUTerol    90 MICROgram(s) HFA Inhaler 2 Puff(s) Inhalation every 6 hours PRN Shortness of Breath and/or Wheezing  sodium chloride 0.9% lock flush 10 milliLiter(s) IV Push every 1 hour PRN Pre/post blood products, medications, blood draw, and to maintain line patency      Weight (kg): 100 (06-29 @ 08:12)Mode: CPAP with PS, FiO2: 40, PEEP: 5, PS: 8, MAP: 8  Daily     Daily       ABG - ( 29 Jun 2021 11:30 )  pH, Arterial: 7.440 pH, Blood: x     /  pCO2: 31    /  pO2: 69    / HCO3: 21    / Base Excess: -3.1  /  SaO2: 98.5                                    8.3    10.43 )-----------( 105      ( 29 Jun 2021 03:11 )             26.4   06-29    137  |  103  |  11.7  ----------------------------<  99  3.9   |  19.0<L>  |  1.03    Ca    8.2<L>      29 Jun 2021 20:58  Phos  3.2     06-29  Mg     2.2     06-29    TPro  6.1<L>  /  Alb  3.3  /  TBili  2.0  /  DBili  x   /  AST  49<H>  /  ALT  44<H>  /  AlkPhos  113  06-29      PT/INR - ( 29 Jun 2021 03:11 )   PT: 17.7 sec;   INR: 1.56 ratio         PTT - ( 29 Jun 2021 03:11 )  PTT:34.6 sec      Objective:  T(C): 37.9 (06-30-21 @ 00:20), Max: 37.9 (06-30-21 @ 00:20)  HR: 100 (06-30-21 @ 00:00) (82 - 100)  BP: 96/56 (06-30-21 @ 00:00) (96/56 - 131/65)  RR: 21 (06-30-21 @ 00:00) (10 - 46)  SpO2: 98% (06-30-21 @ 00:00) (91% - 100%)  Wt(kg): --CAPILLARY BLOOD GLUCOSE      POCT Blood Glucose.: 98 mg/dL (29 Jun 2021 17:17)  POCT Blood Glucose.: 117 mg/dL (29 Jun 2021 05:43)  POCT Blood Glucose.: 99 mg/dL (29 Jun 2021 00:43)  I&O's Summary    28 Jun 2021 07:01  -  29 Jun 2021 07:00  --------------------------------------------------------  IN: 3396.1 mL / OUT: 2911 mL / NET: 485.1 mL    29 Jun 2021 07:01  -  30 Jun 2021 00:42  --------------------------------------------------------  IN: 1979.5 mL / OUT: 711 mL / NET: 1268.5 mL        Physical Exam  Neuro: A+O x 3, non-focal, speech clear and intact  Pulm: CTA, equal bilaterally  CV: RRR,  +S1S2, no m/r/g  Abd: soft, NT, ND, +BS  Ext: +DP Pulses b/l, no edema, b/l groins sites c/d/i, no hematoma or bleeding.     Imaging:  CXR:  < from: Xray Chest 1 View- PORTABLE-Routine (Xray Chest 1 View- PORTABLE-Routine in AM.) (06.29.21 @ 06:23) >    INTERPRETATION:  AP chest on June 28, 2021 at 8:33 PM. Patient requires intubation. 2 images.    Heart is likely enlarged.    There is a diffuse moderate to advanced symmetric congestive picture somewhat increased from June 28 earlier study.    Endotracheal tube, nasogastric tube, and right jugular sleeve remain.    Present film shows a left jugular line inserted with the tip in the superior vena caval area.    Follow-up AP chest on June 29, 2021 at 5:23 AM.    Right jugular sleeve has been removed.    Persistent moderate congestion.    IMPRESSION: Left jugular line inserted. Right jugular sleeve removed. Persistent moderate CHF.    < end of copied text >

## 2021-06-30 NOTE — PROGRESS NOTE ADULT - SUBJECTIVE AND OBJECTIVE BOX
Patient was seen and evaluated on dialysis.     No c/o CP SOB NV  no F/C  ++ swelling    T(C): 37.8 (07-01-21 @ 04:00), Max: 37.8 (06-30-21 @ 07:00)  HR: 98 (07-01-21 @ 06:00) (92 - 102)  BP: 117/69 (07-01-21 @ 04:00) (105/71 - 126/74)  Wt(kg): --NA  PE ;  NAD, Pale,  lungs - Coarse BS,  CV gr 1 murmur,  No gallop or rub  Abd : soft, NT BS +, No masses  Ext- ++ edema  Neuro : Grossly intact, moving extremities                         8.9    10.69 )-----------( 137      ( 01 Jul 2021 02:58 )             27.2     07-01    137  |  101  |  14.7  ----------------------------<  107<H>  3.8   |  23.0  |  1.68<H>    Ca    8.4<L>      01 Jul 2021 02:58  Phos  2.1     07-01  Mg     2.0     07-01    TPro  5.9<L>  /  Alb  3.2<L>  /  TBili  3.1<H>  /  DBili  x   /  AST  44<H>  /  ALT  39  /  AlkPhos  109  07-01    MEDICATIONS  (STANDING):  ALBUTerol    90 MICROgram(s) HFA Inhaler PRN  aMIOdarone    Tablet  aMIOdarone    Tablet  ascorbic acid  atorvastatin  budesonide 160 MICROgram(s)/formoterol 4.5 MICROgram(s) Inhaler  chlorhexidine 2% Cloths  EPINEPHrine    Infusion  ferrous    sulfate  folic acid  insulin lispro (ADMELOG) corrective regimen sliding scale  melatonin  midodrine  milrinone Infusion  mirtazapine  Nephro-melissa  pantoprazole  Injectable  piperacillin/tazobactam IVPB..  psyllium Powder  senna  sodium chloride 0.9% lock flush PRN  sodium chloride 0.9%.  sodium chloride 0.9%.  tamsulosin    Patient stable  Onofre HD easily  Continue

## 2021-06-30 NOTE — PROGRESS NOTE ADULT - PROBLEM SELECTOR PLAN 6
Initially RONNELL on CKD  Now progressed to ESRD requiring HD  Left IJ HD catheter placed 6/16 and d/c'd on 6/26  new Fem HD cath placed 6/26  RUE has pink band for preservation in anticipation for eventual AVF  Renal team following.  CVVHD started 6/25, completed 6/29 and HD cath removed.  Remains oliguric. Monitor BMP. Will likely need more HD via permacath.

## 2021-07-01 LAB
ALBUMIN SERPL ELPH-MCNC: 3.2 G/DL — LOW (ref 3.3–5.2)
ALP SERPL-CCNC: 109 U/L — SIGNIFICANT CHANGE UP (ref 40–120)
ALT FLD-CCNC: 39 U/L — SIGNIFICANT CHANGE UP
ANION GAP SERPL CALC-SCNC: 13 MMOL/L — SIGNIFICANT CHANGE UP (ref 5–17)
APTT BLD: 35.5 SEC — SIGNIFICANT CHANGE UP (ref 27.5–35.5)
AST SERPL-CCNC: 44 U/L — HIGH
BASOPHILS # BLD AUTO: 0.03 K/UL — SIGNIFICANT CHANGE UP (ref 0–0.2)
BASOPHILS NFR BLD AUTO: 0.3 % — SIGNIFICANT CHANGE UP (ref 0–2)
BILIRUB SERPL-MCNC: 3.1 MG/DL — HIGH (ref 0.4–2)
BUN SERPL-MCNC: 14.7 MG/DL — SIGNIFICANT CHANGE UP (ref 8–20)
CALCIUM SERPL-MCNC: 8.4 MG/DL — LOW (ref 8.6–10.2)
CHLORIDE SERPL-SCNC: 101 MMOL/L — SIGNIFICANT CHANGE UP (ref 98–107)
CO2 SERPL-SCNC: 23 MMOL/L — SIGNIFICANT CHANGE UP (ref 22–29)
CREAT SERPL-MCNC: 1.68 MG/DL — HIGH (ref 0.5–1.3)
CULTURE RESULTS: SIGNIFICANT CHANGE UP
EOSINOPHIL # BLD AUTO: 0.03 K/UL — SIGNIFICANT CHANGE UP (ref 0–0.5)
EOSINOPHIL NFR BLD AUTO: 0.3 % — SIGNIFICANT CHANGE UP (ref 0–6)
GAS PNL BLDA: SIGNIFICANT CHANGE UP
GLUCOSE BLDC GLUCOMTR-MCNC: 104 MG/DL — HIGH (ref 70–99)
GLUCOSE BLDC GLUCOMTR-MCNC: 125 MG/DL — HIGH (ref 70–99)
GLUCOSE BLDC GLUCOMTR-MCNC: 90 MG/DL — SIGNIFICANT CHANGE UP (ref 70–99)
GLUCOSE SERPL-MCNC: 107 MG/DL — HIGH (ref 70–99)
HCT VFR BLD CALC: 27.2 % — LOW (ref 39–50)
HGB BLD-MCNC: 8.9 G/DL — LOW (ref 13–17)
IMM GRANULOCYTES NFR BLD AUTO: 0.9 % — SIGNIFICANT CHANGE UP (ref 0–1.5)
INR BLD: 1.48 RATIO — HIGH (ref 0.88–1.16)
LYMPHOCYTES # BLD AUTO: 0.76 K/UL — LOW (ref 1–3.3)
LYMPHOCYTES # BLD AUTO: 7.1 % — LOW (ref 13–44)
MAGNESIUM SERPL-MCNC: 2 MG/DL — SIGNIFICANT CHANGE UP (ref 1.6–2.6)
MCHC RBC-ENTMCNC: 30.9 PG — SIGNIFICANT CHANGE UP (ref 27–34)
MCHC RBC-ENTMCNC: 32.7 GM/DL — SIGNIFICANT CHANGE UP (ref 32–36)
MCV RBC AUTO: 94.4 FL — SIGNIFICANT CHANGE UP (ref 80–100)
MONOCYTES # BLD AUTO: 1.09 K/UL — HIGH (ref 0–0.9)
MONOCYTES NFR BLD AUTO: 10.2 % — SIGNIFICANT CHANGE UP (ref 2–14)
NEUTROPHILS # BLD AUTO: 8.68 K/UL — HIGH (ref 1.8–7.4)
NEUTROPHILS NFR BLD AUTO: 81.2 % — HIGH (ref 43–77)
PHOSPHATE SERPL-MCNC: 2.1 MG/DL — LOW (ref 2.4–4.7)
PLATELET # BLD AUTO: 137 K/UL — LOW (ref 150–400)
POTASSIUM SERPL-MCNC: 3.8 MMOL/L — SIGNIFICANT CHANGE UP (ref 3.5–5.3)
POTASSIUM SERPL-SCNC: 3.8 MMOL/L — SIGNIFICANT CHANGE UP (ref 3.5–5.3)
PROT SERPL-MCNC: 5.9 G/DL — LOW (ref 6.6–8.7)
PROTHROM AB SERPL-ACNC: 16.9 SEC — HIGH (ref 10.6–13.6)
RBC # BLD: 2.88 M/UL — LOW (ref 4.2–5.8)
RBC # FLD: 21.3 % — HIGH (ref 10.3–14.5)
SODIUM SERPL-SCNC: 137 MMOL/L — SIGNIFICANT CHANGE UP (ref 135–145)
SPECIMEN SOURCE: SIGNIFICANT CHANGE UP
WBC # BLD: 10.69 K/UL — HIGH (ref 3.8–10.5)
WBC # FLD AUTO: 10.69 K/UL — HIGH (ref 3.8–10.5)

## 2021-07-01 PROCEDURE — 99232 SBSQ HOSP IP/OBS MODERATE 35: CPT

## 2021-07-01 PROCEDURE — 99233 SBSQ HOSP IP/OBS HIGH 50: CPT

## 2021-07-01 PROCEDURE — 71045 X-RAY EXAM CHEST 1 VIEW: CPT | Mod: 26

## 2021-07-01 PROCEDURE — 74230 X-RAY XM SWLNG FUNCJ C+: CPT | Mod: 26

## 2021-07-01 RX ORDER — PANTOPRAZOLE SODIUM 20 MG/1
40 TABLET, DELAYED RELEASE ORAL EVERY 12 HOURS
Refills: 0 | Status: DISCONTINUED | OUTPATIENT
Start: 2021-07-01 | End: 2021-07-08

## 2021-07-01 RX ORDER — HEPARIN SODIUM 5000 [USP'U]/ML
5000 INJECTION INTRAVENOUS; SUBCUTANEOUS EVERY 12 HOURS
Refills: 0 | Status: DISCONTINUED | OUTPATIENT
Start: 2021-07-01 | End: 2021-07-01

## 2021-07-01 RX ORDER — HEPARIN SODIUM 5000 [USP'U]/ML
5000 INJECTION INTRAVENOUS; SUBCUTANEOUS ONCE
Refills: 0 | Status: COMPLETED | OUTPATIENT
Start: 2021-07-01 | End: 2021-07-01

## 2021-07-01 RX ORDER — ASPIRIN/CALCIUM CARB/MAGNESIUM 324 MG
81 TABLET ORAL DAILY
Refills: 0 | Status: DISCONTINUED | OUTPATIENT
Start: 2021-07-01 | End: 2021-07-10

## 2021-07-01 RX ORDER — POTASSIUM PHOSPHATE, MONOBASIC POTASSIUM PHOSPHATE, DIBASIC 236; 224 MG/ML; MG/ML
15 INJECTION, SOLUTION INTRAVENOUS ONCE
Refills: 0 | Status: COMPLETED | OUTPATIENT
Start: 2021-07-01 | End: 2021-07-01

## 2021-07-01 RX ORDER — HEPARIN SODIUM 5000 [USP'U]/ML
5000 INJECTION INTRAVENOUS; SUBCUTANEOUS EVERY 12 HOURS
Refills: 0 | Status: DISCONTINUED | OUTPATIENT
Start: 2021-07-02 | End: 2021-07-08

## 2021-07-01 RX ADMIN — POTASSIUM PHOSPHATE, MONOBASIC POTASSIUM PHOSPHATE, DIBASIC 62.5 MILLIMOLE(S): 236; 224 INJECTION, SOLUTION INTRAVENOUS at 09:21

## 2021-07-01 RX ADMIN — AMIODARONE HYDROCHLORIDE 400 MILLIGRAM(S): 400 TABLET ORAL at 21:04

## 2021-07-01 RX ADMIN — Medication 325 MILLIGRAM(S): at 21:05

## 2021-07-01 RX ADMIN — MIDODRINE HYDROCHLORIDE 10 MILLIGRAM(S): 2.5 TABLET ORAL at 00:28

## 2021-07-01 RX ADMIN — ATORVASTATIN CALCIUM 10 MILLIGRAM(S): 80 TABLET, FILM COATED ORAL at 21:04

## 2021-07-01 RX ADMIN — ATORVASTATIN CALCIUM 10 MILLIGRAM(S): 80 TABLET, FILM COATED ORAL at 00:27

## 2021-07-01 RX ADMIN — MIDODRINE HYDROCHLORIDE 10 MILLIGRAM(S): 2.5 TABLET ORAL at 21:05

## 2021-07-01 RX ADMIN — TAMSULOSIN HYDROCHLORIDE 0.4 MILLIGRAM(S): 0.4 CAPSULE ORAL at 21:05

## 2021-07-01 RX ADMIN — AMIODARONE HYDROCHLORIDE 400 MILLIGRAM(S): 400 TABLET ORAL at 00:27

## 2021-07-01 RX ADMIN — Medication 1 PACKET(S): at 18:07

## 2021-07-01 RX ADMIN — AMIODARONE HYDROCHLORIDE 400 MILLIGRAM(S): 400 TABLET ORAL at 05:41

## 2021-07-01 RX ADMIN — Medication 5 MILLIGRAM(S): at 21:04

## 2021-07-01 RX ADMIN — MIDODRINE HYDROCHLORIDE 10 MILLIGRAM(S): 2.5 TABLET ORAL at 05:41

## 2021-07-01 RX ADMIN — HEPARIN SODIUM 5000 UNIT(S): 5000 INJECTION INTRAVENOUS; SUBCUTANEOUS at 18:07

## 2021-07-01 RX ADMIN — SENNA PLUS 2 TABLET(S): 8.6 TABLET ORAL at 21:05

## 2021-07-01 RX ADMIN — PANTOPRAZOLE SODIUM 40 MILLIGRAM(S): 20 TABLET, DELAYED RELEASE ORAL at 05:41

## 2021-07-01 RX ADMIN — PANTOPRAZOLE SODIUM 40 MILLIGRAM(S): 20 TABLET, DELAYED RELEASE ORAL at 18:06

## 2021-07-01 RX ADMIN — Medication 81 MILLIGRAM(S): at 21:04

## 2021-07-01 RX ADMIN — PIPERACILLIN AND TAZOBACTAM 25 GRAM(S): 4; .5 INJECTION, POWDER, LYOPHILIZED, FOR SOLUTION INTRAVENOUS at 05:46

## 2021-07-01 RX ADMIN — BUDESONIDE AND FORMOTEROL FUMARATE DIHYDRATE 2 PUFF(S): 160; 4.5 AEROSOL RESPIRATORY (INHALATION) at 19:41

## 2021-07-01 RX ADMIN — BUDESONIDE AND FORMOTEROL FUMARATE DIHYDRATE 2 PUFF(S): 160; 4.5 AEROSOL RESPIRATORY (INHALATION) at 08:40

## 2021-07-01 RX ADMIN — AMIODARONE HYDROCHLORIDE 400 MILLIGRAM(S): 400 TABLET ORAL at 15:03

## 2021-07-01 RX ADMIN — MIRTAZAPINE 15 MILLIGRAM(S): 45 TABLET, ORALLY DISINTEGRATING ORAL at 21:05

## 2021-07-01 NOTE — PROGRESS NOTE ADULT - PROBLEM SELECTOR PLAN 2
As above  ID following  Broad spectrum ABX with Vanco/Rocephin started 6/26 for presumed sepsis switched to zosyn 6/28  Continue to follow OR cultures for full 14 days  Short course Zosyn (5days) as per ID

## 2021-07-01 NOTE — PROGRESS NOTE ADULT - SUBJECTIVE AND OBJECTIVE BOX
INFECTIOUS DISEASES AND INTERNAL MEDICINE at [place]  =======================================================  Kyle Wright MD  Diplomates American Board of Internal Medicine and Infectious Diseases  Telephone [phone]  Fax            [phone]  =======================================================    REYNA BOB [id number]    Follow up: ? valve vegetation    s/p TAVR on 6/23/21  no fevers  cultures from blood remains negative  EXTUBATED AWAKE ALERT    Allergies:  No Known Drug Allergies  strawberry (Anaphylaxis)       FAMILY  FAMILY HISTORY:  FH: lung cancer      REVIEW OF SYSTEMS:  Unable to obtain due to medical condition      Physical Exam:  GEN: AWAKE ALERT  HEENT: normocephalic and atraumatic.  Anicteric   NECK: Supple.   LUNGS: diffuse rhonchi   HEART: Regular rate and rhythm   ABDOMEN: Soft, nontender, and nondistended.  Positive bowel sounds.    : Lemus   EXTREMITIES: trace edema.  MSK: no joint swelling  NEUROLOGIC:AWAKE ALERT      Vitals:   V Vital Signs Last 24 Hrs  T(C): 37.7 (01 Jul 2021 10:00), Max: 37.8 (01 Jul 2021 04:00)  T(F): 99.9 (01 Jul 2021 10:00), Max: 100 (01 Jul 2021 04:00)  HR: 87 (01 Jul 2021 12:00) (86 - 102)  BP: 117/69 (01 Jul 2021 04:00) (105/71 - 126/74)  BP(mean): 88 (01 Jul 2021 04:00) (76 - 96)  RR: 18 (01 Jul 2021 12:00) (15 - 30)  SpO2: 97% (01 Jul 2021 12:00) (91% - 100%))    Current Antibiotics:  cefTRIAXone   IVPB 2000 milliGRAM(s) IV Intermittent every 24 hours    Other medications:  aMIOdarone Infusion 0.5 mG/Min IV Continuous <Continuous>  atorvastatin 10 milliGRAM(s) Oral at bedtime  budesonide 160 MICROgram(s)/formoterol 4.5 MICROgram(s) Inhaler 2 Puff(s) Inhalation two times a day  chlorhexidine 0.12% Liquid 5 milliLiter(s) Oral Mucosa two times a day  chlorhexidine 2% Cloths 1 Application(s) Topical daily  CRRT Treatment    <Continuous>  dexMEDEtomidine Infusion 0.2 MICROgram(s)/kG/Hr IV Continuous <Continuous>  EPINEPHrine    Infusion 0.04 MICROgram(s)/kG/Min IV Continuous <Continuous>  insulin lispro (ADMELOG) corrective regimen sliding scale   SubCutaneous every 6 hours  midodrine 10 milliGRAM(s) Oral every 8 hours  milrinone Infusion 0.25 MICROgram(s)/kG/Min IV Continuous <Continuous>  mirtazapine 15 milliGRAM(s) Oral daily  Nephro-melissa 1 Tablet(s) Oral daily  norepinephrine Infusion 0.05 MICROgram(s)/kG/Min IV Continuous <Continuous>  pantoprazole  Injectable 40 milliGRAM(s) IV Push every 12 hours  Phoxillum Filtration BK 4 / 2.5 5000 milliLiter(s) CRRT <Continuous>  Phoxillum Filtration BK 4 / 2.5 5000 milliLiter(s) CRRT <Continuous>  Phoxillum Filtration BK 4 / 2.5 5000 milliLiter(s) CRRT <Continuous>  psyllium Powder 1 Packet(s) Oral two times a day  senna 2 Tablet(s) Oral at bedtime  sodium chloride 0.9%. 1000 milliLiter(s) IV Continuous <Continuous>  sodium chloride 0.9%. 1000 milliLiter(s) IV Continuous <Continuous>                                      .                       8.9    10.69 )-----------( 137      ( 01 Jul 2021 02:58 )                27.2     07-01    137  |  101  |  14.7  ----------------------------<  107<H>  3.8   |  23.0  |  1.68<H>    Ca    8.4<L>      01 Jul 2021 02:58  Phos  2.1     07-01  Mg     2.0     07-01    TPro  5.9<L>  /  Alb  3.2<L>  /  TBili  3.1<H>  /  DBili  x   /  AST  44<H>  /  ALT  39  /  AlkPhos  109  07-01      RECENT CULTURES:  06-26 @ 21:09 .Sputum Sputum     Rare polymorphonuclear leukocytes per low power field  No Squamous epithelial cells per low power field  No organisms seen per oil power field    06-16 @ 17:23 .Blood Blood     No growth at 5 days.    06-16 @ 14:41 .Blood Blood-Peripheral     No growth at 5 days.    06-16 @ 14:40 .Blood Blood-Peripheral     No growth at 5 days.    06-15 @ 12:30 .Blood Blood-Peripheral     No growth at 5 days.    06-15 @ 12:29 .Blood Blood-Peripheral     No growth at 5 days.      WBC Count: 14.00 K/uL (06-27-21 @ 00:35)  WBC Count: 11.58 K/uL (06-26-21 @ 16:52)  WBC Count: 13.11 K/uL (06-26-21 @ 05:43)  WBC Count: 12.82 K/uL (06-25-21 @ 23:38)  WBC Count: 13.53 K/uL (06-25-21 @ 17:42)  WBC Count: 13.00 K/uL (06-25-21 @ 09:53)  WBC Count: 13.29 K/uL (06-25-21 @ 02:40)  WBC Count: 12.85 K/uL (06-24-21 @ 12:25)  WBC Count: 9.68 K/uL (06-24-21 @ 03:19)  WBC Count: 8.93 K/uL (06-23-21 @ 14:24)  WBC Count: 10.50 K/uL (06-23-21 @ 06:05)  WBC Count: 8.73 K/uL (06-22-21 @ 20:14)    Creatinine, Serum: 1.51 mg/dL (06-27-21 @ 00:35)  Creatinine, Serum: 1.96 mg/dL (06-26-21 @ 16:52)  Creatinine, Serum: 2.55 mg/dL (06-26-21 @ 05:45)  Creatinine, Serum: 3.32 mg/dL (06-25-21 @ 23:38)  Creatinine, Serum: 3.97 mg/dL (06-25-21 @ 17:42)  Creatinine, Serum: 5.22 mg/dL (06-25-21 @ 09:53)  Creatinine, Serum: 5.05 mg/dL (06-25-21 @ 02:40)  Creatinine, Serum: 5.00 mg/dL (06-24-21 @ 12:25)  Creatinine, Serum: 4.77 mg/dL (06-24-21 @ 03:19)  Creatinine, Serum: 4.38 mg/dL (06-23-21 @ 14:24)  Creatinine, Serum: 4.41 mg/dL (06-23-21 @ 06:05)  Creatinine, Serum: 3.13 mg/dL (06-22-21 @ 20:14)    Procalcitonin, Serum: 0.24 ng/mL (06-17-21 @ 09:12)     COVID-19 PCR: NotDetec (06-22-21 @ 13:29)  COVID-19 PCR: NotDetec (06-18-21 @ 08:13)

## 2021-07-01 NOTE — SWALLOW VFSS/MBS ASSESSMENT ADULT - DIAGNOSTIC IMPRESSIONS
Mild oral dysphagia with reduced lingual strength. Mild to moderate pharyngeal dysphagia marked by decreased tongue base retraction, reduced laryngeal elevation & airway closure with stasis in valleculae, ranging from trace to moderate, which was reduced with successive swallow & trace pyriform sinus stasis. Trace penetration noted during the swallow with tspn nectar thick fluids, not eliminated with chin down posture. Mild oral dysphagia with reduced lingual strength. Mild to moderate pharyngeal dysphagia marked by decreased tongue base retraction, reduced laryngeal elevation & airway closure with stasis in valleculae, ranging from trace to moderate (trace to mild with fluids, mild to moderate for puree, & mech soft & moderate with solids) , which was reduced with successive swallow & trace stasis in pyriform sinus. Trace penetration noted during the swallow with tspn nectar thick fluids, not eliminated with chin down posture. Penetration/aspiration not viewed for puree, mech soft, solids or honey thick fluids.

## 2021-07-01 NOTE — PROGRESS NOTE ADULT - PROBLEM SELECTOR PLAN 1
Biventricular failure, LVEF 20-25%, ACC/AHA stage C, NYHA class III  Appears euvolemic on exam, lukewarm extremities  Keep net even volume status  Strict I&O monitoring  Milrinone @ 0.125mcg/kg/min. Recommend weaning.  Recommend reducing midodrine/PRN usage as this will increase afterload.  Eventually resume GDMT with low dose BB or afterload reduction.  Maintain amiodarone given prior tachyarrhythmias. Biventricular failure, LVEF 20-25%, ACC/AHA stage C, NYHA class III  Appears euvolemic on exam, lukewarm extremities  Keep net even volume status  Strict I&O monitoring  Milrinone @ 0.125mcg/kg/min. Recommend weaning as tolerated  Continue to monitor CVP via RIJ central line. Check VBG tomorrow am.   Recommend reducing midodrine/PRN usage as this will increase afterload.  Eventually resume GDMT if BP allows.

## 2021-07-01 NOTE — PROGRESS NOTE ADULT - SUBJECTIVE AND OBJECTIVE BOX
Subjective: NAEO. Pt    Medications:  ALBUTerol    90 MICROgram(s) HFA Inhaler 2 Puff(s) Inhalation every 6 hours PRN  aMIOdarone    Tablet 400 milliGRAM(s) Oral every 8 hours  aMIOdarone    Tablet   Oral   ascorbic acid 500 milliGRAM(s) Oral daily  atorvastatin 10 milliGRAM(s) Oral at bedtime  budesonide 160 MICROgram(s)/formoterol 4.5 MICROgram(s) Inhaler 2 Puff(s) Inhalation two times a day  chlorhexidine 2% Cloths 1 Application(s) Topical daily  ferrous    sulfate 325 milliGRAM(s) Oral at bedtime  folic acid 1 milliGRAM(s) Oral daily  heparin   Injectable 5000 Unit(s) SubCutaneous every 12 hours  melatonin 5 milliGRAM(s) Oral at bedtime  midodrine 10 milliGRAM(s) Oral every 8 hours  milrinone Infusion 0.125 MICROgram(s)/kG/Min IV Continuous <Continuous>  mirtazapine 15 milliGRAM(s) Oral daily  Nephro-melissa 1 Tablet(s) Oral daily  pantoprazole  Injectable 40 milliGRAM(s) IV Push every 12 hours  psyllium Powder 1 Packet(s) Oral two times a day  senna 2 Tablet(s) Oral at bedtime  sodium chloride 0.9% lock flush 10 milliLiter(s) IV Push every 1 hour PRN  sodium chloride 0.9%. 1000 milliLiter(s) IV Continuous <Continuous>  sodium chloride 0.9%. 1000 milliLiter(s) IV Continuous <Continuous>  tamsulosin 0.4 milliGRAM(s) Oral at bedtime      Physical Exam:    Vitals:  Vital Signs Last 24 Hours  T(C): 37.7 (07-01-21 @ 10:00), Max: 37.8 (07-01-21 @ 04:00)  HR: 86 (07-01-21 @ 11:00) (86 - 102)  BP: 117/69 (07-01-21 @ 04:00) (105/71 - 126/74)  RR: 25 (07-01-21 @ 11:00) (15 - 30)  SpO2: 100% (07-01-21 @ 11:00) (91% - 100%)        I&O's Summary    30 Jun 2021 07:01  -  01 Jul 2021 07:00  --------------------------------------------------------  IN: 1128.5 mL / OUT: 435 mL / NET: 693.5 mL    01 Jul 2021 07:01  -  01 Jul 2021 11:43  --------------------------------------------------------  IN: 350.1 mL / OUT: 9 mL / NET: 341.1 mL    Tele: Sinus rhythm    General: Sitting up in chair, in no acute distres  HEENT: EOMs intact.  Neck: Neck supple. JVP not elevated.  Chest: Bibasilar crackles  CV: RRR, Normal S1 and S2. No murmurs, rub, or gallops. Radial pulses normal. B/l trace ankle edema, lukewarm extremities.  Abdomen: Soft, non-distended  Skin: WDI, Left IJ central line, RIJ permacath  Neurology: Alert and oriented times three. Sensation intact  Psych: Affect normal    Labs:                        8.9    10.69 )-----------( 137      ( 01 Jul 2021 02:58 )             27.2     07-01    137  |  101  |  14.7  ----------------------------<  107<H>  3.8   |  23.0  |  1.68<H>    Ca    8.4<L>      01 Jul 2021 02:58  Phos  2.1     07-01  Mg     2.0     07-01    TPro  5.9<L>  /  Alb  3.2<L>  /  TBili  3.1<H>  /  DBili  x   /  AST  44<H>  /  ALT  39  /  AlkPhos  109  07-01    PT/INR - ( 01 Jul 2021 02:58 )   PT: 16.9 sec;   INR: 1.48 ratio         PTT - ( 01 Jul 2021 02:58 )  PTT:35.5 sec        < from: Xray Chest 1 View- PORTABLE-Urgent (Xray Chest 1 View- PORTABLE-Urgent .) (06.30.21 @ 17:06) >  Bilateral IJ lines with tips in the SVC and no pneumothorax, unchanged.    Cardiac silhouette at the upper limit of normal, unchanged. Mild, central pulmonary venous congestion/possible perihilar infiltrates, unchanged.    Bibasilar patchy infiltrates, recommend correlation with physical findings and further evaluation of possible interstitial infiltrates.    Post TAVR. Cylindrical bronchiectasis in the right upper lobe, unchanged    IMPRESSION:  NG tube in satisfactory position, new.    Mild pulmonary venous congestion/possible bibasilar interstitial infiltrates, radiographically unchanged.    Moderate paraseptal emphysema with multiple bullae in the upper lobes, evident on CT    FADI S LISA DO; Attending Radiologist  This document has been electronically signed. Jul 1 2021  8:41AM    < from: TTE Echo Complete w/ Contrast w/ Doppler (06.26.21 @ 10:36) >  Summary:   1. Endocardial visualization was enhanced with intravenous echo contrast.   2. Left ventricular ejection fraction, by visual estimation, is 20 to 25%.   3. Severely decreased global left ventricular systolic function.   4. Mildly increased LV wall thickness.   5. The mitral in-flow pattern reveals no discernable A-wave, therefore no comment on diastolic function can be made.   6. Mildly enlarged right ventricle.   7. Mild thickening of the anterior and posterior mitral valve leaflets.   8. Moderate mitral valve regurgitation.   9. Severe tricuspid regurgitation.  10. TAVR in the aortic position. Appears well seated with no abnormal rocking motion. Mild to moderate paravalvular aortic regurgitation is seen. Peak velocity is 2.26 m/s, MG is 11.2 mmHg, and PG is 20.5 mmHg, which are acceptable in the setting of a prosethetic aortic valve.  11. Mild pulmonic valve regurgitation.  12. Compared with prior study dated 6/23/21, the LVEF appears decreased to 20-25%. MR now appears moderate and TR appears severe. Results were discussed with CT surgery.    DUSTIN Calero. Electronically signed on 6/26/2021 at 6:23:07 PM  *** Final ***   < end of copied text >

## 2021-07-01 NOTE — PHYSICAL THERAPY INITIAL EVALUATION ADULT - ADDITIONAL COMMENTS
Pt. states he lives in a private home with 1 step to enter that he can negotiate with his walker.  Has home O2 and shower chair.
Pt. states he lives alone in a private home with 1 step to enter with use of rolling walker and home O2.  Pt. also has a shower chair.

## 2021-07-01 NOTE — PROGRESS NOTE ADULT - PROBLEM SELECTOR PLAN 1
Now s/p Valve in Valve TAVR with Dr. Campbell on 6/23  Primacor @ 0.25 mcg/kg/min   Epi gtt weaning slowly  Repeat echo revealed EF 20-25% from 30-35% prior with midly enlarged RV, severely decreased LV Fxn, moderate MR, Severe TR and mild Pulm Regurgitation  CT C/A/P revealed b/l pneumonia, ABX started, now on Zosyn for 5 day course to complete 7/3  Pressors:  Vaso off, Levophed off  Continue Midodrine 10 mg q8   Runs of SVT post op requiring Lidocaine and Amio gtts for stability, Amio PO load  Lidocaine and Amio gtts now off  Continue GI ppx with Protonix and Senna, DVT ppx with SCD boots

## 2021-07-01 NOTE — PROGRESS NOTE ADULT - ASSESSMENT
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) and repeat NANO TAVR on 6/23/21 for severe paravalvular leak/AI. History also notable for CVA s/p thrombectomy, COPD, PH, obesity, MI/CAD without need for intervention.     Recently extubated with reported drop in DBP. Albumin currently infusing. On Milrinone, Epinephrine and Levophed. Receiving CRRT for renal failure. IV Zosyn for B/L CAP. 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 Pt. remains afebrile, WBC improving, BC negative to date. S/P bronchoscopy 6/28.    Hemodynamics:  6/28/21: Milrinone 0.25mcg/kg/min & Epineprine 0.04mcg/kg/min   CVP 6, PAP 49/17/28, HR 85, /61/82, TD CO/CI  4.4/2.2.       75 y/o male with h/o chronic systolic HF ACC/AHA stage C, ICMP, CAD s/p MI ’95, s/p TAVR 3/2019 (@ [facility]), S. bovis bacteremia, COPD (home O2 @ 2 lpm), CVA (9/2020), gout, noncompliance who was admitted to [facility] with RONNELL on CKD and urinary retention. His hospital course was complicated by cardiogenic shock, acute hypoxemic respiratory failure likely in setting of bioprosthetic severe paravalvular/valvular AI and decompensated HF. He was subsequently transferred to [facility] for TAVR. 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 ID was following and BxCx were obtained which showed no growth. Eventually, he underwent Amy TAVR on 6/23/21 and has remained on CTU since then. He was initially on epinephrine and milrinone gtt. His epinephrine has been weaned off and he was successfully extubated. His CVVH has been transitioned to iHD.       Hemodynamics:  6/28/21: Milrinone 0.25mcg/kg/min & Epineprine 0.04mcg/kg/min   CVP 6, PAP 49/17/28, HR 85, /61/82, TD CO/CI  4.4/2.2.

## 2021-07-01 NOTE — PROGRESS NOTE ADULT - SUBJECTIVE AND OBJECTIVE BOX
Extubated, In Chair,    Secretions - Less,    No c/o CP SOB NV  no F/C  + swelling    T(C): 37.8 (07-01-21 @ 04:00), Max: 37.8 (06-30-21 @ 07:00)  HR: 98 (07-01-21 @ 06:00) (92 - 102)  BP: 117/69 (07-01-21 @ 04:00) (105/71 - 126/74)  Wt(kg): --NA  PE ;  NAD, Pale,  lungs - Coarse BS,  CV gr 1 murmur,  No gallop or rub  Abd : soft, NT BS +, No masses  Ext- ++ edema  Neuro : Grossly intact, moving extremities                         8.9    10.69 )-----------( 137      ( 01 Jul 2021 02:58 )             27.2     07-01    137  |  101  |  14.7  ----------------------------<  107<H>  3.8   |  23.0  |  1.68<H>    Ca    8.4<L>      01 Jul 2021 02:58  Phos  2.1     07-01  Mg     2.0     07-01    TPro  5.9<L>  /  Alb  3.2<L>  /  TBili  3.1<H>  /  DBili  x   /  AST  44<H>  /  ALT  39  /  AlkPhos  109  07-01    MEDICATIONS  (STANDING):  ALBUTerol    90 MICROgram(s) HFA Inhaler PRN  aMIOdarone    Tablet  aMIOdarone    Tablet  ascorbic acid  atorvastatin  budesonide 160 MICROgram(s)/formoterol 4.5 MICROgram(s) Inhaler  chlorhexidine 2% Cloths  EPINEPHrine    Infusion  ferrous    sulfate  folic acid  insulin lispro (ADMELOG) corrective regimen sliding scale  melatonin  midodrine  milrinone Infusion  mirtazapine  Nephro-melissa  pantoprazole  Injectable  piperacillin/tazobactam IVPB..  psyllium Powder  senna  sodium chloride 0.9% lock flush PRN  sodium chloride 0.9%.  sodium chloride 0.9%.  tamsulosin    Patient stable    TDC - Insertion - P :    HD in AM, D/W CRISTOBAL Osuna

## 2021-07-01 NOTE — SWALLOW VFSS/MBS ASSESSMENT ADULT - ADDITIONAL RECOMMENDATIONS
Will follow for dysphagia tx, as schedule permits Will follow for dysphagia tx, as schedule permits  Repeat MBS prior to discharge

## 2021-07-01 NOTE — PHYSICAL THERAPY INITIAL EVALUATION ADULT - GENERAL OBSERVATIONS, REHAB EVAL
Pt. OOB; +monitor, O2 nasal canula, marie; alert and cooperative
Pt. OOB; +monitor, O2 nasal canula, NG tube, HD catheter, IJ central, radial a-line

## 2021-07-01 NOTE — PROGRESS NOTE ADULT - SUBJECTIVE AND OBJECTIVE BOX
[facility]                                                               Office: [address] [address]                                                              Telephone: [phone]. Fax:[phone]                                                                             PROGRESS NOTE  Reason for follow up: Valvular disease, cardiogenic shock  Update: Weaned off epinephrine, still maintaining on milrinone. Patient's telemetry with no evidence of Afib. Received HD yesterday, but no fluid removed, will repeat today. Patient resting comfortably.     Review of symptoms:   Cardiac:  No chest pain. No dyspnea. No palpitations.  Respiratory: No cough. No dyspnea  Gastrointestinal: No diarrhea. No abdominal pain. No bleeding.     Past medical history: No updates.   	  Vitals:  T(C): 37.7 (07-01-21 @ 10:00), Max: 37.8 (07-01-21 @ 04:00)  HR: 86 (07-01-21 @ 11:00) (86 - 102)  BP: 117/69 (07-01-21 @ 04:00) (105/71 - 126/74)  RR: 25 (07-01-21 @ 11:00) (15 - 30)  SpO2: 100% (07-01-21 @ 11:00) (91% - 100%)  Wt(kg): --  I&O's Summary    30 Jun 2021 07:01  -  01 Jul 2021 07:00  --------------------------------------------------------  IN: 1128.5 mL / OUT: 435 mL / NET: 693.5 mL    01 Jul 2021 07:01  -  01 Jul 2021 11:54  --------------------------------------------------------  IN: 350.1 mL / OUT: 9 mL / NET: 341.1 mL      Weight (kg): 100 (06-29 @ 08:12)      PHYSICAL EXAM:  Appearance: Comfortable. No acute distress  HEENT:  Head and neck: Atraumatic. Normocephalic.  Normal oral mucosa, PERRL, Neck is supple. No JVD, No carotid bruit.   Neurologic: A&Ox 3, no focal deficits. EOMI, Cranial nerves are intact.  Lymphatic: No cervical lymphadenopathy  Cardiovascular: Normal S1 S2, No rubs/gallops. No JVD, III/VI systolic murmur  Respiratory: Coarse breath sounds anteriorly   Gastrointestinal:  Soft, Non-tender, + BS  Lower Extremities: No edema  Psychiatry: Patient is calm. No agitation. Mood & affect appropriate  Skin: No rashes/ecchymoses/cyanosis/ulcers visualized on the face, hands or feet.        CURRENT MEDICATIONS:  aMIOdarone    Tablet 400 milliGRAM(s) Oral every 8 hours  aMIOdarone    Tablet   Oral   midodrine 10 milliGRAM(s) Oral every 8 hours  milrinone Infusion 0.125 MICROgram(s)/kG/Min IV Continuous <Continuous>  tamsulosin 0.4 milliGRAM(s) Oral at bedtime    budesonide 160 MICROgram(s)/formoterol 4.5 MICROgram(s) Inhaler  melatonin  mirtazapine  pantoprazole  Injectable  psyllium Powder  senna  atorvastatin  ascorbic acid  chlorhexidine 2% Cloths  ferrous    sulfate  folic acid  heparin   Injectable  Nephro-melissa  sodium chloride 0.9%.  sodium chloride 0.9%.      DIAGNOSTIC TESTING:  DIAGNOSTIC TESTING:  [ ] Echocardiogram:   < from: TTE Echo Complete w/ Contrast w/ Doppler (06.26.21 @ 10:36) >    PHYSICIAN INTERPRETATION:  Left Ventricle:Endocardial visualization was enhanced with intravenous echo contrast. The left ventricular internal cavity size is normal. Left ventricular wall thickness is mildly increased.  Global LV systolic function was severely decreased. Left ventricular ejection fraction, by visual estimation, is 20 to 25%. The mitral in-flow pattern reveals no discernable A-wave, therefore no comment on diastolic function can be made.  Right Ventricle: The right ventricular size is mildly enlarged.  Pericardium: Trivial pericardial effusion is present. The pericardial effusion is posterior and lateral to the left ventricle.  Mitral Valve: Mild thickening of the anterior and posterior mitral valve leaflets. Moderate mitral valve regurgitation is seen.  Tricuspid Valve: The tricuspid valve is normal in structure. Severe tricuspid regurgitation is visualized.  Aortic Valve: Mild to moderate aortic valve regurgitation is seen. The Dimesionless Index value is 0.26.  Pulmonic Valve: The pulmonic valve is normal. Mildpulmonic valve regurgitation.  In comparison to the previous echocardiogram(s): Prior examinations are available and were reviewed for comparison purposes. Compared with prior study dated 6/23/21, the LVEF appears decreased to 20-25%. MR now appears moderate and TR appears severe. Results were discussed with CT surgery.      Summary:   1. Endocardial visualization was enhanced with intravenous echo contrast.   2. Left ventricular ejection fraction, by visual estimation, is 20 to 25%.   3. Severely decreased global left ventricular systolic function.   4. Mildly increased LV wall thickness.   5. The mitral in-flow pattern reveals no discernable A-wave, therefore no comment on diastolic function can be made.   6. Mildly enlarged right ventricle.   7. Mild thickening of the anterior and posterior mitral valve leaflets.   8. Moderate mitral valve regurgitation.   9. Severe tricuspid regurgitation.  10. TAVR in the aortic position. Appears well seated with no abnormal rocking motion. Mild to moderate paravalvular aortic regurgitation is seen. Peak velocity is 2.26 m/s, MG is 11.2 mmHg, and PG is 20.5 mmHg, which are acceptable in the setting of a prosethetic aortic valve.  11. Mild pulmonic valve regurgitation.  12. Compared with prior study dated 6/23/21, the LVEF appears decreased to 20-25%. MR now appears moderate and TR appears severe. Results were discussed with CT surgery.    LAMAR Calero Electronically signed on 6/26/2021 at 6:23:07 PM    < end of copied text >    [ ]  Catheterization:  < from: Cardiac Cath Lab - Adult (06.18.21 @ 13:53) >  VENTRICLES: EF by echo was 35 %.  CORONARY VESSELS: The coronary circulation isright dominant.  LM:   --  LM: Normal.  LAD:   --  Proximal LAD: There was a tubular 50 % stenosis.  CX:   --  Circumflex: Angiography showed minor luminal irregularities with  no flow limiting lesions.  RCA:   --  RCA: Angiography showed minor luminal irregularities with no  flow limiting lesions.  COMPLICATIONS: No complications occurred during the cath lab visit.  DIAGNOSTIC IMPRESSIONS: Moderate 40-50% stenosis in the proximal LAD,  unchanged from 2/2019 (Insignificant iFR in 2/2019). 2. No significant CAD  involving the LM, LCX and RCA. 3. A TAV is noted to be moving in  consonance with the surrounding structures. 4. Severe central Prosthetic  valve Aortic Insufficiency is noted on a ISRAEL and the LVEF was estimated  [id number]%.  DIAGNOSTIC RECOMMENDATIONS: GDMT. 2. RFM. 3. Possible TAVIV to address the  prosthetic AI, centrally mediated.  INTERVENTIONAL IMPRESSIONS: Moderate 40-50% stenosis in the proximal LAD,  unchanged from 2/2019 (Insignificant iFR in 2/2019). 2. No significant CAD  involving the LM, LCX and RCA. 3. A TAV is noted to be moving in  consonance with the surrounding structures. 4. Severe central Prosthetic  valve Aortic Insufficiency is noted on a ISRAEL and the LVEF was estimated  [id number]%.  INTERVENTIONAL RECOMMENDATIONS: GDMT. 2. RFM. 3. Possible TAVIV to address  the prosthetic AI, centrally mediated.  Prepared and signed by  Herson Jorgensen M.D.  Signed 06/18/2021 14:34:38    < end of copied text >        LABS:	 	                            8.9    10.69 )-----------( 137      ( 01 Jul 2021 02:58 )             27.2     07-01    137  |  101  |  14.7  ----------------------------<  107<H>  3.8   |  23.0  |  1.68<H>    Ca    8.4<L>      01 Jul 2021 02:58  Phos  2.1     07-01  Mg     2.0     07-01    TPro  5.9<L>  /  Alb  3.2<L>  /  TBili  3.1<H>  /  DBili  x   /  AST  44<H>  /  ALT  39  /  AlkPhos  109  07-01    proBNP:   Lipid Profile:   HgA1c:   TSH:       TELEMETRY: Reviewed  SR 1st degree AV block, ST, PACs

## 2021-07-01 NOTE — PHYSICAL THERAPY INITIAL EVALUATION ADULT - CRITERIA FOR SKILLED THERAPEUTIC INTERVENTIONS
impairments found/rehab potential/anticipated discharge recommendation
impairments found/anticipated discharge recommendation

## 2021-07-01 NOTE — PROGRESS NOTE ADULT - ASSESSMENT
76 year old male patient with a medical history of MI in 1995 (angiogram, no stents placed), COPD (2L O2 at home), s/p TAVR (03/2019 at [facility]), gout, CKD, CVA (09/2020), pulmonary HTN, initially presented to [facility] 6/1/21 with RONNELL on CKD (likely cardiorenal syndrome), urinary retention due to noncompliance with medications, with hospital course complicated by cardiogenic shock, acute hypoxemic respiratory failure secondary to metabolic acidosis and respiratory alkalosis. Patient found to have TAVR failure with ISRAEL 6/10/21 showing EF 40-45%, Aortic valve prosthesis with Severe paravalvular leak and valvular AI, mild-moderate aortic stenosis, and moderate MR. Patient was transferred to [facility] under Dr. Campbell for further workup.   ISRAEL DONE HERE WITH CONCERN FOR ENDOCARDITIS  PT DENIES FEVERS OR SWEATS  BUT HAS HAD SIGNIFICANT WEIGHT LOSS WITHOUT DIETING  PT HAS HX OF STREP BOVIS  BACTEREMIA   LAST YEAR AND RECEIVED   ROCEPHIN AT HOME FOR 6 WEEKS        s/p COLONOSCOPY     Blood cultures from admission remain negative      initial blood cultures being held for14 days.       s/p TAVR on 6/23/2021    Started on IV Vancomycin and Ceftriaxone by CT ICU 6/26  Blood cultures repeated     ALL  cultures remain negative,   PT WITH THICK SECRETIONS  S/P BRONCH SPECIMENS SENT     Kashif MACARIO TO COVER FOR ? Pneumonia  DIFFICULT TO DISTINGUISH ON CXR BUT   WOULD TREAT SHORT COURSE 5 DAYS ZOSYN  WILL FOLLOW UP  WILL D/W CTICU PA 76 year old male patient with a medical history of MI in 1995 (angiogram, no stents placed), COPD (2L O2 at home), s/p TAVR (03/2019 at [facility]), gout, CKD, CVA (09/2020), pulmonary HTN, initially presented to [facility] 6/1/21 with RONNELL on CKD (likely cardiorenal syndrome), urinary retention due to noncompliance with medications, with hospital course complicated by cardiogenic shock, acute hypoxemic respiratory failure secondary to metabolic acidosis and respiratory alkalosis. Patient found to have TAVR failure with ISRAEL 6/10/21 showing EF 40-45%, Aortic valve prosthesis with Severe paravalvular leak and valvular AI, mild-moderate aortic stenosis, and moderate MR. Patient was transferred to [facility] under Dr. Campbell for further workup.   ISRAEL DONE HERE WITH CONCERN FOR ENDOCARDITIS  PT DENIES FEVERS OR SWEATS  BUT HAS HAD SIGNIFICANT WEIGHT LOSS WITHOUT DIETING  PT HAS HX OF STREP BOVIS  BACTEREMIA   LAST YEAR AND RECEIVED   ROCEPHIN AT HOME FOR 6 WEEKS        s/p COLONOSCOPY     Blood cultures from admission remain negative      initial blood cultures being held for14 days.       s/p TAVR on 6/23/2021    Started on IV Vancomycin and Ceftriaxone by CT ICU 6/26  Blood cultures repeated     ALL  cultures remain negative,   PT WITH THICK SECRETIONS  S/P BRONCH SPECIMENS SENT     Kashif MACARIO TO COVER FOR ? Pneumonia  DIFFICULT TO DISTINGUISH ON CXR BUT   WOULD TREAT SHORT COURSE 5 DAYS IV ABX  WILL FOLLOW UP  WILL D/W CTICU PA

## 2021-07-01 NOTE — PROGRESS NOTE ADULT - ASSESSMENT
76M with a PMH of MI in 1995 (angiogram, no stents placed), COPD (2L O2 at home), severe AS s/p TAVR (03/2019 at [facility]), gout, CKD, CVA (no deficits, 09/2020), pulmonary HTN, initially presented to [facility] 6/1/21 with RONNELL on CKD, hospital course significant for cardiogenic shock, acute hypoxemic respiratory failure, and acute on chronic combined diastolic and systolic heart failure. ISRAEL revealed severe AI. Patient was transferred to [facility] under Dr. Campbell for further workup of bioprosthetic AI.     6/28 s/p BAL, sputum samples sent m ax switched to [facility]     Inpatient hospital course has been significant for:  1. 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 RONNELL on CKD progressed to ESRD requiring HD   3. Unintentional Weight loss / Dysphagia / Anorexia work up revealing duodenitis and diffuse erosive esophagitis, gastritis on EGD (biopsies negative for H. Pylori or carcinoma); colonoscopy showing diverticulosis  4. R/O AV Endocarditis   5. Auto-elevated INR (followed by Hematology)  6. R/O WILLEM   7. Adjustment disorder (evaluated by Behavioral Health)  8. Thrombocytopenia, Hematology consulted, r/o DIC    Patient is now s/p valve in valve TAVR on 6/23/21 with Dr. Campbell.     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 pt remains on epi, amio, primacor and is tolerating CVVHD  6/30 Pt now on HD

## 2021-07-01 NOTE — SWALLOW VFSS/MBS ASSESSMENT ADULT - SLP GENERAL OBSERVATIONS
Pt received & seen seated upright via stretcher in radiology, accompanied by BETZY Levin, on monitor, 02 sats: 96% on RA, awake/alert, baseline throat clearing, 0/10 pain

## 2021-07-01 NOTE — PROGRESS NOTE ADULT - PROBLEM SELECTOR PLAN 4
Likely cardiorenal etiology  S/P HD last night  Maintain net even volume status S/P HD last night  Maintain net even volume status

## 2021-07-01 NOTE — PROGRESS NOTE ADULT - PROBLEM SELECTOR PLAN 10
Continue 2L NC, wean to RA as tolerated    Problem 11: Adjustment Disorder   Will resume Remeron per recommendations from Behavioral Health on 6/17 when able    Problem 12: COPD  On home O2

## 2021-07-01 NOTE — SWALLOW VFSS/MBS ASSESSMENT ADULT - COMMENTS
As per MD note: "76M with a PMH of MI in 1995 (angiogram, no stents placed), COPD (2L O2 at home), severe AS s/p TAVR (03/2019 at [facility]), gout, CKD, CVA (no deficits, 09/2020), pulmonary HTN, initially presented to [facility] 6/1/21 with RONNELL on CKD, hospital course significant for cardiogenic shock, acute hypoxemic respiratory failure, and acute on chronic combined diastolic and systolic heart failure. ISRAEL revealed severe AI. Patient was transferred to [facility] under Dr. Campbell for further workup of bioprosthetic AI.   6/28 s/p BAL, sputum samples sent m ax switched to [facility]   Inpatient hospital course has been significant for:  1. 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 RONNELL on CKD progressed to ESRD requiring HD   3. Unintentional Weight loss / Dysphagia / Anorexia work up revealing duodenitis and diffuse erosive esophagitis, gastritis on EGD (biopsies negative for H. Pylori or carcinoma); colonoscopy showing diverticulosis  4. R/O AV Endocarditis   5. Auto-elevated INR (followed by Hematology)  6. R/O WILLEM   7. Adjustment disorder (evaluated by Behavioral Health)  8. Thrombocytopenia, Hematology consulted, r/o DIC  Patient is now s/p valve in valve TAVR on 6/23/21 with Dr. Campbell.

## 2021-07-01 NOTE — PROGRESS NOTE ADULT - ASSESSMENT
76M h/o CAD MI in 1995 (angiogram, no stents placed), COPD (2L O2 at home), s/p TAVR (03/2019 at [facility]), gout, CKD, CVA (09/2020), pulmonary HTN, initially presented to [facility] 6/1/21 with RONNELL on CKD (likely cardiorenal syndrome), urinary retention due to noncompliance with medications, with hospital course complicated by cardiogenic shock, acute hypoxemic respiratory failure secondary to metabolic acidosis and respiratory alkalosis. Patient found to have TAVR failure with ISRAEL 6/10/21 showing initial EF 40-45%, Aortic valve prosthesis with Severe paravalvular leak and valvular AI, mild-moderate aortic stenosis, and moderate MR. Decompensated HF and severe pulmonary hypertension, underwent valve-in-valve TAVR 6/23/21, course complicated by worsening ADHF/cardiogenic shock with VT, intubated and RONNELL on CKD required CVVHD, repeat TTE with worsening LV EF 20-25%.     Cardiogenic shock, AD-biV failure  - Weaned off epinephrine.   - Wean off milrinone as tolerated.   - On midodrine, try to wean off or switch to PRN.   - CVVHD today for fluid removal as patient is still tachypneic.   - Strict Is and Os.   - Repeat echo next week to reassess worsening EF   - No further VT on telemetry.   - Start GDMT tomorrow with either beta blockade or afterload reduction.     TAVR failure s/p Amy  -add ASA 81mg    VT  -on amiodarone  -Monitor for Afib, currently SR 1st degree with PACs      RONNELL on CKD, oligouria  - Restarting HD.    Respiratory failure, COPD  -pulmonary toilet, BiPAP as needed    Assessment and recommendations are final when note is signed by the attending.

## 2021-07-01 NOTE — PROGRESS NOTE ADULT - ATTENDING COMMENTS
Clinically doing better.  Able to be weaned off epinephrine. Only on low dose milrinone gtt.   Will continue to wean inotropes as tolerated and then re-introduce GDMT.  Seems close to euvolemia, should continue gentle fluid removal via RRT.

## 2021-07-01 NOTE — PROGRESS NOTE ADULT - ATTENDING COMMENTS
-off epinephrine, BP and MAP stable, continue wean off milrinone and eventual GDMT  -continue Amiodarone load for VT, no Afib on tele but at risk, no AC for now, would need eventual AICD vs. Life-Vest on discharge?  -repeat TTE next week  -monitor urine output, may need long term HD      Taz Carter DO, [facility]  Faculty Non-Invasive Cardiologist  [phone]

## 2021-07-01 NOTE — PHYSICAL THERAPY INITIAL EVALUATION ADULT - MUSCLE TONE ASSESSMENT, REHAB EVAL
left-side extremities/right-side extremities/normal
left-side extremities/right-side extremities/normal

## 2021-07-01 NOTE — SWALLOW VFSS/MBS ASSESSMENT ADULT - RECOMMENDED FEEDING/EATING TECHNIQUES
swallow x2 after each bite/allow for swallow between intakes/crush medication (when feasible)/maintain upright posture during/after eating for 30 mins/no straws/oral hygiene/position upright (90 degrees)/provide rest periods between swallows/small sips/bites

## 2021-07-01 NOTE — PHYSICAL THERAPY INITIAL EVALUATION ADULT - DIAGNOSIS, PT EVAL
Decreased mobility/function; h/o paravalvular leak
Decreased mobility/function; generalized weakness; deconditioned; CHF, COPD, ESRD

## 2021-07-01 NOTE — PROGRESS NOTE ADULT - SUBJECTIVE AND OBJECTIVE BOX
Subjective:  77yo M resting comfortable, no c/o pain.      HPI:  76 year old male patient with a medical history of MI in 1995 (angiogram, no stents placed), COPD (2L O2 at home), s/p TAVR (03/2019 at [facility]), gout, CKD, CVA (09/2020), pulmonary HTN, initially presented to [facility] 6/1/21 with RONNELL on CKD (likely cardiorenal syndrome), urinary retention due to noncompliance with medications, with hospital course complicated by cardiogenic shock, acute hypoxemic respiratory failure secondary to metabolic acidosis and respiratory alkalosis. Patient found to have TAVR failure with ISRAEL 6/10/21 showing EF 40-45%, Aortic valve prosthesis with Severe paravalvular leak and valvular AI, mild-moderate aortic stenosis, and moderate MR. Patient was transferred to [facility] under Dr. Campbell for further workup.     Imaging from [facility]:  6/1: CXR shwoing cardiomegaly, small b/l pleural effusions, moderate pulmonary vascular congestion.  6/1: CT Brain ordered for head trauma? showing age-related cerebral and cerebellar volume loss, mild chronic vascular ischemic changes, stable biparietal arachnoid cyst and stable midline posterior fossa arachnoid cyst.   6/1: US Abdomen for elevated LFTs showing mild hepatic steatosis, mild hepatomegaly, sludge in the gallbladder, no discrete gallstones, normal biliary ducts, mild echogenic right kidney which may be seen with medical renal dz, mildly complex Right upper pole 4cm cyst with subtle internal echoes may represent hemorrhagic/proteinaceous cyst, mildly complex right midpole 2cm cyst with thin internal linear septation.   6/3: Kidney and Bladder US showing no hydronephrosis, echogenic kidneys likely from medical renal dz, prostatomegaly, and thickening of the posterior wall of the bladder with trabeculations which could be due to bladder outlet obstruction.   6/7: Kidney and Bladder US: No hydronephrosis or shadowing renal calculi. Stable b/l renal cysts.   6/7: Lower Extremity Venous Doppler with no DVT noted.   6/8: TTE showing EF 47%, dilated IVC, dilation of the ascending aorta of 4.4cm, moderate-severe pulmonary HTN, moderate-severe TR, aortic valve with severe prosthesis regurgitation and moderate prosthesis stenosis  6/9: CT Chest showing emphysema and intersitial lung dz changes, stable b/l small pleural effusions, cardiomegaly.   6/11: ISRAEL showing EF 40-45%, Aortic valve prosthesis with Severe paravalvular leak and valvular AI, mild-moderate aortic stenosis, and moderate MR.  (12 Jun 2021 01:36)          PAST MEDICAL & SURGICAL HISTORY:  Pulmonary hypertension    Hypertension    Hyperlipidemia    H/O aortic valve stenosis  s/p TAVR 2019 at [place]    CVA (cerebrovascular accident)  MCA CVA with tPA and thrombectomy    Chronic kidney disease    Prediabetes    Mitral valve regurgitation    CAD in native artery    Aneurysm of aortic root  thoracic aortic aneurysm, without ruptur    Benign prostatic hyperplasia    Gout    History of transcatheter aortic valve replacement (TAVR)            MEDICATIONS  (STANDING):  aMIOdarone    Tablet 400 milliGRAM(s) Oral every 8 hours  aMIOdarone    Tablet   Oral   ascorbic acid 500 milliGRAM(s) Oral daily  atorvastatin 10 milliGRAM(s) Oral at bedtime  budesonide 160 MICROgram(s)/formoterol 4.5 MICROgram(s) Inhaler 2 Puff(s) Inhalation two times a day  chlorhexidine 2% Cloths 1 Application(s) Topical daily  EPINEPHrine    Infusion 0.02 MICROgram(s)/kG/Min (7.5 mL/Hr) IV Continuous <Continuous>  ferrous    sulfate 325 milliGRAM(s) Oral at bedtime  folic acid 1 milliGRAM(s) Oral daily  insulin lispro (ADMELOG) corrective regimen sliding scale   SubCutaneous every 6 hours  melatonin 5 milliGRAM(s) Oral at bedtime  midodrine 10 milliGRAM(s) Oral every 8 hours  milrinone Infusion 0.125 MICROgram(s)/kG/Min (3.75 mL/Hr) IV Continuous <Continuous>  mirtazapine 15 milliGRAM(s) Oral daily  Nephro-melissa 1 Tablet(s) Oral daily  pantoprazole  Injectable 40 milliGRAM(s) IV Push every 12 hours  piperacillin/tazobactam IVPB.. 3.375 Gram(s) IV Intermittent every 12 hours  psyllium Powder 1 Packet(s) Oral two times a day  senna 2 Tablet(s) Oral at bedtime  sodium chloride 0.9%. 1000 milliLiter(s) (10 mL/Hr) IV Continuous <Continuous>  sodium chloride 0.9%. 1000 milliLiter(s) (5 mL/Hr) IV Continuous <Continuous>  tamsulosin 0.4 milliGRAM(s) Oral at bedtime    MEDICATIONS  (PRN):  ALBUTerol    90 MICROgram(s) HFA Inhaler 2 Puff(s) Inhalation every 6 hours PRN Shortness of Breath and/or Wheezing  sodium chloride 0.9% lock flush 10 milliLiter(s) IV Push every 1 hour PRN Pre/post blood products, medications, blood draw, and to maintain line patency          Allergies    No Known Drug Allergies  strawberry (Anaphylaxis)    Intolerances          WEIGHTS:  Daily     Daily   Admit Wt: Drug Dosing Weight  Height (cm): 182.9 (12 Jun 2021 00:32)  Weight (kg): 100 (29 Jun 2021 08:12)  BMI (kg/m2): 29.9 (29 Jun 2021 08:12)  BSA (m2): 2.22 (29 Jun 2021 08:12)  I&O's Summary    29 Jun 2021 07:01  -  30 Jun 2021 07:00  --------------------------------------------------------  IN: 2337.4 mL / OUT: 761 mL / NET: 1576.4 mL    30 Jun 2021 07:01  -  01 Jul 2021 02:55  --------------------------------------------------------  IN: 983.3 mL / OUT: 345 mL / NET: 638.3 mL        VITAL SIGNS:  ICU Vital Signs Last 24 Hrs  T(C): 37.7 (30 Jun 2021 23:58), Max: 37.9 (30 Jun 2021 03:00)  T(F): 99.9 (30 Jun 2021 23:58), Max: 100.2 (30 Jun 2021 03:00)  HR: 97 (01 Jul 2021 02:00) (92 - 102)  BP: 112/70 (01 Jul 2021 02:00) (105/71 - 126/74)  BP(mean): 86 (01 Jul 2021 02:00) (76 - 96)  ABP: 119/55 (01 Jul 2021 02:00) (113/54 - 154/66)  ABP(mean): 76 (01 Jul 2021 02:00) (71 - 90)  RR: 16 (01 Jul 2021 02:00) (15 - 30)  SpO2: 93% (01 Jul 2021 02:00) (86% - 100%)        All laboratory results, radiology and medications reviewed.    LABS:  06-30    x   |  x   |  10.3  ----------------------------<  x   x    |  x   |  x     Ca    8.4<L>      30 Jun 2021 02:39  Phos  2.8     06-30  Mg     2.2     06-30    TPro  5.9<L>  /  Alb  3.3  /  TBili  3.1<H>  /  DBili  x   /  AST  50<H>  /  ALT  43<H>  /  AlkPhos  107  06-30                                 8.0    11.72 )-----------( 110      ( 30 Jun 2021 02:39 )             25.4          PT/INR - ( 30 Jun 2021 02:39 )   PT: 18.5 sec;   INR: 1.63 ratio         PTT - ( 30 Jun 2021 02:39 )  PTT:35.8 sec    ABG - ( 01 Jul 2021 02:30 )  pH, Arterial: 7.520 pH, Blood: x     /  pCO2: 31    /  pO2: 62    / HCO3: 25    / Base Excess: 2.4   /  SaO2: 96.5                  CAPILLARY BLOOD GLUCOSE      POCT Blood Glucose.: 104 mg/dL (01 Jul 2021 00:32)  POCT Blood Glucose.: 100 mg/dL (30 Jun 2021 17:32)  POCT Blood Glucose.: 101 mg/dL (30 Jun 2021 12:03)  POCT Blood Glucose.: 101 mg/dL (30 Jun 2021 07:01)             PHYSICAL EXAM:  General:  no acute distress  Neurology:  alert and oriented X 4, nonfocal, no gross deficits  Respiratory:  clear to auscultation with diminished BS at bases bilaterally  CV:  A Fib, rate controlled  Abdomen:  soft, nontender, nondistended, positive bowel sounds  Extremities:  warm, well perfused, 1+ edema +DP pulses

## 2021-07-01 NOTE — PROGRESS NOTE ADULT - PROBLEM SELECTOR PLAN 6
Initially RONNELL on CKD  Now progressed to ESRD requiring HD  Left IJ HD catheter placed 6/16 and d/c'd on 6/26  Fem HD cath placed 6/26 and d/c'd  RIJ HD cath placed 6/30  RUE has pink band for preservation in anticipation for eventual AVF  Renal team following.  CVVHD started 6/25, now tolerating HD

## 2021-07-01 NOTE — PROGRESS NOTE ADULT - ASSESSMENT
76 year old male patient with a medical history of MI in 1995 (angiogram, no stents placed), COPD (2L O2 at home), s/p TAVR (03/2019 at [facility]), gout, CKD, CVA (09/2020), pulmonary HTN, initially presented to [facility] 6/1/21 with RONNELL on CKD (likely cardiorenal syndrome), urinary retention due to noncompliance with medications, with hospital course complicated by cardiogenic shock, acute hypoxemic respiratory failure secondary to metabolic acidosis and respiratory alkalosis. Patient found to have TAVR failure with ISRAEL 6/10/21 showing EF 40-45%, Aortic valve prosthesis with Severe paravalvular leak and valvular AI, mild-moderate aortic stenosis, and moderate MR. Patient was transferred to [facility] under Dr. Campbell for further workup.   ISRAEL DONE HERE WITH CONCERN FOR ENDOCARDITIS  PT DENIES FEVERS OR SWEATS  BUT HAS HAD SIGNIFICANT WEIGHT LOSS WITHOUT DIETING  PT HAS HX OF STREP BOVIS  BACTEREMIA   LAST YEAR AND RECEIVED   ROCEPHIN AT HOME FOR 6 WEEKS     s/p COLONOSCOPY     Blood cultures from admission remain negative      initial blood cultures being held for14 days.       s/p TAVR on 6/23/2021    Started on IV Vancomycin and Ceftriaxone by CT ICU 6/26  Blood cultures repeated    ALL  cultures remain negative,     S/P BRONCH SPECIMENS SENT     On ZOSYN TO COVER FOR ? Pneumonia  DIFFICULT TO DISTINGUISH ON CXR BUT   ID Recommending to  TREAT SHORT COURSE 5 DAYS IV ABX

## 2021-07-02 LAB
ALBUMIN SERPL ELPH-MCNC: 3 G/DL — LOW (ref 3.3–5.2)
ALP SERPL-CCNC: 102 U/L — SIGNIFICANT CHANGE UP (ref 40–120)
ALT FLD-CCNC: 34 U/L — SIGNIFICANT CHANGE UP
ANION GAP SERPL CALC-SCNC: 15 MMOL/L — SIGNIFICANT CHANGE UP (ref 5–17)
AST SERPL-CCNC: 39 U/L — SIGNIFICANT CHANGE UP
BASE EXCESS BLDV CALC-SCNC: 2.4 MMOL/L — SIGNIFICANT CHANGE UP (ref -2–3)
BILIRUB SERPL-MCNC: 2 MG/DL — SIGNIFICANT CHANGE UP (ref 0.4–2)
BLD GP AB SCN SERPL QL: SIGNIFICANT CHANGE UP
BLOOD GAS COMMENTS, VENOUS: SIGNIFICANT CHANGE UP
BUN SERPL-MCNC: 25.3 MG/DL — HIGH (ref 8–20)
CA-I SERPL-SCNC: 1.13 MMOL/L — LOW (ref 1.15–1.33)
CALCIUM SERPL-MCNC: 8.5 MG/DL — LOW (ref 8.6–10.2)
CHLORIDE BLDV-SCNC: 105 MMOL/L — SIGNIFICANT CHANGE UP (ref 98–107)
CHLORIDE SERPL-SCNC: 102 MMOL/L — SIGNIFICANT CHANGE UP (ref 98–107)
CO2 SERPL-SCNC: 22 MMOL/L — SIGNIFICANT CHANGE UP (ref 22–29)
CREAT SERPL-MCNC: 2.75 MG/DL — HIGH (ref 0.5–1.3)
GAS PNL BLDV: 137 MMOL/L — SIGNIFICANT CHANGE UP (ref 136–145)
GAS PNL BLDV: SIGNIFICANT CHANGE UP
GAS PNL BLDV: SIGNIFICANT CHANGE UP
GLUCOSE BLDV-MCNC: 85 MG/DL — SIGNIFICANT CHANGE UP (ref 70–99)
GLUCOSE SERPL-MCNC: 87 MG/DL — SIGNIFICANT CHANGE UP (ref 70–99)
HCO3 BLDV-SCNC: 26 MMOL/L — SIGNIFICANT CHANGE UP (ref 22–29)
HCT VFR BLD CALC: 26.6 % — LOW (ref 39–50)
HCT VFR BLDA CALC: 27 % — LOW (ref 39–51)
HGB BLD CALC-MCNC: 9 G/DL — LOW (ref 12.6–17.4)
HGB BLD-MCNC: 8.6 G/DL — LOW (ref 13–17)
HOROWITZ INDEX BLDV+IHG-RTO: SIGNIFICANT CHANGE UP
INR BLD: 1.48 RATIO — HIGH (ref 0.88–1.16)
LACTATE BLDV-MCNC: 0.9 MMOL/L — SIGNIFICANT CHANGE UP (ref 0.5–2)
MAGNESIUM SERPL-MCNC: 2 MG/DL — SIGNIFICANT CHANGE UP (ref 1.6–2.6)
MCHC RBC-ENTMCNC: 30.4 PG — SIGNIFICANT CHANGE UP (ref 27–34)
MCHC RBC-ENTMCNC: 32.3 GM/DL — SIGNIFICANT CHANGE UP (ref 32–36)
MCV RBC AUTO: 94 FL — SIGNIFICANT CHANGE UP (ref 80–100)
PCO2 BLDV: 38 MMHG — LOW (ref 42–55)
PH BLDV: 7.45 — HIGH (ref 7.32–7.43)
PHOSPHATE SERPL-MCNC: 2.9 MG/DL — SIGNIFICANT CHANGE UP (ref 2.4–4.7)
PLATELET # BLD AUTO: 149 K/UL — LOW (ref 150–400)
PO2 BLDV: <42 MMHG — SIGNIFICANT CHANGE UP (ref 25–45)
POTASSIUM BLDV-SCNC: 3.9 MMOL/L — SIGNIFICANT CHANGE UP (ref 3.5–5.1)
POTASSIUM SERPL-MCNC: 3.8 MMOL/L — SIGNIFICANT CHANGE UP (ref 3.5–5.3)
POTASSIUM SERPL-SCNC: 3.8 MMOL/L — SIGNIFICANT CHANGE UP (ref 3.5–5.3)
PROT SERPL-MCNC: 5.9 G/DL — LOW (ref 6.6–8.7)
PROTHROM AB SERPL-ACNC: 16.8 SEC — HIGH (ref 10.6–13.6)
RBC # BLD: 2.83 M/UL — LOW (ref 4.2–5.8)
RBC # FLD: 21.2 % — HIGH (ref 10.3–14.5)
SAO2 % BLDV: 70.1 % — SIGNIFICANT CHANGE UP
SODIUM SERPL-SCNC: 139 MMOL/L — SIGNIFICANT CHANGE UP (ref 135–145)
SRA INTERP SER-IMP: SIGNIFICANT CHANGE UP
WBC # BLD: 8.38 K/UL — SIGNIFICANT CHANGE UP (ref 3.8–10.5)
WBC # FLD AUTO: 8.38 K/UL — SIGNIFICANT CHANGE UP (ref 3.8–10.5)

## 2021-07-02 PROCEDURE — 99223 1ST HOSP IP/OBS HIGH 75: CPT

## 2021-07-02 PROCEDURE — 99232 SBSQ HOSP IP/OBS MODERATE 35: CPT

## 2021-07-02 PROCEDURE — 71045 X-RAY EXAM CHEST 1 VIEW: CPT | Mod: 26

## 2021-07-02 PROCEDURE — 99233 SBSQ HOSP IP/OBS HIGH 50: CPT

## 2021-07-02 PROCEDURE — 77001 FLUOROGUIDE FOR VEIN DEVICE: CPT | Mod: 26

## 2021-07-02 PROCEDURE — 36558 INSERT TUNNELED CV CATH: CPT

## 2021-07-02 PROCEDURE — 76937 US GUIDE VASCULAR ACCESS: CPT | Mod: 26

## 2021-07-02 PROCEDURE — 90937 HEMODIALYSIS REPEATED EVAL: CPT

## 2021-07-02 RX ORDER — MEGESTROL ACETATE 40 MG/ML
300 SUSPENSION ORAL DAILY
Refills: 0 | Status: DISCONTINUED | OUTPATIENT
Start: 2021-07-02 | End: 2021-07-02

## 2021-07-02 RX ORDER — IRON SUCROSE 20 MG/ML
50 INJECTION, SOLUTION INTRAVENOUS
Refills: 0 | Status: DISCONTINUED | OUTPATIENT
Start: 2021-07-02 | End: 2021-07-10

## 2021-07-02 RX ORDER — ACETAMINOPHEN 500 MG
650 TABLET ORAL EVERY 6 HOURS
Refills: 0 | Status: DISCONTINUED | OUTPATIENT
Start: 2021-07-02 | End: 2021-07-10

## 2021-07-02 RX ORDER — CHLORHEXIDINE GLUCONATE 213 G/1000ML
1 SOLUTION TOPICAL
Refills: 0 | Status: DISCONTINUED | OUTPATIENT
Start: 2021-07-02 | End: 2021-07-10

## 2021-07-02 RX ORDER — MEGESTROL ACETATE 40 MG/ML
400 SUSPENSION ORAL DAILY
Refills: 0 | Status: DISCONTINUED | OUTPATIENT
Start: 2021-07-02 | End: 2021-07-10

## 2021-07-02 RX ORDER — ERYTHROPOIETIN 10000 [IU]/ML
12000 INJECTION, SOLUTION INTRAVENOUS; SUBCUTANEOUS
Refills: 0 | Status: DISCONTINUED | OUTPATIENT
Start: 2021-07-02 | End: 2021-07-10

## 2021-07-02 RX ADMIN — Medication 5 MILLIGRAM(S): at 21:00

## 2021-07-02 RX ADMIN — Medication 81 MILLIGRAM(S): at 12:42

## 2021-07-02 RX ADMIN — Medication 500 MILLIGRAM(S): at 12:16

## 2021-07-02 RX ADMIN — Medication 1 PACKET(S): at 17:05

## 2021-07-02 RX ADMIN — Medication 650 MILLIGRAM(S): at 16:19

## 2021-07-02 RX ADMIN — MIDODRINE HYDROCHLORIDE 10 MILLIGRAM(S): 2.5 TABLET ORAL at 15:19

## 2021-07-02 RX ADMIN — Medication 650 MILLIGRAM(S): at 15:19

## 2021-07-02 RX ADMIN — Medication 1 MILLIGRAM(S): at 12:16

## 2021-07-02 RX ADMIN — HEPARIN SODIUM 5000 UNIT(S): 5000 INJECTION INTRAVENOUS; SUBCUTANEOUS at 18:24

## 2021-07-02 RX ADMIN — CHLORHEXIDINE GLUCONATE 1 APPLICATION(S): 213 SOLUTION TOPICAL at 12:19

## 2021-07-02 RX ADMIN — ATORVASTATIN CALCIUM 10 MILLIGRAM(S): 80 TABLET, FILM COATED ORAL at 21:00

## 2021-07-02 RX ADMIN — BUDESONIDE AND FORMOTEROL FUMARATE DIHYDRATE 2 PUFF(S): 160; 4.5 AEROSOL RESPIRATORY (INHALATION) at 20:22

## 2021-07-02 RX ADMIN — IRON SUCROSE 205 MILLIGRAM(S): 20 INJECTION, SOLUTION INTRAVENOUS at 12:15

## 2021-07-02 RX ADMIN — MIRTAZAPINE 15 MILLIGRAM(S): 45 TABLET, ORALLY DISINTEGRATING ORAL at 12:17

## 2021-07-02 RX ADMIN — MEGESTROL ACETATE 400 MILLIGRAM(S): 40 SUSPENSION ORAL at 12:11

## 2021-07-02 RX ADMIN — MIDODRINE HYDROCHLORIDE 10 MILLIGRAM(S): 2.5 TABLET ORAL at 21:00

## 2021-07-02 RX ADMIN — Medication 1 TABLET(S): at 12:12

## 2021-07-02 RX ADMIN — PANTOPRAZOLE SODIUM 40 MILLIGRAM(S): 20 TABLET, DELAYED RELEASE ORAL at 18:23

## 2021-07-02 RX ADMIN — BUDESONIDE AND FORMOTEROL FUMARATE DIHYDRATE 2 PUFF(S): 160; 4.5 AEROSOL RESPIRATORY (INHALATION) at 09:02

## 2021-07-02 RX ADMIN — ERYTHROPOIETIN 12000 UNIT(S): 10000 INJECTION, SOLUTION INTRAVENOUS; SUBCUTANEOUS at 18:36

## 2021-07-02 NOTE — PROGRESS NOTE ADULT - SUBJECTIVE AND OBJECTIVE BOX
[facility] DIVISION OF KIDNEY DISEASES AND HYPERTENSION -- FOLLOW UP NOTE  --------------------------------------------------------------------------------  Chief Complaint:  ATN with ongoing HD requirement    24 hour events/subjective:  Plan Tunneled Catheter today  HD today after TDC placed      PAST HISTORY  --------------------------------------------------------------------------------  No significant changes to PMH, PSH, FHx, SHx, unless otherwise noted    ALLERGIES & MEDICATIONS  --------------------------------------------------------------------------------  Allergies    No Known Drug Allergies  strawberry (Anaphylaxis)    Intolerances      Standing Inpatient Medications  aMIOdarone    Tablet 200 milliGRAM(s) Oral daily  aMIOdarone    Tablet   Oral   ascorbic acid 500 milliGRAM(s) Oral daily  aspirin  chewable 81 milliGRAM(s) Oral daily  atorvastatin 10 milliGRAM(s) Oral at bedtime  budesonide 160 MICROgram(s)/formoterol 4.5 MICROgram(s) Inhaler 2 Puff(s) Inhalation two times a day  chlorhexidine 2% Cloths 1 Application(s) Topical daily  chlorhexidine 4% Liquid 1 Application(s) Topical <User Schedule>  epoetin yolanda-epbx (RETACRIT) Injectable 00835 Unit(s) IV Push <User Schedule>  folic acid 1 milliGRAM(s) Oral daily  heparin   Injectable 5000 Unit(s) SubCutaneous every 12 hours  iron sucrose IVPB 50 milliGRAM(s) IV Intermittent <User Schedule>  megestrol Suspension 400 milliGRAM(s) Oral daily  melatonin 5 milliGRAM(s) Oral at bedtime  midodrine 10 milliGRAM(s) Oral every 8 hours  mirtazapine 15 milliGRAM(s) Oral daily  Nephro-melissa 1 Tablet(s) Oral daily  pantoprazole   Suspension 40 milliGRAM(s) Oral every 12 hours  psyllium Powder 1 Packet(s) Oral two times a day  senna 2 Tablet(s) Oral at bedtime  sodium chloride 0.9%. 1000 milliLiter(s) IV Continuous <Continuous>  sodium chloride 0.9%. 1000 milliLiter(s) IV Continuous <Continuous>  tamsulosin 0.4 milliGRAM(s) Oral at bedtime    PRN Inpatient Medications  ALBUTerol    90 MICROgram(s) HFA Inhaler 2 Puff(s) Inhalation every 6 hours PRN  sodium chloride 0.9% lock flush 10 milliLiter(s) IV Push every 1 hour PRN      REVIEW OF SYSTEMS  --------------------------------------------------------------------------------  Gen: No weight changes, fatigue, fevers/chills, weakness  Skin: No rashes  Head/Eyes/Ears/Mouth: No headache; Normal hearing; Normal vision w/o blurriness; No sinus pain/discomfort, sore throat  Respiratory: No dyspnea, cough, wheezing, hemoptysis  CV: No chest pain, PND, orthopnea  GI: No abdominal pain, diarrhea, constipation, nausea, vomiting, melena, hematochezia  : No increased frequency, dysuria, hematuria, nocturia  MSK: No joint pain/swelling; no back pain; no edema  Neuro: No dizziness/lightheadedness, weakness, seizures, numbness, tingling  Heme: No easy bruising or bleeding  Endo: No heat/cold intolerance  Psych: No significant nervousness, anxiety, stress, depression    All other systems were reviewed and are negative, except as noted.    VITALS/PHYSICAL EXAM  --------------------------------------------------------------------------------  T(C): 37.2 (07-02-21 @ 07:00), Max: 37.3 (07-02-21 @ 06:00)  HR: 91 (07-02-21 @ 12:00) (85 - 95)  BP: --  RR: 18 (07-02-21 @ 12:00) (14 - 27)  SpO2: 94% (07-02-21 @ 12:00) (90% - 100%)  Wt(kg): --        07-01-21 @ 07:01  -  07-02-21 @ 07:00  --------------------------------------------------------  IN: 694.5 mL / OUT: 120 mL / NET: 574.5 mL    07-02-21 @ 07:01  -  07-02-21 @ 12:53  --------------------------------------------------------  IN: 0 mL / OUT: 35 mL / NET: -35 mL      Physical Exam:  	Gen: NAD, well-appearing  	HEENT: PERRL, supple neck, clear oropharynx  	Pulm: CTA B/L  	CV: RRR, S1S2; no rub  	Back: No spinal or CVA tenderness; no sacral edema  	Abd: +BS, soft, nontender/nondistended  	: No suprapubic tenderness  	UE: Warm, FROM, no clubbing, intact strength; no edema; no asterixis  	LE: Warm, FROM, no clubbing, intact strength; no edema  	Neuro: No focal deficits, intact gait  	Psych: Normal affect and mood  	Skin: Warm, without rashes  	Vascular access:    LABS/STUDIES  --------------------------------------------------------------------------------              8.6    8.38  >-----------<  149      [07-02-21 @ 02:59]              26.6     139  |  102  |  25.3  ----------------------------<  87      [07-02-21 @ 02:59]  3.8   |  22.0  |  2.75        Ca     8.5     [07-02-21 @ 02:59]      Mg     2.0     [07-02-21 @ 02:59]      Phos  2.9     [07-02-21 @ 02:59]    TPro  5.9  /  Alb  3.0  /  TBili  2.0  /  DBili  x   /  AST  39  /  ALT  34  /  AlkPhos  102  [07-02-21 @ 02:59]    PT/INR: PT 16.8 , INR 1.48       [07-02-21 @ 02:59]  PTT: 35.5       [07-01-21 @ 02:58]      Creatinine Trend:  SCr 2.75 [07-02 @ 02:59]  SCr 1.68 [07-01 @ 02:58]  SCr 1.40 [06-30 @ 02:39]  SCr 1.03 [06-29 @ 20:58]  SCr 0.74 [06-29 @ 03:11]        TSH 2.31      [06-12-21 @ 01:55]    HBsAb <3.0      [06-17-21 @ 05:13]  HBsAg Nonreact      [06-17-21 @ 05:13]  HBcAb Nonreact      [06-17-21 @ 05:13]  HCV 0.15, Nonreact      [06-17-21 @ 05:13]

## 2021-07-02 NOTE — PROGRESS NOTE ADULT - PROBLEM SELECTOR PLAN 1
Now s/p Valve in Valve TAVR with Dr. Campebll on 6/23  Primacor weaned off   Repeat echo revealed EF 20-25% from 30-35% prior with midly enlarged RV, severely decreased LV Fxn, moderate MR, Severe TR and mild Pulm Regurgitation  CT C/A/P revealed b/l pneumonia, ABX started, now on Zosyn for 5 day course to complete 7/3  Continue Midodrine 10 mg q8, wean to off as tolerated, maintain SBP < 100  Continue Amio PO load for runs of SVT post op   Continue GI ppx with Protonix and Senna, DVT ppx with SCD boots Now s/p Valve in Valve TAVR with Dr. Campbell on 6/23  Primacor weaned off   Repeat echo revealed EF 20-25% from 30-35% prior with midly enlarged RV, severely decreased LV Fxn, moderate MR, Severe TR and mild Pulm Regurgitation  CT C/A/P revealed b/l pneumonia, ABX started, now on Zosyn for 5 day course to complete 7/3  Continue Midodrine 10 mg q8, wean to off as tolerated, maintain SBP < 100  Continue Amio PO load for runs of SVT post op   Continue GI ppx with Protonix and Senna, DVT ppx with SCD boots  Cleared by speech for Dysphagia 2 diet with honey thick liquids, feeding tube d/c'd

## 2021-07-02 NOTE — PROGRESS NOTE ADULT - PROBLEM SELECTOR PLAN 4
Not on beta blocker given pressor support with Midodrine  Daily CXR while inpatient   Cardiology following  Heart Failure team consulted and following, VBG 02 sat 70

## 2021-07-02 NOTE — PROGRESS NOTE ADULT - ASSESSMENT
76M with a PMH of MI in 1995 (angiogram, no stents placed), COPD (2L O2 at home), severe AS s/p TAVR (03/2019 at [facility]), gout, CKD, CVA (no deficits, 09/2020), pulmonary HTN, initially presented to [facility] 6/1/21 with RONNELL on CKD, hospital course significant for cardiogenic shock, acute hypoxemic respiratory failure, and acute on chronic combined diastolic and systolic heart failure. ISRAEL revealed severe AI. Patient was transferred to [facility] under Dr. Campbell for further workup of bioprosthetic AI.     6/28 s/p BAL, sputum samples sent m ax switched to [facility]     Inpatient hospital course has been significant for:  1. 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 RONNELL on CKD progressed to ESRD requiring HD   3. Unintentional Weight loss / Dysphagia / Anorexia work up revealing duodenitis and diffuse erosive esophagitis, gastritis on EGD (biopsies negative for H. Pylori or carcinoma); colonoscopy showing diverticulosis  4. R/O AV Endocarditis   5. Auto-elevated INR (followed by Hematology)  6. R/O WILLEM   7. Adjustment disorder (evaluated by Behavioral Health)  8. Thrombocytopenia, Hematology consulted, r/o DIC    Patient is now s/p valve in valve TAVR on 6/23/21 with Dr. Campbell.     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 pt remains on epi, amio, primacor and is tolerating CVVHD  6/30 Pt now on HD

## 2021-07-02 NOTE — PROGRESS NOTE ADULT - PROBLEM SELECTOR PLAN 10
Continue 2L NC, wean to RA as tolerated    Problem 11: Adjustment Disorder   Remeron resumed    Problem 12: COPD  On home O2

## 2021-07-02 NOTE — PROGRESS NOTE ADULT - ASSESSMENT
77 y/o male with h/o chronic systolic HF ACC/AHA stage C, ICMP, CAD s/p MI ’95, s/p TAVR 3/2019 (@ [facility]), S. bovis bacteremia, COPD (home O2 @ 2 lpm), CVA (9/2020), gout, noncompliance who was admitted to [facility] with RONNELL on CKD and urinary retention. His hospital course was complicated by cardiogenic shock, acute hypoxemic respiratory failure likely in setting of bioprosthetic severe paravalvular/valvular AI and decompensated HF. He was subsequently transferred to [facility] for TAVR. 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 ID was following and BxCx were obtained which showed no growth. Eventually, he underwent Amy TAVR on 6/23/21 and has remained on CTU since then. He was initially on epinephrine and milrinone gtt. His epinephrine has been weaned off and he was successfully extubated. His CVVH has been transitioned to iHD.       Hemodynamics:  6/28/21: Milrinone 0.25mcg/kg/min & Epineprine 0.04mcg/kg/min   CVP 6, PAP 49/17/28, HR 85, /61/82, TD CO/CI  4.4/2.2.       77 y/o male with h/o chronic systolic HF ACC/AHA stage C, ICMP, CAD s/p MI ’95, s/p TAVR 3/2019 (@ [facility]), S. bovis bacteremia, COPD (home O2 @ 2 lpm), CVA (9/2020), gout, noncompliance who was admitted to [facility] with RONNELL on CKD and urinary retention. His hospital course was complicated by cardiogenic shock, acute hypoxemic respiratory failure likely in setting of bioprosthetic severe paravalvular/valvular AI and decompensated HF. He was subsequently transferred to [facility] for TAVR. 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 ID was following and BxCx were obtained which showed no growth. Eventually, he underwent Amy TAVR on 6/23/21 and has remained on CTU since then. He was initially on epinephrine and milrinone gtt. He has been weaned off epinephrine and milrinone gtt, and he was successfully extubated. His CVVH has been transitioned to iHD.       Hemodynamics:  6/28/21: Milrinone 0.25mcg/kg/min & Epineprine 0.04mcg/kg/min   CVP 6, PAP 49/17/28, HR 85, /61/82, TD CO/CI  4.4/2.2.

## 2021-07-02 NOTE — BRIEF OPERATIVE NOTE - OPERATION/FINDINGS
s/p RIJ 14.5F x 19cm tunneled HD cathter placement with removal of nontunneled HD RIJ catheter
secretions
Colonoscopy: Digital rectal examination was unremarkable.  Terminal ileum appeared unremarkable.  The colon mucosa throughout the entire colon was fairly unremarkable.  Mild diverticulosis was noted.  Internal hemorrhoids were noted on retroflexion.
Informed consent was obtained from the patient at the bedside in the CTICU.  The procedure, its risks, and benefits were discussed.  All questions were answered.  The patient was placed and remained in the supine position.  A safety timeout was performed, confirming the patient by name, date of birth and medical record number.  All discrepancies were addressed, confirmed by all present personnel.  The patient was sedated with IV sedation provided by the anesthesiologist.  The Olympus GIFH-190 video endoscope was inserted into the oropharynx and guided under direct vision into the esophagus, stomach and duodenum.   The duodenal bulb was notable for moderately severe duodenitis, and second portion with mild duodenitis.  The scope was withdrawn to the stomach and retroflexed.  The entire gastric mucosa was covered with innumerable raised erosions with flecks of hematin throughout.  The mucosa had an appearance of early linitis plastica.  Biopsies were taken from the antrum and body.  The endoscope and insufflated air were slowly removed from the upper GI tract.  The scope was withdrawn to the esophagus.  The Z-line was regular, located at approximately 42 cm from the incisor orifice.  There was no Moore’s epithelium or esophagitis seen.  The patient tolerated the procedure well.  There were no apparent complications, and the blood loss from the maneuver was minimal.
Severe aortic stenosis.  Post valve deployment Gradient 5 mm unable to obtain valve area with .

## 2021-07-02 NOTE — PROGRESS NOTE ADULT - SUBJECTIVE AND OBJECTIVE BOX
[place] CARDIOLOGY-[facility]                                                               Office: [address] [address]                                                              Telephone: [phone]. Fax:[phone]                                                                             PROGRESS NOTE  Reason for follow up: Valvular Disease, Cardiogenic Shock  Update: Patient just underwent permacath placement today. Weaned off milrinone, but still requiring midodrine for BP support.     Review of symptoms:   Cardiac:  No chest pain. No dyspnea. No palpitations.  Respiratory: No cough. No dyspnea  Gastrointestinal: No diarrhea. No abdominal pain. No bleeding.     Past medical history: No updates.   	  Vitals:  T(C): 37.1 (07-02-21 @ 13:00), Max: 37.3 (07-02-21 @ 06:00)  HR: 89 (07-02-21 @ 13:00) (85 - 95)  BP: --  RR: 20 (07-02-21 @ 13:00) (14 - 27)  SpO2: 94% (07-02-21 @ 13:00) (90% - 100%)  Wt(kg): --  I&O's Summary    01 Jul 2021 07:01  -  02 Jul 2021 07:00  --------------------------------------------------------  IN: 694.5 mL / OUT: 120 mL / NET: 574.5 mL    02 Jul 2021 07:01  -  02 Jul 2021 13:44  --------------------------------------------------------  IN: 0 mL / OUT: 45 mL / NET: -45 mL      Weight (kg): 100 (06-29 @ 08:12)      PHYSICAL EXAM:  Appearance: Comfortable. No acute distress  HEENT:  Head and neck: Atraumatic. Normocephalic.  Normal oral mucosa, PERRL, Neck is supple. No JVD, No carotid bruit.   Neurologic: A&Ox 3, no focal deficits. EOMI, Cranial nerves are intact.  Lymphatic: No cervical lymphadenopathy  Cardiovascular: Normal S1 S2, No rubs/gallops. No JVD, III/VI systolic murmur  Respiratory: Coarse breath sounds anteriorly   Gastrointestinal:  Soft, Non-tender, + BS  Lower Extremities: 1+ edema  Psychiatry: Patient is calm. No agitation. Mood & affect appropriate  Skin: No rashes/ecchymoses/cyanosis/ulcers visualized on the face, hands or feet.      CURRENT MEDICATIONS:  aMIOdarone    Tablet 200 milliGRAM(s) Oral daily  aMIOdarone    Tablet   Oral   midodrine 10 milliGRAM(s) Oral every 8 hours  tamsulosin 0.4 milliGRAM(s) Oral at bedtime    budesonide 160 MICROgram(s)/formoterol 4.5 MICROgram(s) Inhaler  melatonin  mirtazapine  pantoprazole   Suspension  psyllium Powder  senna  atorvastatin  megestrol Suspension  ascorbic acid  aspirin  chewable  chlorhexidine 2% Cloths  chlorhexidine 4% Liquid  epoetin yolanda-epbx (RETACRIT) Injectable  folic acid  heparin   Injectable  iron sucrose IVPB  Nephro-melissa  sodium chloride 0.9%.  sodium chloride 0.9%.      DIAGNOSTIC TESTING:  [ ] Echocardiogram:   < from: TTE Echo Complete w/ Contrast w/ Doppler (06.26.21 @ 10:36) >  PHYSICIAN INTERPRETATION:  Left Ventricle:Endocardial visualization was enhanced with intravenous echo contrast. The left ventricular internal cavity size is normal. Left ventricular wall thickness is mildly increased.  Global LV systolic function was severely decreased. Left ventricular ejection fraction, by visual estimation, is 20 to 25%. The mitral in-flow pattern reveals no discernable A-wave, therefore no comment on diastolic function can be made.  Right Ventricle: The right ventricular size is mildly enlarged.  Pericardium: Trivial pericardial effusion is present. The pericardial effusion is posterior and lateral to the left ventricle.  Mitral Valve: Mild thickening of the anterior and posterior mitral valve leaflets. Moderate mitral valve regurgitation is seen.  Tricuspid Valve: The tricuspid valve is normal in structure. Severe tricuspid regurgitation is visualized.  Aortic Valve: Mild to moderate aortic valve regurgitation is seen. The Dimesionless Index value is 0.26.  Pulmonic Valve: The pulmonic valve is normal. Mildpulmonic valve regurgitation.  In comparison to the previous echocardiogram(s): Prior examinations are available and were reviewed for comparison purposes. Compared with prior study dated 6/23/21, the LVEF appears decreased to 20-25%. MR now appears moderate and TR appears severe. Results were discussed with CT surgery.      Summary:   1. Endocardial visualization was enhanced with intravenous echo contrast.   2. Left ventricular ejection fraction, by visual estimation, is 20 to 25%.   3. Severely decreased global left ventricular systolic function.   4. Mildly increased LV wall thickness.   5. The mitral in-flow pattern reveals no discernable A-wave, therefore no comment on diastolic function can be made.   6. Mildly enlarged right ventricle.   7. Mild thickening of the anterior and posterior mitral valve leaflets.   8. Moderate mitral valve regurgitation.   9. Severe tricuspid regurgitation.  10. TAVR in the aortic position. Appears well seated with no abnormal rocking motion. Mild to moderate paravalvular aortic regurgitation is seen. Peak velocity is 2.26 m/s, MG is 11.2 mmHg, and PG is 20.5 mmHg, which are acceptable in the setting of a prosethetic aortic valve.  11. Mild pulmonic valve regurgitation.  12. Compared with prior study dated 6/23/21, the LVEF appears decreased to 20-25%. MR now appears moderate and TR appears severe. Results were discussed with CT surgery.    LAMAR Calero Electronically signed on 6/26/2021 at 6:23:07 PM    < end of copied text >    [ ]  Catheterization:  < from: Cardiac Cath Lab - Adult (06.18.21 @ 13:53) >  VENTRICLES: EF by echo was 35 %.  CORONARY VESSELS: The coronary circulation isright dominant.  LM:   --  LM: Normal.  LAD:   --  Proximal LAD: There was a tubular 50 % stenosis.  CX:   --  Circumflex: Angiography showed minor luminal irregularities with  no flow limiting lesions.  RCA:   --  RCA: Angiography showed minor luminal irregularities with no  flow limiting lesions.  COMPLICATIONS: No complications occurred during the cath lab visit.  DIAGNOSTIC IMPRESSIONS: Moderate 40-50% stenosis in the proximal LAD,  unchanged from 2/2019 (Insignificant iFR in 2/2019). 2. No significant CAD  involving the LM, LCX and RCA. 3. A TAV is noted to be moving in  consonance with the surrounding structures. 4. Severe central Prosthetic  valve Aortic Insufficiency is noted on a ISRAEL and the LVEF was estimated  [id number]%.  DIAGNOSTIC RECOMMENDATIONS: GDMT. 2. RFM. 3. Possible TAVIV to address the  prosthetic AI, centrally mediated.  INTERVENTIONAL IMPRESSIONS: Moderate 40-50% stenosis in the proximal LAD,  unchanged from 2/2019 (Insignificant iFR in 2/2019). 2. No significant CAD  involving the LM, LCX and RCA. 3. A TAV is noted to be moving in  consonance with the surrounding structures. 4. Severe central Prosthetic  valve Aortic Insufficiency is noted on a ISRAEL and the LVEF was estimated  [id number]%.  INTERVENTIONAL RECOMMENDATIONS: GDMT. 2. RFM. 3. Possible TAVIV to address  the prosthetic AI, centrally mediated.  Prepared and signed by  Herson Jorgensen M.D.  Signed 06/18/2021 14:34:38    < end of copied text >        LABS:	 	                            8.6    8.38  )-----------( 149      ( 02 Jul 2021 02:59 )             26.6     07-02    139  |  102  |  25.3<H>  ----------------------------<  87  3.8   |  22.0  |  2.75<H>    Ca    8.5<L>      02 Jul 2021 02:59  Phos  2.9     07-02  Mg     2.0     07-02    TPro  5.9<L>  /  Alb  3.0<L>  /  TBili  2.0  /  DBili  x   /  AST  39  /  ALT  34  /  AlkPhos  102  07-02    proBNP:   Lipid Profile:   HgA1c:   TSH:       TELEMETRY: Reviewed  SR 1st degree AV block, PACs, PVCs

## 2021-07-02 NOTE — CONSULT NOTE ADULT - SUBJECTIVE AND OBJECTIVE BOX
76yM was admitted on 06-12 from [place] with RONNELL/CKD, urinary retention, cardiogenic shock, acute hypoxemic respiratory failure secondary to metabolic acidosis and respiratory alkalosis. Workup there showed TAVR failure with ISRAEL 6/10/21 showing EF 40-45%, Aortic valve prosthesis with Severe paravalvular leak and valvular AI, mild-moderate aortic stenosis, and moderate MR.       Imaging performed at [place]:  6/1: CXR shwoing cardiomegaly, small b/l pleural effusions, moderate pulmonary vascular congestion.  6/1: CT Brain ordered for head trauma? showing age-related cerebral and cerebellar volume loss, mild chronic vascular ischemic changes, stable biparietal arachnoid cyst and stable midline posterior fossa arachnoid cyst.   6/1: US Abdomen for elevated LFTs showing mild hepatic steatosis, mild hepatomegaly, sludge in the gallbladder, no discrete gallstones, normal biliary ducts, mild echogenic right kidney which may be seen with medical renal dz, mildly complex Right upper pole 4cm cyst with subtle internal echoes may represent hemorrhagic/proteinaceous cyst, mildly complex right midpole 2cm cyst with thin internal linear septation.   6/3: Kidney and Bladder US showing no hydronephrosis, echogenic kidneys likely from medical renal dz, prostatomegaly, and thickening of the posterior wall of the bladder with trabeculations which could be due to bladder outlet obstruction.   6/7: Kidney and Bladder US: No hydronephrosis or shadowing renal calculi. Stable b/l renal cysts.   6/7: Lower Extremity Venous Doppler with no DVT noted.   6/8: TTE showing EF 47%, dilated IVC, dilation of the ascending aorta of 4.4cm, moderate-severe pulmonary HTN, moderate-severe TR, aortic valve with severe prosthesis regurgitation and moderate prosthesis stenosis  6/9: CT Chest showing emphysema and intersitial lung dz changes, stable b/l small pleural effusions, cardiomegaly.   6/11: ISRAEL showing EF 40-45%, Aortic valve prosthesis with Severe paravalvular leak and valvular AI, mild-moderate aortic stenosis, and moderate MR.  (12 Jun 2021 01:36)      TTE 6/26 - Summary:   1. Endocardial visualization was enhanced with intravenous echo contrast.   2. Left ventricular ejection fraction, by visual estimation, is 20 to 25%.   3. Severely decreased global left ventricular systolic function.   4. Mildly increased LV wall thickness.   5. The mitral in-flow pattern reveals no discernable A-wave, therefore no comment on diastolic function can be made.   6. Mildly enlarged right ventricle.   7. Mild thickening of the anterior and posterior mitral valve leaflets.   8. Moderate mitral valve regurgitation.   9. Severe tricuspid regurgitation.  10. TAVR in the aortic position. Appears well seated with no abnormal rocking motion. Mild to moderate paravalvular aortic regurgitation is seen. Peak velocity is 2.26 m/s, MG is 11.2 mmHg, and PG is 20.5 mmHg, which are acceptable in the setting of a prosethetic aortic valve.  11. Mild pulmonic valve regurgitation.  12. Compared with prior study dated 6/23/21, the LVEF appears decreased to 20-25%. MR now appears moderate and TR appears severe. Results were discussed with CT surgery.    CXR 7/1 - Status post cardiac surgery. No evidence of active chest pathology. Catheters and/or tubes in place    Patient reports fatigue and right knee/hip pain as well as SOB are limiting his ability to move.  He is adamant about going home, will consider his options after another week here.    REVIEW OF SYSTEMS  Constitutional - No fever, +fatigue  HEENT - No eye pain, No visual disturbances, No difficulty hearing, No tinnitus, No vertigo, No neck pain  Respiratory - +cough, No wheezing, +shortness of breath  Cardiovascular - No chest pain, No palpitations  Gastrointestinal - No abdominal pain, No nausea, No vomiting, No diarrhea, No constipation  Genitourinary - No dysuria, No frequency, No hematuria, +incontinence  Neurological - No headaches, No memory loss, +loss of strength, No numbness, No tremors  Skin - No itching, No rashes, No lesions   Endocrine - No temperature intolerance  Musculoskeletal - +joint pain, +joint swelling, +muscle pain  Psychiatric - +depression, No anxiety    VITALS  T(C): 37.2 (07-02-21 @ 07:00), Max: 37.3 (07-02-21 @ 06:00)  HR: 91 (07-02-21 @ 12:00) (85 - 95)  BP: --  RR: 18 (07-02-21 @ 12:00) (14 - 27)  SpO2: 94% (07-02-21 @ 12:00) (90% - 100%)  Wt(kg): --    PAST MEDICAL & SURGICAL HISTORY  Pulmonary hypertension    Hypertension    Hyperlipidemia    H/O aortic valve stenosis    CVA (cerebrovascular accident)    Chronic kidney disease    Prediabetes    Mitral valve regurgitation    CAD in native artery    Aneurysm of aortic root    Benign prostatic hyperplasia    Gout    History of transcatheter aortic valve replacement (TAVR)        SOCIAL HISTORY - as per documentation/history  Smoking - Quit  EtOH - None  Drugs - None    FUNCTIONAL HISTORY  Lives alone  Independent, Limited mobility     CURRENT FUNCTIONAL STATUS  7/1  · Recommended Consistencies	mechanical soft, honey thick fluids    Bed Mobility: Rolling/Turning:     · Level of [place]	minimum assist (75% patients effort)  · Physical Assist/Nonphysical Assist	1 person assist; verbal cues  · Assistive Device	bed rails    Bed Mobility: Supine to Sit:     · Level of [place]	moderate assist (50% patients effort)  · Physical Assist/Nonphysical Assist	1 person assist; verbal cues  · Assistive Device	bed rails    Transfer: Bed to Chair:     Transfer Skill: Bed to Chair   · Level of [place]	moderate assist (50% patients effort)  · Physical Assist/Nonphysical Assist	2 person assist; verbal cues  · Assistive Device	rolling walker    Bed/Chair Transfer Safety Analysis:     · Impairments Contributing to Impaired Transfers	impaired balance; decreased strength    Transfer: Sit to Stand:     · Level of [place]	moderate assist (50% patients effort)  · Physical Assist/Nonphysical Assist	2 person assist; verbal cues  · Assistive Device	rolling walker    Transfer: Stand to Sit:     · Level of [place]	moderate assist (50% patients effort)  · Physical Assist/Nonphysical Assist	2 person assist; verbal cues  · Assistive Device	rolling walker    Sit/Stand Transfer Safety Analysis:     · Impairments Contributing to Impaired Transfers	impaired balance; decreased strength    Gait Skills:     · Level of [place]	moderate assist (50% patients effort)  · Physical Assist/Nonphysical Assist	2 person assist; verbal cues  · Assistive Device	rolling walker  · Gait Distance	1 step    Gait Analysis:     · Gait Pattern Used	shuffle    FAMILY HISTORY   FH: lung cancer        RECENT LABS - Reviewed  CBC Full  -  ( 02 Jul 2021 02:59 )  WBC Count : 8.38 K/uL  RBC Count : 2.83 M/uL  Hemoglobin : 8.6 g/dL  Hematocrit : 26.6 %  Platelet Count - Automated : 149 K/uL  Mean Cell Volume : 94.0 fl  Mean Cell Hemoglobin : 30.4 pg  Mean Cell Hemoglobin Concentration : 32.3 gm/dL  Auto Neutrophil # : x  Auto Lymphocyte # : x  Auto Monocyte # : x  Auto Eosinophil # : x  Auto Basophil # : x  Auto Neutrophil % : x  Auto Lymphocyte % : x  Auto Monocyte % : x  Auto Eosinophil % : x  Auto Basophil % : x    07-02    139  |  102  |  25.3<H>  ----------------------------<  87  3.8   |  22.0  |  2.75<H>    Ca    8.5<L>      02 Jul 2021 02:59  Phos  2.9     07-02  Mg     2.0     07-02    TPro  5.9<L>  /  Alb  3.0<L>  /  TBili  2.0  /  DBili  x   /  AST  39  /  ALT  34  /  AlkPhos  102  07-02        ALLERGIES  No Known Drug Allergies  strawberry (Anaphylaxis)      MEDICATIONS   ALBUTerol    90 MICROgram(s) HFA Inhaler 2 Puff(s) Inhalation every 6 hours PRN  aMIOdarone    Tablet 200 milliGRAM(s) Oral daily  aMIOdarone    Tablet   Oral   ascorbic acid 500 milliGRAM(s) Oral daily  aspirin  chewable 81 milliGRAM(s) Oral daily  atorvastatin 10 milliGRAM(s) Oral at bedtime  budesonide 160 MICROgram(s)/formoterol 4.5 MICROgram(s) Inhaler 2 Puff(s) Inhalation two times a day  chlorhexidine 2% Cloths 1 Application(s) Topical daily  chlorhexidine 4% Liquid 1 Application(s) Topical <User Schedule>  epoetin yolanda-epbx (RETACRIT) Injectable 23144 Unit(s) IV Push <User Schedule>  folic acid 1 milliGRAM(s) Oral daily  heparin   Injectable 5000 Unit(s) SubCutaneous every 12 hours  iron sucrose IVPB 50 milliGRAM(s) IV Intermittent <User Schedule>  megestrol Suspension 400 milliGRAM(s) Oral daily  melatonin 5 milliGRAM(s) Oral at bedtime  midodrine 10 milliGRAM(s) Oral every 8 hours  mirtazapine 15 milliGRAM(s) Oral daily  Nephro-melissa 1 Tablet(s) Oral daily  pantoprazole   Suspension 40 milliGRAM(s) Oral every 12 hours  psyllium Powder 1 Packet(s) Oral two times a day  senna 2 Tablet(s) Oral at bedtime  sodium chloride 0.9% lock flush 10 milliLiter(s) IV Push every 1 hour PRN  sodium chloride 0.9%. 1000 milliLiter(s) IV Continuous <Continuous>  sodium chloride 0.9%. 1000 milliLiter(s) IV Continuous <Continuous>  tamsulosin 0.4 milliGRAM(s) Oral at bedtime      ----------------------------------------------------------------------------------------  PHYSICAL EXAM  Constitutional - NAD, Comfortable  HEENT - NCAT, EOMI  Neck - Supple, No limited ROM  Chest - Increased work of breathing, No wheezing  Cardiovascular - S1S2   Abdomen - Soft   Extremities - BLE edema  Neurologic Exam -                    Cognitive - AAO to self, place, date, year, situation     Communication - Fluent, MIld dysarthria     Cranial Nerves - CN 2-12 intact     Motor -                      LEFT    UE - ShAB 5/5, EF 4/5, EE 4/5,  5/5                    RIGHT UE - ShAB 5/5, EF 5/5, EE 5/5,  5/5                    LEFT    LE - HF 1/5, KE 1/5, DF 3/5                     RIGHT LE - HF 1/5, KE 1/5, DF 3/5      Sensory - Intact to LT  Psychiatric - Mood depressed, Affect WNL  ----------------------------------------------------------------------------------------  ASSESSMENT/PLAN  76yMale with functional deficits with debility related to  failed TAVR  CAD - ASA, Lipitor  AFIB - Amiodarone  Sleep - Melatonin  Mood - Remeron, Recommend alternate SSRI - patient is depressed since death of spouse  Pain - Tylenol  DVT PPX - SCDs  Rehab - Patient with consderable debility related to complications related to immobility after his souse's death last year. As per discussion with SW about PT progress. Agree with PT. At this time, recommend ELSI, patient DOES NOT meet acute inpatient rehabilitation criteria. Patient needs a more prolonged stay to achieve transition to community living and would not be able to tolerate a comprehensive/intense rehab program of 3hours/day. Continue mobilization by staff to maintain cardiopulmonary function and prevention of secondary complications related to debility. Discussed with rehab team.

## 2021-07-02 NOTE — PROGRESS NOTE ADULT - SUBJECTIVE AND OBJECTIVE BOX
INFECTIOUS DISEASES AND INTERNAL MEDICINE at [place]  =======================================================  Kyle Wright MD  Diplomates American Board of Internal Medicine and Infectious Diseases  Telephone [phone]  Fax            [phone]  =======================================================    REYNA BOB [id number]    Follow up: ? valve vegetation    s/p TAVR on 6/23/21  no fevers  cultures from blood remains negative  EXTUBATED AWAKE ALERT    Allergies:  No Known Drug Allergies  strawberry (Anaphylaxis)       FAMILY  FAMILY HISTORY:  FH: lung cancer      REVIEW OF SYSTEMS:  Unable to obtain due to medical condition      Physical Exam:  GEN: AWAKE ALERT  HEENT: normocephalic and atraumatic.  Anicteric   NECK: Supple.   LUNGS: diffuse rhonchi   HEART: Regular rate and rhythm   ABDOMEN: Soft, nontender, and nondistended.  Positive bowel sounds.    : Lemus   EXTREMITIES: trace edema.  MSK: no joint swelling  NEUROLOGIC:AWAKE ALERT      Vitals:   V  Vital Signs Last 24 Hrs  T(C): 37.1 (02 Jul 2021 13:00), Max: 37.3 (02 Jul 2021 06:00)  T(F): 98.8 (02 Jul 2021 13:00), Max: 99.1 (02 Jul 2021 06:00)  HR: 89 (02 Jul 2021 14:00) (85 - 95)  BP: --  BP(mean): --  RR: 18 (02 Jul 2021 14:00) (14 - 27)  SpO2: 93% (02 Jul 2021 14:00) (90% - 100%)    Current Antibiotics:  cefTRIAXone   IVPB 2000 milliGRAM(s) IV Intermittent every 24 hours    Other medications:  aMIOdarone Infusion 0.5 mG/Min IV Continuous <Continuous>  atorvastatin 10 milliGRAM(s) Oral at bedtime  budesonide 160 MICROgram(s)/formoterol 4.5 MICROgram(s) Inhaler 2 Puff(s) Inhalation two times a day  chlorhexidine 0.12% Liquid 5 milliLiter(s) Oral Mucosa two times a day  chlorhexidine 2% Cloths 1 Application(s) Topical daily  CRRT Treatment    <Continuous>  dexMEDEtomidine Infusion 0.2 MICROgram(s)/kG/Hr IV Continuous <Continuous>  EPINEPHrine    Infusion 0.04 MICROgram(s)/kG/Min IV Continuous <Continuous>  insulin lispro (ADMELOG) corrective regimen sliding scale   SubCutaneous every 6 hours  midodrine 10 milliGRAM(s) Oral every 8 hours  milrinone Infusion 0.25 MICROgram(s)/kG/Min IV Continuous <Continuous>  mirtazapine 15 milliGRAM(s) Oral daily  Nephro-melissa 1 Tablet(s) Oral daily  norepinephrine Infusion 0.05 MICROgram(s)/kG/Min IV Continuous <Continuous>  pantoprazole  Injectable 40 milliGRAM(s) IV Push every 12 hours  Phoxillum Filtration BK 4 / 2.5 5000 milliLiter(s) CRRT <Continuous>  Phoxillum Filtration BK 4 / 2.5 5000 milliLiter(s) CRRT <Continuous>  Phoxillum Filtration BK 4 / 2.5 5000 milliLiter(s) CRRT <Continuous>  psyllium Powder 1 Packet(s) Oral two times a day  senna 2 Tablet(s) Oral at bedtime  sodium chloride 0.9%. 1000 milliLiter(s) IV Continuous <Continuous>  sodium chloride 0.9%. 1000 milliLiter(s) IV Continuous <Continuous>                                      .                                              8.6    8.38  )-----------( 149      ( 02 Jul 2021 02:59 )             26.6   07-02    139  |  102  |  25.3<H>  ----------------------------<  87  3.8   |  22.0  |  2.75<H>    Ca    8.5<L>      02 Jul 2021 02:59  Phos  2.9     07-02  Mg     2.0     07-02    TPro  5.9<L>  /  Alb  3.0<L>  /  TBili  2.0  /  DBili  x   /  AST  39  /  ALT  34  /  AlkPhos  102  07-02        RECENT CULTURES:  06-26 @ 21:09 .Sputum Sputum     Rare polymorphonuclear leukocytes per low power field  No Squamous epithelial cells per low power field  No organisms seen per oil power field    06-16 @ 17:23 .Blood Blood     No growth at 5 days.    06-16 @ 14:41 .Blood Blood-Peripheral     No growth at 5 days.    06-16 @ 14:40 .Blood Blood-Peripheral     No growth at 5 days.    06-15 @ 12:30 .Blood Blood-Peripheral     No growth at 5 days.    06-15 @ 12:29 .Blood Blood-Peripheral     No growth at 5 days.      WBC Count: 14.00 K/uL (06-27-21 @ 00:35)  WBC Count: 11.58 K/uL (06-26-21 @ 16:52)  WBC Count: 13.11 K/uL (06-26-21 @ 05:43)  WBC Count: 12.82 K/uL (06-25-21 @ 23:38)  WBC Count: 13.53 K/uL (06-25-21 @ 17:42)  WBC Count: 13.00 K/uL (06-25-21 @ 09:53)  WBC Count: 13.29 K/uL (06-25-21 @ 02:40)  WBC Count: 12.85 K/uL (06-24-21 @ 12:25)  WBC Count: 9.68 K/uL (06-24-21 @ 03:19)  WBC Count: 8.93 K/uL (06-23-21 @ 14:24)  WBC Count: 10.50 K/uL (06-23-21 @ 06:05)  WBC Count: 8.73 K/uL (06-22-21 @ 20:14)    Creatinine, Serum: 1.51 mg/dL (06-27-21 @ 00:35)  Creatinine, Serum: 1.96 mg/dL (06-26-21 @ 16:52)  Creatinine, Serum: 2.55 mg/dL (06-26-21 @ 05:45)  Creatinine, Serum: 3.32 mg/dL (06-25-21 @ 23:38)  Creatinine, Serum: 3.97 mg/dL (06-25-21 @ 17:42)  Creatinine, Serum: 5.22 mg/dL (06-25-21 @ 09:53)  Creatinine, Serum: 5.05 mg/dL (06-25-21 @ 02:40)  Creatinine, Serum: 5.00 mg/dL (06-24-21 @ 12:25)  Creatinine, Serum: 4.77 mg/dL (06-24-21 @ 03:19)  Creatinine, Serum: 4.38 mg/dL (06-23-21 @ 14:24)  Creatinine, Serum: 4.41 mg/dL (06-23-21 @ 06:05)  Creatinine, Serum: 3.13 mg/dL (06-22-21 @ 20:14)    Procalcitonin, Serum: 0.24 ng/mL (06-17-21 @ 09:12)     COVID-19 PCR: NotDetec (06-22-21 @ 13:29)  COVID-19 PCR: NotDetec (06-18-21 @ 08:13)

## 2021-07-02 NOTE — PROGRESS NOTE ADULT - ASSESSMENT
76 year old male patient with a medical history of MI in 1995 (angiogram, no stents placed), COPD (2L O2 at home), s/p TAVR (03/2019 at [facility]), gout, CKD, CVA (09/2020), pulmonary HTN, initially presented to [facility] 6/1/21 with RONNELL on CKD (likely cardiorenal syndrome), urinary retention due to noncompliance with medications, with hospital course complicated by cardiogenic shock, acute hypoxemic respiratory failure secondary to metabolic acidosis and respiratory alkalosis. Patient found to have TAVR failure with ISRAEL 6/10/21 showing EF 40-45%, Aortic valve prosthesis with Severe paravalvular leak and valvular AI, mild-moderate aortic stenosis, and moderate MR. Patient was transferred to [facility] under Dr. Campbell for further workup.   ISRAEL DONE HERE WITH CONCERN FOR ENDOCARDITIS  PT DENIES FEVERS OR SWEATS  BUT HAS HAD SIGNIFICANT WEIGHT LOSS WITHOUT DIETING  PT HAS HX OF STREP BOVIS  BACTEREMIA   LAST YEAR AND RECEIVED   ROCEPHIN AT HOME FOR 6 WEEKS     s/p COLONOSCOPY     Blood cultures from admission remain negative      initial blood cultures being held for14 days.       s/p TAVR on 6/23/2021    Started on IV Vancomycin and Ceftriaxone by CT ICU 6/26  Blood cultures repeated    ALL  cultures remain negative,     S/P BRONCH SPECIMENS SENT     On ZOSYN TO COVER FOR ? Pneumonia  DIFFICULT TO DISTINGUISH ON CXR BUT   ID Recommending to  TREAT SHORT COURSE 5 DAYS IV ABX    Plan for tunneled catheter placement today with HD to be done after

## 2021-07-02 NOTE — PROGRESS NOTE ADULT - ASSESSMENT
76 year old male patient with a medical history of MI in 1995 (angiogram, no stents placed), COPD (2L O2 at home), s/p TAVR (03/2019 at [facility]), gout, CKD, CVA (09/2020), pulmonary HTN, initially presented to [facility] 6/1/21 with RONNELL on CKD (likely cardiorenal syndrome), urinary retention due to noncompliance with medications, with hospital course complicated by cardiogenic shock, acute hypoxemic respiratory failure secondary to metabolic acidosis and respiratory alkalosis. Patient found to have TAVR failure with ISRAEL 6/10/21 showing EF 40-45%, Aortic valve prosthesis with Severe paravalvular leak and valvular AI, mild-moderate aortic stenosis, and moderate MR. Patient was transferred to [facility] under Dr. Campbell for further workup.   ISRAEL DONE HERE WITH CONCERN FOR ENDOCARDITIS  PT DENIES FEVERS OR SWEATS  BUT HAS HAD SIGNIFICANT WEIGHT LOSS WITHOUT DIETING  PT HAS HX OF STREP BOVIS  BACTEREMIA   LAST YEAR AND RECEIVED   ROCEPHIN AT HOME FOR 6 WEEKS        s/p COLONOSCOPY     Blood cultures from admission remain negative      initial blood cultures being held for14 days.       s/p TAVR on 6/23/2021    Started on IV Vancomycin and Ceftriaxone by CT ICU 6/26  Blood cultures repeated     ALL  cultures remain negative,   PT WITH THICK SECRETIONS  S/P BRONCH SPECIMENS SENT      ZOSYN TO COVER FOR ? Pneumonia  DIFFICULT TO DISTINGUISH ON CXR BUT    COMPLETED A COURSE 5 DAYS IV ABX  WILL FOLLOW UP  AS NEEDED PLEASE CALL IF QUESTIONS

## 2021-07-02 NOTE — PROGRESS NOTE ADULT - SUBJECTIVE AND OBJECTIVE BOX
Subjective:  75yo M resting comfortable, no c/o pain.       HPI:  76 year old male patient with a medical history of MI in 1995 (angiogram, no stents placed), COPD (2L O2 at home), s/p TAVR (03/2019 at [facility]), gout, CKD, CVA (09/2020), pulmonary HTN, initially presented to [facility] 6/1/21 with RONNELL on CKD (likely cardiorenal syndrome), urinary retention due to noncompliance with medications, with hospital course complicated by cardiogenic shock, acute hypoxemic respiratory failure secondary to metabolic acidosis and respiratory alkalosis. Patient found to have TAVR failure with ISRAEL 6/10/21 showing EF 40-45%, Aortic valve prosthesis with Severe paravalvular leak and valvular AI, mild-moderate aortic stenosis, and moderate MR. Patient was transferred to [facility] under Dr. Campbell for further workup.     Imaging from [facility]:  6/1: CXR shwoing cardiomegaly, small b/l pleural effusions, moderate pulmonary vascular congestion.  6/1: CT Brain ordered for head trauma? showing age-related cerebral and cerebellar volume loss, mild chronic vascular ischemic changes, stable biparietal arachnoid cyst and stable midline posterior fossa arachnoid cyst.   6/1: US Abdomen for elevated LFTs showing mild hepatic steatosis, mild hepatomegaly, sludge in the gallbladder, no discrete gallstones, normal biliary ducts, mild echogenic right kidney which may be seen with medical renal dz, mildly complex Right upper pole 4cm cyst with subtle internal echoes may represent hemorrhagic/proteinaceous cyst, mildly complex right midpole 2cm cyst with thin internal linear septation.   6/3: Kidney and Bladder US showing no hydronephrosis, echogenic kidneys likely from medical renal dz, prostatomegaly, and thickening of the posterior wall of the bladder with trabeculations which could be due to bladder outlet obstruction.   6/7: Kidney and Bladder US: No hydronephrosis or shadowing renal calculi. Stable b/l renal cysts.   6/7: Lower Extremity Venous Doppler with no DVT noted.   6/8: TTE showing EF 47%, dilated IVC, dilation of the ascending aorta of 4.4cm, moderate-severe pulmonary HTN, moderate-severe TR, aortic valve with severe prosthesis regurgitation and moderate prosthesis stenosis  6/9: CT Chest showing emphysema and intersitial lung dz changes, stable b/l small pleural effusions, cardiomegaly.   6/11: ISRAEL showing EF 40-45%, Aortic valve prosthesis with Severe paravalvular leak and valvular AI, mild-moderate aortic stenosis, and moderate MR.  (12 Jun 2021 01:36)          PAST MEDICAL & SURGICAL HISTORY:  Pulmonary hypertension    Hypertension    Hyperlipidemia    H/O aortic valve stenosis  s/p TAVR 2019 at [facility]    CVA (cerebrovascular accident)  MCA CVA with tPA and thrombectomy    Chronic kidney disease    Prediabetes    Mitral valve regurgitation    CAD in native artery    Aneurysm of aortic root  thoracic aortic aneurysm, without ruptur    Benign prostatic hyperplasia    Gout    History of transcatheter aortic valve replacement (TAVR)            MEDICATIONS  (STANDING):  aMIOdarone    Tablet 400 milliGRAM(s) Oral every 8 hours  aMIOdarone    Tablet 200 milliGRAM(s) Oral daily  aMIOdarone    Tablet   Oral   ascorbic acid 500 milliGRAM(s) Oral daily  aspirin  chewable 81 milliGRAM(s) Oral daily  atorvastatin 10 milliGRAM(s) Oral at bedtime  budesonide 160 MICROgram(s)/formoterol 4.5 MICROgram(s) Inhaler 2 Puff(s) Inhalation two times a day  chlorhexidine 2% Cloths 1 Application(s) Topical daily  ferrous    sulfate 325 milliGRAM(s) Oral at bedtime  folic acid 1 milliGRAM(s) Oral daily  heparin   Injectable 5000 Unit(s) SubCutaneous every 12 hours  melatonin 5 milliGRAM(s) Oral at bedtime  midodrine 10 milliGRAM(s) Oral every 8 hours  mirtazapine 15 milliGRAM(s) Oral daily  Nephro-melissa 1 Tablet(s) Oral daily  pantoprazole   Suspension 40 milliGRAM(s) Oral every 12 hours  psyllium Powder 1 Packet(s) Oral two times a day  senna 2 Tablet(s) Oral at bedtime  sodium chloride 0.9%. 1000 milliLiter(s) (10 mL/Hr) IV Continuous <Continuous>  sodium chloride 0.9%. 1000 milliLiter(s) (5 mL/Hr) IV Continuous <Continuous>  tamsulosin 0.4 milliGRAM(s) Oral at bedtime    MEDICATIONS  (PRN):  ALBUTerol    90 MICROgram(s) HFA Inhaler 2 Puff(s) Inhalation every 6 hours PRN Shortness of Breath and/or Wheezing  sodium chloride 0.9% lock flush 10 milliLiter(s) IV Push every 1 hour PRN Pre/post blood products, medications, blood draw, and to maintain line patency          Allergies    No Known Drug Allergies  strawberry (Anaphylaxis)    Intolerances          WEIGHTS:  Daily     Daily   Admit Wt: Drug Dosing Weight  Height (cm): 182.9 (12 Jun 2021 00:32)  Weight (kg): 100 (29 Jun 2021 08:12)  BMI (kg/m2): 29.9 (29 Jun 2021 08:12)  BSA (m2): 2.22 (29 Jun 2021 08:12)  I&O's Summary    30 Jun 2021 07:01  -  01 Jul 2021 07:00  --------------------------------------------------------  IN: 1128.5 mL / OUT: 435 mL / NET: 693.5 mL    01 Jul 2021 07:01  -  02 Jul 2021 03:43  --------------------------------------------------------  IN: 664.5 mL / OUT: 80 mL / NET: 584.5 mL        VITAL SIGNS:  ICU Vital Signs Last 24 Hrs  T(C): 36 (02 Jul 2021 03:00), Max: 37.8 (01 Jul 2021 04:00)  T(F): 96.8 (02 Jul 2021 03:00), Max: 100 (01 Jul 2021 04:00)  HR: 90 (02 Jul 2021 03:00) (85 - 98)  BP: 117/69 (01 Jul 2021 04:00) (117/69 - 117/69)  BP(mean): 88 (01 Jul 2021 04:00) (88 - 88)  ABP: 104/50 (02 Jul 2021 03:00) (101/46 - 151/74)  ABP(mean): 68 (02 Jul 2021 03:00) (64 - 97)  RR: 14 (02 Jul 2021 03:00) (14 - 27)  SpO2: 95% (02 Jul 2021 03:00) (91% - 100%)        All laboratory results, radiology and medications reviewed.    LABS:  07-02    139  |  102  |  25.3<H>  ----------------------------<  87  3.8   |  22.0  |  2.75<H>    Ca    8.5<L>      02 Jul 2021 02:59  Phos  2.9     07-02  Mg     2.0     07-02    TPro  5.9<L>  /  Alb  3.0<L>  /  TBili  2.0  /  DBili  x   /  AST  39  /  ALT  34  /  AlkPhos  102  07-02                                 8.6    8.38  )-----------( 149      ( 02 Jul 2021 02:59 )             26.6          PT/INR - ( 02 Jul 2021 02:59 )   PT: 16.8 sec;   INR: 1.48 ratio         PTT - ( 01 Jul 2021 02:58 )  PTT:35.5 sec  Bilirubin Total, Serum: 2.0 mg/dL (07-02 @ 02:59)    ABG - ( 01 Jul 2021 02:30 )  pH, Arterial: 7.520 pH, Blood: x     /  pCO2: 31    /  pO2: 62    / HCO3: 25    / Base Excess: 2.4   /  SaO2: 96.5                  CAPILLARY BLOOD GLUCOSE      POCT Blood Glucose.: 90 mg/dL (01 Jul 2021 17:04)  POCT Blood Glucose.: 125 mg/dL (01 Jul 2021 06:02)             PHYSICAL EXAM:  General:   no acute distress  Neurology:  alert and oriented X 4, nonfocal, no gross deficits  Respiratory:  clear to auscultation bilaterally  CV:  regular rate and rhythm, normal S1 S2  Abdomen:  soft, nontender, nondistended, positive bowel sounds  Extremities:  warm, well perfused, trace edema +DP pulses

## 2021-07-02 NOTE — PROGRESS NOTE ADULT - ASSESSMENT
76M h/o CAD MI in 1995 (angiogram, no stents placed), COPD (2L O2 at home), s/p TAVR (03/2019 at [facility]), gout, CKD, CVA (09/2020), pulmonary HTN, initially presented to [facility] 6/1/21 with RONNELL on CKD (likely cardiorenal syndrome), urinary retention due to noncompliance with medications, with hospital course complicated by cardiogenic shock, acute hypoxemic respiratory failure secondary to metabolic acidosis and respiratory alkalosis. Patient found to have TAVR failure with ISRAEL 6/10/21 showing initial EF 40-45%, Aortic valve prosthesis with Severe paravalvular leak and valvular AI, mild-moderate aortic stenosis, and moderate MR. Decompensated HF and severe pulmonary hypertension, underwent valve-in-valve TAVR 6/23/21, course complicated by worsening ADHF/cardiogenic shock with VT, intubated and RONNELL on CKD required CVVHD, repeat TTE with worsening LV EF 20-25%.     Cardiogenic shock, AD-biV failure  - Weaned off epinephrine and milrinone.   - On midodrine, try to wean off or switch to PRN.   - Continue HD for fluid removal.   - Try to start GDMT this weekend when BP allows.   - Strict Is and Os.   - Repeat echo next week to reassess worsening EF   - No further VT on telemetry.       TAVR failure s/p Amy  -Continue ASA 81mg    VT  -on amiodarone  -Monitor for Afib, currently SR 1st degree with PACs  - Patient may need ICD vs. lifevest upon discharge.     RONNELL on CKD, oligouria  - Restarting HD.    Respiratory failure, COPD  -pulmonary toilet, BiPAP as needed    Assessment and recommendations are final when note is signed by the attending.

## 2021-07-02 NOTE — PROGRESS NOTE ADULT - ATTENDING COMMENTS
-currently on midodrine and await BP response on HD, consider weaning off if tolerate to add GDMT  -continue Amiodarone for VT, given recent drop in EF/NICM, endocarditis and now on HD not ideal for transvenous AICD yet, consider LifeVest on discharge  -remains with sinus 1st degree/PACs, but at risk for Afib development.   -repeat TTE next week to reassess LVEF        Taz Carter DO, [facility]  Faculty Non-Invasive Cardiologist  [phone]

## 2021-07-02 NOTE — PROGRESS NOTE ADULT - PROBLEM SELECTOR PLAN 1
Biventricular failure, LVEF 20-25%, ACC/AHA stage C, NYHA class III  Appears euvolemic on exam, lukewarm extremities  Keep net even volume status  Strict I&O monitoring  Milrinone @ 0.125mcg/kg/min. Recommend weaning as tolerated  Continue to monitor CVP via RIJ central line. Check VBG tomorrow am.   Recommend reducing midodrine/PRN usage as this will increase afterload.  Eventually resume GDMT if BP allows. Biventricular failure, LVEF 20-25%, ACC/AHA stage C, NYHA class III  Appears mildly hypervolemic on exam, lukewarm extremities  Inotropes/vasopressors: off. SVO2 70%  Will need fluid removal via HD. Can consider diuretics trial if and when nephrology agrees.   Strict I&O monitoring  Continue to monitor CVP via RIJ central line.   Recommend reducing midodrine/PRN usage as this will increase afterload.  Eventually resume GDMT if BP allows.

## 2021-07-02 NOTE — PROGRESS NOTE ADULT - SUBJECTIVE AND OBJECTIVE BOX
Subjective:      Medications:  ALBUTerol    90 MICROgram(s) HFA Inhaler 2 Puff(s) Inhalation every 6 hours PRN  aMIOdarone    Tablet 200 milliGRAM(s) Oral daily  aMIOdarone    Tablet   Oral   ascorbic acid 500 milliGRAM(s) Oral daily  aspirin  chewable 81 milliGRAM(s) Oral daily  atorvastatin 10 milliGRAM(s) Oral at bedtime  budesonide 160 MICROgram(s)/formoterol 4.5 MICROgram(s) Inhaler 2 Puff(s) Inhalation two times a day  chlorhexidine 2% Cloths 1 Application(s) Topical daily  ferrous    sulfate 325 milliGRAM(s) Oral at bedtime  folic acid 1 milliGRAM(s) Oral daily  heparin   Injectable 5000 Unit(s) SubCutaneous every 12 hours  melatonin 5 milliGRAM(s) Oral at bedtime  midodrine 10 milliGRAM(s) Oral every 8 hours  mirtazapine 15 milliGRAM(s) Oral daily  Nephro-melissa 1 Tablet(s) Oral daily  pantoprazole   Suspension 40 milliGRAM(s) Oral every 12 hours  psyllium Powder 1 Packet(s) Oral two times a day  senna 2 Tablet(s) Oral at bedtime  sodium chloride 0.9% lock flush 10 milliLiter(s) IV Push every 1 hour PRN  sodium chloride 0.9%. 1000 milliLiter(s) IV Continuous <Continuous>  sodium chloride 0.9%. 1000 milliLiter(s) IV Continuous <Continuous>  tamsulosin 0.4 milliGRAM(s) Oral at bedtime    Vitals:  T(C): 37.2 (07-02-21 @ 07:00), Max: 37.7 (07-01-21 @ 10:00)  HR: 92 (07-02-21 @ 07:00) (85 - 95)  BP: --  BP(mean): --  RR: 23 (07-02-21 @ 07:00) (14 - 27)  SpO2: 98% (07-02-21 @ 07:00) (90% - 100%)    Daily     Daily         I&O's Summary    01 Jul 2021 07:01  -  02 Jul 2021 07:00  --------------------------------------------------------  IN: 694.5 mL / OUT: 120 mL / NET: 574.5 mL        Physical Exam:  Appearance: No Acute Distress  HEENT: JVP non elevated  Cardiovascular: RRR, Normal S1 S2, No murmurs/rubs/gallops  Respiratory: Clear to auscultation bilaterally  Gastrointestinal: Soft, Non-tender, non-distended	  Skin: no skin lesions  Neurologic: Non-focal  Extremities: No LE edema, warm and well perfused  Psychiatry: A & O x 3, Mood & affect appropriate      Labs:                        8.6    8.38  )-----------( 149      ( 02 Jul 2021 02:59 )             26.6     07-02    139  |  102  |  25.3<H>  ----------------------------<  87  3.8   |  22.0  |  2.75<H>    Ca    8.5<L>      02 Jul 2021 02:59  Phos  2.9     07-02  Mg     2.0     07-02    TPro  5.9<L>  /  Alb  3.0<L>  /  TBili  2.0  /  DBili  x   /  AST  39  /  ALT  34  /  AlkPhos  102  07-02      PT/INR - ( 02 Jul 2021 02:59 )   PT: 16.8 sec;   INR: 1.48 ratio         PTT - ( 01 Jul 2021 02:58 )  PTT:35.5 sec               Subjective:  Feels well. No acute events overnight.    Medications:  ALBUTerol    90 MICROgram(s) HFA Inhaler 2 Puff(s) Inhalation every 6 hours PRN  aMIOdarone    Tablet 200 milliGRAM(s) Oral daily  aMIOdarone    Tablet   Oral   ascorbic acid 500 milliGRAM(s) Oral daily  aspirin  chewable 81 milliGRAM(s) Oral daily  atorvastatin 10 milliGRAM(s) Oral at bedtime  budesonide 160 MICROgram(s)/formoterol 4.5 MICROgram(s) Inhaler 2 Puff(s) Inhalation two times a day  chlorhexidine 2% Cloths 1 Application(s) Topical daily  ferrous    sulfate 325 milliGRAM(s) Oral at bedtime  folic acid 1 milliGRAM(s) Oral daily  heparin   Injectable 5000 Unit(s) SubCutaneous every 12 hours  melatonin 5 milliGRAM(s) Oral at bedtime  midodrine 10 milliGRAM(s) Oral every 8 hours  mirtazapine 15 milliGRAM(s) Oral daily  Nephro-melissa 1 Tablet(s) Oral daily  pantoprazole   Suspension 40 milliGRAM(s) Oral every 12 hours  psyllium Powder 1 Packet(s) Oral two times a day  senna 2 Tablet(s) Oral at bedtime  sodium chloride 0.9% lock flush 10 milliLiter(s) IV Push every 1 hour PRN  sodium chloride 0.9%. 1000 milliLiter(s) IV Continuous <Continuous>  sodium chloride 0.9%. 1000 milliLiter(s) IV Continuous <Continuous>  tamsulosin 0.4 milliGRAM(s) Oral at bedtime    Vitals:  T(C): 37.2 (07-02-21 @ 07:00), Max: 37.7 (07-01-21 @ 10:00)  HR: 92 (07-02-21 @ 07:00) (85 - 95)  BP: --  BP(mean): --  RR: 23 (07-02-21 @ 07:00) (14 - 27)  SpO2: 98% (07-02-21 @ 07:00) (90% - 100%)    Daily     Daily         I&O's Summary    01 Jul 2021 07:01  -  02 Jul 2021 07:00  --------------------------------------------------------  IN: 694.5 mL / OUT: 120 mL / NET: 574.5 mL        Physical Exam:  Appearance: No Acute Distress  HEENT: JVP 7cm  Cardiovascular: RRR, Normal S1 S2, No murmurs/rubs/gallops  Respiratory: decreased at the bases  Gastrointestinal: Soft, Non-tender, non-distended	  Skin: no skin lesions  Neurologic: Non-focal  Extremities: +1 ankle edema b/l  Psychiatry: A & O x 3, Mood & affect appropriate      Labs:                        8.6    8.38  )-----------( 149      ( 02 Jul 2021 02:59 )             26.6     07-02    139  |  102  |  25.3<H>  ----------------------------<  87  3.8   |  22.0  |  2.75<H>    Ca    8.5<L>      02 Jul 2021 02:59  Phos  2.9     07-02  Mg     2.0     07-02    TPro  5.9<L>  /  Alb  3.0<L>  /  TBili  2.0  /  DBili  x   /  AST  39  /  ALT  34  /  AlkPhos  102  07-02      PT/INR - ( 02 Jul 2021 02:59 )   PT: 16.8 sec;   INR: 1.48 ratio         PTT - ( 01 Jul 2021 02:58 )  PTT:35.5 sec

## 2021-07-03 LAB
ANION GAP SERPL CALC-SCNC: 14 MMOL/L — SIGNIFICANT CHANGE UP (ref 5–17)
BUN SERPL-MCNC: 17.2 MG/DL — SIGNIFICANT CHANGE UP (ref 8–20)
CALCIUM SERPL-MCNC: 9 MG/DL — SIGNIFICANT CHANGE UP (ref 8.6–10.2)
CHLORIDE SERPL-SCNC: 102 MMOL/L — SIGNIFICANT CHANGE UP (ref 98–107)
CO2 SERPL-SCNC: 24 MMOL/L — SIGNIFICANT CHANGE UP (ref 22–29)
CREAT SERPL-MCNC: 2.21 MG/DL — HIGH (ref 0.5–1.3)
GLUCOSE SERPL-MCNC: 78 MG/DL — SIGNIFICANT CHANGE UP (ref 70–99)
HCT VFR BLD CALC: 28.4 % — LOW (ref 39–50)
HGB BLD-MCNC: 9.3 G/DL — LOW (ref 13–17)
MAGNESIUM SERPL-MCNC: 2 MG/DL — SIGNIFICANT CHANGE UP (ref 1.6–2.6)
MCHC RBC-ENTMCNC: 31.2 PG — SIGNIFICANT CHANGE UP (ref 27–34)
MCHC RBC-ENTMCNC: 32.7 GM/DL — SIGNIFICANT CHANGE UP (ref 32–36)
MCV RBC AUTO: 95.3 FL — SIGNIFICANT CHANGE UP (ref 80–100)
PHOSPHATE SERPL-MCNC: 2.1 MG/DL — LOW (ref 2.4–4.7)
PLATELET # BLD AUTO: 171 K/UL — SIGNIFICANT CHANGE UP (ref 150–400)
POTASSIUM SERPL-MCNC: 4.2 MMOL/L — SIGNIFICANT CHANGE UP (ref 3.5–5.3)
POTASSIUM SERPL-SCNC: 4.2 MMOL/L — SIGNIFICANT CHANGE UP (ref 3.5–5.3)
RBC # BLD: 2.98 M/UL — LOW (ref 4.2–5.8)
RBC # FLD: 21.4 % — HIGH (ref 10.3–14.5)
SODIUM SERPL-SCNC: 139 MMOL/L — SIGNIFICANT CHANGE UP (ref 135–145)
WBC # BLD: 8.27 K/UL — SIGNIFICANT CHANGE UP (ref 3.8–10.5)
WBC # FLD AUTO: 8.27 K/UL — SIGNIFICANT CHANGE UP (ref 3.8–10.5)

## 2021-07-03 PROCEDURE — 99232 SBSQ HOSP IP/OBS MODERATE 35: CPT

## 2021-07-03 PROCEDURE — 99291 CRITICAL CARE FIRST HOUR: CPT

## 2021-07-03 PROCEDURE — 99233 SBSQ HOSP IP/OBS HIGH 50: CPT

## 2021-07-03 PROCEDURE — 71045 X-RAY EXAM CHEST 1 VIEW: CPT | Mod: 26

## 2021-07-03 RX ORDER — MIDODRINE HYDROCHLORIDE 2.5 MG/1
10 TABLET ORAL EVERY 8 HOURS
Refills: 0 | Status: DISCONTINUED | OUTPATIENT
Start: 2021-07-03 | End: 2021-07-07

## 2021-07-03 RX ORDER — CARVEDILOL PHOSPHATE 80 MG/1
3.12 CAPSULE, EXTENDED RELEASE ORAL EVERY 12 HOURS
Refills: 0 | Status: DISCONTINUED | OUTPATIENT
Start: 2021-07-03 | End: 2021-07-10

## 2021-07-03 RX ADMIN — MIRTAZAPINE 15 MILLIGRAM(S): 45 TABLET, ORALLY DISINTEGRATING ORAL at 13:57

## 2021-07-03 RX ADMIN — CARVEDILOL PHOSPHATE 3.12 MILLIGRAM(S): 80 CAPSULE, EXTENDED RELEASE ORAL at 18:41

## 2021-07-03 RX ADMIN — Medication 650 MILLIGRAM(S): at 02:34

## 2021-07-03 RX ADMIN — Medication 1 MILLIGRAM(S): at 13:57

## 2021-07-03 RX ADMIN — Medication 500 MILLIGRAM(S): at 13:58

## 2021-07-03 RX ADMIN — BUDESONIDE AND FORMOTEROL FUMARATE DIHYDRATE 2 PUFF(S): 160; 4.5 AEROSOL RESPIRATORY (INHALATION) at 19:58

## 2021-07-03 RX ADMIN — HEPARIN SODIUM 5000 UNIT(S): 5000 INJECTION INTRAVENOUS; SUBCUTANEOUS at 17:22

## 2021-07-03 RX ADMIN — Medication 1 PACKET(S): at 17:22

## 2021-07-03 RX ADMIN — CHLORHEXIDINE GLUCONATE 1 APPLICATION(S): 213 SOLUTION TOPICAL at 13:58

## 2021-07-03 RX ADMIN — Medication 81 MILLIGRAM(S): at 13:58

## 2021-07-03 RX ADMIN — AMIODARONE HYDROCHLORIDE 200 MILLIGRAM(S): 400 TABLET ORAL at 08:27

## 2021-07-03 RX ADMIN — Medication 5 MILLIGRAM(S): at 23:01

## 2021-07-03 RX ADMIN — ATORVASTATIN CALCIUM 10 MILLIGRAM(S): 80 TABLET, FILM COATED ORAL at 23:01

## 2021-07-03 RX ADMIN — SENNA PLUS 2 TABLET(S): 8.6 TABLET ORAL at 23:01

## 2021-07-03 RX ADMIN — BUDESONIDE AND FORMOTEROL FUMARATE DIHYDRATE 2 PUFF(S): 160; 4.5 AEROSOL RESPIRATORY (INHALATION) at 08:44

## 2021-07-03 RX ADMIN — Medication 1 TABLET(S): at 13:58

## 2021-07-03 RX ADMIN — PANTOPRAZOLE SODIUM 40 MILLIGRAM(S): 20 TABLET, DELAYED RELEASE ORAL at 17:22

## 2021-07-03 RX ADMIN — TAMSULOSIN HYDROCHLORIDE 0.4 MILLIGRAM(S): 0.4 CAPSULE ORAL at 23:01

## 2021-07-03 RX ADMIN — Medication 650 MILLIGRAM(S): at 04:04

## 2021-07-03 NOTE — PROGRESS NOTE ADULT - ASSESSMENT
76M h/o CAD MI in 1995 (angiogram, no stents placed), COPD (2L O2 at home), s/p TAVR (03/2019 at [facility]), gout, CKD, CVA (09/2020), pulmonary HTN, initially presented to [facility] 6/1/21 with RONNELL on CKD (likely cardiorenal syndrome), urinary retention due to noncompliance with medications, with hospital course complicated by cardiogenic shock, acute hypoxemic respiratory failure secondary to metabolic acidosis and respiratory alkalosis. Patient found to have TAVR failure with ISRAEL 6/10/21 showing initial EF 40-45%, Aortic valve prosthesis with Severe paravalvular leak and valvular AI, mild-moderate aortic stenosis, and moderate MR. Decompensated HF and severe pulmonary hypertension, underwent valve-in-valve TAVR 6/23/21, course complicated by worsening ADHF/cardiogenic shock with VT, intubated and RONNELL on CKD required CVVHD, repeat TTE with worsening LV EF 20-25%.     7/3: Pt denies chest pain, palpitations, SOB. Tele- NSR HR @ 80-90s. Pt weaned off IV pressors. Midodrine PRN. Continue HD for fluid removal. Lopressor 25mg BID for GDMT. Patient may need ICD vs. lifevest upon discharge. Repeat echo next week to re-assess worsening EF.     Cardiogenic shock, AD-Bi-V failure  - Pt weaned off IV pressor  - Midodrine PRN  - Continue HD for fluid removal  - Consider starting Lopressor 25mg BID for GDMT  - Strict Is and Os.   - Repeat echo next week to reassess worsening EF   - No further VT on telemetry    TAVR failure   -s/p Amy  -Continue ASA 81mg    VT  -on amiodarone  -No further VT on telemetry  -Tele-NSR HR @ 80-90s  -Patient may need ICD vs. lifevest upon discharge     RONNELL on CKD  -oligouria  - Cont. HD    Respiratory Failure  -COPD  -pulmonary toilet, BiPAP as needed

## 2021-07-03 NOTE — PROGRESS NOTE ADULT - PROBLEM SELECTOR PLAN 6
Initially RONNELL on CKD  Now progressed to ESRD requiring HD  new RIJ tunnel HD cath placed 7/2 by AIRAM ARCHULETA has pink band for preservation in anticipation for eventual AVF  Renal team following.  CVVHD started 6/25, now tolerating HD M/W/F

## 2021-07-03 NOTE — PROGRESS NOTE ADULT - SUBJECTIVE AND OBJECTIVE BOX
[facility] DIVISION OF KIDNEY DISEASES AND HYPERTENSION -- HEMODIALYSIS NOTE  --------------------------------------------------------------------------------  Chief Complaint: ESRD/Ongoing hemodialysis requirement    24 hour events/subjective:  s/p removal of non tunneled HD cathter and insertion of tunneled HD catheter  Got HD with 0 UF        PAST HISTORY  --------------------------------------------------------------------------------  No significant changes to PMH, PSH, FHx, SHx, unless otherwise noted    ALLERGIES & MEDICATIONS  --------------------------------------------------------------------------------  Allergies    No Known Drug Allergies  strawberry (Anaphylaxis)    Intolerances      Standing Inpatient Medications  aMIOdarone    Tablet 200 milliGRAM(s) Oral daily  aMIOdarone    Tablet   Oral   ascorbic acid 500 milliGRAM(s) Oral daily  aspirin  chewable 81 milliGRAM(s) Oral daily  atorvastatin 10 milliGRAM(s) Oral at bedtime  budesonide 160 MICROgram(s)/formoterol 4.5 MICROgram(s) Inhaler 2 Puff(s) Inhalation two times a day  chlorhexidine 2% Cloths 1 Application(s) Topical daily  chlorhexidine 4% Liquid 1 Application(s) Topical <User Schedule>  epoetin yolanda-epbx (RETACRIT) Injectable 33935 Unit(s) IV Push <User Schedule>  folic acid 1 milliGRAM(s) Oral daily  heparin   Injectable 5000 Unit(s) SubCutaneous every 12 hours  iron sucrose IVPB 50 milliGRAM(s) IV Intermittent <User Schedule>  megestrol Suspension 400 milliGRAM(s) Oral daily  melatonin 5 milliGRAM(s) Oral at bedtime  mirtazapine 15 milliGRAM(s) Oral daily  Nephro-melissa 1 Tablet(s) Oral daily  pantoprazole   Suspension 40 milliGRAM(s) Oral every 12 hours  psyllium Powder 1 Packet(s) Oral two times a day  senna 2 Tablet(s) Oral at bedtime  sodium chloride 0.9%. 1000 milliLiter(s) IV Continuous <Continuous>  sodium chloride 0.9%. 1000 milliLiter(s) IV Continuous <Continuous>  tamsulosin 0.4 milliGRAM(s) Oral at bedtime    PRN Inpatient Medications  acetaminophen   Tablet .. 650 milliGRAM(s) Oral every 6 hours PRN  ALBUTerol    90 MICROgram(s) HFA Inhaler 2 Puff(s) Inhalation every 6 hours PRN  midodrine 10 milliGRAM(s) Oral every 8 hours PRN  sodium chloride 0.9% lock flush 10 milliLiter(s) IV Push every 1 hour PRN      REVIEW OF SYSTEMS  --------------------------------------------------------------------------------  Gen: No weight changes, fatigue, fevers/chills, weakness  Skin: No rashes  Head/Eyes/Ears/Mouth: No headache  Respiratory: No dyspnea, cough,  CV: No chest pain, orthopnea  GI: No abdominal pain, diarrhea, constipation, nausea, vomiting,  MSK: No joint pain  Neuro: No dizziness/lightheadedness, weakness  Heme: No bleeding  Psych: No significant nervousness, anxiety, stress, depression    All other systems were reviewed and are negative, except as noted.    VITALS/PHYSICAL EXAM  --------------------------------------------------------------------------------  T(C): 37 (07-03-21 @ 08:00), Max: 37 (07-02-21 @ 20:00)  HR: 84 (07-03-21 @ 12:00) (83 - 100)  BP: --  RR: 19 (07-03-21 @ 12:00) (17 - 22)  SpO2: 100% (07-03-21 @ 12:00) (92% - 100%)  Wt(kg): --        07-02-21 @ 07:01  -  07-03-21 @ 07:00  --------------------------------------------------------  IN: 220 mL / OUT: 135 mL / NET: 85 mL    07-03-21 @ 07:01  -  07-03-21 @ 13:00  --------------------------------------------------------  IN: 150 mL / OUT: 15 mL / NET: 135 mL      Physical Exam:  	Gen: NAD,  	HEENT: PERRL, supple neck,  	Pulm: CTA B/L  	CV: RRR, S1S2; no rub  	Abd: +BS, soft, nontender  	UE: Warm, intact strength; no asterixis  	LE: Warm, + edema  	Neuro: No focal deficits  	Psych: Normal affect and mood  	Skin: Warm, without rashes  	Vascular access: TDC    LABS/STUDIES  --------------------------------------------------------------------------------              9.3    8.27  >-----------<  171      [07-03-21 @ 03:10]              28.4     139  |  102  |  17.2  ----------------------------<  78      [07-03-21 @ 03:10]  4.2   |  24.0  |  2.21        Ca     9.0     [07-03-21 @ 03:10]      Mg     2.0     [07-03-21 @ 03:10]      Phos  2.1     [07-03-21 @ 03:10]    TPro  5.9  /  Alb  3.0  /  TBili  2.0  /  DBili  x   /  AST  39  /  ALT  34  /  AlkPhos  102  [07-02-21 @ 02:59]    PT/INR: PT 16.8 , INR 1.48       [07-02-21 @ 02:59]      TSH 2.31      [06-12-21 @ 01:55]    HBsAb <3.0      [06-17-21 @ 05:13]  HBsAg Nonreact      [06-17-21 @ 05:13]  HBcAb Nonreact      [06-17-21 @ 05:13]  HCV 0.15, Nonreact      [06-17-21 @ 05:13]

## 2021-07-03 NOTE — PROGRESS NOTE ADULT - ASSESSMENT
Álvaro on CKD  Cardiogenic shock,-Bi-V failure  TAVR failure   Anemia    HD- MWF schedule  UF as tolerated with aid of midodrine  TED with HD

## 2021-07-03 NOTE — PROGRESS NOTE ADULT - PROBLEM SELECTOR PLAN 10
Wean to RA as tolerated    Problem 11: Adjustment Disorder   Remeron resumed    Problem 12: COPD  On home O2

## 2021-07-03 NOTE — PROGRESS NOTE ADULT - SUBJECTIVE AND OBJECTIVE BOX
[facility]                                                               Office: [address] [address]                                                              Telephone: [phone]. Fax:[phone]                                                                             PROGRESS NOTE  Reason for follow up: TAVR Failure  Overnight: No new events.   Update: 7/3: Pt denies chest pain, palpitations, SOB. Tele- NSR HR @ 80-90s. Pt weaned off IV pressors. Midodrine PRN. Continue HD for fluid removal. Lopressor 25mg BID for GDMT. Patient may need ICD vs. lifevest upon discharge. Repeat echo next week to re-assess worsening EF.         Subjective: No new events    	  Vitals:  T(C): 37 (07-03-21 @ 08:00), Max: 37.1 (07-02-21 @ 13:00)  HR: 100 (07-03-21 @ 09:00) (83 - 100)  RR: 18 (07-03-21 @ 09:00) (17 - 22)  SpO2: 96% (07-03-21 @ 09:00) (92% - 100%)    I&O's Summary    02 Jul 2021 07:01  -  03 Jul 2021 07:00  --------------------------------------------------------  IN: 220 mL / OUT: 135 mL / NET: 85 mL      Weight (kg): 100 (06-29 @ 08:12)      PHYSICAL EXAM:  Appearance: Comfortable. No acute distress  HEENT:  Head and neck: Atraumatic. Normocephalic.  Normal oral mucosa, PERRL, Neck is supple. No JVD, No carotid bruit.   Neurologic: A & O x 3, no focal deficits. EOMI.  Lymphatic: No cervical lymphadenopathy  Cardiovascular: Normal S1 S2, +murmur grade II-III/VI  Respiratory: Lungs clear to auscultation  Gastrointestinal:  Soft, Non-tender, + BS  Lower Extremities: +1/+1 pitting bilateral lower extremity edema  Psychiatry: Patient is calm. No agitation. Mood & affect appropriate  Skin: No rashes/ ecchymoses/cyanosis/ulcers visualized on the face, hands or feet      CURRENT MEDICATIONS:  aMIOdarone    Tablet 200 milliGRAM(s) Oral daily  aMIOdarone    Tablet   Oral   midodrine 10 milliGRAM(s) Oral every 8 hours PRN  tamsulosin 0.4 milliGRAM(s) Oral at bedtime  budesonide 160 MICROgram(s)/formoterol 4.5 MICROgram(s) Inhaler  melatonin  mirtazapine  pantoprazole   Suspension  psyllium Powder  senna  atorvastatin  megestrol Suspension  ascorbic acid  aspirin  chewable  chlorhexidine 2% Cloths  chlorhexidine 4% Liquid  epoetin yolanda-epbx (RETACRIT) Injectable  folic acid  heparin   Injectable  iron sucrose IVPB  Nephro-melissa  sodium chloride 0.9%.  sodium chloride 0.9%.      DIAGNOSTIC TESTING:    [ ] Echocardiogram: < from: TTE Echo Complete w/ Contrast w/ Doppler (06.26.21 @ 10:36) >  Summary:   1. Endocardial visualization was enhanced with intravenous echo contrast.   2. Left ventricular ejection fraction, by visual estimation, is 20 to 25%.   3. Severely decreased global left ventricular systolic function.   4. Mildly increased LV wall thickness.   5. The mitral in-flow pattern reveals no discernable A-wave, therefore no comment on diastolic function can be made.   6. Mildly enlarged right ventricle.   7. Mild thickening of the anterior and posterior mitral valve leaflets.   8. Moderate mitral valve regurgitation.   9. Severe tricuspid regurgitation.  10. TAVR in the aortic position. Appears well seated with no abnormal rocking motion. Mild to moderate paravalvular aortic regurgitation is seen. Peak velocity is 2.26 m/s, MG is 11.2 mmHg, and PG is 20.5 mmHg, which are acceptable in the setting of a prosethetic aortic valve.  11. Mild pulmonic valve regurgitation.  12. Compared with prior study dated 6/23/21, the LVEF appears decreased to 20-25%. MR now appears moderate and TR appears severe. Results were discussed with CT surgery.        [ ]  Catheterization: < from: Cardiac Cath Lab - Adult (06.23.21 @ 10:29) >  INTERVENTIONAL IMPRESSIONS: Successful TAVIV via a Left Femoral Cut-Down  approach using a 29mm Waters-Hector ES3 Ultra TAV. 2. Successful  placement and removal of a TPM. 3. Successful placement and removal ofa  [place] Cerebral Protection device. 4. Successful deploymnt of a vascular  closure device: 6Fr Perclose to the R CFA.  INTERVENTIONAL RECOMMENDATIONS: DAPT. 2. May hold BB for 24-48 hrs. 3. TTE  for obtaining the data for NATALI registry submission. 4. CTICU management  per CTICU team.           OTHER: 	  Xray:< from: Xray Chest 1 View- PORTABLE-Routine (07.03.21 @ 06:11) >  FINDINGS:  LEFT IJ catheter tip in SVC.    There is an indwelling tunneled double lumen catheter with distal tip in the SVC.    The lungs show residual lung perihilar diffuse airspace disease and/or effusions. LEFT diaphragm not delineated indicating LEFT of retrocardiac airspace consolidation and/or effusion.  . No pneumothorax.    The  heart is enlarged in transverse diameter. No hilar mass. Status post cardiac valve replacement.     Visualized osseous structures are intact.    IMPRESSION:  There is an indwelling tunneled double lumen catheter with distal tip in the SVC..  LEFT IJ catheter tip remains in SVC.  Otherwise no interval change      LABS:	 	                            9.3    8.27  )-----------( 171      ( 03 Jul 2021 03:10 )             28.4     07-03    139  |  102  |  17.2  ----------------------------<  78  4.2   |  24.0  |  2.21<H>    Ca    9.0      03 Jul 2021 03:10  Phos  2.1     07-03  Mg     2.0     07-03    TPro  5.9<L>  /  Alb  3.0<L>  /  TBili  2.0  /  DBili  x   /  AST  39  /  ALT  34  /  AlkPhos  102  07-02      TELEMETRY:   NSR HR @ 80-90s

## 2021-07-03 NOTE — PROGRESS NOTE ADULT - CARDIOVASCULAR SYMPTOMS
peripheral edema

## 2021-07-03 NOTE — PROGRESS NOTE ADULT - PROBLEM SELECTOR PLAN 4
Not on beta blocker given pressor support with Midodrine  Daily CXR while inpatient   Cardiology following  Heart Failure team consulted and following

## 2021-07-03 NOTE — PROGRESS NOTE ADULT - ASSESSMENT
76M with a PMH of MI in 1995 (angiogram, no stents placed), COPD (2L O2 at home), severe AS s/p TAVR (03/2019 at [facility]), gout, CKD, CVA (no deficits, 09/2020), pulmonary HTN, initially presented to [facility] 6/1/21 with RONNELL on CKD, hospital course significant for cardiogenic shock, acute hypoxemic respiratory failure, and acute on chronic combined diastolic and systolic heart failure. ISRAEL revealed severe AI. Patient was transferred to [facility] under Dr. Campbell for further workup of bioprosthetic AI.     6/28 s/p BAL, sputum samples sent m ax switched to [facility]     Inpatient hospital course has been significant for:  1. 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 RONNELL on CKD progressed to ESRD requiring HD   3. Unintentional Weight loss / Dysphagia / Anorexia work up revealing duodenitis and diffuse erosive esophagitis, gastritis on EGD (biopsies negative for H. Pylori or carcinoma); colonoscopy showing diverticulosis  4. R/O AV Endocarditis   5. Auto-elevated INR (followed by Hematology)  6. R/O WILLEM   7. Adjustment disorder (evaluated by Behavioral Health)  8. Thrombocytopenia, Hematology consulted, r/o DIC    Patient is now s/p valve in valve TAVR on 6/23/21 with Dr. Campbell.     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 pt remains on epi, amio, primacor and is tolerating CVVHD  6/30 Pt now on HD

## 2021-07-03 NOTE — PROGRESS NOTE ADULT - SUBJECTIVE AND OBJECTIVE BOX
Subjective:  75yo M more mobile last 24 hrs, no c/o pain.      HPI:  76 year old male patient with a medical history of MI in 1995 (angiogram, no stents placed), COPD (2L O2 at home), s/p TAVR (03/2019 at [facility]), gout, CKD, CVA (09/2020), pulmonary HTN, initially presented to [facility] 6/1/21 with RONNELL on CKD (likely cardiorenal syndrome), urinary retention due to noncompliance with medications, with hospital course complicated by cardiogenic shock, acute hypoxemic respiratory failure secondary to metabolic acidosis and respiratory alkalosis. Patient found to have TAVR failure with ISRAEL 6/10/21 showing EF 40-45%, Aortic valve prosthesis with Severe paravalvular leak and valvular AI, mild-moderate aortic stenosis, and moderate MR. Patient was transferred to [facility] under Dr. Campbell for further workup.     Imaging from [facility]:  6/1: CXR shwoing cardiomegaly, small b/l pleural effusions, moderate pulmonary vascular congestion.  6/1: CT Brain ordered for head trauma? showing age-related cerebral and cerebellar volume loss, mild chronic vascular ischemic changes, stable biparietal arachnoid cyst and stable midline posterior fossa arachnoid cyst.   6/1: US Abdomen for elevated LFTs showing mild hepatic steatosis, mild hepatomegaly, sludge in the gallbladder, no discrete gallstones, normal biliary ducts, mild echogenic right kidney which may be seen with medical renal dz, mildly complex Right upper pole 4cm cyst with subtle internal echoes may represent hemorrhagic/proteinaceous cyst, mildly complex right midpole 2cm cyst with thin internal linear septation.   6/3: Kidney and Bladder US showing no hydronephrosis, echogenic kidneys likely from medical renal dz, prostatomegaly, and thickening of the posterior wall of the bladder with trabeculations which could be due to bladder outlet obstruction.   6/7: Kidney and Bladder US: No hydronephrosis or shadowing renal calculi. Stable b/l renal cysts.   6/7: Lower Extremity Venous Doppler with no DVT noted.   6/8: TTE showing EF 47%, dilated IVC, dilation of the ascending aorta of 4.4cm, moderate-severe pulmonary HTN, moderate-severe TR, aortic valve with severe prosthesis regurgitation and moderate prosthesis stenosis  6/9: CT Chest showing emphysema and intersitial lung dz changes, stable b/l small pleural effusions, cardiomegaly.   6/11: ISRAEL showing EF 40-45%, Aortic valve prosthesis with Severe paravalvular leak and valvular AI, mild-moderate aortic stenosis, and moderate MR.  (12 Jun 2021 01:36)          PAST MEDICAL & SURGICAL HISTORY:  Pulmonary hypertension    Hypertension    Hyperlipidemia    H/O aortic valve stenosis  s/p TAVR 2019 at [place]    CVA (cerebrovascular accident)  MCA CVA with tPA and thrombectomy    Chronic kidney disease    Prediabetes    Mitral valve regurgitation    CAD in native artery    Aneurysm of aortic root  thoracic aortic aneurysm, without ruptur    Benign prostatic hyperplasia    Gout    History of transcatheter aortic valve replacement (TAVR)            MEDICATIONS  (STANDING):  aMIOdarone    Tablet 200 milliGRAM(s) Oral daily  aMIOdarone    Tablet   Oral   ascorbic acid 500 milliGRAM(s) Oral daily  aspirin  chewable 81 milliGRAM(s) Oral daily  atorvastatin 10 milliGRAM(s) Oral at bedtime  budesonide 160 MICROgram(s)/formoterol 4.5 MICROgram(s) Inhaler 2 Puff(s) Inhalation two times a day  chlorhexidine 2% Cloths 1 Application(s) Topical daily  chlorhexidine 4% Liquid 1 Application(s) Topical <User Schedule>  epoetin yolanda-epbx (RETACRIT) Injectable 77635 Unit(s) IV Push <User Schedule>  folic acid 1 milliGRAM(s) Oral daily  heparin   Injectable 5000 Unit(s) SubCutaneous every 12 hours  iron sucrose IVPB 50 milliGRAM(s) IV Intermittent <User Schedule>  megestrol Suspension 400 milliGRAM(s) Oral daily  melatonin 5 milliGRAM(s) Oral at bedtime  mirtazapine 15 milliGRAM(s) Oral daily  Nephro-melissa 1 Tablet(s) Oral daily  pantoprazole   Suspension 40 milliGRAM(s) Oral every 12 hours  psyllium Powder 1 Packet(s) Oral two times a day  senna 2 Tablet(s) Oral at bedtime  sodium chloride 0.9%. 1000 milliLiter(s) (10 mL/Hr) IV Continuous <Continuous>  sodium chloride 0.9%. 1000 milliLiter(s) (5 mL/Hr) IV Continuous <Continuous>  tamsulosin 0.4 milliGRAM(s) Oral at bedtime    MEDICATIONS  (PRN):  acetaminophen   Tablet .. 650 milliGRAM(s) Oral every 6 hours PRN Mild Pain (1 - 3)  ALBUTerol    90 MICROgram(s) HFA Inhaler 2 Puff(s) Inhalation every 6 hours PRN Shortness of Breath and/or Wheezing  midodrine 10 milliGRAM(s) Oral every 8 hours PRN SBP < 100  sodium chloride 0.9% lock flush 10 milliLiter(s) IV Push every 1 hour PRN Pre/post blood products, medications, blood draw, and to maintain line patency          Allergies    No Known Drug Allergies  strawberry (Anaphylaxis)    Intolerances          WEIGHTS:  Daily     Daily   Admit Wt: Drug Dosing Weight  Height (cm): 182.9 (12 Jun 2021 00:32)  Weight (kg): 100 (29 Jun 2021 08:12)  BMI (kg/m2): 29.9 (29 Jun 2021 08:12)  BSA (m2): 2.22 (29 Jun 2021 08:12)  I&O's Summary    01 Jul 2021 07:01  -  02 Jul 2021 07:00  --------------------------------------------------------  IN: 694.5 mL / OUT: 120 mL / NET: 574.5 mL    02 Jul 2021 07:01  -  03 Jul 2021 03:12  --------------------------------------------------------  IN: 170 mL / OUT: 120 mL / NET: 50 mL        VITAL SIGNS:  ICU Vital Signs Last 24 Hrs  T(C): 37 (03 Jul 2021 00:00), Max: 37.3 (02 Jul 2021 06:00)  T(F): 98.6 (03 Jul 2021 00:00), Max: 99.1 (02 Jul 2021 06:00)  HR: 93 (03 Jul 2021 01:00) (83 - 95)  BP: --  BP(mean): --  ABP: 134/66 (03 Jul 2021 01:00) (109/55 - 152/76)  ABP(mean): 92 (03 Jul 2021 01:00) (72 - 94)  RR: 18 (02 Jul 2021 19:00) (17 - 26)  SpO2: 96% (03 Jul 2021 01:00) (90% - 100%)        All laboratory results, radiology and medications reviewed.    LABS:  07-02    139  |  102  |  25.3<H>  ----------------------------<  87  3.8   |  22.0  |  2.75<H>    Ca    8.5<L>      02 Jul 2021 02:59  Phos  2.9     07-02  Mg     2.0     07-02    TPro  5.9<L>  /  Alb  3.0<L>  /  TBili  2.0  /  DBili  x   /  AST  39  /  ALT  34  /  AlkPhos  102  07-02                                 8.6    8.38  )-----------( 149      ( 02 Jul 2021 02:59 )             26.6          PT/INR - ( 02 Jul 2021 02:59 )   PT: 16.8 sec;   INR: 1.48 ratio                   CAPILLARY BLOOD GLUCOSE                 PHYSICAL EXAM:  General:  no acute distress  Neurology:  alert and oriented X 4, nonfocal, no gross deficits  Respiratory:  clear to auscultation bilaterally  CV:  regular rate and rhythm, normal S1 S2  Abdomen:  soft, nontender, nondistended, positive bowel sounds  Extremities:  warm, well perfused, trace edema +DP pulses

## 2021-07-03 NOTE — PROGRESS NOTE ADULT - ATTENDING COMMENTS
Patient was seen and examined at bedside and agree with above plan   Telemetry: no acute events     Labs reviewed     Dx: Cardiogenic shock, AD-Bi-V failure, Severe AS with TAVR failure Patient was seen and examined at bedside and agree with above plan   Telemetry: no acute events     Labs reviewed     Dx: Cardiogenic shock, AD-Bi-V failure, Severe AS with TAVR failure,  - patient is sitting comfortably in chair with no complaints of chest pain or shortness of breath   - recommend to wean off Midodrine 10mg po q 8 hrs to 5mg po q 8 hrs  - as Midodrine is weaned will be able to start GDMT, if then start Lopressor 12.5mg po q 12hrs   - continue with Amiodarone for VT   - will consider LifeVest on discharge   - repeat TTE next week to assess for any interval improvement in LVEF     Yazmin Menezes D.O. [facility]  Cardiology/Vascular Cardiology -[facility]   Telephone # [phone]

## 2021-07-04 LAB
INR BLD: 1.55 RATIO — HIGH (ref 0.88–1.16)
PROTHROM AB SERPL-ACNC: 17.6 SEC — HIGH (ref 10.6–13.6)

## 2021-07-04 PROCEDURE — 99291 CRITICAL CARE FIRST HOUR: CPT

## 2021-07-04 PROCEDURE — 99233 SBSQ HOSP IP/OBS HIGH 50: CPT

## 2021-07-04 PROCEDURE — 99232 SBSQ HOSP IP/OBS MODERATE 35: CPT

## 2021-07-04 RX ADMIN — ATORVASTATIN CALCIUM 10 MILLIGRAM(S): 80 TABLET, FILM COATED ORAL at 21:10

## 2021-07-04 RX ADMIN — Medication 1 PACKET(S): at 06:38

## 2021-07-04 RX ADMIN — TAMSULOSIN HYDROCHLORIDE 0.4 MILLIGRAM(S): 0.4 CAPSULE ORAL at 21:08

## 2021-07-04 RX ADMIN — SENNA PLUS 2 TABLET(S): 8.6 TABLET ORAL at 21:08

## 2021-07-04 RX ADMIN — BUDESONIDE AND FORMOTEROL FUMARATE DIHYDRATE 2 PUFF(S): 160; 4.5 AEROSOL RESPIRATORY (INHALATION) at 20:40

## 2021-07-04 RX ADMIN — CARVEDILOL PHOSPHATE 3.12 MILLIGRAM(S): 80 CAPSULE, EXTENDED RELEASE ORAL at 06:39

## 2021-07-04 RX ADMIN — Medication 500 MILLIGRAM(S): at 11:41

## 2021-07-04 RX ADMIN — BUDESONIDE AND FORMOTEROL FUMARATE DIHYDRATE 2 PUFF(S): 160; 4.5 AEROSOL RESPIRATORY (INHALATION) at 08:56

## 2021-07-04 RX ADMIN — CARVEDILOL PHOSPHATE 3.12 MILLIGRAM(S): 80 CAPSULE, EXTENDED RELEASE ORAL at 18:41

## 2021-07-04 RX ADMIN — AMIODARONE HYDROCHLORIDE 200 MILLIGRAM(S): 400 TABLET ORAL at 06:37

## 2021-07-04 RX ADMIN — Medication 1 MILLIGRAM(S): at 11:41

## 2021-07-04 RX ADMIN — MIRTAZAPINE 15 MILLIGRAM(S): 45 TABLET, ORALLY DISINTEGRATING ORAL at 11:41

## 2021-07-04 RX ADMIN — HEPARIN SODIUM 5000 UNIT(S): 5000 INJECTION INTRAVENOUS; SUBCUTANEOUS at 06:37

## 2021-07-04 RX ADMIN — CHLORHEXIDINE GLUCONATE 1 APPLICATION(S): 213 SOLUTION TOPICAL at 06:38

## 2021-07-04 RX ADMIN — Medication 1 PACKET(S): at 18:41

## 2021-07-04 RX ADMIN — Medication 81 MILLIGRAM(S): at 11:41

## 2021-07-04 RX ADMIN — PANTOPRAZOLE SODIUM 40 MILLIGRAM(S): 20 TABLET, DELAYED RELEASE ORAL at 06:39

## 2021-07-04 RX ADMIN — PANTOPRAZOLE SODIUM 40 MILLIGRAM(S): 20 TABLET, DELAYED RELEASE ORAL at 18:41

## 2021-07-04 RX ADMIN — MEGESTROL ACETATE 400 MILLIGRAM(S): 40 SUSPENSION ORAL at 11:43

## 2021-07-04 RX ADMIN — Medication 1 TABLET(S): at 11:41

## 2021-07-04 RX ADMIN — HEPARIN SODIUM 5000 UNIT(S): 5000 INJECTION INTRAVENOUS; SUBCUTANEOUS at 18:42

## 2021-07-04 RX ADMIN — Medication 5 MILLIGRAM(S): at 21:08

## 2021-07-04 RX ADMIN — CHLORHEXIDINE GLUCONATE 1 APPLICATION(S): 213 SOLUTION TOPICAL at 11:41

## 2021-07-04 NOTE — PROGRESS NOTE ADULT - SUBJECTIVE AND OBJECTIVE BOX
[facility] DIVISION OF KIDNEY DISEASES AND HYPERTENSION -- FOLLOW UP NOTE  --------------------------------------------------------------------------------  Chief Complaint: kathy /Ongoing hemodialysis requirement    24 hour events/subjective:  no acute event noted  Pt seen and examined; feels well      PAST HISTORY  --------------------------------------------------------------------------------  No significant changes to PMH, PSH, FH, SH unless otherwise noted    ALLERGIES & MEDICATIONS  --------------------------------------------------------------------------------  Allergies    No Known Drug Allergies  strawberry (Anaphylaxis)    Intolerances      Standing Inpatient Medications  aMIOdarone    Tablet 200 milliGRAM(s) Oral daily  aMIOdarone    Tablet   Oral   ascorbic acid 500 milliGRAM(s) Oral daily  aspirin  chewable 81 milliGRAM(s) Oral daily  atorvastatin 10 milliGRAM(s) Oral at bedtime  budesonide 160 MICROgram(s)/formoterol 4.5 MICROgram(s) Inhaler 2 Puff(s) Inhalation two times a day  carvedilol 3.125 milliGRAM(s) Oral every 12 hours  chlorhexidine 4% Liquid 1 Application(s) Topical <User Schedule>  epoetin yolanda-epbx (RETACRIT) Injectable 94622 Unit(s) IV Push <User Schedule>  folic acid 1 milliGRAM(s) Oral daily  heparin   Injectable 5000 Unit(s) SubCutaneous every 12 hours  iron sucrose IVPB 50 milliGRAM(s) IV Intermittent <User Schedule>  megestrol Suspension 400 milliGRAM(s) Oral daily  melatonin 5 milliGRAM(s) Oral at bedtime  mirtazapine 15 milliGRAM(s) Oral daily  Nephro-melissa 1 Tablet(s) Oral daily  pantoprazole   Suspension 40 milliGRAM(s) Oral every 12 hours  psyllium Powder 1 Packet(s) Oral two times a day  senna 2 Tablet(s) Oral at bedtime  tamsulosin 0.4 milliGRAM(s) Oral at bedtime    PRN Inpatient Medications  acetaminophen   Tablet .. 650 milliGRAM(s) Oral every 6 hours PRN  ALBUTerol    90 MICROgram(s) HFA Inhaler 2 Puff(s) Inhalation every 6 hours PRN  midodrine 10 milliGRAM(s) Oral every 8 hours PRN  sodium chloride 0.9% lock flush 10 milliLiter(s) IV Push every 1 hour PRN      REVIEW OF SYSTEMS  --------------------------------------------------------------------------------  Gen: No weight changes, fatigue, fevers/chills, weakness  Skin: No rashes  Head/Eyes/Ears/Mouth: No headache  Respiratory: No dyspnea, cough,  CV: No chest pain, orthopnea  GI: No abdominal pain, diarrhea, constipation, nausea, vomiting,  MSK: No joint pain  Neuro: No dizziness/lightheadedness, weakness  Heme: No bleeding  Psych: No significant nervousness, anxiety, stress, depression    All other systems were reviewed and are negative, except as noted.    VITALS/PHYSICAL EXAM  --------------------------------------------------------------------------------  T(C): 35.9 (07-04-21 @ 12:00), Max: 37 (07-03-21 @ 20:00)  HR: 90 (07-04-21 @ 15:00) (81 - 96)  BP: 117/87 (07-04-21 @ 15:00) (115/74 - 175/87)  RR: 35 (07-04-21 @ 15:00) (14 - 42)  SpO2: 100% (07-04-21 @ 15:00) (73% - 100%)  Wt(kg): --        07-03-21 @ 07:01  -  07-04-21 @ 07:00  --------------------------------------------------------  IN: 450 mL / OUT: 30 mL / NET: 420 mL    07-04-21 @ 07:01  -  07-04-21 @ 16:46  --------------------------------------------------------  IN: 100 mL / OUT: 0 mL / NET: 100 mL      Physical Exam:  	Gen: NAD, well-appearing  	HEENT: PERRL, supple neck,  	Pulm: CTA B/L  	CV: RRR, S1S2; no rub  	Abd: +BS, soft, nontender  	UE: Warm, intact strength  	LE: Warm, + edema  	Neuro: No focal deficits  	Psych: Normal affect and mood  	Skin: Warm, without rashes  	Vascular access: DAVI Tunneled dialysis catheter     LABS/STUDIES  --------------------------------------------------------------------------------              9.3    8.27  >-----------<  171      [07-03-21 @ 03:10]              28.4     139  |  102  |  17.2  ----------------------------<  78      [07-03-21 @ 03:10]  4.2   |  24.0  |  2.21        Ca     9.0     [07-03-21 @ 03:10]      Mg     2.0     [07-03-21 @ 03:10]      Phos  2.1     [07-03-21 @ 03:10]      PT/INR: PT 17.6 , INR 1.55       [07-04-21 @ 02:35]      TSH 2.31      [06-12-21 @ 01:55]    HBsAb <3.0      [06-17-21 @ 05:13]  HBsAg Nonreact      [06-17-21 @ 05:13]  HBcAb Nonreact      [06-17-21 @ 05:13]  HCV 0.15, Nonreact      [06-17-21 @ 05:13]

## 2021-07-04 NOTE — PROGRESS NOTE ADULT - ASSESSMENT
76M with a PMH of MI in 1995 (angiogram, no stents placed), COPD (2L O2 at home), severe AS s/p TAVR (03/2019 at [facility]), gout, CKD, CVA (no deficits, 09/2020), pulmonary HTN, initially presented to [facility] 6/1/21 with RONNELL on CKD, hospital course significant for cardiogenic shock, acute hypoxemic respiratory failure, and acute on chronic combined diastolic and systolic heart failure. ISRAEL revealed severe AI. Patient was transferred to [facility] under Dr. Campbell for further workup of bioprosthetic AI.     6/28 s/p BAL, sputum samples sent m ax switched to [facility]     Inpatient hospital course has been significant for:  1. 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 RONNELL on CKD progressed to ESRD requiring HD   3. Unintentional Weight loss / Dysphagia / Anorexia work up revealing duodenitis and diffuse erosive esophagitis, gastritis on EGD (biopsies negative for H. Pylori or carcinoma); colonoscopy showing diverticulosis  4. R/O AV Endocarditis   5. Auto-elevated INR (followed by Hematology)  6. R/O WILLEM   7. Adjustment disorder (evaluated by Behavioral Health)  8. Thrombocytopenia, Hematology consulted, r/o DIC    Patient is now s/p valve in valve TAVR on 6/23/21 with Dr. Campbell.     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 pt remains on epi, amio, primacor and is tolerating CVVHD  6/30 Pt now on HD

## 2021-07-04 NOTE — PROGRESS NOTE ADULT - NEGATIVE CARDIOVASCULAR SYMPTOMS
no chest pain
no chest pain/no peripheral edema
no chest pain/no peripheral edema
no chest pain

## 2021-07-04 NOTE — PROGRESS NOTE ADULT - ASSESSMENT
Álvaro on CKD requiring RRT  Cardiogenic shock,-Bi-V failure  TAVR failure   Anemia    HD- MWF schedule  UF as tolerated with aid of midodrine  TED with HD

## 2021-07-04 NOTE — PROGRESS NOTE ADULT - OPHTHALMOLOGIC
not applicable

## 2021-07-04 NOTE — PROGRESS NOTE ADULT - SUBJECTIVE AND OBJECTIVE BOX
Subjective:  75yo M resting comfortable, no c/o pain.      HPI:  76 year old male patient with a medical history of MI in 1995 (angiogram, no stents placed), COPD (2L O2 at home), s/p TAVR (03/2019 at [facility]), gout, CKD, CVA (09/2020), pulmonary HTN, initially presented to [facility] 6/1/21 with RONNELL on CKD (likely cardiorenal syndrome), urinary retention due to noncompliance with medications, with hospital course complicated by cardiogenic shock, acute hypoxemic respiratory failure secondary to metabolic acidosis and respiratory alkalosis. Patient found to have TAVR failure with ISRAEL 6/10/21 showing EF 40-45%, Aortic valve prosthesis with Severe paravalvular leak and valvular AI, mild-moderate aortic stenosis, and moderate MR. Patient was transferred to [facility] under Dr. Campbell for further workup.     Imaging from [facility]:  6/1: CXR shwoing cardiomegaly, small b/l pleural effusions, moderate pulmonary vascular congestion.  6/1: CT Brain ordered for head trauma? showing age-related cerebral and cerebellar volume loss, mild chronic vascular ischemic changes, stable biparietal arachnoid cyst and stable midline posterior fossa arachnoid cyst.   6/1: US Abdomen for elevated LFTs showing mild hepatic steatosis, mild hepatomegaly, sludge in the gallbladder, no discrete gallstones, normal biliary ducts, mild echogenic right kidney which may be seen with medical renal dz, mildly complex Right upper pole 4cm cyst with subtle internal echoes may represent hemorrhagic/proteinaceous cyst, mildly complex right midpole 2cm cyst with thin internal linear septation.   6/3: Kidney and Bladder US showing no hydronephrosis, echogenic kidneys likely from medical renal dz, prostatomegaly, and thickening of the posterior wall of the bladder with trabeculations which could be due to bladder outlet obstruction.   6/7: Kidney and Bladder US: No hydronephrosis or shadowing renal calculi. Stable b/l renal cysts.   6/7: Lower Extremity Venous Doppler with no DVT noted.   6/8: TTE showing EF 47%, dilated IVC, dilation of the ascending aorta of 4.4cm, moderate-severe pulmonary HTN, moderate-severe TR, aortic valve with severe prosthesis regurgitation and moderate prosthesis stenosis  6/9: CT Chest showing emphysema and intersitial lung dz changes, stable b/l small pleural effusions, cardiomegaly.   6/11: ISRAEL showing EF 40-45%, Aortic valve prosthesis with Severe paravalvular leak and valvular AI, mild-moderate aortic stenosis, and moderate MR.  (12 Jun 2021 01:36)          PAST MEDICAL & SURGICAL HISTORY:  Pulmonary hypertension    Hypertension    Hyperlipidemia    H/O aortic valve stenosis  s/p TAVR 2019 at [place]    CVA (cerebrovascular accident)  MCA CVA with tPA and thrombectomy    Chronic kidney disease    Prediabetes    Mitral valve regurgitation    CAD in native artery    Aneurysm of aortic root  thoracic aortic aneurysm, without ruptur    Benign prostatic hyperplasia    Gout    History of transcatheter aortic valve replacement (TAVR)            MEDICATIONS  (STANDING):  aMIOdarone    Tablet 200 milliGRAM(s) Oral daily  aMIOdarone    Tablet   Oral   ascorbic acid 500 milliGRAM(s) Oral daily  aspirin  chewable 81 milliGRAM(s) Oral daily  atorvastatin 10 milliGRAM(s) Oral at bedtime  budesonide 160 MICROgram(s)/formoterol 4.5 MICROgram(s) Inhaler 2 Puff(s) Inhalation two times a day  carvedilol 3.125 milliGRAM(s) Oral every 12 hours  chlorhexidine 2% Cloths 1 Application(s) Topical daily  chlorhexidine 4% Liquid 1 Application(s) Topical <User Schedule>  epoetin yolanda-epbx (RETACRIT) Injectable 57982 Unit(s) IV Push <User Schedule>  folic acid 1 milliGRAM(s) Oral daily  heparin   Injectable 5000 Unit(s) SubCutaneous every 12 hours  iron sucrose IVPB 50 milliGRAM(s) IV Intermittent <User Schedule>  megestrol Suspension 400 milliGRAM(s) Oral daily  melatonin 5 milliGRAM(s) Oral at bedtime  mirtazapine 15 milliGRAM(s) Oral daily  Nephro-melissa 1 Tablet(s) Oral daily  pantoprazole   Suspension 40 milliGRAM(s) Oral every 12 hours  psyllium Powder 1 Packet(s) Oral two times a day  senna 2 Tablet(s) Oral at bedtime  sodium chloride 0.9%. 1000 milliLiter(s) (10 mL/Hr) IV Continuous <Continuous>  sodium chloride 0.9%. 1000 milliLiter(s) (5 mL/Hr) IV Continuous <Continuous>  tamsulosin 0.4 milliGRAM(s) Oral at bedtime    MEDICATIONS  (PRN):  acetaminophen   Tablet .. 650 milliGRAM(s) Oral every 6 hours PRN Mild Pain (1 - 3)  ALBUTerol    90 MICROgram(s) HFA Inhaler 2 Puff(s) Inhalation every 6 hours PRN Shortness of Breath and/or Wheezing  midodrine 10 milliGRAM(s) Oral every 8 hours PRN SBP < 100  sodium chloride 0.9% lock flush 10 milliLiter(s) IV Push every 1 hour PRN Pre/post blood products, medications, blood draw, and to maintain line patency          Allergies    No Known Drug Allergies  strawberry (Anaphylaxis)    Intolerances          WEIGHTS:  Daily     Daily   Admit Wt: Drug Dosing Weight  Height (cm): 182.9 (12 Jun 2021 00:32)  Weight (kg): 100 (29 Jun 2021 08:12)  BMI (kg/m2): 29.9 (29 Jun 2021 08:12)  BSA (m2): 2.22 (29 Jun 2021 08:12)  I&O's Summary    02 Jul 2021 07:01  -  03 Jul 2021 07:00  --------------------------------------------------------  IN: 220 mL / OUT: 135 mL / NET: 85 mL    03 Jul 2021 07:01  -  04 Jul 2021 01:55  --------------------------------------------------------  IN: 450 mL / OUT: 30 mL / NET: 420 mL        VITAL SIGNS:  ICU Vital Signs Last 24 Hrs  T(C): 37 (03 Jul 2021 20:00), Max: 37.4 (03 Jul 2021 12:00)  T(F): 98.6 (03 Jul 2021 20:00), Max: 99.3 (03 Jul 2021 12:00)  HR: 82 (04 Jul 2021 00:00) (82 - 100)  BP: 134/80 (04 Jul 2021 00:00) (117/81 - 134/80)  BP(mean): 98 (04 Jul 2021 00:00) (94 - 103)  ABP: 140/68 (03 Jul 2021 15:00) (124/58 - 149/69)  ABP(mean): 89 (03 Jul 2021 15:00) (80 - 92)  RR: 21 (04 Jul 2021 00:00) (17 - 27)  SpO2: 94% (04 Jul 2021 00:00) (92% - 100%)        All laboratory results, radiology and medications reviewed.    LABS:  07-03    139  |  102  |  17.2  ----------------------------<  78  4.2   |  24.0  |  2.21<H>    Ca    9.0      03 Jul 2021 03:10  Phos  2.1     07-03  Mg     2.0     07-03    TPro  5.9<L>  /  Alb  3.0<L>  /  TBili  2.0  /  DBili  x   /  AST  39  /  ALT  34  /  AlkPhos  102  07-02                                 9.3    8.27  )-----------( 171      ( 03 Jul 2021 03:10 )             28.4          PT/INR - ( 02 Jul 2021 02:59 )   PT: 16.8 sec;   INR: 1.48 ratio                   CAPILLARY BLOOD GLUCOSE                 PHYSICAL EXAM:  General:  well nourished, no acute distress  Neurology:  alert and oriented X 4, nonfocal, no gross deficits  Respiratory:  clear to auscultation bilaterally  CV:  regular rate and rhythm, normal S1 S2  Abdomen:  soft, nontender, nondistended, positive bowel sounds  Extremities:  warm, well perfused, no edema +DP pulses  Incisions:  midline sternal incision, c/d/i, sternum stable                 Subjective:  77yo M resting comfortable, no c/o pain.      HPI:  76 year old male patient with a medical history of MI in 1995 (angiogram, no stents placed), COPD (2L O2 at home), s/p TAVR (03/2019 at [facility]), gout, CKD, CVA (09/2020), pulmonary HTN, initially presented to [facility] 6/1/21 with RONNELL on CKD (likely cardiorenal syndrome), urinary retention due to noncompliance with medications, with hospital course complicated by cardiogenic shock, acute hypoxemic respiratory failure secondary to metabolic acidosis and respiratory alkalosis. Patient found to have TAVR failure with ISRAEL 6/10/21 showing EF 40-45%, Aortic valve prosthesis with Severe paravalvular leak and valvular AI, mild-moderate aortic stenosis, and moderate MR. Patient was transferred to [facility] under Dr. Campbell for further workup.     Imaging from [facility]:  6/1: CXR shwoing cardiomegaly, small b/l pleural effusions, moderate pulmonary vascular congestion.  6/1: CT Brain ordered for head trauma? showing age-related cerebral and cerebellar volume loss, mild chronic vascular ischemic changes, stable biparietal arachnoid cyst and stable midline posterior fossa arachnoid cyst.   6/1: US Abdomen for elevated LFTs showing mild hepatic steatosis, mild hepatomegaly, sludge in the gallbladder, no discrete gallstones, normal biliary ducts, mild echogenic right kidney which may be seen with medical renal dz, mildly complex Right upper pole 4cm cyst with subtle internal echoes may represent hemorrhagic/proteinaceous cyst, mildly complex right midpole 2cm cyst with thin internal linear septation.   6/3: Kidney and Bladder US showing no hydronephrosis, echogenic kidneys likely from medical renal dz, prostatomegaly, and thickening of the posterior wall of the bladder with trabeculations which could be due to bladder outlet obstruction.   6/7: Kidney and Bladder US: No hydronephrosis or shadowing renal calculi. Stable b/l renal cysts.   6/7: Lower Extremity Venous Doppler with no DVT noted.   6/8: TTE showing EF 47%, dilated IVC, dilation of the ascending aorta of 4.4cm, moderate-severe pulmonary HTN, moderate-severe TR, aortic valve with severe prosthesis regurgitation and moderate prosthesis stenosis  6/9: CT Chest showing emphysema and intersitial lung dz changes, stable b/l small pleural effusions, cardiomegaly.   6/11: ISRAEL showing EF 40-45%, Aortic valve prosthesis with Severe paravalvular leak and valvular AI, mild-moderate aortic stenosis, and moderate MR.  (12 Jun 2021 01:36)          PAST MEDICAL & SURGICAL HISTORY:  Pulmonary hypertension    Hypertension    Hyperlipidemia    H/O aortic valve stenosis  s/p TAVR 2019 at [place]    CVA (cerebrovascular accident)  MCA CVA with tPA and thrombectomy    Chronic kidney disease    Prediabetes    Mitral valve regurgitation    CAD in native artery    Aneurysm of aortic root  thoracic aortic aneurysm, without ruptur    Benign prostatic hyperplasia    Gout    History of transcatheter aortic valve replacement (TAVR)            MEDICATIONS  (STANDING):  aMIOdarone    Tablet 200 milliGRAM(s) Oral daily  aMIOdarone    Tablet   Oral   ascorbic acid 500 milliGRAM(s) Oral daily  aspirin  chewable 81 milliGRAM(s) Oral daily  atorvastatin 10 milliGRAM(s) Oral at bedtime  budesonide 160 MICROgram(s)/formoterol 4.5 MICROgram(s) Inhaler 2 Puff(s) Inhalation two times a day  carvedilol 3.125 milliGRAM(s) Oral every 12 hours  chlorhexidine 2% Cloths 1 Application(s) Topical daily  chlorhexidine 4% Liquid 1 Application(s) Topical <User Schedule>  epoetin yolanda-epbx (RETACRIT) Injectable 19359 Unit(s) IV Push <User Schedule>  folic acid 1 milliGRAM(s) Oral daily  heparin   Injectable 5000 Unit(s) SubCutaneous every 12 hours  iron sucrose IVPB 50 milliGRAM(s) IV Intermittent <User Schedule>  megestrol Suspension 400 milliGRAM(s) Oral daily  melatonin 5 milliGRAM(s) Oral at bedtime  mirtazapine 15 milliGRAM(s) Oral daily  Nephro-melissa 1 Tablet(s) Oral daily  pantoprazole   Suspension 40 milliGRAM(s) Oral every 12 hours  psyllium Powder 1 Packet(s) Oral two times a day  senna 2 Tablet(s) Oral at bedtime  sodium chloride 0.9%. 1000 milliLiter(s) (10 mL/Hr) IV Continuous <Continuous>  sodium chloride 0.9%. 1000 milliLiter(s) (5 mL/Hr) IV Continuous <Continuous>  tamsulosin 0.4 milliGRAM(s) Oral at bedtime    MEDICATIONS  (PRN):  acetaminophen   Tablet .. 650 milliGRAM(s) Oral every 6 hours PRN Mild Pain (1 - 3)  ALBUTerol    90 MICROgram(s) HFA Inhaler 2 Puff(s) Inhalation every 6 hours PRN Shortness of Breath and/or Wheezing  midodrine 10 milliGRAM(s) Oral every 8 hours PRN SBP < 100  sodium chloride 0.9% lock flush 10 milliLiter(s) IV Push every 1 hour PRN Pre/post blood products, medications, blood draw, and to maintain line patency          Allergies    No Known Drug Allergies  strawberry (Anaphylaxis)    Intolerances          WEIGHTS:  Daily     Daily   Admit Wt: Drug Dosing Weight  Height (cm): 182.9 (12 Jun 2021 00:32)  Weight (kg): 100 (29 Jun 2021 08:12)  BMI (kg/m2): 29.9 (29 Jun 2021 08:12)  BSA (m2): 2.22 (29 Jun 2021 08:12)  I&O's Summary    02 Jul 2021 07:01  -  03 Jul 2021 07:00  --------------------------------------------------------  IN: 220 mL / OUT: 135 mL / NET: 85 mL    03 Jul 2021 07:01  -  04 Jul 2021 01:55  --------------------------------------------------------  IN: 450 mL / OUT: 30 mL / NET: 420 mL        VITAL SIGNS:  ICU Vital Signs Last 24 Hrs  T(C): 37 (03 Jul 2021 20:00), Max: 37.4 (03 Jul 2021 12:00)  T(F): 98.6 (03 Jul 2021 20:00), Max: 99.3 (03 Jul 2021 12:00)  HR: 82 (04 Jul 2021 00:00) (82 - 100)  BP: 134/80 (04 Jul 2021 00:00) (117/81 - 134/80)  BP(mean): 98 (04 Jul 2021 00:00) (94 - 103)  ABP: 140/68 (03 Jul 2021 15:00) (124/58 - 149/69)  ABP(mean): 89 (03 Jul 2021 15:00) (80 - 92)  RR: 21 (04 Jul 2021 00:00) (17 - 27)  SpO2: 94% (04 Jul 2021 00:00) (92% - 100%)        All laboratory results, radiology and medications reviewed.    LABS:  07-03    139  |  102  |  17.2  ----------------------------<  78  4.2   |  24.0  |  2.21<H>    Ca    9.0      03 Jul 2021 03:10  Phos  2.1     07-03  Mg     2.0     07-03    TPro  5.9<L>  /  Alb  3.0<L>  /  TBili  2.0  /  DBili  x   /  AST  39  /  ALT  34  /  AlkPhos  102  07-02                                 9.3    8.27  )-----------( 171      ( 03 Jul 2021 03:10 )             28.4          PT/INR - ( 02 Jul 2021 02:59 )   PT: 16.8 sec;   INR: 1.48 ratio                   CAPILLARY BLOOD GLUCOSE                 PHYSICAL EXAM:  General:   no acute distress  Neurology:  alert and oriented X 4, nonfocal, no gross deficits, occasionally sundowns at night  Respiratory:  clear to auscultation bilaterally  CV:  regular rate and rhythm, normal S1 S2  Abdomen:  soft, nontender, nondistended, positive bowel sounds  Extremities:  warm, well perfused, no edema +DP pulses

## 2021-07-04 NOTE — PROGRESS NOTE ADULT - NEGATIVE GASTROINTESTINAL SYMPTOMS
no nausea/no vomiting/no diarrhea/no constipation
no vomiting/no diarrhea
no nausea/no vomiting/no diarrhea/no constipation
no vomiting/no diarrhea
no nausea/no vomiting/no diarrhea
no vomiting/no diarrhea
no nausea/no vomiting/no diarrhea/no constipation
no vomiting/no diarrhea
no nausea/no vomiting/no diarrhea/no constipation
no nausea/no vomiting/no diarrhea/no constipation

## 2021-07-04 NOTE — PROGRESS NOTE ADULT - ASSESSMENT
76M h/o CAD MI in 1995 (angiogram, no stents placed), COPD (2L O2 at home), s/p TAVR (03/2019 at [facility]), gout, CKD, CVA (09/2020), pulmonary HTN, initially presented to [facility] 6/1/21 with RONNELL on CKD (likely cardiorenal syndrome), urinary retention due to noncompliance with medications, with hospital course complicated by cardiogenic shock, acute hypoxemic respiratory failure secondary to metabolic acidosis and respiratory alkalosis. Patient found to have TAVR failure with ISRAEL 6/10/21 showing initial EF 40-45%, Aortic valve prosthesis with Severe paravalvular leak and valvular AI, mild-moderate aortic stenosis, and moderate MR. Decompensated HF and severe pulmonary hypertension, underwent valve-in-valve TAVR 6/23/21, course complicated by worsening ADHF/cardiogenic shock with VT, intubated and RONNELL on CKD required CVVHD, repeat TTE with worsening LV EF 20-25%.     7/4: Pt denies chest pain, palpitations, SOB. Tele- NSR HR @ 80-90s with periods of -110s, occasional PVCs. Pt has worsnening lower extremity edema today but no complaints of SOB or signs of fluid overload on lung exam. Pt is on HD M-W-F schedule.  Pt started on Coreg as per HF team recommendations, increase dose as BP tolerates. Repeat echo next week to reassess worsening EF.          Cardiogenic shock, AD-Bi-V failure  - Pt weaned off IV pressor  - Midodrine PRN  - Continue HD for fluid removal  - Pt started on Coreg, increase dose as BP tolerates  - Strict Is and Os   - Repeat echo next week to reassess worsening EF       TAVR failure   -s/p Amy  -Continue ASA 81mg    VT  -on amiodarone  -Tele-NSR HR @ 80-90s with periods of -110s, occasional PVCs  -Lytes WNL   -Plan for ICD vs. lifevest prior to DC       RONNELL on CKD  -oligouria  - Cont. HD    Respiratory Failure  -COPD  -pulmonary toilet, BiPAP as needed

## 2021-07-04 NOTE — PROGRESS NOTE ADULT - NEGATIVE RESPIRATORY AND THORAX SYMPTOMS
no wheezing
no wheezing/no cough
no wheezing/no cough
no wheezing
no wheezing
no wheezing/no cough
no wheezing
no wheezing/no cough

## 2021-07-04 NOTE — PROGRESS NOTE ADULT - PROBLEM SELECTOR PLAN 4
Cardiology following  Heart Failure team consulted and following  Coreg started @ 3.125 BID as per heart failure team recommendations  Will need Ace when able to tolerate to complete GDMT for heart failure

## 2021-07-04 NOTE — PROGRESS NOTE ADULT - SUBJECTIVE AND OBJECTIVE BOX
[facility] Faculty Practice                                                               Office: [address] [address]                                                              Telephone: [phone]. Fax:[phone]                                                                             PROGRESS NOTE  Reason for follow up: TAVR Failure  Overnight: No new events.   Update: 7/4: Pt denies chest pain, palpitations, SOB. Tele- NSR HR @ 80-90s with periods of -110s, occasional PVCs. Pt has worsnening lower extremity edema today but no complaints of SOB or signs of fluid overload on lung exam. Pt is on HD M-W-F schedule.  Pt started on Coreg as per HF team recommendations, increase dose as BP tolerates. Repeat echo next week to reassess worsening EF.      Subjective:   	  Vitals:  T(C): 36.2 (07-04-21 @ 08:00), Max: 37.1 (07-03-21 @ 16:45)  HR: 87 (07-04-21 @ 11:00) (81 - 96)  BP: 175/87 (07-04-21 @ 10:00) (115/74 - 175/87)  RR: 24 (07-04-21 @ 11:00) (17 - 42)  SpO2: 97% (07-04-21 @ 11:00) (73% - 100%)    I&O's Summary    03 Jul 2021 07:01  -  04 Jul 2021 07:00  --------------------------------------------------------  IN: 450 mL / OUT: 30 mL / NET: 420 mL    04 Jul 2021 07:01  -  04 Jul 2021 12:33  --------------------------------------------------------  IN: 100 mL / OUT: 0 mL / NET: 100 mL    PHYSICAL EXAM:  Appearance: Comfortable. No acute distress  HEENT:  Head and neck: Atraumatic. Normocephalic.  Normal oral mucosa, PERRL, Neck is supple. No JVD, No carotid bruit.   Neurologic: A & O x 3, no focal deficits. EOMI  Lymphatic: No cervical lymphadenopathy  Cardiovascular: Normal S1 S2, +sys. murmur grade II-III/VI   Respiratory: Lungs clear to auscultation  Gastrointestinal:  Soft, Non-tender, + BS  Lower Extremities: +3/+3 pitting lower extremity edema  Psychiatry: Patient is calm. No agitation. Mood & affect appropriate  Skin: No rashes/ ecchymoses/cyanosis/ulcers visualized on the face, hands or feet.      CURRENT MEDICATIONS:  aMIOdarone    Tablet 200 milliGRAM(s) Oral daily  aMIOdarone    Tablet   Oral   carvedilol 3.125 milliGRAM(s) Oral every 12 hours  midodrine 10 milliGRAM(s) Oral every 8 hours PRN  tamsulosin 0.4 milliGRAM(s) Oral at bedtime  budesonide 160 MICROgram(s)/formoterol 4.5 MICROgram(s) Inhaler  melatonin  mirtazapine  pantoprazole   Suspension  psyllium Powder  senna  atorvastatin  megestrol Suspension  ascorbic acid  aspirin  chewable  chlorhexidine 4% Liquid  epoetin oylanda-epbx (RETACRIT) Injectable  folic acid  heparin   Injectable  iron sucrose IVPB  Nephro-melissa      DIAGNOSTIC TESTING:    [ ] Echocardiogram: < from: TTE Echo Complete w/ Contrast w/ Doppler (06.26.21 @ 10:36) >  Summary:   1. Endocardial visualization was enhanced with intravenous echo contrast.   2. Left ventricular ejection fraction, by visual estimation, is 20 to 25%.   3. Severely decreased global left ventricular systolic function.   4. Mildly increased LV wall thickness.   5. The mitral in-flow pattern reveals no discernable A-wave, therefore no comment on diastolic function can be made.   6. Mildly enlarged right ventricle.   7. Mild thickening of the anterior and posterior mitral valve leaflets.   8. Moderate mitral valve regurgitation.   9. Severe tricuspid regurgitation.  10. TAVR in the aortic position. Appears well seated with no abnormal rocking motion. Mild to moderate paravalvular aortic regurgitation is seen. Peak velocity is 2.26 m/s, MG is 11.2 mmHg, and PG is 20.5 mmHg, which are acceptable in the setting of a prosethetic aortic valve.  11. Mild pulmonic valve regurgitation.  12. Compared with prior study dated 6/23/21, the LVEF appears decreased to 20-25%. MR now appears moderate and TR appears severe. Results were discussed with CT surgery.    OTHER: 	    Xray:< from: Xray Chest 1 View- PORTABLE-Routine (07.03.21 @ 06:11) >  IMPRESSION:  There is an indwelling tunneled double lumen catheter with distal tip in the SVC..  LEFT IJ catheter tip remains in SVC.  Otherwise no interval change    LABS:	 	                            9.3    8.27  )-----------( 171      ( 03 Jul 2021 03:10 )             28.4     07-03    139  |  102  |  17.2  ----------------------------<  78  4.2   |  24.0  |  2.21<H>    Ca    9.0      03 Jul 2021 03:10  Phos  2.1     07-03  Mg     2.0     07-03        TELEMETRY: NSR HR @ 80-90s with periods of -110s, occasional PVCs

## 2021-07-04 NOTE — PROGRESS NOTE ADULT - ATTENDING COMMENTS
Patient was seen and examined at bedside and agree with above plan   Telemetry: no acute events     Labs reviewed     Dx: Cardiogenic shock, AD-Bi-V failure, Severe AS with TAVR failure,  - patient is sitting comfortably in chair with no complaints of chest pain or shortness of breath   - recommend to wean off Midodrine 10mg po q 8 hrs to 5mg po q 8 hrs  - as Midodrine is weaned will be able to start GDMT, started on Coreg 3.125mg po q 12hrs (as per HF)   - continue with Amiodarone for VT   - will consider LifeVest on discharge   - repeat TTE next week to assess for any interval improvement in LVEF     Yazmin Menezes D.O. [facility]  Cardiology/Vascular Cardiology -[facility] Cardiology   Telephone # [phone].

## 2021-07-04 NOTE — PROGRESS NOTE ADULT - PROBLEM SELECTOR PLAN 1
Now s/p Valve in Valve TAVR with Dr. Campbell on 6/23  Repeat echo revealed EF 20-25% from 30-35% prior with midly enlarged RV, severely decreased LV Fxn, moderate MR, Severe TR and mild Pulm Regurgitation  Midodrine now prn for SBP < 100  Coreg started @ 3.125 BID as per heart failure team recommendations  Will need Ace when able to tolerate to complete GDMT for heart failure  Continue Amio for runs of SVT post op   Continue GI ppx with Protonix and Senna, DVT ppx with SCD boots  Continue Dysphagia 2 diet with honey thick liquids as per speech/swallow eval

## 2021-07-05 LAB
ANION GAP SERPL CALC-SCNC: 14 MMOL/L — SIGNIFICANT CHANGE UP (ref 5–17)
BUN SERPL-MCNC: 33.4 MG/DL — HIGH (ref 8–20)
CALCIUM SERPL-MCNC: 9.3 MG/DL — SIGNIFICANT CHANGE UP (ref 8.6–10.2)
CHLORIDE SERPL-SCNC: 101 MMOL/L — SIGNIFICANT CHANGE UP (ref 98–107)
CO2 SERPL-SCNC: 24 MMOL/L — SIGNIFICANT CHANGE UP (ref 22–29)
CREAT SERPL-MCNC: 4.78 MG/DL — HIGH (ref 0.5–1.3)
GLUCOSE SERPL-MCNC: 87 MG/DL — SIGNIFICANT CHANGE UP (ref 70–99)
HCT VFR BLD CALC: 30.6 % — LOW (ref 39–50)
HGB BLD-MCNC: 9.4 G/DL — LOW (ref 13–17)
INR BLD: 1.46 RATIO — HIGH (ref 0.88–1.16)
MAGNESIUM SERPL-MCNC: 2.1 MG/DL — SIGNIFICANT CHANGE UP (ref 1.6–2.6)
MCHC RBC-ENTMCNC: 30.5 PG — SIGNIFICANT CHANGE UP (ref 27–34)
MCHC RBC-ENTMCNC: 30.7 GM/DL — LOW (ref 32–36)
MCV RBC AUTO: 99.4 FL — SIGNIFICANT CHANGE UP (ref 80–100)
PHOSPHATE SERPL-MCNC: 3.8 MG/DL — SIGNIFICANT CHANGE UP (ref 2.4–4.7)
PLATELET # BLD AUTO: 216 K/UL — SIGNIFICANT CHANGE UP (ref 150–400)
POTASSIUM SERPL-MCNC: 4.2 MMOL/L — SIGNIFICANT CHANGE UP (ref 3.5–5.3)
POTASSIUM SERPL-SCNC: 4.2 MMOL/L — SIGNIFICANT CHANGE UP (ref 3.5–5.3)
PROTHROM AB SERPL-ACNC: 16.6 SEC — HIGH (ref 10.6–13.6)
RBC # BLD: 3.08 M/UL — LOW (ref 4.2–5.8)
RBC # FLD: 21.9 % — HIGH (ref 10.3–14.5)
SODIUM SERPL-SCNC: 139 MMOL/L — SIGNIFICANT CHANGE UP (ref 135–145)
WBC # BLD: 8.54 K/UL — SIGNIFICANT CHANGE UP (ref 3.8–10.5)
WBC # FLD AUTO: 8.54 K/UL — SIGNIFICANT CHANGE UP (ref 3.8–10.5)

## 2021-07-05 PROCEDURE — 71045 X-RAY EXAM CHEST 1 VIEW: CPT | Mod: 26

## 2021-07-05 PROCEDURE — 90937 HEMODIALYSIS REPEATED EVAL: CPT

## 2021-07-05 PROCEDURE — 99232 SBSQ HOSP IP/OBS MODERATE 35: CPT

## 2021-07-05 PROCEDURE — 93010 ELECTROCARDIOGRAM REPORT: CPT

## 2021-07-05 RX ADMIN — CHLORHEXIDINE GLUCONATE 1 APPLICATION(S): 213 SOLUTION TOPICAL at 05:49

## 2021-07-05 RX ADMIN — Medication 5 MILLIGRAM(S): at 21:10

## 2021-07-05 RX ADMIN — PANTOPRAZOLE SODIUM 40 MILLIGRAM(S): 20 TABLET, DELAYED RELEASE ORAL at 20:53

## 2021-07-05 RX ADMIN — BUDESONIDE AND FORMOTEROL FUMARATE DIHYDRATE 2 PUFF(S): 160; 4.5 AEROSOL RESPIRATORY (INHALATION) at 11:45

## 2021-07-05 RX ADMIN — ERYTHROPOIETIN 12000 UNIT(S): 10000 INJECTION, SOLUTION INTRAVENOUS; SUBCUTANEOUS at 16:11

## 2021-07-05 RX ADMIN — Medication 1 MILLIGRAM(S): at 11:44

## 2021-07-05 RX ADMIN — AMIODARONE HYDROCHLORIDE 200 MILLIGRAM(S): 400 TABLET ORAL at 05:49

## 2021-07-05 RX ADMIN — HEPARIN SODIUM 5000 UNIT(S): 5000 INJECTION INTRAVENOUS; SUBCUTANEOUS at 05:52

## 2021-07-05 RX ADMIN — BUDESONIDE AND FORMOTEROL FUMARATE DIHYDRATE 2 PUFF(S): 160; 4.5 AEROSOL RESPIRATORY (INHALATION) at 20:55

## 2021-07-05 RX ADMIN — Medication 81 MILLIGRAM(S): at 11:44

## 2021-07-05 RX ADMIN — Medication 500 MILLIGRAM(S): at 11:44

## 2021-07-05 RX ADMIN — CARVEDILOL PHOSPHATE 3.12 MILLIGRAM(S): 80 CAPSULE, EXTENDED RELEASE ORAL at 05:54

## 2021-07-05 RX ADMIN — MIRTAZAPINE 15 MILLIGRAM(S): 45 TABLET, ORALLY DISINTEGRATING ORAL at 11:44

## 2021-07-05 RX ADMIN — MEGESTROL ACETATE 400 MILLIGRAM(S): 40 SUSPENSION ORAL at 11:44

## 2021-07-05 RX ADMIN — HEPARIN SODIUM 5000 UNIT(S): 5000 INJECTION INTRAVENOUS; SUBCUTANEOUS at 20:56

## 2021-07-05 RX ADMIN — ATORVASTATIN CALCIUM 10 MILLIGRAM(S): 80 TABLET, FILM COATED ORAL at 21:10

## 2021-07-05 RX ADMIN — TAMSULOSIN HYDROCHLORIDE 0.4 MILLIGRAM(S): 0.4 CAPSULE ORAL at 21:09

## 2021-07-05 RX ADMIN — CARVEDILOL PHOSPHATE 3.12 MILLIGRAM(S): 80 CAPSULE, EXTENDED RELEASE ORAL at 20:53

## 2021-07-05 RX ADMIN — SENNA PLUS 2 TABLET(S): 8.6 TABLET ORAL at 21:10

## 2021-07-05 RX ADMIN — Medication 1 PACKET(S): at 05:50

## 2021-07-05 RX ADMIN — PANTOPRAZOLE SODIUM 40 MILLIGRAM(S): 20 TABLET, DELAYED RELEASE ORAL at 05:54

## 2021-07-05 RX ADMIN — Medication 1 TABLET(S): at 11:44

## 2021-07-05 NOTE — PROGRESS NOTE ADULT - SUBJECTIVE AND OBJECTIVE BOX
Hospital course:    transfer from [place], with severe bioprosthetic AI, EF< 20%, Severe RV dysfunction and RONNELL    TF Valve in Valve TAVR via LCFA Cutdown (Hector) using sentinel device. Came out of OR intubated, on epi, , levo, primacor and nitric.    CVVH initiated    groin site from [place] bleeding significantly, required 2 platelets (thrombocytopenic with platelet count of 47.    extubated    primacor weaned off, right permacath inserted       Significant recent/past 24 hr events: Was having issues with agitation the other day, some sun downing. No issues tonight. Hemodynamically stable.     Subjective:  no complaints at this time     Review of System: no complaints at this time resting comofrtably       Patient is a 76y old  Male who presents with a chief complaint of TAVR Failure (2021 16:46)    HPI:  76 year old male patient with a medical history of MI in  (angiogram, no stents placed), COPD (2L O2 at home), s/p TAVR (2019 at [facility]), gout, CKD, CVA (2020), pulmonary HTN, initially presented to [facility] 21 with RONNELL on CKD (likely cardiorenal syndrome), urinary retention due to noncompliance with medications, with hospital course complicated by cardiogenic shock, acute hypoxemic respiratory failure secondary to metabolic acidosis and respiratory alkalosis. Patient found to have TAVR failure with ISRAEL 6/10/21 showing EF 40-45%, Aortic valve prosthesis with Severe paravalvular leak and valvular AI, mild-moderate aortic stenosis, and moderate MR. Patient was transferred to [facility] under Dr. Campbell for further workup.     Imaging from [facility]:  : CXR shwoing cardiomegaly, small b/l pleural effusions, moderate pulmonary vascular congestion.  : CT Brain ordered for head trauma? showing age-related cerebral and cerebellar volume loss, mild chronic vascular ischemic changes, stable biparietal arachnoid cyst and stable midline posterior fossa arachnoid cyst.   : US Abdomen for elevated LFTs showing mild hepatic steatosis, mild hepatomegaly, sludge in the gallbladder, no discrete gallstones, normal biliary ducts, mild echogenic right kidney which may be seen with medical renal dz, mildly complex Right upper pole 4cm cyst with subtle internal echoes may represent hemorrhagic/proteinaceous cyst, mildly complex right midpole 2cm cyst with thin internal linear septation.   6/3: Kidney and Bladder US showing no hydronephrosis, echogenic kidneys likely from medical renal dz, prostatomegaly, and thickening of the posterior wall of the bladder with trabeculations which could be due to bladder outlet obstruction.   : Kidney and Bladder US: No hydronephrosis or shadowing renal calculi. Stable b/l renal cysts.   : Lower Extremity Venous Doppler with no DVT noted.   : TTE showing EF 47%, dilated IVC, dilation of the ascending aorta of 4.4cm, moderate-severe pulmonary HTN, moderate-severe TR, aortic valve with severe prosthesis regurgitation and moderate prosthesis stenosis  : CT Chest showing emphysema and intersitial lung dz changes, stable b/l small pleural effusions, cardiomegaly.   : ISRAEL showing EF 40-45%, Aortic valve prosthesis with Severe paravalvular leak and valvular AI, mild-moderate aortic stenosis, and moderate MR.  (2021 01:36)    PAST MEDICAL & SURGICAL HISTORY:  Pulmonary hypertension    Hypertension    Hyperlipidemia    H/O aortic valve stenosis  s/p TAVR 2019 at [place]    CVA (cerebrovascular accident)  MCA CVA with tPA and thrombectomy    Chronic kidney disease    Prediabetes    Mitral valve regurgitation    CAD in native artery    Aneurysm of aortic root  thoracic aortic aneurysm, without ruptur    Benign prostatic hyperplasia    Gout    History of transcatheter aortic valve replacement (TAVR)      FAMILY HISTORY:  FH: lung cancer        Vitals   ICU Vital Signs Last 24 Hrs  T(C): 36.4 (2021 20:00), Max: 36.4 (2021 20:00)  T(F): 97.5 (2021 20:00), Max: 97.5 (2021 20:00)  HR: 77 (2021 23:00) (77 - 96)  BP: 118/76 (2021 23:00) (107/67 - 175/87)  BP(mean): 93 (2021 23:00) (83 - 118)  ABP: --  ABP(mean): --  RR: 15 (2021 23:00) (14 - 42)  SpO2: 99% (2021 23:00) (73% - 100%)      VENT SETTINGS       I&O's Detail    2021 07:01  -  2021 07:00  --------------------------------------------------------  IN:    Oral Fluid: 450 mL  Total IN: 450 mL    OUT:    Indwelling Catheter - Urethral (mL): 30 mL  Total OUT: 30 mL    Total NET: 420 mL      2021 07:01  -  2021 00:11  --------------------------------------------------------  IN:    Oral Fluid: 100 mL  Total IN: 100 mL    OUT:  Total OUT: 0 mL    Total NET: 100 mL          LABS                        9.3    8.27  )-----------( 171      ( 2021 03:10 )             28.4     07-03    139  |  102  |  17.2  ----------------------------<  78  4.2   |  24.0  |  2.21<H>    Ca    9.0      2021 03:10  Phos  2.1     07-03  Mg     2.0     07-03      PT/INR - ( 2021 02:35 )   PT: 17.6 sec;   INR: 1.55 ratio                         MEDICATIONS  (STANDING):  aMIOdarone    Tablet 200 milliGRAM(s) Oral daily  aMIOdarone    Tablet   Oral   ascorbic acid 500 milliGRAM(s) Oral daily  aspirin  chewable 81 milliGRAM(s) Oral daily  atorvastatin 10 milliGRAM(s) Oral at bedtime  budesonide 160 MICROgram(s)/formoterol 4.5 MICROgram(s) Inhaler 2 Puff(s) Inhalation two times a day  carvedilol 3.125 milliGRAM(s) Oral every 12 hours  chlorhexidine 4% Liquid 1 Application(s) Topical <User Schedule>  epoetin yolanda-epbx (RETACRIT) Injectable 52578 Unit(s) IV Push <User Schedule>  folic acid 1 milliGRAM(s) Oral daily  heparin   Injectable 5000 Unit(s) SubCutaneous every 12 hours  iron sucrose IVPB 50 milliGRAM(s) IV Intermittent <User Schedule>  megestrol Suspension 400 milliGRAM(s) Oral daily  melatonin 5 milliGRAM(s) Oral at bedtime  mirtazapine 15 milliGRAM(s) Oral daily  Nephro-melissa 1 Tablet(s) Oral daily  pantoprazole   Suspension 40 milliGRAM(s) Oral every 12 hours  psyllium Powder 1 Packet(s) Oral two times a day  senna 2 Tablet(s) Oral at bedtime  tamsulosin 0.4 milliGRAM(s) Oral at bedtime    MEDICATIONS  (PRN):  acetaminophen   Tablet .. 650 milliGRAM(s) Oral every 6 hours PRN Mild Pain (1 - 3)  ALBUTerol    90 MICROgram(s) HFA Inhaler 2 Puff(s) Inhalation every 6 hours PRN Shortness of Breath and/or Wheezing  midodrine 10 milliGRAM(s) Oral every 8 hours PRN SBP < 100  sodium chloride 0.9% lock flush 10 milliLiter(s) IV Push every 1 hour PRN Pre/post blood products, medications, blood draw, and to maintain line patency    Allergies:  No Known Drug Allergies  strawberry (Anaphylaxis)      Physical Exam:   General:   no acute distress  Neurology:  alert and oriented X 4, nonfocal, no gross deficits, occasionally sundowns at night  Respiratory:  clear to auscultation bilaterally  CV:  regular rate and rhythm, normal S1 S2  Abdomen:  soft, nontender, nondistended, positive bowel sounds  Extremities:  warm, well perfused, no edema +DP pulses

## 2021-07-05 NOTE — PROGRESS NOTE ADULT - SUBJECTIVE AND OBJECTIVE BOX
[facility] DIVISION OF KIDNEY DISEASES AND HYPERTENSION -- HEMODIALYSIS NOTE  --------------------------------------------------------------------------------  Chief Complaint: ESRD/Ongoing hemodialysis requirement    24 hour events/subjective:        PAST HISTORY  --------------------------------------------------------------------------------  No significant changes to PMH, PSH, FHx, SHx, unless otherwise noted    ALLERGIES & MEDICATIONS  --------------------------------------------------------------------------------  Allergies    No Known Drug Allergies  strawberry (Anaphylaxis)    Intolerances      Standing Inpatient Medications  aMIOdarone    Tablet 200 milliGRAM(s) Oral daily  aMIOdarone    Tablet   Oral   ascorbic acid 500 milliGRAM(s) Oral daily  aspirin  chewable 81 milliGRAM(s) Oral daily  atorvastatin 10 milliGRAM(s) Oral at bedtime  budesonide 160 MICROgram(s)/formoterol 4.5 MICROgram(s) Inhaler 2 Puff(s) Inhalation two times a day  carvedilol 3.125 milliGRAM(s) Oral every 12 hours  chlorhexidine 4% Liquid 1 Application(s) Topical <User Schedule>  epoetin yolanda-epbx (RETACRIT) Injectable 45351 Unit(s) IV Push <User Schedule>  folic acid 1 milliGRAM(s) Oral daily  heparin   Injectable 5000 Unit(s) SubCutaneous every 12 hours  iron sucrose IVPB 50 milliGRAM(s) IV Intermittent <User Schedule>  megestrol Suspension 400 milliGRAM(s) Oral daily  melatonin 5 milliGRAM(s) Oral at bedtime  mirtazapine 15 milliGRAM(s) Oral daily  Nephro-melissa 1 Tablet(s) Oral daily  pantoprazole   Suspension 40 milliGRAM(s) Oral every 12 hours  psyllium Powder 1 Packet(s) Oral two times a day  senna 2 Tablet(s) Oral at bedtime  tamsulosin 0.4 milliGRAM(s) Oral at bedtime    PRN Inpatient Medications  acetaminophen   Tablet .. 650 milliGRAM(s) Oral every 6 hours PRN  ALBUTerol    90 MICROgram(s) HFA Inhaler 2 Puff(s) Inhalation every 6 hours PRN  midodrine 10 milliGRAM(s) Oral every 8 hours PRN  sodium chloride 0.9% lock flush 10 milliLiter(s) IV Push every 1 hour PRN      REVIEW OF SYSTEMS  --------------------------------------------------------------------------------  Gen: No weight changes, fatigue, fevers/chills, weakness  Skin: No rashes  Head/Eyes/Ears/Mouth: No headache  Respiratory: No dyspnea, cough,  CV: No chest pain, orthopnea  GI: No abdominal pain, diarrhea, constipation, nausea, vomiting,  MSK: No joint pain  Neuro: No dizziness/lightheadedness, weakness  Heme: No bleeding  Psych: No significant nervousness, anxiety, stress, depression    All other systems were reviewed and are negative, except as noted.    VITALS/PHYSICAL EXAM  --------------------------------------------------------------------------------  T(C): 36.3 (07-05-21 @ 09:45), Max: 36.6 (07-05-21 @ 00:10)  HR: 76 (07-05-21 @ 09:45) (76 - 90)  BP: 105/67 (07-05-21 @ 09:45) (105/67 - 129/81)  RR: 17 (07-05-21 @ 09:45) (14 - 35)  SpO2: 97% (07-05-21 @ 09:45) (91% - 100%)  Wt(kg): --        07-04-21 @ 07:01  -  07-05-21 @ 07:00  --------------------------------------------------------  IN: 200 mL / OUT: 0 mL / NET: 200 mL      Physical Exam:  	Gen: NAD  	HEENT:  supple neck  	Pulm: CTA B/L  	CV: RRR, S1S2; no rub  	Abd: +BS, soft, nontender  	UE: Warm, no asterixis  	LE: Warm, no edema  	Neuro: No focal deficits  	Psych: Normal affect and mood  	Skin: Warm      LABS/STUDIES  --------------------------------------------------------------------------------          PT/INR: PT 16.6 , INR 1.46       [07-05-21 @ 06:45]      TSH 2.31      [06-12-21 @ 01:55]    HBsAb <3.0      [06-17-21 @ 05:13]  HBsAg Nonreact      [06-17-21 @ 05:13]  HBcAb Nonreact      [06-17-21 @ 05:13]  HCV 0.15, Nonreact      [06-17-21 @ 05:13]

## 2021-07-05 NOTE — CHART NOTE - NSCHARTNOTEFT_GEN_A_CORE
Source: Patient [x ]  Family [ ]   other [ ]    Current Diet: Diet, DASH/TLC:   Sodium & Cholesterol Restricted  Consistent Carbohydrate {No Snacks} (CSTCHO)  For patients receiving Renal Replacement - No Protein Restr, No Conc K, No Conc Phos, Low  Sodium (RENAL) (07-04-21 @ 09:06)    PO intake:  Pt reports fair po intake at meals; consumes small portions at meals.  Pt consumed cold cereal this morning per report and tray observation at bedside    Current Weight:   (7/5) 208.9 lbs    (6/12) 220.6 lbs    % Weight Change     Pertinent Medications: MEDICATIONS  (STANDING):  aMIOdarone    Tablet 200 milliGRAM(s) Oral daily  aMIOdarone    Tablet   Oral   ascorbic acid 500 milliGRAM(s) Oral daily  aspirin  chewable 81 milliGRAM(s) Oral daily  atorvastatin 10 milliGRAM(s) Oral at bedtime  budesonide 160 MICROgram(s)/formoterol 4.5 MICROgram(s) Inhaler 2 Puff(s) Inhalation two times a day  carvedilol 3.125 milliGRAM(s) Oral every 12 hours  chlorhexidine 4% Liquid 1 Application(s) Topical <User Schedule>  epoetin yolanda-epbx (RETACRIT) Injectable 47003 Unit(s) IV Push <User Schedule>  folic acid 1 milliGRAM(s) Oral daily  heparin   Injectable 5000 Unit(s) SubCutaneous every 12 hours  iron sucrose IVPB 50 milliGRAM(s) IV Intermittent <User Schedule>  megestrol Suspension 400 milliGRAM(s) Oral daily  melatonin 5 milliGRAM(s) Oral at bedtime  mirtazapine 15 milliGRAM(s) Oral daily  Nephro-melissa 1 Tablet(s) Oral daily  pantoprazole   Suspension 40 milliGRAM(s) Oral every 12 hours  psyllium Powder 1 Packet(s) Oral two times a day  senna 2 Tablet(s) Oral at bedtime  tamsulosin 0.4 milliGRAM(s) Oral at bedtime    MEDICATIONS  (PRN):  acetaminophen   Tablet .. 650 milliGRAM(s) Oral every 6 hours PRN Mild Pain (1 - 3)  ALBUTerol    90 MICROgram(s) HFA Inhaler 2 Puff(s) Inhalation every 6 hours PRN Shortness of Breath and/or Wheezing  midodrine 10 milliGRAM(s) Oral every 8 hours PRN SBP < 100  sodium chloride 0.9% lock flush 10 milliLiter(s) IV Push every 1 hour PRN Pre/post blood products, medications, blood draw, and to maintain line patency    Pertinent Labs: NA    Skin: no skin breakdown noted     Nutrition focused physical exam previously conducted - found signs of malnutrition [ ]absent [x ]present    Subcutaneous fat loss: [x ] Orbital fat pads region, [x ]Buccal fat region, [ ]Triceps region,  [ ]Ribs region    Muscle wasting: [x ]Temples region, [x ]Clavicle region, [ x]Shoulder region, [ ]Scapula region, [ ]Interosseous region,  [ ]thigh region, [ ]Calf region    Current Nutrition Diagnosis: Pt remains at high nutrition risk secondary to severe protein calorie malnutrition (acute on chronic) related to inability to consume sufficient protein energy intake with consumption of small portions at meal in setting of aortic valve prosthesis with severe paravalvular leak and valvular AI, mild-moderate aortic stenosis, moderate MR, s/p valve in valve TAVR, post op requiring vent support and CVVHD and now with erosive gastritis s/p colonoscopy as evidenced by pt meeting <50% estimated energy intake > 5 days, severe muscle wasting/fat loss and 31% unintentional wt loss x 4 months prior to admission.   Pt reports fair po intake at meals, but consumes small portions.    Recommendations:   1. RX: Nepro BID  2. Continue with Nephro-melissa daily  3. Monitor daily wts     Monitoring and Evaluation:   [x ] PO intake [ x] Tolerance to diet prescription [X] Weights  [X] Follow up per protocol [X] Labs: Source: Patient [x ]  Family [ ]   other [ ]    Current Diet: Diet, DASH/TLC:   Sodium & Cholesterol Restricted  Consistent Carbohydrate {No Snacks} (CSTCHO)  For patients receiving Renal Replacement - No Protein Restr, No Conc K, No Conc Phos, Low  Sodium (RENAL) (07-04-21 @ 09:06)    PO intake:  Pt reports fair po intake at meals; consumes small portions at meals.  Pt consumed cold cereal this morning per report and tray observation at bedside    Current Weight:   (7/5) 208.9 lbs  (6/23) 220.6 lbs  (6/12) 219.3 lbs    % Weight Change - wt fluctuations noted, will continue to monitor for accuracy.  Aware pt with 1+ trace generalized edema and 2+ dependent/left foot edema noted per documentation.    Pertinent Medications: MEDICATIONS  (STANDING):  aMIOdarone    Tablet 200 milliGRAM(s) Oral daily  aMIOdarone    Tablet   Oral   ascorbic acid 500 milliGRAM(s) Oral daily  aspirin  chewable 81 milliGRAM(s) Oral daily  atorvastatin 10 milliGRAM(s) Oral at bedtime  budesonide 160 MICROgram(s)/formoterol 4.5 MICROgram(s) Inhaler 2 Puff(s) Inhalation two times a day  carvedilol 3.125 milliGRAM(s) Oral every 12 hours  chlorhexidine 4% Liquid 1 Application(s) Topical <User Schedule>  epoetin yolanda-epbx (RETACRIT) Injectable 92640 Unit(s) IV Push <User Schedule>  folic acid 1 milliGRAM(s) Oral daily  heparin   Injectable 5000 Unit(s) SubCutaneous every 12 hours  iron sucrose IVPB 50 milliGRAM(s) IV Intermittent <User Schedule>  megestrol Suspension 400 milliGRAM(s) Oral daily  melatonin 5 milliGRAM(s) Oral at bedtime  mirtazapine 15 milliGRAM(s) Oral daily  Nephro-melissa 1 Tablet(s) Oral daily  pantoprazole   Suspension 40 milliGRAM(s) Oral every 12 hours  psyllium Powder 1 Packet(s) Oral two times a day  senna 2 Tablet(s) Oral at bedtime  tamsulosin 0.4 milliGRAM(s) Oral at bedtime    MEDICATIONS  (PRN):  acetaminophen   Tablet .. 650 milliGRAM(s) Oral every 6 hours PRN Mild Pain (1 - 3)  ALBUTerol    90 MICROgram(s) HFA Inhaler 2 Puff(s) Inhalation every 6 hours PRN Shortness of Breath and/or Wheezing  midodrine 10 milliGRAM(s) Oral every 8 hours PRN SBP < 100  sodium chloride 0.9% lock flush 10 milliLiter(s) IV Push every 1 hour PRN Pre/post blood products, medications, blood draw, and to maintain line patency    Pertinent Labs: NA    Skin: no skin breakdown noted     Nutrition focused physical exam previously conducted - found signs of malnutrition [ ]absent [x ]present    Subcutaneous fat loss: [x ] Orbital fat pads region, [x ]Buccal fat region, [ ]Triceps region,  [ ]Ribs region    Muscle wasting: [x ]Temples region, [x ]Clavicle region, [ x]Shoulder region, [ ]Scapula region, [ ]Interosseous region,  [ ]thigh region, [ ]Calf region    Current Nutrition Diagnosis: Pt remains at high nutrition risk secondary to severe protein calorie malnutrition (acute on chronic) related to inability to consume sufficient protein energy intake with consumption of small portions at meal in setting of aortic valve prosthesis with severe paravalvular leak and valvular AI, mild-moderate aortic stenosis, moderate MR, s/p valve in valve TAVR, post op requiring vent support and CVVHD and now with erosive gastritis s/p colonoscopy as evidenced by pt meeting <50% estimated energy intake > 5 days, severe muscle wasting/fat loss and 31% unintentional wt loss x 4 months prior to admission.   Pt reports fair po intake at meals, but consumes small portions.    Recommendations:   1. RX: Nepro BID  2. Continue with Nephro-melissa daily  3. Monitor daily wts     Monitoring and Evaluation:   [x ] PO intake [ x] Tolerance to diet prescription [X] Weights  [X] Follow up per protocol [X] Labs:

## 2021-07-05 NOTE — PROGRESS NOTE ADULT - ASSESSMENT
76M with a PMH of MI in 1995 (angiogram, no stents placed), COPD (2L O2 at home), severe AS s/p TAVR (03/2019 at [facility]), gout, CKD, CVA (no deficits, 09/2020), pulmonary HTN, initially presented to [facility] 6/1/21 with RONNELL on CKD, hospital course significant for cardiogenic shock, acute hypoxemic respiratory failure, and acute on chronic combined diastolic and systolic heart failure. ISRAEL revealed severe AI. Patient was transferred to [facility] under Dr. Campbell for further workup of bioprosthetic AI.     6/28 s/p BAL, sputum samples sent m ax switched to [facility]     Inpatient hospital course has been significant for:  1. 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 RONNELL on CKD progressed to ESRD requiring HD   3. Unintentional Weight loss / Dysphagia / Anorexia work up revealing duodenitis and diffuse erosive esophagitis, gastritis on EGD (biopsies negative for H. Pylori or carcinoma); colonoscopy showing diverticulosis  4. R/O AV Endocarditis   5. Auto-elevated INR (followed by Hematology)  6. R/O WILLEM   7. Adjustment disorder (evaluated by Behavioral Health)  8. Thrombocytopenia, Hematology consulted, r/o DIC    Patient is now s/p valve in valve TAVR on 6/23/21 with Dr. Campbell.     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 pt remains on epi, amio, primacor and is tolerating CVVHD  6/30 Pt now on HD

## 2021-07-05 NOTE — PROGRESS NOTE ADULT - ASSESSMENT
76M h/o CAD MI in 1995 (angiogram, no stents placed), COPD (2L O2 at home), s/p TAVR (03/2019 at [facility]), gout, CKD, CVA (09/2020), pulmonary HTN, initially presented to [facility] 6/1/21 with RONNELL on CKD (likely cardiorenal syndrome), urinary retention due to noncompliance with medications, with hospital course complicated by cardiogenic shock, acute hypoxemic respiratory failure secondary to metabolic acidosis and respiratory alkalosis. Patient found to have TAVR failure with ISRAEL 6/10/21 showing initial EF 40-45%, Aortic valve prosthesis with Severe paravalvular leak and valvular AI, mild-moderate aortic stenosis, and moderate MR. Decompensated HF and severe pulmonary hypertension, underwent valve-in-valve TAVR 6/23/21, course complicated by worsening ADHF/cardiogenic shock with VT, intubated and RONNELL on CKD required CVVHD, repeat TTE with worsening LV EF 20-25%.     7/4: Pt denies chest pain, palpitations, SOB. Tele- NSR HR @ 80-90s with periods of -110s, occasional PVCs. Pt has worsening lower extremity edema today but no complaints of SOB or signs of fluid overload on lung exam. Pt is on HD M-W-F schedule.  Pt started on Coreg as per HF team recommendations, increase dose as BP tolerates. Repeat echo next week to reassess worsening EF.        1) Cardiogenic shock, AD-Bi-V failure, with LVEF 20-25%   - currently on oral pressors, Midodrine 10mg po q 8hrs PRN, would recommend only on dialysis days   - Continue HD for fluid removal  - on Coreg 3.125mg po q 12hrs, may consider uptitrating or starting ACEI/ARB or ARNI ( Entresto 24/26) if blood pressure can tolerate to optimize GDMT for HFrEF   - Strict Is and Os, ultrafiltration with HD   - Repeat echo next week to reassess worsening EF       2) TAVR failure   -s/p Amy  -Continue ASA 81mg    3) VT  -on amiodarone  -Tele-NSR HR @ 80-90s with periods of -110s, occasional PVCs  -Lytes WNL   -Plan for ICD vs. lifevest prior to DC       3) RONNELL on CKD  - oliguria  - Cont. HD    4) Respiratory Failure  -COPD  -pulmonary toilet, BiPAP as needed    Yazmin Menezes D.O. [facility]  Cardiology/Vascular Cardiology -[facility]   Telephone # [phone]

## 2021-07-05 NOTE — PROGRESS NOTE ADULT - PROBLEM SELECTOR PLAN 1
Now s/p Valve in Valve TAVR with Dr. Campbell on 6/23  Repeat echo revealed EF 20-25% from 30-35% prior with midly enlarged RV, severely decreased LV Fxn, moderate MR, Severe TR and mild Pulm Regurgitation  Midodrine now prn for SBP < 100  Coreg started @ 3.125 BID as per heart failure team recommendations  Will need Ace when able to tolerate to complete GDMT for heart failure  Continue Amio for runs of SVT post op   Continue GI ppx with Protonix and Senna, DVT ppx with SCD boots  Advanced diet to DASH per Dr. Campbell   Patient may be discharged with life vest per cardiology.

## 2021-07-05 NOTE — PROGRESS NOTE ADULT - SUBJECTIVE AND OBJECTIVE BOX
[place] [facility]                                                               Office: [address] [address]                                                              Telephone: [phone]. Fax:[phone]                                                                             PROGRESS NOTE  Reason for follow up: TAVR failure with HFrEF   Overnight: No new events.   Update:   No acute events overnight   Subjective: "  ______________________"      	  Vitals:  T(C): 36.3 (07-05-21 @ 05:41), Max: 36.6 (07-05-21 @ 00:10)  HR: 82 (07-05-21 @ 05:41) (77 - 92)  BP: 129/81 (07-05-21 @ 05:41) (107/67 - 175/87)  RR: 16 (07-05-21 @ 05:41) (14 - 36)  SpO2: 98% (07-05-21 @ 05:41) (91% - 100%)  Wt(kg): --  I&O's Summary    04 Jul 2021 07:01  -  05 Jul 2021 07:00  --------------------------------------------------------  IN: 200 mL / OUT: 0 mL / NET: 200 mL            PHYSICAL EXAM:  Appearance: Comfortable. No acute distress  HEENT:  Head and neck: Atraumatic. Normocephalic.  Normal oral mucosa, PERRL, Neck is supple. No JVD, No carotid bruit.   Neurologic: A & O x 3, no focal deficits. EOMI.  Lymphatic: No cervical lymphadenopathy  Cardiovascular: Normal S1 S2, No murmur, rubs/gallops. No JVD, No edema  Respiratory: Lungs clear to auscultation  Gastrointestinal:  Soft, Non-tender, + BS  Lower Extremities: No edema  Psychiatry: Patient is calm. No agitation. Mood & affect appropriate  Skin: No rashes/ ecchymoses/cyanosis/ulcers visualized on the face, hands or feet.      CURRENT MEDICATIONS:  aMIOdarone    Tablet 200 milliGRAM(s) Oral daily  aMIOdarone    Tablet   Oral   carvedilol 3.125 milliGRAM(s) Oral every 12 hours  midodrine 10 milliGRAM(s) Oral every 8 hours PRN  tamsulosin 0.4 milliGRAM(s) Oral at bedtime    budesonide 160 MICROgram(s)/formoterol 4.5 MICROgram(s) Inhaler  melatonin  mirtazapine  pantoprazole   Suspension  psyllium Powder  senna  atorvastatin  megestrol Suspension  ascorbic acid  aspirin  chewable  chlorhexidine 4% Liquid  epoetin yolanda-epbx (RETACRIT) Injectable  folic acid  heparin   Injectable  iron sucrose IVPB  Nephro-melissa      DIAGNOSTIC TESTING:  [x ] Echocardiogram:   < from: TTE Echo Complete w/ Contrast w/ Doppler (06.26.21 @ 10:36) >  Summary:   1. Endocardial visualization was enhanced with intravenous echo contrast.   2. Left ventricular ejection fraction, by visual estimation, is 20 to 25%.   3. Severely decreased global left ventricular systolic function.   4. Mildly increased LV wall thickness.   5. The mitral in-flow pattern reveals no discernable A-wave, therefore no comment on diastolic function can be made.   6. Mildly enlarged right ventricle.   7. Mild thickening of the anterior and posterior mitral valve leaflets.   8. Moderate mitral valve regurgitation.   9. Severe tricuspid regurgitation.  10. TAVR in the aortic position. Appears well seated with no abnormal rocking motion. Mild to moderate paravalvular aortic regurgitation is seen. Peak velocity is 2.26 m/s, MG is 11.2 mmHg, and PG is 20.5 mmHg, which are acceptable in the setting of a prosethetic aortic valve.  11. Mild pulmonic valve regurgitation.  12. Compared with prior study dated 6/23/21, the LVEF appears decreased to 20-25%. MR now appears moderate and TR appears severe. Results were discussed with CT surgery.    LAMAR Calero Electronically signed on 6/26/2021 at 6:23:07 PM    < end of copied text >    [ ]  Catheterization:  [ ] Stress Test:    OTHER: 	      LABS:	 	    proBNP:   Lipid Profile:   HgA1c:   TSH:       TELEMETRY: Reviewed    ECG:  Reviewed by me.

## 2021-07-05 NOTE — PROGRESS NOTE ADULT - PROBLEM SELECTOR PLAN 4
Cardiology following  Heart Failure team consulted and following  Coreg started @ 3.125 BID as per heart failure team recommendations  Will need Ace when able to tolerate to complete GDMT for heart failure  May go home with life vest  wean midodrine off

## 2021-07-06 LAB
ANION GAP SERPL CALC-SCNC: 11 MMOL/L — SIGNIFICANT CHANGE UP (ref 5–17)
BUN SERPL-MCNC: 18.5 MG/DL — SIGNIFICANT CHANGE UP (ref 8–20)
CALCIUM SERPL-MCNC: 9.2 MG/DL — SIGNIFICANT CHANGE UP (ref 8.6–10.2)
CHLORIDE SERPL-SCNC: 102 MMOL/L — SIGNIFICANT CHANGE UP (ref 98–107)
CO2 SERPL-SCNC: 27 MMOL/L — SIGNIFICANT CHANGE UP (ref 22–29)
CREAT SERPL-MCNC: 3.23 MG/DL — HIGH (ref 0.5–1.3)
GLUCOSE SERPL-MCNC: 88 MG/DL — SIGNIFICANT CHANGE UP (ref 70–99)
HCT VFR BLD CALC: 30.3 % — LOW (ref 39–50)
HGB BLD-MCNC: 9.5 G/DL — LOW (ref 13–17)
INR BLD: 1.49 RATIO — HIGH (ref 0.88–1.16)
MAGNESIUM SERPL-MCNC: 1.9 MG/DL — SIGNIFICANT CHANGE UP (ref 1.8–2.6)
MCHC RBC-ENTMCNC: 30.6 PG — SIGNIFICANT CHANGE UP (ref 27–34)
MCHC RBC-ENTMCNC: 31.4 GM/DL — LOW (ref 32–36)
MCV RBC AUTO: 97.7 FL — SIGNIFICANT CHANGE UP (ref 80–100)
PHOSPHATE SERPL-MCNC: 2.4 MG/DL — SIGNIFICANT CHANGE UP (ref 2.4–4.7)
PLATELET # BLD AUTO: 220 K/UL — SIGNIFICANT CHANGE UP (ref 150–400)
POTASSIUM SERPL-MCNC: 4.4 MMOL/L — SIGNIFICANT CHANGE UP (ref 3.5–5.3)
POTASSIUM SERPL-SCNC: 4.4 MMOL/L — SIGNIFICANT CHANGE UP (ref 3.5–5.3)
PROTHROM AB SERPL-ACNC: 17 SEC — HIGH (ref 10.6–13.6)
RBC # BLD: 3.1 M/UL — LOW (ref 4.2–5.8)
RBC # FLD: 21.9 % — HIGH (ref 10.3–14.5)
SODIUM SERPL-SCNC: 140 MMOL/L — SIGNIFICANT CHANGE UP (ref 135–145)
WBC # BLD: 8.62 K/UL — SIGNIFICANT CHANGE UP (ref 3.8–10.5)
WBC # FLD AUTO: 8.62 K/UL — SIGNIFICANT CHANGE UP (ref 3.8–10.5)

## 2021-07-06 PROCEDURE — 99233 SBSQ HOSP IP/OBS HIGH 50: CPT

## 2021-07-06 PROCEDURE — 99024 POSTOP FOLLOW-UP VISIT: CPT

## 2021-07-06 PROCEDURE — 99231 SBSQ HOSP IP/OBS SF/LOW 25: CPT

## 2021-07-06 RX ORDER — MIRTAZAPINE 45 MG/1
15 TABLET, ORALLY DISINTEGRATING ORAL AT BEDTIME
Refills: 0 | Status: DISCONTINUED | OUTPATIENT
Start: 2021-07-07 | End: 2021-07-10

## 2021-07-06 RX ORDER — CADEXOMER IODINE 0.9 %
1 PADS, MEDICATED (EA) TOPICAL
Refills: 0 | Status: DISCONTINUED | OUTPATIENT
Start: 2021-07-06 | End: 2021-07-10

## 2021-07-06 RX ADMIN — HEPARIN SODIUM 5000 UNIT(S): 5000 INJECTION INTRAVENOUS; SUBCUTANEOUS at 05:39

## 2021-07-06 RX ADMIN — Medication 1 MILLIGRAM(S): at 10:51

## 2021-07-06 RX ADMIN — PANTOPRAZOLE SODIUM 40 MILLIGRAM(S): 20 TABLET, DELAYED RELEASE ORAL at 18:14

## 2021-07-06 RX ADMIN — Medication 500 MILLIGRAM(S): at 10:50

## 2021-07-06 RX ADMIN — TAMSULOSIN HYDROCHLORIDE 0.4 MILLIGRAM(S): 0.4 CAPSULE ORAL at 20:59

## 2021-07-06 RX ADMIN — ATORVASTATIN CALCIUM 10 MILLIGRAM(S): 80 TABLET, FILM COATED ORAL at 20:59

## 2021-07-06 RX ADMIN — MIRTAZAPINE 15 MILLIGRAM(S): 45 TABLET, ORALLY DISINTEGRATING ORAL at 10:50

## 2021-07-06 RX ADMIN — AMIODARONE HYDROCHLORIDE 200 MILLIGRAM(S): 400 TABLET ORAL at 05:39

## 2021-07-06 RX ADMIN — HEPARIN SODIUM 5000 UNIT(S): 5000 INJECTION INTRAVENOUS; SUBCUTANEOUS at 18:14

## 2021-07-06 RX ADMIN — CHLORHEXIDINE GLUCONATE 1 APPLICATION(S): 213 SOLUTION TOPICAL at 05:46

## 2021-07-06 RX ADMIN — CARVEDILOL PHOSPHATE 3.12 MILLIGRAM(S): 80 CAPSULE, EXTENDED RELEASE ORAL at 18:14

## 2021-07-06 RX ADMIN — CARVEDILOL PHOSPHATE 3.12 MILLIGRAM(S): 80 CAPSULE, EXTENDED RELEASE ORAL at 05:39

## 2021-07-06 RX ADMIN — Medication 1 TABLET(S): at 10:50

## 2021-07-06 RX ADMIN — Medication 5 MILLIGRAM(S): at 20:59

## 2021-07-06 RX ADMIN — Medication 81 MILLIGRAM(S): at 10:50

## 2021-07-06 RX ADMIN — MEGESTROL ACETATE 400 MILLIGRAM(S): 40 SUSPENSION ORAL at 10:51

## 2021-07-06 RX ADMIN — PANTOPRAZOLE SODIUM 40 MILLIGRAM(S): 20 TABLET, DELAYED RELEASE ORAL at 05:39

## 2021-07-06 NOTE — PROGRESS NOTE ADULT - SUBJECTIVE AND OBJECTIVE BOX
Subjective: Patient lying in bed, no acute distress noted, denies chest pain, pressure, palpitation, shortness of breath, dizziness, abdominal pain, N/V/D.   VITAL SIGNS  Vital Signs Last 24 Hrs  T(C): 36.6 (21 @ 21:00), Max: 36.7 (21 @ 15:11)  T(F): 97.8 (21 @ 21:00), Max: 98 (21 @ 15:11)  HR: 86 (21 @ 21:00) (76 - 86)  BP: 108/64 (21 @ 21:00) (105/67 - 129/81)  RR: 18 (21 @ 21:00) (16 - 18)  SpO2: 98% (21 @ 21:00) (97% - 99%)  on 2L O2           Telemetry/Alarms:  SR  LVEF: 20%    MEDICATIONS  acetaminophen   Tablet .. 650 milliGRAM(s) Oral every 6 hours PRN  ALBUTerol    90 MICROgram(s) HFA Inhaler 2 Puff(s) Inhalation every 6 hours PRN  aMIOdarone    Tablet 200 milliGRAM(s) Oral daily  aMIOdarone    Tablet   Oral   ascorbic acid 500 milliGRAM(s) Oral daily  aspirin  chewable 81 milliGRAM(s) Oral daily  atorvastatin 10 milliGRAM(s) Oral at bedtime  budesonide 160 MICROgram(s)/formoterol 4.5 MICROgram(s) Inhaler 2 Puff(s) Inhalation two times a day  carvedilol 3.125 milliGRAM(s) Oral every 12 hours  chlorhexidine 4% Liquid 1 Application(s) Topical <User Schedule>  epoetin yolanda-epbx (RETACRIT) Injectable 35994 Unit(s) IV Push <User Schedule>  folic acid 1 milliGRAM(s) Oral daily  heparin   Injectable 5000 Unit(s) SubCutaneous every 12 hours  iron sucrose IVPB 50 milliGRAM(s) IV Intermittent <User Schedule>  megestrol Suspension 400 milliGRAM(s) Oral daily  melatonin 5 milliGRAM(s) Oral at bedtime  midodrine 10 milliGRAM(s) Oral every 8 hours PRN  mirtazapine 15 milliGRAM(s) Oral daily  Nephro-melissa 1 Tablet(s) Oral daily  pantoprazole   Suspension 40 milliGRAM(s) Oral every 12 hours  psyllium Powder 1 Packet(s) Oral two times a day  senna 2 Tablet(s) Oral at bedtime  sodium chloride 0.9% lock flush 10 milliLiter(s) IV Push every 1 hour PRN  tamsulosin 0.4 milliGRAM(s) Oral at bedtime      PHYSICAL EXAM  General:   no acute distress  Neurology:  alert and oriented X 4, nonfocal, no gross deficits, occasionally sundowns at night  Respiratory: breath sounds clear, diminished at the bases bilaterally  CV:  regular rate and rhythm, normal S1 S2  Abdomen:  soft, nontender, nondistended, positive bowel sounds  Extremities:  warm, well perfused, +2 BLE edema +DP pulses bilaterally  Skin: Left groin staples, C/D/I, no hematoma noted  Psych: Appropriate mood and affect      0704 @ 07:01  -  05 @ 07:00  --------------------------------------------------------  IN: 200 mL / OUT: 0 mL / NET: 200 mL     @ 07:01  -  06 @ 01:00  --------------------------------------------------------  IN: 1000 mL / OUT: 2350 mL / NET: -1350 mL      Weights:  Daily     Daily Weight in k.7 (2021 15:11)  Admit Wt: Drug Dosing Weight  Height (cm): 182.9 (2021 00:32)  Weight (kg): 100 (2021 08:12)  BMI (kg/m2): 29.9 (2021 08:12)  BSA (m2): 2.22 (2021 08:12)    All laboratory results, radiology and medications reviewed.    LABS      139  |  101  |  33.4<H>  ----------------------------<  87  4.2   |  24.0  |  4.78<H>    Ca    9.3      2021 17:19  Phos  3.8     07-05  Mg     2.1     07-05                                 9.4    8.54  )-----------( 216      ( 2021 17:19 )             30.6          PT/INR - ( 2021 06:45 )   PT: 16.6 sec;   INR: 1.46 ratio       < from: Xray Chest 1 View- PORTABLE-Routine (21 @ 06:11) >    IMPRESSION:  There is an indwelling tunneled double lumen catheter with distal tip in the SVC..  LEFT IJ catheter tip remains in SVC.  Otherwise no interval change    A FOLLOW-UP AP PORTABLE CHEST RADIOGRAPH 7/3/2021 AT 4:18 AM:  There is significant reduction of bilateral diffuse airspace disease and effusions.  LEFT and RIGHT diaphragmatic contours are visualized. Cathetersremain in place.  Otherwise no interval change.    < end of copied text >          PAST MEDICAL & SURGICAL HISTORY:  Pulmonary hypertension    Hypertension    Hyperlipidemia    H/O aortic valve stenosis  s/p TAVR 2019 at [place]    CVA (cerebrovascular accident)  MCA CVA with tPA and thrombectomy    Chronic kidney disease    Prediabetes    Mitral valve regurgitation    CAD in native artery    Aneurysm of aortic root  thoracic aortic aneurysm, without ruptur    Benign prostatic hyperplasia    Gout    History of transcatheter aortic valve replacement (TAVR)

## 2021-07-06 NOTE — BH CONSULTATION LIAISON PROGRESS NOTE - NSBHFUPINTERVALHXFT_PSY_A_CORE
76 year old male patient with a medical history of MI in 1995 (angiogram, no stents placed), COPD (2L O2 at home), s/p TAVR (03/2019 at [facility]), gout, CKD, CVA (09/2020), pulmonary HTN, initially presented to [facility] 6/1/21 with RONNELL on CKD (likely cardiorenal syndrome), urinary retention due to noncompliance with medications, with hospital course complicated by cardiogenic shock, acute hypoxemic respiratory failure secondary to metabolic acidosis and respiratory alkalosis. Patient found to have TAVR failure with ISRAEL 6/10/21 showing EF 40-45%, Aortic valve prosthesis with Severe paravalvular leak and valvular AI, mild-moderate aortic stenosis, and moderate MR. Patient was transferred to [facility] under Dr. Campbell for further workup.   psych consult requested due to grief and depression    Patient seen today and found to be calm and cooperative. Patient states he has been in the hospital for a total of over 5 weeks and expresses frustration to not being able to do anything and feeling week. Patient talks of his depression and talks of the death of his Wife in February from pancreatic cancer and states he took care of her the best he could but as a result neglected his own health. Patient does report to having a good support system including his children and grandchildren who he sees regularly. Patient states he was started on a medication for depression but is unsure if it is too strong due to feeling tired throughout the day. Patient also reports appetite difficulty but stats it has been improving since he has been in the hospital and denies any s/h ideation as well as a any symptoms of avery or AV hallucinations.     Cognition:   Oriented x3   Days of week backwards : 7/7

## 2021-07-06 NOTE — ADVANCED PRACTICE NURSE CONSULT - RECOMMEDATIONS
·	Jurgen Velazquez Tegaderm - change daily or as needed when soiled.    ·	Turn and Reposition Q2H when in bed  ·	Offload patient to alternating side when bed repositioning with Z-julisa pillow   ·	Reposition patient Q1H when in chair with seat cushion
·	Cleanse with Normal Saline, Cavilon skin prep, Aquacel, Gauze, Tegaderm - change daily or as needed when soiled.    ·	Turn and Reposition Q2H when in bed  ·	Clinitron (low air-loss) mattress   ·	Offload patient to alternating side when bed repositioning with Z-julisa pillow   ·	Reposition patient Q1H when in chair with seat cushion
·	Cleanse with Normal Saline, Cavilon skin prep, Cadexomer Iodine (Iodosorb), Gauze, Tegaderm - BIDTurn and Reposition Q2H when in bed  ·	Clinitron (low air-loss) mattress   ·	Offload patient's back to alternating side when bed repositioning with Z-julisa pillow   ·	Reposition patient Q1H when in chair with seat cushion

## 2021-07-06 NOTE — PROGRESS NOTE ADULT - TIME BILLING
Cardiogenic shock, VT
Acute on chronic HFrEF, VT
- Review of records, telemetry, vital signs and daily labs.   - General and cardiovascular physical examination.  - Generation of cardiovascular treatment plan.  - Coordination of care with primary team.
- Review of notes/records, telemetry, vital signs and daily labs.   - General and cardiovascular physical examination.  - Generation of cardiovascular treatment plan.  - Coordination of care with primary team.

## 2021-07-06 NOTE — PROGRESS NOTE ADULT - SUBJECTIVE AND OBJECTIVE BOX
[place] [facility]                                                        Office: [address] [address]                                                       Telephone: [phone]. Fax:[phone]                                                                             PROGRESS NOTE   Reason for follow up:    Cardiomyopathy                            Overnight: No new events.   Update:   Sitting up in bed, seen by cardiology and has a good understanding of life vest possibility   PT bedside, patient refusing - stating feeling fatigued, will try tomorrow.   NET: -1110 mL  Subjective: "I will ambulate tomorrow with PT"   Complains of:  Fatigue  Review of symptoms: Cardiac:  No chest pain. No dyspnea. No palpitations.  Respiratory: no cough. No dyspnea  Gastrointestinal: No diarrhea. No abdominal pain. No bleeding.     Past medical history: No updates.   Chronic conditions:  PAST MEDICAL & SURGICAL HISTORY:  Pulmonary hypertension  Hypertension  Hyperlipidemia  H/O aortic valve stenosis  s/p TAVR 2019 at [place]  CVA (cerebrovascular accident)  MCA CVA with tPA and thrombectomy  Chronic kidney disease  Prediabetes  Mitral valve regurgitation  CAD in native artery  Aneurysm of aortic root  thoracic aortic aneurysm, without ruptur  Benign prostatic hyperplasia  Gout  	  Vitals:  T(C): 36.4 (07-06-21 @ 15:32), Max: 36.6 (07-05-21 @ 19:00)  HR: 77 (07-06-21 @ 15:32) (72 - 86)  BP: 120/71 (07-06-21 @ 15:32) (108/64 - 120/71)  RR: 19 (07-06-21 @ 15:32) (18 - 19)  SpO2: 96% (07-06-21 @ 15:32) (96% - 99%)  I&O's Summary  05 Jul 2021 07:01  -  06 Jul 2021 07:00  --------------------------------------------------------  IN: 1240 mL / OUT: 2350 mL / NET: -1110 mL      PHYSICAL EXAM:  Appearance: Comfortable. No acute distress  HEENT:  Head and neck: Atraumatic. Normocephalic.  Normal oral mucosa, PERRL, Neck is supple.    Neurologic: A & O x 3, no focal deficits.   Cardiovascular: Normal S1 S2, No murmur, rubs/gallops.   Respiratory: Lungs clear to auscultation, diminished bilaterally  Gastrointestinal:  Soft, Non-tender, + BS  Lower Extremities: + 1 pitting edema  Psychiatry: Patient is calm. No agitation. Mood & affect appropriate  Skin: No rash, warm and dry.      CURRENT MEDICATIONS:  aMIOdarone   Tablet 200 milliGRAM(s) Oral daily  aMIOdarone    Tablet   Oral   carvedilol 3.125 milliGRAM(s) Oral every 12 hours  midodrine 10 milliGRAM(s) Oral every 8 hours PRN  tamsulosin 0.4 milliGRAM(s) Oral at bedtime  budesonide 160 MICROgram(s)/formoterol 4.5 MICROgram(s) Inhaler  melatonin  mirtazapine  pantoprazole   Suspension  psyllium Powder  senna  atorvastatin  megestrol Suspension  ascorbic acid  aspirin  chewable  chlorhexidine 4% Liquid  epoetin yolanda-epbx (RETACRIT) Injectable  folic acid  heparin   Injectable  iron sucrose IVPB  Nephro-melissa    LABS:	 	                      9.5    8.62  )-----------( 220      ( 06 Jul 2021 05:52 )             30.3     07-06  140  |  102  |  18.5  ----------------------------<  88  4.4   |  27.0  |  3.23<H>  Ca    9.2      06 Jul 2021 05:52  Phos  2.4     07-06  Mg     1.9     07-06    TELEMETRY: Reviewed

## 2021-07-06 NOTE — PROGRESS NOTE ADULT - ASSESSMENT
76M with a PMH of MI in 1995 (angiogram, no stents placed), COPD (2L O2 at home), severe AS s/p TAVR (03/2019 at [facility]), gout, CKD, CVA (no deficits, 09/2020), pulmonary HTN, initially presented to [facility] 6/1/21 with RONNELL on CKD, hospital course significant for cardiogenic shock, acute hypoxemic respiratory failure, and acute on chronic combined diastolic and systolic heart failure. ISRAEL revealed severe AI. Patient was transferred to [facility] under Dr. Campbell for further workup of bioprosthetic AI.     6/28 s/p BAL, sputum samples sent m ax switched to [facility]     Inpatient hospital course has been significant for:  1. 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 RONNELL on CKD progressed to ESRD requiring HD   3. Unintentional Weight loss / Dysphagia / Anorexia work up revealing duodenitis and diffuse erosive esophagitis, gastritis on EGD (biopsies negative for H. Pylori or carcinoma); colonoscopy showing diverticulosis  4. R/O AV Endocarditis   5. Auto-elevated INR (followed by Hematology)  6. R/O WILLEM   7. Adjustment disorder (evaluated by Behavioral Health)  8. Thrombocytopenia, Hematology consulted, r/o DIC    Patient is now s/p valve in valve TAVR on 6/23/21 with Dr. Campbell.     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 pt remains on epi, amio, primacor and is tolerating CVVHD  6/30 Pt now on HD

## 2021-07-06 NOTE — PROGRESS NOTE ADULT - ATTENDING COMMENTS
On minimally tolerated GDMT, limited by low BP.   Continue fluid removal via HD.   Will likely need subacute rehab.

## 2021-07-06 NOTE — CONSULT NOTE ADULT - SUBJECTIVE AND OBJECTIVE BOX
ACUTE CARE SURGERY CONSULT     HPI: 76y Male admitted with TAVR failure and AR and AS and MR, history of COPD, RONNELL on CKD now requiring HD (M/W/F) through permacath placed by IR on 7/2. Our team was consulted for permanent dialysis access. Patient states he still produces some urine, denies current SOB, chest pain, lower extremity edema, pruritus, skin alterations or ulcerations, AMS. He states he is left handed and would be interested in right upper extremity access. Has a RIJ permacath and history of RUE PICC. currently bialteral upper extremities have PIVs and multiple bruises noticed on PE. Denies previous surgeries on upper extremities or bad reactions to anesthesia    ROS: 10-system review is otherwise negative except HPI above.      PAST MEDICAL & SURGICAL HISTORY:  Pulmonary hypertension    Hypertension    Hyperlipidemia    H/O aortic valve stenosis  s/p TAVR 2019 at [facility]    CVA (cerebrovascular accident)  MCA CVA with tPA and thrombectomy    Chronic kidney disease    Prediabetes    Mitral valve regurgitation    CAD in native artery    Aneurysm of aortic root  thoracic aortic aneurysm, without ruptur    Benign prostatic hyperplasia    Gout    History of transcatheter aortic valve replacement (TAVR)      FAMILY HISTORY:  FH: lung cancer      Family history not pertinent as reviewed with the patient.    SOCIAL HISTORY:  Denies any toxic habits    ALLERGIES: NKA No Known Drug Allergies  strawberry (Anaphylaxis)      HOME MEDICATIONS: ***  Home Medications:  Advair Diskus 100 mcg-50 mcg inhalation powder: 1 puff(s) inhaled 2 times a day (26 Mar 2021 15:32)  Albuterol (Eqv-Proventil HFA) 90 mcg/inh inhalation aerosol: 2 puff(s) inhaled every 4 hours, As Needed (26 Mar 2021 15:32)  allopurinol 100 mg oral tablet: 1 tab(s) orally 2 times a day (26 Mar 2021 15:32)  amLODIPine 2.5 mg oral tablet: 1 tab(s) orally once a day (26 Mar 2021 15:32)  Aspir 81 oral delayed release tablet: 1 tab(s) orally once a day (26 Mar 2021 15:32)  atorvastatin 10 mg oral tablet: 1 tab(s) orally once a day (26 Mar 2021 15:32)  Centrum Silver oral tablet: 1 tab(s) orally once a day (26 Mar 2021 15:32)  Culturelle Advanced Immune Defense oral capsule: 1 cap(s) orally 2 times a day (26 Mar 2021 15:32)  ipratropium-albuterol 0.5 mg-2.5 mg/3 mLinhalation solution: 3 milliliter(s) inhaled 4 times a day, As Needed (26 Mar 2021 15:32)  metoprolol tartrate 100 mg oral tablet: 1 tab(s) orally 2 times a day (26 Mar 2021 15:32)  tamsulosin 0.4 mg oral capsule: 1 cap(s) orally once a day (26 Mar 2021 15:32)  Vitamin D3 1000 intl units (25 mcg) oral capsule: orally once a day (26 Mar 2021 15:32)      --------------------------------------------------------------------------------------------    PHYSICAL EXAM:   General: NAD, Lying in bed comfortably RTC in place, NC in place  Neuro: A+Ox3  HEENT: EOMI, PERRLA, MMM  Cardio: RRR  Resp: No increased work of breathing  GI/Abd: Soft, NT/ND, no rebound/guarding, no masses palpated  Vascular: All 4 extremities warm and well perfused. bilateral niharika's test suggest ulnar dominance, palpable ulnar and radial, strength normal 5/5  Pelvis: stable  Musculoskeletal: All 4 extremities moving spontaneously, no limitations, no spinal tenderness.  --------------------------------------------------------------------------------------------    LABS                 9.5    8.62   )----------(  220       ( 06 Jul 2021 05:52 )               30.3      140    |  102    |  18.5   ----------------------------<  88         ( 06 Jul 2021 05:52 )  4.4     |  27.0   |  3.23     Ca    9.2        ( 06 Jul 2021 05:52 )  Phos  2.4       ( 06 Jul 2021 05:52 )  Mg     1.9       ( 06 Jul 2021 05:52 )        PT/INR -  17.0 sec / 1.49 ratio   ( 06 Jul 2021 05:52 )         CAPILLARY BLOOD GLUCOSE              --------------------------------------------------------------------------------------------  IMAGING  reviewed CXR, no previous vein mapping    ASSESSMENT: Patient is a 76y old male with HTN, DM TAVR with HF, AR,AS MR now s/p TaVR on TAVR recovering but with HD needs through permacat currently. consulted for permanent access. Patient would prefer RUE access, vein mapping pending, and multiple PIVs in place, will order vein mapping and schedule outpatient follow up    PLAN:    - Bilateral upper extremity vein mapping (ordered)  - Avoid PIVs, BP measurments or any kind of invasive devices on RUE  - PAtient to follow up with Dr. Hyde in Vascular clinic [address] [place] [id number], phone [phone]  - Plan discussed with Attending,      Consult called at:1700  Consult seen at:1800

## 2021-07-06 NOTE — BH CONSULTATION LIAISON PROGRESS NOTE - NSBHADMITCOUNSEL_PSY_A_CORE
diagnostic results/impressions and/or recommended studies/risks and benefits of treatment options/instructions for management, treatment and follow up/importance of adherence to chosen treatment

## 2021-07-06 NOTE — BH CONSULTATION LIAISON PROGRESS NOTE - CURRENT MEDICATION
MEDICATIONS  (STANDING):  aMIOdarone    Tablet 200 milliGRAM(s) Oral daily  aMIOdarone    Tablet   Oral   ascorbic acid 500 milliGRAM(s) Oral daily  aspirin  chewable 81 milliGRAM(s) Oral daily  atorvastatin 10 milliGRAM(s) Oral at bedtime  budesonide 160 MICROgram(s)/formoterol 4.5 MICROgram(s) Inhaler 2 Puff(s) Inhalation two times a day  carvedilol 3.125 milliGRAM(s) Oral every 12 hours  chlorhexidine 4% Liquid 1 Application(s) Topical <User Schedule>  epoetin yolanda-epbx (RETACRIT) Injectable 71600 Unit(s) IV Push <User Schedule>  folic acid 1 milliGRAM(s) Oral daily  heparin   Injectable 5000 Unit(s) SubCutaneous every 12 hours  iron sucrose IVPB 50 milliGRAM(s) IV Intermittent <User Schedule>  megestrol Suspension 400 milliGRAM(s) Oral daily  melatonin 5 milliGRAM(s) Oral at bedtime  mirtazapine 15 milliGRAM(s) Oral daily  Nephro-melissa 1 Tablet(s) Oral daily  pantoprazole   Suspension 40 milliGRAM(s) Oral every 12 hours  psyllium Powder 1 Packet(s) Oral two times a day  senna 2 Tablet(s) Oral at bedtime  tamsulosin 0.4 milliGRAM(s) Oral at bedtime    MEDICATIONS  (PRN):  acetaminophen   Tablet .. 650 milliGRAM(s) Oral every 6 hours PRN Mild Pain (1 - 3)  ALBUTerol    90 MICROgram(s) HFA Inhaler 2 Puff(s) Inhalation every 6 hours PRN Shortness of Breath and/or Wheezing  midodrine 10 milliGRAM(s) Oral every 8 hours PRN SBP < 100  sodium chloride 0.9% lock flush 10 milliLiter(s) IV Push every 1 hour PRN Pre/post blood products, medications, blood draw, and to maintain line patency

## 2021-07-06 NOTE — PROGRESS NOTE ADULT - PROBLEM SELECTOR PLAN 1
Now s/p Valve in Valve TAVR with Dr. Campbell on 6/23  Repeat echo revealed EF 20-25% from 30-35% prior with mildly enlarged RV, severely decreased LV Fxn, moderate MR, Severe TR and mild Pulm Regurgitation  Midodrine now prn for SBP < 100,  recommends to limit use on HD days if possible  Coreg 3.125 BID as per heart failure team recommendations  Will need ACE/ARB/ARNI when able to tolerate to complete GDMT for heart failure  Continue Amio for runs of SVT post op   Continue GI ppx with Protonix,   Advanced diet to DASH per Dr. Campbell   Patient may be discharged with life vest vs ICD per cardiology

## 2021-07-06 NOTE — BH CONSULTATION LIAISON PROGRESS NOTE - NSBHPSYCHOLCOGORIENT_PSY_A_CORE
Oriented to time, place, person, situation Trilobed Flap Text: The defect edges were debeveled with a #15 scalpel blade.  Given the location of the defect and the proximity to free margins a trilobed flap was deemed most appropriate.  Using a sterile surgical marker, an appropriate trilobed flap drawn around the defect.    The area thus outlined was incised deep to adipose tissue with a #15 scalpel blade.  The skin margins were undermined to an appropriate distance in all directions utilizing iris scissors.

## 2021-07-06 NOTE — PROGRESS NOTE ADULT - ASSESSMENT
76M h/o CAD MI in 1995 (angiogram, no stents placed), COPD (2L O2 at home), s/p TAVR (03/2019 at [facility]), gout, CKD, CVA (09/2020), pulmonary HTN, initially presented to [facility] 6/1/21 with RONNELL on CKD (likely cardiorenal syndrome), urinary retention due to noncompliance with medications, with hospital course complicated by cardiogenic shock, acute hypoxemic respiratory failure secondary to metabolic acidosis and respiratory alkalosis. Patient found to have TAVR failure with ISRAEL 6/10/21 showing initial EF 40-45%, Aortic valve prosthesis with Severe paravalvular leak and valvular AI, mild-moderate aortic stenosis, and moderate MR. Decompensated HF and severe pulmonary hypertension, underwent valve-in-valve TAVR 6/23/21, course complicated by worsening ADHF/cardiogenic shock with VT, intubated and RONNELL on CKD required CVVHD, repeat TTE with worsening LV EF 20-25%.     7/4: Pt denies chest pain, palpitations, SOB. Tele- NSR HR @ 80-90s with periods of -110s, occasional PVCs. Pt has worsening lower extremity edema today but no complaints of SOB or signs of fluid overload on lung exam. Pt is on HD M-W-F schedule.  Pt started on Coreg as per HF team recommendations, increase dose as BP tolerates. Repeat echo next week to reassess worsening EF.        1) Cardiogenic shock, AD-Bi-V failure, with LVEF 20-25%   - shock state resolved and off inotrope  - Continue HD for optimal fluid removal  - consider switching carvedilol to metoprolol succinate given low BP and on midodrine PRN- not able to add other GDMT due to ESRD, consider adding vericiguat as oupatient.   - Repeat Echo this week to reassess LV EF if stress induced for persistent drop in EF        2) TAVR failure   -s/p Amy  -Continue ASA 81mg    3) VT  -on amiodarone  -not ideal for ICD implant given recent endocarditis and now HD with permacath; if LV EF <35 on repeat Echo consider LifeVest on discharge    3) RONNELL on CKD  - oliguria  - Cont. HD    4) Respiratory Failure  -COPD  -pulmonary toilet, BiPAP as needed        Taz Carter DO, FACC  Faculty Non-Invasive Cardiologist  [phone]

## 2021-07-06 NOTE — PROGRESS NOTE ADULT - PROBLEM SELECTOR PLAN 10
Wean to RA as tolerated    Problem 11: Adjustment Disorder   Cont Remeron    Problem 12: COPD  On home O2  Cont Symbicort, Proventil   Plan to discuss with CTS team in AM

## 2021-07-06 NOTE — PROGRESS NOTE ADULT - SUBJECTIVE AND OBJECTIVE BOX
[place] [facility]                                                               Office: [address] [address]                                                              Telephone: [phone]. Fax:[phone]                                                                             PROGRESS NOTE  Reason for follow up: Cardiomyopathy  Overnight: No new events.   Update: HD dependent via R-chest permacath, -120s on low dose carvedilol and midodrine PRN; reports feeling tired early morning, not yet ambulated       Review of symptoms:   Cardiac:  No chest pain. No dyspnea. No palpitations.  Respiratory: No cough. No dyspnea  Gastrointestinal: No diarrhea. No abdominal pain. No bleeding.     Past medical history: No updates.   	  Vital Signs Last 24 Hrs  T(C): 36.4 (07-06-21 @ 10:48), Max: 36.6 (07-05-21 @ 19:00)  T(F): 97.6 (07-06-21 @ 10:48), Max: 97.9 (07-05-21 @ 19:00)  HR: 72 (07-06-21 @ 10:48) (72 - 86)  BP: 116/74 (07-06-21 @ 10:48) (108/64 - 119/66)  BP(mean): --  RR: 18 (07-06-21 @ 10:48) (18 - 18)  SpO2: 99% (07-06-21 @ 10:48) (98% - 99%)  I&O's Summary    05 Jul 2021 07:01  -  06 Jul 2021 07:00  --------------------------------------------------------  IN: 1240 mL / OUT: 2350 mL / NET: -1110 mL          PHYSICAL EXAM:  Appearance: Comfortable. No acute distress, pale  HEENT:  Head and neck: Atraumatic. Normocephalic.  Normal oral mucosa, PERRL, Neck is supple. No JVD  Neurologic: A&Ox 3, no focal deficits. EOMI, Cranial nerves are intact.  Lymphatic: No cervical lymphadenopathy  Cardiovascular: Normal S1 S2, No murmur, rubs/gallops. No JVD, R-chest permacath  Respiratory: Lungs clear to auscultation  Gastrointestinal:  Soft, Non-tender, + BS  Lower Extremities: +2 pitting edema bilateral shin  Psychiatry: Patient is calm. No agitation. Mood & affect appropriate  Skin: No rashes/ecchymoses/cyanosis/ulcers visualized on the face, hands or feet.      CURRENT MEDICATIONS:  MEDICATIONS  (STANDING):  aMIOdarone    Tablet 200 milliGRAM(s) Oral daily  aMIOdarone    Tablet   Oral   ascorbic acid 500 milliGRAM(s) Oral daily  aspirin  chewable 81 milliGRAM(s) Oral daily  atorvastatin 10 milliGRAM(s) Oral at bedtime  budesonide 160 MICROgram(s)/formoterol 4.5 MICROgram(s) Inhaler 2 Puff(s) Inhalation two times a day  carvedilol 3.125 milliGRAM(s) Oral every 12 hours  chlorhexidine 4% Liquid 1 Application(s) Topical <User Schedule>  epoetin yolanda-epbx (RETACRIT) Injectable 13843 Unit(s) IV Push <User Schedule>  folic acid 1 milliGRAM(s) Oral daily  heparin   Injectable 5000 Unit(s) SubCutaneous every 12 hours  iron sucrose IVPB 50 milliGRAM(s) IV Intermittent <User Schedule>  megestrol Suspension 400 milliGRAM(s) Oral daily  melatonin 5 milliGRAM(s) Oral at bedtime  mirtazapine 15 milliGRAM(s) Oral daily  Nephro-melissa 1 Tablet(s) Oral daily  pantoprazole   Suspension 40 milliGRAM(s) Oral every 12 hours  psyllium Powder 1 Packet(s) Oral two times a day  senna 2 Tablet(s) Oral at bedtime  tamsulosin 0.4 milliGRAM(s) Oral at bedtime    MEDICATIONS  (PRN):  acetaminophen   Tablet .. 650 milliGRAM(s) Oral every 6 hours PRN Mild Pain (1 - 3)  ALBUTerol    90 MICROgram(s) HFA Inhaler 2 Puff(s) Inhalation every 6 hours PRN Shortness of Breath and/or Wheezing  midodrine 10 milliGRAM(s) Oral every 8 hours PRN SBP < 100  sodium chloride 0.9% lock flush 10 milliLiter(s) IV Push every 1 hour PRN Pre/post blood products, medications, blood draw, and to maintain line patency      DIAGNOSTIC TESTING:  [ ] Echocardiogram:   < from: TTE Echo Complete w/ Contrast w/ Doppler (06.26.21 @ 10:36) >      Summary:   1. Endocardial visualization was enhanced with intravenous echo contrast.   2. Left ventricular ejection fraction, by visual estimation, is 20 to 25%.   3. Severely decreased global left ventricular systolic function.   4. Mildly increased LV wall thickness.   5. The mitral in-flow pattern reveals no discernable A-wave, therefore no comment on diastolic function can be made.   6. Mildly enlarged right ventricle.   7. Mild thickening of the anterior and posterior mitral valve leaflets.   8. Moderate mitral valve regurgitation.   9. Severe tricuspid regurgitation.  10. TAVR in the aortic position. Appears well seated with no abnormal rocking motion. Mild to moderate paravalvular aortic regurgitation is seen. Peak velocity is 2.26 m/s, MG is 11.2 mmHg, and PG is 20.5 mmHg, which are acceptable in the setting of a prosethetic aortic valve.  11. Mild pulmonic valve regurgitation.  12. Compared with prior study dated 6/23/21, the LVEF appears decreased to 20-25%. MR now appears moderate and TR appears severe. Results were discussed with CT surgery.    < end of copied text >    [ ]  Catheterization:  [ ] Stress Test:      Labs:                        9.5    8.62  )-----------( 220      ( 06 Jul 2021 05:52 )             30.3     07-06    140  |  102  |  18.5  ----------------------------<  88  4.4   |  27.0  |  3.23<H>    Ca    9.2      06 Jul 2021 05:52  Phos  2.4     07-06  Mg     1.9     07-06          Serum Pro-Brain Natriuretic Peptide: 89980 pg/mL (06-13-21 @ 03:05)  Serum Pro-Brain Natriuretic Peptide: 87405 pg/mL (06-12-21 @ 01:55)      A1C with Estimated Average Glucose Result: 5.9 % (06-12-21 @ 01:55)      TELEMETRY: Sinus [id number] degree, 3 beats NSVT

## 2021-07-06 NOTE — PROGRESS NOTE ADULT - PROBLEM SELECTOR PLAN 4
Cardiology following  Heart Failure team following  Coreg 3.125 BID as per heart failure team recommendations  Midodrine as needed for hypotension recommends to limit use on HD days if possible  Will need ACEI/ARB/ARNI when able to tolerate to complete GDMT for heart failure  May go home with life vest vs ICD  Repeat ECHO next week per cardiology

## 2021-07-06 NOTE — PROGRESS NOTE ADULT - SUBJECTIVE AND OBJECTIVE BOX
Reason for follow up: Cardiomyopathy-  CRS  Overnight: No new events.   Update: HD dependent via R-chest TDC , -120s on low dose carvedilol and midodrine PRN; reports feeling tired early morning, not yet ambulated     Review of symptoms:   Cardiac:  No chest pain. No dyspnea. No palpitations.  Respiratory: No cough. No dyspnea  Gastrointestinal: No diarrhea. No abdominal pain. No bleeding.     Past medical history: No updates.   	  Vital Signs Last 24 Hrs  T(C): 36.4 (07-06-21 @ 10:48), Max: 36.6 (07-05-21 @ 19:00)  T(F): 97.6 (07-06-21 @ 10:48), Max: 97.9 (07-05-21 @ 19:00)  HR: 72 (07-06-21 @ 10:48) (72 - 86)  BP: 116/74 (07-06-21 @ 10:48) (108/64 - 119/66)  BP(mean): --  RR: 18 (07-06-21 @ 10:48) (18 - 18)  SpO2: 99% (07-06-21 @ 10:48) (98% - 99%)  I&O's Summary    05 Jul 2021 07:01  -  06 Jul 2021 07:00  --------------------------------------------------------  IN: 1240 mL / OUT: 2350 mL / NET: -1110 mL    PHYSICAL EXAM:  Appearance: Comfortable. No acute distress, pale  HEENT:  Head and neck: Atraumatic. Normocephalic.  Normal oral mucosa, PERRL, Neck is supple. No JVD  Neurologic: A&Ox 3, no focal deficits. EOMI, Cranial nerves are intact.  Lymphatic: No cervical lymphadenopathy  Cardiovascular: Normal S1 S2, No murmur, rubs/gallops. No JVD, R-chest permacath  Respiratory: Lungs clear to auscultation  Gastrointestinal:  Soft, Non-tender, + BS  Lower Extremities: +2 pitting edema bilateral shin  Psychiatry: Patient is calm. No agitation. Mood & affect appropriate  Skin: No rashes/ecchymoses/cyanosis/ulcers visualized on the face, hands or feet.      CURRENT MEDICATIONS:  MEDICATIONS  (STANDING):  aMIOdarone    Tablet 200 milliGRAM(s) Oral daily  aMIOdarone    Tablet   Oral   ascorbic acid 500 milliGRAM(s) Oral daily  aspirin  chewable 81 milliGRAM(s) Oral daily  atorvastatin 10 milliGRAM(s) Oral at bedtime  budesonide 160 MICROgram(s)/formoterol 4.5 MICROgram(s) Inhaler 2 Puff(s) Inhalation two times a day  carvedilol 3.125 milliGRAM(s) Oral every 12 hours  chlorhexidine 4% Liquid 1 Application(s) Topical <User Schedule>  epoetin yolanda-epbx (RETACRIT) Injectable 50207 Unit(s) IV Push <User Schedule>  folic acid 1 milliGRAM(s) Oral daily  heparin   Injectable 5000 Unit(s) SubCutaneous every 12 hours  iron sucrose IVPB 50 milliGRAM(s) IV Intermittent <User Schedule>  megestrol Suspension 400 milliGRAM(s) Oral daily  melatonin 5 milliGRAM(s) Oral at bedtime  mirtazapine 15 milliGRAM(s) Oral daily  Nephro-melissa 1 Tablet(s) Oral daily  pantoprazole   Suspension 40 milliGRAM(s) Oral every 12 hours  psyllium Powder 1 Packet(s) Oral two times a day  senna 2 Tablet(s) Oral at bedtime  tamsulosin 0.4 milliGRAM(s) Oral at bedtime    MEDICATIONS  (PRN):  acetaminophen   Tablet .. 650 milliGRAM(s) Oral every 6 hours PRN Mild Pain (1 - 3)  ALBUTerol    90 MICROgram(s) HFA Inhaler 2 Puff(s) Inhalation every 6 hours PRN Shortness of Breath and/or Wheezing  midodrine 10 milliGRAM(s) Oral every 8 hours PRN SBP < 100  sodium chloride 0.9% lock flush 10 milliLiter(s) IV Push every 1 hour PRN Pre/post blood products, medications, blood draw, and to maintain line patency      DIAGNOSTIC TESTING:  [ ] Echocardiogram:   < from: TTE Echo Complete w/ Contrast w/ Doppler (06.26.21 @ 10:36) >      Summary:   1. Endocardial visualization was enhanced with intravenous echo contrast.   2. Left ventricular ejection fraction, by visual estimation, is 20 to 25%.   3. Severely decreased global left ventricular systolic function.   4. Mildly increased LV wall thickness.   5. The mitral in-flow pattern reveals no discernable A-wave, therefore no comment on diastolic function can be made.   6. Mildly enlarged right ventricle.   7. Mild thickening of the anterior and posterior mitral valve leaflets.   8. Moderate mitral valve regurgitation.   9. Severe tricuspid regurgitation.  10. TAVR in the aortic position. Appears well seated with no abnormal rocking motion. Mild to moderate paravalvular aortic regurgitation is seen. Peak velocity is 2.26 m/s, MG is 11.2 mmHg, and PG is 20.5 mmHg, which are acceptable in the setting of a prosethetic aortic valve.  11. Mild pulmonic valve regurgitation.  12. Compared with prior study dated 6/23/21, the LVEF appears decreased to 20-25%. MR now appears moderate and TR appears severe. Results were discussed with CT surgery.    < end of copied text >    [ ]  Catheterization:  [ ] Stress Test:      Labs:                        9.5    8.62  )-----------( 220      ( 06 Jul 2021 05:52 )             30.3     07-06    140  |  102  |  18.5  ----------------------------<  88  4.4   |  27.0  |  3.23<H>    Ca    9.2      06 Jul 2021 05:52  Phos  2.4     07-06  Mg     1.9     07-06          Serum Pro-Brain Natriuretic Peptide: 39295 pg/mL (06-13-21 @ 03:05)  Serum Pro-Brain Natriuretic Peptide: 58348 pg/mL (06-12-21 @ 01:55)      A1C with Estimated Average Glucose Result: 5.9 % (06-12-21 @ 01:55)      TELEMETRY: Sinus [id number] degree, 3 beats NSVT    76M h/o CAD MI in 1995 (angiogram, no stents placed), COPD (2L O2 at home), s/p TAVR (03/2019 at [facility]), gout, CKD, CVA (09/2020), pulmonary HTN, initially presented to [facility] 6/1/21 with RONNELL on CKD (likely cardiorenal syndrome), urinary retention due to noncompliance with medications, with hospital course complicated by cardiogenic shock, acute hypoxemic respiratory failure secondary to metabolic acidosis and respiratory alkalosis. Patient found to have TAVR failure with ISRAEL 6/10/21 showing initial EF 40-45%, Aortic valve prosthesis with Severe paravalvular leak and valvular AI, mild-moderate aortic stenosis, and moderate MR. Decompensated HF and severe pulmonary hypertension, underwent valve-in-valve TAVR 6/23/21, course complicated by worsening ADHF/cardiogenic shock with VT, intubated and RONNELL on CKD required CVVHD, repeat TTE with worsening LV EF 20-25%.     7/4: Pt denies chest pain, palpitations, SOB. Tele- NSR HR @ 80-90s with periods of -110s, occasional PVCs. Pt has worsening lower extremity edema today but no complaints of SOB or signs of fluid overload on lung exam. Pt is on HD M-W-F schedule.  Pt started on Coreg as per HF team recommendations, increase dose as BP tolerates. Repeat echo next week to reassess worsening EF.    1) Cardiogenic shock, AD-Bi-V failure, with LVEF 20-25%   - shock state resolved and off inotrope  - Continue HD for optimal fluid removal  - consider switching carvedilol to metoprolol succinate given low BP and on midodrine PRN- not able to add other GDMT due to ESRD, consider adding vericiguat as oupatient.   - Repeat Echo this week to reassess LV EF if stress induced for persistent drop in EF    2) TAVR failure   -s/p Amy  -Continue ASA 81mg    3) VT  -on amiodarone  -not ideal for ICD implant given recent endocarditis and now HD with permacath; if LV EF <35 on repeat Echo consider LifeVest on discharge    3) RONNELL on CKD  - oliguria  - Cont. HD    4) Respiratory Failure  -COPD  -pulmonary toilet, BiPAP as needed

## 2021-07-06 NOTE — PROGRESS NOTE ADULT - SUBJECTIVE AND OBJECTIVE BOX
Patient has been OOB for a few minutes.  Has been fatigued and is motivated to keep  moving.  Provided emotional support.     REVIEW OF SYSTEMS  Constitutional - No fever,  +fatigue  HEENT - No vertigo, No neck pain  Neurological - No headaches, No memory loss, +loss of strength   Musculoskeletal - No joint pain, No joint swelling, +muscle pain  Psychiatric - +depression, No anxiety    FUNCTIONAL PROGRESS  7/5  Sit-Stand Transfer Training  Transfer Training Sit-to-Stand Transfer: maximum assist (25% patient effort);  2 person assist;  full weight-bearing   rolling walker  Transfer Training Stand-to-Sit Transfer: maximum assist (25% patient effort);  2 person assist;  full weight-bearing   rolling walker  Sit-to-Stand Transfer Training Transfer Safety Analysis: decreased strength;  pt unable to stand fully erect, attemped 3x, requested to sit due to exhaustion    Therapeutic Exercise  Therapeutic Exercise Detail: 1x 10 seated [id number] 10 knee extension -pt unable to continue therex due to exhaustion     7/2  SLP Treatment: Feeding  SLP Treatment: Feeding (min) Minutes of Treatment: 30   SLP Treatment: Feeding Rehab Effort: good  SLP Treatment: Feeding Treatment Detail: 1. Pt completed oral motor exercises to increase lingual strength X5 each exericse with good success 2. Pt completed Massako maneuver to increase BOT retraction X5 with poor success; despite cues to modify tongue placement3. Pt completed Mendelson maneuver to increase laryngeal elevation/airway protection X5 with poor success; despite max cues including direct model4. Pt completed Falsetto X5 each with fair success; improvment following verbal promptPt provided with visual handout for exercises to complete for HEP         VITALS  T(C): 36.5 (07-06-21 @ 05:00), Max: 36.7 (07-05-21 @ 15:11)  HR: 74 (07-06-21 @ 05:00) (74 - 86)  BP: 114/69 (07-06-21 @ 05:00) (105/67 - 120/84)  RR: 18 (07-06-21 @ 05:00) (17 - 18)  SpO2: 99% (07-06-21 @ 05:00) (97% - 99%)  Wt(kg): --    MEDICATIONS   acetaminophen   Tablet .. 650 milliGRAM(s) every 6 hours PRN  ALBUTerol    90 MICROgram(s) HFA Inhaler 2 Puff(s) every 6 hours PRN  aMIOdarone    Tablet 200 milliGRAM(s) daily  aMIOdarone    Tablet     ascorbic acid 500 milliGRAM(s) daily  aspirin  chewable 81 milliGRAM(s) daily  atorvastatin 10 milliGRAM(s) at bedtime  budesonide 160 MICROgram(s)/formoterol 4.5 MICROgram(s) Inhaler 2 Puff(s) two times a day  carvedilol 3.125 milliGRAM(s) every 12 hours  chlorhexidine 4% Liquid 1 Application(s) <User Schedule>  epoetin yolanda-epbx (RETACRIT) Injectable 52051 Unit(s) <User Schedule>  folic acid 1 milliGRAM(s) daily  heparin   Injectable 5000 Unit(s) every 12 hours  iron sucrose IVPB 50 milliGRAM(s) <User Schedule>  megestrol Suspension 400 milliGRAM(s) daily  melatonin 5 milliGRAM(s) at bedtime  midodrine 10 milliGRAM(s) every 8 hours PRN  mirtazapine 15 milliGRAM(s) daily  Nephro-melissa 1 Tablet(s) daily  pantoprazole   Suspension 40 milliGRAM(s) every 12 hours  psyllium Powder 1 Packet(s) two times a day  senna 2 Tablet(s) at bedtime  sodium chloride 0.9% lock flush 10 milliLiter(s) every 1 hour PRN  tamsulosin 0.4 milliGRAM(s) at bedtime      RECENT LABS/IMAGING                          9.5    8.62  )-----------( 220      ( 06 Jul 2021 05:52 )             30.3     07-06    140  |  102  |  18.5  ----------------------------<  88  4.4   |  27.0  |  3.23<H>    Ca    9.2      06 Jul 2021 05:52  Phos  2.4     07-06  Mg     1.9     07-06      PT/INR - ( 06 Jul 2021 05:52 )   PT: 17.0 sec;   INR: 1.49 ratio                     TTE 6/26 - Summary:   1. Endocardial visualization was enhanced with intravenous echo contrast.   2. Left ventricular ejection fraction, by visual estimation, is 20 to 25%.   3. Severely decreased global left ventricular systolic function.   4. Mildly increased LV wall thickness.   5. The mitral in-flow pattern reveals no discernable A-wave, therefore no comment on diastolic function can be made.   6. Mildly enlarged right ventricle.   7. Mild thickening of the anterior and posterior mitral valve leaflets.   8. Moderate mitral valve regurgitation.   9. Severe tricuspid regurgitation.  10. TAVR in the aortic position. Appears well seated with no abnormal rocking motion. Mild to moderate paravalvular aortic regurgitation is seen. Peak velocity is 2.26 m/s, MG is 11.2 mmHg, and PG is 20.5 mmHg, which are acceptable in the setting of a prosethetic aortic valve.  11. Mild pulmonic valve regurgitation.  12. Compared with prior study dated 6/23/21, the LVEF appears decreased to 20-25%. MR now appears moderate and TR appears severe. Results were discussed with CT surgery.    CXR 7/1 - Status post cardiac surgery. No evidence of active chest pathology. Catheters and/or tubes in place    CXR 7/3 - There is an indwelling tunneled double lumen catheter with distal tip in the SVC. LEFT IJ catheter tip remains in SVC. Otherwise no interval change  ----------------------------------------------------------------------------------------  PHYSICAL EXAM  Constitutional - NAD, Comfortable  Chest - Increased work of breathing, No wheezing  Extremities - BLE edema  Neurologic Exam -                    Cognitive - AAO to self, place, date, year, situation     Motor -                      LEFT    UE - ShAB 4/5, EF 4/5, EE 4/5,  5/5                    RIGHT UE - ShAB 4/5, EF 5/5, EE 5/5,  5/5                    LEFT    LE - HF 2/5, KE 2/5, DF 3/5                     RIGHT LE - HF 2/5, KE 2/5, DF 3/5      Sensory - Intact to LT  Psychiatric - Mood depressed, Fatigued  ----------------------------------------------------------------------------------------  ASSESSMENT/PLAN  76yMale with functional deficits with debility related to failed TAVR  CAD - ASA, Lipitor  AFIB - Amiodarone  HTN - Coreg  HypoTN - Midodrine  Pulm - Proventil, Symbicort  ESRD - HD s/p right IJ, Epoetin  Sleep - Melatonin  Mood - Remeron, Recommend alternate SSRI    Pain - Tylenol  DVT PPX - SCDs, Heparin  Rehab - AS per discussion with PT, patient has considerable debility. Continue to recommend ELSI, as patient is expected to needs a more prolonged stay to achieve transition to community living and would not be able to tolerate a comprehensive/intense rehab program of 3hours/day. Continue mobilization by staff to maintain cardiopulmonary function and prevention of secondary complications related to debility. Discussed with rehab team.

## 2021-07-06 NOTE — PROGRESS NOTE ADULT - ASSESSMENT
76M h/o CAD MI in 1995 (angiogram, no stents placed), COPD (2L O2 at home), s/p TAVR (03/2019 at [facility]), gout, CKD, CVA (09/2020), pulmonary HTN, initially presented to [facility] 6/1/21 with RONNELL on CKD (likely cardiorenal syndrome), urinary retention due to noncompliance with medications, with hospital course complicated by cardiogenic shock, acute hypoxemic respiratory failure secondary to metabolic acidosis and respiratory alkalosis. Patient found to have TAVR failure with ISRAEL 6/10/21 showing initial EF 40-45%, Aortic valve prosthesis with Severe paravalvular leak and valvular AI, mild-moderate aortic stenosis, and moderate MR. Decompensated HF and severe pulmonary hypertension, underwent valve-in-valve TAVR 6/23/21, course complicated by worsening ADHF/cardiogenic shock with VT, intubated and RONNELL on CKD required CVVHD, repeat TTE with worsening LV EF 20-25%.     1) Heart failure with LVEF 20-25%   Acute on chronic combined systolic and diastolic CHF, NYHA class 3.  -Will need ACE/ARB/ARNI when able to tolerate to complete GDMT for heart failure  - shock state resolved and off inotrope  - Continue HD for optimal fluid removal  - Continue carvedilol for GDMT.   - CTS recommending possibility of life vest vs ICD  -Repeat echo as needed.      2)   Nonrheumatic aortic valve insufficiency.    -Patient is now s/p valve in valve TAVR on 6/23/21 with Dr. Campbell.   -CTs surgery following.      3) VT - resolved  -on amiodarone    3) RONNELL on CKD  - oliguria  - Cont. HD    4) Respiratory Failure  -COPD  -pulmonary toilet, BiPAP as needed

## 2021-07-06 NOTE — PROGRESS NOTE ADULT - ASSESSMENT
75 y/o male with h/o chronic systolic HF ACC/AHA stage C, ICMP, CAD s/p MI ’95, s/p TAVR 3/2019 (@ [facility]), S. bovis bacteremia, COPD (home O2 @ 2 lpm), CVA (9/2020), gout, noncompliance who was admitted to [facility] with RONNELL on CKD and urinary retention. His hospital course was complicated by cardiogenic shock, acute hypoxemic respiratory failure likely in setting of bioprosthetic severe paravalvular/valvular AI and decompensated HF. He was subsequently transferred to [facility] for TAVR. 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 ID was following and BxCx were obtained which showed no growth. Eventually, he underwent Amy TAVR on 6/23/21. He was initially on epinephrine and milrinone gtt. He was extubated and successfully tolerated weaning of pressors. His CVVH has been transitioned to HD MWF.      Hemodynamics:  6/28/21: Milrinone 0.25mcg/kg/min & Epineprine 0.04mcg/kg/min   CVP 6, PAP 49/17/28, HR 85, /61/82, TD CO/CI  4.4/2.2.

## 2021-07-06 NOTE — ADVANCED PRACTICE NURSE CONSULT - ASSESSMENT
Patient is a 76 year old male patient with a medical history of MI (1995), COPD, s/p TAVR, gout, CKD, CVA (09/2020), pulmonary HTN, initially presented to [facility] 6/1/21 with RONNELL on CKD, urinary retention with hospital course complicated by cardiogenic shock, acute hypoxemic respiratory failure secondary to metabolic acidosis and respiratory alkalosis. Patient found to have TAVR failure with ISRAEL 6/10/21. Patient was transferred to [facility] for further workup.  Wound RN and CTICU RN Manager at bedside for site assessment.  Patient with documented history of IAD.    Gluteal Cleft - IAD with associated skin breakdown - (4L x 3.5W) wound bed covered with thin layer of biofilm, defined borders, scant serous drainage noted, periwound erythema with blanchable redness, visible moisture present, skin intact, no drainage. 
Patient is a 76 year old male patient with a medical history of MI (1995), COPD, s/p TAVR, gout, CKD, CVA (09/2020), pulmonary HTN, initially presented to [facility] 6/1/21 with RONNELL on CKD, urinary retention with hospital course complicated by cardiogenic shock, acute hypoxemic respiratory failure secondary to metabolic acidosis and respiratory alkalosis. Patient found to have TAVR failure with ISRAEL 6/10/21. Patient was transferred to [facility] for further workup.  Wound RN, assigned bedside RN and CTICU Manager at bedside for site assessment.  Patient with documented history of IAD.    ·	Gluteal Cleft - IAD with associated skin breakdown - (1.5L x 1W) wound bed covered with thin layer of yellow slough, defined borders, scant serous drainage noted, periwound erythema with blanchable redness, skin intact, no drainage.  
Patient is a 76 year old male patient with a medical history of MI (1995), COPD, s/p TAVR, gout, CKD, CVA (09/2020), pulmonary HTN, initially presented to [facility] 6/1/21 with RONNELL on CKD, urinary retention with hospital course complicated by cardiogenic shock, acute hypoxemic respiratory failure secondary to metabolic acidosis and respiratory alkalosis. Patient found to have TAVR failure with ISRAEL 6/10/21. Patient was transferred to [facility] for further workup.  Wound RN, and primary RN at bedside for site assessment.  Patient with documented history of IAD.    ·	Gluteal Cleft - IAD with associated skin breakdown - (2.5L x 0.5W) wound bed covered with thin layer of yellow slough, defined borders, scant serous drainage noted, periwound erythema with blanchable redness, skin intact, no drainage.     ·	Coccyx - DTI - (5.5L x 5W) diffuse nonblanching redness with persistent purple to center and right area, skin intact, no drainage noted

## 2021-07-06 NOTE — BH CONSULTATION LIAISON PROGRESS NOTE - NSBHASSESSMENTFT_PSY_ALL_CORE
77 yo wwm, lives alone,, retired, no formal PPH, tx psych admissions, PMH  CVA (2020), pulmonary HTN, with hospital course complicated by cardiogenic shock, acute hypoxemic respiratory failure secondary to metabolic acidosis and respiratory alkalosis, referred to psych for depression.  Pt reports he is sad and frustrated with ongoing medical condition and not knowing what the medical teams, plan or "prognosis ", feels he is in dark and is despondent about his condition. Pt made statement that sometimes he feels he wants to be with wife who  in Feb or pancreatic cancer., but claims he would never do that to his grandchildren.  Pt speaks endearingly of family and of his relationship with them who are  very supportive.  Pt on Remeron 7.5 mg hs and discussed increasing to 15 mg and is agreeable.  Pt denies SI/HI/AH delusions.  Pt tearful at times when describing his life with his wife.  No acute condition which requires psych admission.  Psych to follow.    Patient seen for follow and currently denies any s/h ideation and no A V hallucinations but still with depression related to the loss of his wife. Patient having day time sedation which Remeron is likely contributing to since patient is receiving it in the daytime. Recommend changing Remeron to QHS dosing and will continue to follow for possible dose titration. Patient also at high risk for delirium and as per family, had moments of confusion. Recommend to keep on delirium precautions.     Recs.   -Patient does not require inpatient psychiatric admission.   -Recommend to continue Remeron 15mg but please change time of administration to Bedtime.   -Will continue for support   -Patient at high risk of delirium, recommend to keep on precautions.   -Recommend SW to arrange outpatient psych follow up prior to discharge

## 2021-07-06 NOTE — PROGRESS NOTE ADULT - SUBJECTIVE AND OBJECTIVE BOX
Subjective: NAEO. Pt sitting up in chair. Denies cp, palpitations, sob, dizziness/LH. Endorses fatigue/weakness.    Medications:  acetaminophen   Tablet .. 650 milliGRAM(s) Oral every 6 hours PRN  ALBUTerol    90 MICROgram(s) HFA Inhaler 2 Puff(s) Inhalation every 6 hours PRN  aMIOdarone    Tablet 200 milliGRAM(s) Oral daily  aMIOdarone    Tablet   Oral   ascorbic acid 500 milliGRAM(s) Oral daily  aspirin  chewable 81 milliGRAM(s) Oral daily  atorvastatin 10 milliGRAM(s) Oral at bedtime  budesonide 160 MICROgram(s)/formoterol 4.5 MICROgram(s) Inhaler 2 Puff(s) Inhalation two times a day  carvedilol 3.125 milliGRAM(s) Oral every 12 hours  chlorhexidine 4% Liquid 1 Application(s) Topical <User Schedule>  epoetin yolanda-epbx (RETACRIT) Injectable 70464 Unit(s) IV Push <User Schedule>  folic acid 1 milliGRAM(s) Oral daily  heparin   Injectable 5000 Unit(s) SubCutaneous every 12 hours  iron sucrose IVPB 50 milliGRAM(s) IV Intermittent <User Schedule>  megestrol Suspension 400 milliGRAM(s) Oral daily  melatonin 5 milliGRAM(s) Oral at bedtime  midodrine 10 milliGRAM(s) Oral every 8 hours PRN  mirtazapine 15 milliGRAM(s) Oral daily  Nephro-melissa 1 Tablet(s) Oral daily  pantoprazole   Suspension 40 milliGRAM(s) Oral every 12 hours  psyllium Powder 1 Packet(s) Oral two times a day  senna 2 Tablet(s) Oral at bedtime  sodium chloride 0.9% lock flush 10 milliLiter(s) IV Push every 1 hour PRN  tamsulosin 0.4 milliGRAM(s) Oral at bedtime      Physical Exam:    Vitals:  Vital Signs Last 24 Hours  T(C): 36.4 (21 @ 15:32), Max: 36.6 (21 @ 19:00)  HR: 77 (21 @ 15:32) (72 - 86)  BP: 120/71 (21 @ 15:32) (108/64 - 120/71)  RR: 19 (21 @ 15:32) (18 - 19)  SpO2: 96% (21 @ 15:32) (96% - 99%)    Weight in k ( @ 05:00)    I&O's Summary    2021 07:01  -  2021 07:00  --------------------------------------------------------  IN: 1240 mL / OUT: 2350 mL / NET: -1110 mL      Tele: Sinus rhythm first degree AV block    General: No distress. Comfortable.  HEENT: EOMs intact.  Neck: Neck supple. JVP not elevated. No masses  Chest: Fine bibasilar crackles  CV: RRR, Normal S1 and S2. No murmurs, rub, or gallops. Radial pulses normal.  Extremities: warm peripherally, trace edema  Abdomen: Soft, non-distended, non-tender  Skin: WDI. Right ACW HD cath.   Neurology: Alert and oriented times three. Sensation intact  Psych: Affect normal    Labs:                        9.5    8.62  )-----------( 220      ( 2021 05:52 )             30.3     07-06    140  |  102  |  18.5  ----------------------------<  88  4.4   |  27.0  |  3.23<H>    Ca    9.2      2021 05:52  Phos  2.4     -  Mg     1.9     -      PT/INR - ( 2021 05:52 )   PT: 17.0 sec;   INR: 1.49 ratio         < from: TTE Echo Complete w/ Contrast w/ Doppler (21 @ 10:36) >    Summary:   1. Endocardial visualization was enhanced with intravenous echo contrast.   2. Left ventricular ejection fraction, by visual estimation, is 20 to 25%.   3. Severely decreased global left ventricular systolic function.   4. Mildly increased LV wall thickness.   5. The mitral in-flow pattern reveals no discernable A-wave, therefore no comment on diastolic function can be made.   6. Mildly enlarged right ventricle.   7. Mild thickening of the anterior and posterior mitral valve leaflets.   8. Moderate mitral valve regurgitation.   9. Severe tricuspid regurgitation.  10. TAVR in the aortic position. Appears well seated with no abnormal rocking motion. Mild to moderate paravalvular aortic regurgitation is seen. Peak velocity is 2.26 m/s, MG is 11.2 mmHg, and PG is 20.5 mmHg, which are acceptable in the setting of a prosethetic aortic valve.  11. Mild pulmonic valve regurgitation.  12. Compared with prior study dated 21, the LVEF appears decreased to 20-25%. MR now appears moderate and TR appears severe. Results were discussed with CT surgery.    LAMAR Calero Electronically signed on 2021 at 6:23:07 PM  < end of copied text >

## 2021-07-06 NOTE — PROGRESS NOTE ADULT - PROBLEM SELECTOR PLAN 1
Biventricular failure, LVEF 20-25%, ACC/AHA stage C, NYHA class III  Appears mildly hypervolemic on exam, lukewarm extremities  Will need fluid removal via HD. Can consider diuretics trial if and when nephrology agrees.   Strict I&O monitoring, daily standing weights  Continue to monitor CVP via RIJ central line.   Recommend reducing midodrine/PRN usage as this will increase afterload.  Eventually resume GDMT if BP allows, likely as outpatient Biventricular failure, LVEF 20-25%, ACC/AHA stage C, NYHA class III  Appears mildly hypervolemic on exam, lukewarm extremities  Will need fluid removal via HD. Can consider diuretics trial if and when nephrology agrees.   GDMT: coreg 3.125mg BID. No ACEi/ARNi/ARB/MRA due to CKD.   Strict I&O monitoring, daily standing weights  Recommend reducing midodrine/PRN usage as this will increase afterload.

## 2021-07-06 NOTE — BH CONSULTATION LIAISON PROGRESS NOTE - NSBHCHARTREVIEWINVESTIGATE_PSY_A_CORE FT
Ventricular Rate 83 BPM    Atrial Rate 83 BPM    P-R Interval 324 ms    QRS Duration 128 ms    Q-T Interval 398 ms    QTC Calculation(Bazett) 467 ms    P Axis 19 degrees    R Axis -24 degrees    T Axis 90 degrees    Diagnosis Line Sinus rhythm atit1wd degree A-V block with Premature atrial complexes  Possible Left atrial enlargement  Non-specific intra-ventricular conduction block    Confirmed by EDITA BANKS ([id number]) on 6/13/2021 8:25:18 AM

## 2021-07-06 NOTE — BH CONSULTATION LIAISON PROGRESS NOTE - NSBHCHARTREVIEWVS_PSY_A_CORE FT
Vital Signs Last 24 Hrs  T(C): 36.4 (06 Jul 2021 10:48), Max: 36.6 (05 Jul 2021 19:00)  T(F): 97.6 (06 Jul 2021 10:48), Max: 97.9 (05 Jul 2021 19:00)  HR: 72 (06 Jul 2021 10:48) (72 - 86)  BP: 116/74 (06 Jul 2021 10:48) (108/64 - 119/66)  BP(mean): --  RR: 18 (06 Jul 2021 10:48) (18 - 18)  SpO2: 99% (06 Jul 2021 10:48) (98% - 99%)

## 2021-07-07 ENCOUNTER — TRANSCRIPTION ENCOUNTER (OUTPATIENT)
Age: 76
End: 2021-07-07

## 2021-07-07 LAB
ALBUMIN SERPL ELPH-MCNC: 3.2 G/DL — LOW (ref 3.3–5.2)
ALP SERPL-CCNC: 100 U/L — SIGNIFICANT CHANGE UP (ref 40–120)
ALT FLD-CCNC: 35 U/L — SIGNIFICANT CHANGE UP
ANION GAP SERPL CALC-SCNC: 12 MMOL/L — SIGNIFICANT CHANGE UP (ref 5–17)
AST SERPL-CCNC: 42 U/L — HIGH
BILIRUB SERPL-MCNC: 1.4 MG/DL — SIGNIFICANT CHANGE UP (ref 0.4–2)
BUN SERPL-MCNC: 25.8 MG/DL — HIGH (ref 8–20)
CALCIUM SERPL-MCNC: 9.4 MG/DL — SIGNIFICANT CHANGE UP (ref 8.6–10.2)
CHLORIDE SERPL-SCNC: 101 MMOL/L — SIGNIFICANT CHANGE UP (ref 98–107)
CO2 SERPL-SCNC: 25 MMOL/L — SIGNIFICANT CHANGE UP (ref 22–29)
CREAT SERPL-MCNC: 4.22 MG/DL — HIGH (ref 0.5–1.3)
GLUCOSE SERPL-MCNC: 77 MG/DL — SIGNIFICANT CHANGE UP (ref 70–99)
HCT VFR BLD CALC: 29.9 % — LOW (ref 39–50)
HGB BLD-MCNC: 9.5 G/DL — LOW (ref 13–17)
INR BLD: 1.46 RATIO — HIGH (ref 0.88–1.16)
MAGNESIUM SERPL-MCNC: 1.9 MG/DL — SIGNIFICANT CHANGE UP (ref 1.6–2.6)
MCHC RBC-ENTMCNC: 30.8 PG — SIGNIFICANT CHANGE UP (ref 27–34)
MCHC RBC-ENTMCNC: 31.8 GM/DL — LOW (ref 32–36)
MCV RBC AUTO: 97.1 FL — SIGNIFICANT CHANGE UP (ref 80–100)
PLATELET # BLD AUTO: 228 K/UL — SIGNIFICANT CHANGE UP (ref 150–400)
POTASSIUM SERPL-MCNC: 4.3 MMOL/L — SIGNIFICANT CHANGE UP (ref 3.5–5.3)
POTASSIUM SERPL-SCNC: 4.3 MMOL/L — SIGNIFICANT CHANGE UP (ref 3.5–5.3)
PROT SERPL-MCNC: 6.4 G/DL — LOW (ref 6.6–8.7)
PROTHROM AB SERPL-ACNC: 16.6 SEC — HIGH (ref 10.6–13.6)
RBC # BLD: 3.08 M/UL — LOW (ref 4.2–5.8)
RBC # FLD: 22.2 % — HIGH (ref 10.3–14.5)
SODIUM SERPL-SCNC: 138 MMOL/L — SIGNIFICANT CHANGE UP (ref 135–145)
WBC # BLD: 7.54 K/UL — SIGNIFICANT CHANGE UP (ref 3.8–10.5)
WBC # FLD AUTO: 7.54 K/UL — SIGNIFICANT CHANGE UP (ref 3.8–10.5)

## 2021-07-07 PROCEDURE — 99233 SBSQ HOSP IP/OBS HIGH 50: CPT

## 2021-07-07 PROCEDURE — 93985 DUP-SCAN HEMO COMPL BI STD: CPT | Mod: 26

## 2021-07-07 PROCEDURE — 71045 X-RAY EXAM CHEST 1 VIEW: CPT | Mod: 26

## 2021-07-07 PROCEDURE — 99232 SBSQ HOSP IP/OBS MODERATE 35: CPT

## 2021-07-07 PROCEDURE — 90937 HEMODIALYSIS REPEATED EVAL: CPT

## 2021-07-07 PROCEDURE — 99024 POSTOP FOLLOW-UP VISIT: CPT

## 2021-07-07 RX ADMIN — TAMSULOSIN HYDROCHLORIDE 0.4 MILLIGRAM(S): 0.4 CAPSULE ORAL at 21:09

## 2021-07-07 RX ADMIN — CHLORHEXIDINE GLUCONATE 1 APPLICATION(S): 213 SOLUTION TOPICAL at 05:17

## 2021-07-07 RX ADMIN — HEPARIN SODIUM 5000 UNIT(S): 5000 INJECTION INTRAVENOUS; SUBCUTANEOUS at 05:15

## 2021-07-07 RX ADMIN — Medication 1 MILLIGRAM(S): at 08:31

## 2021-07-07 RX ADMIN — AMIODARONE HYDROCHLORIDE 200 MILLIGRAM(S): 400 TABLET ORAL at 05:15

## 2021-07-07 RX ADMIN — PANTOPRAZOLE SODIUM 40 MILLIGRAM(S): 20 TABLET, DELAYED RELEASE ORAL at 17:34

## 2021-07-07 RX ADMIN — PANTOPRAZOLE SODIUM 40 MILLIGRAM(S): 20 TABLET, DELAYED RELEASE ORAL at 05:15

## 2021-07-07 RX ADMIN — Medication 500 MILLIGRAM(S): at 08:31

## 2021-07-07 RX ADMIN — MEGESTROL ACETATE 400 MILLIGRAM(S): 40 SUSPENSION ORAL at 08:32

## 2021-07-07 RX ADMIN — Medication 81 MILLIGRAM(S): at 08:31

## 2021-07-07 RX ADMIN — HEPARIN SODIUM 5000 UNIT(S): 5000 INJECTION INTRAVENOUS; SUBCUTANEOUS at 17:34

## 2021-07-07 RX ADMIN — ATORVASTATIN CALCIUM 10 MILLIGRAM(S): 80 TABLET, FILM COATED ORAL at 21:09

## 2021-07-07 RX ADMIN — Medication 5 MILLIGRAM(S): at 21:10

## 2021-07-07 RX ADMIN — Medication 1 APPLICATION(S): at 05:16

## 2021-07-07 RX ADMIN — Medication 1 TABLET(S): at 08:31

## 2021-07-07 RX ADMIN — MIRTAZAPINE 15 MILLIGRAM(S): 45 TABLET, ORALLY DISINTEGRATING ORAL at 21:09

## 2021-07-07 RX ADMIN — CARVEDILOL PHOSPHATE 3.12 MILLIGRAM(S): 80 CAPSULE, EXTENDED RELEASE ORAL at 05:15

## 2021-07-07 RX ADMIN — ERYTHROPOIETIN 12000 UNIT(S): 10000 INJECTION, SOLUTION INTRAVENOUS; SUBCUTANEOUS at 13:29

## 2021-07-07 RX ADMIN — CARVEDILOL PHOSPHATE 3.12 MILLIGRAM(S): 80 CAPSULE, EXTENDED RELEASE ORAL at 17:34

## 2021-07-07 RX ADMIN — Medication 1 APPLICATION(S): at 16:34

## 2021-07-07 NOTE — PROGRESS NOTE ADULT - ASSESSMENT
CARDIAC MEDS  aMIOdarone    Tablet 200 milliGRAM(s) Oral daily  aspirin  chewable 81 milliGRAM(s) Oral daily  atorvastatin 10 milliGRAM(s) Oral at bedtime  carvedilol 3.125 milliGRAM(s) Oral every 12 hours   76M h/o CAD MI in 1995 (angiogram, no stents placed), COPD (2L O2 at home), s/p TAVR (03/2019 at [facility]), gout, CKD, CVA (09/2020), pulmonary HTN, initially presented to [facility] 6/1/21 with RONNELL on CKD (likely cardiorenal syndrome), urinary retention due to noncompliance with medications, with hospital course complicated by cardiogenic shock, acute hypoxemic respiratory failure secondary to metabolic acidosis and respiratory alkalosis. Patient found to have TAVR failure with ISRAEL 6/10/21 showing initial EF 40-45%, Aortic valve prosthesis with Severe paravalvular leak and valvular AI, mild-moderate aortic stenosis, and moderate MR. Decompensated HF and severe pulmonary hypertension, underwent valve-in-valve TAVR 6/23/21, course complicated by worsening ADHF/cardiogenic shock with VT, intubated and RONNELL on CKD required CVVHD, repeat TTE with worsening LV EF 20-25%.     HRrEF s/p cardiogenic shock  Acute on chronic combined systolic and diastolic CHF, NYHA class 3.  GDMT requires ace arb arni patient cannot tolerate due to hypotension  HD for fluid managment  CTS recommending possibility of life vest vs ICD    VALVULAR HEART DISEASE    Patient is now s/p valve in valve TAVR on 6/23/21 with Dr. Campbell.   CTs surgery following.      VENTRICULAR ARRHYTHMIA -> resolved  C/W amiodarone     RONNELL on CKD  Now receiving hemodialysis mw                      CARDIAC MEDS  aMIOdarone    Tablet 200 milliGRAM(s) Oral daily  aspirin  chewable 81 milliGRAM(s) Oral daily  atorvastatin 10 milliGRAM(s) Oral at bedtime  carvedilol 3.125 milliGRAM(s) Oral every 12 hours

## 2021-07-07 NOTE — DISCHARGE NOTE PROVIDER - NSDCFUADDINST_GEN_ALL_CORE_FT
Please call the Cardiothoracic Surgery office at [phone] if you are experiencing any shortness of breath, chest pain, fevers or chills, drainage from the incisions, persistent nausea, vomiting or if you have any questions about your medications. If the symptoms are severe, call 911 and go to the nearest hospital. You can also call [phone] for an emergency [facility] ambulance, which will take you to the closest [facility].    If you need any assistance for making any appointments for a new consult or referral in any specialty, please call our [facility] Clinical Coordination Center at [phone].  Your Care Navigator Nurse Practitioner will be in touch to see you in your home within a few days from discharge. The Follow Your Heart program can help ensure you understand your medications, discharge instructions and answer any questions you may have at that time. They are also a great source to address concerns during the day and may be reached at [phone].

## 2021-07-07 NOTE — DISCHARGE NOTE PROVIDER - CARE PROVIDER_API CALL
Herson Jorgensen)  Cardiovascular Disease; Internal Medicine; [place]ventional [place] [address]  Phone: [phone]  Fax: [phone]  Follow Up Time:     Jarod Campbell)  Surgery; Thoracic and Cardiac Surgery  [address] [place] [address]  Phone: [phone]  Fax: [phone]  Follow Up Time:     Regan Goldman)  Medicine  Nephrology  [address] [place] [id number]  Phone: [phone]  Fax: [phone]  Follow Up Time:     Emigdio Fajardo)  Hematology; HospicePalliative Medicine; Internal Medicine; Medical Oncology  [address] [place] [address]  Phone: [phone]  Fax: [phone]  Follow Up Time:    Herson Jorgensen)  Cardiovascular Disease; Internal Medicine; Interventional [place] [address]  Phone: [phone]  Fax: [phone]  Follow Up Time:     Jarod Campbell)  Surgery; Thoracic and Cardiac Surgery  [address] [place] [address]  Phone: [phone]  Fax: [phone]  Follow Up Time:     Regan Goldman)  Medicine  Nephrology  [address] [place] [id number]  Phone: [phone]  Fax: [phone]  Follow Up Time:     Emigdio Fajardo)  Hematology; HospicePalliative Medicine; Internal Medicine; Medical Oncology  [address] [place] [address]  Phone: [phone]  Fax: [phone]  Follow Up Time:     Radha Bhandari)  Internal Medicine; Pulmonary Disease  [address] [facility], [address]  [place] [address]  Phone: [phone]  Fax: [phone]  Follow Up Time:     Shahriar Call)  Gastroenterology; Internal Medicine  [address] [facility], [address]  [place] [address]  Phone: [phone]  Fax: [phone]  Follow Up Time:

## 2021-07-07 NOTE — PROGRESS NOTE ADULT - SUBJECTIVE AND OBJECTIVE BOX
Patient reports another bad day yesterday.  As per discussion with RN, need 2-3 people to transfer OOB and was irritable while doing so.  Patient has pain in his back where his pressure sore is.  Reiterated exercises to perform throughout the day.     REVIEW OF SYSTEMS  Constitutional - No fever,  +fatigue  HEENT - No vertigo, No neck pain  Neurological - No headaches, No memory loss, +loss of strength, +numbness, No tremors  Musculoskeletal - No joint pain, No joint swelling, +muscle pain  Psychiatric - +depression, +anxiety    FUNCTIONAL PROGRESS  7/5  Sit-Stand Transfer Training  Transfer Training Sit-to-Stand Transfer: maximum assist (25% patient effort);  2 person assist;  full weight-bearing   rolling walker  Transfer Training Stand-to-Sit Transfer: maximum assist (25% patient effort);  2 person assist;  full weight-bearing   rolling walker  Sit-to-Stand Transfer Training Transfer Safety Analysis: decreased strength;  pt unable to stand fully erect, attemped 3x, requested to sit due to exhaustion      VITALS  T(C): 36.6 (07-07-21 @ 08:00), Max: 36.8 (07-07-21 @ 05:11)  HR: 73 (07-07-21 @ 08:00) (72 - 77)  BP: 125/68 (07-07-21 @ 08:00) (116/74 - 130/77)  RR: 16 (07-07-21 @ 08:00) (16 - 19)  SpO2: 100% (07-07-21 @ 08:00) (95% - 100%)  Wt(kg): --    MEDICATIONS   acetaminophen   Tablet .. 650 milliGRAM(s) every 6 hours PRN  ALBUTerol    90 MICROgram(s) HFA Inhaler 2 Puff(s) every 6 hours PRN  aMIOdarone    Tablet 200 milliGRAM(s) daily  aMIOdarone    Tablet     ascorbic acid 500 milliGRAM(s) daily  aspirin  chewable 81 milliGRAM(s) daily  atorvastatin 10 milliGRAM(s) at bedtime  budesonide 160 MICROgram(s)/formoterol 4.5 MICROgram(s) Inhaler 2 Puff(s) two times a day  cadexomer iodine 0.9% Gel 1 Application(s) two times a day  carvedilol 3.125 milliGRAM(s) every 12 hours  chlorhexidine 4% Liquid 1 Application(s) <User Schedule>  epoetin yolanda-epbx (RETACRIT) Injectable 29072 Unit(s) <User Schedule>  folic acid 1 milliGRAM(s) daily  heparin   Injectable 5000 Unit(s) every 12 hours  iron sucrose IVPB 50 milliGRAM(s) <User Schedule>  megestrol Suspension 400 milliGRAM(s) daily  melatonin 5 milliGRAM(s) at bedtime  mirtazapine 15 milliGRAM(s) at bedtime  Nephro-melissa 1 Tablet(s) daily  pantoprazole   Suspension 40 milliGRAM(s) every 12 hours  psyllium Powder 1 Packet(s) two times a day  senna 2 Tablet(s) at bedtime  sodium chloride 0.9% lock flush 10 milliLiter(s) every 1 hour PRN  tamsulosin 0.4 milliGRAM(s) at bedtime      RECENT LABS/IMAGING                          9.5    7.54  )-----------( 228      ( 07 Jul 2021 06:55 )             29.9     07-07    138  |  101  |  25.8<H>  ----------------------------<  77  4.3   |  25.0  |  4.22<H>    Ca    9.4      07 Jul 2021 06:55  Phos  2.4     07-06  Mg     1.9     07-07    TPro  6.4<L>  /  Alb  3.2<L>  /  TBili  1.4  /  DBili  x   /  AST  42<H>  /  ALT  35  /  AlkPhos  100  07-07    PT/INR - ( 07 Jul 2021 06:55 )   PT: 16.6 sec;   INR: 1.46 ratio                       TTE 6/26 - Summary:   1. Endocardial visualization was enhanced with intravenous echo contrast.   2. Left ventricular ejection fraction, by visual estimation, is 20 to 25%.   3. Severely decreased global left ventricular systolic function.   4. Mildly increased LV wall thickness.   5. The mitral in-flow pattern reveals no discernable A-wave, therefore no comment on diastolic function can be made.   6. Mildly enlarged right ventricle.   7. Mild thickening of the anterior and posterior mitral valve leaflets.   8. Moderate mitral valve regurgitation.   9. Severe tricuspid regurgitation.  10. TAVR in the aortic position. Appears well seated with no abnormal rocking motion. Mild to moderate paravalvular aortic regurgitation is seen. Peak velocity is 2.26 m/s, MG is 11.2 mmHg, and PG is 20.5 mmHg, which are acceptable in the setting of a prosethetic aortic valve.  11. Mild pulmonic valve regurgitation.  12. Compared with prior study dated 6/23/21, the LVEF appears decreased to 20-25%. MR now appears moderate and TR appears severe. Results were discussed with CT surgery.    CXR 7/1 - Status post cardiac surgery. No evidence of active chest pathology. Catheters and/or tubes in place    CXR 7/3 - There is an indwelling tunneled double lumen catheter with distal tip in the SVC. LEFT IJ catheter tip remains in SVC. Otherwise no interval change  ----------------------------------------------------------------------------------------  PHYSICAL EXAM  Constitutional - NAD, Comfortable  Chest - Increased work of breathing   Extremities - BLE edema - improved  Neurologic Exam -                    Cognitive - AAOx 3     Motor -                      LEFT    UE - ShAB 4/5, EF 4/5, EE 4/5,  5/5                    RIGHT UE - ShAB 4/5, EF 5/5, EE 5/5,  5/5                    LEFT    LE - HF 2/5, KE 3/5, DF 3/5                     RIGHT LE - HF 2/5, KE 3/5, DF 3/5      Sensory - Intact to LT  Psychiatric - Mood depressed/Anxious, Fatigued  ----------------------------------------------------------------------------------------  ASSESSMENT/PLAN  76yMale with functional deficits with debility related to failed TAVR  CAD - ASA, Lipitor  AFIB - Amiodarone  HTN - Coreg  Pulm - Proventil, Symbicort  ESRD - HD s/p right IJ, Epoetin  Sleep - Melatonin  Mood - Remeron, Recommend alternate SSRI    Pain - Tylenol  DVT PPX - SCDs, Heparin  Rehab - Patient continues to need significant assist related to his debility. Continue to recommend ELSI, as patient is expected to needs a more prolonged stay to achieve transition to community living and would not be able to tolerate a comprehensive/intense rehab program of 3hours/day. Continue mobilization by staff to maintain cardiopulmonary function and prevention of secondary complications related to debility. Discussed with rehab team.

## 2021-07-07 NOTE — DISCHARGE NOTE PROVIDER - NSDCACTIVITY_GEN_ALL_CORE
Do not drive or operate machinery/Showering allowed/Do not make important decisions/Stairs allowed/Walking - Indoors allowed/Walking - Outdoors allowed Do not drive or operate machinery/Showering allowed/Do not make important decisions/Stairs allowed/Walking - Indoors allowed/No heavy lifting/straining/Walking - Outdoors allowed

## 2021-07-07 NOTE — DISCHARGE NOTE PROVIDER - NSDCFUADDAPPT_GEN_ALL_CORE_FT
Please follow up with Dr. Campbell on  at [facility].     **Please arrive at [facility] ONE HOUR PRIOR TO APPOINTMENT TIME to get a CHEST XRAY (script in folder). Go directly to the Radiology Department.***     Patient to be discharged to [facility] later today. Upon discharge from rehab, make a follow up appointment with Dr. Campbell by calling [phone].  Follow up with your cardiologist and primary care doctor within 7-10 days after discharge. Daily weights, DASH diet, ensure supplements bid, daily shower,  PT/OT Rehab per rehab facility. BMP, CBC twice a week. CXR weekly. Vitals per routine. Continue HD M/W/F.     On the day of your appointment with Dr. Campbell, please arrive at [facility] ONE HOUR PRIOR TO APPOINTMENT TIME to get a CHEST XRAY (script in folder). Go directly to the Radiology Department.***    Plan for patient to follow up for an outpatient sleep study, an outpatient dental evaluation, and follow up with GI given recent workup.     Please follow up with Vascular Surgeon for eventual AV fistula creation for HD access.     Please follow up with your Cardiologist and Primary Care Physician 2-4 weeks from discharge. Upon discharge from rehab, make a follow up appointment with Dr. Campbell by calling [phone].  Follow up with your cardiologist and primary care doctor within 7-10 days after discharge. Daily weights, DASH diet, ensure supplements bid, daily shower,  PT/OT Rehab per rehab facility. BMP, CBC twice a week. CXR weekly. Vitals per routine. Continue HD M/W/F.     Plan for patient to follow up for an outpatient sleep study, an outpatient dental evaluation, and follow up with GI given recent workup.     Please follow up with Vascular Surgeon for eventual AV fistula creation for HD access.     Please follow up with your Cardiologist and Primary Care Physician 2-4 weeks from discharge.

## 2021-07-07 NOTE — DISCHARGE NOTE PROVIDER - PROVIDER TOKENS
PROVIDER:[TOKEN:[[id number]]],PROVIDER:[TOKEN:[[id number]]],PROVIDER:[TOKEN:[[id number]]],PROVIDER:[TOKEN:[[id number]]] PROVIDER:[TOKEN:[[id number]]],PROVIDER:[TOKEN:[[id number]]],PROVIDER:[TOKEN:[[id number]]],PROVIDER:[TOKEN:[[id number]]],PROVIDER:[TOKEN:[[id number]]],PROVIDER:[TOKEN:[[id number]]]

## 2021-07-07 NOTE — PROGRESS NOTE ADULT - SUBJECTIVE AND OBJECTIVE BOX
[facility] DIVISION OF KIDNEY DISEASES AND HYPERTENSION -- HEMODIALYSIS NOTE  --------------------------------------------------------------------------------  Chief Complaint: ESRD/Ongoing hemodialysis requirement    24 hour events/subjective:    PAST HISTORY  --------------------------------------------------------------------------------  No significant changes to PMH, PSH, FHx, SHx, unless otherwise noted    ALLERGIES & MEDICATIONS  --------------------------------------------------------------------------------  Allergies    No Known Drug Allergies  strawberry (Anaphylaxis)    Intolerances- None,     Standing Inpatient Medications  aMIOdarone    Tablet 200 milliGRAM(s) Oral daily  aMIOdarone    Tablet   Oral   ascorbic acid 500 milliGRAM(s) Oral daily  aspirin  chewable 81 milliGRAM(s) Oral daily  atorvastatin 10 milliGRAM(s) Oral at bedtime  budesonide 160 MICROgram(s)/formoterol 4.5 MICROgram(s) Inhaler 2 Puff(s) Inhalation two times a day  cadexomer iodine 0.9% Gel 1 Application(s) Topical two times a day  carvedilol 3.125 milliGRAM(s) Oral every 12 hours  chlorhexidine 4% Liquid 1 Application(s) Topical <User Schedule>  epoetin yolanda-epbx (RETACRIT) Injectable 86011 Unit(s) IV Push <User Schedule>  folic acid 1 milliGRAM(s) Oral daily  heparin   Injectable 5000 Unit(s) SubCutaneous every 12 hours  iron sucrose IVPB 50 milliGRAM(s) IV Intermittent <User Schedule>  megestrol Suspension 400 milliGRAM(s) Oral daily  melatonin 5 milliGRAM(s) Oral at bedtime  mirtazapine 15 milliGRAM(s) Oral at bedtime  Nephro-melissa 1 Tablet(s) Oral daily  pantoprazole   Suspension 40 milliGRAM(s) Oral every 12 hours  psyllium Powder 1 Packet(s) Oral two times a day  senna 2 Tablet(s) Oral at bedtime  tamsulosin 0.4 milliGRAM(s) Oral at bedtime    PRN Inpatient Medications  acetaminophen   Tablet .. 650 milliGRAM(s) Oral every 6 hours PRN  ALBUTerol    90 MICROgram(s) HFA Inhaler 2 Puff(s) Inhalation every 6 hours PRN  sodium chloride 0.9% lock flush 10 milliLiter(s) IV Push every 1 hour PRN    REVIEW OF SYSTEMS  --------------------------------------------------------------------------------  Gen: + weight changes, fatigue, No fever/chills, weakness  Skin: No rashes  Head/Eyes/Ears/Mouth: No headache; Normal hearing; Normal vision w/o blurriness; No sinus pain/discomfort, sore throat  Respiratory: No dyspnea, cough, wheezing, hemoptysis  CV: No chest pain, PND, orthopnea  GI: No abdominal pain, diarrhea, constipation, nausea, vomiting, melena, hematochezia  : No increased frequency, dysuria, hematuria, nocturia  MSK: No joint pain/swelling; no back pain; no edema  Neuro: No dizziness/lightheadedness, weakness, seizures, numbness, tingling  Heme: No easy bruising or bleeding  Endo: No heat/cold intolerance  Psych: No significant nervousness, anxiety, stress, depression    All other systems were reviewed and are negative, except as noted.    VITALS/PHYSICAL EXAM  --------------------------------------------------------------------------------  T(C): 36.7 (07-07-21 @ 11:43), Max: 36.8 (07-07-21 @ 05:11)  HR: 75 (07-07-21 @ 11:43) (73 - 77)  BP: 117/61 (07-07-21 @ 11:43) (116/74 - 130/77)  RR: 18 (07-07-21 @ 11:43) (16 - 19)  SpO2: 100% (07-07-21 @ 11:43) (95% - 100%)    07-06-21 @ 07:01  -  07-07-21 @ 07:00  --------------------------------------------------------  IN: 0 mL / OUT: 100 mL / NET: -100 mL    Physical Exam:  	Gen: NAD, Pale, ill-appearing  	HEENT: PERRL, supple neck, clear oropharynx  	Pulm: CTA B/L  	CV: RRR, S1S2; no rub  	Back: No spinal or CVA tenderness; no sacral edema  	Abd: +BS, soft, nontender/nondistended  	: No suprapubic tenderness  	UE: Warm, FROM, no clubbing, intact strength; no edema; no asterixis  	LE: Warm, FROM, no clubbing, intact strength; ++ edema  	Neuro: No focal deficits,   	Psych: Normal affect and mood  	Skin: Warm, without rashes  	Vascular access: TDC,    LABS/STUDIES  --------------------------------------------------------------------------------              9.5    7.54  >-----------<  228      [07-07-21 @ 06:55]              29.9     138  |  101  |  25.8  ----------------------------<  77      [07-07-21 @ 06:55]  4.3   |  25.0  |  4.22        Ca     9.4     [07-07-21 @ 06:55]      Mg     1.9     [07-07-21 @ 06:55]      Phos  2.4     [07-06-21 @ 05:52]    TPro  6.4  /  Alb  3.2  /  TBili  1.4  /  DBili  x   /  AST  42  /  ALT  35  /  AlkPhos  100  [07-07-21 @ 06:55]    PT/INR: PT 16.6 , INR 1.46       [07-07-21 @ 06:55]      TSH 2.31      [06-12-21 @ 01:55]    HBsAb <3.0      [06-17-21 @ 05:13]  HBsAg Nonreact      [06-17-21 @ 05:13]  HBcAb Nonreact      [06-17-21 @ 05:13]  HCV 0.15, Nonreact      [06-17-21 @ 05:13]

## 2021-07-07 NOTE — PROGRESS NOTE ADULT - PROBLEM SELECTOR PLAN 10
Wean to RA as tolerated    Problem 11: Adjustment Disorder   Cont Remeron   following    Problem 12: COPD  On home O2  Cont Symbicort, Proventil   Plan to discuss with CTS team in AM

## 2021-07-07 NOTE — PROGRESS NOTE ADULT - PROBLEM SELECTOR PLAN 1
Now s/p Valve in Valve TAVR with Dr. Campbell on 6/23  Repeat echo revealed EF 20-25% from 30-35% prior with mildly enlarged RV, severely decreased LV Fxn, moderate MR, Severe TR and mild Pulm Regurgitation  Midodrine now prn for SBP < 100,  recommends to limit use on HD days if possible  Coreg 3.125 BID as per heart failure team recommendations  Will need ACE/ARB/ARNI when able to tolerate to complete GDMT for heart failure  Continue Amio for runs of SVT post op   Continue GI ppx with Protonix,   Repeat ECHO this week per cardiology  May needed to be devaluated for life vest depending on ECHO report per cardiology Now s/p Valve in Valve TAVR with Dr. Campbell on 6/23  Repeat echo revealed EF 20-25% from 30-35% prior with mildly enlarged RV, severely decreased LV Fxn, moderate MR, Severe TR and mild Pulm Regurgitation  Midodrine now prn for SBP < 100,  recommends to limit use on HD days if possible  Coreg 3.125 BID as per heart failure team recommendations  Will need ACE/ARB/ARNI when able to tolerate to complete GDMT for heart failure  Continue Amio for runs of SVT post op   Continue GI ppx with Protonix,   Repeat ECHO this week per cardiology  May needed to be devaluated for life vest depending on ECHO report per cardiology  Evaluated by adult rehab medicine, recommending ELSI for a more prolonged stay to achieve transition to community living and would not be able to tolerate a comprehensive/intense rehab program of 3hours/day.

## 2021-07-07 NOTE — DISCHARGE NOTE PROVIDER - NPI NUMBER (FOR SYSADMIN USE ONLY) :
[[id number]],[[id number]],[[id number]] [[id number]],[[id number]]

## 2021-07-07 NOTE — DISCHARGE NOTE PROVIDER - NSDCCPCAREPLAN_GEN_ALL_CORE_FT
PRINCIPAL DISCHARGE DIAGNOSIS  Diagnosis: Paravalvular leak of prosthetic heart valve, initial encounter  Assessment and Plan of Treatment: Now s/p Valve in valve TAVR   Keep femoral or groin site clean,please shower daily and pat site dry.Watch site for signs of reddness or drainage, these should be reported to your doctor.You will receive a card about your valve in the mail,please carry in your wallet.        SECONDARY DISCHARGE DIAGNOSES  Diagnosis: H/O aortic valve stenosis  Assessment and Plan of Treatment: H/O aortic valve stenosis    Diagnosis: ESRD (end stage renal disease) on dialysis  Assessment and Plan of Treatment: Follow up with Nephrologist   Continue to take medicines as prescribed  Continue HD as per nephrology       Diagnosis: Acute on chronic combined systolic and diastolic CHF, NYHA class 3  Assessment and Plan of Treatment: Continue to take all medicines as prescribed  Follow up with cardiologist    Diagnosis: CAD in native artery  Assessment and Plan of Treatment: Continue to take all medicines as ordered       PRINCIPAL DISCHARGE DIAGNOSIS  Diagnosis: Paravalvular leak of prosthetic heart valve, initial encounter  Assessment and Plan of Treatment: Now s/p Valve in valve TAVR 6/23/21.  - Keep the groin site clean and dry.  You may shower and pat the site dry.  - Watch the site for signs of redness or drainage, these should be reported to your doctor immediately.  - You will receive a wallet card about your new valve in the mail.  Please carry it with you to present to anyone who may ask if you have any medical implants.  - Be sure to inform your doctors including your dentist about your valve since you will need to take antibiotics to reduce the risk of infection before certain medical and dental procedures.  - You will be given an appointment to follow up with your doctor in approximately 4 weeks.  It is important to keep this appointment so that your new valve can be assessed.  - Follow up for an outpatient dental evaluation to prevent infections of your new heart valve.      SECONDARY DISCHARGE DIAGNOSES  Diagnosis: Ventricular arrhythmia  Assessment and Plan of Treatment: Now s/p S-ICD placement 7/9/21.    Post Operative Device Instructions:  - Bruising around the implant site or over the chest, side or arm near the incision is normal, and will take a few weeks to resolve.  - Do not push, pull or lift anything heavier than 10 lbs (about a gallon of milk) with the affected arm for 4 weeks.     - keep site dry for 5 days  - Do not touch the incision until it is completely healed.   - There are Steristrips (white strips of tape) on your incision, which will start to peel off on their own over the next 2-3 weeks. Do not pick at or peel off the Steristrips.   - Do not apply soaps, creams, lotions, ointments or powders to the incision until it is completely healed.  You should call the doctor if:   - you notice redness, drainage, swelling, increased tenderness, hot sensation around the incision, bleeding or incision edges pulling apart.  - your temperature is greater than 100 degress F for more than 24 hours.  - you notice swelling or bulging at the incision or around the device that was not there when you left the hospital or is increasing in size.  - you experience increased difficulty breathing.  - you notice new/worsening swelling in your legs and ankles.  - you faint or have dizzy spells.  -you have any questions or concerns regarding your device or the procedure.  -You have received an implantable cardioverter defibrillator (“ICD”) following the diagnosis of a potentially lethal ventricular arrhythmia. In accordance with Heart Rhythm Society guidelines and [place] Vehicle and Traffic Law, you should not drive until you have been arrhythmia free (with no clinically significant arrhythmias or ICD therapies) for a minimum of 6 months.    Diagnosis: Acute on chronic combined systolic and diastolic CHF, NYHA class 3  Assessment and Plan of Treatment: Take all medications as prescribed and listed on your discharge paperwork.  Please follow up with your Cardiologist and Primary Care Physician 2-4 weeks from discharge.    Diagnosis: CAD in native artery  Assessment and Plan of Treatment: Continue to take all medicines as ordered.  Please follow up with your Cardiologist and Primary Care Physician 2-4 weeks from discharge.      Diagnosis: ESRD (end stage renal disease) on dialysis  Assessment and Plan of Treatment: Follow up with Nephrologist.  Continue to take medicines as prescribed.  Continue HD as per nephrology Monday/Wednesday/Friday schedule.   Goal -1/5L off.   Follow up with the Vascular Surgeon for eventual AV fistula for HD.       Diagnosis: Weight loss, unintentional  Assessment and Plan of Treatment: Follow up with GI Dr. Lopez / Jakub for further evaluation.    Diagnosis: WILLEM (obstructive sleep apnea)  Assessment and Plan of Treatment: Follow up with Pulmonologist Dr. Bhandari for an outpatinet sleep study to rule out WILLEM.     PRINCIPAL DISCHARGE DIAGNOSIS  Diagnosis: Paravalvular leak of prosthetic heart valve, initial encounter  Assessment and Plan of Treatment: Now s/p Valve in valve TAVR 6/23/21.  - Keep the groin site clean and dry.  You may shower and pat the site dry.  - Watch the site for signs of redness or drainage, these should be reported to your doctor immediately.  - You will receive a wallet card about your new valve in the mail.  Please carry it with you to present to anyone who may ask if you have any medical implants.  - Be sure to inform your doctors including your dentist about your valve since you will need to take antibiotics to reduce the risk of infection before certain medical and dental procedures.  - You will be given an appointment to follow up with your doctor in approximately 4 weeks.  It is important to keep this appointment so that your new valve can be assessed.  - Follow up for an outpatient dental evaluation to prevent infections of your new heart valve.      SECONDARY DISCHARGE DIAGNOSES  Diagnosis: ESRD (end stage renal disease) on dialysis  Assessment and Plan of Treatment: Follow up with Nephrologist.  Continue to take medicines as prescribed.  Continue HD as per nephrology Monday/Wednesday/Friday schedule.   Goal -1.5L off.   Follow up with the Vascular Surgeon for eventual AV fistula for HD.       Diagnosis: CAD in native artery  Assessment and Plan of Treatment: Continue to take all medicines as ordered.  Please follow up with your Cardiologist and Primary Care Physician 2-4 weeks from discharge.      Diagnosis: Acute on chronic combined systolic and diastolic CHF, NYHA class 3  Assessment and Plan of Treatment: Take all medications as prescribed and listed on your discharge paperwork.  Please follow up with your Cardiologist and Primary Care Physician 2-4 weeks from discharge.    Diagnosis: Weight loss, unintentional  Assessment and Plan of Treatment: Follow up with GI Dr. Lopez / Jakub for further evaluation.    Diagnosis: Ventricular arrhythmia  Assessment and Plan of Treatment: Now s/p S-ICD placement 7/9/21.    Post Operative Device Instructions:  - Bruising around the implant site or over the chest, side or arm near the incision is normal, and will take a few weeks to resolve.  - Do not push, pull or lift anything heavier than 10 lbs (about a gallon of milk) with the affected arm for 4 weeks.     - keep site dry for 5 days  - Do not touch the incision until it is completely healed.   - There are Steristrips (white strips of tape) on your incision, which will start to peel off on their own over the next 2-3 weeks. Do not pick at or peel off the Steristrips.   - Do not apply soaps, creams, lotions, ointments or powders to the incision until it is completely healed.  You should call the doctor if:   - you notice redness, drainage, swelling, increased tenderness, hot sensation around the incision, bleeding or incision edges pulling apart.  - your temperature is greater than 100 degress F for more than 24 hours.  - you notice swelling or bulging at the incision or around the device that was not there when you left the hospital or is increasing in size.  - you experience increased difficulty breathing.  - you notice new/worsening swelling in your legs and ankles.  - you faint or have dizzy spells.  -you have any questions or concerns regarding your device or the procedure.  -You have received an implantable cardioverter defibrillator (“ICD”) following the diagnosis of a potentially lethal ventricular arrhythmia. In accordance with Heart Rhythm Society guidelines and [place] Vehicle and Traffic Law, you should not drive until you have been arrhythmia free (with no clinically significant arrhythmias or ICD therapies) for a minimum of 6 months.    Diagnosis: WILLEM (obstructive sleep apnea)  Assessment and Plan of Treatment: Follow up with Pulmonologist Dr. Bhandari for an outpatinet sleep study to rule out WILLEM.     PRINCIPAL DISCHARGE DIAGNOSIS  Diagnosis: Paravalvular leak of prosthetic heart valve, initial encounter  Assessment and Plan of Treatment: Now s/p Valve in valve TAVR 6/23/21.  - Keep the groin site clean and dry.  You may shower and pat the site dry.  - Watch the site for signs of redness or drainage, these should be reported to your doctor immediately.  - You will receive a wallet card about your new valve in the mail.  Please carry it with you to present to anyone who may ask if you have any medical implants.  - Be sure to inform your doctors including your dentist about your valve since you will need to take antibiotics to reduce the risk of infection before certain medical and dental procedures.  - You will be given an appointment to follow up with your doctor in approximately 4 weeks.  It is important to keep this appointment so that your new valve can be assessed.  - Follow up for an outpatient dental evaluation to prevent infections of your new heart valve.      SECONDARY DISCHARGE DIAGNOSES  Diagnosis: ESRD (end stage renal disease) on dialysis  Assessment and Plan of Treatment: Follow up with Nephrologist.  Continue to take medicines as prescribed.  Continue HD as per nephrology Monday/Wednesday/Friday schedule.   Goal -1.5L off.   Follow up with the Vascular Surgeon for eventual AV fistula for HD.       Diagnosis: CAD in native artery  Assessment and Plan of Treatment: Continue to take all medicines as ordered.  Please follow up with your Cardiologist and Primary Care Physician 2-4 weeks from discharge.      Diagnosis: Acute on chronic combined systolic and diastolic CHF, NYHA class 3  Assessment and Plan of Treatment: Take all medications as prescribed and listed on your discharge paperwork.  Please follow up with your Cardiologist and Primary Care Physician 2-4 weeks from discharge.    Diagnosis: Weight loss, unintentional  Assessment and Plan of Treatment: Follow up with GI Dr. Lopez / Jakub for further evaluation.    Diagnosis: Ventricular arrhythmia  Assessment and Plan of Treatment: Now s/p S-ICD placement 7/9/21.    Post Operative Device Instructions:  leave abdominal binder on until Sunday 7/11  - Bruising around the implant site or over the chest, side or arm near the incision is normal, and will take a few weeks to resolve.  - Do not push, pull or lift anything heavier than 10 lbs (about a gallon of milk) with the affected arm for 4 weeks.     - keep site dry for 5 days  - Do not touch the incision until it is completely healed.   - There are Steristrips (white strips of tape) on your incision, which will start to peel off on their own over the next 2-3 weeks. Do not pick at or peel off the Steristrips.   - Do not apply soaps, creams, lotions, ointments or powders to the incision until it is completely healed.  You should call the doctor if:   - you notice redness, drainage, swelling, increased tenderness, hot sensation around the incision, bleeding or incision edges pulling apart.  - your temperature is greater than 100 degress F for more than 24 hours.  - you notice swelling or bulging at the incision or around the device that was not there when you left the hospital or is increasing in size.  - you experience increased difficulty breathing.  - you notice new/worsening swelling in your legs and ankles.  - you faint or have dizzy spells.  -you have any questions or concerns regarding your device or the procedure.  -You have received an implantable cardioverter defibrillator (“ICD”) following the diagnosis of a potentially lethal ventricular arrhythmia. In accordance with Heart Rhythm Society guidelines and [place] Vehicle and Traffic Law, you should not drive until you have been arrhythmia free (with no clinically significant arrhythmias or ICD therapies) for a minimum of 6 months.    Diagnosis: WILLEM (obstructive sleep apnea)  Assessment and Plan of Treatment: Follow up with Pulmonologist Dr. Bhandari for an outpatinet sleep study to rule out WILLEM.

## 2021-07-07 NOTE — PROGRESS NOTE ADULT - ASSESSMENT
76M with a PMH of MI in 1995 (angiogram, no stents placed), COPD (2L O2 at home), severe AS s/p TAVR (03/2019 at [facility]), gout, CKD, CVA (no deficits, 09/2020), pulmonary HTN, initially presented to [facility] 6/1/21 with RONNELL on CKD, hospital course significant for cardiogenic shock, acute hypoxemic respiratory failure, and acute on chronic combined diastolic and systolic heart failure. ISRAEL revealed severe AI. Patient was transferred to [facility] under Dr. Campbell for further workup of bioprosthetic AI.     6/28 s/p BAL, sputum samples sent m ax switched to [facility]     Inpatient hospital course has been significant for:  1. 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 RONNELL on CKD progressed to ESRD requiring HD   3. Unintentional Weight loss / Dysphagia / Anorexia work up revealing duodenitis and diffuse erosive esophagitis, gastritis on EGD (biopsies negative for H. Pylori or carcinoma); colonoscopy showing diverticulosis  4. R/O AV Endocarditis   5. Auto-elevated INR (followed by Hematology)  6. R/O WILLEM   7. Adjustment disorder (evaluated by Behavioral Health)  8. Thrombocytopenia, Hematology consulted, r/o DIC    Patient is now s/p valve in valve TAVR on 6/23/21 with Dr. Campbell.     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 pt remains on epi, amio, primacor and is tolerating CVVHD  6/30 Pt now on HD

## 2021-07-07 NOTE — DISCHARGE NOTE PROVIDER - NSDCCPTREATMENT_GEN_ALL_CORE_FT
PRINCIPAL PROCEDURE  Procedure: TAVR, open femoral artery approach  Findings and Treatment: Left femoral cutdown 29MM Waters rudy valve in valve, with use of right radial Steens Device       PRINCIPAL PROCEDURE  Procedure: TAVR, open femoral artery approach  Findings and Treatment: Left femoral cutdown 29MM Waters rudy valve in valve, with use of right radial Fairlee Device      SECONDARY PROCEDURE  Procedure: EGD with biopsy  Findings and Treatment:     Procedure: Flexible colonoscopy  Findings and Treatment:

## 2021-07-07 NOTE — DISCHARGE NOTE PROVIDER - DETAILS OF MALNUTRITION DIAGNOSIS/DIAGNOSES
This patient has been assessed with a concern for Malnutrition and was treated during this hospitalization for the following Nutrition diagnosis/diagnoses:     -  06/14/2021: Severe protein-calorie malnutrition

## 2021-07-07 NOTE — DISCHARGE NOTE PROVIDER - NSDCMRMEDTOKEN_GEN_ALL_CORE_FT
Advair Diskus 100 mcg-50 mcg inhalation powder: 1 puff(s) inhaled 2 times a day  Albuterol (Eqv-Proventil HFA) 90 mcg/inh inhalation aerosol: 2 puff(s) inhaled every 4 hours, As Needed  allopurinol 100 mg oral tablet: 1 tab(s) orally 2 times a day  amLODIPine 2.5 mg oral tablet: 1 tab(s) orally once a day  Aspir 81 oral delayed release tablet: 1 tab(s) orally once a day  atorvastatin 10 mg oral tablet: 1 tab(s) orally once a day  BMP: Result to Dr. Jameel Arambula Silver oral tablet: 1 tab(s) orally once a day  Culturelle Advanced Immune Defense oral capsule: 1 cap(s) orally 2 times a day  ipratropium-albuterol 0.5 mg-2.5 mg/3 mLinhalation solution: 3 milliliter(s) inhaled 4 times a day, As Needed  metOLazone 2.5 mg oral tablet: 1 tab(s) orally once a day  take 30 minutes prior to torsemide  metoprolol tartrate 100 mg oral tablet: 1 tab(s) orally 2 times a day  spironolactone 25 mg oral tablet: 1 tab(s) orally 2 times a day  tamsulosin 0.4 mg oral capsule: 1 cap(s) orally once a day  torsemide 20 mg oral tablet: 1 tab(s) orally 2 times a day  Vitamin D3 1000 intl units (25 mcg) oral capsule: orally once a day   acetaminophen 325 mg oral tablet: 2 tab(s) orally every 6 hours, As needed, Mild Pain (1 - 3)  Albuterol (Eqv-Proventil HFA) 90 mcg/inh inhalation aerosol: 2 puff(s) inhaled every 4 hours, As Needed  amiodarone 200 mg oral tablet: 1 tab(s) orally once a day  ascorbic acid 500 mg oral tablet: 1 tab(s) orally once a day  aspirin 81 mg oral tablet, chewable: 1 tab(s) orally once a day  atorvastatin 10 mg oral tablet: 1 tab(s) orally once a day (at bedtime)  budesonide-formoterol 160 mcg-4.5 mcg/inh inhalation aerosol: 2 puff(s) inhaled 2 times a day  cadexomer iodine 0.9% topical gel: 1 application topically 2 times a day    to sacral wound  clean wound with NS, prep skin with cavilon then apply iodosorb gel and cover with avelyn dressing  carvedilol 3.125 mg oral tablet: 1 tab(s) orally every 12 hours  epoetin yolanda: 28087 units IV push wiht HD M/W/F  folic acid 1 mg oral tablet: 1 tab(s) orally once a day  iron sucrose 20 mg/mL intravenous solution: 50mg in 100mL NS IVPB every Friday   megestrol 40 mg/mL oral suspension: 10 milliliter(s) orally once a day  melatonin 5 mg oral tablet: 1 tab(s) orally once a day (at bedtime)  mirtazapine 15 mg oral tablet: 1 tab(s) orally once a day (at bedtime)  multivitamin: nephrovite  1 tab(s) orally once a day  pantoprazole 40 mg oral delayed release tablet: 1 tab(s) orally every 12 hours  psyllium 3.4 g/7 g oral powder for reconstitution: 1 packet(s) orally 2 times a day  senna oral tablet: 2 tab(s) orally once a day (at bedtime)  tamsulosin 0.4 mg oral capsule: 1 cap(s) orally once a day  Vitamin D3 1000 intl units (25 mcg) oral capsule: orally once a day

## 2021-07-07 NOTE — PROGRESS NOTE ADULT - SUBJECTIVE AND OBJECTIVE BOX
Subjective: Patient lying in bed, no acute distress noted, denies chest pian, pressure, shortness of breath.     VITAL SIGNS  Vital Signs Last 24 Hrs  T(C): 36.7 (21 @ 20:55), Max: 36.7 (21 @ 20:55)  T(F): 98.1 (21 @ 20:55), Max: 98.1 (21 @ 20:55)  HR: 75 (21 @ 20:55) (72 - 77)  BP: 130/77 (21 @ 20:55) (114/69 - 130/77)  RR: 18 (21 @ 20:55) (18 - 19)  SpO2: 100% (21 @ 20:55) (95% - 100%)  on 2L O2            Telemetry/Alarms:    LVEF:     MEDICATIONS  acetaminophen   Tablet .. 650 milliGRAM(s) Oral every 6 hours PRN  ALBUTerol    90 MICROgram(s) HFA Inhaler 2 Puff(s) Inhalation every 6 hours PRN  aMIOdarone    Tablet 200 milliGRAM(s) Oral daily  aMIOdarone    Tablet   Oral   ascorbic acid 500 milliGRAM(s) Oral daily  aspirin  chewable 81 milliGRAM(s) Oral daily  atorvastatin 10 milliGRAM(s) Oral at bedtime  budesonide 160 MICROgram(s)/formoterol 4.5 MICROgram(s) Inhaler 2 Puff(s) Inhalation two times a day  cadexomer iodine 0.9% Gel 1 Application(s) Topical two times a day  carvedilol 3.125 milliGRAM(s) Oral every 12 hours  chlorhexidine 4% Liquid 1 Application(s) Topical <User Schedule>  epoetin yolanda-epbx (RETACRIT) Injectable 75944 Unit(s) IV Push <User Schedule>  folic acid 1 milliGRAM(s) Oral daily  heparin   Injectable 5000 Unit(s) SubCutaneous every 12 hours  iron sucrose IVPB 50 milliGRAM(s) IV Intermittent <User Schedule>  megestrol Suspension 400 milliGRAM(s) Oral daily  melatonin 5 milliGRAM(s) Oral at bedtime  midodrine 10 milliGRAM(s) Oral every 8 hours PRN  mirtazapine 15 milliGRAM(s) Oral at bedtime  Nephro-melissa 1 Tablet(s) Oral daily  pantoprazole   Suspension 40 milliGRAM(s) Oral every 12 hours  psyllium Powder 1 Packet(s) Oral two times a day  senna 2 Tablet(s) Oral at bedtime  sodium chloride 0.9% lock flush 10 milliLiter(s) IV Push every 1 hour PRN  tamsulosin 0.4 milliGRAM(s) Oral at bedtime      PHYSICAL EXAM  General:   no acute distress  Neurology:  alert and oriented X 4, nonfocal, no gross deficits, occasionally sundowns at night  Respiratory: breath sounds clear, diminished at the bases bilaterally  CV:  regular rate and rhythm, normal S1 S2  Abdomen:  soft, nontender, nondistended, positive bowel sounds  Extremities:  warm, well perfused, +2 BLE edema +DP pulses bilaterally  Skin: Left groin C/D/I, no hematoma noted  Psych: Appropriate mood and affect    0705 @ 07:01  -   @ 07:00  --------------------------------------------------------  IN: 1240 mL / OUT: 2350 mL / NET: -1110 mL     @ 07:01  -  07 @ 01:40  --------------------------------------------------------  IN: 0 mL / OUT: 100 mL / NET: -100 mL        Weights:  Daily     Daily Weight in k (2021 05:00)  Admit Wt: Drug Dosing Weight  Height (cm): 182.9 (2021 00:32)  Weight (kg): 100 (2021 08:12)  BMI (kg/m2): 29.9 (2021 08:12)  BSA (m2): 2.22 (2021 08:12)    All laboratory results, radiology and medications reviewed.    LABS      140  |  102  |  18.5  ----------------------------<  88  4.4   |  27.0  |  3.23<H>    Ca    9.2      2021 05:52  Phos  2.4     07-06  Mg     1.9     07-06                                   9.5    8.62  )-----------( 220      ( 2021 05:52 )             30.3          PT/INR - ( 2021 05:52 )   PT: 17.0 sec;   INR: 1.49 ratio         < from: Xray Chest 1 View- PORTABLE-Routine (21 @ 06:11) >  IMPRESSION:  There is an indwelling tunneled double lumen catheter with distal tip in the SVC..  LEFT IJ catheter tip remains in SVC.  Otherwise no interval change    A FOLLOW-UP AP PORTABLE CHEST RADIOGRAPH 7/3/2021 AT 4:18 AM:  There is significant reduction of bilateral diffuse airspace disease and effusions.  LEFT and RIGHT diaphragmatic contours are visualized. Cathetersremain in place.  Otherwise no interval change.    < end of copied text >      PAST MEDICAL & SURGICAL HISTORY:  Pulmonary hypertension    Hypertension    Hyperlipidemia    H/O aortic valve stenosis  s/p TAVR 2019 at [facility]    CVA (cerebrovascular accident)  MCA CVA with tPA and thrombectomy    Chronic kidney disease    Prediabetes    Mitral valve regurgitation    CAD in native artery    Aneurysm of aortic root  thoracic aortic aneurysm, without ruptur    Benign prostatic hyperplasia    Gout    History of transcatheter aortic valve replacement (TAVR)

## 2021-07-07 NOTE — PROGRESS NOTE ADULT - ASSESSMENT
75 y/o male with h/o chronic systolic HF ACC/AHA stage C, ICMP, CAD s/p MI ’95, s/p TAVR 3/2019 (@ [facility]), S. bovis bacteremia, COPD (home O2 @ 2 lpm), CVA (9/2020), gout, noncompliance who was admitted to [facility] with RONNELL on CKD and urinary retention. His hospital course was complicated by cardiogenic shock, acute hypoxemic respiratory failure likely in setting of bioprosthetic severe paravalvular/valvular AI and decompensated HF. He was subsequently transferred to [facility] for TAVR. 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 ID was following and BxCx were obtained which showed no growth. Eventually, he underwent Amy TAVR on 6/23/21. He was initially on epinephrine and milrinone gtt. He was extubated and successfully tolerated weaning of pressors. His CVVH has been transitioned to HD MWF.      Hemodynamics:  6/28/21: Milrinone 0.25mcg/kg/min & Epineprine 0.04mcg/kg/min   CVP 6, PAP 49/17/28, HR 85, /61/82, TD CO/CI  4.4/2.2.

## 2021-07-07 NOTE — PROGRESS NOTE ADULT - PROBLEM SELECTOR PLAN 6
HISTORY OF PRESENT ILLNESS  Claudy Ramirez is a 81 y.o. female. Chief Complaint   Patient presents with    Follow-up     1 week f/u on otitis. Patient states ear is better but she now have a 'mild discomfort' to the back of her head on the right side. I prescribed augmentin and allegra d on the last visit, has improved but now having similar pain on the right side, new. HPI  Past Medical History:   Diagnosis Date    Breast mass     right,  excisional biopsy 1/15,  atypical ductal hyperplasia ([facility] 1/15)    Cardiac echocardiogram 12/02/2016    Ltd study to evaluate LA size. EF 65%. No RWMA. LA size was upper limits of normal (decreased in size since prev study of 9/23/16). Mild MR. Current Outpatient Prescriptions   Medication Sig Dispense Refill    amoxicillin-clavulanate (AUGMENTIN) 875-125 mg per tablet Take 1 Tab by mouth every twelve (12) hours for 10 days. 20 Tab 0    aspirin delayed-release (ADULT LOW DOSE ASPIRIN) 81 mg tablet Take 1 Tab by mouth daily. 30 Tab prn    cholecalciferol (VITAMIN D3) 1,000 unit cap Take 1,000 Units by mouth every other day.  raloxifene (EVISTA) 60 mg tablet 60 mg daily. 0     No Known Allergies    ROS Respiratory: Negative for shortness of breath. Cardiovascular: Negative for chest pain. Genitourinary: Negative for frequency. Neurological: Negative for dizziness . Visit Vitals    /77 (BP 1 Location: Right arm, BP Patient Position: Sitting)    Pulse 60    Temp 97.9 °F (36.6 °C) (Oral)    Resp 20    Ht 5' 4.5\" (1.638 m)    Wt 216 lb 8 oz (98.2 kg)    BMI 36.59 kg/m2       Physical Exam   Constitutional: She appears well-developed and well-nourished. No distress. HENT:   Right Ear: External ear normal. Tympanic membrane is not injected. Left Ear: External ear normal. Tympanic membrane is not injected. Eyes: Conjunctivae and EOM are normal.   Lymphadenopathy:     She has no cervical adenopathy.    Nursing note and vitals reviewed. ASSESSMENT and PLAN    ICD-10-CM ICD-9-CM    1. Left otitis media with effusion H66.92 381.4 REFERRAL TO ENT-OTOLARYNGOLOGY   2.  Otalgia of both ears H92.03 388.70 REFERRAL TO ENT-OTOLARYNGOLOGY   cont sx, despite augmentin but has improved some referred Initially RONNELL on CKD  Now progressed to ESRD requiring HD  New RIJ tunnel HD cath placed 7/2 by AIRAM ARCHULETA has pink band for preservation in anticipation for eventual AVF, vascular following  Renal team following.  CVVHD started 6/25, now tolerating HD M/W/F

## 2021-07-07 NOTE — PROGRESS NOTE ADULT - SUBJECTIVE AND OBJECTIVE BOX
[facility]  [address] [place] [address] [phone]    REASON FOR VISIT:  FOLLOW UP ON CHF  UPDATE      MEDICATIONS  (STANDING):  aMIOdarone    Tablet 200 milliGRAM(s) Oral daily  aMIOdarone    Tablet   Oral   ascorbic acid 500 milliGRAM(s) Oral daily  aspirin  chewable 81 milliGRAM(s) Oral daily  atorvastatin 10 milliGRAM(s) Oral at bedtime  budesonide 160 MICROgram(s)/formoterol 4.5 MICROgram(s) Inhaler 2 Puff(s) Inhalation two times a day  cadexomer iodine 0.9% Gel 1 Application(s) Topical two times a day  carvedilol 3.125 milliGRAM(s) Oral every 12 hours  chlorhexidine 4% Liquid 1 Application(s) Topical <User Schedule>  epoetin yolanda-epbx (RETACRIT) Injectable 96001 Unit(s) IV Push <User Schedule>  folic acid 1 milliGRAM(s) Oral daily  heparin   Injectable 5000 Unit(s) SubCutaneous every 12 hours  iron sucrose IVPB 50 milliGRAM(s) IV Intermittent <User Schedule>  megestrol Suspension 400 milliGRAM(s) Oral daily  melatonin 5 milliGRAM(s) Oral at bedtime  mirtazapine 15 milliGRAM(s) Oral at bedtime  Nephro-melissa 1 Tablet(s) Oral daily  pantoprazole   Suspension 40 milliGRAM(s) Oral every 12 hours  psyllium Powder 1 Packet(s) Oral two times a day  senna 2 Tablet(s) Oral at bedtime  tamsulosin 0.4 milliGRAM(s) Oral at bedtime    MEDICATIONS  (PRN):  acetaminophen   Tablet .. 650 milliGRAM(s) Oral every 6 hours PRN Mild Pain (1 - 3)  ALBUTerol    90 MICROgram(s) HFA Inhaler 2 Puff(s) Inhalation every 6 hours PRN Shortness of Breath and/or Wheezing  sodium chloride 0.9% lock flush 10 milliLiter(s) IV Push every 1 hour PRN Pre/post blood products, medications, blood draw, and to maintain line patency      Vital Signs Last 24 Hrs  T(C): 36.7 (07 Jul 2021 11:43), Max: 36.8 (07 Jul 2021 05:11)  T(F): 98.1 (07 Jul 2021 11:43), Max: 98.2 (07 Jul 2021 05:11)  HR: 75 (07 Jul 2021 11:43) (73 - 77)  BP: 117/61 (07 Jul 2021 11:43) (116/74 - 130/77)  BP(mean): --  RR: 18 (07 Jul 2021 11:43) (16 - 19)  SpO2: 100% (07 Jul 2021 11:43) (95% - 100%)  CAPILLARY BLOOD GLUCOSE    I&O's Summary    06 Jul 2021 07:01  -  07 Jul 2021 07:00  --------------------------------------------------------  IN: 0 mL / OUT: 100 mL / NET: -100 mL      PHYSICAL EXAM  HEAD: Atraumatic  NECK:  Supple, No JVD, No carotid bruitts  CHEST:  Clear on auscultation  HEART:  s1&s2 regular  ABDOMEN:  soft non tender  EXT: 2+ peripheal pulses no cyanosis,   bruising    Edema  NEURO:   Alert and oriented  No gross motor or sensory deficts      LABS:                        9.5    7.54  )-----------( 228      ( 07 Jul 2021 06:55 )             29.9     07-07    138  |  101  |  25.8<H>  ----------------------------<  77  4.3   |  25.0  |  4.22<H>    Ca    9.4      07 Jul 2021 06:55  Phos  2.4     07-06  Mg     1.9     07-07    TPro  6.4<L>  /  Alb  3.2<L>  /  TBili  1.4  /  DBili  x   /  AST  42<H>  /  ALT  35  /  AlkPhos  100  07-07    PT/INR - ( 07 Jul 2021 06:55 )   PT: 16.6 sec;   INR: 1.46 ratio      RADIOLOGY & ADDITIONAL TESTS:    < from: TTE Echo Complete w/ Contrast w/ Doppler (06.26.21 @ 10:36) >   1. Endocardial visualization was enhanced with intravenous echo contrast.   2. Left ventricular ejection fraction, by visual estimation, is 20 to 25%.   3. Severely decreased global left ventricular systolic function.   4. Mildly increased LV wall thickness.   5. The mitral in-flow pattern reveals no discernable A-wave, therefore no comment on diastolic function can be made.   6. Mildly enlarged right ventricle.   7. Mild thickening of the anterior and posterior mitral valve leaflets.   8. Moderate mitral valve regurgitation.   9. Severe tricuspid regurgitation.  10. TAVR in the aortic position. Appears well seated with no abnormal rocking motion. Mild to moderate paravalvular aortic regurgitation is seen. Peak velocity is 2.26 m/s, MG is 11.2 mmHg, and PG is 20.5 mmHg, which are acceptable in the setting of a prosethetic aortic valve.  11. Mild pulmonic valve regurgitation.  12. Compared with prior study dated 6/23/21, the LVEF appears decreased to 20-25%. MR now appears moderate and TR appears severe. Results were discussed with CT surgery.                 [facility]  [address] [place] [address] [phone]    REASON FOR VISIT:  FOLLOW UP ON CHF  UPDATE currently being dialysed    MEDICATIONS  (STANDING):  aMIOdarone    Tablet 200 milliGRAM(s) Oral daily  aMIOdarone    Tablet   Oral   ascorbic acid 500 milliGRAM(s) Oral daily  aspirin  chewable 81 milliGRAM(s) Oral daily  atorvastatin 10 milliGRAM(s) Oral at bedtime  budesonide 160 MICROgram(s)/formoterol 4.5 MICROgram(s) Inhaler 2 Puff(s) Inhalation two times a day  cadexomer iodine 0.9% Gel 1 Application(s) Topical two times a day  carvedilol 3.125 milliGRAM(s) Oral every 12 hours  chlorhexidine 4% Liquid 1 Application(s) Topical <User Schedule>  epoetin yolanda-epbx (RETACRIT) Injectable 29624 Unit(s) IV Push <User Schedule>  folic acid 1 milliGRAM(s) Oral daily  heparin   Injectable 5000 Unit(s) SubCutaneous every 12 hours  iron sucrose IVPB 50 milliGRAM(s) IV Intermittent <User Schedule>  megestrol Suspension 400 milliGRAM(s) Oral daily  melatonin 5 milliGRAM(s) Oral at bedtime  mirtazapine 15 milliGRAM(s) Oral at bedtime  Nephro-melissa 1 Tablet(s) Oral daily  pantoprazole   Suspension 40 milliGRAM(s) Oral every 12 hours  psyllium Powder 1 Packet(s) Oral two times a day  senna 2 Tablet(s) Oral at bedtime  tamsulosin 0.4 milliGRAM(s) Oral at bedtime    MEDICATIONS  (PRN):  acetaminophen   Tablet .. 650 milliGRAM(s) Oral every 6 hours PRN Mild Pain (1 - 3)  ALBUTerol    90 MICROgram(s) HFA Inhaler 2 Puff(s) Inhalation every 6 hours PRN Shortness of Breath and/or Wheezing  sodium chloride 0.9% lock flush 10 milliLiter(s) IV Push every 1 hour PRN Pre/post blood products, medications, blood draw, and to maintain line patency      Vital Signs Last 24 Hrs  T(C): 36.7 (07 Jul 2021 11:43), Max: 36.8 (07 Jul 2021 05:11)  T(F): 98.1 (07 Jul 2021 11:43), Max: 98.2 (07 Jul 2021 05:11)  HR: 75 (07 Jul 2021 11:43) (73 - 77)  BP: 117/61 (07 Jul 2021 11:43) (116/74 - 130/77)  BP(mean): --  RR: 18 (07 Jul 2021 11:43) (16 - 19)  SpO2: 100% (07 Jul 2021 11:43) (95% - 100%)  CAPILLARY BLOOD GLUCOSE    I&O's Summary    06 Jul 2021 07:01  -  07 Jul 2021 07:00  --------------------------------------------------------  IN: 0 mL / OUT: 100 mL / NET: -100 mL      PHYSICAL EXAM  HEAD: Atraumatic  NECK:  Supple, No JVD, No carotid bruitts  CHEST:  Clear on auscultation  HEART:  s1&s2 regular  ABDOMEN:  soft non tender  EXT: 2+ peripheal pulses no cyanosis,   bruising    Edema  NEURO:   Alert and oriented  No gross motor or sensory deficts      LABS:                        9.5    7.54  )-----------( 228      ( 07 Jul 2021 06:55 )             29.9     07-07    138  |  101  |  25.8<H>  ----------------------------<  77  4.3   |  25.0  |  4.22<H>    Ca    9.4      07 Jul 2021 06:55  Phos  2.4     07-06  Mg     1.9     07-07    TPro  6.4<L>  /  Alb  3.2<L>  /  TBili  1.4  /  DBili  x   /  AST  42<H>  /  ALT  35  /  AlkPhos  100  07-07    PT/INR - ( 07 Jul 2021 06:55 )   PT: 16.6 sec;   INR: 1.46 ratio      RADIOLOGY & ADDITIONAL TESTS:    < from: TTE Echo Complete w/ Contrast w/ Doppler (06.26.21 @ 10:36) >   1. Endocardial visualization was enhanced with intravenous echo contrast.   2. Left ventricular ejection fraction, by visual estimation, is 20 to 25%.   3. Severely decreased global left ventricular systolic function.   4. Mildly increased LV wall thickness.   5. The mitral in-flow pattern reveals no discernable A-wave, therefore no comment on diastolic function can be made.   6. Mildly enlarged right ventricle.   7. Mild thickening of the anterior and posterior mitral valve leaflets.   8. Moderate mitral valve regurgitation.   9. Severe tricuspid regurgitation.  10. TAVR in the aortic position. Appears well seated with no abnormal rocking motion. Mild to moderate paravalvular aortic regurgitation is seen. Peak velocity is 2.26 m/s, MG is 11.2 mmHg, and PG is 20.5 mmHg, which are acceptable in the setting of a prosethetic aortic valve.  11. Mild pulmonic valve regurgitation.  12. Compared with prior study dated 6/23/21, the LVEF appears decreased to 20-25%. MR now appears moderate and TR appears severe. Results were discussed with CT surgery.

## 2021-07-07 NOTE — PROGRESS NOTE ADULT - SUBJECTIVE AND OBJECTIVE BOX
Subjective: NAEO. Pt. asymptomatic. Denies dizziness/LH. Endorses generalized weakness.    Medications:  acetaminophen   Tablet .. 650 milliGRAM(s) Oral every 6 hours PRN  ALBUTerol    90 MICROgram(s) HFA Inhaler 2 Puff(s) Inhalation every 6 hours PRN  aMIOdarone    Tablet 200 milliGRAM(s) Oral daily  aMIOdarone    Tablet   Oral   ascorbic acid 500 milliGRAM(s) Oral daily  aspirin  chewable 81 milliGRAM(s) Oral daily  atorvastatin 10 milliGRAM(s) Oral at bedtime  budesonide 160 MICROgram(s)/formoterol 4.5 MICROgram(s) Inhaler 2 Puff(s) Inhalation two times a day  cadexomer iodine 0.9% Gel 1 Application(s) Topical two times a day  carvedilol 3.125 milliGRAM(s) Oral every 12 hours  chlorhexidine 4% Liquid 1 Application(s) Topical <User Schedule>  epoetin yolanda-epbx (RETACRIT) Injectable 05654 Unit(s) IV Push <User Schedule>  folic acid 1 milliGRAM(s) Oral daily  heparin   Injectable 5000 Unit(s) SubCutaneous every 12 hours  iron sucrose IVPB 50 milliGRAM(s) IV Intermittent <User Schedule>  megestrol Suspension 400 milliGRAM(s) Oral daily  melatonin 5 milliGRAM(s) Oral at bedtime  mirtazapine 15 milliGRAM(s) Oral at bedtime  Nephro-melissa 1 Tablet(s) Oral daily  pantoprazole   Suspension 40 milliGRAM(s) Oral every 12 hours  psyllium Powder 1 Packet(s) Oral two times a day  senna 2 Tablet(s) Oral at bedtime  sodium chloride 0.9% lock flush 10 milliLiter(s) IV Push every 1 hour PRN  tamsulosin 0.4 milliGRAM(s) Oral at bedtime      Physical Exam:    Vitals:  Vital Signs Last 24 Hours  T(C): 36.6 (21 @ 08:00), Max: 36.8 (21 @ 05:11)  HR: 73 (21 @ 08:00) (72 - 77)  BP: 125/68 (21 @ 08:00) (116/74 - 130/77)  RR: 16 (21 @ 08:00) (16 - 19)  SpO2: 100% (21 @ 08:00) (95% - 100%)    Weight in k.7 ( @ 05:19)    I&O's Summary    2021 07:01  -  2021 07:00  --------------------------------------------------------  IN: 0 mL / OUT: 100 mL / NET: -100 mL      Tele: Sinus rhythm, first degree AV block    General: Sitting up in chair, in no distress.  HEENT: EOMs intact.  Neck: Neck supple. JVP mildly elevated.  Chest: Fine bibasilar crackles.  CV: RRR, normal S1 and S2. No murmurs, rub, or gallops. Radial pulses normal.  Extremities: 1-2+ pitting ankle edema bilaterally, lukewarm peripherally.   Abdomen: Soft, non-distended, non-tender  Skin: No rashes or skin breakdown  Neurology: Alert and oriented times three. Sensation intact  Psych: Affect normal    Labs:                        9.5    7.54  )-----------( 228      ( 2021 06:55 )             29.9     -    138  |  101  |  25.8<H>  ----------------------------<  77  4.3   |  25.0  |  4.22<H>    Ca    9.4      2021 06:55  Phos  2.4     -  Mg     1.9     -07    TPro  6.4<L>  /  Alb  3.2<L>  /  TBili  1.4  /  DBili  x   /  AST  42<H>  /  ALT  35  /  AlkPhos  100  -    PT/INR - ( 2021 06:55 )   PT: 16.6 sec;   INR: 1.46 ratio         < from: TTE Echo Complete w/ Contrast w/ Doppler (21 @ 10:36) >    Summary:   1. Endocardial visualization was enhanced with intravenous echo contrast.   2. Left ventricular ejection fraction, by visual estimation, is 20 to 25%.   3. Severely decreased global left ventricular systolic function.   4. Mildly increased LV wall thickness.   5. The mitral in-flow pattern reveals no discernable A-wave, therefore no comment on diastolic function can be made.   6. Mildly enlarged right ventricle.   7. Mild thickening of the anterior and posterior mitral valve leaflets.   8. Moderate mitral valve regurgitation.   9. Severe tricuspid regurgitation.  10. TAVR in the aortic position. Appears well seated with no abnormal rocking motion. Mild to moderate paravalvular aortic regurgitation is seen. Peak velocity is 2.26 m/s, MG is 11.2 mmHg, and PG is 20.5 mmHg, which are acceptable in the setting of a prosethetic aortic valve.  11. Mild pulmonic valve regurgitation.  12. Compared with prior study dated 21, the LVEF appears decreased to 20-25%. MR now appears moderate and TR appears severe. Results were discussed with CT surgery.    LAMAR Calero Electronically signed on 2021 at 6:23:07 PM  < end of copied text >

## 2021-07-07 NOTE — PROGRESS NOTE ADULT - ATTENDING COMMENTS
Above noted and dw np  pt seen and examined  He is being dialyzed. S/P NANO TAVR. Residual MR and TR which is not severe.   Asked to repeat TTE today  If LVEF is low, we need to talk about a lifet vest vs an ICD

## 2021-07-07 NOTE — DISCHARGE NOTE PROVIDER - INSTRUCTIONS
Choose lean meats and poultry without skin and prepare them without added saturated/trans fat. Choose fish at least twice a week, especially those high in omega-3 (salmon or trout). Select fat-free or low-fat dairy products. To lower cholesterol, reduce saturated fat to no more than 5 to 6 percent of total calories. For someone eating 2,000 calories a day, that’s about 13 grams of saturated fat.  Cut back on beverages and foods with added sugars.  Choose and prepare foods with little or no salt. To lower blood pressure, reducing daily intake of sodium to 1,500 mg is desirable because it can lower blood pressure.  If you drink alcohol, drink sparingly and NOT if you are taking narcotics for pain. Follow the American Heart Association recommendations when you eat out, and keep an eye on your portion sizes.  Continue to follow diet low in potassium

## 2021-07-07 NOTE — PROGRESS NOTE ADULT - ASSESSMENT
TTE Echo Complete w/ Contrast w/ Doppler (06.26.21 @ 10:36)      1. Endocardial visualization was enhanced with intravenous echo contrast.   2. Left ventricular ejection fraction, by visual estimation, is 20 to 25%.   3. Severely decreased global left ventricular systolic function.   4. Mildly increased LV wall thickness.   5. The mitral in-flow pattern reveals no discernable A-wave, therefore no comment on diastolic function can be made.   6. Mildly enlarged right ventricle.   7. Mild thickening of the anterior and posterior mitral valve leaflets.   8. Moderate mitral valve regurgitation.   9. Severe tricuspid regurgitation.  10. TAVR in the aortic position. Appears well seated with no abnormal rocking motion. Mild to moderate paravalvular aortic regurgitation is seen. Peak velocity is 2.26 m/s, MG is 11.2 mmHg, and PG is 20.5 mmHg, which are acceptable in the setting of a prosthetic aortic valve.  11. Mild pulmonic valve regurgitation.  12. Compared with prior study dated 6/23/21, the LVEF appears decreased to 20-25%. MR now appears moderate and TR appears severe. Results were discussed with CT surgery.    CARDIAC MEDS:    aMIOdarone    Tablet 200 milliGRAM(s) Oral daily  aspirin  chewable 81 milliGRAM(s) Oral daily  atorvastatin 10 milliGRAM(s) Oral at bedtime  carvedilol 3.125 milliGRAM(s) Oral every 12 hours

## 2021-07-07 NOTE — DISCHARGE NOTE PROVIDER - CARE PROVIDERS DIRECT ADDRESSES
,DirectAddress_Unknown,DirectAddress_Unknown,DirectAddress_Unknown,[email or web address] ,DirectAddress_Unknown,DirectAddress_Unknown,DirectAddress_Unknown,[email or web address]

## 2021-07-07 NOTE — PROGRESS NOTE ADULT - PROBLEM SELECTOR PLAN 4
Cardiology following, recommendations appreciated  Heart Failure team following  Coreg 3.125 BID as per heart failure team recommendations  Midodrine as needed for hypotension recommends to limit use on HD days if possible  Will need ACEI/ARB/ARNI when able to tolerate to complete GDMT for heart failure  Repeat ECHO this week per cardiology,   May need Life vest prior to d/c depends on ECHO report

## 2021-07-07 NOTE — PROGRESS NOTE ADULT - PROBLEM SELECTOR PLAN 1
Biventricular failure, LVEF 20-25%, ACC/AHA stage C, NYHA class III  Appears mildly hypervolemic on exam, lukewarm extremities  Will need fluid removal via HD. Can consider diuretics trial if and when nephrology agrees.   GDMT: coreg 3.125mg BID. No ACEi/ARNi/ARB/MRA due to CKD.   Strict I&O monitoring, daily standing weights  Recommend reducing midodrine/PRN usage as this will increase afterload.

## 2021-07-07 NOTE — DISCHARGE NOTE PROVIDER - HOSPITAL COURSE
76M with a PMH of MI in 1995 (angiogram, no stents placed), COPD (2L O2 at home), severe AS s/p TAVR (03/2019 at [facility]), gout, CKD, CVA (no deficits, 09/2020), pulmonary HTN, initially presented to [facility] 6/1/21 with RONNELL on CKD, hospital course significant for cardiogenic shock, acute hypoxemic respiratory failure, and acute on chronic combined diastolic and systolic heart failure. ISRAEL revealed severe AI. Patient was transferred to [facility] under Dr. Campbell for further workup of bioprosthetic AI.     6/28 s/p BAL    Inpatient hospital course has been significant for:  1. 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 RONNELL on CKD progressed to ESRD requiring HD   3. Unintentional Weight loss / Dysphagia / Anorexia work up revealing duodenitis and diffuse erosive esophagitis, gastritis on EGD (biopsies negative for H. Pylori or carcinoma); colonoscopy showing diverticulosis  4. R/O AV Endocarditis   5. Auto-elevated INR (followed by Hematology)  6. R/O WILLEM   7. Adjustment disorder (evaluated by Behavioral Health), started on Remeron  8. Thrombocytopenia, Hematology consulted, r/o DIC, now resolved    Patient is now s/p valve in valve TAVR on 6/23/21 with Dr. Campbell.   Post op course was significant for thrombocytopenia, HIT negative. Patient was on epi, Amio and primacor gtt during the immediate post op course, now weaned off. Received CVVD inially now on HD protocol M/W/F.   Patient was evaluated by adult acute rehab medicine, recommending [facility]. Patient is now stable for discharge to [facility] per Dr. Campbell.      76M with a PMH of MI in 1995 (angiogram, no stents placed), COPD (2L O2 at home), severe AS s/p TAVR (03/2019 at [facility]), gout, CKD, CVA (no deficits, 09/2020), pulmonary HTN, initially presented to [facility] 6/1/21 with RONNELL on CKD, hospital course significant for cardiogenic shock, acute hypoxemic respiratory failure, and acute on chronic combined diastolic and systolic heart failure. ISRAEL revealed severe AI. Patient was transferred to [facility] under Dr. Campbell for further workup of bioprosthetic AI.   Patient is now s/p valve in valve TAVR on 6/23/21 with Dr. Campbell. On 6/28 patient underwent BAL, and ultimately extubated 6/29. Postoperative course significant for Acute on chronic combined diastolic and systolic heart failure, RONNELL on CKD progressed to ESRD requiring HD via RIJ tunneled permacath, Unintentional Weight loss / Dysphagia / Anorexia work up revealing duodenitis and diffuse erosive esophagitis, gastritis on EGD (biopsies negative for H. Pylori or carcinoma), and colonoscopy showing diverticulosis. 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 ID following patient throughout hospital stay. No indications for continued IV abx therapy as patient has remained afebrile, nontoxic, with blood cultures negative since arrival. Hospital course also significant for auto-elevated INR (followed by Hematology), R/O WILLEM (Pulmonology recommending outpatient sleep study), Adjustment disorder (evaluated by Behavioral Health), and thrombocytopenia (resolved).    During this hospitalization pt noted to have sustained VT >200bpm requiring amiodarone therapy and is now s/p CI S-ICD implant for secondary prevention of SCD.    Plan for pt to be discharged to [facility] later today 7/10 if medically stable.    As per Dr Campbell no MCOT device necessary upon discharge, secondary to pt being on telemetry for 2 weeks with no conduction issues,  as well as low incidence of risk for conduction complications with a TAVR valve in valve. Patient is now stable for discharge to [facility] per Dr. Campbell.     Plan for patient to follow up for an outpatient sleep study, an outpatient dental evaluation, and follow up with GI given recent workup.      76M with a PMH of MI in 1995 (angiogram, no stents placed), COPD (2L O2 at home), severe AS s/p TAVR (03/2019 at [facility]), gout, CKD, CVA (no deficits, 09/2020), pulmonary HTN, initially presented to [facility] 6/1/21 with RONNELL on CKD, hospital course significant for cardiogenic shock, acute hypoxemic respiratory failure, and acute on chronic combined diastolic and systolic heart failure. ISRAEL revealed severe AI. Patient was transferred to [facility] under Dr. Campbell for further workup of bioprosthetic AI.   Patient is now s/p valve in valve TAVR on 6/23/21 with Dr. Campbell. On 6/28 patient underwent BAL, and ultimately extubated 6/29. Postoperative course significant for Acute on chronic combined diastolic and systolic heart failure, RONNELL on CKD progressed to ESRD requiring HD via RIJ tunneled permacath, Unintentional Weight loss / Dysphagia / Anorexia work up revealing duodenitis and diffuse erosive esophagitis, gastritis on EGD (biopsies negative for H. Pylori or carcinoma), and colonoscopy showing diverticulosis. 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 ID following patient throughout hospital stay. No indications for continued IV abx therapy as patient has remained afebrile, nontoxic, with blood cultures negative since arrival. Hospital course also significant for auto-elevated INR (followed by Hematology), R/O WILLEM (Pulmonology recommending outpatient sleep study), Adjustment disorder (evaluated by Behavioral Health), and thrombocytopenia (resolved).    During this hospitalization pt noted to have sustained VT >200bpm requiring amiodarone therapy and is now s/p BSCI S-ICD implant for secondary prevention of SCD.    Pt stable for discharge to rehab as discussed with Dr. Arrington.    As per Dr Campbell no MCOT device necessary upon discharge, secondary to pt being on telemetry for 2 weeks with no conduction issues,  as well as low incidence of risk for conduction complications with a TAVR valve in valve. Patient is now stable for discharge to [facility] per Dr. Campbell.     Plan for patient to follow up for an outpatient sleep study, an outpatient dental evaluation, and follow up with GI given recent workup.

## 2021-07-08 LAB
ALBUMIN SERPL ELPH-MCNC: 3 G/DL — LOW (ref 3.3–5.2)
ALP SERPL-CCNC: 97 U/L — SIGNIFICANT CHANGE UP (ref 40–120)
ALT FLD-CCNC: 42 U/L — HIGH
ANION GAP SERPL CALC-SCNC: 12 MMOL/L — SIGNIFICANT CHANGE UP (ref 5–17)
AST SERPL-CCNC: 53 U/L — HIGH
BILIRUB SERPL-MCNC: 1.3 MG/DL — SIGNIFICANT CHANGE UP (ref 0.4–2)
BUN SERPL-MCNC: 17 MG/DL — SIGNIFICANT CHANGE UP (ref 8–20)
CALCIUM SERPL-MCNC: 9 MG/DL — SIGNIFICANT CHANGE UP (ref 8.6–10.2)
CHLORIDE SERPL-SCNC: 100 MMOL/L — SIGNIFICANT CHANGE UP (ref 98–107)
CO2 SERPL-SCNC: 25 MMOL/L — SIGNIFICANT CHANGE UP (ref 22–29)
CREAT SERPL-MCNC: 3.16 MG/DL — HIGH (ref 0.5–1.3)
GLUCOSE SERPL-MCNC: 93 MG/DL — SIGNIFICANT CHANGE UP (ref 70–99)
HCT VFR BLD CALC: 30.8 % — LOW (ref 39–50)
HGB BLD-MCNC: 9.8 G/DL — LOW (ref 13–17)
MAGNESIUM SERPL-MCNC: 1.8 MG/DL — SIGNIFICANT CHANGE UP (ref 1.6–2.6)
MCHC RBC-ENTMCNC: 30.7 PG — SIGNIFICANT CHANGE UP (ref 27–34)
MCHC RBC-ENTMCNC: 31.8 GM/DL — LOW (ref 32–36)
MCV RBC AUTO: 96.6 FL — SIGNIFICANT CHANGE UP (ref 80–100)
PLATELET # BLD AUTO: 211 K/UL — SIGNIFICANT CHANGE UP (ref 150–400)
POTASSIUM SERPL-MCNC: 4 MMOL/L — SIGNIFICANT CHANGE UP (ref 3.5–5.3)
POTASSIUM SERPL-SCNC: 4 MMOL/L — SIGNIFICANT CHANGE UP (ref 3.5–5.3)
PROT SERPL-MCNC: 6.3 G/DL — LOW (ref 6.6–8.7)
RBC # BLD: 3.19 M/UL — LOW (ref 4.2–5.8)
RBC # FLD: 22.3 % — HIGH (ref 10.3–14.5)
SARS-COV-2 RNA SPEC QL NAA+PROBE: SIGNIFICANT CHANGE UP
SODIUM SERPL-SCNC: 137 MMOL/L — SIGNIFICANT CHANGE UP (ref 135–145)
WBC # BLD: 7 K/UL — SIGNIFICANT CHANGE UP (ref 3.8–10.5)
WBC # FLD AUTO: 7 K/UL — SIGNIFICANT CHANGE UP (ref 3.8–10.5)

## 2021-07-08 PROCEDURE — 99232 SBSQ HOSP IP/OBS MODERATE 35: CPT

## 2021-07-08 PROCEDURE — 99223 1ST HOSP IP/OBS HIGH 75: CPT

## 2021-07-08 PROCEDURE — 99233 SBSQ HOSP IP/OBS HIGH 50: CPT

## 2021-07-08 PROCEDURE — 90937 HEMODIALYSIS REPEATED EVAL: CPT

## 2021-07-08 PROCEDURE — 99024 POSTOP FOLLOW-UP VISIT: CPT

## 2021-07-08 PROCEDURE — 71045 X-RAY EXAM CHEST 1 VIEW: CPT | Mod: 26

## 2021-07-08 RX ORDER — PANTOPRAZOLE SODIUM 20 MG/1
40 TABLET, DELAYED RELEASE ORAL EVERY 12 HOURS
Refills: 0 | Status: DISCONTINUED | OUTPATIENT
Start: 2021-07-08 | End: 2021-07-10

## 2021-07-08 RX ORDER — BUMETANIDE 0.25 MG/ML
2 INJECTION INTRAMUSCULAR; INTRAVENOUS ONCE
Refills: 0 | Status: COMPLETED | OUTPATIENT
Start: 2021-07-08 | End: 2021-07-08

## 2021-07-08 RX ORDER — ALBUMIN HUMAN 25 %
100 VIAL (ML) INTRAVENOUS ONCE
Refills: 0 | Status: COMPLETED | OUTPATIENT
Start: 2021-07-08 | End: 2021-07-08

## 2021-07-08 RX ORDER — FUROSEMIDE 40 MG
40 TABLET ORAL ONCE
Refills: 0 | Status: DISCONTINUED | OUTPATIENT
Start: 2021-07-08 | End: 2021-07-08

## 2021-07-08 RX ADMIN — TAMSULOSIN HYDROCHLORIDE 0.4 MILLIGRAM(S): 0.4 CAPSULE ORAL at 21:38

## 2021-07-08 RX ADMIN — Medication 1 APPLICATION(S): at 17:49

## 2021-07-08 RX ADMIN — Medication 50 MILLILITER(S): at 12:00

## 2021-07-08 RX ADMIN — AMIODARONE HYDROCHLORIDE 200 MILLIGRAM(S): 400 TABLET ORAL at 05:18

## 2021-07-08 RX ADMIN — MEGESTROL ACETATE 400 MILLIGRAM(S): 40 SUSPENSION ORAL at 09:10

## 2021-07-08 RX ADMIN — HEPARIN SODIUM 5000 UNIT(S): 5000 INJECTION INTRAVENOUS; SUBCUTANEOUS at 05:18

## 2021-07-08 RX ADMIN — ATORVASTATIN CALCIUM 10 MILLIGRAM(S): 80 TABLET, FILM COATED ORAL at 21:38

## 2021-07-08 RX ADMIN — Medication 500 MILLIGRAM(S): at 09:09

## 2021-07-08 RX ADMIN — MIRTAZAPINE 15 MILLIGRAM(S): 45 TABLET, ORALLY DISINTEGRATING ORAL at 21:38

## 2021-07-08 RX ADMIN — Medication 81 MILLIGRAM(S): at 09:09

## 2021-07-08 RX ADMIN — Medication 1 MILLIGRAM(S): at 09:09

## 2021-07-08 RX ADMIN — Medication 5 MILLIGRAM(S): at 21:38

## 2021-07-08 RX ADMIN — BUDESONIDE AND FORMOTEROL FUMARATE DIHYDRATE 2 PUFF(S): 160; 4.5 AEROSOL RESPIRATORY (INHALATION) at 20:53

## 2021-07-08 RX ADMIN — BUMETANIDE 2 MILLIGRAM(S): 0.25 INJECTION INTRAMUSCULAR; INTRAVENOUS at 17:49

## 2021-07-08 RX ADMIN — CARVEDILOL PHOSPHATE 3.12 MILLIGRAM(S): 80 CAPSULE, EXTENDED RELEASE ORAL at 17:49

## 2021-07-08 RX ADMIN — Medication 1 APPLICATION(S): at 05:40

## 2021-07-08 RX ADMIN — BUDESONIDE AND FORMOTEROL FUMARATE DIHYDRATE 2 PUFF(S): 160; 4.5 AEROSOL RESPIRATORY (INHALATION) at 08:31

## 2021-07-08 RX ADMIN — PANTOPRAZOLE SODIUM 40 MILLIGRAM(S): 20 TABLET, DELAYED RELEASE ORAL at 17:49

## 2021-07-08 RX ADMIN — CARVEDILOL PHOSPHATE 3.12 MILLIGRAM(S): 80 CAPSULE, EXTENDED RELEASE ORAL at 05:18

## 2021-07-08 RX ADMIN — Medication 1 TABLET(S): at 09:10

## 2021-07-08 RX ADMIN — PANTOPRAZOLE SODIUM 40 MILLIGRAM(S): 20 TABLET, DELAYED RELEASE ORAL at 05:18

## 2021-07-08 RX ADMIN — CHLORHEXIDINE GLUCONATE 1 APPLICATION(S): 213 SOLUTION TOPICAL at 05:40

## 2021-07-08 NOTE — CONSULT NOTE ADULT - PROVIDER SPECIALTY LIST ADULT
Vascular Surgery
Heme/Onc
Pulmonology
Electrophysiology
Infectious Disease
Nephrology
Gastroenterology
Nephrology
Neurology
Rehab Medicine
Cardiology
Heart Failure

## 2021-07-08 NOTE — CONSULT NOTE ADULT - ASSESSMENT
76 year old male patient with a medical history of MI in 1995 (angiogram, no stents placed), COPD (2L O2 at home), s/p TAVR (03/2019 at [facility]), gout, CKD, CVA (09/2020), pulmonary HTN, initially presented to [facility] 6/1/21 with RONNELL on CKD (likely cardiorenal syndrome), urinary retention due to noncompliance with medications, with hospital course complicated by cardiogenic shock, acute hypoxemic respiratory failure secondary to metabolic acidosis and respiratory alkalosis. Patient found to have TAVR failure with ISRAEL 6/10/21 showing EF 40-45%, Aortic valve prosthesis with Severe paravalvular leak and valvular AI, mild-moderate aortic stenosis, and moderate MR. Patient was transferred to [facility] under Dr. Campbell for further workup.     Above appreciated from H &P:  - Patient reported that he was fairly active and took care of his wife till this event of the AV IE. He had a fairly complex course so far. I was consulted today by Dr. Meier for assessment for ICD. Dr. Meier mentioned that the patient had several episodes of sustained and unstable VT at rates > 200 bpm during this hospitalization which required IV amiodarone, followed by oral amiodarone. He was also treated by IV lidocaine at times. He is now being prepared for discharge to rehab, but he was deemed at high level of SCD from VT and so ICD as well as LifeVest were considered. Patient had a DIALYSIS line via right IJ, into his RA and attached to the anterior right chest wall and so LlifeVeskaris deemed hard to apply  - His hospital course was complicated by VT, HF (EF now down to 20-25%), infection, RONNELL/CKD now on IHD via right IJ dialysis line    INCOMPLETE  76 year old male patient with a medical history of MI in 1995 (angiogram, no stents placed), COPD (2L O2 at home), s/p TAVR (03/2019 at [facility]), gout, CKD, CVA (09/2020), pulmonary HTN, initially presented to [facility] 6/1/21 with RONNELL on CKD (likely cardiorenal syndrome), urinary retention due to noncompliance with medications, with hospital course complicated by cardiogenic shock, acute hypoxemic respiratory failure secondary to metabolic acidosis and respiratory alkalosis. Patient found to have TAVR failure with ISRAEL 6/10/21 showing EF 40-45%, Aortic valve prosthesis with Severe paravalvular leak and valvular AI, mild-moderate aortic stenosis, and moderate MR. Patient was transferred to [facility] under Dr. Campbell for further workup.     Above appreciated from H &P:  - Patient reported that he was fairly active and took care of his wife till this event of the AV IE. He had a fairly complex course so far. I was consulted today by Dr. Meier for assessment for ICD. Dr. Meier mentioned that the patient had several episodes of sustained and unstable VT at rates > 200 bpm during this hospitalization which required IV amiodarone, followed by oral amiodarone. He was also treated by IV lidocaine at times. He is now being prepared for discharge to rehab, but he was deemed at high level of SCD from VT and so ICD as well as LifeVest were considered. Patient had a DIALYSIS line via right IJ, into his RA and attached to the anterior right chest wall and so LlifeVest deemed hard to apply  - His hospital course was complicated by VT, HF (EF now down to 20-25%), infection, RONNELL/CKD now on IHD via right IJ dialysis line      1. Sustained VT noted and documented by Dr. Meier to be at rates > 200 bpm. No recent recurrent while on amiodarone. Pt prognosis is likely > 1 year. He is interested in getting back to previous baseline and to pursue any option to prolong his survival. Also most recent EF is 20-25%. Cleared for ICD by ID  - c/w amidoaroen for 2-3 months then can r/a  - will  to avoid driving for 6 months  - Discussed with pt and Dr. JACOBO, the risk of VT recurrence and SCD. All options reviewed. We discussed ICD as an option to protect against SCD/VT. He is keen on having this. We discussed transvenous as well as sub-cutaneous ICDs as well as Life Vest. Given the presence of a dialysis line for the next 3 months at least, recent infection a sub-cutaneous ICD is the most reasonable option. Benefits, risks and alternatives were discussed.   - Will screen him for S-ICD, if passed will perform this tomorrow under GA. Will decide on performing DFT tomorrow based on intra-op results/findings  - No pacing indications  - Cleared for ICD by ID  - If passed screening and planned ICD tomorrow then need to be NPO past MN, need COVID swab, and blood cross and screen orders    2. HFrEF, on GDMT  - No indication for CRT    3. 1st AVB, asymptomatic, no prior syncope. No pacing indication. No high grade AVB despite use of amiodarone and BB    aBOVE d/w pt, Dr. Meier and CTS team  will follow up

## 2021-07-08 NOTE — PROGRESS NOTE ADULT - PROBLEM SELECTOR PLAN 1
Now s/p Valve in Valve TAVR with Dr. Campbell on 6/23  Echocardiogram on 06/26 revealed EF 20-25% from 30-35% prior with mildly enlarged RV, severely decreased LV   Fxn, moderate MR, Severe TR and mild Pulm Regurgitation  Repeat ECHO done per cariology, report pending  Cardiology following  Patient may require life vest prior to discharge home depending on ECHO report  Coreg 3.125 BID as per heart failure team recommendations  Continue Amio for runs of SVT post op   Continue GI ppx with Protonix,   Evaluated by adult rehab medicine, recommending [facility] for a more prolonged stay to achieve transition to community living and would not be able to tolerate a comprehensive/intense rehab program of 3hours/day.

## 2021-07-08 NOTE — PROGRESS NOTE ADULT - PROBLEM SELECTOR PLAN 4
Cardiology following, recommendations appreciated  Heart Failure team following  Coreg 3.125 BID as per heart failure team recommendations  Heart failure team following  Will hold ACEi/ARBs and ARNi given CKD  Rpeat ECHO done on 07/07, report pending  May need life vest prior to d/c if EF remains low

## 2021-07-08 NOTE — PROGRESS NOTE ADULT - ASSESSMENT
76M with a PMH of MI in 1995 (angiogram, no stents placed), COPD (2L O2 at home), severe AS s/p TAVR (03/2019 at [facility]), gout, CKD, CVA (no deficits, 09/2020), pulmonary HTN, initially presented to [facility] 6/1/21 with RONNELL on CKD, hospital course significant for cardiogenic shock, acute hypoxemic respiratory failure, and acute on chronic combined diastolic and systolic heart failure. ISRAEL revealed severe AI. Patient was transferred to [facility] under Dr. Campbell for further workup of bioprosthetic AI.     6/28 s/p BAL, sputum samples sent m ax switched to [facility]     Inpatient hospital course has been significant for:  1. 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 RONNELL on CKD progressed to ESRD requiring HD   3. Unintentional Weight loss / Dysphagia / Anorexia work up revealing duodenitis and diffuse erosive esophagitis, gastritis on EGD (biopsies negative for H. Pylori or carcinoma); colonoscopy showing diverticulosis  4. R/O AV Endocarditis   5. Auto-elevated INR (followed by Hematology)  6. R/O WILLEM   7. Adjustment disorder (evaluated by Behavioral Health)  8. Thrombocytopenia, Hematology consulted, r/o DIC    Patient is now s/p valve in valve TAVR on 6/23/21 with Dr. Campbell.     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 pt remains on epi, amio, primacor and is tolerating CVVHD  6/30 Pt now on HD

## 2021-07-08 NOTE — PROGRESS NOTE ADULT - SUBJECTIVE AND OBJECTIVE BOX
INFECTIOUS DISEASES AND INTERNAL MEDICINE at [facility]  =======================================================  Kyle Wright MD  Diplomates American Board of Internal Medicine and Infectious Diseases  Telephone [phone]  Fax            [phone]  =======================================================    REYNA BOB [id number]    Follow up:  75YO MALE S/P TAVR AND PNEUMONIA NOW WITH ARRHYTHMIAS AND NEED FOR DEFIBRILLATOR ASKED TO REVALUATE FROM ID STANDPOINT FOR    ID CLEARANCE FOR AICD    s/p TAVR on 6/23/21  no fevers  cultures from blood remains negative    AWAKE ALERT    Allergies:  No Known Drug Allergies  strawberry (Anaphylaxis)       FAMILY  FAMILY HISTORY:  FH: lung cancer      REVIEW OF SYSTEMS:   ROS WNL    Physical Exam:  GEN: AWAKE ALERT ON DIALYSIS MACHINE AT PRESENT  HEENT: normocephalic and atraumatic.  Anicteric   NECK: Supple.   LUNGS: diffuse rhonchi   HEART: Regular rate and rhythm   ABDOMEN: Soft, nontender, and nondistended.  Positive bowel sounds.    : Lemus   EXTREMITIES: trace edema.  MSK: no joint swelling  NEUROLOGIC:AWAKE ALERT      Vitals:   V  Vital Signs Last 24 Hrs  T(C): 37.1 (02 Jul 2021 13:00), Max: 37.3 (02 Jul 2021 06:00)  T(F): 98.8 (02 Jul 2021 13:00), Max: 99.1 (02 Jul 2021 06:00)  HR: 89 (02 Jul 2021 14:00) (85 - 95)  BP: --  BP(mean): --  RR: 18 (02 Jul 2021 14:00) (14 - 27)  SpO2: 93% (02 Jul 2021 14:00) (90% - 100%)    Current Antibiotics:  cefTRIAXone   IVPB 2000 milliGRAM(s) IV Intermittent every 24 hours    Other medications:  aMIOdarone Infusion 0.5 mG/Min IV Continuous <Continuous>  atorvastatin 10 milliGRAM(s) Oral at bedtime  budesonide 160 MICROgram(s)/formoterol 4.5 MICROgram(s) Inhaler 2 Puff(s) Inhalation two times a day  chlorhexidine 0.12% Liquid 5 milliLiter(s) Oral Mucosa two times a day  chlorhexidine 2% Cloths 1 Application(s) Topical daily  CRRT Treatment    <Continuous>  dexMEDEtomidine Infusion 0.2 MICROgram(s)/kG/Hr IV Continuous <Continuous>  EPINEPHrine    Infusion 0.04 MICROgram(s)/kG/Min IV Continuous <Continuous>  insulin lispro (ADMELOG) corrective regimen sliding scale   SubCutaneous every 6 hours  midodrine 10 milliGRAM(s) Oral every 8 hours  milrinone Infusion 0.25 MICROgram(s)/kG/Min IV Continuous <Continuous>  mirtazapine 15 milliGRAM(s) Oral daily  Nephro-melissa 1 Tablet(s) Oral daily  norepinephrine Infusion 0.05 MICROgram(s)/kG/Min IV Continuous <Continuous>  pantoprazole  Injectable 40 milliGRAM(s) IV Push every 12 hours  Phoxillum Filtration BK 4 / 2.5 5000 milliLiter(s) CRRT <Continuous>  Phoxillum Filtration BK 4 / 2.5 5000 milliLiter(s) CRRT <Continuous>  Phoxillum Filtration BK 4 / 2.5 5000 milliLiter(s) CRRT <Continuous>  psyllium Powder 1 Packet(s) Oral two times a day  senna 2 Tablet(s) Oral at bedtime  sodium chloride 0.9%. 1000 milliLiter(s) IV Continuous <Continuous>  sodium chloride 0.9%. 1000 milliLiter(s) IV Continuous <Continuous>                                      .                                              8.6    8.38  )-----------( 149      ( 02 Jul 2021 02:59 )             26.6   07-02    139  |  102  |  25.3<H>  ----------------------------<  87  3.8   |  22.0  |  2.75<H>    Ca    8.5<L>      02 Jul 2021 02:59  Phos  2.9     07-02  Mg     2.0     07-02    TPro  5.9<L>  /  Alb  3.0<L>  /  TBili  2.0  /  DBili  x   /  AST  39  /  ALT  34  /  AlkPhos  102  07-02        RECENT CULTURES:  06-26 @ 21:09 .Sputum Sputum     Rare polymorphonuclear leukocytes per low power field  No Squamous epithelial cells per low power field  No organisms seen per oil power field    06-16 @ 17:23 .Blood Blood     No growth at 5 days.    06-16 @ 14:41 .Blood Blood-Peripheral     No growth at 5 days.    06-16 @ 14:40 .Blood Blood-Peripheral     No growth at 5 days.    06-15 @ 12:30 .Blood Blood-Peripheral     No growth at 5 days.    06-15 @ 12:29 .Blood Blood-Peripheral     No growth at 5 days.      WBC Count: 14.00 K/uL (06-27-21 @ 00:35)  WBC Count: 11.58 K/uL (06-26-21 @ 16:52)  WBC Count: 13.11 K/uL (06-26-21 @ 05:43)  WBC Count: 12.82 K/uL (06-25-21 @ 23:38)  WBC Count: 13.53 K/uL (06-25-21 @ 17:42)  WBC Count: 13.00 K/uL (06-25-21 @ 09:53)  WBC Count: 13.29 K/uL (06-25-21 @ 02:40)  WBC Count: 12.85 K/uL (06-24-21 @ 12:25)  WBC Count: 9.68 K/uL (06-24-21 @ 03:19)  WBC Count: 8.93 K/uL (06-23-21 @ 14:24)  WBC Count: 10.50 K/uL (06-23-21 @ 06:05)  WBC Count: 8.73 K/uL (06-22-21 @ 20:14)    Creatinine, Serum: 1.51 mg/dL (06-27-21 @ 00:35)  Creatinine, Serum: 1.96 mg/dL (06-26-21 @ 16:52)  Creatinine, Serum: 2.55 mg/dL (06-26-21 @ 05:45)  Creatinine, Serum: 3.32 mg/dL (06-25-21 @ 23:38)  Creatinine, Serum: 3.97 mg/dL (06-25-21 @ 17:42)  Creatinine, Serum: 5.22 mg/dL (06-25-21 @ 09:53)  Creatinine, Serum: 5.05 mg/dL (06-25-21 @ 02:40)  Creatinine, Serum: 5.00 mg/dL (06-24-21 @ 12:25)  Creatinine, Serum: 4.77 mg/dL (06-24-21 @ 03:19)  Creatinine, Serum: 4.38 mg/dL (06-23-21 @ 14:24)  Creatinine, Serum: 4.41 mg/dL (06-23-21 @ 06:05)  Creatinine, Serum: 3.13 mg/dL (06-22-21 @ 20:14)    Procalcitonin, Serum: 0.24 ng/mL (06-17-21 @ 09:12)     COVID-19 PCR: NotDetec (06-22-21 @ 13:29)  COVID-19 PCR: NotDetec (06-18-21 @ 08:13)

## 2021-07-08 NOTE — PROGRESS NOTE ADULT - SUBJECTIVE AND OBJECTIVE BOX
CHIEF COMPLAINT:Patient is a 76y old  Male who presents with a chief complaint of TAVR Failure (08 Jul 2021 13:44)    INTERVAL HISTORY:  pt is seen and examined this am.  TTE and labs reviewed and DW NP.   No cp and SOB is unchanged.   Pt is weak and can not walk.  Asked EP to evaluate him for sustained VT and plan to get an ICD    Allergies  No Known Drug Allergies  strawberry (Anaphylaxis)  	  MEDICATIONS:  aMIOdarone    Tablet 200 milliGRAM(s) Oral daily  aMIOdarone    Tablet   Oral   carvedilol 3.125 milliGRAM(s) Oral every 12 hours  tamsulosin 0.4 milliGRAM(s) Oral at bedtime  ALBUTerol    90 MICROgram(s) HFA Inhaler 2 Puff(s) Inhalation every 6 hours PRN  budesonide 160 MICROgram(s)/formoterol 4.5 MICROgram(s) Inhaler 2 Puff(s) Inhalation two times a day  acetaminophen   Tablet .. 650 milliGRAM(s) Oral every 6 hours PRN  melatonin 5 milliGRAM(s) Oral at bedtime  mirtazapine 15 milliGRAM(s) Oral at bedtime  pantoprazole    Tablet 40 milliGRAM(s) Oral every 12 hours  psyllium Powder 1 Packet(s) Oral two times a day  senna 2 Tablet(s) Oral at bedtime  atorvastatin 10 milliGRAM(s) Oral at bedtime  megestrol Suspension 400 milliGRAM(s) Oral daily  ascorbic acid 500 milliGRAM(s) Oral daily  aspirin  chewable 81 milliGRAM(s) Oral daily  cadexomer iodine 0.9% Gel 1 Application(s) Topical two times a day  chlorhexidine 4% Liquid 1 Application(s) Topical <User Schedule>  epoetin yolanda-epbx (RETACRIT) Injectable 38039 Unit(s) IV Push <User Schedule>  folic acid 1 milliGRAM(s) Oral daily  iron sucrose IVPB 50 milliGRAM(s) IV Intermittent <User Schedule>  Nephro-melissa 1 Tablet(s) Oral daily  sodium chloride 0.9% lock flush 10 milliLiter(s) IV Push every 1 hour PRN    PHYSICAL EXAM:  T(C): 36.6 (07-08-21 @ 21:36), Max: 36.7 (07-08-21 @ 10:00)  HR: 74 (07-08-21 @ 21:36) (67 - 79)  BP: 127/77 (07-08-21 @ 21:36) (116/81 - 127/79)  RR: 18 (07-08-21 @ 21:36) (17 - 18)  SpO2: 100% (07-08-21 @ 21:36) (98% - 100%)  I&O's Summary    07 Jul 2021 07:01  -  08 Jul 2021 07:00  --------------------------------------------------------  IN: 0 mL / OUT: 1500 mL / NET: -1500 mL    08 Jul 2021 07:01  -  08 Jul 2021 22:16  --------------------------------------------------------  IN: 500 mL / OUT: 2500 mL / NET: -2000 mL      Appearance: In NAD  Eye: Pink Conjunctiva  Lungs: Crackles at bases  CVS: RRR, Normal S1 and S2, No Edema  Neuro: A&O x3    LABS:	 	    CARDIAC MARKERS:                            9.8    7.00  )-----------( 211      ( 08 Jul 2021 06:04 )             30.8     07-08    137  |  100  |  17.0  ----------------------------<  93  4.0   |  25.0  |  3.16<H>    Ca    9.0      08 Jul 2021 06:04  Mg     1.8     07-08    TPro  6.3<L>  /  Alb  3.0<L>  /  TBili  1.3  /  DBili  x   /  AST  53<H>  /  ALT  42<H>  /  AlkPhos  97  07-08    proBNP:   Lipid Profile:   HgA1c:   TSH:     ASSESSMENT/PLAN:

## 2021-07-08 NOTE — PROGRESS NOTE ADULT - ASSESSMENT
76 year old male patient with a medical history of MI in 1995 (angiogram, no stents placed), COPD (2L O2 at home), s/p TAVR (03/2019 at [facility]), gout, CKD, CVA (09/2020), pulmonary HTN, initially presented to [facility] 6/1/21 with RONNELL on CKD (likely cardiorenal syndrome), urinary retention due to noncompliance with medications, with hospital course complicated by cardiogenic shock, acute hypoxemic respiratory failure secondary to metabolic acidosis and respiratory alkalosis. Patient found to have TAVR failure with ISRAEL 6/10/21 showing EF 40-45%, Aortic valve prosthesis with Severe paravalvular leak and valvular AI, mild-moderate aortic stenosis, and moderate MR. Patient was transferred to [facility] under Dr. Campbell for further workup.   ISRAEL DONE HERE WITH CONCERN FOR ENDOCARDITIS  PT DENIES FEVERS OR SWEATS  BUT HAS HAD SIGNIFICANT WEIGHT LOSS WITHOUT DIETING  PT HAS HX OF STREP BOVIS  BACTEREMIA   LAST YEAR AND RECEIVED   ROCEPHIN AT HOME FOR 6 WEEKS     s/p COLONOSCOPY     Blood cultures from admission remain negative      initial blood cultures being held for14 days.     75YO MALE S/P TAVR AND PNEUMONIA NOW WITH ARRHYTHMIAS AND NEED FOR DEFIBRILLATOR ASKED TO REVALUATE FROM ID STANDPOINT FOR    ID CLEARANCE FOR AICD    s/p TAVR on 6/23/2021  HAS REMAINED AFEBRILE NON TOXIC   PT CURRENTLY ON DIALYSIS   Blood cultures  6/26 repeated ALL NEGATIVE  NORMAL WBC  NO CONTRAINDICATION FROM ID STANDPOINT FOR DEFIBRILLATOR PLACEMENT  D/W EP   WILL FOLLOWUP

## 2021-07-08 NOTE — CONSULT NOTE ADULT - REASON FOR ADMISSION
TAVR Failure

## 2021-07-08 NOTE — PROGRESS NOTE ADULT - SUBJECTIVE AND OBJECTIVE BOX
Patient is very frustrated about everything.  CTS present.  Provided some emotional support, but not ready to receive.     REVIEW OF SYSTEMS  Constitutional - No fever,  +fatigue  Neurological - +loss of strength, +numbness, No tremors  Musculoskeletal - No joint pain, No joint swelling, No muscle pain  Psychiatric - +depression, +anxiety    FUNCTIONAL PROGRESS  Total A    VITALS  T(C): 36.7 (07-08-21 @ 10:00), Max: 36.7 (07-07-21 @ 11:43)  HR: 70 (07-08-21 @ 10:00) (69 - 75)  BP: 118/79 (07-08-21 @ 10:00) (111/77 - 127/79)  RR: 18 (07-08-21 @ 10:00) (18 - 18)  SpO2: 100% (07-08-21 @ 10:00) (100% - 100%)  Wt(kg): --    MEDICATIONS   acetaminophen   Tablet .. 650 milliGRAM(s) every 6 hours PRN  albumin human 25% IVPB 100 milliLiter(s) once  ALBUTerol    90 MICROgram(s) HFA Inhaler 2 Puff(s) every 6 hours PRN  aMIOdarone    Tablet 200 milliGRAM(s) daily  aMIOdarone    Tablet     ascorbic acid 500 milliGRAM(s) daily  aspirin  chewable 81 milliGRAM(s) daily  atorvastatin 10 milliGRAM(s) at bedtime  budesonide 160 MICROgram(s)/formoterol 4.5 MICROgram(s) Inhaler 2 Puff(s) two times a day  cadexomer iodine 0.9% Gel 1 Application(s) two times a day  carvedilol 3.125 milliGRAM(s) every 12 hours  chlorhexidine 4% Liquid 1 Application(s) <User Schedule>  epoetin yolanda-epbx (RETACRIT) Injectable 16357 Unit(s) <User Schedule>  folic acid 1 milliGRAM(s) daily  furosemide   Injectable 40 milliGRAM(s) once  heparin   Injectable 5000 Unit(s) every 12 hours  iron sucrose IVPB 50 milliGRAM(s) <User Schedule>  megestrol Suspension 400 milliGRAM(s) daily  melatonin 5 milliGRAM(s) at bedtime  mirtazapine 15 milliGRAM(s) at bedtime  Nephro-melissa 1 Tablet(s) daily  pantoprazole    Tablet 40 milliGRAM(s) every 12 hours  psyllium Powder 1 Packet(s) two times a day  senna 2 Tablet(s) at bedtime  sodium chloride 0.9% lock flush 10 milliLiter(s) every 1 hour PRN  tamsulosin 0.4 milliGRAM(s) at bedtime      RECENT LABS/IMAGING                          9.8    7.00  )-----------( 211      ( 08 Jul 2021 06:04 )             30.8     07-08    137  |  100  |  17.0  ----------------------------<  93  4.0   |  25.0  |  3.16<H>    Ca    9.0      08 Jul 2021 06:04  Mg     1.8     07-08    TPro  6.3<L>  /  Alb  3.0<L>  /  TBili  1.3  /  DBili  x   /  AST  53<H>  /  ALT  42<H>  /  AlkPhos  97  07-08    PT/INR - ( 07 Jul 2021 06:55 )   PT: 16.6 sec;   INR: 1.46 ratio                       TTE 6/26 - Summary:   1. Endocardial visualization was enhanced with intravenous echo contrast.   2. Left ventricular ejection fraction, by visual estimation, is 20 to 25%.   3. Severely decreased global left ventricular systolic function.   4. Mildly increased LV wall thickness.   5. The mitral in-flow pattern reveals no discernable A-wave, therefore no comment on diastolic function can be made.   6. Mildly enlarged right ventricle.   7. Mild thickening of the anterior and posterior mitral valve leaflets.   8. Moderate mitral valve regurgitation.   9. Severe tricuspid regurgitation.  10. TAVR in the aortic position. Appears well seated with no abnormal rocking motion. Mild to moderate paravalvular aortic regurgitation is seen. Peak velocity is 2.26 m/s, MG is 11.2 mmHg, and PG is 20.5 mmHg, which are acceptable in the setting of a prosethetic aortic valve.  11. Mild pulmonic valve regurgitation.  12. Compared with prior study dated 6/23/21, the LVEF appears decreased to 20-25%. MR now appears moderate and TR appears severe. Results were discussed with CT surgery.    CXR 7/1 - Status post cardiac surgery. No evidence of active chest pathology. Catheters and/or tubes in place    CXR 7/3 - There is an indwelling tunneled double lumen catheter with distal tip in the SVC. LEFT IJ catheter tip remains in SVC. Otherwise no interval change    CXR 7/7 -  Cardiomegaly. Suspect LEFT retrocardiac airspace disease and/or bilateral effusions layering along posterior thoracic wall.  ----------------------------------------------------------------------------------------  PHYSICAL EXAM  Constitutional - NAD, Comfortable  Chest - Increased work of breathing   Extremities - BLE edema - improved  Neurologic Exam -                    Cognitive - AAOx 3     Motor -                      LEFT    UE - ShAB 4/5, EF 4/5, EE 4/5,  5/5                    RIGHT UE - ShAB 4/5, EF 5/5, EE 5/5,  5/5                    LEFT    LE - HF 2/5, KE 3/5, DF 3/5                     RIGHT LE - HF 2/5, KE 3/5, DF 3/5      Sensory - Intact to LT  Psychiatric - Mood depressed/Anxious, Fatigued  ----------------------------------------------------------------------------------------  ASSESSMENT/PLAN  76yMale with functional deficits with debility related to failed TAVR  CAD - ASA, Lipitor  AFIB - Amiodarone  HTN - Coreg, Lasix  Pulm - Proventil, Symbicort  ESRD - HD s/p right IJ, Epoetin  Sleep - Melatonin  Mood - Remeron   Pain - Tylenol  DVT PPX - SCDs, Heparin  Rehab - Patient continues to need significant assist related to his debility. Plan is [facility]SI. Will sign off at this time. Thank you for allowing me to be part of your patient's care. Please reconsult PMR for additional rehab recommendations or dispo needs if functional status changes.     Discussed with rehab clinical care team.

## 2021-07-08 NOTE — PROGRESS NOTE ADULT - PROBLEM SELECTOR PLAN 6
Initially RONNELL on CKD  Now progressed to ESRD requiring HD  New RIJ tunnel HD cath placed 7/2 by AIRAM ARCHULETA has pink band for preservation in anticipation for eventual AVF, vascular following  Renal team following.  CVVHD started 6/25, now tolerating HD M/W/F, 1.5 L ultrafiltration on 07/07

## 2021-07-08 NOTE — CONSULT NOTE ADULT - CONSULT REQUESTED BY NAME
DR OMER
Dr. Campbell
Analilia Solorio MD
Renea
Dr. estrada
CICU team
CT surgery
Dr. Campbell
CTS
Dr. Campbell
Shannan
Jarod Campbell

## 2021-07-08 NOTE — PROGRESS NOTE ADULT - SUBJECTIVE AND OBJECTIVE BOX
Chief Complaint: ESRD/Ongoing hemodialysis requirement    24 hour events/subjective:    PAST HISTORY  --------------------------------------------------------------------------------  No significant changes to PMH, PSH, FHx, SHx, unless otherwise noted    ALLERGIES & MEDICATIONS  --------------------------------------------------------------------------------  Allergies    No Known Drug Allergies  strawberry (Anaphylaxis)    Intolerances- None,     Standing Inpatient Medications  aMIOdarone    Tablet 200 milliGRAM(s) Oral daily  aMIOdarone    Tablet   Oral   ascorbic acid 500 milliGRAM(s) Oral daily  aspirin  chewable 81 milliGRAM(s) Oral daily  atorvastatin 10 milliGRAM(s) Oral at bedtime  budesonide 160 MICROgram(s)/formoterol 4.5 MICROgram(s) Inhaler 2 Puff(s) Inhalation two times a day  cadexomer iodine 0.9% Gel 1 Application(s) Topical two times a day  carvedilol 3.125 milliGRAM(s) Oral every 12 hours  chlorhexidine 4% Liquid 1 Application(s) Topical <User Schedule>  epoetin yolanda-epbx (RETACRIT) Injectable 61475 Unit(s) IV Push <User Schedule>  folic acid 1 milliGRAM(s) Oral daily  heparin   Injectable 5000 Unit(s) SubCutaneous every 12 hours  iron sucrose IVPB 50 milliGRAM(s) IV Intermittent <User Schedule>  megestrol Suspension 400 milliGRAM(s) Oral daily  melatonin 5 milliGRAM(s) Oral at bedtime  mirtazapine 15 milliGRAM(s) Oral at bedtime  Nephro-melissa 1 Tablet(s) Oral daily  pantoprazole   Suspension 40 milliGRAM(s) Oral every 12 hours  psyllium Powder 1 Packet(s) Oral two times a day  senna 2 Tablet(s) Oral at bedtime  tamsulosin 0.4 milliGRAM(s) Oral at bedtime    PRN Inpatient Medications  acetaminophen   Tablet .. 650 milliGRAM(s) Oral every 6 hours PRN  ALBUTerol    90 MICROgram(s) HFA Inhaler 2 Puff(s) Inhalation every 6 hours PRN  sodium chloride 0.9% lock flush 10 milliLiter(s) IV Push every 1 hour PRN    REVIEW OF SYSTEMS  --------------------------------------------------------------------------------  Gen: + weight changes, fatigue, No fever/chills, weakness  Skin: No rashes  Head/Eyes/Ears/Mouth: No headache; Normal hearing; Normal vision w/o blurriness; No sinus pain/discomfort, sore throat  Respiratory: No dyspnea, cough, wheezing, hemoptysis  CV: No chest pain, PND, orthopnea  GI: No abdominal pain, diarrhea, constipation, nausea, vomiting, melena, hematochezia  : No increased frequency, dysuria, hematuria, nocturia  MSK: No joint pain/swelling; no back pain; no edema  Neuro: No dizziness/lightheadedness, weakness, seizures, numbness, tingling  Heme: No easy bruising or bleeding  Endo: No heat/cold intolerance  Psych: No significant nervousness, anxiety, stress, depression    All other systems were reviewed and are negative, except as noted.    VITALS/PHYSICAL EXAM:    ICU Vital Signs Last 48 Hrs,    T(C): 36.2 (2021 11:18), Max: 36.7 (2021 15:30)  T(F): 97.1 (2021 11:18), Max: 98.1 (2021 15:30)  HR: 68 (2021 11:18) (68 - 74)  BP: 117/71 (2021 11:18) (111/77 - 127/79)  RR: 18 (2021 11:18) (18 - 18)  SpO2: 100% (2021 11:18) (100% - 100%)  --------------------------------------------------------------------------------  T(C): 36.7 (21 @ 11:43), Max: 36.8 (21 @ 05:11)  HR: 75 (21 @ 11:43) (73 - 77)  BP: 117/61 (21 @ 11:43) (116/74 - 130/77)  RR: 18 (21 @ 11:43) (16 - 19)  SpO2: 100% (21 @ 11:43) (95% - 100%)    21 @ 07:01  -  21 @ 07:00  --------------------------------------------------------  IN: 0 mL / OUT: 100 mL / NET: -100 mL    Physical Exam:  	Gen: NAD, Pale, ill-appearing  	HEENT: PERRL, supple neck, clear oropharynx  	Pulm: CTA B/L  	CV: RRR, S1S2; no rub  	Back: No spinal or CVA tenderness; no sacral edema  	Abd: +BS, soft, nontender/nondistended  	: No suprapubic tenderness  	UE: Warm, FROM, no clubbing, intact strength; no edema; no asterixis  	LE: Warm, FROM, no clubbing, intact strength; ++ edema  	Neuro: No focal deficits,   	Psych: Normal affect and mood  	Skin: Warm, without rashes  	Vascular access: TDC,    LABS/STUDIES:    137    |  100    |  17.0   ----------------------------<  93     Ca:9.0   (2021 06:04)  4.0     |  25.0   |  3.16<H>    eGFR if Non : 18 *L*    TPro  6.3<L>  /  Alb  3.0<L>  /  TBili  1.3    /  DBili  x      /  AST  53<H>  /  ALT  42<H>  /  AlkPhos  97     2021 06:04                        9.8<L>  7.00  )-----------( 211      ( 2021 06:04 )             30.8<L>    M.8 mg/dL  (  06:04)  -------------------------------------------------------------------------------              9.5    7.54  >-----------<  228      [21 @ 06:55]              29.9     138  |  101  |  25.8  ----------------------------<  77      [21 @ 06:55]  4.3   |  25.0  |  4.22        Ca     9.4     [21 @ 06:55]      Mg     1.9     [21 @ 06:55]      Phos  2.4     [21 @ 05:52]    TPro  6.4  /  Alb  3.2  /  TBili  1.4  /  DBili  x   /  AST  42  /  ALT  35  /  AlkPhos  100  [21 @ 06:55]    PT/INR: PT 16.6 , INR 1.46       [21 @ 06:55]      TSH 2.31      [21 @ 01:55]    HBsAb <3.0      [21 @ 05:13]  HBsAg Nonreact      [21 @ 05:13]  HBcAb Nonreact      [21 @ 05:13]  HCV 0.15, Nonreact      [21 @ 05:13]    TTE Echo Complete w/ Contrast w/ Doppler (21 @ 10:36)      1. Endocardial visualization was enhanced with intravenous echo contrast.   2. Left ventricular ejection fraction, by visual estimation, is 20 to 25%.   3. Severely decreased global left ventricular systolic function.   4. Mildly increased LV wall thickness.   5. The mitral in-flow pattern reveals no discernable A-wave, therefore no comment on diastolic function can be made.   6. Mildly enlarged right ventricle.   7. Mild thickening of the anterior and posterior mitral valve leaflets.   8. Moderate mitral valve regurgitation.   9. Severe tricuspid regurgitation.  10. TAVR in the aortic position. Appears well seated with no abnormal rocking motion. Mild to moderate paravalvular aortic regurgitation is seen. Peak velocity is 2.26 m/s, MG is 11.2 mmHg, and PG is 20.5 mmHg, which are acceptable in the setting of a prosthetic aortic valve.  11. Mild pulmonic valve regurgitation.  12. Compared with prior study dated 21, the LVEF appears decreased to 20-25%. MR now appears moderate and TR appears severe. Results were discussed with CT surgery.    CARDIAC MEDS:    aMIOdarone    Tablet 200 milliGRAM(s) Oral daily  aspirin  chewable 81 milliGRAM(s) Oral daily  atorvastatin 10 milliGRAM(s) Oral at bedtime  carvedilol 3.125 milliGRAM(s) Oral every 12 hours    76 year old male patient with a medical history of MI in  (angiogram, no stents placed), COPD (2L O2 at home), s/p TAVR (2019 at [facility]), gout, CKD, CVA (2020), pulmonary HTN, initially presented to [facility] 21 with RONNELL on CKD (likely cardiorenal syndrome), urinary retention due to noncompliance with medications, with hospital course complicated by cardiogenic shock, acute hypoxemic respiratory failure secondary to metabolic acidosis and respiratory alkalosis. Patient found to have TAVR failure with [place] 6/10/21 showing EF 40-45%, Aortic valve prosthesis with Severe paravalvular leak and valvular AI, mild-moderate aortic stenosis, and moderate MR. Patient was transferred to [facility] under Dr. Campbell for further workup.     Per EP Cardiology :     - Patient reported that he was fairly active and took care of his wife till this event of the AV IE. He had a fairly complex course so far. I was consulted today by Dr. Meier for assessment for ICD. Dr. Meier mentioned that the patient had several episodes of sustained and unstable VT at rates > 200 bpm during this hospitalization which required IV amiodarone, followed by oral amiodarone. He was also treated by IV lidocaine at times. He is now being prepared for discharge to rehab, but he was deemed at high level of SCD from VT and so ICD as well as LifeVest were considered. Patient had a DIALYSIS line via right IJ, into his RA and attached to the anterior right chest wall and so LlifeVest deemed hard to apply    - His hospital course was complicated by VT, HF (EF now down to 20-25%), infection, CRS - RONNELL / CKD now on IHD via right IJ dialysis line

## 2021-07-08 NOTE — PROGRESS NOTE ADULT - SUBJECTIVE AND OBJECTIVE BOX
Subjective: Patient lying in bed, no acute distress noted, denies chest pian, pressure, palpitation, shortness of breath, abdominal pian, N/V/D/.     V/S  T(C): 36.7 (07-07-21 @ 21:00), Max: 36.8 (07-07-21 @ 05:11)  HR: 69 (07-07-21 @ 21:00) (69 - 75)  BP: 118/70 (07-07-21 @ 21:00) (111/77 - 125/68)  RR: 18 (07-07-21 @ 21:00) (16 - 18)  SpO2: 100% (07-07-21 @ 21:00) (100% - 100%) on 2L O2                                               Tele: SR with first degree AV block, LISANDRA 0.35 sec    MEDICATIONS  (STANDING):  aMIOdarone    Tablet 200 milliGRAM(s) Oral daily  aMIOdarone    Tablet   Oral   ascorbic acid 500 milliGRAM(s) Oral daily  aspirin  chewable 81 milliGRAM(s) Oral daily  atorvastatin 10 milliGRAM(s) Oral at bedtime  budesonide 160 MICROgram(s)/formoterol 4.5 MICROgram(s) Inhaler 2 Puff(s) Inhalation two times a day  cadexomer iodine 0.9% Gel 1 Application(s) Topical two times a day  carvedilol 3.125 milliGRAM(s) Oral every 12 hours  chlorhexidine 4% Liquid 1 Application(s) Topical <User Schedule>  epoetin yolanda-epbx (RETACRIT) Injectable 22737 Unit(s) IV Push <User Schedule>  folic acid 1 milliGRAM(s) Oral daily  heparin   Injectable 5000 Unit(s) SubCutaneous every 12 hours  iron sucrose IVPB 50 milliGRAM(s) IV Intermittent <User Schedule>  megestrol Suspension 400 milliGRAM(s) Oral daily  melatonin 5 milliGRAM(s) Oral at bedtime  mirtazapine 15 milliGRAM(s) Oral at bedtime  Nephro-melissa 1 Tablet(s) Oral daily  pantoprazole   Suspension 40 milliGRAM(s) Oral every 12 hours  psyllium Powder 1 Packet(s) Oral two times a day  senna 2 Tablet(s) Oral at bedtime  tamsulosin 0.4 milliGRAM(s) Oral at bedtime      07-07    138  |  101  |  25.8<H>  ----------------------------<  77  4.3   |  25.0  |  4.22<H>    Ca    9.4      07 Jul 2021 06:55  Phos  2.4     07-06  Mg     1.9     07-07    TPro  6.4<L>  /  Alb  3.2<L>  /  TBili  1.4  /  DBili  x   /  AST  42<H>  /  ALT  35  /  AlkPhos  100  07-07                               9.5    7.54  )-----------( 228      ( 07 Jul 2021 06:55 )             29.9        PT/INR - ( 07 Jul 2021 06:55 )   PT: 16.6 sec;   INR: 1.46 ratio           CXR:  < from: US Duplex Preoperative Hemodialysis Access, Bilateral (07.07.21 @ 11:54) >  IMPRESSION:    Right IJ nonocclusive thrombus.    Focal thrombosis of the cephalic vein in the right forearm.    < end of copied text >      PHYSICAL EXAM  General:   no acute distress  Neurology:  alert and oriented X 4, nonfocal, no gross deficits  Respiratory: breath sounds clear, diminished at the bases bilaterally  CV:  regular rate and rhythm, normal S1 S2  Abdomen:  soft, nontender, nondistended, positive bowel sounds, last bowel movement 07/06  Extremities:  warm, well perfused, +2 BLE edema +DP pulses bilaterally  Skin: Left groin cutdown site C/D/I, no hematoma noted  Psych: Appropriate mood and affect        PAST MEDICAL & SURGICAL HISTORY:  Pulmonary hypertension    Hypertension    Hyperlipidemia    H/O aortic valve stenosis  s/p TAVR 2019 at [facility]    CVA (cerebrovascular accident)  [facility] CVA with tPA and thrombectomy    Chronic kidney disease    Prediabetes    Mitral valve regurgitation    CAD in native artery    Aneurysm of aortic root  thoracic aortic aneurysm, without ruptur    Benign prostatic hyperplasia    Gout    History of transcatheter aortic valve replacement (TAVR)

## 2021-07-08 NOTE — CONSULT NOTE ADULT - SUBJECTIVE AND OBJECTIVE BOX
Electrophysiology Attending Consult Note    HPI:  76 year old male patient with a medical history of MI in 1995 (angiogram, no stents placed), COPD (2L O2 at home), s/p TAVR (03/2019 at [facility]), gout, CKD, CVA (09/2020), pulmonary HTN, initially presented to [facility] 6/1/21 with RONNELL on CKD (likely cardiorenal syndrome), urinary retention due to noncompliance with medications, with hospital course complicated by cardiogenic shock, acute hypoxemic respiratory failure secondary to metabolic acidosis and respiratory alkalosis. Patient found to have TAVR failure with ISRAEL 6/10/21 showing EF 40-45%, Aortic valve prosthesis with Severe paravalvular leak and valvular AI, mild-moderate aortic stenosis, and moderate MR. Patient was transferred to [facility] under Dr. Campbell for further workup.     Imaging from [facility]:  6/1: CXR shwoing cardiomegaly, small b/l pleural effusions, moderate pulmonary vascular congestion.  6/1: CT Brain ordered for head trauma? showing age-related cerebral and cerebellar volume loss, mild chronic vascular ischemic changes, stable biparietal arachnoid cyst and stable midline posterior fossa arachnoid cyst.   6/1: US Abdomen for elevated LFTs showing mild hepatic steatosis, mild hepatomegaly, sludge in the gallbladder, no discrete gallstones, normal biliary ducts, mild echogenic right kidney which may be seen with medical renal dz, mildly complex Right upper pole 4cm cyst with subtle internal echoes may represent hemorrhagic/proteinaceous cyst, mildly complex right midpole 2cm cyst with thin internal linear septation.   6/3: Kidney and Bladder US showing no hydronephrosis, echogenic kidneys likely from medical renal dz, prostatomegaly, and thickening of the posterior wall of the bladder with trabeculations which could be due to bladder outlet obstruction.   6/7: Kidney and Bladder US: No hydronephrosis or shadowing renal calculi. Stable b/l renal cysts.   6/7: Lower Extremity Venous Doppler with no DVT noted.   6/8: TTE showing EF 47%, dilated IVC, dilation of the ascending aorta of 4.4cm, moderate-severe pulmonary HTN, moderate-severe TR, aortic valve with severe prosthesis regurgitation and moderate prosthesis stenosis  6/9: CT Chest showing emphysema and intersitial lung dz changes, stable b/l small pleural effusions, cardiomegaly.   6/11: ISRAEL showing EF 40-45%, Aortic valve prosthesis with Severe paravalvular leak and valvular AI, mild-moderate aortic stenosis, and moderate MR.  (12 Jun 2021 01:36)    Above appreciated from H &P:  - Patient reported that he was fairly active and took care of his wife till this event of the AV IE. He had a fairly complex course so far. I was consulted today by Dr. Meier for assessment for ICD. Dr. Meier mentioned that the patient had several episodes of sustained and unstable VT at rates > 200 bpm during this hospitalization which required IV amiodarone, followed by oral amiodarone. He was also treated by IV lidocaine at times. He is now being prepared for discharge to rehab, but he was deemed at high level of SCD from VT and so ICD as well as LifeVest were considered. Patient had a DIALYSIS line via right IJ, into his RA and attached to the anterior right chest wall and so LlifeVest deemed hard to apply  - His hospital course was complicated by            - HF, seen by HF service. His EF is now down to 20-25% from 40-45% prior           - RONNELL/CKD, now on IHD via right IJ. Talked to Dr. Marc from renal. Its unclear yet, if patient will require permanent dialysis, but he will likely need it for the coming 2-3 months. He is not sure if he can have functional AVF given the poor cardiac function. Its expected that the current dialysis line to stay in place for the next 3 months           - IE of the AV (s/p valve in valve TAVR in TAVR), and ? pneumonia, s/p antibiotic for both. Seen by ID last on 7/2/2021. Called Dr. Schafer and asked for ID clearance before any device implant. He will r/a. As of now, hoping we can use subcutaneous ICD if an ICD is needed  - Tele reviewed no recent VT. NO prior high grade AVB or drop beats  - Patient denies any prior syncope, presyncope or sustained palpitation    PAST MEDICAL & SURGICAL HISTORY:  Pulmonary hypertension  Hypertension  Hyperlipidemia  H/O aortic valve stenosis  s/p TAVR 2019 at [place]  CVA (cerebrovascular accident)  MCA CVA with tPA and thrombectomy  Chronic kidney disease  Prediabetes  Mitral valve regurgitation  CAD in native artery  Aneurysm of aortic root  thoracic aortic aneurysm, without ruptur  Benign prostatic hyperplasia  Gout        REVIEW OF SYSTEMS:  all negative unless specified above       MEDICATIONS  (STANDING):  aMIOdarone    Tablet 200 milliGRAM(s) Oral daily  aMIOdarone    Tablet   Oral   ascorbic acid 500 milliGRAM(s) Oral daily  aspirin  chewable 81 milliGRAM(s) Oral daily  atorvastatin 10 milliGRAM(s) Oral at bedtime  budesonide 160 MICROgram(s)/formoterol 4.5 MICROgram(s) Inhaler 2 Puff(s) Inhalation two times a day  cadexomer iodine 0.9% Gel 1 Application(s) Topical two times a day  carvedilol 3.125 milliGRAM(s) Oral every 12 hours  chlorhexidine 4% Liquid 1 Application(s) Topical <User Schedule>  epoetin yolanda-epbx (RETACRIT) Injectable 14746 Unit(s) IV Push <User Schedule>  folic acid 1 milliGRAM(s) Oral daily  furosemide   Injectable 40 milliGRAM(s) IV Push once  heparin   Injectable 5000 Unit(s) SubCutaneous every 12 hours  iron sucrose IVPB 50 milliGRAM(s) IV Intermittent <User Schedule>  megestrol Suspension 400 milliGRAM(s) Oral daily  melatonin 5 milliGRAM(s) Oral at bedtime  mirtazapine 15 milliGRAM(s) Oral at bedtime  Nephro-melissa 1 Tablet(s) Oral daily  pantoprazole    Tablet 40 milliGRAM(s) Oral every 12 hours  psyllium Powder 1 Packet(s) Oral two times a day  senna 2 Tablet(s) Oral at bedtime  tamsulosin 0.4 milliGRAM(s) Oral at bedtime    MEDICATIONS  (PRN):  acetaminophen   Tablet .. 650 milliGRAM(s) Oral every 6 hours PRN Mild Pain (1 - 3)  ALBUTerol    90 MICROgram(s) HFA Inhaler 2 Puff(s) Inhalation every 6 hours PRN Shortness of Breath and/or Wheezing  sodium chloride 0.9% lock flush 10 milliLiter(s) IV Push every 1 hour PRN Pre/post blood products, medications, blood draw, and to maintain line patency      Allergies    No Known Drug Allergies  strawberry (Anaphylaxis)    Intolerances        SOCIAL HISTORY:  ex-smoker, stopped 1985  no alcohol abuse      FAMILY HISTORY:  FH: lung cancer  negative for premature CAD or SCD      Vital Signs Last 24 Hrs  T(C): 36.2 (08 Jul 2021 11:18), Max: 36.7 (07 Jul 2021 15:30)  T(F): 97.1 (08 Jul 2021 11:18), Max: 98.1 (07 Jul 2021 15:30)  HR: 68 (08 Jul 2021 11:18) (68 - 74)  BP: 117/71 (08 Jul 2021 11:18) (111/77 - 127/79)  BP(mean): --  RR: 18 (08 Jul 2021 11:18) (18 - 18)  SpO2: 100% (08 Jul 2021 11:18) (100% - 100%)    Physical Exam:  Constitutional: AAOx3, NAD  Neck: supple, No JVD  Cardiovascular: +S1S2 RRR, soft systolic murmur, rubs, gallops   Pulmonary: CTA b/l, unlabored, no wheezes, rales. rhonci  Abdomen: soft NTND  Extremities: no edema b/l,   Neuro: non focal, speech clear, CRONIN x 4    LABS:                        9.8    7.00  )-----------( 211      ( 08 Jul 2021 06:04 )             30.8   07-08    137  |  100  |  17.0  ----------------------------<  93  4.0   |  25.0  |  3.16<H>    Ca    9.0      08 Jul 2021 06:04  Mg     1.8     07-08    TPro  6.3<L>  /  Alb  3.0<L>  /  TBili  1.3  /  DBili  x   /  AST  53<H>  /  ALT  42<H>  /  AlkPhos  97  07-08  LIVER FUNCTIONS - ( 08 Jul 2021 06:04 )  Alb: 3.0 g/dL / Pro: 6.3 g/dL / ALK PHOS: 97 U/L / ALT: 42 U/L / AST: 53 U/L / GGT: x           PT/INR - ( 07 Jul 2021 06:55 )   PT: 16.6 sec;   INR: 1.46 ratio                 RADIOLOGY & ADDITIONAL STUDIES:  EKG  7/5/2021:  SR with 1st degree AVB,  ms and  ms  EKG 6/27/2021:  SR with 1st degree AVB,  ms and  ms  EKG 6/26/2021:  SR with 1st degree AVB,  ms and  ms  EKG 6/26/2021  9:31 AM:  SR with competing AIVR  EKG 3/26/2021:  SR with 1st degree AVB,  ms and  ms      Tele no high grade AVB, bradycardia or VT seen. VT reported by Dr. Meier but the tracing is lost.       < from: TTE Echo Complete w/ Contrast w/ Doppler (06.26.21 @ 10:36) >  Summary:   1. Endocardial visualization was enhanced with intravenous echo contrast.   2. Left ventricular ejection fraction, by visual estimation, is 20 to 25%.   3. Severely decreased global left ventricular systolic function.   4. Mildly increased LV wall thickness.   5. The mitral in-flow pattern reveals no discernable A-wave, therefore no comment on diastolic function can be made.   6. Mildly enlarged right ventricle.   7. Mild thickening of the anterior and posterior mitral valve leaflets.   8. Moderate mitral valve regurgitation.   9. Severe tricuspid regurgitation.  10. TAVR in the aortic position. Appears well seated with no abnormal rocking motion. Mild to moderate paravalvular aortic regurgitation is seen. Peak velocity is 2.26 m/s, MG is 11.2 mmHg, and PG is 20.5 mmHg, which are acceptable in the setting of a prosethetic aortic valve.  11. Mild pulmonic valve regurgitation.  12. Compared with prior study dated 6/23/21, the LVEF appears decreased to 20-25%. MR now appears moderate and TR appears severe. Results were discussed with CT surgery.    < end of copied text >      < from: TTE Echo Complete w/o Contrast w/ Doppler (06.23.21 @ 17:24) >    Summary:   1. Left ventricular ejection fraction, by visual estimation, is 30 to 35%.   2. Moderately to severely decreased global left ventricular systolic function.   3. There is mild concentric left ventricular hypertrophy.   4. The mitral in-flow pattern reveals no discernable A-wave, therefore no comment on diastolic function can be made.   5. Mildly enlarged rightventricle.   6. Severely enlarged left atrium.   7. Moderately enlarged right atrium.   8. Mild mitral valve regurgitation.   9. Mild thickening of the anterior and posterior mitral valve leaflets.  10. Mild tricuspid regurgitation.  11. Mild aortic regurgitation.  12. Waters Hector 3 ultra Transfemoral TAVR in the aortic position. The max velocity is 2.21 m/s, the PG equals 19.5 mmHg, and MG equals 11.6 mmHg, which are acceptable in the setting of a prosthetic aortic valve. The Dimensionless Index value is 0.38.  13. Mild pulmonic valve regurgitation.  14. Estimated pulmonary artery systolic pressure is 42.1 mmHg assuming a right atrial pressure of 8 mmHg, which is consistent with mild pulmonary hypertension.  15. Dilatation of the ascending aorta to 4cm.    < end of copied text >      < from: TTE Echo Complete w/ Contrast w/ Doppler (06.12.21 @ 15:04) >    Summary:   1. Left ventricular ejection fraction, by visual estimation, is 30 to 35%.   2. Moderately decreased global left ventricular systolic function.   3. Severely enlarged left atrium.   4. Severely enlarged right atrium.   5. The mitral in-flow pattern reveals no discernable A-wave, therefore no comment on diastolic function can be made.   6. Severely enlarged right ventricle.   7. Severely reduced RV systolic function.   8. Mild mitral annular calcification.   9. Moderate to severe mitral valve regurgitation.  10. The mitral valve leaflets are tethered which is due to reduced systolic function and elevated LVDP.  11. Severe tricuspid regurgitation.  12. Moderate aortic regurgitation.  13. TAVR in the aortic position.  14. Estimated pulmonary artery systolic pressure is 61.5 mmHg assuming a right atrial pressure of 15 mmHg, which is consistent with severe pulmonary hypertension.  15. Mitral valve mean gradient is 4.0 mmHg consistent with mild mitral stenosis.    < end of copied text >      < from: ISRAEL Echo Doppler (06.23.21 @ 11:15) >  Summary:   1. Left ventricular ejection fraction, by visual estimation, is 30 to 35%.   2. Moderately decreased global left ventricular systolic function.   3. Mildly enlarged right ventricle.   4. Moderately reduced RV systolic function.   5. Mild to moderately enlarged left atrium.   6. There is no evidence of pericardial effusion.   7. Mild to moderate mitral valve regurgitation.   8. The mitral valve leaflets are tethered which is due to reduced systolic function and elevated LVDP.   9. Moderate to severe aortic regurgitation.  10. TAVR in the aortic position.  11. Mild to moderate pulmonic valve regurgitation.  12. No left atrial appendage thrombus.  13. Post TAVR ISRAEL Findings: Bioprosthetic Valve implanted in Aortic position successfully. TAVR Valve is in stable position, functioning normally. There was trace paravalvular leak. No pericardial effusion. Estimated LVEF 35-40%. No Aortic dissection. New LVOT Diameter is 2.3 cm. AV Vmax 153 cm/sec. Max PG 9 mm Hg. Mean PG 5 mm Hg. This is a TAVR Vavle in Valve. All the other findings remain unchanged compared to pre TAVR ISRAEL.    < end of copied text >      < from: ISRAEL Echo Doppler (06.15.21 @ 11:34) >  Summary:   1. Technically good study.   2. Moderately decreased global left ventricular systolic function.   3. Left ventricular ejection fraction, by visual estimation, is 30 to 35%.   4. Left atrial enlargement.   5. Moderately enlarged right ventricle.   6. Right atrial enlargement.   7. Mild prolapse of the posterior leaflet, moderate mitral valve regurgitation.   8. S/P TAVR. There is a mobile echo density attached to the ventricular side of the prosthetic aortic valve. Severe aortic valve regurgitation is seen, with what appears to be damage to one of the leaflets. Patient with prior Hx of step Bovis bacteremia. Findings are highly suspicious for endocarditis. Recommend ID consult, blood and urine cultures.   9. Mild-moderate tricuspid regurgitation.  10. Mild pulmonic valve regurgitation.  11. Trivial pericardial effusion.  12. Findings DW MAVERICK Osuna-BELLA.    < end of copied text >

## 2021-07-08 NOTE — CONSULT NOTE ADULT - NSICDXPILOT_GEN_ALL_CORE
[place]

## 2021-07-08 NOTE — CHART NOTE - NSCHARTNOTEFT_GEN_A_CORE
patient passed S-ICD screening in all vectors. will plan for S-ICD tomrrow around 10:30 or 11 am. This will be done under GA. d/w CTS, cardiac anesthesia and Dr. Campbell    Will decide on DFT tomorrow during the case  - keep pt NPO past MN  - COVID swab  - blood cross and screen    thx

## 2021-07-08 NOTE — CONSULT NOTE ADULT - CONSULT REQUESTED DATE/TIME
02-Jul-2021 13:11
15-Jhoan-2021 10:10
12-Jun-2021 11:55
12-Jun-2021 09:00
15-Jhoan-2021
27-Jun-2021 12:44
06-Jul-2021 18:54
12-Jun-2021 12:54
18-Jun-2021
08-Jul-2021 10:00
19-Jun-2021
28-Jun-2021 09:30

## 2021-07-08 NOTE — PROGRESS NOTE ADULT - ASSESSMENT
1. HFrEF. Pt is on HD, can also use lasixsince he is making some urine  EP asked to evaluate for ICD insertion  2. Cont HF meds  3. Pt with TAVR, mild to moderate AI  4. VT, sustained, on amio. plan for ICD if ok by EP  5. Pt  I agree with a/p.

## 2021-07-09 DIAGNOSIS — I49.9 CARDIAC ARRHYTHMIA, UNSPECIFIED: ICD-10-CM

## 2021-07-09 LAB
ANION GAP SERPL CALC-SCNC: 13 MMOL/L — SIGNIFICANT CHANGE UP (ref 5–17)
BLD GP AB SCN SERPL QL: SIGNIFICANT CHANGE UP
BUN SERPL-MCNC: 25.4 MG/DL — HIGH (ref 8–20)
CALCIUM SERPL-MCNC: 9.2 MG/DL — SIGNIFICANT CHANGE UP (ref 8.6–10.2)
CHLORIDE SERPL-SCNC: 101 MMOL/L — SIGNIFICANT CHANGE UP (ref 98–107)
CO2 SERPL-SCNC: 25 MMOL/L — SIGNIFICANT CHANGE UP (ref 22–29)
CREAT SERPL-MCNC: 4.14 MG/DL — HIGH (ref 0.5–1.3)
GAS PNL BLDA: SIGNIFICANT CHANGE UP
GLUCOSE SERPL-MCNC: 83 MG/DL — SIGNIFICANT CHANGE UP (ref 70–99)
HCT VFR BLD CALC: 32.4 % — LOW (ref 39–50)
HGB BLD-MCNC: 10 G/DL — LOW (ref 13–17)
MAGNESIUM SERPL-MCNC: 1.8 MG/DL — SIGNIFICANT CHANGE UP (ref 1.6–2.6)
MCHC RBC-ENTMCNC: 30.6 PG — SIGNIFICANT CHANGE UP (ref 27–34)
MCHC RBC-ENTMCNC: 30.9 GM/DL — LOW (ref 32–36)
MCV RBC AUTO: 99.1 FL — SIGNIFICANT CHANGE UP (ref 80–100)
PLATELET # BLD AUTO: 217 K/UL — SIGNIFICANT CHANGE UP (ref 150–400)
POTASSIUM SERPL-MCNC: 4.2 MMOL/L — SIGNIFICANT CHANGE UP (ref 3.5–5.3)
POTASSIUM SERPL-SCNC: 4.2 MMOL/L — SIGNIFICANT CHANGE UP (ref 3.5–5.3)
RBC # BLD: 3.27 M/UL — LOW (ref 4.2–5.8)
RBC # FLD: 22.4 % — HIGH (ref 10.3–14.5)
SODIUM SERPL-SCNC: 139 MMOL/L — SIGNIFICANT CHANGE UP (ref 135–145)
WBC # BLD: 7.28 K/UL — SIGNIFICANT CHANGE UP (ref 3.8–10.5)
WBC # FLD AUTO: 7.28 K/UL — SIGNIFICANT CHANGE UP (ref 3.8–10.5)

## 2021-07-09 PROCEDURE — 90937 HEMODIALYSIS REPEATED EVAL: CPT

## 2021-07-09 PROCEDURE — 76937 US GUIDE VASCULAR ACCESS: CPT | Mod: 26

## 2021-07-09 PROCEDURE — 99232 SBSQ HOSP IP/OBS MODERATE 35: CPT

## 2021-07-09 PROCEDURE — 36556 INSERT NON-TUNNEL CV CATH: CPT

## 2021-07-09 PROCEDURE — 33270 INS/REP SUBQ DEFIBRILLATOR: CPT

## 2021-07-09 PROCEDURE — 71045 X-RAY EXAM CHEST 1 VIEW: CPT | Mod: 26

## 2021-07-09 PROCEDURE — 99223 1ST HOSP IP/OBS HIGH 75: CPT

## 2021-07-09 PROCEDURE — 36620 INSERTION CATHETER ARTERY: CPT

## 2021-07-09 PROCEDURE — 71045 X-RAY EXAM CHEST 1 VIEW: CPT | Mod: 26,77

## 2021-07-09 PROCEDURE — 93010 ELECTROCARDIOGRAM REPORT: CPT

## 2021-07-09 RX ORDER — OXYCODONE AND ACETAMINOPHEN 5; 325 MG/1; MG/1
1 TABLET ORAL EVERY 6 HOURS
Refills: 0 | Status: DISCONTINUED | OUTPATIENT
Start: 2021-07-09 | End: 2021-07-10

## 2021-07-09 RX ORDER — CEFAZOLIN SODIUM 1 G
1000 VIAL (EA) INJECTION EVERY 12 HOURS
Refills: 0 | Status: COMPLETED | OUTPATIENT
Start: 2021-07-09 | End: 2021-07-10

## 2021-07-09 RX ORDER — ACETAMINOPHEN 500 MG
650 TABLET ORAL EVERY 6 HOURS
Refills: 0 | Status: DISCONTINUED | OUTPATIENT
Start: 2021-07-09 | End: 2021-07-10

## 2021-07-09 RX ORDER — SODIUM CHLORIDE 9 MG/ML
10 INJECTION INTRAMUSCULAR; INTRAVENOUS; SUBCUTANEOUS
Refills: 0 | Status: DISCONTINUED | OUTPATIENT
Start: 2021-07-09 | End: 2021-07-10

## 2021-07-09 RX ADMIN — Medication 1 MILLIGRAM(S): at 17:14

## 2021-07-09 RX ADMIN — CARVEDILOL PHOSPHATE 3.12 MILLIGRAM(S): 80 CAPSULE, EXTENDED RELEASE ORAL at 05:56

## 2021-07-09 RX ADMIN — ATORVASTATIN CALCIUM 10 MILLIGRAM(S): 80 TABLET, FILM COATED ORAL at 21:35

## 2021-07-09 RX ADMIN — Medication 81 MILLIGRAM(S): at 17:14

## 2021-07-09 RX ADMIN — TAMSULOSIN HYDROCHLORIDE 0.4 MILLIGRAM(S): 0.4 CAPSULE ORAL at 21:35

## 2021-07-09 RX ADMIN — Medication 1 APPLICATION(S): at 17:15

## 2021-07-09 RX ADMIN — Medication 1 APPLICATION(S): at 05:56

## 2021-07-09 RX ADMIN — ERYTHROPOIETIN 12000 UNIT(S): 10000 INJECTION, SOLUTION INTRAVENOUS; SUBCUTANEOUS at 18:14

## 2021-07-09 RX ADMIN — Medication 100 MILLIGRAM(S): at 21:40

## 2021-07-09 RX ADMIN — CHLORHEXIDINE GLUCONATE 1 APPLICATION(S): 213 SOLUTION TOPICAL at 05:56

## 2021-07-09 RX ADMIN — AMIODARONE HYDROCHLORIDE 200 MILLIGRAM(S): 400 TABLET ORAL at 05:56

## 2021-07-09 RX ADMIN — PANTOPRAZOLE SODIUM 40 MILLIGRAM(S): 20 TABLET, DELAYED RELEASE ORAL at 05:56

## 2021-07-09 RX ADMIN — PANTOPRAZOLE SODIUM 40 MILLIGRAM(S): 20 TABLET, DELAYED RELEASE ORAL at 17:14

## 2021-07-09 RX ADMIN — Medication 5 MILLIGRAM(S): at 21:35

## 2021-07-09 RX ADMIN — Medication 1 TABLET(S): at 17:14

## 2021-07-09 RX ADMIN — Medication 500 MILLIGRAM(S): at 17:14

## 2021-07-09 RX ADMIN — BUDESONIDE AND FORMOTEROL FUMARATE DIHYDRATE 2 PUFF(S): 160; 4.5 AEROSOL RESPIRATORY (INHALATION) at 08:22

## 2021-07-09 RX ADMIN — MIRTAZAPINE 15 MILLIGRAM(S): 45 TABLET, ORALLY DISINTEGRATING ORAL at 21:35

## 2021-07-09 RX ADMIN — CARVEDILOL PHOSPHATE 3.12 MILLIGRAM(S): 80 CAPSULE, EXTENDED RELEASE ORAL at 17:14

## 2021-07-09 RX ADMIN — IRON SUCROSE 205 MILLIGRAM(S): 20 INJECTION, SOLUTION INTRAVENOUS at 18:14

## 2021-07-09 RX ADMIN — MEGESTROL ACETATE 400 MILLIGRAM(S): 40 SUSPENSION ORAL at 17:14

## 2021-07-09 NOTE — PROCEDURE NOTE - NSSITEPREP_SKIN_A_CORE
chlorhexidine
chlorhexidine/Adherence to aseptic technique: hand hygiene prior to donning barriers (gown, gloves), don cap and mask, sterile drape over patient
chlorhexidine
chlorhexidine

## 2021-07-09 NOTE — PROGRESS NOTE ADULT - ASSESSMENT
76M with a PMH of MI in 1995 (angiogram, no stents placed), COPD (2L O2 at home), severe AS s/p TAVR (03/2019 at [facility]), gout, CKD, CVA (no deficits, 09/2020), pulmonary HTN, initially presented to [facility] 6/1/21 with RONNELL on CKD, hospital course significant for cardiogenic shock, acute hypoxemic respiratory failure, and acute on chronic combined diastolic and systolic heart failure. ISRAEL revealed severe AI. Patient was transferred to [facility] under Dr. Campbell for further workup of bioprosthetic AI.   Patient is now s/p valve in valve TAVR on 6/23/21 with Dr. Campbell. On 6/28 patient underwent BAL, and ultimately extubated 6/29.     Inpatient hospital course has been significant for:  1. 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 RONNELL on CKD progressed to ESRD requiring HD via RIJ tunneled permacath  3. Unintentional Weight loss / Dysphagia / Anorexia work up revealing duodenitis and diffuse erosive esophagitis, gastritis on EGD (biopsies negative for H. Pylori or carcinoma); colonoscopy showing diverticulosis  4. R/O AV Endocarditis   5. Auto-elevated INR (followed by Hematology)  6. R/O WILLEM   7. Adjustment disorder (evaluated by Behavioral Health)  8. Thrombocytopenia, Hematology consulted, r/o DIC

## 2021-07-09 NOTE — PROGRESS NOTE ADULT - ATTENDING COMMENTS
No complaints this am.   Remains hemodynamically stable. BP has improved.  Clinically cllose to euvolemia, tolerating HD and bumex PO.   EP is planning for S-ICD today.   He is stable from HF standpoint, we will follow up as an outpatient.

## 2021-07-09 NOTE — PROGRESS NOTE ADULT - SUBJECTIVE AND OBJECTIVE BOX
INFECTIOUS DISEASES AND INTERNAL MEDICINE at [facility]  =======================================================  Kyle Wright MD  Diplomates American Board of Internal Medicine and Infectious Diseases  Telephone [phone]  Fax            [phone]  =======================================================    REYNA BOB [id number]    Follow up:  77YO MALE S/P TAVR AND PNEUMONIA NOW WITH ARRHYTHMIAS AND NEED FOR DEFIBRILLATOR ASKED TO REVALUATE FROM ID STANDPOINT FOR    ID CLEARANCE FOR AICD  HAS REMAINED AFEBRILE    s/p TAVR on 6/23/21  no fevers  cultures from blood remains negative    AWAKE ALERT    Allergies:  No Known Drug Allergies  strawberry (Anaphylaxis)       FAMILY  FAMILY HISTORY:  FH: lung cancer      REVIEW OF SYSTEMS:   ROS WNL    Physical Exam:  GEN: AWAKE ALERT ON DIALYSIS MACHINE AT PRESENT  HEENT: normocephalic and atraumatic.  Anicteric   NECK: Supple.   LUNGS: diffuse rhonchi   HEART: Regular rate and rhythm   ABDOMEN: Soft, nontender, and nondistended.  Positive bowel sounds.    : Lemus   EXTREMITIES: trace edema.  MSK: no joint swelling  NEUROLOGIC:AWAKE ALERT      Vitals:   V Vital Signs Last 24 Hrs  T(C): 36.4 (09 Jul 2021 09:30), Max: 36.8 (09 Jul 2021 05:54)  T(F): 97.6 (09 Jul 2021 09:30), Max: 98.2 (09 Jul 2021 05:54)  HR: 63 (09 Jul 2021 09:30) (63 - 79)  BP: 122/72 (09 Jul 2021 09:30) (116/81 - 127/77)  BP(mean): 83 (09 Jul 2021 09:30) (83 - 83)  RR: 18 (09 Jul 2021 09:30) (17 - 18)  SpO2: 100% (09 Jul 2021 09:30) (95% - 100%)  Current Antibiotics:  cefTRIAXone   IVPB 2000 milliGRAM(s) IV Intermittent every 24 hours    Other medications:  aMIOdarone Infusion 0.5 mG/Min IV Continuous <Continuous>  atorvastatin 10 milliGRAM(s) Oral at bedtime  budesonide 160 MICROgram(s)/formoterol 4.5 MICROgram(s) Inhaler 2 Puff(s) Inhalation two times a day  chlorhexidine 0.12% Liquid 5 milliLiter(s) Oral Mucosa two times a day  chlorhexidine 2% Cloths 1 Application(s) Topical daily  CRRT Treatment    <Continuous>  dexMEDEtomidine Infusion 0.2 MICROgram(s)/kG/Hr IV Continuous <Continuous>  EPINEPHrine    Infusion 0.04 MICROgram(s)/kG/Min IV Continuous <Continuous>  insulin lispro (ADMELOG) corrective regimen sliding scale   SubCutaneous every 6 hours  midodrine 10 milliGRAM(s) Oral every 8 hours  milrinone Infusion 0.25 MICROgram(s)/kG/Min IV Continuous <Continuous>  mirtazapine 15 milliGRAM(s) Oral daily  Nephro-melissa 1 Tablet(s) Oral daily  norepinephrine Infusion 0.05 MICROgram(s)/kG/Min IV Continuous <Continuous>  pantoprazole  Injectable 40 milliGRAM(s) IV Push every 12 hours  Phoxillum Filtration BK 4 / 2.5 5000 milliLiter(s) CRRT <Continuous>  Phoxillum Filtration BK 4 / 2.5 5000 milliLiter(s) CRRT <Continuous>  Phoxillum Filtration BK 4 / 2.5 5000 milliLiter(s) CRRT <Continuous>  psyllium Powder 1 Packet(s) Oral two times a day  senna 2 Tablet(s) Oral at bedtime  sodium chloride 0.9%. 1000 milliLiter(s) IV Continuous <Continuous>  sodium chloride 0.9%. 1000 milliLiter(s) IV Continuous <Continuous>                                      .                                               10.0   7.28  )-----------( 217      ( 09 Jul 2021 06:17 )             32.4   07-09    139  |  101  |  25.4<H>  ----------------------------<  83  4.2   |  25.0  |  4.14<H>    Ca    9.2      09 Jul 2021 06:17  Mg     1.8     07-09    TPro  6.3<L>  /  Alb  3.0<L>  /  TBili  1.3  /  DBili  x   /  AST  53<H>  /  ALT  42<H>  /  AlkPhos  97  07-08        RECENT CULTURES:  06-26 @ 21:09 .Sputum Sputum     Rare polymorphonuclear leukocytes per low power field  No Squamous epithelial cells per low power field  No organisms seen per oil power field    06-16 @ 17:23 .Blood Blood     No growth at 5 days.    06-16 @ 14:41 .Blood Blood-Peripheral     No growth at 5 days.    06-16 @ 14:40 .Blood Blood-Peripheral     No growth at 5 days.    06-15 @ 12:30 .Blood Blood-Peripheral     No growth at 5 days.    06-15 @ 12:29 .Blood Blood-Peripheral     No growth at 5 days.      WBC Count: 14.00 K/uL (06-27-21 @ 00:35)  WBC Count: 11.58 K/uL (06-26-21 @ 16:52)  WBC Count: 13.11 K/uL (06-26-21 @ 05:43)  WBC Count: 12.82 K/uL (06-25-21 @ 23:38)  WBC Count: 13.53 K/uL (06-25-21 @ 17:42)  WBC Count: 13.00 K/uL (06-25-21 @ 09:53)  WBC Count: 13.29 K/uL (06-25-21 @ 02:40)  WBC Count: 12.85 K/uL (06-24-21 @ 12:25)  WBC Count: 9.68 K/uL (06-24-21 @ 03:19)  WBC Count: 8.93 K/uL (06-23-21 @ 14:24)  WBC Count: 10.50 K/uL (06-23-21 @ 06:05)  WBC Count: 8.73 K/uL (06-22-21 @ 20:14)    Creatinine, Serum: 1.51 mg/dL (06-27-21 @ 00:35)  Creatinine, Serum: 1.96 mg/dL (06-26-21 @ 16:52)  Creatinine, Serum: 2.55 mg/dL (06-26-21 @ 05:45)  Creatinine, Serum: 3.32 mg/dL (06-25-21 @ 23:38)  Creatinine, Serum: 3.97 mg/dL (06-25-21 @ 17:42)  Creatinine, Serum: 5.22 mg/dL (06-25-21 @ 09:53)  Creatinine, Serum: 5.05 mg/dL (06-25-21 @ 02:40)  Creatinine, Serum: 5.00 mg/dL (06-24-21 @ 12:25)  Creatinine, Serum: 4.77 mg/dL (06-24-21 @ 03:19)  Creatinine, Serum: 4.38 mg/dL (06-23-21 @ 14:24)  Creatinine, Serum: 4.41 mg/dL (06-23-21 @ 06:05)  Creatinine, Serum: 3.13 mg/dL (06-22-21 @ 20:14)    Procalcitonin, Serum: 0.24 ng/mL (06-17-21 @ 09:12)     COVID-19 PCR: NotDetec (06-22-21 @ 13:29)  COVID-19 PCR: NotDetec (06-18-21 @ 08:13)

## 2021-07-09 NOTE — PROCEDURE NOTE - PRACTITIONER PERFORMING THE TIME OUT
Melanie Millard PA-C
Enrrique Robb NP and Andrez Espino NP
Enrrique Robb, NP and Chelly Owens RN
Jarod gregg
Andrez Plascencia NP
RUDDY Hyde PA-C

## 2021-07-09 NOTE — PROGRESS NOTE ADULT - ASSESSMENT
76 year old male patient with a medical history of MI in 1995 (angiogram, no stents placed), COPD (2L O2 at home), s/p TAVR (03/2019 at [facility]), gout, CKD, CVA (09/2020), pulmonary HTN, initially presented to [facility] 6/1/21 with RONNELL on CKD (likely cardiorenal syndrome), urinary retention due to noncompliance with medications, with hospital course complicated by cardiogenic shock, acute hypoxemic respiratory failure secondary to metabolic acidosis and respiratory alkalosis. Patient found to have TAVR failure with ISRAEL 6/10/21 showing EF 40-45%, Aortic valve prosthesis with Severe paravalvular leak and valvular AI, mild-moderate aortic stenosis, and moderate MR. Patient was transferred to [facility] under Dr. Campbell for further workup.   ISRAEL DONE HERE WITH CONCERN FOR ENDOCARDITIS  PT DENIES FEVERS OR SWEATS  BUT HAS HAD SIGNIFICANT WEIGHT LOSS WITHOUT DIETING  PT HAS HX OF STREP BOVIS  BACTEREMIA   LAST YEAR AND RECEIVED   ROCEPHIN AT HOME FOR 6 WEEKS     s/p COLONOSCOPY     Blood cultures from admission remain negative      initial blood cultures being held for14 days.     77YO MALE S/P TAVR AND PNEUMONIA NOW WITH ARRHYTHMIAS AND NEED FOR DEFIBRILLATOR ASKED TO REVALUATE FROM ID STANDPOINT FOR    ID CLEARANCE FOR AICD    s/p TAVR on 6/23/2021  HAS REMAINED AFEBRILE NON TOXIC   PT CURRENTLY ON DIALYSIS   Blood cultures  6/26 repeated ALL NEGATIVE  NORMAL WBC  NO CONTRAINDICATION FROM ID STANDPOINT FOR DEFIBRILLATOR PLACEMENT  D/W EP FOR AICD TODAY   WILL FOLLOWUP AS NEEDED PLEASE CALL IF QUESTIONS

## 2021-07-09 NOTE — PROCEDURE NOTE - NSANESTHESIA_GEN_A_CORE
2% lidocaine
1% lidocaine
3 cc/1% lidocaine
0.4 ml/1% lidocaine
3 cc/1% lidocaine

## 2021-07-09 NOTE — PROGRESS NOTE ADULT - SUBJECTIVE AND OBJECTIVE BOX
PROCEDURE(S): [employer] Subcutaneous ICD Implant    ELECTROPHYSIOLOGIST(S): Tersea Fraser MD    COMPLICATIONS:  none          DISPOSITION:  Observation Unit           CONDITION: Stable    CARDIOMYOPATHY: YES    IMPLANT EF%:    BETA BLOCKER: coreg    ACE/ARB: limited by hypotension       Pt doing well s/p BSCI S-ICD implant. DFT deferred, 10J shock >67 ohm shock impedance. Pt in AF post shock requiring 360J DCCV, currently in sinus rhythm.   Denies complaint, resting comfortably. plan for HD tonight.    Exam:   T(C): 36.4 (07-09-21 @ 09:30), Max: 36.8 (07-09-21 @ 05:54)  HR: 63 (07-09-21 @ 09:30) (63 - 79)  BP: 122/72 (07-09-21 @ 09:30) (119/74 - 127/77)  RR: 18 (07-09-21 @ 09:30) (17 - 18)  SpO2: 100% (07-09-21 @ 09:30) (95% - 100%)     VSS, NAD, A&O x 3  left flank & sub xyphoid Incisions: dsg C/D/I; no bleeding, hematoma, erythema or edema >abdominal binder placed  Card: S1/S2, RRR, no m/g/r  Resp: lungs CTA b/l  Abd: S/NT/ND  Ext: no edema, distal pulses intact  LIJ site wnl  right chest HD catheter site is wnl    ECG: sinus rhythm 68bpm, prolonged AV delay, IVCD, PVC  TTE 6/26/21: EF 20-25%, mod MR, severe TR, TAVR in the aortic position, appears well seated with no abnormal rocking motion. Mild to moderate paravalvular aortic regurgitation is seen. Peak velocity is 2.26 m/s, MG is 11.2 mmHg, and PG is 20.5 mmHg, which are acceptable in the setting of a prosethetic aortic valve.      Assessment: 76 year old male patient with a medical history of MI in 1995 (angiogram, no stents placed), COPD (2L O2 at home), s/p TAVR (03/2019 at [facility]), gout, CKD, CVA (09/2020), pulmonary HTN, strep bovis bacteremia last year (2020) s/p 6 weeks of IV rocephin at home who initially presented to [facility] 6/1/21 with RONNELL on CKD (likely cardiorenal syndrome), urinary retention due to noncompliance with medications, with hospital course complicated by cardiogenic shock, acute hypoxemic respiratory failure secondary to metabolic acidosis and respiratory alkalosis, hospital course significant for cardiogenic shock, acute hypoxemic respiratory failure, and acute on chronic combined diastolic and systolic heart failure. [place] with severe AI and concerns for endocarditis.  Patient was transferred to [facility] under Dr. Campbell for further workup of bioprosthetic AI, now s/p Valve in Valve TAVR with Dr. Campbell on 6/23. Blood cultures have remained negative.  During this hospitalization pt noted to have sustained VT >200bpm requiring amiodarone therapy and is now s/p BSCI S-ICD implant for secondary prevention of SCD, ID cleared pt for device implant.     Plan:   Continued observation on telemetry overnight.   Bedrest x 4 hours post implant, then OOB w/ assist & progress as tolerated.    Cont Ancef 1gram IV x 1 to be given after HD today 7/9/21 and then 1gram 12 hours later. Dosing reviewed with Dr Madrid/nephrology  Pain control with PO analgesia PRN.   GDMT for HFrEF, continue Coreg, plan for ACEI/ARB  per heart Failure team when BP and renal status can tolerate  Continue Amiodarone for VT > TFT, LFT, PFT monitoring   NO HEPARIN OR LOVENOX, INCLUDING PROPHYLACTIC/SUBCUT DOSING, UNTIL OTHERWISE ADVISED BY EP.   ECG, Labs, PA/Lat CXR in am   Please continue abdominal binder x 48 hours   Keep sites dry x 1 week   outpt device f/up in 2 weeks, sooner if needed    DW Dr Fraser agrees                 PROCEDURE(S): [employer] Subcutaneous ICD Implant    ELECTROPHYSIOLOGIST(S): Teresa Fraser MD    COMPLICATIONS:  none          DISPOSITION:  Observation Unit           CONDITION: Stable    CARDIOMYOPATHY: YES    IMPLANT EF%: 20-25%    BETA BLOCKER: coreg    ACE/ARB: limited by hypotension       Pt doing well s/p BSCI S-ICD implant. DFT deferred, 10J shock >67 ohm shock impedance. Pt in AF post shock requiring 360J DCCV, currently in sinus rhythm. VT zone 180bpm/VF zone 230bpm  Denies complaint, resting comfortably. plan for HD tonight.    Exam:   T(C): 36.4 (07-09-21 @ 09:30), Max: 36.8 (07-09-21 @ 05:54)  HR: 63 (07-09-21 @ 09:30) (63 - 79)  BP: 122/72 (07-09-21 @ 09:30) (119/74 - 127/77)  RR: 18 (07-09-21 @ 09:30) (17 - 18)  SpO2: 100% (07-09-21 @ 09:30) (95% - 100%)     VSS, NAD, A&O x 3  left flank & sub xyphoid Incisions: dsg C/D/I; no bleeding, hematoma, erythema or edema >abdominal binder placed  Card: S1/S2, RRR, no m/g/r  Resp: lungs CTA b/l  Abd: S/NT/ND  Ext: no edema, distal pulses intact  LIJ site wnl  right chest HD catheter site is wnl    ECG: sinus rhythm 68bpm, prolonged AV delay, IVCD, PVC  TTE 6/26/21: EF 20-25%, mod MR, severe TR, TAVR in the aortic position, appears well seated with no abnormal rocking motion. Mild to moderate paravalvular aortic regurgitation is seen. Peak velocity is 2.26 m/s, MG is 11.2 mmHg, and PG is 20.5 mmHg, which are acceptable in the setting of a prosethetic aortic valve.      Assessment: 76 year old male patient with a medical history of MI in 1995 (angiogram, no stents placed), COPD (2L O2 at home), s/p TAVR (03/2019 at [facility]), gout, CKD, CVA (09/2020), pulmonary HTN, strep bovis bacteremia last year (2020) s/p 6 weeks of IV rocephin at home who initially presented to [facility] 6/1/21 with RONNELL on CKD (likely cardiorenal syndrome), urinary retention due to noncompliance with medications, with hospital course complicated by cardiogenic shock, acute hypoxemic respiratory failure secondary to metabolic acidosis and respiratory alkalosis, hospital course significant for cardiogenic shock, acute hypoxemic respiratory failure, and acute on chronic combined diastolic and systolic heart failure. [place] with severe AI and concerns for endocarditis.  Patient was transferred to [facility] under Dr. Campbell for further workup of bioprosthetic AI, now s/p Valve in Valve TAVR with Dr. Campbell on 6/23. Blood cultures have remained negative.  During this hospitalization pt noted to have sustained VT >200bpm requiring amiodarone therapy and is now s/p BSCI S-ICD implant for secondary prevention of SCD, ID cleared pt for device implant.     Plan:   Continued observation on telemetry overnight.   Bedrest x 4 hours post implant, then OOB w/ assist & progress as tolerated.    Cont Ancef 1gram IV x 1 to be given after HD today 7/9/21 and then 1gram 12 hours later. Dosing reviewed with Dr Madrid/nephrology  Pain control with PO analgesia PRN.   GDMT for HFrEF, continue Coreg, plan for ACEI/ARB  per heart Failure team when BP and renal status can tolerate  Continue Amiodarone for VT > TFT, LFT, PFT monitoring   NO HEPARIN OR LOVENOX, INCLUDING PROPHYLACTIC/SUBCUT DOSING, UNTIL OTHERWISE ADVISED BY EP.   ECG, Labs, PA/Lat CXR in am   Please continue abdominal binder x 48 hours   Keep sites dry x 1 week   outpt device f/up in 2 weeks with Dr Fraser-[facility],  sooner if needed    DW Dr Fraser agrees       Post Operative Device Instructions:  - Bruising around the implant site or over the chest, side or arm near the incision is normal, and will take a few weeks to resolve.  - Do not push, pull or lift anything heavier than 10 lbs (about a gallon of milk) with the affected arm for 4 weeks.     - keep site dry for 5 days  - Do not touch the incision until it is completely healed.   - There are Steristrips (white strips of tape) on your incision, which will start to peel off on their own over the next 2-3 weeks. Do not pick at or peel off the Steristrips.   - Do not apply soaps, creams, lotions, ointments or powders to the incision until it is completely healed.  You should call the doctor if:   - you notice redness, drainage, swelling, increased tenderness, hot sensation around the incision, bleeding or incision edges pulling apart.  - your temperature is greater than 100 degress F for more than 24 hours.  - you notice swelling or bulging at the incision or around the device that was not there when you left the hospital or is increasing in size.  - you experience increased difficulty breathing.  - you notice new/worsening swelling in your legs and ankles.  - you faint or have dizzy spells.  -you have any questions or concerns regarding your device or the procedure.             PROCEDURE(S): [employer] Subcutaneous ICD Implant    ELECTROPHYSIOLOGIST(S): Teresa Fraser MD    COMPLICATIONS:  none          DISPOSITION:  Observation Unit           CONDITION: Stable    CARDIOMYOPATHY: YES    IMPLANT EF%: 20-25%    BETA BLOCKER: coreg    ACE/ARB: limited by hypotension       Pt doing well s/p BSCI S-ICD implant. DFT deferred, 10J shock >67 ohm shock impedance. Pt in AF post shock requiring 360J DCCV, currently in sinus rhythm. VT zone 180bpm/VF zone 230bpm  Denies complaint, resting comfortably. plan for HD tonight.    Exam:   T(C): 36.4 (07-09-21 @ 09:30), Max: 36.8 (07-09-21 @ 05:54)  HR: 64bpm  BP: 151/68  RR: 18   SpO2: 94% on 2L     VSS, NAD, A&O x 3  left flank & sub xyphoid Incisions: dsg C/D/I; no bleeding, hematoma, erythema or edema >abdominal binder placed  Card: S1/S2, RRR, no m/g/r  Resp: lungs CTA b/l  Abd: S/NT/ND  Ext: no edema, distal pulses intact  LIJ site wnl  right chest HD catheter site is wnl    ECG: sinus rhythm 68bpm, prolonged AV delay, IVCD, PVC  TTE 6/26/21: EF 20-25%, mod MR, severe TR, TAVR in the aortic position, appears well seated with no abnormal rocking motion. Mild to moderate paravalvular aortic regurgitation is seen. Peak velocity is 2.26 m/s, MG is 11.2 mmHg, and PG is 20.5 mmHg, which are acceptable in the setting of a prosethetic aortic valve.      Assessment: 76 year old male patient with a medical history of MI in 1995 (angiogram, no stents placed), COPD (2L O2 at home), s/p TAVR (03/2019 at [facility]), gout, CKD, CVA (09/2020), pulmonary HTN, strep bovis bacteremia last year (2020) s/p 6 weeks of IV rocephin at home who initially presented to [facility] 6/1/21 with RONNELL on CKD (likely cardiorenal syndrome), urinary retention due to noncompliance with medications, with hospital course complicated by cardiogenic shock, acute hypoxemic respiratory failure secondary to metabolic acidosis and respiratory alkalosis, hospital course significant for cardiogenic shock, acute hypoxemic respiratory failure, and acute on chronic combined diastolic and systolic heart failure. [place] with severe AI and concerns for endocarditis.  Patient was transferred to [facility] under Dr. Campbell for further workup of bioprosthetic AI, now s/p Valve in Valve TAVR with Dr. Campbell on 6/23. Blood cultures have remained negative.  During this hospitalization pt noted to have sustained VT >200bpm requiring amiodarone therapy and is now s/p BSCI S-ICD implant for secondary prevention of SCD, ID cleared pt for device implant.     Plan:   Continued observation on telemetry overnight.   Bedrest x 4 hours post implant, then OOB w/ assist & progress as tolerated.    Cont Ancef 1gram IV x 1 to be given after HD today 7/9/21 and then 1gram 12 hours later. Dosing reviewed with Dr Madrid/nephrology  Pain control with PO analgesia PRN.   GDMT for HFrEF, continue Coreg, plan for ACEI/ARB  per heart Failure team when BP and renal status can tolerate  Continue Amiodarone for VT > TFT, LFT, PFT monitoring   NO HEPARIN OR LOVENOX, INCLUDING PROPHYLACTIC/SUBCUT DOSING, UNTIL OTHERWISE ADVISED BY EP.   ECG, Labs, PA/Lat CXR in am   Please continue abdominal binder x 48 hours   Keep sites dry x 1 week   outpt device f/up in 2 weeks with Dr Fraser-[facility],  sooner if needed    DW Dr Fraser agrees       Post Operative Device Instructions:  - Bruising around the implant site or over the chest, side or arm near the incision is normal, and will take a few weeks to resolve.  - Do not push, pull or lift anything heavier than 10 lbs (about a gallon of milk) with the affected arm for 4 weeks.     - keep site dry for 5 days  - Do not touch the incision until it is completely healed.   - There are Steristrips (white strips of tape) on your incision, which will start to peel off on their own over the next 2-3 weeks. Do not pick at or peel off the Steristrips.   - Do not apply soaps, creams, lotions, ointments or powders to the incision until it is completely healed.  You should call the doctor if:   - you notice redness, drainage, swelling, increased tenderness, hot sensation around the incision, bleeding or incision edges pulling apart.  - your temperature is greater than 100 degress F for more than 24 hours.  - you notice swelling or bulging at the incision or around the device that was not there when you left the hospital or is increasing in size.  - you experience increased difficulty breathing.  - you notice new/worsening swelling in your legs and ankles.  - you faint or have dizzy spells.  -you have any questions or concerns regarding your device or the procedure.             PROCEDURE(S): [employer] Subcutaneous ICD Implant    ELECTROPHYSIOLOGIST(S): Teresa Fraser MD    COMPLICATIONS:  none          DISPOSITION:  Observation Unit           CONDITION: Stable    CARDIOMYOPATHY: YES    IMPLANT EF%: 20-25%    BETA BLOCKER: coreg    ACE/ARB: limited by hypotension       Pt doing well s/p BSCI S-ICD implant. DFT deferred, 10J shock >67 ohm shock impedance. Pt in AF post shock requiring 360J DCCV, currently in sinus rhythm. VT zone 180bpm/VF zone 230bpm  Denies complaint, resting comfortably. plan for HD tonight.    Exam:   T(C): 36.4 (07-09-21 @ 09:30), Max: 36.8 (07-09-21 @ 05:54)  HR: 64bpm  BP: 151/68  RR: 18   SpO2: 94% on 2L     VSS, NAD, A&O x 3  left flank & sub xyphoid Incisions: dsg C/D/I; no bleeding, hematoma, erythema or edema >abdominal binder placed  Card: S1/S2, RRR, no m/g/r  Resp: lungs CTA b/l  Abd: S/NT/ND  Ext: no edema, distal pulses intact  LIJ site wnl  right chest HD catheter site is wnl    ECG: sinus rhythm 68bpm, prolonged AV delay, IVCD, PVC  TTE 6/26/21: EF 20-25%, mod MR, severe TR, TAVR in the aortic position, appears well seated with no abnormal rocking motion. Mild to moderate paravalvular aortic regurgitation is seen. Peak velocity is 2.26 m/s, MG is 11.2 mmHg, and PG is 20.5 mmHg, which are acceptable in the setting of a prosethetic aortic valve.      Assessment: 76 year old male patient with a medical history of MI in 1995 (angiogram, no stents placed), COPD (2L O2 at home), s/p TAVR (03/2019 at [facility]), gout, CKD, CVA (09/2020), pulmonary HTN, strep bovis bacteremia last year (2020) s/p 6 weeks of IV rocephin at home who initially presented to [facility] 6/1/21 with RONNELL on CKD (likely cardiorenal syndrome), urinary retention due to noncompliance with medications, with hospital course complicated by cardiogenic shock, acute hypoxemic respiratory failure secondary to metabolic acidosis and respiratory alkalosis, hospital course significant for cardiogenic shock, acute hypoxemic respiratory failure, and acute on chronic combined diastolic and systolic heart failure. [place] with severe AI and concerns for endocarditis.  Patient was transferred to [facility] under Dr. Campbell for further workup of bioprosthetic AI, now s/p Valve in Valve TAVR with Dr. Campbell on 6/23. Blood cultures have remained negative.  During this hospitalization pt noted to have sustained VT >200bpm requiring amiodarone therapy and is now s/p BSCI S-ICD implant for secondary prevention of SCD, ID cleared pt for device implant.     Plan:   Continued observation on telemetry overnight.   Bedrest x 4 hours post implant, then OOB w/ assist & progress as tolerated.    Cont Ancef 1gram IV x 1 to be given after HD today 7/9/21 and then 1gram 12 hours later. Dosing reviewed with Dr Madrid/nephrology  Pain control with PO analgesia PRN.   GDMT for HFrEF, continue Coreg, plan for ACEI/ARB  per heart Failure team when BP and renal status can tolerate  Continue Amiodarone for VT > TFT, LFT, PFT monitoring   NO HEPARIN OR LOVENOX, INCLUDING PROPHYLACTIC/SUBCUT DOSING, UNTIL OTHERWISE ADVISED BY EP.   ECG, Labs, PA/Lat CXR in am   Please continue abdominal binder x 48 hours   Keep sites dry x 1 week   outpt device f/up in 2 weeks with Dr Fraser-[facility],  sooner if needed  You have received an implantable cardioverter defibrillator (“ICD”) following the diagnosis of a potentially lethal ventricular arrhythmia. In accordance with Heart Rhythm Society guidelines and [place] Vehicle and Traffic Law, you should not drive until you have been arrhythmia free (with no clinically significant arrhythmias or ICD therapies) for a minimum of 6 months.    DW Dr Fraser agrees       Post Operative Device Instructions:  - Bruising around the implant site or over the chest, side or arm near the incision is normal, and will take a few weeks to resolve.  - Do not push, pull or lift anything heavier than 10 lbs (about a gallon of milk) with the affected arm for 4 weeks.     - keep site dry for 5 days  - Do not touch the incision until it is completely healed.   - There are Steristrips (white strips of tape) on your incision, which will start to peel off on their own over the next 2-3 weeks. Do not pick at or peel off the Steristrips.   - Do not apply soaps, creams, lotions, ointments or powders to the incision until it is completely healed.  You should call the doctor if:   - you notice redness, drainage, swelling, increased tenderness, hot sensation around the incision, bleeding or incision edges pulling apart.  - your temperature is greater than 100 degress F for more than 24 hours.  - you notice swelling or bulging at the incision or around the device that was not there when you left the hospital or is increasing in size.  - you experience increased difficulty breathing.  - you notice new/worsening swelling in your legs and ankles.  - you faint or have dizzy spells.  -you have any questions or concerns regarding your device or the procedure.  -You have received an implantable cardioverter defibrillator (“ICD”) following the diagnosis of a potentially lethal ventricular arrhythmia. In accordance with Heart Rhythm Society guidelines and [place] Vehicle and Traffic Law, you should not drive until you have been arrhythmia free (with no clinically significant arrhythmias or ICD therapies) for a minimum of 6 months.

## 2021-07-09 NOTE — PROGRESS NOTE ADULT - SUBJECTIVE AND OBJECTIVE BOX
POD # 16 s/p transfemoral valve-in-valve TAVR via LCFA cutdown (Hector) using [employer] C[employer] Protection    Patient is a 76y old  Male who presents with a chief complaint of TAVR Failure (08 Jul 2021 22:16)    HPI:  76 year old male patient with a medical history of MI in 1995 (angiogram, no stents placed), COPD (2L O2 at home), s/p TAVR (03/2019 at [facility]), gout, CKD, CVA (09/2020), pulmonary HTN, initially presented to [facility] 6/1/21 with RONNELL on CKD (likely cardiorenal syndrome), urinary retention due to noncompliance with medications, with hospital course complicated by cardiogenic shock, acute hypoxemic respiratory failure secondary to metabolic acidosis and respiratory alkalosis. Patient found to have TAVR failure with ISRAEL 6/10/21 showing EF 40-45%, Aortic valve prosthesis with Severe paravalvular leak and valvular AI, mild-moderate aortic stenosis, and moderate MR. Patient was transferred to [facility] under Dr. Campbell for further workup.     Imaging from [facility]:  6/1: CXR shwoing cardiomegaly, small b/l pleural effusions, moderate pulmonary vascular congestion.  6/1: CT Brain ordered for head trauma? showing age-related cerebral and cerebellar volume loss, mild chronic vascular ischemic changes, stable biparietal arachnoid cyst and stable midline posterior fossa arachnoid cyst.   6/1: US Abdomen for elevated LFTs showing mild hepatic steatosis, mild hepatomegaly, sludge in the gallbladder, no discrete gallstones, normal biliary ducts, mild echogenic right kidney which may be seen with medical renal dz, mildly complex Right upper pole 4cm cyst with subtle internal echoes may represent hemorrhagic/proteinaceous cyst, mildly complex right midpole 2cm cyst with thin internal linear septation.   6/3: Kidney and Bladder US showing no hydronephrosis, echogenic kidneys likely from medical renal dz, prostatomegaly, and thickening of the posterior wall of the bladder with trabeculations which could be due to bladder outlet obstruction.   6/7: Kidney and Bladder US: No hydronephrosis or shadowing renal calculi. Stable b/l renal cysts.   6/7: Lower Extremity Venous Doppler with no DVT noted.   6/8: TTE showing EF 47%, dilated IVC, dilation of the ascending aorta of 4.4cm, moderate-severe pulmonary HTN, moderate-severe TR, aortic valve with severe prosthesis regurgitation and moderate prosthesis stenosis  6/9: CT Chest showing emphysema and intersitial lung dz changes, stable b/l small pleural effusions, cardiomegaly.   6/11: ISRAEL showing EF 40-45%, Aortic valve prosthesis with Severe paravalvular leak and valvular AI, mild-moderate aortic stenosis, and moderate MR.  (12 Jun 2021 01:36)    PAST MEDICAL & SURGICAL HISTORY:  Pulmonary hypertension  Hypertension  Hyperlipidemia  H/O aortic valve stenosis, s/p TAVR 2019 at [facility]  CVA (cerebrovascular accident), MCA CVA with tPA and thrombectomy  Chronic kidney disease  Prediabetes  Mitral valve regurgitation  CAD in native artery  Aneurysm of aortic root, thoracic aortic aneurysm, without rupture  Benign prostatic hyperplasia  Gout  History of transcatheter aortic valve replacement (TAVR)    FAMILY HISTORY:  FH: lung cancer    Significant recent/past 24 hr events: No overnight events reported.     Subjective: Patient lying in bed in no acute distress. "Lets just get this over with so I can get out of here." Denies fevers, chills, lightheadedness, dizziness, HA, CP, palpitations, SOB, cough, abdominal pain, N/V, diarrhea, numbness/tingling in extremities, or any other acute complaints.    MEDICATIONS  (STANDING):  aMIOdarone    Tablet 200 milliGRAM(s) Oral daily  ascorbic acid 500 milliGRAM(s) Oral daily  aspirin  chewable 81 milliGRAM(s) Oral daily  atorvastatin 10 milliGRAM(s) Oral at bedtime  budesonide 160 MICROgram(s)/formoterol 4.5 MICROgram(s) Inhaler 2 Puff(s) Inhalation two times a day  cadexomer iodine 0.9% Gel 1 Application(s) Topical two times a day  carvedilol 3.125 milliGRAM(s) Oral every 12 hours  chlorhexidine 4% Liquid 1 Application(s) Topical <User Schedule>  epoetin yolanda-epbx (RETACRIT) Injectable 25973 Unit(s) IV Push <User Schedule>  folic acid 1 milliGRAM(s) Oral daily  iron sucrose IVPB 50 milliGRAM(s) IV Intermittent <User Schedule>  megestrol Suspension 400 milliGRAM(s) Oral daily  melatonin 5 milliGRAM(s) Oral at bedtime  mirtazapine 15 milliGRAM(s) Oral at bedtime  Nephro-melissa 1 Tablet(s) Oral daily  pantoprazole    Tablet 40 milliGRAM(s) Oral every 12 hours  psyllium Powder 1 Packet(s) Oral two times a day  senna 2 Tablet(s) Oral at bedtime  tamsulosin 0.4 milliGRAM(s) Oral at bedtime    MEDICATIONS  (PRN):  acetaminophen   Tablet .. 650 milliGRAM(s) Oral every 6 hours PRN Mild Pain (1 - 3)  ALBUTerol    90 MICROgram(s) HFA Inhaler 2 Puff(s) Inhalation every 6 hours PRN Shortness of Breath and/or Wheezing  sodium chloride 0.9% lock flush 10 milliLiter(s) IV Push every 1 hour PRN Pre/post blood products, medications, blood draw, and to maintain line patency    Allergies: No Known Drug Allergies  strawberry (Anaphylaxis)    Vitals   T(C): 36.6 (08 Jul 2021 21:36), Max: 36.7 (08 Jul 2021 10:00)  T(F): 97.8 (08 Jul 2021 21:36), Max: 98.1 (08 Jul 2021 10:00)  HR: 74 (08 Jul 2021 21:36) (67 - 79)  BP: 127/77 (08 Jul 2021 21:36) (116/81 - 127/79)  RR: 18 (08 Jul 2021 21:36) (17 - 18)  SpO2: 100% (08 Jul 2021 21:36) (98% - 100%)    I&O's Detail    07 Jul 2021 07:01  -  08 Jul 2021 07:00  --------------------------------------------------------  IN:  Total IN: 0 mL    OUT:    Other (mL): 1500 mL  Total OUT: 1500 mL    Total NET: -1500 mL      08 Jul 2021 07:01  -  09 Jul 2021 01:16  --------------------------------------------------------  IN:    Oral Fluid: 500 mL  Total IN: 500 mL    OUT:    Other (mL): 2500 mL  Total OUT: 2500 mL    Total NET: -2000 mL    Physical Exam  General: Lying in bed in no acute distress  Neuro: A+O x 3, non-focal, speech clear and intact  HEENT:  NCAT, PERRL, EOMI. No conjuctival edema or icterus, no thrush.    Neck:  Supple, trachea midline  RIJ tunneled permacath with dressing C/D/I  Pulm: +Decreased BSs b/l with scattered crackles b/l, no accessory muscle use noted  CV: regular rate, regular rhythm, +S1S2  Abd: soft, NT, ND, + BS  Ext: CRONIN x 4, +1-2 LE pitting edema b/l, no cyanosis or clubbing, distal motor/neuro/circ intact  Skin: warm, dry, perfused  Incisions:  Left groin cutdown site C/D/I, no hematoma noted    LABS                        9.8    7.00  )-----------( 211      ( 08 Jul 2021 06:04 )             30.8     07-08    137  |  100  |  17.0  ----------------------------<  93  4.0   |  25.0  |  3.16<H>    Ca    9.0      08 Jul 2021 06:04  Mg     1.8     07-08    TPro  6.3<L>  /  Alb  3.0<L>  /  TBili  1.3  /  DBili  x   /  AST  53<H>  /  ALT  42<H>  /  AlkPhos  97  07-08    PT/INR - ( 07 Jul 2021 06:55 )   PT: 16.6 sec;   INR: 1.46 ratio         Last CXR:  < from: Xray Chest 1 View- PORTABLE-Routine (Xray Chest 1 View- PORTABLE-Routine in AM.) (07.07.21 @ 06:32) >  FINDINGS:  The lungs show residual lung LEFT basilar airspace disease obscuring diaphragm contour.. There is haziness overlying lung bases indicating pleural effusions layering along posterior thoracic wall.  No pneumothorax.  The  heart is enlarged in transverse diameter. No hilar mass.  Status post cardiac valve replacement.  There is an indwelling tunneled double lumen catheter with distal tip in the SVC.  Visualized osseous structures are intact.  IMPRESSION:   Cardiomegaly. Suspect LEFT retrocardiac airspace disease and/or bilateral effusions layering along posterior thoracic wall.  < end of copied text >

## 2021-07-09 NOTE — PROGRESS NOTE ADULT - PROBLEM SELECTOR PLAN 6
Initially RONNELL on CKD.  Now progressed to ESRD requiring HD.  New RIJ tunnel HD cath placed 7/2 by AIRAM.  GEREMISA has pink band for preservation in anticipation for eventual AVF, vascular following.  Renal team following.  CVVHD started 6/25, now tolerating HD M/W/F, -2.5 L ultrafiltration on 07/08.

## 2021-07-09 NOTE — PROGRESS NOTE ADULT - SUBJECTIVE AND OBJECTIVE BOX
[facility] DIVISION OF KIDNEY DISEASES AND HYPERTENSION -- FOLLOW UP NOTE  --------------------------------------------------------------------------------  Chief Complaint: ESRD HD    24 hour events/subjective:  Pt seen/examined  HD today      PAST HISTORY  --------------------------------------------------------------------------------  No significant changes to PMH, PSH, FHx, SHx, unless otherwise noted    ALLERGIES & MEDICATIONS  --------------------------------------------------------------------------------  Allergies    No Known Drug Allergies  strawberry (Anaphylaxis)    Intolerances      Standing Inpatient Medications  aMIOdarone    Tablet 200 milliGRAM(s) Oral daily  aMIOdarone    Tablet   Oral   ascorbic acid 500 milliGRAM(s) Oral daily  aspirin  chewable 81 milliGRAM(s) Oral daily  atorvastatin 10 milliGRAM(s) Oral at bedtime  budesonide 160 MICROgram(s)/formoterol 4.5 MICROgram(s) Inhaler 2 Puff(s) Inhalation two times a day  cadexomer iodine 0.9% Gel 1 Application(s) Topical two times a day  carvedilol 3.125 milliGRAM(s) Oral every 12 hours  chlorhexidine 4% Liquid 1 Application(s) Topical <User Schedule>  epoetin yolanda-epbx (RETACRIT) Injectable 06641 Unit(s) IV Push <User Schedule>  folic acid 1 milliGRAM(s) Oral daily  iron sucrose IVPB 50 milliGRAM(s) IV Intermittent <User Schedule>  megestrol Suspension 400 milliGRAM(s) Oral daily  melatonin 5 milliGRAM(s) Oral at bedtime  mirtazapine 15 milliGRAM(s) Oral at bedtime  Nephro-melissa 1 Tablet(s) Oral daily  pantoprazole    Tablet 40 milliGRAM(s) Oral every 12 hours  psyllium Powder 1 Packet(s) Oral two times a day  senna 2 Tablet(s) Oral at bedtime  tamsulosin 0.4 milliGRAM(s) Oral at bedtime    PRN Inpatient Medications  acetaminophen   Tablet .. 650 milliGRAM(s) Oral every 6 hours PRN  ALBUTerol    90 MICROgram(s) HFA Inhaler 2 Puff(s) Inhalation every 6 hours PRN  sodium chloride 0.9% lock flush 10 milliLiter(s) IV Push every 1 hour PRN  sodium chloride 0.9% lock flush 10 milliLiter(s) IV Push every 1 hour PRN      REVIEW OF SYSTEMS  --------------------------------------------------------------------------------  Gen: No weight changes, fatigue, fevers/chills, weakness  Skin: No rashes  Head/Eyes/Ears/Mouth: No headache; Normal hearing; Normal vision w/o blurriness; No sinus pain/discomfort, sore throat  Respiratory: No dyspnea, cough, wheezing, hemoptysis  CV: No chest pain, PND, orthopnea  GI: No abdominal pain, diarrhea, constipation, nausea, vomiting, melena, hematochezia  : No increased frequency, dysuria, hematuria, nocturia  MSK: No joint pain/swelling; no back pain; no edema  Neuro: No dizziness/lightheadedness, weakness, seizures, numbness, tingling  Heme: No easy bruising or bleeding  Endo: No heat/cold intolerance  Psych: No significant nervousness, anxiety, stress, depression    All other systems were reviewed and are negative, except as noted.    VITALS/PHYSICAL EXAM  --------------------------------------------------------------------------------  T(C): 36.4 (07-09-21 @ 09:30), Max: 36.8 (07-09-21 @ 05:54)  HR: 63 (07-09-21 @ 09:30) (63 - 79)  BP: 122/72 (07-09-21 @ 09:30) (116/81 - 127/77)  RR: 18 (07-09-21 @ 09:30) (17 - 18)  SpO2: 100% (07-09-21 @ 09:30) (95% - 100%)  Wt(kg): --        07-08-21 @ 07:01  -  07-09-21 @ 07:00  --------------------------------------------------------  IN: 500 mL / OUT: 2800 mL / NET: -2300 mL      Physical Exam:  	Gen: NAD, Pale, ill-appearing  	HEENT: PERRL, supple neck, clear oropharynx  	Pulm: CTA B/L  	CV: RRR, S1S2; no rub  	Back: No spinal or CVA tenderness; no sacral edema  	Abd: +BS, soft, nontender/nondistended  	: No suprapubic tenderness  	UE: Warm, FROM, no clubbing, intact strength; no edema; no asterixis  	LE: Warm, FROM, no clubbing, intact strength; ++ edema  	Neuro: No focal deficits,   	Psych: Normal affect and mood  	Skin: Warm, without rashes  	Vascular access: TDC,      LABS/STUDIES  --------------------------------------------------------------------------------              10.0   7.28  >-----------<  217      [07-09-21 @ 06:17]              32.4     139  |  101  |  25.4  ----------------------------<  83      [07-09-21 @ 06:17]  4.2   |  25.0  |  4.14        Ca     9.2     [07-09-21 @ 06:17]      Mg     1.8     [07-09-21 @ 06:17]    TPro  6.3  /  Alb  3.0  /  TBili  1.3  /  DBili  x   /  AST  53  /  ALT  42  /  AlkPhos  97  [07-08-21 @ 06:04]          Creatinine Trend:  SCr 4.14 [07-09 @ 06:17]  SCr 3.16 [07-08 @ 06:04]  SCr 4.22 [07-07 @ 06:55]  SCr 3.23 [07-06 @ 05:52]  SCr 4.78 [07-05 @ 17:19]        TSH 2.31      [06-12-21 @ 01:55]    HBsAb <3.0      [06-17-21 @ 05:13]  HBsAg Nonreact      [06-17-21 @ 05:13]  HBcAb Nonreact      [06-17-21 @ 05:13]  HCV 0.15, Nonreact      [06-17-21 @ 05:13]

## 2021-07-09 NOTE — PROGRESS NOTE ADULT - PROBLEM SELECTOR PLAN 4
Heart Failure team following.  Coreg 3.125 BID as per heart failure team.  Will hold ACEi/ARBs and ARNi given CKD.  Cardiology and EP following.  Plan for S-ICD later today.

## 2021-07-09 NOTE — CHART NOTE - NSCHARTNOTEFT_GEN_A_CORE
pt returned from EP lab s/p  BSCI S-ICD implant. DFT deferred, 10J shock >67 ohm shock impedance. Pt in AF post shock requiring 360J DCCV, currently in sinus rhythm. VT zone 180bpm/VF zone 230bpm    pt in bed stable.     Physical Exam  General: Lying in bed in no acute distress  Neuro: A+O x 3, non-focal, speech clear and intact  HEENT:  NCAT, PERRL, EOMI. No conjuctival edema or icterus, no thrush.    Neck:  Supple, trachea midline  RIJ tunneled permacath with dressing C/D/I  Pulm: +Decreased BSs b/l with scattered crackles b/l, no accessory muscle use noted  CV: regular rate, regular rhythm, +S1S2  Abd: soft, NT, ND, + BS  Ext: CRONIN x 4, +1-2 LE pitting edema b/l, no cyanosis or clubbing, distal motor/neuro/circ intact  Skin: warm, dry, perfused  Incisions: left flank & sub xyphoid gauze and Tegaderm, C/D/I; no bleeding, hematoma, erythema or edema >abdominal binder placed  LINES: left radial A line and left IJ TLC. pt returned from EP lab s/p  BSCI S-ICD implant. DFT deferred, 10J shock >67 ohm shock impedance. Pt in AF post shock requiring 360J DCCV, currently in sinus rhythm. VT zone 180bpm/VF zone 230bpm    pt in bed stable.     Physical Exam  General: Lying in bed in no acute distress  Neuro: A+O x 3, non-focal, speech clear and intact  HEENT:  NCAT, PERRL, EOMI. No conjuctival edema or icterus, no thrush.    Neck:  Supple, trachea midline  RIJ tunneled permacath with dressing C/D/I  Pulm: +Decreased BSs b/l with scattered crackles b/l, no accessory muscle use noted  CV: regular rate, regular rhythm, +S1S2  Abd: soft, NT, ND, + BS  Ext: CRONIN x 4, +1-2 LE pitting edema b/l, no cyanosis or clubbing, distal motor/neuro/circ intact  Skin: warm, dry, perfused  Incisions: left flank & sub xyphoid gauze and Tegaderm, C/D/I; no bleeding, hematoma, erythema or edema >abdominal binder placed  LINES: left radial A line and left IJ TLC.    Plan for pt to be discharged to [facility] 7[facility]10 if medically stable   As per Dr Campbell no MCOT device necessary upon discharge, secondary to pt being on telemetry for 2 weeks with no conduction issues,  as well as low incidence of risk for conduction complications with a TAVR valve in valve.

## 2021-07-09 NOTE — PROGRESS NOTE ADULT - ATTENDING COMMENTS
pt seen and examined  plan of care dw np and CT surgery team.  Pt on HD, plan for SQ ICD  Resume HF meds  I agree with a/p .

## 2021-07-09 NOTE — PROGRESS NOTE ADULT - PROBLEM SELECTOR PLAN 1
Biventricular failure, LVEF 20-25%, ACC/AHA stage C, NYHA class III  Appears mildly hypervolemic on exam, lukewarm extremities  Continue fluid removal with HD MWF to maintain net even/slightly negative volume status.  Continue Bumex 2mg PO daily as pt does produce urine.  Strict I&O monitoring, daily standing weights  GDMT: Coreg 3.125mg BID. No ACEi/ARNi/ARB/MRA due to CKD.   Device: Plan for subcutaneous ICD today Biventricular failure, LVEF 20-25%, ACC/AHA stage C, NYHA class III  Appears mildly hypervolemic on exam, lukewarm extremities  GDMT: Recommend adding hydralazine 10mg TID for afterload reduction. Continue Coreg 3.125mg BID. No ACEi/ARNi/ARB/MRA due to CKD.   Continue fluid removal with HD MWF to maintain net even/slightly negative volume status.  Continue Bumex 2mg PO daily as pt does produce urine.  Strict I&O monitoring, daily standing weights  Device: Plan for subcutaneous ICD today Biventricular failure, LVEF 20-25%, ACC/AHA stage C, NYHA class III  Appears mildly hypervolemic on exam, lukewarm extremities  GDMT: Recommend adding hydralazine 10mg TID for afterload reduction. Continue Coreg 3.125mg BID. No ACEi/ARNi/ARB/MRA due to CKD.   Continue fluid removal with HD MWF to maintain net even/slightly negative volume status.  Continue Bumex 2mg PO daily as pt does produce urine.  Strict I&O monitoring, daily standing weights  Device: Plan for subcutaneous ICD today  HE is stable from HF standpoint to be DC to [facility].   We will follow up as an outpatient, please call us if any further issues/concerns.

## 2021-07-09 NOTE — PROCEDURE NOTE - NSPROCDETAILS_GEN_ALL_CORE
location identified, draped/prepped, sterile technique used, needle inserted/introduced/positive blood return obtained via catheter/connected to a pressurized flush line/sutured in place/Seldinger technique/all materials/supplies accounted for at end of procedure
location identified, draped/prepped, sterile technique used, needle inserted/introduced/positive blood return obtained via catheter/connected to a pressurized flush line/sutured in place/hemostasis with direct pressure, dressing applied/Seldinger technique/all materials/supplies accounted for at end of procedure
guidewire recovered/lumen(s) aspirated and flushed/sterile dressing applied/sterile technique, catheter placed/ultrasound guidance with use of sterile gel and probe cove
guidewire recovered
guidewire recovered/lumen(s) aspirated and flushed/sterile dressing applied/sterile technique, catheter placed/ultrasound guidance with use of sterile gel and probe cove

## 2021-07-09 NOTE — PROCEDURE NOTE - PROCEDURE DATE TIME, MLM
29-Jun-2021 01:37
09-Jul-2021 09:00
30-Jun-2021 14:39
23-Jun-2021 05:40
26-Jun-2021 14:45
16-Jun-2021
09-Jul-2021 09:45

## 2021-07-09 NOTE — PROGRESS NOTE ADULT - SUBJECTIVE AND OBJECTIVE BOX
Subjective:    Medications:  acetaminophen   Tablet .. 650 milliGRAM(s) Oral every 6 hours PRN  ALBUTerol    90 MICROgram(s) HFA Inhaler 2 Puff(s) Inhalation every 6 hours PRN  aMIOdarone    Tablet 200 milliGRAM(s) Oral daily  aMIOdarone    Tablet   Oral   ascorbic acid 500 milliGRAM(s) Oral daily  aspirin  chewable 81 milliGRAM(s) Oral daily  atorvastatin 10 milliGRAM(s) Oral at bedtime  budesonide 160 MICROgram(s)/formoterol 4.5 MICROgram(s) Inhaler 2 Puff(s) Inhalation two times a day  cadexomer iodine 0.9% Gel 1 Application(s) Topical two times a day  carvedilol 3.125 milliGRAM(s) Oral every 12 hours  chlorhexidine 4% Liquid 1 Application(s) Topical <User Schedule>  epoetin yolanda-epbx (RETACRIT) Injectable 60342 Unit(s) IV Push <User Schedule>  folic acid 1 milliGRAM(s) Oral daily  iron sucrose IVPB 50 milliGRAM(s) IV Intermittent <User Schedule>  megestrol Suspension 400 milliGRAM(s) Oral daily  melatonin 5 milliGRAM(s) Oral at bedtime  mirtazapine 15 milliGRAM(s) Oral at bedtime  Nephro-melissa 1 Tablet(s) Oral daily  pantoprazole    Tablet 40 milliGRAM(s) Oral every 12 hours  psyllium Powder 1 Packet(s) Oral two times a day  senna 2 Tablet(s) Oral at bedtime  sodium chloride 0.9% lock flush 10 milliLiter(s) IV Push every 1 hour PRN  sodium chloride 0.9% lock flush 10 milliLiter(s) IV Push every 1 hour PRN  tamsulosin 0.4 milliGRAM(s) Oral at bedtime      Physical Exam:    Vitals:  Vital Signs Last 24 Hours  T(C): 36.4 (21 @ 09:30), Max: 36.8 (21 @ 05:54)  HR: 63 (21 @ 09:30) (63 - 79)  BP: 122/72 (21 @ 09:30) (116/81 - 127/77)  RR: 18 (21 @ 09:30) (17 - 18)  SpO2: 100% (21 @ 09:30) (95% - 100%)    Weight in k.9 ( @ 05:30)    I&O's Summary    2021 07:01  -  2021 07:00  --------------------------------------------------------  IN: 500 mL / OUT: 2800 mL / NET: -2300 mL        Tele:    General: No distress. Comfortable.  HEENT: EOM intact.  Neck: Neck supple. JVP not elevated. No masses  Chest: Clear to auscultation bilaterally  CV: Normal S1 and S2. No murmurs, rub, or gallops. Radial pulses normal.  Abdomen: Soft, non-distended, non-tender  Skin: No rashes or skin breakdown  Neurology: Alert and oriented times three. Sensation intact  Psych: Affect normal    Labs:                        10.0   7.28  )-----------( 217      ( 2021 06:17 )             32.4         139  |  101  |  25.4<H>  ----------------------------<  83  4.2   |  25.0  |  4.14<H>    Ca    9.2      2021 06:17  Mg     1.8         TPro  6.3<L>  /  Alb  3.0<L>  /  TBili  1.3  /  DBili  x   /  AST  53<H>  /  ALT  42<H>  /  AlkPhos  97                         Subjective: NAEO. Pt. without complaints. Tolerating HD. NPO for subcutaneous ICD placement.    Medications:  acetaminophen   Tablet .. 650 milliGRAM(s) Oral every 6 hours PRN  ALBUTerol    90 MICROgram(s) HFA Inhaler 2 Puff(s) Inhalation every 6 hours PRN  aMIOdarone    Tablet 200 milliGRAM(s) Oral daily  aMIOdarone    Tablet   Oral   ascorbic acid 500 milliGRAM(s) Oral daily  aspirin  chewable 81 milliGRAM(s) Oral daily  atorvastatin 10 milliGRAM(s) Oral at bedtime  budesonide 160 MICROgram(s)/formoterol 4.5 MICROgram(s) Inhaler 2 Puff(s) Inhalation two times a day  cadexomer iodine 0.9% Gel 1 Application(s) Topical two times a day  carvedilol 3.125 milliGRAM(s) Oral every 12 hours  chlorhexidine 4% Liquid 1 Application(s) Topical <User Schedule>  epoetin yolanda-epbx (RETACRIT) Injectable 12347 Unit(s) IV Push <User Schedule>  folic acid 1 milliGRAM(s) Oral daily  iron sucrose IVPB 50 milliGRAM(s) IV Intermittent <User Schedule>  megestrol Suspension 400 milliGRAM(s) Oral daily  melatonin 5 milliGRAM(s) Oral at bedtime  mirtazapine 15 milliGRAM(s) Oral at bedtime  Nephro-melissa 1 Tablet(s) Oral daily  pantoprazole    Tablet 40 milliGRAM(s) Oral every 12 hours  psyllium Powder 1 Packet(s) Oral two times a day  senna 2 Tablet(s) Oral at bedtime  sodium chloride 0.9% lock flush 10 milliLiter(s) IV Push every 1 hour PRN  sodium chloride 0.9% lock flush 10 milliLiter(s) IV Push every 1 hour PRN  tamsulosin 0.4 milliGRAM(s) Oral at bedtime    Vitals:  Vital Signs Last 24 Hours  T(C): 36.4 (21 @ 09:30), Max: 36.8 (21 @ 05:54)  HR: 63 (21 @ 09:30) (63 - 79)  BP: 122/72 (21 @ 09:30) (116/81 - 127/77)  RR: 18 (21 @ 09:30) (17 - 18)  SpO2: 100% (21 @ 09:30) (95% - 100%)    Weight in k.9 ( @ 05:30)    I&O's Summary    2021 07:01  -  2021 07:00  --------------------------------------------------------  IN: 500 mL / OUT: 2800 mL / NET: -2300 mL    Tele: Sinus rhythm first degree AVB    Physical Exam:  General: Sleeping in bed, easily to arouse, in no distress  HEENT: EOMs intact.  Neck: Neck supple. JVP mildly elevated  Chest: fine bibasilar crackles  CV: RRR, Normal S1 and S2. No murmurs, rub, or gallops. Warm peripherally. 1+ B/L ankle edema.  Abdomen: Soft, non-distended, non-tender  Skin: WDI  Neurology: Alert and oriented times three. Sensation intact  Psych: Affect normal    Labs:                        10.0   7.28  )-----------( 217      ( 2021 06:17 )             32.4     -    139  |  101  |  25.4<H>  ----------------------------<  83  4.2   |  25.0  |  4.14<H>    Ca    9.2      2021 06:17  Mg     1.8     -09    TPro  6.3<L>  /  Alb  3.0<L>  /  TBili  1.3  /  DBili  x   /  AST  53<H>  /  ALT  42<H>  /  AlkPhos  97  07-08    < from: TTE Echo Complete w/ Contrast w/ Doppler (21 @ 10:36) >  Summary:   1. Endocardial visualization was enhanced with intravenous echo contrast.   2. Left ventricular ejection fraction, by visual estimation, is 20 to 25%.   3. Severely decreased global left ventricular systolic function.   4. Mildly increased LV wall thickness.   5. The mitral in-flow pattern reveals no discernable A-wave, therefore no comment on diastolic function can be made.   6. Mildly enlarged right ventricle.   7. Mild thickening of the anterior and posterior mitral valve leaflets.   8. Moderate mitral valve regurgitation.   9. Severe tricuspid regurgitation.  10. TAVR in the aortic position. Appears well seated with no abnormal rocking motion. Mild to moderate paravalvular aortic regurgitation is seen. Peak velocity is 2.26 m/s, MG is 11.2 mmHg, and PG is 20.5 mmHg, which are acceptable in the setting of a prosethetic aortic valve.  11. Mild pulmonic valve regurgitation.  12. Compared with prior study dated 21, the LVEF appears decreased to 20-25%. MR now appears moderate and TR appears severe. Results were discussed with CT surgery.    LAMAR Calero Electronically signed on 2021 at 6:23:07 PM  < end of copied text >

## 2021-07-09 NOTE — PROGRESS NOTE ADULT - ATTENDING SUPERVISION STATEMENT
ACP

## 2021-07-09 NOTE — PROCEDURE NOTE - NSPOSTPRCRAD_GEN_A_CORE
central line located in the superior vena cava/no pneumothorax/post-procedure radiography performed
no pneumothorax/post procedure radiography not performed
central line located in the
central line located in the/central line located in the superior vena cava/depth of insertion/line adjusted to depth of insertion/no pneumothorax/post procedure radiography not performed
central line located in the superior vena cava

## 2021-07-09 NOTE — PROGRESS NOTE ADULT - PROBLEM SELECTOR PLAN 1
Now s/p Valve in Valve TAVR with Dr. Campbell on 6/23.  Echocardiogram on 06/26 revealed EF 20-25% from 30-35% prior with mildly enlarged RV, severely decreased LV   Fxn, moderate MR, Severe TR and mild Pulm Regurgitation  Repeat TTE 7/7 showing an EF of 25-30% with mild-mod AI, mod MR, mod TR.   Cardiology and EP following.  Plan for S-ICD later today.   Coreg 3.125 BID as per heart failure team recommendations  Continue Amio for runs of SVT post op.  Continue GI ppx with Protonix.  Evaluated by adult rehab medicine, recommending [facility] for a more prolonged stay to achieve transition to community living and would not be able to tolerate a comprehensive/intense rehab program of 3hours/day.

## 2021-07-09 NOTE — CHART NOTE - NSCHARTNOTEFT_GEN_A_CORE
Seen and examined  Has been NPO  S-ICD and potential use/role of DFT were discussed with pt. Risks explained and include but not limited to infection, bleeding, vascular complication, dislodgment, malfunction, pneumothorax, cardiac perforation, stroke, heart attack and death. We discussed that if shock impedance was low, then will forgo DFT. If shock impedance was borderline, will plan for DFT unless if patient is unstable hemodynamically. If shock impedance was high then will revise the lead/device.     Informed consent obtained

## 2021-07-09 NOTE — PROCEDURE NOTE - NSINFORMCONSENT_GEN_A_CORE
Benefits, risks, and possible complications of procedure explained to patient/caregiver who verbalized understanding and gave verbal consent.
This was an emergent procedure.
Benefits, risks, and possible complications of procedure explained to patient/caregiver who verbalized understanding and gave written consent.
This was an emergent procedure.
Benefits, risks, and possible complications of procedure explained to patient/caregiver who verbalized understanding and gave verbal consent.
Benefits, risks, and possible complications of procedure explained to patient/caregiver who verbalized understanding and gave verbal consent.

## 2021-07-09 NOTE — PROGRESS NOTE ADULT - PROBLEM SELECTOR PLAN 10
Wean to RA as tolerated.    Problem 11: Adjustment Disorder   Continue Remeron.  BH following.    Problem 12: COPD  On home O2.  Continue Symbicort, Proventil.    Plan to be discussed / reviewed with CT Surgery attending / team during AM rounds.

## 2021-07-09 NOTE — PROCEDURE NOTE - NSCVLATTEMPTSITEVASC_A_CORE
right/internal jugular
right/femoral vein
left/internal jugular
left/internal jugular
right/internal jugular

## 2021-07-09 NOTE — PROCEDURE NOTE - NSINDICATIONS_GEN_A_CORE
dialysis/CRRT
dialysis/CRRT
monitoring purposes
critical illness/hemodynamic monitoring
venous access
critical illness/dialysis/CRRT/venous access
arterial puncture to obtain ABG's/critical patient/monitoring purposes

## 2021-07-09 NOTE — PROCEDURE NOTE - NSPROCNAME_GEN_A_CORE
Central Line Insertion
Central Line Insertion
Arterial Puncture/Cannulation
Central Line Insertion
Arterial Puncture/Cannulation
Central Line Insertion
Central Line Insertion

## 2021-07-09 NOTE — PROGRESS NOTE ADULT - ASSESSMENT
76M h/o CAD MI in 1995 (angiogram, no stents placed), COPD (2L O2 at home), s/p TAVR (03/2019 at [facility]), gout, CKD, CVA (09/2020), pulmonary HTN, initially presented to [facility] 6/1/21 with RONNELL on CKD (likely cardiorenal syndrome), urinary retention due to noncompliance with medications, with hospital course complicated by cardiogenic shock, acute hypoxemic respiratory failure secondary to metabolic acidosis and respiratory alkalosis. Patient found to have TAVR failure with ISRAEL 6/10/21 showing initial EF 40-45%, Aortic valve prosthesis with Severe paravalvular leak and valvular AI, mild-moderate aortic stenosis, and moderate MR. Decompensated HF and severe pulmonary hypertension, underwent valve-in-valve TAVR 6/23/21, course complicated by worsening ADHF/cardiogenic shock with VT, intubated and RONNELL on CKD required CVVHD, repeat TTE with worsening LV EF 20-25%.       7/9/21 - plan for today is ICD, consider low dose Entresto post ICD placement today     HRrEF s/p cardiogenic shock  Acute on chronic combined systolic and diastolic CHF, NYHA class 3.  GDMT requires ace arb arni patient cannot tolerate due to hypotension  HD for fluid management  ICD today    VALVULAR HEART DISEASE    Patient is now s/p valve in valve TAVR on 6/23/21 with Dr. Campbell.   CTs surgery following.    Inserted TLC and a-line today as well.      VENTRICULAR ARRHYTHMIA -> resolved  C/W amiodarone     RONNELL / CKD  Cont with hemodialysis and lasix if needed.

## 2021-07-09 NOTE — PROGRESS NOTE ADULT - SUBJECTIVE AND OBJECTIVE BOX
[facility]                                                        Office: [address] [address]                                                       Telephone: [phone]. Fax:[phone]                                                                             PROGRESS NOTE   Reason for follow up:   TAVR Failure -s/p valve in valve TAVR on 6/23/21 with Dr. Campbell.                              Overnight: No new events.   Update:   7/9/21 - plan for today is ICD, consider low dose Entresto post ICD placement today     Subjective: "I am going to rehab tomorrow."   Complains of:  Nothing  Review of symptoms: Cardiac:  No chest pain. No dyspnea. No palpitations.  Respiratory: no cough. No dyspnea  Gastrointestinal: No diarrhea. No abdominal pain. No bleeding.     Past medical history: No updates.   Chronic conditions:  PAST MEDICAL & SURGICAL HISTORY:  Pulmonary hypertension  Hypertension  Hyperlipidemia  H/O aortic valve stenosis  s/p TAVR 2019 at [place]  CVA (cerebrovascular accident)  MCA CVA with tPA and thrombectomy  Chronic kidney disease  Prediabetes  Mitral valve regurgitation  CAD in native artery  Aneurysm of aortic root  thoracic aortic aneurysm, without ruptur  Benign prostatic hyperplasia  Gout  History of transcatheter aortic valve replacement (TAVR)  	  Vitals:  T(C): 36.4 (07-09-21 @ 09:30), Max: 36.8 (07-09-21 @ 05:54)  HR: 63 (07-09-21 @ 09:30) (63 - 79)  BP: 122/72 (07-09-21 @ 09:30) (116/81 - 127/77)  RR: 18 (07-09-21 @ 09:30) (17 - 18)  SpO2: 100% (07-09-21 @ 09:30) (95% - 100%)  I&O's Summary  08 Jul 2021 07:01  -  09 Jul 2021 07:00  --------------------------------------------------------  IN: 500 mL / OUT: 2800 mL / NET: -2300 mL    PHYSICAL EXAM:  Appearance: Comfortable. No acute distress, frail, ill appearing, deconditioned.    HEENT:  Head and neck: Atraumatic. Normocephalic.  Normal oral mucosa, PERRL, Neck is supple.  Neurologic: A & O x 3, no focal deficits.   Cardiovascular: Normal S1 S2, No murmur, rubs/gallops. No JVD, No edema  Respiratory: Lungs clear to auscultation, diminished bilateral bases.  Left chest wall dialysis catheter noted, and TLC on right subclavian area.    Gastrointestinal:  Soft, Non-tender, + BS  Lower Extremities: + edema on left lower extremity    Psychiatry: Patient is calm. No agitation. Mood & affect appropriate  Skin: No rashes/ ecchymoses/cyanosis/ulcers visualized on the face, hands or feet.    CURRENT MEDICATIONS:  aMIOdarone    Tablet 200 milliGRAM(s) Oral daily  aMIOdarone    Tablet   Oral   carvedilol 3.125 milliGRAM(s) Oral every 12 hours  tamsulosin 0.4 milliGRAM(s) Oral at bedtime  budesonide 160 MICROgram(s)/formoterol 4.5 MICROgram(s) Inhaler  melatonin  mirtazapine  pantoprazole    Tablet  psyllium Powder  senna  atorvastatin  megestrol Suspension  ascorbic acid  aspirin  chewable  cadexomer iodine 0.9% Gel  chlorhexidine 4% Liquid  epoetin yolanda-epbx (RETACRIT) Injectable  folic acid  iron sucrose IVPB  Nephro-melissa    LABS:	 	                      10.0   7.28  )-----------( 217      ( 09 Jul 2021 06:17 )             32.4   07-09  139  |  101  |  25.4<H>  ----------------------------<  83  4.2   |  25.0  |  4.14<H>  Ca    9.2      09 Jul 2021 06:17  Mg     1.8     07-09  TPro  6.3<L>  /  Alb  3.0<L>  /  TBili  1.3  /  DBili  x   /  AST  53<H>  /  ALT  42<H>  /  AlkPhos  97  07-08    TELEMETRY: Reviewed

## 2021-07-09 NOTE — PROGRESS NOTE ADULT - ASSESSMENT
77 y/o male with h/o chronic systolic HF ACC/AHA stage C, ICMP, CAD s/p MI ’95, s/p TAVR 3/2019 (@ [facility]), S. bovis bacteremia, COPD (home O2 @ 2 lpm), CVA (9/2020), gout, noncompliance who was admitted to [facility] with RONNELL on CKD and urinary retention. His hospital course was complicated by cardiogenic shock, acute hypoxemic respiratory failure likely in setting of bioprosthetic severe paravalvular/valvular AI and decompensated HF. He was subsequently transferred to [facility] for TAVR. 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 ID was following and BxCx were obtained which showed no growth. Eventually, he underwent Amy TAVR on 6/23/21. He was initially on epinephrine and milrinone gtt. He was extubated and successfully tolerated weaning of pressors. His CVVH has been transitioned to HD MWF.      Hemodynamics:  6/28/21: Milrinone 0.25mcg/kg/min & Epineprine 0.04mcg/kg/min   CVP 6, PAP 49/17/28, HR 85, /61/82, TD CO/CI  4.4/2.2.       75 y/o male with h/o chronic systolic HF ACC/AHA stage C, ICMP, CAD s/p MI ’95, s/p TAVR 3/2019 (@ [facility]), S. bovis bacteremia, COPD (home O2 @ 2 lpm), CVA (9/2020), gout, noncompliance who was admitted to [facility] with RONNELL on CKD and urinary retention. His hospital course was complicated by cardiogenic shock, acute hypoxemic respiratory failure likely in setting of bioprosthetic severe paravalvular/valvular AI and decompensated HF. He was subsequently transferred to [facility] for TAVR. 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 ID was following and BxCx were obtained 6/26 which showed no growth to date. Eventually, he underwent Amy TAVR on 6/23/21. He was initially on epinephrine and milrinone gtt. He was extubated and successfully tolerated weaning of pressors. His CVVH has been transitioned to HD MWF. Now awaiting subcutaneous ICD (cleared by ID given h/o endocarditis).      Hemodynamics:  6/28/21: Milrinone 0.25mcg/kg/min & Epineprine 0.04mcg/kg/min   CVP 6, PAP 49/17/28, HR 85, /61/82, TD CO/CI  4.4/2.2.

## 2021-07-09 NOTE — CHART NOTE - NSCHARTNOTESELECT_GEN_ALL_CORE
Event Note
Event Note
TAVR Documentation/Event Note
ACP/Event Note
CT Surgery/Event Note
CTS PA/Event Note
Event Note
Nutrition Services

## 2021-07-10 ENCOUNTER — TRANSCRIPTION ENCOUNTER (OUTPATIENT)
Age: 76
End: 2021-07-10

## 2021-07-10 VITALS — OXYGEN SATURATION: 99 %

## 2021-07-10 LAB
ANION GAP SERPL CALC-SCNC: 13 MMOL/L — SIGNIFICANT CHANGE UP (ref 5–17)
BUN SERPL-MCNC: 16.4 MG/DL — SIGNIFICANT CHANGE UP (ref 8–20)
CALCIUM SERPL-MCNC: 9.2 MG/DL — SIGNIFICANT CHANGE UP (ref 8.6–10.2)
CHLORIDE SERPL-SCNC: 98 MMOL/L — SIGNIFICANT CHANGE UP (ref 98–107)
CO2 SERPL-SCNC: 25 MMOL/L — SIGNIFICANT CHANGE UP (ref 22–29)
CREAT SERPL-MCNC: 2.87 MG/DL — HIGH (ref 0.5–1.3)
GLUCOSE SERPL-MCNC: 87 MG/DL — SIGNIFICANT CHANGE UP (ref 70–99)
HCT VFR BLD CALC: 33 % — LOW (ref 39–50)
HGB BLD-MCNC: 10.3 G/DL — LOW (ref 13–17)
MAGNESIUM SERPL-MCNC: 2 MG/DL — SIGNIFICANT CHANGE UP (ref 1.6–2.6)
MCHC RBC-ENTMCNC: 30.5 PG — SIGNIFICANT CHANGE UP (ref 27–34)
MCHC RBC-ENTMCNC: 31.2 GM/DL — LOW (ref 32–36)
MCV RBC AUTO: 97.6 FL — SIGNIFICANT CHANGE UP (ref 80–100)
PLATELET # BLD AUTO: 228 K/UL — SIGNIFICANT CHANGE UP (ref 150–400)
POTASSIUM SERPL-MCNC: 4.4 MMOL/L — SIGNIFICANT CHANGE UP (ref 3.5–5.3)
POTASSIUM SERPL-SCNC: 4.4 MMOL/L — SIGNIFICANT CHANGE UP (ref 3.5–5.3)
RBC # BLD: 3.38 M/UL — LOW (ref 4.2–5.8)
RBC # FLD: 22.6 % — HIGH (ref 10.3–14.5)
SODIUM SERPL-SCNC: 136 MMOL/L — SIGNIFICANT CHANGE UP (ref 135–145)
WBC # BLD: 7.42 K/UL — SIGNIFICANT CHANGE UP (ref 3.8–10.5)
WBC # FLD AUTO: 7.42 K/UL — SIGNIFICANT CHANGE UP (ref 3.8–10.5)

## 2021-07-10 PROCEDURE — 97116 GAIT TRAINING THERAPY: CPT

## 2021-07-10 PROCEDURE — 36592 COLLECT BLOOD FROM PICC: CPT

## 2021-07-10 PROCEDURE — 85610 PROTHROMBIN TIME: CPT

## 2021-07-10 PROCEDURE — 94799 UNLISTED PULMONARY SVC/PX: CPT

## 2021-07-10 PROCEDURE — 92611 MOTION FLUOROSCOPY/SWALLOW: CPT

## 2021-07-10 PROCEDURE — P9100: CPT

## 2021-07-10 PROCEDURE — 74230 X-RAY XM SWLNG FUNCJ C+: CPT

## 2021-07-10 PROCEDURE — 71045 X-RAY EXAM CHEST 1 VIEW: CPT | Mod: 26,59

## 2021-07-10 PROCEDURE — 93325 DOPPLER ECHO COLOR FLOW MAPG: CPT

## 2021-07-10 PROCEDURE — 85732 THROMBOPLASTIN TIME PARTIAL: CPT

## 2021-07-10 PROCEDURE — 83735 ASSAY OF MAGNESIUM: CPT

## 2021-07-10 PROCEDURE — 99153 MOD SED SAME PHYS/QHP EA: CPT

## 2021-07-10 PROCEDURE — 71046 X-RAY EXAM CHEST 2 VIEWS: CPT

## 2021-07-10 PROCEDURE — 82435 ASSAY OF BLOOD CHLORIDE: CPT

## 2021-07-10 PROCEDURE — C1751: CPT

## 2021-07-10 PROCEDURE — 76000 FLUOROSCOPY <1 HR PHYS/QHP: CPT

## 2021-07-10 PROCEDURE — 85290 CLOT FACTOR XIII FIBRIN STAB: CPT

## 2021-07-10 PROCEDURE — C1760: CPT

## 2021-07-10 PROCEDURE — 82803 BLOOD GASES ANY COMBINATION: CPT

## 2021-07-10 PROCEDURE — 36415 COLL VENOUS BLD VENIPUNCTURE: CPT

## 2021-07-10 PROCEDURE — 85230 CLOT FACTOR VII PROCONVERTIN: CPT

## 2021-07-10 PROCEDURE — 87635 SARS-COV-2 COVID-19 AMP PRB: CPT

## 2021-07-10 PROCEDURE — 80176 ASSAY OF LIDOCAINE: CPT

## 2021-07-10 PROCEDURE — 93454 CORONARY ARTERY ANGIO S&I: CPT

## 2021-07-10 PROCEDURE — 77001 FLUOROGUIDE FOR VEIN DEVICE: CPT

## 2021-07-10 PROCEDURE — 94668 MNPJ CHEST WALL SBSQ: CPT

## 2021-07-10 PROCEDURE — 85384 FIBRINOGEN ACTIVITY: CPT

## 2021-07-10 PROCEDURE — C1769: CPT

## 2021-07-10 PROCEDURE — 93880 EXTRACRANIAL BILAT STUDY: CPT

## 2021-07-10 PROCEDURE — 87040 BLOOD CULTURE FOR BACTERIA: CPT

## 2021-07-10 PROCEDURE — 80048 BASIC METABOLIC PNL TOTAL CA: CPT

## 2021-07-10 PROCEDURE — 86985 SPLIT BLOOD OR PRODUCTS: CPT

## 2021-07-10 PROCEDURE — 97110 THERAPEUTIC EXERCISES: CPT

## 2021-07-10 PROCEDURE — 94003 VENT MGMT INPAT SUBQ DAY: CPT

## 2021-07-10 PROCEDURE — 86022 PLATELET ANTIBODIES: CPT

## 2021-07-10 PROCEDURE — 84134 ASSAY OF PREALBUMIN: CPT

## 2021-07-10 PROCEDURE — 82805 BLOOD GASES W/O2 SATURATION: CPT

## 2021-07-10 PROCEDURE — 85670 THROMBIN TIME PLASMA: CPT

## 2021-07-10 PROCEDURE — 87070 CULTURE OTHR SPECIMN AEROBIC: CPT

## 2021-07-10 PROCEDURE — 86901 BLOOD TYPING SEROLOGIC RH(D): CPT

## 2021-07-10 PROCEDURE — 88305 TISSUE EXAM BY PATHOLOGIST: CPT

## 2021-07-10 PROCEDURE — 70450 CT HEAD/BRAIN W/O DYE: CPT

## 2021-07-10 PROCEDURE — 85210 CLOT FACTOR II PROTHROM SPEC: CPT

## 2021-07-10 PROCEDURE — 84295 ASSAY OF SERUM SODIUM: CPT

## 2021-07-10 PROCEDURE — 94760 N-INVAS EAR/PLS OXIMETRY 1: CPT

## 2021-07-10 PROCEDURE — 31720 CLEARANCE OF AIRWAYS: CPT

## 2021-07-10 PROCEDURE — 85220 BLOOC CLOT FACTOR V TEST: CPT

## 2021-07-10 PROCEDURE — 86900 BLOOD TYPING SEROLOGIC ABO: CPT

## 2021-07-10 PROCEDURE — 84100 ASSAY OF PHOSPHORUS: CPT

## 2021-07-10 PROCEDURE — 85014 HEMATOCRIT: CPT

## 2021-07-10 PROCEDURE — 84145 PROCALCITONIN (PCT): CPT

## 2021-07-10 PROCEDURE — 86803 HEPATITIS C AB TEST: CPT

## 2021-07-10 PROCEDURE — 85730 THROMBOPLASTIN TIME PARTIAL: CPT

## 2021-07-10 PROCEDURE — 86769 SARS-COV-2 COVID-19 ANTIBODY: CPT

## 2021-07-10 PROCEDURE — 85018 HEMOGLOBIN: CPT

## 2021-07-10 PROCEDURE — 74018 RADEX ABDOMEN 1 VIEW: CPT

## 2021-07-10 PROCEDURE — 85270 CLOT FACTOR XI PTA: CPT

## 2021-07-10 PROCEDURE — 94660 CPAP INITIATION&MGMT: CPT

## 2021-07-10 PROCEDURE — 93312 ECHO TRANSESOPHAGEAL: CPT

## 2021-07-10 PROCEDURE — 97530 THERAPEUTIC ACTIVITIES: CPT

## 2021-07-10 PROCEDURE — 83880 ASSAY OF NATRIURETIC PEPTIDE: CPT

## 2021-07-10 PROCEDURE — C1894: CPT

## 2021-07-10 PROCEDURE — 86704 HEP B CORE ANTIBODY TOTAL: CPT

## 2021-07-10 PROCEDURE — 85025 COMPLETE CBC W/AUTO DIFF WBC: CPT

## 2021-07-10 PROCEDURE — 86850 RBC ANTIBODY SCREEN: CPT

## 2021-07-10 PROCEDURE — 99261: CPT

## 2021-07-10 PROCEDURE — 82962 GLUCOSE BLOOD TEST: CPT

## 2021-07-10 PROCEDURE — 93010 ELECTROCARDIOGRAM REPORT: CPT

## 2021-07-10 PROCEDURE — 71250 CT THORAX DX C-: CPT

## 2021-07-10 PROCEDURE — 76942 ECHO GUIDE FOR BIOPSY: CPT

## 2021-07-10 PROCEDURE — 71046 X-RAY EXAM CHEST 2 VIEWS: CPT | Mod: 26

## 2021-07-10 PROCEDURE — 74174 CTA ABD&PLVS W/CONTRAST: CPT

## 2021-07-10 PROCEDURE — 93985 DUP-SCAN HEMO COMPL BI STD: CPT

## 2021-07-10 PROCEDURE — 85291 CLOT FACTOR XIII FIBRIN SCRN: CPT

## 2021-07-10 PROCEDURE — 94010 BREATHING CAPACITY TEST: CPT

## 2021-07-10 PROCEDURE — 88342 IMHCHEM/IMCYTCHM 1ST ANTB: CPT

## 2021-07-10 PROCEDURE — 82330 ASSAY OF CALCIUM: CPT

## 2021-07-10 PROCEDURE — 87340 HEPATITIS B SURFACE AG IA: CPT

## 2021-07-10 PROCEDURE — P9011: CPT

## 2021-07-10 PROCEDURE — 71045 X-RAY EXAM CHEST 1 VIEW: CPT

## 2021-07-10 PROCEDURE — 85240 CLOT FACTOR VIII AHG 1 STAGE: CPT

## 2021-07-10 PROCEDURE — L8699: CPT

## 2021-07-10 PROCEDURE — 94002 VENT MGMT INPAT INIT DAY: CPT

## 2021-07-10 PROCEDURE — 93005 ELECTROCARDIOGRAM TRACING: CPT

## 2021-07-10 PROCEDURE — 33270 INS/REP SUBQ DEFIBRILLATOR: CPT

## 2021-07-10 PROCEDURE — 85280 CLOT FACTOR XII HAGEMAN: CPT

## 2021-07-10 PROCEDURE — 87640 STAPH A DNA AMP PROBE: CPT

## 2021-07-10 PROCEDURE — 85250 CLOT FACTOR IX PTC/CHRSTMAS: CPT

## 2021-07-10 PROCEDURE — 85379 FIBRIN DEGRADATION QUANT: CPT

## 2021-07-10 PROCEDURE — 82947 ASSAY GLUCOSE BLOOD QUANT: CPT

## 2021-07-10 PROCEDURE — P9059: CPT

## 2021-07-10 PROCEDURE — P9047: CPT

## 2021-07-10 PROCEDURE — C1722: CPT

## 2021-07-10 PROCEDURE — 99152 MOD SED SAME PHYS/QHP 5/>YRS: CPT

## 2021-07-10 PROCEDURE — C1887: CPT

## 2021-07-10 PROCEDURE — 84443 ASSAY THYROID STIM HORMONE: CPT

## 2021-07-10 PROCEDURE — 99238 HOSP IP/OBS DSCHRG MGMT 30/<: CPT

## 2021-07-10 PROCEDURE — 85611 PROTHROMBIN TEST: CPT

## 2021-07-10 PROCEDURE — 85027 COMPLETE CBC AUTOMATED: CPT

## 2021-07-10 PROCEDURE — 80053 COMPREHEN METABOLIC PANEL: CPT

## 2021-07-10 PROCEDURE — 93320 DOPPLER ECHO COMPLETE: CPT

## 2021-07-10 PROCEDURE — P9045: CPT

## 2021-07-10 PROCEDURE — 74176 CT ABD & PELVIS W/O CONTRAST: CPT

## 2021-07-10 PROCEDURE — 80202 ASSAY OF VANCOMYCIN: CPT

## 2021-07-10 PROCEDURE — C1889: CPT

## 2021-07-10 PROCEDURE — 84132 ASSAY OF SERUM POTASSIUM: CPT

## 2021-07-10 PROCEDURE — C1750: CPT

## 2021-07-10 PROCEDURE — 86705 HEP B CORE ANTIBODY IGM: CPT

## 2021-07-10 PROCEDURE — 80076 HEPATIC FUNCTION PANEL: CPT

## 2021-07-10 PROCEDURE — 84460 ALANINE AMINO (ALT) (SGPT): CPT

## 2021-07-10 PROCEDURE — 83036 HEMOGLOBIN GLYCOSYLATED A1C: CPT

## 2021-07-10 PROCEDURE — 86147 CARDIOLIPIN ANTIBODY EA IG: CPT

## 2021-07-10 PROCEDURE — 93306 TTE W/DOPPLER COMPLETE: CPT

## 2021-07-10 PROCEDURE — 83605 ASSAY OF LACTIC ACID: CPT

## 2021-07-10 PROCEDURE — C1896: CPT

## 2021-07-10 PROCEDURE — 86923 COMPATIBILITY TEST ELECTRIC: CPT

## 2021-07-10 PROCEDURE — 94640 AIRWAY INHALATION TREATMENT: CPT

## 2021-07-10 PROCEDURE — 87641 MR-STAPH DNA AMP PROBE: CPT

## 2021-07-10 PROCEDURE — P9037: CPT

## 2021-07-10 PROCEDURE — 75574 CT ANGIO HRT W/3D IMAGE: CPT

## 2021-07-10 PROCEDURE — 85260 CLOT FACTOR X STUART-POWER: CPT

## 2021-07-10 PROCEDURE — C8929: CPT

## 2021-07-10 PROCEDURE — 86706 HEP B SURFACE ANTIBODY: CPT

## 2021-07-10 PROCEDURE — 84520 ASSAY OF UREA NITROGEN: CPT

## 2021-07-10 PROCEDURE — 86709 HEPATITIS A IGM ANTIBODY: CPT

## 2021-07-10 PROCEDURE — 36430 TRANSFUSION BLD/BLD COMPNT: CPT

## 2021-07-10 PROCEDURE — P9040: CPT

## 2021-07-10 RX ORDER — FLUTICASONE PROPIONATE AND SALMETEROL 50; 250 UG/1; UG/1
1 POWDER ORAL; RESPIRATORY (INHALATION)
Qty: 0 | Refills: 0 | DISCHARGE

## 2021-07-10 RX ORDER — LANOLIN ALCOHOL/MO/W.PET/CERES
1 CREAM (GRAM) TOPICAL
Qty: 0 | Refills: 0 | DISCHARGE
Start: 2021-07-10

## 2021-07-10 RX ORDER — MIRTAZAPINE 45 MG/1
1 TABLET, ORALLY DISINTEGRATING ORAL
Qty: 0 | Refills: 0 | DISCHARGE
Start: 2021-07-10

## 2021-07-10 RX ORDER — METOPROLOL TARTRATE 50 MG
1 TABLET ORAL
Qty: 0 | Refills: 0 | DISCHARGE

## 2021-07-10 RX ORDER — SENNA PLUS 8.6 MG/1
2 TABLET ORAL
Qty: 0 | Refills: 0 | DISCHARGE
Start: 2021-07-10

## 2021-07-10 RX ORDER — CADEXOMER IODINE 0.9 %
1 PADS, MEDICATED (EA) TOPICAL
Qty: 0 | Refills: 0 | DISCHARGE
Start: 2021-07-10

## 2021-07-10 RX ORDER — ASPIRIN/CALCIUM CARB/MAGNESIUM 324 MG
1 TABLET ORAL
Qty: 0 | Refills: 0 | DISCHARGE
Start: 2021-07-10

## 2021-07-10 RX ORDER — ATORVASTATIN CALCIUM 80 MG/1
1 TABLET, FILM COATED ORAL
Qty: 0 | Refills: 0 | DISCHARGE
Start: 2021-07-10

## 2021-07-10 RX ORDER — MEGESTROL ACETATE 40 MG/ML
10 SUSPENSION ORAL
Qty: 0 | Refills: 0 | DISCHARGE
Start: 2021-07-10

## 2021-07-10 RX ORDER — ATORVASTATIN CALCIUM 80 MG/1
1 TABLET, FILM COATED ORAL
Qty: 0 | Refills: 0 | DISCHARGE

## 2021-07-10 RX ORDER — PSYLLIUM SEED (WITH DEXTROSE)
1 POWDER (GRAM) ORAL
Qty: 0 | Refills: 0 | DISCHARGE
Start: 2021-07-10

## 2021-07-10 RX ORDER — FOLIC ACID 0.8 MG
1 TABLET ORAL
Qty: 0 | Refills: 0 | DISCHARGE
Start: 2021-07-10

## 2021-07-10 RX ORDER — ACETAMINOPHEN 500 MG
2 TABLET ORAL
Qty: 0 | Refills: 0 | DISCHARGE
Start: 2021-07-10

## 2021-07-10 RX ORDER — CARVEDILOL PHOSPHATE 80 MG/1
1 CAPSULE, EXTENDED RELEASE ORAL
Qty: 0 | Refills: 0 | DISCHARGE
Start: 2021-07-10

## 2021-07-10 RX ORDER — BUDESONIDE AND FORMOTEROL FUMARATE DIHYDRATE 160; 4.5 UG/1; UG/1
2 AEROSOL RESPIRATORY (INHALATION)
Qty: 0 | Refills: 0 | DISCHARGE
Start: 2021-07-10

## 2021-07-10 RX ORDER — IRON SUCROSE 20 MG/ML
0 INJECTION, SOLUTION INTRAVENOUS
Qty: 0 | Refills: 0 | DISCHARGE
Start: 2021-07-10

## 2021-07-10 RX ORDER — AMIODARONE HYDROCHLORIDE 400 MG/1
1 TABLET ORAL
Qty: 0 | Refills: 0 | DISCHARGE
Start: 2021-07-10

## 2021-07-10 RX ORDER — MULTIVIT-MIN/FERROUS GLUCONATE 9 MG/15 ML
1 LIQUID (ML) ORAL
Qty: 0 | Refills: 0 | DISCHARGE

## 2021-07-10 RX ORDER — IPRATROPIUM/ALBUTEROL SULFATE 18-103MCG
3 AEROSOL WITH ADAPTER (GRAM) INHALATION
Qty: 0 | Refills: 0 | DISCHARGE

## 2021-07-10 RX ORDER — ALLOPURINOL 300 MG
1 TABLET ORAL
Qty: 0 | Refills: 0 | DISCHARGE

## 2021-07-10 RX ORDER — LACTOBACILLUS RHAMNOSUS GG 10B CELL
1 CAPSULE ORAL
Qty: 0 | Refills: 0 | DISCHARGE

## 2021-07-10 RX ORDER — PANTOPRAZOLE SODIUM 20 MG/1
1 TABLET, DELAYED RELEASE ORAL
Qty: 0 | Refills: 0 | DISCHARGE
Start: 2021-07-10

## 2021-07-10 RX ORDER — ASCORBIC ACID 60 MG
1 TABLET,CHEWABLE ORAL
Qty: 0 | Refills: 0 | DISCHARGE
Start: 2021-07-10

## 2021-07-10 RX ORDER — ERYTHROPOIETIN 10000 [IU]/ML
0 INJECTION, SOLUTION INTRAVENOUS; SUBCUTANEOUS
Qty: 0 | Refills: 0 | DISCHARGE
Start: 2021-07-10

## 2021-07-10 RX ORDER — ASPIRIN/CALCIUM CARB/MAGNESIUM 324 MG
1 TABLET ORAL
Qty: 0 | Refills: 0 | DISCHARGE

## 2021-07-10 RX ORDER — AMLODIPINE BESYLATE 2.5 MG/1
1 TABLET ORAL
Qty: 0 | Refills: 0 | DISCHARGE

## 2021-07-10 RX ADMIN — Medication 1 APPLICATION(S): at 05:36

## 2021-07-10 RX ADMIN — AMIODARONE HYDROCHLORIDE 200 MILLIGRAM(S): 400 TABLET ORAL at 05:34

## 2021-07-10 RX ADMIN — Medication 100 MILLIGRAM(S): at 10:05

## 2021-07-10 RX ADMIN — CHLORHEXIDINE GLUCONATE 1 APPLICATION(S): 213 SOLUTION TOPICAL at 05:35

## 2021-07-10 RX ADMIN — CARVEDILOL PHOSPHATE 3.12 MILLIGRAM(S): 80 CAPSULE, EXTENDED RELEASE ORAL at 05:35

## 2021-07-10 RX ADMIN — Medication 1 MILLIGRAM(S): at 10:55

## 2021-07-10 RX ADMIN — BUDESONIDE AND FORMOTEROL FUMARATE DIHYDRATE 2 PUFF(S): 160; 4.5 AEROSOL RESPIRATORY (INHALATION) at 09:40

## 2021-07-10 RX ADMIN — PANTOPRAZOLE SODIUM 40 MILLIGRAM(S): 20 TABLET, DELAYED RELEASE ORAL at 05:34

## 2021-07-10 RX ADMIN — Medication 81 MILLIGRAM(S): at 10:55

## 2021-07-10 RX ADMIN — MEGESTROL ACETATE 400 MILLIGRAM(S): 40 SUSPENSION ORAL at 10:55

## 2021-07-10 RX ADMIN — Medication 1 TABLET(S): at 10:55

## 2021-07-10 RX ADMIN — Medication 500 MILLIGRAM(S): at 10:55

## 2021-07-10 NOTE — PROGRESS NOTE ADULT - PROBLEM SELECTOR PLAN 1
Now s/p Valve in Valve TAVR with Dr. Campbell on 6/23.  Echocardiogram on 06/26 revealed EF 20-25% from 30-35% prior with mildly enlarged RV, severely decreased LV   Fxn, moderate MR, Severe TR and mild Pulm Regurgitation  Repeat TTE 7/7 showing an EF of 25-30% with mild-mod AI, mod MR, mod TR.   Cardiology and EP following.  Coreg 3.125 BID as per heart failure team recommendations  Continue Amio for runs of SVT post op.  S/p S-ICD 7/9/21.  Continue GI ppx with Protonix.  Evaluated by adult rehab medicine, recommending [facility] for a more prolonged stay to achieve transition to community living and would not be able to tolerate a comprehensive/intense rehab program of 3hours/day.

## 2021-07-10 NOTE — PROGRESS NOTE ADULT - PROBLEM SELECTOR PLAN 8
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   CT Head 6/17 negative for acute finding.   No residual deficits noted.  CT Head repeated 6/26 remains negative for any acute findings.  Lidocaine gtt stopped.
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   CT Head 6/17 negative for acute finding.   No residual deficits noted.  CT Head repeated 6/26 remains negative for any acute findings.  Lidocaine gtt stopped.
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   No residual deficits noted.
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   CT Head 6/17 negative for acute finding.   No residual deficits noted.
CVA (09/2020).  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   CT Head 6/17 negative for acute finding.   No residual deficits noted.
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   CT Head 6/17 negative for acute finding.   No residual deficits noted.
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   CT Head 6/17 negative for acute finding.   No residual deficits noted.  CT Head repeated 6/26 remains negative for any acute findings.
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   CT Head 6/17 negative for acute finding.   No residual deficits noted.  CT Head repeated 6/26 remains negative for any acute findings.  Lidocaine gtt stopped.
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   CT Head 6/17 negative for acute finding.   No residual deficits noted.  CT Head repeated 6/26 remains negative for any acute findings.
CVA (09/2020) .  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   CT Head 6/17 negative for acute finding.   No residual deficits noted.  CT Head repeated 6/26 remains negative for any acute findings.
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   CT Head 6/17 negative for acute finding.   No residual deficits noted.  CT Head repeated 6/26 remains negative for any acute findings.
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   No residual deficits noted.
CVA (09/2020) .  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   CT Head 6/17 negative for acute finding.   No residual deficits noted.  CT Head repeated 6/26 remains negative for any acute findings.
CVA (09/2020).  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   CT Head 6/17 negative for acute finding.   No residual deficits noted.
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   CT Head 6/17 negative for acute finding.   No residual deficits noted.
CVA (09/2020).  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   CT Head 6/17 negative for acute finding.   No residual deficits noted.
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   CT Head 6/17 negative for acute finding.   No residual deficits noted.  CT Head repeated 6/26 remains negative for any acute findings.
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   CT Head 6/17 negative for acute finding.   No residual deficits noted.  CT Head repeated 6/26 remains negative for any acute findings.  Lidocaine gtt stopped.
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   No residual deficits noted.
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   CT Head 6/17 negative for acute finding.   No residual deficits noted.  CT Head repeated 6/26 remains negative for any acute findings.
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   CT Head 6/17 negative for acute finding.   No residual deficits noted.  CT Head repeated 6/26 remains negative for any acute findings.
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   CT Head 6/17 negative for acute finding.   No residual deficits noted.
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   CT Head 6/17 negative for acute finding.   No residual deficits noted.

## 2021-07-10 NOTE — PROGRESS NOTE ADULT - NSICDXPILOT_GEN_ALL_CORE
[place]
[employer]
[place]

## 2021-07-10 NOTE — PROGRESS NOTE ADULT - PROBLEM SELECTOR PLAN 4
Heart Failure team following.  Coreg 3.125 BID as per heart failure team.  Will hold ACEi/ARBs and ARNi given CKD.  Cardiology and EP following.  S/p S-ICD 7/9/21.

## 2021-07-10 NOTE — PROGRESS NOTE ADULT - PROBLEM SELECTOR PLAN 9
Seen by heme/onc: Elevated PT / INR possibly secondary to underlying liver disease / passive congestion of liver. -He had mild hepatomegaly / hepatic steatosis on 6/1 US abd from [facility].   Mixing studies sent 6/21 2/2 persistently elevated INR despite daily PO vit K  Continue to monitor Daily CBC, INR
Seen by heme/onc: Elevated PT / INR possibly secondary to underlying liver disease / passive congestion of liver. -He had mild hepatomegaly / hepatic steatosis on 6/1 US abd from [facility].   Mixing studies sent 6/21 2/2 persistently elevated INR despite daily PO vit K  Continue to monitor Daily CBC, INR
HA1c 5/9.   Diabetic/ consistent carb diet ordered.
Seen by heme/onc: Elevated PT / INR possibly secondary to underlying liver disease / passive congestion of liver. -He had mild hepatomegaly / hepatic steatosis on 6/1 US abd from [facility].   Mixing studies sent 6/21 2/2 persistently elevated INR despite daily PO vit K  Per Heme/Onc: INR<2, is usually not associated with increased risk of perioperative bleeding and pt was cleared for surgery.  Daily CBC, INR along with D Dimer and Fibrinogen
Seen by heme/onc: Elevated PT / INR possibly secondary to underlying liver disease / passive congestion of liver. -He had mild hepatomegaly / hepatic steatosis on 6/1 US abd from [facility].   Mixing studies sent 6/21 2/2 persistently elevated INR despite daily PO vit K.  Continue to monitor Daily CBC, INR.
Seen by heme/onc: Elevated PT / INR possibly secondary to underlying liver disease / passive congestion of liver. -He had mild hepatomegaly / hepatic steatosis on 6/1 US abd from [facility].   Mixing studies sent 6/21 2/2 persistently elevated INR despite daily PO vit K  Per Heme/Onc: INR<2, is usually not associated with increased risk of perioperative bleeding and pt was cleared for surgery.  Daily CBC, INR along with D Dimer and Fibrinogen  Broad spectrum ABX started for presumed sepsis
Seen by heme/onc: Elevated PT / INR possibly secondary to underlying liver disease / passive congestion of liver. -He had mild hepatomegaly / hepatic steatosis on 6/1 US abd from [facility].   Mixing studies sent 6/21 2/2 persistently elevated INR despite daily PO vit K  Continue to monitor Daily CBC, INR
HA1c 5.9.   Diabetic/ consistent carb diet ordered.
Seen by heme/onc: Elevated PT / INR possibly secondary to underlying liver disease / passive congestion of liver. -He had mild hepatomegaly / hepatic steatosis on 6/1 US abd from [facility].   Mixing studies sent 6/21 2/2 persistently elevated INR despite daily PO vit K  Per Heme/Onc: INR<2, is usually not associated with increased risk of perioperative bleeding and pt was cleared for surgery.  Daily CBC, INR along with D Dimer and Fibrinogen  Broad spectrum ABX started for presumed sepsis
Seen by heme/onc: Elevated PT / INR possibly secondary to underlying liver disease / passive congestion of liver. -He had mild hepatomegaly / hepatic steatosis on 6/1 US abd from [facility].   Mixing studies sent 6/21 2/2 persistently elevated INR despite daily PO vit K.  Continue to monitor Daily CBC, INR.
HA1c 5.9.   Diabetic/ consistent carb diet ordered.
HA1c 5/9.   Diabetic/ consistent carb diet ordered.
HA1c 5/9.   Diabetic/ consistent carb diet ordered.
Seen by heme/onc: Elevated PT / INR possibly secondary to underlying liver disease / passive congestion of liver. -He had mild hepatomegaly / hepatic steatosis on 6/1 US abd from [facility].   Mixing studies sent 6/21 2/2 persistently elevated INR despite daily PO vit K  Continue to monitor Daily CBC, INR
Seen by heme/onc: Elevated PT / INR possibly secondary to underlying liver disease / passive congestion of liver. -He had mild hepatomegaly / hepatic steatosis on 6/1 US abd from [facility].   Mixing studies sent 6/21 2/2 persistently elevated INR despite daily PO vit K  Continue to monitor Daily CBC, INR
-Seen by heme/onc: Elevated PT / INR possibly secondary to underlying liver disease / passive congestion of liver. -He had mild hepatomegaly / hepatic steatosis on 6/1 US abd from [facility].   -Mixing studies sent 6/21 2/2 persistently elevated INR despite daily PO vit K  -Per Heme/Onc: INR<2, is usually not associated with increased risk of perioperative bleeding.
Seen by heme/onc: Elevated PT / INR possibly secondary to underlying liver disease / passive congestion of liver. -He had mild hepatomegaly / hepatic steatosis on 6/1 US abd from [facility].   Mixing studies sent 6/21 2/2 persistently elevated INR despite daily PO vit K  Per Heme/Onc: INR<2, is usually not associated with increased risk of perioperative bleeding and pt was cleared for surgery.  Daily CBC, INR along with D Dimer and Fibrinogen  Broad spectrum ABX started for presumed sepsis
Seen by heme/onc: Elevated PT / INR possibly secondary to underlying liver disease / passive congestion of liver. -He had mild hepatomegaly / hepatic steatosis on 6/1 US abd from [facility].   Mixing studies sent 6/21 2/2 persistently elevated INR despite daily PO vit K  Daily CBC, INR   Broad spectrum ABX for presumed sepsis as per ID
Seen by heme/onc: Elevated PT / INR possibly secondary to underlying liver disease / passive congestion of liver. -He had mild hepatomegaly / hepatic steatosis on 6/1 US abd from [facility].   Mixing studies sent 6/21 2/2 persistently elevated INR despite daily PO vit K  Per Heme/Onc: INR<2, is usually not associated with increased risk of perioperative bleeding and pt was cleared for surgery.  Daily CBC, INR along with D Dimer and Fibrinogen  Broad spectrum ABX started for presumed sepsis
Seen by heme/onc: Elevated PT / INR possibly secondary to underlying liver disease / passive congestion of liver. -He had mild hepatomegaly / hepatic steatosis on 6/1 [facility].   Mixing studies sent 6/21 2/2 persistently elevated INR despite daily PO vit K  Per Heme/Onc: INR<2, is usually not associated with increased risk of perioperative bleeding and pt was cleared for surgery.  Daily CBC, INR along with D Dimer and Fibrinogen  Broad spectrum ABX for presumed sepsis as per ID
Seen by heme/onc: Elevated PT / INR possibly secondary to underlying liver disease / passive congestion of liver. -He had mild hepatomegaly / hepatic steatosis on 6/1 US abd from [facility].   Mixing studies sent 6/21 2/2 persistently elevated INR despite daily PO vit K  Per Heme/Onc: INR<2, is usually not associated with increased risk of perioperative bleeding and pt was cleared for surgery.  Received 2 uts FFP yesterday, continue to follow coag studies closely
HA1c 5/9.   Diabetic/ consistent carb diet ordered.
Seen by heme/onc: Elevated PT / INR possibly secondary to underlying liver disease / passive congestion of liver. -He had mild hepatomegaly / hepatic steatosis on 6/1 US abd from [facility].   Mixing studies sent 6/21 2/2 persistently elevated INR despite daily PO vit K  Continue to monitor Daily CBC, INR
Seen by heme/onc: Elevated PT / INR possibly secondary to underlying liver disease / passive congestion of liver. -He had mild hepatomegaly / hepatic steatosis on 6/1 US abd from [facility].   Mixing studies sent 6/21 2/2 persistently elevated INR despite daily PO vit K  Per Heme/Onc: INR<2, is usually not associated with increased risk of perioperative bleeding and pt was cleared for surgery.
HA1c 5/9.   Diabetic/ consistent carb diet ordered.

## 2021-07-10 NOTE — PROGRESS NOTE ADULT - PROBLEM SELECTOR PLAN 7
Continue Flomax and Proscar.
Continue Flomax and Proscar.  Lemus replaced for urinary retention, now removed
Continue Flomax and Proscar.
Continue Flomax and Proscar.
Continue Flomax and Proscar.  Lemus remains in place.
Continue Flomax and Proscar.
Continue Flomax and Proscar.  Lemus replaced for urinary retention
Continue Flomax and Proscar.  Lemus replaced for urinary retention, now removed
Continue Flomax and Proscar.
Continue Flomax and Proscar.
Continue Flomax and Proscar.  Lemus replaced for urinary retention
Continue Flomax and Proscar.  Lemus remains in place.
Continue Flomax and Proscar.  Lemus replaced for urinary retention
Continue Flomax and Proscar.  Lemus remains in place.
Continue Flomax and Proscar.
Continue Flomax and Proscar.  Lemus replaced for urinary retention
Continue Flomax and Proscar.
Continue Flomax and Proscar.  Lemus replaced for urinary retention, now removed.
Continue Flomax and Proscar.
Continue Flomax and Proscar.
Continue Flomax and Proscar.  Lemus remains in place.
Continue Flomax and Proscar.
Continue Flomax and Proscar.
Continue Flomax and Proscar.  Lemus replaced for urinary retention
Continue Flomax and Proscar.  Lemus remains in place.
Continue Flomax and Proscar.  Lemus replaced for urinary retention
Continue Flomax and Proscar.  Lemus replaced for urinary retention

## 2021-07-10 NOTE — DISCHARGE NOTE NURSING/CASE MANAGEMENT/SOCIAL WORK - NSDCFUADDAPPT_GEN_ALL_CORE_FT
Upon discharge from rehab, make a follow up appointment with Dr. Campbell by calling [phone].  Follow up with your cardiologist and primary care doctor within 7-10 days after discharge. Daily weights, DASH diet, ensure supplements bid, daily shower,  PT/OT Rehab per rehab facility. BMP, CBC twice a week. CXR weekly. Vitals per routine. Continue HD M/W/F.     Plan for patient to follow up for an outpatient sleep study, an outpatient dental evaluation, and follow up with GI given recent workup.     Please follow up with Vascular Surgeon for eventual AV fistula creation for HD access.     Please follow up with your Cardiologist and Primary Care Physician 2-4 weeks from discharge.

## 2021-07-10 NOTE — PROGRESS NOTE ADULT - PROBLEM SELECTOR PLAN 6
Initially RONNELL on CKD.  Now progressed to ESRD requiring HD.  New RIJ tunnel HD cath placed 7/2 by AIRAM.  GEREMIAS has pink band for preservation in anticipation for eventual AVF, vascular following.  Renal team following.  CVVHD started 6/25, now tolerating HD M/W/F, -1.5 L ultrafiltration on 07/09.

## 2021-07-10 NOTE — PROGRESS NOTE ADULT - SUBJECTIVE AND OBJECTIVE BOX
Patient seen today in bed sleeping. Dressing removed from left flank without complication. Abdominal binder re-adjusted. No overnight complaints    EKG: SR at 66bpm; QRSD 112ms; TN 364ms  TELE: SR with no acute events.     MEDICATIONS  (STANDING):  aMIOdarone    Tablet 200 milliGRAM(s) Oral daily  aMIOdarone    Tablet   Oral   ascorbic acid 500 milliGRAM(s) Oral daily  aspirin  chewable 81 milliGRAM(s) Oral daily  atorvastatin 10 milliGRAM(s) Oral at bedtime  budesonide 160 MICROgram(s)/formoterol 4.5 MICROgram(s) Inhaler 2 Puff(s) Inhalation two times a day  cadexomer iodine 0.9% Gel 1 Application(s) Topical two times a day  carvedilol 3.125 milliGRAM(s) Oral every 12 hours  ceFAZolin   IVPB 1000 milliGRAM(s) IV Intermittent every 12 hours  chlorhexidine 4% Liquid 1 Application(s) Topical <User Schedule>  epoetin yolanda-epbx (RETACRIT) Injectable 01822 Unit(s) IV Push <User Schedule>  folic acid 1 milliGRAM(s) Oral daily  iron sucrose IVPB 50 milliGRAM(s) IV Intermittent <User Schedule>  megestrol Suspension 400 milliGRAM(s) Oral daily  melatonin 5 milliGRAM(s) Oral at bedtime  mirtazapine 15 milliGRAM(s) Oral at bedtime  Nephro-melissa 1 Tablet(s) Oral daily  pantoprazole    Tablet 40 milliGRAM(s) Oral every 12 hours  psyllium Powder 1 Packet(s) Oral two times a day  senna 2 Tablet(s) Oral at bedtime  tamsulosin 0.4 milliGRAM(s) Oral at bedtime    MEDICATIONS  (PRN):  acetaminophen   Tablet .. 650 milliGRAM(s) Oral every 6 hours PRN Mild Pain (1 - 3)  acetaminophen   Tablet .. 650 milliGRAM(s) Oral every 6 hours PRN Mild Pain (1 - 3)  ALBUTerol    90 MICROgram(s) HFA Inhaler 2 Puff(s) Inhalation every 6 hours PRN Shortness of Breath and/or Wheezing  oxycodone    5 mG/acetaminophen 325 mG 1 Tablet(s) Oral every 6 hours PRN Moderate Pain (4 - 6)  sodium chloride 0.9% lock flush 10 milliLiter(s) IV Push every 1 hour PRN Pre/post blood products, medications, blood draw, and to maintain line patency  sodium chloride 0.9% lock flush 10 milliLiter(s) IV Push every 1 hour PRN Pre/post blood products, medications, blood draw, and to maintain line patency    Allergies  No Known Drug Allergies  strawberry (Anaphylaxis)    PAST MEDICAL & SURGICAL HISTORY:  Pulmonary hypertension  Hypertension  Hyperlipidemia  H/O aortic valve stenosis  s/p TAVR 2019 at [facility]  CVA (cerebrovascular accident)  MCA CVA with tPA and thrombectomy  Chronic kidney disease  Prediabetes  Mitral valve regurgitation  CAD in native artery  Aneurysm of aortic root  thoracic aortic aneurysm, without ruptur  Benign prostatic hyperplasia  Gout  History of transcatheter aortic valve replacement (TAVR)    Vital Signs Last 24 Hrs  T(C): 36.6 (10 Jul 2021 08:00), Max: 36.7 (09 Jul 2021 16:39)  T(F): 97.9 (10 Jul 2021 08:00), Max: 98 (09 Jul 2021 16:39)  HR: 71 (10 Jul 2021 09:40) (54 - 71)  BP: 114/68 (10 Jul 2021 08:00) (106/66 - 148/68)  BP(mean): 80 (10 Jul 2021 08:00) (74 - 80)  RR: 17 (10 Jul 2021 08:00) (15 - 18)  SpO2: 99% (10 Jul 2021 09:40) (98% - 100%)    Physical Exam:  Constitutional: NAD, AAOx3  Cardiovascular: +S1S2 RRR, 3/6 SHANNAN  Pulmonary: Coarse anterior breath sounds  Left Flank: Suture line intact. No hematoma. Mild ecchymosis around pocket  GI: soft NTND +BS  Extremities: 1+ bilateral LE edema  Neuro: non focal, CRONIN x4    LABS:                        10.3   7.42  )-----------( 228      ( 10 Jul 2021 06:56 )             33.0     136  |  98  |  16.4  ----------------------------<  87  4.4   |  25.0  |  2.87<H>  Ca    9.2      10 Jul 2021 06:56  Mg     2.0     07-10    A/P  76 year old male patient with a medical history of MI in 1995 (angiogram, no stents placed), COPD (2L O2 at home), s/p TAVR (03/2019 at [facility]), gout, CKD, CVA (09/2020), pulmonary HTN, strep bovis bacteremia last year (2020) s/p 6 weeks of IV Rocephin at home who initially presented to [facility] 6/1/21 with RONNELL on CKD (likely cardiorenal syndrome), urinary retention due to noncompliance with medications, with hospital course complicated by cardiogenic shock, acute hypoxemic respiratory failure secondary to metabolic acidosis and respiratory alkalosis, hospital course significant for cardiogenic shock, acute hypoxemic respiratory failure, and acute on chronic combined diastolic and systolic heart failure. ISRAEL with severe AI and concerns for endocarditis.  Patient was transferred to [facility] under Dr. Campbell for further workup of bioprosthetic AI, now s/p Valve in Valve TAVR with Dr. Campbell on 6/23. Blood cultures have remained negative.  During this hospitalization pt noted to have sustained VT >200bpm requiring amiodarone therapy and is now POD#1 s/p BSCI S-ICD implant for secondary prevention of SCD.    - CXR pending  - Continue Abdominal binder till 7/11/21  - Outpatient follow up arranged  - NO driving for 6 months - see yesterdays note  - No further inpatient EP recommendations. Will sign off.

## 2021-07-10 NOTE — PROGRESS NOTE ADULT - PROVIDER SPECIALTY LIST ADULT
Anesthesia
CT Surgery
Cardiology
Heart Failure
Infectious Disease
Nephrology
Rehab Medicine
Anesthesia
Cardiology
Heme/Onc
Infectious Disease
Nephrology
CT Surgery
Cardiology
Gastroenterology
Heme/Onc
Infectious Disease
Nephrology
Neurology
Neurology
Rehab Medicine
Rehab Medicine
Cardiology
Electrophysiology
Electrophysiology
Infectious Disease
Infectious Disease
Nephrology
Cardiology
Heart Failure
Heme/Onc
Heme/Onc
Infectious Disease
Nephrology
Cardiology
Gastroenterology
CT Surgery
CT Surgery
Heart Failure
Heart Failure
CT Surgery
Heart Failure
CT Surgery
Heart Failure
CT Surgery
Thoracic Surgery
CT Surgery

## 2021-07-10 NOTE — PROGRESS NOTE ADULT - PROBLEM SELECTOR PROBLEM 4
Acute on chronic combined systolic and diastolic CHF, NYHA class 3
Acute on chronic combined systolic and diastolic CHF, NYHA class 3
ESRD (end stage renal disease) on dialysis
Acute on chronic combined systolic and diastolic CHF, NYHA class 3
ESRD (end stage renal disease) on dialysis
Aneurysm of aortic root
Nonrheumatic aortic valve insufficiency
Aneurysm of aortic root
Acute on chronic combined systolic and diastolic CHF, NYHA class 3
CAD in native artery
ESRD (end stage renal disease) on dialysis
Aneurysm of aortic root
Acute on chronic combined systolic and diastolic CHF, NYHA class 3
Aneurysm of aortic root
ESRD (end stage renal disease) on dialysis
ESRD (end stage renal disease) on dialysis
Aneurysm of aortic root
CAD in native artery
Aneurysm of aortic root
Acute on chronic combined systolic and diastolic CHF, NYHA class 3
Aneurysm of aortic root
Acute on chronic combined systolic and diastolic CHF, NYHA class 3
Acute on chronic combined systolic and diastolic CHF, NYHA class 3
CAD in native artery
Aneurysm of aortic root
Acute on chronic combined systolic and diastolic CHF, NYHA class 3
Aneurysm of aortic root
Acute on chronic combined systolic and diastolic CHF, NYHA class 3

## 2021-07-10 NOTE — DISCHARGE NOTE NURSING/CASE MANAGEMENT/SOCIAL WORK - PATIENT PORTAL LINK FT
You can access the FollowMyHealth Patient Portal offered by [facility] by registering at the following website: [email or web address]. By joining [facility]’s FollowMyHealth portal, you will also be able to view your health information using other applications (apps) compatible with our system.

## 2021-07-10 NOTE — PROGRESS NOTE ADULT - PROBLEM SELECTOR PROBLEM 6
ESRD (end stage renal disease) on dialysis
ESRD (end stage renal disease) on dialysis
Acute kidney injury superimposed on chronic kidney disease
Acute kidney injury superimposed on chronic kidney disease
ESRD (end stage renal disease) on dialysis
Acute kidney injury superimposed on chronic kidney disease
ESRD (end stage renal disease) on dialysis
Acute kidney injury superimposed on chronic kidney disease
ESRD (end stage renal disease) on dialysis
Acute kidney injury superimposed on chronic kidney disease
Weight loss, unintentional
Acute kidney injury superimposed on chronic kidney disease
Acute kidney injury superimposed on chronic kidney disease
ESRD (end stage renal disease) on dialysis
Weight loss, unintentional
ESRD (end stage renal disease) on dialysis
Acute kidney injury superimposed on chronic kidney disease
ESRD (end stage renal disease) on dialysis
Weight loss, unintentional
ESRD (end stage renal disease) on dialysis
Acute kidney injury superimposed on chronic kidney disease
ESRD (end stage renal disease) on dialysis

## 2021-07-10 NOTE — PROGRESS NOTE ADULT - PROBLEM SELECTOR PROBLEM 5
CAD in native artery
ESRD (end stage renal disease) on dialysis
CAD in native artery
Pulmonary hypertension
CAD in native artery
ESRD (end stage renal disease) on dialysis
ESRD (end stage renal disease) on dialysis
CAD in native artery

## 2021-07-10 NOTE — PROGRESS NOTE ADULT - NUTRITIONAL ASSESSMENT
This patient has been assessed with a concern for Malnutrition and has been determined to have a diagnosis/diagnoses of Severe protein-calorie malnutrition.    This patient is being managed with:   Diet Consistent Carbohydrate Full Liquid-  Entered: Jun 17 2021  8:01AM    
This patient has been assessed with a concern for Malnutrition and has been determined to have a diagnosis/diagnoses of Severe protein-calorie malnutrition.      
This patient has been assessed with a concern for Malnutrition and has been determined to have a diagnosis/diagnoses of Severe protein-calorie malnutrition.      
This patient has been assessed with a concern for Malnutrition and has been determined to have a diagnosis/diagnoses of Severe protein-calorie malnutrition.    This patient is being managed with:   Diet DASH/TLC-  Sodium & Cholesterol Restricted  Consistent Carbohydrate {No Snacks} (CSTCHO)  Supplement Feeding Modality:  Oral  Ensure Enlive Cans or Servings Per Day:  3       Frequency:  Three Times a day     Special Instructions for Nursing:  Sodium & Cholesterol Restricted  Entered: Jun 14 2021  3:48PM    Diet NPO after Midnight-     NPO Start Date: 14-Jun-2021   NPO Start Time: 23:59  Entered: Jun 14 2021  2:40PM    
This patient has been assessed with a concern for Malnutrition and has been determined to have a diagnosis/diagnoses of Severe protein-calorie malnutrition.    This patient is being managed with:   Diet Consistent Carbohydrate Full Liquid-  Entered: Jun 17 2021  8:01AM    
This patient has been assessed with a concern for Malnutrition and has been determined to have a diagnosis/diagnoses of Severe protein-calorie malnutrition.    This patient is being managed with:   Diet NPO with Tube Feed-  Tube Feeding Modality: Orogastric  Nepro (NEPRORTH)  Total Volume for 24 Hours (mL): 480  Continuous  Starting Tube Feed Rate {mL per Hour}: 10  Increase Tube Feed Rate by (mL): 10     Every 4 hours  Until Goal Tube Feed Rate (mL per Hour): 20  Tube Feed Duration (in Hours): 24  Tube Feed Start Time: 11:15  Entered: Jun 26 2021 11:14AM    
This patient has been assessed with a concern for Malnutrition and has been determined to have a diagnosis/diagnoses of Severe protein-calorie malnutrition.    This patient is being managed with:   Diet DASH/TLC-  Sodium & Cholesterol Restricted  Consistent Carbohydrate {No Snacks} (CSTCHO)  For patients receiving Renal Replacement - No Protein Restr No Conc K No Conc Phos Low  Sodium (RENAL)  Entered: Jul 4 2021  9:06AM    
This patient has been assessed with a concern for Malnutrition and has been determined to have a diagnosis/diagnoses of Severe protein-calorie malnutrition.    This patient is being managed with:   Diet DASH/TLC-  Sodium & Cholesterol Restricted  Consistent Carbohydrate {No Snacks} (CSTCHO)  For patients receiving Renal Replacement - No Protein Restr No Conc K No Conc Phos Low  Sodium (RENAL)  Entered: Jul 4 2021  9:06AM    
This patient has been assessed with a concern for Malnutrition and has been determined to have a diagnosis/diagnoses of Severe protein-calorie malnutrition.    This patient is being managed with:   Diet DASH/TLC-  Sodium & Cholesterol Restricted  Consistent Carbohydrate {No Snacks} (CSTCHO)  Supplement Feeding Modality:  Oral  Ensure Enlive Cans or Servings Per Day:  3       Frequency:  Three Times a day     Special Instructions for Nursing:  Sodium & Cholesterol Restricted  Entered: Jun 14 2021  3:48PM    Diet NPO after Midnight-     NPO Start Date: 14-Jun-2021   NPO Start Time: 23:59  Entered: Jun 14 2021  2:40PM    
This patient has been assessed with a concern for Malnutrition and has been determined to have a diagnosis/diagnoses of Severe protein-calorie malnutrition.    This patient is being managed with:   Diet DASH/TLC-  Sodium & Cholesterol Restricted  Consistent Carbohydrate {No Snacks} (CSTCHO)  For patients receiving Renal Replacement - No Protein Restr No Conc K No Conc Phos Low  Sodium (RENAL)  Entered: Jul 4 2021  9:06AM    
This patient has been assessed with a concern for Malnutrition and has been determined to have a diagnosis/diagnoses of Severe protein-calorie malnutrition.    This patient is being managed with:   Current Diet: Diet, DASH/TLC:   Sodium & Cholesterol Restricted  Consistent Carbohydrate {No Snacks} (CSTCHO)  For patients receiving Renal Replacement - No Protein Restr, No Conc K, No Conc Phos, Low  Sodium (RENAL) (07-04-21 @ 09:06)    Recommendations:   1. RX: Nepro BID  2. Continue with Nephro-melissa daily  3. Monitor daily wts    Last Updated: 05-Jul-2021 10:53 by Lissett Pérez (Registered Dietitian)
This patient has been assessed with a concern for Malnutrition and has been determined to have a diagnosis/diagnoses of Severe protein-calorie malnutrition.    This patient is being managed with:   Diet DASH/TLC-  Sodium & Cholesterol Restricted  Consistent Carbohydrate {No Snacks} (CSTCHO)  For patients receiving Renal Replacement - No Protein Restr No Conc K No Conc Phos Low  Sodium (RENAL)  Entered: Jul 4 2021  9:06AM    
This patient has been assessed with a concern for Malnutrition and has been determined to have a diagnosis/diagnoses of Severe protein-calorie malnutrition.    This patient is being managed with:   Diet NPO-  Entered: Jun 29 2021 12:08PM    
This patient has been assessed with a concern for Malnutrition and has been determined to have a diagnosis/diagnoses of Severe protein-calorie malnutrition.    This patient is being managed with:   Diet Consistent Carbohydrate Clear Liquid-  Entered: Jun 16 2021  4:52PM    
This patient has been assessed with a concern for Malnutrition and has been determined to have a diagnosis/diagnoses of Severe protein-calorie malnutrition.    This patient is being managed with:   Diet NPO with Tube Feed-  Tube Feeding Modality: Orogastric  Nepro (NEPRORTH)  Total Volume for 24 Hours (mL): 480  Continuous  Starting Tube Feed Rate {mL per Hour}: 10  Increase Tube Feed Rate by (mL): 10     Every 4 hours  Until Goal Tube Feed Rate (mL per Hour): 20  Tube Feed Duration (in Hours): 24  Tube Feed Start Time: 11:15  Entered: Jun 26 2021 11:14AM    
This patient has been assessed with a concern for Malnutrition and has been determined to have a diagnosis/diagnoses of Severe protein-calorie malnutrition.    This patient is being managed with:   Diet DASH/TLC-  Sodium & Cholesterol Restricted  Consistent Carbohydrate {No Snacks} (CSTCHO)  For patients receiving Renal Replacement - No Protein Restr No Conc K No Conc Phos Low  Sodium (RENAL)  Entered: Jul 4 2021  9:06AM    
This patient has been assessed with a concern for Malnutrition and has been determined to have a diagnosis/diagnoses of Severe protein-calorie malnutrition.    This patient is being managed with:   Diet Consistent Carbohydrate Clear Liquid-  Entered: Jun 16 2021  4:52PM    
This patient has been assessed with a concern for Malnutrition and has been determined to have a diagnosis/diagnoses of Severe protein-calorie malnutrition.    This patient is being managed with:   Diet NPO-  Entered: Jun 23 2021  1:59PM    
This patient has been assessed with a concern for Malnutrition and has been determined to have a diagnosis/diagnoses of Severe protein-calorie malnutrition.    This patient is being managed with:   Diet Consistent Carbohydrate Full Liquid-  Entered: Jun 17 2021  8:01AM    
This patient has been assessed with a concern for Malnutrition and has been determined to have a diagnosis/diagnoses of Severe protein-calorie malnutrition.    This patient is being managed with:   Diet NPO-  Entered: Jun 23 2021  1:59PM    
This patient has been assessed with a concern for Malnutrition and has been determined to have a diagnosis/diagnoses of Severe protein-calorie malnutrition.    This patient is being managed with:   Diet Consistent Carbohydrate Clear Liquid-  Entered: Jun 16 2021  4:52PM    
This patient has been assessed with a concern for Malnutrition and has been determined to have a diagnosis/diagnoses of Severe protein-calorie malnutrition.    This patient is being managed with:   Diet NPO-  NPO for Procedure/Test     NPO Start Date: 22-Jun-2021   NPO Start Time: 23:59  Except Medications  Entered: Jun 21 2021  7:48PM    Diet Consistent Carbohydrate/No Snacks-  DASH/TLC {Sodium & Cholesterol Restricted} (DASH)  Entered: Jun 21 2021  6:28PM    
This patient has been assessed with a concern for Malnutrition and has been determined to have a diagnosis/diagnoses of Severe protein-calorie malnutrition.    This patient is being managed with:   Diet NPO-  Entered: Jun 23 2021  1:59PM    
This patient has been assessed with a concern for Malnutrition and has been determined to have a diagnosis/diagnoses of Severe protein-calorie malnutrition.    This patient is being managed with:   Diet Dysphagia 2 Mechanical Soft-Honey Consistency Fluid-  DASH/TLC {Sodium & Cholesterol Restricted} (DASH)  For patients receiving Renal Replacement - No Protein Restr No Conc K No Conc Phos Low  Sodium (RENAL)  Entered: Jul 1 2021  3:10PM    
This patient has been assessed with a concern for Malnutrition and has been determined to have a diagnosis/diagnoses of Severe protein-calorie malnutrition.    This patient is being managed with:   Diet NPO-  Tube Feeding Modality: Nasogastric  Nepro (NEPRORTH)  Total Volume for 24 Hours (mL): 960  Continuous  Starting Tube Feed Rate {mL per Hour}: 20  Until Goal Tube Feed Rate (mL per Hour): 40  Tube Feed Duration (in Hours): 24  Tube Feed Start Time: 18:00  Entered: Jun 30 2021  5:22PM    
This patient has been assessed with a concern for Malnutrition and has been determined to have a diagnosis/diagnoses of Severe protein-calorie malnutrition.    This patient is being managed with:   Diet NPO after Midnight-     NPO Start Date: 01-Jul-2021   NPO Start Time: 23:59  Entered: Jul 1 2021  5:07PM    Diet Dysphagia 2 Mechanical Soft-Honey Consistency Fluid-  DASH/TLC {Sodium & Cholesterol Restricted} (DASH)  For patients receiving Renal Replacement - No Protein Restr No Conc K No Conc Phos Low  Sodium (RENAL)  Entered: Jul 1 2021  3:10PM    
This patient has been assessed with a concern for Malnutrition and has been determined to have a diagnosis/diagnoses of Severe protein-calorie malnutrition.    This patient is being managed with:   Diet NPO with Tube Feed-  Tube Feeding Modality: Orogastric  Nepro (NEPRORTH)  Total Volume for 24 Hours (mL): 480  Continuous  Starting Tube Feed Rate {mL per Hour}: 10  Increase Tube Feed Rate by (mL): 10     Every 4 hours  Until Goal Tube Feed Rate (mL per Hour): 20  Tube Feed Duration (in Hours): 24  Tube Feed Start Time: 11:15  Entered: Jun 26 2021 11:14AM    
This patient has been assessed with a concern for Malnutrition and has been determined to have a diagnosis/diagnoses of Severe protein-calorie malnutrition.    This patient is being managed with:   Diet Consistent Carbohydrate Clear Liquid-  Entered: Jun 16 2021  4:52PM

## 2021-07-10 NOTE — PROGRESS NOTE ADULT - PROBLEM SELECTOR PLAN 5
Initially RONNELL on CKD  Now progressed to ESRD requiring HD  Left IJ HD catheter placed 6/16.   RUE has pink band for preservation in anticipation for eventual AVF  Renal team following.  Plan for addition HD 6/22 prior to OR
Cardiac cath 6/18 without significant CAD  Continue Statin, ASA on hold with thrombocytopenia  Unable to start BB at this time as patient requiring midodrine to maintain BP  Cardiology following
Continue supportive measures.   Continue Statin for now.   Unable to start BB at this time as patient requiring midodrine to maintain BP.
Cardiac cath 6/18 without significant CAD  Continue Statin, ASA   Cardiology following
Cardiac cath 6/18 without significant CAD  Continue Statin, ASA on hold with thrombocytopenia  Unable to start BB at this time as patient requiring midodrine to maintain BP  Cardiology following
Cardiac cath 6/18 without significant CAD.  Continue ASA, Statin, and Coreg.  Cardiology following.
Continue supportive measures.   Continue ASA and Statin for now.   Trend LFTs, may need to d/c statin.   Unable to start BB at this time as patient requiring midodrine to maintain BP.
Continue supportive measures.   Continue Statin for now.   Unable to start BB at this time as patient requiring midodrine to maintain BP.
Initially RONNELL on CKD  Now progressed to ESRD requiring HD  Left IJ HD catheter placed 6/16.   RUE has pink band for preservation in anticipation for eventual AVF  Renal team following.  Plan for HD later today
Continue supportive measures.   Continue ASA and Statin for now.   Trend LFTs, may need to d/c statin.   Unable to start BB at this time as patient requiring midodrine to maintain BP.
Continue supportive measures.   Continue Statin for now.   Unable to start BB at this time as patient requiring midodrine to maintain BP.
Likely post capillary/Group 2  Mgmt as above  Reassess once euvolemic
Cardiac cath 6/18 without significant CAD  Continue Statin, ASA   Cardiology following
Cardiac cath 6/18 without significant CAD  Continue Statin, ASA on hold with thrombocytopenia  Unable to start BB at this time as patient requiring midodrine to maintain BP  Cardiology following
Cardiac cath 6/18 without significant CAD  Continue Statin, ASA   Cardiology following  On Coreg
Cardiac cath 6/18 without significant CAD  Continue Statin, ASA   Unable to start BB at this time as patient requiring midodrine to maintain BP  Cardiology following
Cardiac cath 6/18 without significant CAD.  Continue ASA, Statin, and Coreg.  Cardiology following.
Cardiac cath 6/18 without significant CAD  Continue Statin, ASA   Cardiology following  On Coreg
Continue supportive measures.   Continue Statin for now.   Unable to start BB at this time as patient requiring midodrine to maintain BP.
Cardiac cath 6/18 without significant CAD  Continue Statin, ASA on hold as per Dr. Campbell  Unable to start BB at this time as patient requiring midodrine to maintain BP  Cardiology following
Continue supportive measures.   Continue ASA and Statin for now.   Unable to start BB at this time as patient requiring midodrine to maintain BP.
Cardiac cath 6/18 without significant CAD  Continue Statin, ASA on hold with thrombocytopenia  Unable to start BB at this time as patient requiring midodrine to maintain BP  Cardiology following
Continue supportive measures.   Continue ASA and Statin for now.   Trend LFTs, may need to d/c statin.   Unable to start BB at this time as patient requiring midodrine to maintain BP.
Cardiac cath 6/18 without significant CAD  Continue Statin, ASA on hold with thrombocytopenia  Unable to start BB at this time as patient requiring midodrine to maintain BP  Cardiology following
Continue supportive measures.   Continue ASA and Statin for now.   Trend LFTs, may need to d/c statin.   Unable to start BB at this time as patient requiring midodrine to maintain BP.
Initially RONNELL on CKD  Now progressed to ESRD requiring HD  Left IJ HD catheter placed 6/16.   RUE has pink band for preservation in anticipation for eventual AVF  Renal team following.  No current plan for HD, may require CVVHD if worsening lytes or acidosis
Cardiac cath 6/18 without significant CAD  Continue Statin, ASA   Start BB when appropriate  Cardiology following
Cardiac cath 6/18 without significant CAD  Continue Statin, ASA   Cardiology following

## 2021-07-10 NOTE — PROGRESS NOTE ADULT - PROBLEM SELECTOR PROBLEM 10
WILLEM (obstructive sleep apnea)
Prophylactic measure
WILLEM (obstructive sleep apnea)
Prophylactic measure
WILLEM (obstructive sleep apnea)
Prophylactic measure
WILLEM (obstructive sleep apnea)
Prophylactic measure
Prophylactic measure
WILLEM (obstructive sleep apnea)
Prophylactic measure
Prophylactic measure
WILLEM (obstructive sleep apnea)

## 2021-07-10 NOTE — PROGRESS NOTE ADULT - ASSESSMENT
76M with a PMH of MI in 1995 (angiogram, no stents placed), COPD (2L O2 at home), severe AS s/p TAVR (03/2019 at [facility]), gout, CKD, CVA (no deficits, 09/2020), pulmonary HTN, initially presented to [facility] 6/1/21 with RONNELL on CKD, hospital course significant for cardiogenic shock, acute hypoxemic respiratory failure, and acute on chronic combined diastolic and systolic heart failure. ISRAEL revealed severe AI. Patient was transferred to [facility] under Dr. Campbell for further workup of bioprosthetic AI.   Patient is now s/p valve in valve TAVR on 6/23/21 with Dr. Campbell. On 6/28 patient underwent BAL, and ultimately extubated 6/29. Postoperative course significant for Acute on chronic combined diastolic and systolic heart failure, RONNELL on CKD progressed to ESRD requiring HD via RIJ tunneled permacath, Unintentional Weight loss / Dysphagia / Anorexia work up revealing duodenitis and diffuse erosive esophagitis, gastritis on EGD (biopsies negative for H. Pylori or carcinoma), and colonoscopy showing diverticulosis. 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 ID following patient throughout hospital stay. No indications for continued IV abx therapy as patient has remained afebrile, nontoxic, with blood cultures negative since arrival. Hospital course also significant for auto-elevated INR (followed by Hematology), R/O WILLEM (Pulmonology recommending outpatient sleep study), Adjustment disorder (evaluated by Behavioral Health), and thrombocytopenia (resolved).    During this hospitalization pt noted to have sustained VT >200bpm requiring amiodarone therapy and is now s/p BSCI S-ICD implant for secondary prevention of SCD.    Plan for pt to be discharged to [facility] later today 7/10 if medically stable.    As per Dr Campbell no MCOT device necessary upon discharge, secondary to pt being on telemetry for 2 weeks with no conduction issues,  as well as low incidence of risk for conduction complications with a TAVR valve in valve.    Plan for patient to follow up for an outpatient sleep study, an outpatient dental evaluation, and follow up with GI given recent workup.

## 2021-07-10 NOTE — PROGRESS NOTE ADULT - PROBLEM SELECTOR PROBLEM 2
H/O aortic valve stenosis
Nonrheumatic aortic valve insufficiency
Elevated LFTs
Endocarditis of aortic valve
Endocarditis of aortic valve
Nonrheumatic aortic valve insufficiency
Nonrheumatic aortic valve insufficiency
Endocarditis of aortic valve
H/O aortic valve stenosis
Endocarditis of aortic valve
Erosive gastritis
ESRD (end stage renal disease) on dialysis
Endocarditis of aortic valve
Nonrheumatic aortic valve insufficiency
Nonrheumatic aortic valve insufficiency
H/O aortic valve stenosis
Endocarditis of aortic valve
H/O aortic valve stenosis
Endocarditis of aortic valve
Endocarditis of aortic valve
H/O aortic valve stenosis
Endocarditis of aortic valve
H/O aortic valve stenosis
Endocarditis of aortic valve
Endocarditis of aortic valve
H/O aortic valve stenosis
Endocarditis of aortic valve
H/O aortic valve stenosis
Endocarditis of aortic valve
H/O aortic valve stenosis
Endocarditis of aortic valve

## 2021-07-10 NOTE — PROGRESS NOTE ADULT - SUBJECTIVE AND OBJECTIVE BOX
POD # 17 s/p transfemoral valve-in-valve TAVR via LCFA cutdown ([place]) using [place] [employer]    Patient is a 76y old Male who presents with a chief complaint of TAVR Failure (08 Jul 2021 22:16)    HPI:  76 year old male patient with a medical history of MI in 1995 (angiogram, no stents placed), COPD (2L O2 at home), s/p TAVR (03/2019 at [facility]), gout, CKD, CVA (09/2020), pulmonary HTN, initially presented to [facility] 6/1/21 with RONNELL on CKD (likely cardiorenal syndrome), urinary retention due to noncompliance with medications, with hospital course complicated by cardiogenic shock, acute hypoxemic respiratory failure secondary to metabolic acidosis and respiratory alkalosis. Patient found to have TAVR failure with ISRAEL 6/10/21 showing EF 40-45%, Aortic valve prosthesis with Severe paravalvular leak and valvular AI, mild-moderate aortic stenosis, and moderate MR. Patient was transferred to [facility] under Dr. Campbell for further workup.     Imaging from [facility]:  6/1: CXR shwoing cardiomegaly, small b/l pleural effusions, moderate pulmonary vascular congestion.  6/1: CT Brain ordered for head trauma? showing age-related cerebral and cerebellar volume loss, mild chronic vascular ischemic changes, stable biparietal arachnoid cyst and stable midline posterior fossa arachnoid cyst.   6/1: US Abdomen for elevated LFTs showing mild hepatic steatosis, mild hepatomegaly, sludge in the gallbladder, no discrete gallstones, normal biliary ducts, mild echogenic right kidney which may be seen with medical renal dz, mildly complex Right upper pole 4cm cyst with subtle internal echoes may represent hemorrhagic/proteinaceous cyst, mildly complex right midpole 2cm cyst with thin internal linear septation.   6/3: Kidney and Bladder US showing no hydronephrosis, echogenic kidneys likely from medical renal dz, prostatomegaly, and thickening of the posterior wall of the bladder with trabeculations which could be due to bladder outlet obstruction.   6/7: Kidney and Bladder US: No hydronephrosis or shadowing renal calculi. Stable b/l renal cysts.   6/7: Lower Extremity Venous Doppler with no DVT noted.   6/8: TTE showing EF 47%, dilated IVC, dilation of the ascending aorta of 4.4cm, moderate-severe pulmonary HTN, moderate-severe TR, aortic valve with severe prosthesis regurgitation and moderate prosthesis stenosis  6/9: CT Chest showing emphysema and intersitial lung dz changes, stable b/l small pleural effusions, cardiomegaly.   6/11: ISRAEL showing EF 40-45%, Aortic valve prosthesis with Severe paravalvular leak and valvular AI, mild-moderate aortic stenosis, and moderate MR.  (12 Jun 2021 01:36)    PAST MEDICAL & SURGICAL HISTORY:  Pulmonary hypertension  Hypertension  Hyperlipidemia  H/O aortic valve stenosis, s/p TAVR 2019 at [place]  CVA (cerebrovascular accident), [facility] CVA with tPA and thrombectomy  Chronic kidney disease  Prediabetes  Mitral valve regurgitation  CAD in native artery  Aneurysm of aortic root, thoracic aortic aneurysm, without rupture  Benign prostatic hyperplasia  Gout  History of transcatheter aortic valve replacement (TAVR)    FAMILY HISTORY:  FH: lung cancer    Significant recent/past 24 hr events: Patient returned from EP lab yesterday s/p BSCI S-ICD implant. DFT deferred, 10J shock >67 ohm shock impedance. Pt in AF post shock requiring 360J DCCV, currently in sinus rhythm. VT zone 180bpm/VF zone 230bpm. No overnight events reported.     Subjective: Patient lying in bed in no acute distress. "I'm doing fine." Denies fevers, chills, lightheadedness, dizziness, HA, CP, palpitations, SOB, cough, abdominal pain, N/V, diarrhea, numbness/tingling in extremities, or any other acute complaints.    MEDICATIONS  (STANDING):  aMIOdarone    Tablet 200 milliGRAM(s) Oral daily  ascorbic acid 500 milliGRAM(s) Oral daily  aspirin  chewable 81 milliGRAM(s) Oral daily  atorvastatin 10 milliGRAM(s) Oral at bedtime  budesonide 160 MICROgram(s)/formoterol 4.5 MICROgram(s) Inhaler 2 Puff(s) Inhalation two times a day  cadexomer iodine 0.9% Gel 1 Application(s) Topical two times a day  carvedilol 3.125 milliGRAM(s) Oral every 12 hours  ceFAZolin   IVPB 1000 milliGRAM(s) IV Intermittent every 12 hours  chlorhexidine 4% Liquid 1 Application(s) Topical <User Schedule>  epoetin yolanda-epbx (RETACRIT) Injectable 10462 Unit(s) IV Push <User Schedule>  folic acid 1 milliGRAM(s) Oral daily  iron sucrose IVPB 50 milliGRAM(s) IV Intermittent <User Schedule>  megestrol Suspension 400 milliGRAM(s) Oral daily  melatonin 5 milliGRAM(s) Oral at bedtime  mirtazapine 15 milliGRAM(s) Oral at bedtime  Nephro-melissa 1 Tablet(s) Oral daily  pantoprazole    Tablet 40 milliGRAM(s) Oral every 12 hours  psyllium Powder 1 Packet(s) Oral two times a day  senna 2 Tablet(s) Oral at bedtime  tamsulosin 0.4 milliGRAM(s) Oral at bedtime    MEDICATIONS  (PRN):  acetaminophen   Tablet .. 650 milliGRAM(s) Oral every 6 hours PRN Mild Pain (1 - 3)  acetaminophen   Tablet .. 650 milliGRAM(s) Oral every 6 hours PRN Mild Pain (1 - 3)  ALBUTerol    90 MICROgram(s) HFA Inhaler 2 Puff(s) Inhalation every 6 hours PRN Shortness of Breath and/or Wheezing  oxycodone    5 mG/acetaminophen 325 mG 1 Tablet(s) Oral every 6 hours PRN Moderate Pain (4 - 6)  sodium chloride 0.9% lock flush 10 milliLiter(s) IV Push every 1 hour PRN Pre/post blood products, medications, blood draw, and to maintain line patency  sodium chloride 0.9% lock flush 10 milliLiter(s) IV Push every 1 hour PRN Pre/post blood products, medications, blood draw, and to maintain line patency    Allergies:  No Known Drug Allergies  strawberry (Anaphylaxis)    Vitals   T(C): 36.3 (10 Jul 2021 00:00), Max: 36.8 (09 Jul 2021 05:54)  T(F): 97.3 (10 Jul 2021 00:00), Max: 98.2 (09 Jul 2021 05:54)  HR: 63 (10 Jul 2021 00:00) (54 - 69)  BP: 107/64 (10 Jul 2021 00:00) (106/66 - 148/68)  BP(mean): 74 (10 Jul 2021 00:00) (74 - 83)  ABP: 144/68 (09 Jul 2021 16:12) (128/78 - 152/72)  RR: 18 (10 Jul 2021 00:00) (15 - 18)  SpO2: 100% (10 Jul 2021 00:00) (95% - 100%)    I&O's Detail    08 Jul 2021 07:01  -  09 Jul 2021 07:00  --------------------------------------------------------  IN:    Oral Fluid: 500 mL  Total IN: 500 mL    OUT:    Other (mL): 2500 mL    Voided (mL): 300 mL  Total OUT: 2800 mL    Total NET: -2300 mL      09 Jul 2021 07:01  -  10 Jul 2021 01:48  --------------------------------------------------------  IN:  Total IN: 0 mL    OUT:    Other (mL): 1500 mL  Total OUT: 1500 mL    Total NET: -1500 mL    Physical Exam  General: Lying in bed in no acute distress  Neuro: A+O x 3, non-focal, speech clear and intact  HEENT:  NCAT, PERRL, EOMI. No conjuctival edema or icterus, no thrush.    Neck:  Supple, trachea midline  RIJ tunneled permacath with dressing C/D/I, LIJ TLC with dressing C/D/I  Pulm: +Decreased BSs b/l with scattered crackles b/l, no accessory muscle use noted  CV: regular rate, regular rhythm, +S1S2  Abd: soft, NT, ND, + BS  Ext: CRONIN x 4, +1-2 LE pitting edema b/l, no cyanosis or clubbing, distal motor/neuro/circ intact  Left radial a-line with dressing C/D/I  Skin: warm, dry, perfused  Incisions:  Left groin cutdown site C/D/I, no hematoma noted    LABS                        10.0   7.28  )-----------( 217      ( 09 Jul 2021 06:17 )             32.4     07-09    139  |  101  |  25.4<H>  ----------------------------<  83  4.2   |  25.0  |  4.14<H>    Ca    9.2      09 Jul 2021 06:17  Mg     1.8     07-09    TPro  6.3<L>  /  Alb  3.0<L>  /  TBili  1.3  /  DBili  x   /  AST  53<H>  /  ALT  42<H>  /  AlkPhos  97  07-08      Last CXR:  < from: Xray Chest 1 View- PORTABLE-Urgent (Xray Chest 1 View- PORTABLE-Urgent .) (07.09.21 @ 07:34) >  Two expiratory/kyphotic views are compared to 7/8/2021 and demonstrate a right-sided double-lumen catheter with the tip at the junction of the SVC and right atrium and no pneumothorax, unchanged.  Mild to moderate cardiomegaly, unchanged.  Mild pulmonary venous congestion/possible perihilar interstitial infiltrates, improved.  Opacity at the left base which obscures the hemidiaphragm consistent with atelectasis/consolidation/effusion, mostly unchanged.  Post TAVR. No acute osseous findings.  IMPRESSION:  Mild pulmonary venous congestion/possible perihilar interstitial infiltrates, improved.  Atelectasis/effusion/possible consolidation at the left base, unchanged.  < end of copied text >

## 2021-07-10 NOTE — PROGRESS NOTE ADULT - PROBLEM SELECTOR PROBLEM 3
Ventricular arrhythmia
Pulmonary hypertension
Erosive gastritis
Mitral valve regurgitation
Thrombocytopenia
Thrombocytopenia
Pulmonary hypertension
Thrombocytopenia
Mitral valve regurgitation
Mitral valve regurgitation
Pulmonary hypertension
Thrombocytopenia
Mitral valve regurgitation
Pulmonary hypertension
Mitral valve regurgitation
Pulmonary hypertension
Acute on chronic combined systolic and diastolic CHF, NYHA class 3
Mitral valve regurgitation
Thrombocytopenia
Thrombocytopenia
Acute on chronic combined systolic and diastolic CHF, NYHA class 3
Thrombocytopenia
Mitral valve regurgitation
Thrombocytopenia
Mitral valve regurgitation
Mitral valve regurgitation
Thrombocytopenia
Thrombocytopenia
Acute on chronic combined systolic and diastolic CHF, NYHA class 3
Thrombocytopenia

## 2021-07-10 NOTE — PROGRESS NOTE ADULT - PROBLEM SELECTOR PROBLEM 9
Prediabetes
INR (international normal ratio) abnormal
Prediabetes
Prediabetes
INR (international normal ratio) abnormal
Prediabetes
INR (international normal ratio) abnormal
INR (international normal ratio) abnormal
Prediabetes
INR (international normal ratio) abnormal
Prediabetes
INR (international normal ratio) abnormal
Prediabetes
INR (international normal ratio) abnormal
Prediabetes
INR (international normal ratio) abnormal
Prediabetes
INR (international normal ratio) abnormal

## 2021-07-10 NOTE — PROGRESS NOTE ADULT - PROBLEM SELECTOR PROBLEM 8
CVA (cerebrovascular accident)

## 2021-07-10 NOTE — PROGRESS NOTE ADULT - PROBLEM SELECTOR PROBLEM 7
Benign prostatic hyperplasia with urinary retention

## 2021-07-11 ENCOUNTER — TRANSCRIPTION ENCOUNTER (OUTPATIENT)
Age: 76
End: 2021-07-11

## 2021-07-12 ENCOUNTER — TRANSCRIPTION ENCOUNTER (OUTPATIENT)
Age: 76
End: 2021-07-12

## 2021-07-13 ENCOUNTER — TRANSCRIPTION ENCOUNTER (OUTPATIENT)
Age: 76
End: 2021-07-13

## 2021-07-14 ENCOUNTER — TRANSCRIPTION ENCOUNTER (OUTPATIENT)
Age: 76
End: 2021-07-14

## 2021-07-21 ENCOUNTER — TRANSCRIPTION ENCOUNTER (OUTPATIENT)
Age: 76
End: 2021-07-21

## 2021-07-26 ENCOUNTER — APPOINTMENT (OUTPATIENT)
Dept: ELECTROPHYSIOLOGY | Facility: CLINIC | Age: 76
End: 2021-07-26

## 2021-07-26 DIAGNOSIS — J44.9 CHRONIC OBSTRUCTIVE PULMONARY DISEASE, UNSPECIFIED: ICD-10-CM

## 2021-07-28 ENCOUNTER — TRANSCRIPTION ENCOUNTER (OUTPATIENT)
Age: 76
End: 2021-07-28

## 2021-08-04 ENCOUNTER — TRANSCRIPTION ENCOUNTER (OUTPATIENT)
Age: 76
End: 2021-08-04

## 2021-08-09 ENCOUNTER — TRANSCRIPTION ENCOUNTER (OUTPATIENT)
Age: 76
End: 2021-08-09

## 2021-08-10 DIAGNOSIS — I07.1 RHEUMATIC TRICUSPID INSUFFICIENCY: ICD-10-CM

## 2021-08-10 DIAGNOSIS — Z87.448 PERSONAL HISTORY OF OTHER DISEASES OF URINARY SYSTEM: ICD-10-CM

## 2021-08-10 DIAGNOSIS — I35.0 NONRHEUMATIC AORTIC (VALVE) STENOSIS: ICD-10-CM

## 2021-08-10 DIAGNOSIS — I27.20 PULMONARY HYPERTENSION, UNSPECIFIED: ICD-10-CM

## 2021-08-10 DIAGNOSIS — Z86.73 PERSONAL HISTORY OF TRANSIENT ISCHEMIC ATTACK (TIA), AND CEREBRAL INFARCTION W/OUT RESIDUAL DEFICITS: ICD-10-CM

## 2021-08-12 ENCOUNTER — APPOINTMENT (OUTPATIENT)
Dept: CARDIOTHORACIC SURGERY | Facility: CLINIC | Age: 76
End: 2021-08-12
Payer: MEDICARE

## 2021-08-12 ENCOUNTER — OUTPATIENT (OUTPATIENT)
Dept: OUTPATIENT SERVICES | Facility: HOSPITAL | Age: 76
LOS: 1 days | End: 2021-08-12
Payer: COMMERCIAL

## 2021-08-12 VITALS
SYSTOLIC BLOOD PRESSURE: 130 MMHG | OXYGEN SATURATION: 97 % | HEART RATE: 71 BPM | HEIGHT: 72 IN | TEMPERATURE: 98 F | BODY MASS INDEX: 29.8 KG/M2 | WEIGHT: 220 LBS | DIASTOLIC BLOOD PRESSURE: 75 MMHG | RESPIRATION RATE: 18 BRPM

## 2021-08-12 DIAGNOSIS — Z95.2 PRESENCE OF PROSTHETIC HEART VALVE: ICD-10-CM

## 2021-08-12 DIAGNOSIS — T82.03XA LEAKAGE OF HEART VALVE PROSTHESIS, INITIAL ENCOUNTER: ICD-10-CM

## 2021-08-12 DIAGNOSIS — Z95.2 PRESENCE OF PROSTHETIC HEART VALVE: Chronic | ICD-10-CM

## 2021-08-12 DIAGNOSIS — N18.6 END STAGE RENAL DISEASE: ICD-10-CM

## 2021-08-12 PROCEDURE — 93306 TTE W/DOPPLER COMPLETE: CPT | Mod: 26

## 2021-08-12 PROCEDURE — 71046 X-RAY EXAM CHEST 2 VIEWS: CPT

## 2021-08-12 PROCEDURE — 71046 X-RAY EXAM CHEST 2 VIEWS: CPT | Mod: 26

## 2021-08-12 PROCEDURE — 93306 TTE W/DOPPLER COMPLETE: CPT

## 2021-08-12 PROCEDURE — 99024 POSTOP FOLLOW-UP VISIT: CPT

## 2021-12-07 ENCOUNTER — NON-APPOINTMENT (OUTPATIENT)
Age: 76
End: 2021-12-07

## 2021-12-17 PROBLEM — J44.9 COPD, MODERATE: Status: RESOLVED | Noted: 2021-12-17 | Resolved: 2021-12-17

## 2021-12-17 RX ORDER — ASPIRIN 81 MG
81 TABLET, DELAYED RELEASE (ENTERIC COATED) ORAL DAILY
Refills: 0 | Status: ACTIVE | COMMUNITY

## 2021-12-17 RX ORDER — ATORVASTATIN CALCIUM 10 MG/1
10 TABLET, FILM COATED ORAL
Qty: 60 | Refills: 3 | Status: ACTIVE | COMMUNITY

## 2021-12-17 RX ORDER — MULTIVITAMIN
TABLET ORAL
Refills: 0 | Status: ACTIVE | COMMUNITY

## 2021-12-20 ENCOUNTER — APPOINTMENT (OUTPATIENT)
Dept: ELECTROPHYSIOLOGY | Facility: CLINIC | Age: 76
End: 2021-12-20
Payer: MEDICARE

## 2021-12-20 VITALS
HEART RATE: 61 BPM | OXYGEN SATURATION: 93 % | RESPIRATION RATE: 19 BRPM | BODY MASS INDEX: 29.8 KG/M2 | HEIGHT: 72 IN | WEIGHT: 220 LBS | TEMPERATURE: 97.4 F | DIASTOLIC BLOOD PRESSURE: 89 MMHG | SYSTOLIC BLOOD PRESSURE: 181 MMHG

## 2021-12-20 VITALS — DIASTOLIC BLOOD PRESSURE: 88 MMHG | SYSTOLIC BLOOD PRESSURE: 167 MMHG

## 2021-12-20 PROCEDURE — 99215 OFFICE O/P EST HI 40 MIN: CPT

## 2021-12-20 PROCEDURE — 93000 ELECTROCARDIOGRAM COMPLETE: CPT | Mod: 59

## 2021-12-20 PROCEDURE — 93246 EXT ECG>7D<15D RECORDING: CPT | Mod: 59

## 2021-12-20 PROCEDURE — 93261 INTERROGATE SUBQ DEFIB: CPT

## 2021-12-20 RX ORDER — MEGESTROL ACETATE 40 MG/ML
40 SUSPENSION ORAL
Refills: 0 | Status: DISCONTINUED | COMMUNITY
End: 2021-12-20

## 2021-12-20 RX ORDER — SENNOSIDES 8.6 MG TABLETS 8.6 MG/1
8.6 TABLET ORAL
Refills: 0 | Status: DISCONTINUED | COMMUNITY
End: 2021-12-20

## 2021-12-20 RX ORDER — AMIODARONE HYDROCHLORIDE 200 MG/1
200 TABLET ORAL
Qty: 90 | Refills: 0 | Status: DISCONTINUED | COMMUNITY
End: 2021-12-20

## 2021-12-20 RX ORDER — CHOLECALCIFEROL (VITAMIN D3)
CRYSTALS MISCELLANEOUS
Refills: 0 | Status: DISCONTINUED | COMMUNITY
End: 2021-12-20

## 2021-12-20 RX ORDER — FOLIC ACID 1 MG/1
1 TABLET ORAL
Refills: 0 | Status: DISCONTINUED | COMMUNITY
End: 2021-12-20

## 2021-12-20 NOTE — PROCEDURE
[No] : not [Sinus Bradycardia] : sinus bradycardia [ICD] : Implantable cardioverter-defibrillator [[employer]] : [facility]

## 2021-12-22 NOTE — END OF VISIT
[Time Spent: ___ minutes] : I have spent [unfilled] minutes of time on the encounter. [FreeTextEntry3] : seen and examined, agree with above. Patient lost to follow up since implant. Emphasized the importance of driving restrcition. This was discussed with the patient before as well. Will need to r/a after repeat TTE. Also need full HF/GDMT (will defer to his cardiologist). If continued to have symptomatic HFrEF with narrow QRS despite full therapy then may consider CCM if within GOC and risk of infection is acceptable

## 2021-12-22 NOTE — REVIEW OF SYSTEMS
[Fever] : no fever [Chills] : no chills [Feeling Fatigued] : not feeling fatigued [SOB] : no shortness of breath [Dyspnea on exertion] : not dyspnea during exertion [Chest Discomfort] : no chest discomfort [Leg Claudication] : no intermittent leg claudication [Palpitations] : no palpitations [Orthopnea] : no orthopnea [Syncope] : no syncope [Cough] : no cough [Abdominal Pain] : no abdominal pain [Dizziness] : no dizziness [Confusion] : no confusion was observed [Under Stress] : not under stress [Easy Bleeding] : no tendency for easy bleeding

## 2021-12-22 NOTE — PHYSICAL EXAM
[General Appearance - Well Developed] : well developed [General Appearance - Well Nourished] : well nourished [] : no respiratory distress [Clean] : clean [Dry] : dry [Well-Healed] : well-healed [Erythema] : not erythematous [Warm] : not warm [Tender] : not tender [Indurated] : not indurated [Fluctuant] : not fluctuant [FreeTextEntry1] : mid axillary and sub xiphoid

## 2021-12-22 NOTE — ASSESSMENT
[FreeTextEntry1] : 76 year old male patient with a medical history of MI in 1995 (angiogram, no stents placed), COPD (2L O2 at home), s/p TAVR (03/2019 at [facility]), gout, CKD, CVA (09/2020), pulmonary HTN, strep bovis bacteremia last year (2020) s/p 6 weeks of IV Rocephin at home who initially presented to [facility] 6/1/21 with RONNELL on CKD (likely cardiorenal syndrome), urinary retention due to noncompliance with medications, with hospital course complicated by cardiogenic shock, acute hypoxemic respiratory failure secondary to metabolic acidosis and respiratory alkalosis, hospital course significant for cardiogenic shock, acute hypoxemic respiratory failure, and acute on chronic combined diastolic and systolic heart failure. \par ISRAEL with severe AI and concerns for endocarditis. Patient was transferred to [facility] under Dr. Campbell and underwent Valve in Valve TAVR with Dr. Campbell on 6/23. Blood cultures have remained negative. During this hospitalization pt noted to have sustained VT >200bpm requiring amiodarone therapy s/p BSCI S-ICD implant for secondary prevention of SCD.\par \par Was discharged to rehab and never seen for post implant follow-up.  Saw primary cardiologist, Dr. Nikita Sanchez, one month ago for the first time since hospitalization in July. Coreg was increased to 6.25mg BID and Amiodarone was discontinued.  Planned for f/up with Dr. Sanchez and repeat TTE on 1/13/22. \par \par Overall doing well and denies CP, SOB, HERNANDEZ, dizziness, palpitations, syncope or presyncope.

## 2021-12-22 NOTE — HISTORY OF PRESENT ILLNESS
[de-identified] : 76 year old male patient with a medical history of MI in 1995 (angiogram, no stents placed), COPD (2L O2 at home), s/p TAVR (03/2019 at [facility]), gout, CKD, CVA (09/2020), pulmonary HTN, strep bovis bacteremia last year (2020) s/p 6 weeks of IV Rocephin at home who initially presented to [facility] 6/1/21 with RONNELL on CKD (likely cardiorenal syndrome), urinary retention due to noncompliance with medications, with hospital course complicated by cardiogenic shock, acute hypoxemic respiratory failure secondary to metabolic acidosis and respiratory alkalosis, hospital course significant for cardiogenic shock, acute hypoxemic respiratory failure, and acute on chronic combined diastolic and systolic heart failure. \par ISRAEL with severe AI and concerns for endocarditis. Patient was transferred to [facility] under Dr. Campbell and underwent Valve in Valve TAVR with Dr. Campbell on 6/23. Blood cultures have remained negative. During this hospitalization pt noted to have sustained VT >200bpm requiring amiodarone therapy s/p BSCI S-ICD implant for secondary prevention of SCD.\par \par Was discharged to rehab and never seen for post implant follow-up.  Saw primary cardiologist, Dr. Nikita Sanchez, one month ago for the first time since hospitalization in July. Coreg was increased to 6.25mg BID and Amiodarone was discontinued.  Planned for f/up with Dr. Sanchez and repeat TTE on 1/13/22. \par \par Overall doing well and denies CP, SOB, HERNANDEZ, dizziness, palpitations, syncope or presyncope. \par

## 2022-01-24 ENCOUNTER — APPOINTMENT (OUTPATIENT)
Dept: ELECTROPHYSIOLOGY | Facility: CLINIC | Age: 77
End: 2022-01-24
Payer: MEDICARE

## 2022-01-24 ENCOUNTER — NON-APPOINTMENT (OUTPATIENT)
Age: 77
End: 2022-01-24

## 2022-01-24 VITALS
HEART RATE: 61 BPM | OXYGEN SATURATION: 97 % | TEMPERATURE: 97.5 F | SYSTOLIC BLOOD PRESSURE: 151 MMHG | WEIGHT: 233 LBS | BODY MASS INDEX: 31.56 KG/M2 | HEIGHT: 72 IN | DIASTOLIC BLOOD PRESSURE: 79 MMHG

## 2022-01-24 DIAGNOSIS — I10 ESSENTIAL (PRIMARY) HYPERTENSION: ICD-10-CM

## 2022-01-24 PROCEDURE — 99214 OFFICE O/P EST MOD 30 MIN: CPT

## 2022-01-24 PROCEDURE — 93261 INTERROGATE SUBQ DEFIB: CPT

## 2022-01-24 PROCEDURE — 93000 ELECTROCARDIOGRAM COMPLETE: CPT | Mod: 59

## 2022-01-24 RX ORDER — EPOETIN ALFA 10000 [IU]/ML
10000 SOLUTION INTRAVENOUS; SUBCUTANEOUS
Refills: 0 | Status: DISCONTINUED | COMMUNITY
End: 2022-01-24

## 2022-01-24 RX ORDER — BUDESONIDE AND FORMOTEROL FUMARATE DIHYDRATE 160; 4.5 UG/1; UG/1
160-4.5 AEROSOL RESPIRATORY (INHALATION)
Refills: 0 | Status: DISCONTINUED | COMMUNITY
End: 2022-01-24

## 2022-01-24 RX ORDER — ACETAMINOPHEN 325 MG/1
325 TABLET ORAL
Refills: 0 | Status: DISCONTINUED | COMMUNITY
End: 2022-01-24

## 2022-01-24 RX ORDER — TAMSULOSIN HYDROCHLORIDE 0.4 MG/1
0.4 CAPSULE ORAL
Refills: 0 | Status: ACTIVE | COMMUNITY

## 2022-01-24 RX ORDER — CHLORHEXIDINE GLUCONATE 4 %
5 LIQUID (ML) TOPICAL
Refills: 0 | Status: DISCONTINUED | COMMUNITY
End: 2022-01-24

## 2022-01-24 RX ORDER — AMLODIPINE BESYLATE 5 MG/1
5 TABLET ORAL DAILY
Refills: 0 | Status: ACTIVE | COMMUNITY

## 2022-01-24 RX ORDER — ALBUTEROL SULFATE 90 UG/1
108 (90 BASE) AEROSOL, METERED RESPIRATORY (INHALATION)
Refills: 0 | Status: DISCONTINUED | COMMUNITY
End: 2022-01-24

## 2022-01-24 NOTE — HISTORY OF PRESENT ILLNESS
[de-identified] : 76 year old male patient with a medical history of MI in 1995 (angiogram, no stents placed), COPD (2L O2 at home), s/p TAVR (03/2019 at [facility]), gout, CKD, CVA (09/2020), pulmonary HTN, strep bovis bacteremia last year (2020) s/p 6 weeks of IV Rocephin at home who initially presented to [facility] 6/1/21 with RONNELL on CKD (likely cardiorenal syndrome), urinary retention due to noncompliance with medications, with hospital course complicated by cardiogenic shock, acute hypoxemic respiratory failure secondary to metabolic acidosis and respiratory alkalosis, and acute on chronic combined diastolic and systolic heart failure. \par \par [place] with severe AI and concerns for endocarditis. Patient was transferred to [facility] under Dr. Campbell and underwent Valve in Valve TAVR with Dr. Campbell on 6/23/21. Blood cultures have remained negative. During this hospitalization pt noted to have sustained VT >200bpm requiring amiodarone therapy s/p BSCI S-ICD implant for secondary prevention of SCD.\par \par Was discharged to rehab. Saw primary cardiologist, Dr. Nikita Sanchez, 11/21 for the first time since hospitalization in July. Coreg was increased to 6.25mg BID and Amiodarone was discontinued. Planned for f/up with Dr. Sanchez and repeat TTE on 1/13/22 which revealed EF improved to 50-55%. \par \par Today, S-ICD interrogation reveals no events. No detected AF. He reports he has been feeling well, getting stronger no longer utilizing walker for ambulation. Denies palpitations, lightheadedness, dizziness, near syncope, syncope. \par \par ECG reveals SB with prolonged first degree HB c/w history. \par MCOT worn 12/20/21-12/27/21 revealed SR with first degere AVB and BBB average HR 60 bpm with max 110 bpm. No AF, no VT. \par \par \par  \par

## 2022-01-24 NOTE — REVIEW OF SYSTEMS
[Headache] : no headache [Feeling Fatigued] : not feeling fatigued [SOB] : no shortness of breath [Dyspnea on exertion] : not dyspnea during exertion [Chest Discomfort] : no chest discomfort [Lower Ext Edema] : no extremity edema [Palpitations] : no palpitations [Orthopnea] : no orthopnea [Syncope] : no syncope [Dizziness] : no dizziness [Easy Bleeding] : no tendency for easy bleeding

## 2022-01-24 NOTE — DISCUSSION/SUMMARY
[FreeTextEntry1] : 76 year old male patient with a medical history of MI in 1995 (angiogram, no stents placed), COPD (2L O2 at home), s/p TAVR (03/2019 at [facility]), gout, CKD, CVA (09/2020), pulmonary HTN, strep bovis bacteremia last year (2020) s/p 6 weeks of IV Rocephin at home who initially presented to [facility] 6/1/21 with RONNELL on CKD (likely cardiorenal syndrome), urinary retention due to noncompliance with medications, with hospital course complicated by cardiogenic shock, acute hypoxemic respiratory failure secondary to metabolic acidosis and respiratory alkalosis, and acute on chronic combined diastolic and systolic heart failure. \par \par [place]RA[place] with severe AI and concerns for endocarditis. Patient was transferred to [facility] under Dr. Campbell and underwent Valve in Valve TAVR with Dr. Campbell on 6/23/21. Blood cultures have remained negative. During this hospitalization pt noted to have sustained VT >200bpm requiring amiodarone therapy s/p BSCI S-ICD implant for secondary prevention of SCD.\par \par Was discharged to rehab. Saw primary cardiologist, Dr. Nikita Sanchez, 11/21 for the first time since hospitalization in July. Coreg was increased to 6.25mg BID and Amiodarone was discontinued. Planned for f/up with Dr. Sanchez and repeat TTE on 1/13/22 which revealed EF improved to 50-55%. \par \par Today, S-ICD interrogation reveals no events. No detected AF. He reports he has been feeling well, getting stronger no longer utilizing walker for ambulation. Denies palpitations, lightheadedness, dizziness, near syncope, syncope. \par \par ECG reveals SB with prolonged first degree HB c/w history. \par MCOT worn 12/20/21-12/27/21 revealed SR with first degere AVB and BBB average HR 60 bpm with max 110 bpm. No AF, no VT. \par \par Recommendation: \par \par 1) Sustained VT s/p SICD: Maintained off amiodarone with no VT since last interrogation. Continue Coreg 6.25mg BID\par - Home transmitter being sent. Will continue to monitor remotely once connected\par \par 2) AF, transient in [facility]: Noted at time of SICD implant and induced by 10J shock and terminated in <1-2 hours. - Not on A/c. No history of prior AF or evidence of recurrent AF on SICD monitoring. 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 Unchanged from hospitalization. \par - Denies history of dizziness, syncope or presyncope\par \par 4) Chronic systolic HF w/ h/o cardiogenic shock: LVEF 20-25%., now improved to 50-55%\par - Following with Dr. Sanchez\par \par 5) s/p Valve in valve TAVR\par \par We discussed the need for ongoing monitor for progressive bradyarrhythmias, atrial fibrillation, and slow ventricular arrhythmias. Discussed periodic external monitoring versus ILR implant. He wishes to move forward with ILR implant at [facility]. To arrange, EP follow up 3-4 weeks after implant.\par \par Patti BULLOCK-C \par \par

## 2022-01-26 NOTE — DISCUSSION/SUMMARY
[FreeTextEntry1] : 1)  Sustained VT s/p SICD:  Implant site is well healed and WNL.  \par - Amiodarone discontinued 4 weeks ago.  Remains on Coreg 6.25mg BID\par - No treated or untreated events on SICD. Normal function noted\par - Home transmitter being sent. Will continue to monitor remotely once connected\par - Patient is advised that in accordance with Heart Rhythm Society guidelines and [place] Vehicle and Traffic Law, he should not drive until he has been arrhythmia free (with no clinically significant ventricular arrhythmias or ICD therapies) for a minimum of 6 months. Approaching 6 month nikkie on 7/9/21. Patient verbalized understanding\par \par 2) AF, transient in [facility]: Noted at time of SICD implant and induced by 10J shock and terminated in <1-2 hours. \par - Not on A/c. No history of prior AF or evidence of recurrent AF on SICD monitoring.\par - MCOT x 1 week and continue to monitor SICD for AF. 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 Unchanged from hospitalization. \par - Denies history of dizziness, syncope or presyncope\par - Plan for 1 week MCOT to assess level of conduction disease \par \par 4) Chronic systolic HF w/ h/o cardiogenic shock:  LVEF 20-25%.  \par - Denies HF symptoms or recurrent HF exacerbations/hospitalization. \par - On Coreg.  Not on ACE/ARB - ? related to RONNELL/CKD. \par - Following with Dr. Sanchez. Planned for repeat TTE 1/13/21. \par \par 5) s/p Valve in valve TAVR:  planned for repeat TTE. Asymptomatic. Now on ASA\par \par 6) RONNELL/CKD:  No longer requiring HD. Plans to follow with Nephrology\par - Will request repeat labs from cardiology\par \par EP f/up in 1-2 months after monitoring and TTE\par Seen and d/w Dr. Fraser\par Martha Ornelas PAC\par \par \par  monthly or less

## 2022-02-22 ENCOUNTER — TRANSCRIPTION ENCOUNTER (OUTPATIENT)
Age: 77
End: 2022-02-22

## 2022-02-22 ENCOUNTER — OUTPATIENT (OUTPATIENT)
Dept: OUTPATIENT SERVICES | Facility: HOSPITAL | Age: 77
LOS: 1 days | Discharge: HOME | End: 2022-02-22
Payer: MEDICARE

## 2022-02-22 VITALS
HEART RATE: 64 BPM | OXYGEN SATURATION: 99 % | DIASTOLIC BLOOD PRESSURE: 66 MMHG | TEMPERATURE: 98 F | RESPIRATION RATE: 18 BRPM | SYSTOLIC BLOOD PRESSURE: 121 MMHG | WEIGHT: 227.08 LBS

## 2022-02-22 DIAGNOSIS — I63.9 CEREBRAL INFARCTION, UNSPECIFIED: ICD-10-CM

## 2022-02-22 DIAGNOSIS — I45.9 CONDUCTION DISORDER, UNSPECIFIED: ICD-10-CM

## 2022-02-22 DIAGNOSIS — Z95.2 PRESENCE OF PROSTHETIC HEART VALVE: Chronic | ICD-10-CM

## 2022-02-22 DIAGNOSIS — I49.9 CARDIAC ARRHYTHMIA, UNSPECIFIED: ICD-10-CM

## 2022-02-22 PROCEDURE — C1764: CPT

## 2022-02-22 PROCEDURE — 33285 INSJ SUBQ CAR RHYTHM MNTR: CPT

## 2022-02-22 RX ORDER — ALBUTEROL 90 UG/1
2 AEROSOL, METERED ORAL
Qty: 0 | Refills: 0 | DISCHARGE

## 2022-02-22 RX ORDER — AMLODIPINE BESYLATE 2.5 MG/1
1 TABLET ORAL
Qty: 0 | Refills: 0 | DISCHARGE

## 2022-02-22 RX ORDER — CEPHALEXIN 500 MG
500 CAPSULE ORAL ONCE
Refills: 0 | Status: COMPLETED | OUTPATIENT
Start: 2022-02-22 | End: 2022-02-22

## 2022-02-22 RX ORDER — ALLOPURINOL 300 MG
1 TABLET ORAL
Qty: 0 | Refills: 0 | DISCHARGE

## 2022-02-22 RX ORDER — CHOLECALCIFEROL (VITAMIN D3) 125 MCG
0 CAPSULE ORAL
Qty: 0 | Refills: 0 | DISCHARGE

## 2022-02-22 RX ORDER — TAMSULOSIN HYDROCHLORIDE 0.4 MG/1
1 CAPSULE ORAL
Qty: 0 | Refills: 0 | DISCHARGE

## 2022-02-22 RX ADMIN — Medication 500 MILLIGRAM(S): at 08:00

## 2022-02-22 NOTE — ASU DISCHARGE PLAN (ADULT/PEDIATRIC) - ASU DC SPECIAL INSTRUCTIONSFT
Loop Recorder Incision Care:     - Do not touch the incision until it is completely healed.   - There is Dermabond (skin glue) on your incision, which will start to flake off on its own over the next 2-3 weeks. Do not pick at or peel off the Dermabond.   - Do not apply soaps, creams, lotions, ointments or powders to the incision until it is completely healed.  - You should call the doctor if you notice redness, drainage, swelling, increased tenderness, hot sensation around the  incision, bleeding or incision edges pulling apart. Loop Recorder Incision Care:     - Do not touch the incision until it is completely healed.   - There are steri-strips (white strips of tape) on your incision, which will start to peel off on their own over the next 2-3 weeks. Do not pick at or peel off the Steri-Strips.   - Do not apply soaps, creams, lotions, ointments or powders to the incision until it is completely healed.  - You should call the doctor if you notice redness, drainage, swelling, increased tenderness, hot sensation around the incision, bleeding or incision edges pulling apart.

## 2022-02-22 NOTE — ASU DISCHARGE PLAN (ADULT/PEDIATRIC) - NS MD DC FALL RISK RISK
For information on Fall & Injury Prevention, visit: [email or web address] OR  [email or web address] OR  https://www.cdc.gov/steadi/patient.html

## 2022-02-22 NOTE — DISCHARGE NOTE NURSING/CASE MANAGEMENT/SOCIAL WORK - NSDCPEFALRISK_GEN_ALL_CORE
For information on Fall & Injury Prevention, visit: [email or web address] OR  [email or web address] OR  https://www.cdc.gov/steadi/patient.html

## 2022-02-22 NOTE — DISCHARGE NOTE NURSING/CASE MANAGEMENT/SOCIAL WORK - PATIENT PORTAL LINK FT
You can access the FollowMyHealth Patient Portal offered by [facility] by registering at the following website: [email or web address]. By joining [email or web address] FollowMyHealth portal, you will also be able to view your health information using other applications (apps) compatible with our system.

## 2022-02-22 NOTE — PROGRESS NOTE ADULT - SUBJECTIVE AND OBJECTIVE BOX
Pt doing well s/p loop recorder implant. Denies complaint.     Incision: Steri Strips C/D/I; no bleeding, hematoma, erythema, exudate or edema    Plan:   Resume PO intake.   Ambulate w/ assist, then progress as tolerated.     Pain control with PO analgesia PRN.   Resume home medications.   D/C home once all criteria met, with outpt f/up in 1-2 weeks.

## 2022-02-22 NOTE — ASU DISCHARGE PLAN (ADULT/PEDIATRIC) - COMMENTS
Follow up with Dr. Fraser in 2-4 weeks. Our office will contact you in 3-5 days to schedule this appointment. Please call [phone] with questions or concerns.

## 2022-02-22 NOTE — H&P PST ADULT - NSICDXPASTMEDICALHX_GEN_ALL_CORE_FT
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

## 2022-02-22 NOTE — H&P PST ADULT - ASSESSMENT
Pt is a 76 year old male with PMH significant for MI in 1995 (angiogram, no stents placed), COPD (2L O2 PRN at home), s/p TAVR (03/2019 at [facility]), gout, CKD, CVA (09/2020), pulmonary HTN, diastolic and systolic heart failure, TAVR with Dr. Campbell on 6/23/21 after. During previous hospitalization 6/21 pt with acute on chronic systolic HF w/ cardiogenic shock (LVEF 20-25%) noted to have sustained VT >200bpm requiring amiodarone therapy s/p BSCI S-ICD implant for secondary prevention of SCD.  Pt has been maintained off amiodarone and recent TTE 1/13/22 showed improvement in EF to 50-55%. Pt had MCOT monitoring 12/20/21-12/27/21 revealed SR with first degere AVB and BBB. Pt presents to [facility] today for ILR placement ongoing monitor for progressive bradyarrhythmias, atrial fibrillation, and slow ventricular arrhythmias. Pt denies chest pain, SOB, dizziness, syncope, fevers, chills, HA.    Plan:  1)The risks and benefits of, and alternatives to the planned procedure were discussed with the patient. Risks including, but not limited to: bleeding, infection, stroke, heart attack, needing emergency surgery and death, were reviewed. All questions were answered to the patient's expressed satisfaction and informed consent was obtained.   2)ILR placement at bedside with Dr. Fraser  3) keflex 500mg prior to procedure  4)Keep site dry for 24 hours  5)Outpatient follow up with Dr. Fraser in 2 weeks

## 2022-02-22 NOTE — ASU DISCHARGE PLAN (ADULT/PEDIATRIC) - CARE PROVIDER_API CALL
Teresa Fraser)  Cardiac Electrophysiology; Cardiovascular Disease; Internal Medicine  [address] [place] [id number]  Phone: [phone]  Fax: [phone]  Follow Up Time:

## 2022-02-22 NOTE — H&P PST ADULT - HISTORY OF PRESENT ILLNESS
Pt is a 76 year old male with PMH significant for MI in 1995 (angiogram, no stents placed), COPD (2L O2 PRN at home), s/p TAVR (03/2019 at [facility]), gout, CKD, CVA (09/2020), pulmonary HTN, diastolic and systolic heart failure, TAVR with Dr. Campbell on 6/23/21 after. During previous hospitalization 6/21 pt with acute on chronic systolic HF w/ cardiogenic shock (LVEF 20-25%) noted to have sustained VT >200bpm requiring amiodarone therapy s/p BSCI S-ICD implant for secondary prevention of SCD.  Pt has been maintained off amiodarone and recent TTE 1/13/22 showed improvement in EF to 50-55%. Pt had MCOT monitoring 12/20/21-12/27/21 revealed SR with first degere AVB and BBB. Pt presents to [facility] today for ILR placement ongoing monitor for progressive bradyarrhythmias, atrial fibrillation, and slow ventricular arrhythmias. Pt denies chest pain, SOB, dizziness, syncope, fevers, chills, HA.  Cardiology Summary:  TTE 6/12/21:   1. Left ventricular ejection fraction, by visual estimation, is 35 to 40%.   2. Prior TTE from 7/7/2021 showed LVEF 25-30%.   3. Severely decreased global left ventricular systolic function.   4. Spectral Doppler shows impaired relaxation pattern of left ventricular myocardial filling (Grade I diastolic dysfunction).   5. There is severe concentric left ventricular hypertrophy.   6. Mild mitral valve regurgitation.   7. Waters Hector TAVR in the aortic position.   8. Peak aortic valve gradient is 23.8 mmHg and the mean gradient is 14.8 mmHg, which is probably normal in the setting of a prosthetic aortic valve.   9. Mild prosthetic aortic valve paravalvular leak.  10. Trivial pericardial effusion.  11. 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

## 2022-03-08 ENCOUNTER — TRANSCRIPTION ENCOUNTER (OUTPATIENT)
Age: 77
End: 2022-03-08

## 2022-03-08 DIAGNOSIS — I48.91 UNSPECIFIED ATRIAL FIBRILLATION: ICD-10-CM

## 2022-03-14 ENCOUNTER — NON-APPOINTMENT (OUTPATIENT)
Age: 77
End: 2022-03-14

## 2022-03-14 ENCOUNTER — APPOINTMENT (OUTPATIENT)
Dept: ELECTROPHYSIOLOGY | Facility: CLINIC | Age: 77
End: 2022-03-14
Payer: MEDICARE

## 2022-03-14 VITALS
HEIGHT: 72 IN | OXYGEN SATURATION: 92 % | WEIGHT: 233 LBS | SYSTOLIC BLOOD PRESSURE: 150 MMHG | TEMPERATURE: 97.6 F | BODY MASS INDEX: 31.56 KG/M2 | HEART RATE: 74 BPM | DIASTOLIC BLOOD PRESSURE: 70 MMHG

## 2022-03-14 DIAGNOSIS — I45.9 CONDUCTION DISORDER, UNSPECIFIED: ICD-10-CM

## 2022-03-14 PROCEDURE — 93000 ELECTROCARDIOGRAM COMPLETE: CPT | Mod: 59

## 2022-03-14 PROCEDURE — 93260 PRGRMG DEV EVAL IMPLTBL SYS: CPT

## 2022-03-14 PROCEDURE — 99214 OFFICE O/P EST MOD 30 MIN: CPT

## 2022-03-14 RX ORDER — PANTOPRAZOLE SODIUM 40 MG/1
40 GRANULE, DELAYED RELEASE ORAL DAILY
Refills: 0 | Status: ACTIVE | COMMUNITY

## 2022-03-14 RX ORDER — ALLOPURINOL 100 MG/1
100 TABLET ORAL TWICE DAILY
Refills: 0 | Status: ACTIVE | COMMUNITY

## 2022-03-14 RX ORDER — COLLAGENASE SANTYL 250 [ARB'U]/G
250 OINTMENT TOPICAL
Refills: 0 | Status: DISCONTINUED | COMMUNITY
End: 2022-03-14

## 2022-03-14 NOTE — DISCUSSION/SUMMARY
[FreeTextEntry1] : 77 year old male patient with a medical history of MI in 1995 (angiogram, no stents placed), COPD (2L O2 at home), s/p TAVR (03/2019 at [facility]), gout, CKD, CVA (09/2020), pulmonary HTN, strep bovis bacteremia last year (2020) s/p 6 weeks of IV Rocephin at home who initially presented to [facility] 6/1/21 with RONNELL on CKD (likely cardiorenal syndrome), urinary retention due to noncompliance with medications, with hospital course complicated by cardiogenic shock, acute hypoxemic respiratory failure secondary to metabolic acidosis and respiratory alkalosis, and acute on chronic combined diastolic and systolic heart failure. \par \par [place] with severe AI and concerns for endocarditis. Patient was transferred to [facility] under Dr. Campbell and underwent Valve in Valve TAVR with Dr. Campbell on 6/23/21. Blood cultures have remained negative. During this hospitalization pt noted to have sustained VT >200bpm requiring amiodarone therapy s/p BSCI S-ICD implant for secondary prevention of SCD.\par \par Was discharged to rehab. Saw primary cardiologist, Dr. Nikita Sanchez, 11/21 for the first time since hospitalization in July. Coreg was increased to 6.25mg BID and Amiodarone was discontinued. Planned for f/up with Dr. Sanchez and repeat TTE on 1/13/22 which revealed EF improved to 50-55%. \par \par During previous f/u discussed AF which was noted at time of S-ICD implant induced by 10J shock and terminated after 1-2 hours.  ILR was recommended for ongoing arrhythmia surveillance, a well as monitoring of slow ventricular arrhythmias and progressive bradyarrhythmias. He is now s/p ILR implant on 2/22/22.\par \par Today, he reports he has been feeling well since last follow up. Denies palpitations, lightheadedness, dizziness, near syncope, syncope. Symptomatic improvement with more exercise tolerance. Remote monitoring of ILR reveals no episodes since implant. S-ICD interrogation reveals no episodes since last. \par \par Recommendation: \par \par -Site care, procedure for making symptom recordings and remote follow up reviewed. \par \par 1) Sustained VT s/p SICD: Maintained off amiodarone with no VT since last interrogation. Continue Coreg 6.25mg BID\par - Remote monitor paired in office for ongoing home monitoring\par \par 2) AF, transient in [facility]: Noted at time of SICD implant and induced by 10J shock and terminated in <1-2 hours. - Not on A/c. No history of prior AF or evidence of recurrent AF on SICD monitoring. Now s/p ILR for arrhythmia surveillance. 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 Unchanged from hospitalization. \par - Denies history of dizziness, syncope or presyncope\par \par 4) Chronic systolic HF w/ h/o cardiogenic shock: LVEF 20-25%., now improved to 50-55%\par - Following with Dr. Sanchez\par \par 5) s/p Valve in valve TAVR\par \par EP follow up in 9 months or sooner PRN\par \par Patti Proctor ANP-C \par

## 2022-03-14 NOTE — HISTORY OF PRESENT ILLNESS
[de-identified] : 77 year old male patient with a medical history of MI in 1995 (angiogram, no stents placed), COPD (2L O2 at home), s/p TAVR (03/2019 at [facility]), gout, CKD, CVA (09/2020), pulmonary HTN, strep bovis bacteremia last year (2020) s/p 6 weeks of IV Rocephin at home who initially presented to [facility] 6/1/21 with RONNELL on CKD (likely cardiorenal syndrome), urinary retention due to noncompliance with medications, with hospital course complicated by cardiogenic shock, acute hypoxemic respiratory failure secondary to metabolic acidosis and respiratory alkalosis, and acute on chronic combined diastolic and systolic heart failure. \par \par [place] with severe AI and concerns for endocarditis. Patient was transferred to [facility] under Dr. Campbell and underwent Valve in Valve TAVR with Dr. Campbell on 6/23/21. Blood cultures have remained negative. During this hospitalization pt noted to have sustained VT >200bpm requiring amiodarone therapy s/p BSCI S-ICD implant for secondary prevention of SCD.\par \par Was discharged to rehab. Saw primary cardiologist, Dr. Nikita Sanchez, 11/21 for the first time since hospitalization in July. Coreg was increased to 6.25mg BID and Amiodarone was discontinued. Planned for f/up with Dr. Sanchez and repeat TTE on 1/13/22 which revealed EF improved to 50-55%. \par \par During previous f/u discussed AF which was noted at time of S-ICD implant induced by 10J shock and terminated after 1-2 hours.  ILR was recommended for ongoing arrhythmia surveillance, a well as monitoring of slow ventricular arrhythmias and progressive bradyarrhythmias. He is now s/p ILR implant on 2/22/22.\par \par Today, he reports he has been feeling well since last follow up. Denies palpitations, lightheadedness, dizziness, near syncope, syncope. Symptomatic improvement with more exercise tolerance. Remote monitoring of ILR reveals no episodes since implant. S-ICD interrogation reveals no episodes since last. \par \par \par \par

## 2022-04-01 ENCOUNTER — NON-APPOINTMENT (OUTPATIENT)
Age: 77
End: 2022-04-01

## 2022-04-01 ENCOUNTER — APPOINTMENT (OUTPATIENT)
Dept: ELECTROPHYSIOLOGY | Facility: CLINIC | Age: 77
End: 2022-04-01
Payer: MEDICARE

## 2022-04-01 PROCEDURE — 93298 REM INTERROG DEV EVAL SCRMS: CPT

## 2022-04-01 PROCEDURE — G2066: CPT

## 2022-05-06 ENCOUNTER — NON-APPOINTMENT (OUTPATIENT)
Age: 77
End: 2022-05-06

## 2022-05-06 ENCOUNTER — APPOINTMENT (OUTPATIENT)
Dept: ELECTROPHYSIOLOGY | Facility: CLINIC | Age: 77
End: 2022-05-06
Payer: MEDICARE

## 2022-05-06 PROCEDURE — 93298 REM INTERROG DEV EVAL SCRMS: CPT

## 2022-05-06 PROCEDURE — G2066: CPT

## 2022-06-10 ENCOUNTER — APPOINTMENT (OUTPATIENT)
Dept: ELECTROPHYSIOLOGY | Facility: CLINIC | Age: 77
End: 2022-06-10
Payer: MEDICARE

## 2022-06-10 ENCOUNTER — NON-APPOINTMENT (OUTPATIENT)
Age: 77
End: 2022-06-10

## 2022-06-10 PROCEDURE — G2066: CPT

## 2022-06-10 PROCEDURE — 93298 REM INTERROG DEV EVAL SCRMS: CPT

## 2022-06-16 ENCOUNTER — APPOINTMENT (OUTPATIENT)
Dept: ELECTROPHYSIOLOGY | Facility: CLINIC | Age: 77
End: 2022-06-16

## 2022-06-20 ENCOUNTER — APPOINTMENT (OUTPATIENT)
Dept: ELECTROPHYSIOLOGY | Facility: CLINIC | Age: 77
End: 2022-06-20
Payer: MEDICARE

## 2022-06-20 ENCOUNTER — NON-APPOINTMENT (OUTPATIENT)
Age: 77
End: 2022-06-20

## 2022-06-20 PROCEDURE — 93296 REM INTERROG EVL PM/IDS: CPT

## 2022-06-20 PROCEDURE — 93295 DEV INTERROG REMOTE 1/2/MLT: CPT

## 2022-07-15 ENCOUNTER — APPOINTMENT (OUTPATIENT)
Dept: ELECTROPHYSIOLOGY | Facility: CLINIC | Age: 77
End: 2022-07-15

## 2022-07-15 ENCOUNTER — NON-APPOINTMENT (OUTPATIENT)
Age: 77
End: 2022-07-15

## 2022-07-15 PROCEDURE — 93298 REM INTERROG DEV EVAL SCRMS: CPT

## 2022-07-15 PROCEDURE — G2066: CPT

## 2022-08-19 ENCOUNTER — APPOINTMENT (OUTPATIENT)
Dept: ELECTROPHYSIOLOGY | Facility: CLINIC | Age: 77
End: 2022-08-19

## 2022-08-19 ENCOUNTER — NON-APPOINTMENT (OUTPATIENT)
Age: 77
End: 2022-08-19

## 2022-08-19 PROCEDURE — 93298 REM INTERROG DEV EVAL SCRMS: CPT

## 2022-08-19 PROCEDURE — G2066: CPT

## 2022-08-29 NOTE — BH CONSULTATION LIAISON PROGRESS NOTE - LEVEL OF CONSCIOUSNESS
August 29, 2022        Geam Hough  [address] [place] [id number]        Dear Gema Hough:    We made an attempt to contact you in regards to scheduling a consult with our Hepatology clinic, as you are being referred by Ashli Acevedo NP. To this date we have not yet received a call back.    Please contact the clinic at [phone] at your earliest convenience. Failure to do so will limit our ability to give you the best comprehensive care. We look forward to hearing from you.      Sincerely,          Cuong  Clinical   [facility]      [facility]                
Alert

## 2022-09-19 ENCOUNTER — NON-APPOINTMENT (OUTPATIENT)
Age: 77
End: 2022-09-19

## 2022-09-19 ENCOUNTER — APPOINTMENT (OUTPATIENT)
Dept: ELECTROPHYSIOLOGY | Facility: CLINIC | Age: 77
End: 2022-09-19

## 2022-09-19 PROCEDURE — G2066: CPT

## 2022-09-19 PROCEDURE — 93298 REM INTERROG DEV EVAL SCRMS: CPT

## 2022-10-24 ENCOUNTER — APPOINTMENT (OUTPATIENT)
Dept: ELECTROPHYSIOLOGY | Facility: CLINIC | Age: 77
End: 2022-10-24

## 2022-10-26 ENCOUNTER — FORM ENCOUNTER (OUTPATIENT)
Age: 77
End: 2022-10-26

## 2022-10-31 ENCOUNTER — APPOINTMENT (OUTPATIENT)
Dept: ELECTROPHYSIOLOGY | Facility: CLINIC | Age: 77
End: 2022-10-31

## 2022-10-31 ENCOUNTER — NON-APPOINTMENT (OUTPATIENT)
Age: 77
End: 2022-10-31

## 2022-10-31 PROCEDURE — 93296 REM INTERROG EVL PM/IDS: CPT

## 2022-10-31 PROCEDURE — 93295 DEV INTERROG REMOTE 1/2/MLT: CPT

## 2022-12-09 NOTE — HISTORY OF PRESENT ILLNESS
[de-identified] : 77 year old male patient with a medical history of MI in 1995 (angiogram, no stents placed), COPD (2L O2 at home), s/p TAVR (03/2019 at [facility]), gout, CKD, CVA (09/2020), pulmonary HTN, strep bovis bacteremia last year (2020) s/p 6 weeks of IV Rocephin at home who initially presented to [facility] 6/1/21 with RONNELL on CKD (likely cardiorenal syndrome), urinary retention due to noncompliance with medications, with hospital course complicated by cardiogenic shock, acute hypoxemic respiratory failure secondary to metabolic acidosis and respiratory alkalosis, and acute on chronic combined diastolic and systolic heart failure. \par \par [place] with severe AI and concerns for endocarditis. Patient was transferred to [facility] under Dr. Campbell and underwent Valve in Valve TAVR with Dr. Campbell on 6/23/21. Blood cultures have remained negative. During hospitalization pt noted to have sustained VT >200bpm requiring amiodarone therapy s/p BSCI S-ICD implant for secondary prevention of SCD 7/9/21.\par \par Was discharged to rehab. Saw primary cardiologist, Dr. Nikita Sanchez, 11/21 for the first time since hospitalization in July. Coreg was increased to 6.25mg BID and Amiodarone was discontinued. Planned for f/up with Dr. Sanchez and repeat TTE on 1/13/22 which revealed EF improved to 50-55%. \par \par During previous f/u discussed AF which was noted at time of S-ICD implant induced by 10J shock and terminated after 1-2 hours. ILR was recommended for ongoing arrhythmia surveillance, a well as monitoring of slow ventricular arrhythmias and progressive bradyarrhythmias. He is now s/p ILR implant on 2/22/22.\par \par S-ICD Interrogation\par ILR interrogation\par \par INCOMPLETE NOTE NOT YET SEEN

## 2022-12-12 ENCOUNTER — APPOINTMENT (OUTPATIENT)
Dept: ELECTROPHYSIOLOGY | Facility: CLINIC | Age: 77
End: 2022-12-12

## 2022-12-14 NOTE — PROGRESS NOTE ADULT - RESPIRATORY AND THORAX COMMENTS
HEARING AID INSURANCE BENEFIT CHECK--  Location:  [address] [place] [address] Drive, [address] [facility] Tax ID: [id number]    Insurance Plan: [employer] ADV PPO  Group #: [id number]   ID #: [id number]    AuD: [place] NPI [id number]    Primary INSURANCE:   Date of call 12/14/22  Representative Name:Kirk MORALES  Phone #  [phone]  Reference #: [id number]    Is there a benefit for hearing aids? No     Does the hearing aid benefit require the patient to use a specific vendor/group (such as [employer], [employer])? Yes Member MUST USE [employer] for benefit. Additional information provided by :   No hearing aid benefit. Member MUST USE [employer] for benefit, they cover $500 allowance towards unlimited hearing aid devices every 3 years. Call [phone] for more details.      Amount of time spent on call:  5 minutes course b/l

## 2022-12-28 ENCOUNTER — APPOINTMENT (OUTPATIENT)
Dept: ELECTROPHYSIOLOGY | Facility: CLINIC | Age: 77
End: 2022-12-28

## 2023-01-13 NOTE — PROGRESS NOTE ADULT - PROBLEM SELECTOR PLAN 1
CONTINUOUS VIDEO ELECTROENCEPHALOGRAM (cvEEG) REPORT    Identifying Information:  Name: Eros Zaragoza  MRN: [id number]  : 1988  Attending Physician: Hina Davis MD  Interpreting Physician: Nahun Allison MD  Reporting Physician: Candelario Leiva MD,     Clinical History:  Eros Zaragoza is an 37 y.o. male who was admitted on 2023 with a primary diagnosis of seizures    Past Medical History:  Past Medical History:   Diagnosis Date    Aspiration pneumonia ([facility] [facility]     Hepatitis C     HTN (hypertension)     NSTEMI (non-ST elevated myocardial infarction) ([facility] [facility]     Polysubstance abuse ([facility]     Seizures ([facility]     Septic shock ([facility]     Takotsubo cardiomyopathy        Current Medications:    sodium chloride flush  5-40 mL IntraVENous 2 times per day    pantoprazole  40 mg IntraVENous Daily    heparin (porcine)  5,000 Units SubCUTAneous 3 times per day    linezolid  600 mg IntraVENous Q12H    piperacillin-tazobactam  3,375 mg IntraVENous Q8H    OXcarbazepine  900 mg Oral BID    fosphenytoin (CEREBYX) maintenance dose IVPB  100 mg PE IntraVENous Q8H       cEEG start date & time: 2023 at 11 pm  cEEG end date & time: 2023 at 7 am    Indication:  cEEG was initiated for monitoring and localization of seizures as the etiology of the altered mental status. It was available for review at the bedside as well as via remote access for the entire period of monitoring. Technical Summary:  32 channels of continuous EEG and video were recorded in a digital format on a patient who is reported to be intubated and sedated during the recording. The background rhythm consists of 1-3 Hz activity in the delta frequency range. Over riding fast activity seen as can be seen with sedated record. No well formed PDR,not reactive to stimulation. During the recording:   No focal slowing was seen. No epileptiform discharges or seizures were detected.    Video was reviewed intermittently at times when significant amounts of EMG artifact were present.    The EKG monitoring channel did not detect any significant rhythm abnormalities.    EEG Interpretation:   This EEG is abnormal due to the presence of:     Severe generalized slowing. This is a non-specific finding but is consistent with a generalized disturbance of cerebral function. It may be seen in a variety of conditions, such as toxic, metabolic, post-anoxic, multi-focal, or diffuse structural abnormalities.       Clinical correlation is recommended.         JIMMY MATSON MD, MD  1/13/2023  8:02 AM      Now s/p Valve in Valve TAVR with Dr. Campbell on 6/23  Repeat echo revealed EF 20-25% from 30-35% prior with midly enlarged RV, severely decreased LV Fxn, moderate MR, Severe TR and mild Pulm Regurgitation  CT C/A/P revealed b/l pneumonia, ABX started, now on Zosyn for 5 day course, completed  Midodrine now prn for SBP < 100  Continue Amio for runs of SVT post op   Continue GI ppx with Protonix and Senna, DVT ppx with SCD boots  Continue Dysphagia 2 diet with honey thick liquids as per speech/swallow eval

## 2023-01-17 ENCOUNTER — NON-APPOINTMENT (OUTPATIENT)
Age: 78
End: 2023-01-17

## 2023-01-17 ENCOUNTER — APPOINTMENT (OUTPATIENT)
Dept: ELECTROPHYSIOLOGY | Facility: CLINIC | Age: 78
End: 2023-01-17
Payer: MEDICARE

## 2023-01-17 PROCEDURE — G2066: CPT

## 2023-01-17 PROCEDURE — 93298 REM INTERROG DEV EVAL SCRMS: CPT

## 2023-01-30 ENCOUNTER — NON-APPOINTMENT (OUTPATIENT)
Age: 78
End: 2023-01-30

## 2023-01-30 ENCOUNTER — APPOINTMENT (OUTPATIENT)
Dept: ELECTROPHYSIOLOGY | Facility: CLINIC | Age: 78
End: 2023-01-30
Payer: MEDICARE

## 2023-01-30 VITALS
HEART RATE: 73 BPM | WEIGHT: 252 LBS | DIASTOLIC BLOOD PRESSURE: 78 MMHG | SYSTOLIC BLOOD PRESSURE: 142 MMHG | BODY MASS INDEX: 34.13 KG/M2 | HEIGHT: 72 IN | TEMPERATURE: 97.8 F | OXYGEN SATURATION: 95 %

## 2023-01-30 DIAGNOSIS — I63.9 CEREBRAL INFARCTION, UNSPECIFIED: ICD-10-CM

## 2023-01-30 PROCEDURE — 93000 ELECTROCARDIOGRAM COMPLETE: CPT | Mod: 59

## 2023-01-30 PROCEDURE — 99214 OFFICE O/P EST MOD 30 MIN: CPT

## 2023-01-30 PROCEDURE — 93261 INTERROGATE SUBQ DEFIB: CPT

## 2023-01-30 RX ORDER — FOLIC ACID/VIT B COMPLEX AND C 0.8 MG
TABLET ORAL
Refills: 0 | Status: DISCONTINUED | COMMUNITY
End: 2023-01-30

## 2023-01-30 RX ORDER — IRON SUCROSE 20 MG/ML
INJECTION, SOLUTION INTRAVENOUS
Refills: 0 | Status: DISCONTINUED | COMMUNITY
End: 2023-01-30

## 2023-01-30 RX ORDER — CARVEDILOL 6.25 MG/1
6.25 TABLET, FILM COATED ORAL
Qty: 60 | Refills: 3 | Status: DISCONTINUED | COMMUNITY
End: 2023-01-30

## 2023-01-30 NOTE — HISTORY OF PRESENT ILLNESS
[de-identified] : 77 year old male patient with a medical history of MI in 1995 (angiogram, no stents placed), COPD (2L O2 at home), s/p TAVR (03/2019 at [facility]), gout, CKD, CVA (09/2020), pulmonary HTN, strep bovis bacteremia last year (2020) s/p 6 weeks of IV Rocephin at home who initially presented to [facility] 6/1/21 with RONNELL on CKD (likely cardiorenal syndrome), urinary retention due to noncompliance with medications, with hospital course complicated by cardiogenic shock, acute hypoxemic respiratory failure secondary to metabolic acidosis and respiratory alkalosis, and acute on chronic combined diastolic and systolic heart failure. \par \par ISRAEL with severe AI and concerns for endocarditis. Patient was transferred to [facility] under Dr. Campbell and underwent Valve in Valve TAVR with Dr. Campbell on 6/23/21. Blood cultures have remained negative. During hospitalization pt noted to have sustained VT >200bpm requiring amiodarone therapy s/p BSCI S-ICD implant for secondary prevention of SCD 7/9/21.\par \par Was discharged to rehab. Saw primary cardiologist, Dr. Nikita Sanchez, 11/21 for the first time since hospitalization in July. Coreg was increased to 6.25mg BID and Amiodarone was discontinued. Planned for f/up with Dr. Sanchez and repeat TTE on 1/13/22 which revealed EF improved to 50-55%. \par \par During previous f/u discussed AF which was noted at time of S-ICD implant induced by 10J shock and terminated after 1-2 hours. ILR was recommended for ongoing arrhythmia surveillance, a well as monitoring of slow ventricular arrhythmias and progressive bradyarrhythmias. He is now s/p ILR implant on 2/22/22.\par \par Today, reports he has been doing very well since last follow up. Denies palpitations, dizziness, near syncope, syncope. Had maintained f/u with Dr. Sanchez who discontinued carvedilol after pt c/o ED. \par \par Today,  S-ICD Interrogation reveals normal function. Battery status to MACIE 84%. No events in arrhythmia log. ILR has revealed false "AF" episodes c/w SR with PVCs. PVC burden estimated at 4%. \par \par

## 2023-01-30 NOTE — REVIEW OF SYSTEMS
[SOB] : no shortness of breath [Dyspnea on exertion] : not dyspnea during exertion [Chest Discomfort] : no chest discomfort [Lower Ext Edema] : no extremity edema [Palpitations] : no palpitations [Orthopnea] : no orthopnea [Syncope] : no syncope [Dizziness] : no dizziness

## 2023-01-30 NOTE — DISCUSSION/SUMMARY
[FreeTextEntry1] : 77 year old male patient with a medical history of MI in 1995 (angiogram, no stents placed), COPD (2L O2 at home), s/p TAVR (03/2019 at [facility]), gout, CKD, CVA (09/2020), pulmonary HTN, strep bovis bacteremia last year (2020) s/p 6 weeks of IV Rocephin at home who initially presented to [facility] 6/1/21 with RONNELL on CKD (likely cardiorenal syndrome), urinary retention due to noncompliance with medications, with hospital course complicated by cardiogenic shock, acute hypoxemic respiratory failure secondary to metabolic acidosis and respiratory alkalosis, and acute on chronic combined diastolic and systolic heart failure. \par \par ISRAEL with severe AI and concerns for endocarditis. Patient was transferred to [facility] under Dr. Campbell and underwent Valve in Valve TAVR with Dr. Campbell on 6/23/21. Blood cultures have remained negative. During hospitalization pt noted to have sustained VT >200bpm requiring amiodarone therapy s/p BSCI S-ICD implant for secondary prevention of SCD 7/9/21.\par \par Was discharged to rehab. Saw primary cardiologist, Dr. Nikita Sanchez, 11/21 for the first time since hospitalization in July. Coreg was increased to 6.25mg BID and Amiodarone was discontinued. Planned for f/up with Dr. Sanchez and repeat TTE on 1/13/22 which revealed EF improved to 50-55%. \par \par During previous f/u discussed AF which was noted at time of S-ICD implant induced by 10J shock and terminated after 1-2 hours. ILR was recommended for ongoing arrhythmia surveillance, a well as monitoring of slow ventricular arrhythmias and progressive bradyarrhythmias. He is now s/p ILR implant on 2/22/22.\par \par Today, reports he has been doing very well since last follow up. Denies palpitations, dizziness, near syncope, syncope. Had maintained f/u with Dr. Sanchez who discontinued carvedilol after pt c/o ED. \par \par Today,  S-ICD Interrogation reveals normal function. Battery status to MACIE 84%. No events in arrhythmia log. ILR has revealed false "AF" episodes c/w SR with PVCs. PVC burden estimated at 4%. \par \par Recommendation: \par \par \par 1) Sustained VT s/p SICD: Maintained off amiodarone with no VT since last interrogation. Now off carvedilol due to c/o ED. Advised OK to continue to monitor off beta blocker therapy. If recurrent LV dysfunciton or arrhythmias noted would reconsider initiation. \par - Remote monitor paired in office for ongoing home monitoring\par \par 2) AF, transient in [facility]: Noted at time of SICD implant and induced by 10J shock and terminated in <1-2 hours. - Not on A/c. No history of prior AF or evidence of recurrent AF on SICD monitoring. Now s/p ILR for arrhythmia surveillance. No true AF on ILR. Episodes c/w SR with PVCs with PVC burden 4%. Adjusted ectopy rejection to aggressive to minimize false positive alerts.  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 \par - Denies history of dizziness, syncope or presyncope. To monitor ILR for bradyarrhythmias. \par \par 4) Chronic systolic HF w/ h/o cardiogenic shock: LVEF 20-25%., now improved to 50-55%\par - Following with Dr. Sanchez\par \par 5) s/p Valve in valve TAVR\par \par EP follow up in 9 months or sooner PRN\par \par Patti Proctor ANP-C \par

## 2023-03-06 ENCOUNTER — APPOINTMENT (OUTPATIENT)
Dept: ELECTROPHYSIOLOGY | Facility: CLINIC | Age: 78
End: 2023-03-06
Payer: MEDICARE

## 2023-03-06 ENCOUNTER — NON-APPOINTMENT (OUTPATIENT)
Age: 78
End: 2023-03-06

## 2023-03-06 PROCEDURE — 93298 REM INTERROG DEV EVAL SCRMS: CPT

## 2023-03-06 PROCEDURE — G2066: CPT

## 2023-04-10 ENCOUNTER — NON-APPOINTMENT (OUTPATIENT)
Age: 78
End: 2023-04-10

## 2023-04-10 ENCOUNTER — APPOINTMENT (OUTPATIENT)
Dept: ELECTROPHYSIOLOGY | Facility: CLINIC | Age: 78
End: 2023-04-10
Payer: MEDICARE

## 2023-04-10 PROCEDURE — 93298 REM INTERROG DEV EVAL SCRMS: CPT

## 2023-04-10 PROCEDURE — G2066: CPT

## 2023-05-01 ENCOUNTER — APPOINTMENT (OUTPATIENT)
Dept: ELECTROPHYSIOLOGY | Facility: CLINIC | Age: 78
End: 2023-05-01

## 2023-05-01 ENCOUNTER — FORM ENCOUNTER (OUTPATIENT)
Age: 78
End: 2023-05-01

## 2023-06-02 ENCOUNTER — APPOINTMENT (OUTPATIENT)
Dept: ELECTROPHYSIOLOGY | Facility: CLINIC | Age: 78
End: 2023-06-02
Payer: MEDICARE

## 2023-06-02 ENCOUNTER — NON-APPOINTMENT (OUTPATIENT)
Age: 78
End: 2023-06-02

## 2023-06-02 PROCEDURE — 93298 REM INTERROG DEV EVAL SCRMS: CPT

## 2023-06-02 PROCEDURE — G2066: CPT

## 2023-07-03 ENCOUNTER — NON-APPOINTMENT (OUTPATIENT)
Age: 78
End: 2023-07-03

## 2023-07-03 ENCOUNTER — APPOINTMENT (OUTPATIENT)
Dept: ELECTROPHYSIOLOGY | Facility: CLINIC | Age: 78
End: 2023-07-03
Payer: MEDICARE

## 2023-07-03 PROCEDURE — 93295 DEV INTERROG REMOTE 1/2/MLT: CPT

## 2023-07-03 PROCEDURE — 93296 REM INTERROG EVL PM/IDS: CPT

## 2023-07-13 NOTE — H&P ADULT - GENERAL GENITOURINARY SYMPTOMS
+urinary retention with indwelling marie catheter in place Complex Repair And Double Advancement Flap Text: The defect edges were debeveled with a #15 scalpel blade.  The primary defect was closed partially with a complex linear closure.  Given the location of the remaining defect, shape of the defect and the proximity to free margins a double advancement flap was deemed most appropriate for complete closure of the defect.  Using a sterile surgical marker, an appropriate advancement flap was drawn incorporating the defect and placing the expected incisions within the relaxed skin tension lines where possible.    The area thus outlined was incised deep to adipose tissue with a #15 scalpel blade.  The skin margins were undermined to an appropriate distance in all directions utilizing iris scissors.

## 2023-08-11 ENCOUNTER — NON-APPOINTMENT (OUTPATIENT)
Age: 78
End: 2023-08-11

## 2023-08-11 ENCOUNTER — APPOINTMENT (OUTPATIENT)
Dept: ELECTROPHYSIOLOGY | Facility: CLINIC | Age: 78
End: 2023-08-11
Payer: MEDICARE

## 2023-08-11 PROCEDURE — 93298 REM INTERROG DEV EVAL SCRMS: CPT

## 2023-08-11 PROCEDURE — G2066: CPT

## 2023-09-25 ENCOUNTER — APPOINTMENT (OUTPATIENT)
Dept: ELECTROPHYSIOLOGY | Facility: CLINIC | Age: 78
End: 2023-09-25

## 2023-10-26 ENCOUNTER — APPOINTMENT (OUTPATIENT)
Dept: ELECTROPHYSIOLOGY | Facility: CLINIC | Age: 78
End: 2023-10-26
Payer: MEDICARE

## 2023-10-26 ENCOUNTER — NON-APPOINTMENT (OUTPATIENT)
Age: 78
End: 2023-10-26

## 2023-10-26 PROCEDURE — G2066: CPT | Mod: NC

## 2023-10-26 PROCEDURE — 93298 REM INTERROG DEV EVAL SCRMS: CPT

## 2023-11-29 ENCOUNTER — NON-APPOINTMENT (OUTPATIENT)
Age: 78
End: 2023-11-29

## 2023-11-29 ENCOUNTER — APPOINTMENT (OUTPATIENT)
Dept: ELECTROPHYSIOLOGY | Facility: CLINIC | Age: 78
End: 2023-11-29
Payer: MEDICARE

## 2023-11-29 PROCEDURE — 93298 REM INTERROG DEV EVAL SCRMS: CPT

## 2023-11-29 PROCEDURE — G2066: CPT

## 2023-12-27 ENCOUNTER — NON-APPOINTMENT (OUTPATIENT)
Age: 78
End: 2023-12-27

## 2023-12-27 ENCOUNTER — APPOINTMENT (OUTPATIENT)
Dept: ELECTROPHYSIOLOGY | Facility: CLINIC | Age: 78
End: 2023-12-27
Payer: MEDICARE

## 2023-12-27 VITALS
DIASTOLIC BLOOD PRESSURE: 72 MMHG | OXYGEN SATURATION: 94 % | WEIGHT: 251 LBS | SYSTOLIC BLOOD PRESSURE: 128 MMHG | BODY MASS INDEX: 34 KG/M2 | HEIGHT: 72 IN | HEART RATE: 68 BPM

## 2023-12-27 DIAGNOSIS — I49.9 CARDIAC ARRHYTHMIA, UNSPECIFIED: ICD-10-CM

## 2023-12-27 DIAGNOSIS — Z95.810 PRESENCE OF AUTOMATIC (IMPLANTABLE) CARDIAC DEFIBRILLATOR: ICD-10-CM

## 2023-12-27 DIAGNOSIS — Z95.818 PRESENCE OF OTHER CARDIAC IMPLANTS AND GRAFTS: ICD-10-CM

## 2023-12-27 DIAGNOSIS — Z95.2 PRESENCE OF PROSTHETIC HEART VALVE: ICD-10-CM

## 2023-12-27 PROCEDURE — 93000 ELECTROCARDIOGRAM COMPLETE: CPT | Mod: 59

## 2023-12-27 PROCEDURE — 93289 INTERROG DEVICE EVAL HEART: CPT

## 2023-12-27 PROCEDURE — 99214 OFFICE O/P EST MOD 30 MIN: CPT

## 2023-12-27 RX ORDER — HYDRALAZINE HYDROCHLORIDE 100 MG/1
100 TABLET ORAL 3 TIMES DAILY
Refills: 0 | Status: ACTIVE | COMMUNITY

## 2024-01-10 LAB
OXYHGB MFR BLDMV: SIGNIFICANT CHANGE UP % (ref 90–95)
OXYHGB MFR BLDMV: SIGNIFICANT CHANGE UP % (ref 90–95)
SAO2 % BLDMV: SIGNIFICANT CHANGE UP %
SAO2 % BLDMV: SIGNIFICANT CHANGE UP %

## 2024-01-11 NOTE — ASU DISCHARGE PLAN (ADULT/PEDIATRIC) - CALL YOUR DOCTOR IF YOU HAVE ANY OF THE FOLLOWING:
Bleeding that does not stop/Swelling that gets worse/Pain not relieved by Medications/Fever greater than (need to indicate Fahrenheit or Celsius)/Wound/Surgical Site with redness, or foul smelling discharge or pus
11-Jan-2024 22:08

## 2024-01-29 ENCOUNTER — APPOINTMENT (OUTPATIENT)
Dept: ELECTROPHYSIOLOGY | Facility: CLINIC | Age: 79
End: 2024-01-29
Payer: MEDICARE

## 2024-01-29 ENCOUNTER — NON-APPOINTMENT (OUTPATIENT)
Age: 79
End: 2024-01-29

## 2024-01-29 PROCEDURE — 93298 REM INTERROG DEV EVAL SCRMS: CPT

## 2024-02-28 ENCOUNTER — NON-APPOINTMENT (OUTPATIENT)
Age: 79
End: 2024-02-28

## 2024-02-29 ENCOUNTER — APPOINTMENT (OUTPATIENT)
Dept: ELECTROPHYSIOLOGY | Facility: CLINIC | Age: 79
End: 2024-02-29
Payer: MEDICARE

## 2024-02-29 PROCEDURE — 93298 REM INTERROG DEV EVAL SCRMS: CPT

## 2024-03-07 NOTE — ASU PATIENT PROFILE, ADULT - TEACHING/LEARNING OTHER LEARNERS
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    Melissa Phase I: Melissa Score: 10    Melissa Phase II:      Anesthesia Post Evaluation    Patient location during evaluation: bedside  Patient participation: complete - patient participated  Level of consciousness: awake and alert  Pain score: 0  Airway patency: patent  Nausea & Vomiting: no nausea  Cardiovascular status: hemodynamically stable  Respiratory status: acceptable  Hydration status: euvolemic  Comments: /68   Pulse 74   Temp 97 °F (36.1 °C)   Resp 14   Ht 1.676 m (5' 6\")   Wt 72.6 kg (160 lb)   SpO2 98%   BMI 25.82 kg/m²       No notable events documented.   family

## 2024-03-08 NOTE — ASU PATIENT PROFILE, ADULT - PACKS YRS CALCULATION
PT calling back.  Per facility they have not received any of the documents that were faxed on 3/5/24.  Please resend to   Fax number [phone]    PET scan order and any prior imaging and blood work.  MRI PSA test CBC CMP     Pt requesting a call once it is faxed so he knows to follow up with the facility         0

## 2024-03-27 ENCOUNTER — NON-APPOINTMENT (OUTPATIENT)
Age: 79
End: 2024-03-27

## 2024-03-28 ENCOUNTER — APPOINTMENT (OUTPATIENT)
Dept: ELECTROPHYSIOLOGY | Facility: CLINIC | Age: 79
End: 2024-03-28
Payer: MEDICARE

## 2024-03-28 PROCEDURE — 93296 REM INTERROG EVL PM/IDS: CPT

## 2024-03-28 PROCEDURE — 93295 DEV INTERROG REMOTE 1/2/MLT: CPT

## 2024-04-10 PROBLEM — I49.9 VENTRICULAR ARRHYTHMIA: Status: ACTIVE | Noted: 2021-08-12

## 2024-04-10 PROBLEM — Z95.2 S/P TAVR (TRANSCATHETER AORTIC VALVE REPLACEMENT): Status: ACTIVE | Noted: 2021-08-10

## 2024-04-10 PROBLEM — Z95.810 ICD (IMPLANTABLE CARDIOVERTER-DEFIBRILLATOR) IN PLACE: Status: ACTIVE | Noted: 2022-03-14

## 2024-04-10 PROBLEM — Z95.818 STATUS POST PLACEMENT OF IMPLANTABLE LOOP RECORDER: Status: ACTIVE | Noted: 2022-03-14

## 2024-04-10 NOTE — HISTORY OF PRESENT ILLNESS
[FreeTextEntry1] : The patient is a 79-year-old male presenting for follow up today (previously with Dr. Fraser). The patient has a history of MI in 1995 (angiogram, no stents placed), COPD (2L O2 at home), s/p TAVR (03/2019 at [facility]), gout, CKD, CVA (09/2020), pulmonary HTN, severe AR s/p valve in valve TAVR, cardiomyopathy, and ventricular tachycardia s/p subcutaneous ICD.  To summarize, the patient initially presented to [facility] on 6/1/21 with RONNELL on CKD (likely cardiorenal syndrome), urinary retention due to noncompliance with medications, with hospital course complicated by cardiogenic shock, acute hypoxemic respiratory failure secondary to metabolic acidosis and respiratory alkalosis, and acute on chronic combined diastolic and systolic heart failure. ISRAEL revealed severe AI and concerns for endocarditis. Patient was transferred to [facility] and underwent Valve in Valve TAVR with Dr. Campbell on 6/23/21. During hospitalization pt noted to have sustained VT >200bpm requiring amiodarone therapy and a subcutaneous CD was implanted for secondary prevention on 7/9/21 by Dr. Fraser. AF was noted at time of S-ICD implant induced by 10J shock and terminated after 1-2 hours. ILR was recommended for ongoing arrhythmia surveillance. An ILR was implanted on 2/22/22.  The patient presents for follow up today. The patient denies any symptoms of palpitations, shortness of breath, dizziness, chest pain, syncope or extremity edema.

## 2024-04-10 NOTE — DISCUSSION/SUMMARY
[EKG obtained to assist in diagnosis and management of assessed problem(s)] : EKG obtained to assist in diagnosis and management of assessed problem(s) [FreeTextEntry1] : In summary, the patient is a 79-year-old male with a history of CAD, AS s/p TAVR, severe AR s/p valve in valve TAVR, endocarditis, cardiomyopathy and ventricular tachycardia s/p subcutaneous ICD, and AF during defib testing for ICD s/p ILR implant. The patient feels well. Device interrogations today reveals normal function and no significant arrhythmias and good battery life. No AF is seen. He has a long OH interval at baseline - may require pacemaker in the future.

## 2024-04-16 NOTE — ASU PATIENT PROFILE, ADULT - NS PRO ABUSE SCREEN AFRAID ANYONE YN
no [Maximal Pain Intensity: 0/10] : 0/10 [80: Normal activity with effort; some signs or symptoms of disease.] : 80: Normal activity with effort; some signs or symptoms of disease.  [ECOG Performance Status: 1 - Restricted in physically strenuous activity but ambulatory and able to carry out work of a light or sedentary nature] : Performance Status: 1 - Restricted in physically strenuous activity but ambulatory and able to carry out work of a light or sedentary nature, e.g., light house work, office work

## 2024-04-30 ENCOUNTER — NON-APPOINTMENT (OUTPATIENT)
Age: 79
End: 2024-04-30

## 2024-05-01 ENCOUNTER — APPOINTMENT (OUTPATIENT)
Dept: ELECTROPHYSIOLOGY | Facility: CLINIC | Age: 79
End: 2024-05-01
Payer: MEDICARE

## 2024-05-01 PROCEDURE — 93298 REM INTERROG DEV EVAL SCRMS: CPT

## 2024-05-22 ENCOUNTER — NON-APPOINTMENT (OUTPATIENT)
Age: 79
End: 2024-05-22

## 2024-06-11 NOTE — DISCHARGE NOTE PROVIDER - NSDCHHPTASSISTHOME_GEN_ALL_CORE
6/11/2024      Shiela Hewitt  [address] [place] [address]      Dear Colleague,    Thank you for referring your patient, Shiela Hewitt, to the [facility]. Please see a copy of my visit note below.    Virtual Visit Details    Type of service:  Video Visit   Video Start Time:about 845    Stop about 906  Pt home  [facility] off[facility]  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   -  Background  In brief, she noted an abdominal mass at the end of 2013, which was quite large and was resected on 01/28/2014. This tumor was about 26 cm in greatest diameter, high-grade, with necrosis present. In retrospect, she had had a CT scan done about 2 years before that, which was felt to be negative. In 08/2014, repeat imaging revealed a 3.5 cm mass near the psoas, and she received preoperative radiation therapy for this. This was resected on 12/03/2014, again revealing high-grade dedifferentiated liposarcoma with positive margins. A new margin was obtained, but this was still positive for liposarcoma. All visible tumor was removed.      A recurrence was noted in the abd and she began lipodox/ifos about 3-19-15 and had 6 cycles; the last in August 2015.  -  She was admitted for partial SBO earlier in 2018.  -  She had a normal stress echo 8-31-21. She also had a normal CT angio.  -        Interval history     She is doing pretty well and is active; goes three times per week to gym.  She broke her ankle in Feb and does not walk as much due to that-happened in [place]; took till April to get better; no cane.  She is also walking the dog on short walks.    She had another SBO since 2023.    She again had a red face and neck the next morning after the CT-lasted most of the day.  She got 1 hive with gadolinium with the brain MRI.       Normal diet.  She is no longer taking KCl and does have the diarrhea.  Still ~7-8 BMs/d; it  "varies with the diet. She does have chronic diarrhea that depends on what she eats; still an issue. She has a history of gi issues; no imodium now but might consider trying it again-\"scared to use re SBO\"; shes concerned about getting another SBO.   She had some colitis episodes in the past.     She does still note a little bit of very mild sciatica on the right sometimes and the neuropathy bothers her feet. Right now she is taking gabapentin at bedtime.-was not able to go off it re foot burning.  .      She had a cystoscopy in Dec 2022 for UTIs.  Last UTI Aug 2022.  She saw urology who felt this might be due to the previous radiation.     More GERD; not taking prilosec regularly but uses Tums w/week.     Vertigo is OK now.  In 2018 she had several episodes of vertigo and developed noise in her R ear requiring hospitalization.  She is not having vertigo now but has R hearing loss and has hearing aids.     She does feel more \"tired\" ; had TSH in [place] that was OK at free T4 1.42, TSH 2.13.  She is taking B12 gummies as that was \"a little low\" in [place].    Mood \"OK\" as her mom and sister  a couple years ago.   Does not want to see anyone on that.      She has a longstanding history of hypothyroidism on replacement T4, and some seasonal allergies. She was also noted to have an antiphospholipid antibody. This was checked because her sister presented with a mesenteric artery clot.    They will be going back to [place] Sept to Nov and again late Dec..    -  Background PMH, FH, SH  PAST MEDICAL HISTORY: She did have a tonsillectomy at age 16 and some sinus surgery in about  along with a tubal pregnancy and a tubal ligation.   ALLERGIES: She does describe 1 hive after IV contrast material about 20 years ago, so she has been taking prednisone and Benadryl for contrast scans. She does have some itching with Dilaudid, but still uses it.   SOCIAL HISTORY: She is a never smoker and does not abuse alcohol or other drugs. 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  -          On exam she appeared comfortable but tired with normal affect.   HEENT full EOMs  NECK no obvious goiter or mass  CHEST no resp distress  NEURO  normal mentation and speech   PSYCH Mood somewhat teary discussing her mom and sister     -  On 12-1-23 CBC, LFT, GNE OK except creat 1.02, , Hb 14.2    On 6-7-24 creat 1.12, alb 4.7, LFT OK, glu 163, CBC OK though  - not a true FBS    -   I reviewed her new CT images of 6-7-24 and there is no clear recurrence ; the potential inflammation of the distal small bowel seems slightly improved     Formal read:  CT-CAP 6-7-24  IMPRESSION:  1.  No evidence of new or worsening metastatic disease in the chest, abdomen and pelvis.  2.  Improved inflammatory changes along the distal small bowel. Mild wall thickening is noted along the sigmoid colon which is suspected to be due to underdistention but could also suggest mild colitis. No significant surrounding inflammatory changes are   present. Recommend correlation with clinical exam.  3.  Stable pulmonary nodules measuring up to 6 mm in the right lower lobe    -  A/P   We discussed a number of issues.      1-sarcoma. abd dedifferentiated liposarcomq.  CHEIKH now.       She is now almost 9 years out from last chemotherapy (August 2015) for recurrent sarcoma.     We will tentatively restage 6-6 and see her 6-10, but depending on how she is doing in Nov we may see her 12-10 also.  She might like to scan a bit sooner than Dec due to anxiety.    -elevated glucose  Followed by PCP-not true FBS this time     -small bowel inflammation on CT  This seems better; perhaps XRT effect          Contrast allergy requires methylpred; She has steroid Rx for the next scan and takes that.     -intermittent hematuria/UTI  Had cystoscopy in Dec 2022 and follows w urology.  Doing well      Creat is fairly stable.  She will f/u w PCP.  We  discussed pushing po before and after CTs     -neuropathy  She will use gabapentin as needed    -GERD  She will use prilosec prn now     -Diarrhea  Stable, f/u w PCP     -hypothyroidisim, hearing loss  Stable f/u w PCP     All questions were addressed and she will call if other questions arise.    Rob Wiseman M.D.  Professor  Hematology, Oncology and Transplantation     Patient Needs Assistance to Leave Residence...

## 2024-06-27 ENCOUNTER — APPOINTMENT (OUTPATIENT)
Dept: ELECTROPHYSIOLOGY | Facility: CLINIC | Age: 79
End: 2024-06-27

## 2024-06-27 PROCEDURE — 93296 REM INTERROG EVL PM/IDS: CPT

## 2024-06-27 PROCEDURE — 93295 DEV INTERROG REMOTE 1/2/MLT: CPT

## 2024-07-30 ENCOUNTER — NON-APPOINTMENT (OUTPATIENT)
Age: 79
End: 2024-07-30

## 2024-07-31 ENCOUNTER — APPOINTMENT (OUTPATIENT)
Dept: ELECTROPHYSIOLOGY | Facility: CLINIC | Age: 79
End: 2024-07-31
Payer: MEDICARE

## 2024-07-31 PROCEDURE — 93298 REM INTERROG DEV EVAL SCRMS: CPT

## 2024-08-28 ENCOUNTER — NON-APPOINTMENT (OUTPATIENT)
Age: 79
End: 2024-08-28

## 2024-08-28 ENCOUNTER — APPOINTMENT (OUTPATIENT)
Dept: ELECTROPHYSIOLOGY | Facility: CLINIC | Age: 79
End: 2024-08-28
Payer: MEDICARE

## 2024-08-28 VITALS
WEIGHT: 255 LBS | HEART RATE: 65 BPM | SYSTOLIC BLOOD PRESSURE: 150 MMHG | DIASTOLIC BLOOD PRESSURE: 80 MMHG | HEIGHT: 72 IN | OXYGEN SATURATION: 97 % | BODY MASS INDEX: 34.54 KG/M2

## 2024-08-28 PROCEDURE — 93282 PRGRMG EVAL IMPLANTABLE DFB: CPT

## 2024-08-28 PROCEDURE — 93000 ELECTROCARDIOGRAM COMPLETE: CPT | Mod: 59

## 2024-08-28 PROCEDURE — 99214 OFFICE O/P EST MOD 30 MIN: CPT

## 2024-09-03 NOTE — PHYSICAL EXAM
[No Acute Distress] : no acute distress [Normal Venous Pressure] : normal venous pressure [Normal S1, S2] : normal S1, S2 [Clear Lung Fields] : clear lung fields [No Respiratory Distress] : no respiratory distress  [Moves all extremities] : moves all extremities [Alert and Oriented] : alert and oriented [de-identified] : LLE swelling chronic due to ruptured Baker's cyst

## 2024-09-03 NOTE — REVIEW OF SYSTEMS
[Lower Ext Edema] : lower extremity edema [Feeling Fatigued] : not feeling fatigued [Dyspnea on exertion] : not dyspnea during exertion [Chest Discomfort] : no chest discomfort [Palpitations] : no palpitations [Orthopnea] : no orthopnea [PND] : no PND [Syncope] : no syncope [Dizziness] : no dizziness [Easy Bleeding] : no tendency for easy bleeding

## 2024-09-03 NOTE — DISCUSSION/SUMMARY
[EKG obtained to assist in diagnosis and management of assessed problem(s)] : EKG obtained to assist in diagnosis and management of assessed problem(s) [FreeTextEntry1] : In summary, the patient is a 79-year-old male with a history of CAD, AS s/p TAVR, severe AR s/p valve in valve TAVR, endocarditis, cardiomyopathy and ventricular tachycardia s/p subcutaneous ICD, and AF during defib testing for ICD s/p ILR implant. The patient feels well. ICD Device interrogation today reveals normal function, no significant arrhythmias or treatment, and good battery life. Smart Pass enabled. ILR with false AFib rhythm c/w SR and PVCs, also notable for post PVC pauses, PVC burden 4.9%, he is asymptomatic. He has a long GA interval at baseline, on ECG today SR with Luis 400ms, +LBB with qrs 159ms, future PPM may be indicated.   Recommendations: -Continue remote monitoring via ICD and ILR -Continue current cardiac medication regimen -repeat TTE schedule Oct to re-eval ventricular/valvular function -Continue to follow with cardiologist Dr Manas Sanchez -Follow-up with EP in 6-months

## 2024-09-03 NOTE — PHYSICAL EXAM
[No Acute Distress] : no acute distress [Normal Venous Pressure] : normal venous pressure [Normal S1, S2] : normal S1, S2 [Clear Lung Fields] : clear lung fields [No Respiratory Distress] : no respiratory distress  [Moves all extremities] : moves all extremities [Alert and Oriented] : alert and oriented [de-identified] : LLE swelling chronic due to ruptured Baker's cyst

## 2024-09-03 NOTE — CARDIOLOGY SUMMARY
[de-identified] : 8/28/24: SR with 1st degree AVB Luis 400ms, qrs 159ms, qtc 415ms 12/27/24: normal sinus rhythm, ist degree AV block, IVCD

## 2024-09-03 NOTE — DISCUSSION/SUMMARY
[EKG obtained to assist in diagnosis and management of assessed problem(s)] : EKG obtained to assist in diagnosis and management of assessed problem(s) [FreeTextEntry1] : In summary, the patient is a 79-year-old male with a history of CAD, AS s/p TAVR, severe AR s/p valve in valve TAVR, endocarditis, cardiomyopathy and ventricular tachycardia s/p subcutaneous ICD, and AF during defib testing for ICD s/p ILR implant. The patient feels well. ICD Device interrogation today reveals normal function, no significant arrhythmias or treatment, and good battery life. Smart Pass enabled. ILR with false AFib rhythm c/w SR and PVCs, also notable for post PVC pauses, PVC burden 4.9%, he is asymptomatic. He has a long IN interval at baseline, on ECG today SR with Luis 400ms, +LBB with qrs 159ms, future PPM may be indicated.   Recommendations: -Continue remote monitoring via ICD and ILR -Continue current cardiac medication regimen -repeat TTE schedule Oct to re-eval ventricular/valvular function -Continue to follow with cardiologist Dr Manas Sanchez -Follow-up with EP in 6-months

## 2024-09-03 NOTE — HISTORY OF PRESENT ILLNESS
[FreeTextEntry1] : The patient is a 79-year-old male presenting for follow up today (previously with Dr. Fraser). The patient has a history of MI in 1995 (angiogram, no stents placed), COPD, s/p TAVR (03/2019 at [facility]), gout, CKD, CVA (09/2020), pulmonary HTN, severe AR s/p valve in valve TAVR, cardiomyopathy, and ventricular tachycardia s/p subcutaneous ICD.  To summarize, the patient initially presented to [facility] on 6/1/21 with RONNELL on CKD (likely cardiorenal syndrome), urinary retention due to noncompliance with medications, with hospital course complicated by cardiogenic shock, acute hypoxemic respiratory failure secondary to metabolic acidosis and respiratory alkalosis, and acute on chronic combined diastolic and systolic heart failure. ISRAEL revealed severe AI and concerns for endocarditis. Patient was transferred to [facility] and underwent Valve in Valve TAVR with Dr. Campbell on 6/23/21. During hospitalization pt noted to have sustained VT >200bpm requiring amiodarone therapy and a subcutaneous CD was implanted for secondary prevention on 7/9/21 by Dr. Fraser. AF was noted at time of S-ICD implant induced by 10J shock and terminated after 1-2 hours. ILR was recommended for ongoing arrhythmia surveillance. An ILR was implanted on 2/22/22.  The patient presents for follow up today, he denies palpitations, dizziness, SOB or chest pain.   ICD interrogation with well-functioning device, battery-life 66%, no VT/VF events or treatments. Smart pass enabled  ILR interrogation with false AFib- on review SR/1st degree AVB with PVCs, post PVC pauses, PVC burden 4.9%.

## 2024-09-03 NOTE — CARDIOLOGY SUMMARY
[de-identified] : 8/28/24: SR with 1st degree AVB Luis 400ms, qrs 159ms, qtc 415ms 12/27/24: normal sinus rhythm, ist degree AV block, IVCD

## 2024-09-03 NOTE — HISTORY OF PRESENT ILLNESS
[FreeTextEntry1] : The patient is a 79-year-old male presenting for follow up today (previously with Dr. Fraser). The patient has a history of MI in 1995 (angiogram, no stents placed), COPD, s/p TAVR (03/2019 at [facility]), gout, CKD, CVA (09/2020), pulmonary HTN, severe AR s/p valve in valve TAVR, cardiomyopathy, and ventricular tachycardia s/p subcutaneous ICD.  To summarize, the patient initially presented to [facility] on 6/1/21 with RONNELL on CKD (likely cardiorenal syndrome), urinary retention due to noncompliance with medications, with hospital course complicated by cardiogenic shock, acute hypoxemic respiratory failure secondary to metabolic acidosis and respiratory alkalosis, and acute on chronic combined diastolic and systolic heart failure. ISRAEL revealed severe AI and concerns for endocarditis. Patient was transferred to [facility] and underwent Valve in Valve TAVR with Dr. Campbell on 6/23/21. During hospitalization pt noted to have sustained VT >200bpm requiring amiodarone therapy and a subcutaneous CD was implanted for secondary prevention on 7/9/21 by Dr. Fraser. AF was noted at time of S-ICD implant induced by 10J shock and terminated after 1-2 hours. ILR was recommended for ongoing arrhythmia surveillance. An ILR was implanted on 2/22/22.  The patient presents for follow up today, he denies palpitations, dizziness, SOB or chest pain.   ICD interrogation with well-functioning device, battery-life 66%, no VT/VF events or treatments. Smart pass enabled  ILR interrogation with false AFib- on review SR/1st degree AVB with PVCs, post PVC pauses, PVC burden 4.9%.

## 2024-09-03 NOTE — END OF VISIT
[Time Spent: ___ minutes] : I have spent [unfilled] minutes of time on the encounter which excludes teaching and separately reported services. [FreeTextEntry3] : I, Dr. Monk, personally performed the evaluation and management (E/M) services for this new patient. That E/M includes conducting the clinically appropriate initial history &/or exam, assessing all conditions, and establishing the plan of care. Today, my assistant, Patti Grullon NP, was here to observe my evaluation and management service for this patient & follow plan of care established by me going forward.

## 2024-09-29 ENCOUNTER — NON-APPOINTMENT (OUTPATIENT)
Age: 79
End: 2024-09-29

## 2024-09-30 ENCOUNTER — APPOINTMENT (OUTPATIENT)
Dept: ELECTROPHYSIOLOGY | Facility: CLINIC | Age: 79
End: 2024-09-30
Payer: MEDICARE

## 2024-09-30 PROCEDURE — 93298 REM INTERROG DEV EVAL SCRMS: CPT

## 2024-10-31 ENCOUNTER — APPOINTMENT (OUTPATIENT)
Dept: ELECTROPHYSIOLOGY | Facility: CLINIC | Age: 79
End: 2024-10-31

## 2024-11-27 ENCOUNTER — APPOINTMENT (OUTPATIENT)
Dept: ELECTROPHYSIOLOGY | Facility: CLINIC | Age: 79
End: 2024-11-27
Payer: MEDICARE

## 2024-11-27 ENCOUNTER — NON-APPOINTMENT (OUTPATIENT)
Age: 79
End: 2024-11-27

## 2024-11-27 PROCEDURE — 93298 REM INTERROG DEV EVAL SCRMS: CPT

## 2024-12-02 ENCOUNTER — APPOINTMENT (OUTPATIENT)
Dept: ELECTROPHYSIOLOGY | Facility: CLINIC | Age: 79
End: 2024-12-02

## 2024-12-05 ENCOUNTER — APPOINTMENT (OUTPATIENT)
Dept: ELECTROPHYSIOLOGY | Facility: CLINIC | Age: 79
End: 2024-12-05

## 2025-01-02 ENCOUNTER — APPOINTMENT (OUTPATIENT)
Dept: ELECTROPHYSIOLOGY | Facility: CLINIC | Age: 80
End: 2025-01-02
Payer: MEDICARE

## 2025-01-02 ENCOUNTER — NON-APPOINTMENT (OUTPATIENT)
Age: 80
End: 2025-01-02

## 2025-01-02 PROCEDURE — 93295 DEV INTERROG REMOTE 1/2/MLT: CPT

## 2025-01-02 PROCEDURE — 93296 REM INTERROG EVL PM/IDS: CPT

## 2025-02-03 ENCOUNTER — APPOINTMENT (OUTPATIENT)
Dept: ELECTROPHYSIOLOGY | Facility: CLINIC | Age: 80
End: 2025-02-03

## 2025-02-03 PROCEDURE — 93298 REM INTERROG DEV EVAL SCRMS: CPT

## 2025-02-17 ENCOUNTER — NON-APPOINTMENT (OUTPATIENT)
Age: 80
End: 2025-02-17

## 2025-02-19 ENCOUNTER — APPOINTMENT (OUTPATIENT)
Dept: ELECTROPHYSIOLOGY | Facility: CLINIC | Age: 80
End: 2025-02-19
Payer: MEDICARE

## 2025-02-19 VITALS
HEIGHT: 72 IN | OXYGEN SATURATION: 98 % | WEIGHT: 245 LBS | SYSTOLIC BLOOD PRESSURE: 126 MMHG | BODY MASS INDEX: 33.18 KG/M2 | DIASTOLIC BLOOD PRESSURE: 60 MMHG | HEART RATE: 64 BPM

## 2025-02-19 DIAGNOSIS — Z95.810 PRESENCE OF AUTOMATIC (IMPLANTABLE) CARDIAC DEFIBRILLATOR: ICD-10-CM

## 2025-02-19 DIAGNOSIS — Z95.2 PRESENCE OF PROSTHETIC HEART VALVE: ICD-10-CM

## 2025-02-19 DIAGNOSIS — I49.9 CARDIAC ARRHYTHMIA, UNSPECIFIED: ICD-10-CM

## 2025-02-19 PROCEDURE — 93000 ELECTROCARDIOGRAM COMPLETE: CPT

## 2025-02-19 PROCEDURE — 99214 OFFICE O/P EST MOD 30 MIN: CPT

## 2025-02-19 RX ORDER — CARVEDILOL 3.12 MG/1
TABLET, FILM COATED ORAL
Refills: 0 | Status: ACTIVE | COMMUNITY

## 2025-03-26 ENCOUNTER — APPOINTMENT (OUTPATIENT)
Dept: ELECTROPHYSIOLOGY | Facility: CLINIC | Age: 80
End: 2025-03-26
Payer: MEDICARE

## 2025-03-26 ENCOUNTER — NON-APPOINTMENT (OUTPATIENT)
Age: 80
End: 2025-03-26

## 2025-03-26 PROCEDURE — 93298 REM INTERROG DEV EVAL SCRMS: CPT

## 2025-04-30 ENCOUNTER — NON-APPOINTMENT (OUTPATIENT)
Age: 80
End: 2025-04-30

## 2025-04-30 ENCOUNTER — APPOINTMENT (OUTPATIENT)
Dept: ELECTROPHYSIOLOGY | Facility: CLINIC | Age: 80
End: 2025-04-30
Payer: MEDICARE

## 2025-04-30 PROCEDURE — 93298 REM INTERROG DEV EVAL SCRMS: CPT

## 2025-06-02 ENCOUNTER — APPOINTMENT (OUTPATIENT)
Dept: ELECTROPHYSIOLOGY | Facility: CLINIC | Age: 80
End: 2025-06-02

## 2025-06-04 ENCOUNTER — APPOINTMENT (OUTPATIENT)
Dept: ELECTROPHYSIOLOGY | Facility: CLINIC | Age: 80
End: 2025-06-04
Payer: MEDICARE

## 2025-06-04 ENCOUNTER — NON-APPOINTMENT (OUTPATIENT)
Age: 80
End: 2025-06-04

## 2025-06-04 PROCEDURE — 93298 REM INTERROG DEV EVAL SCRMS: CPT

## 2025-07-07 ENCOUNTER — APPOINTMENT (OUTPATIENT)
Dept: ELECTROPHYSIOLOGY | Facility: CLINIC | Age: 80
End: 2025-07-07

## 2025-07-08 ENCOUNTER — APPOINTMENT (OUTPATIENT)
Dept: ELECTROPHYSIOLOGY | Facility: CLINIC | Age: 80
End: 2025-07-08

## 2025-07-08 PROCEDURE — 93296 REM INTERROG EVL PM/IDS: CPT

## 2025-07-08 PROCEDURE — 93295 DEV INTERROG REMOTE 1/2/MLT: CPT

## 2025-08-19 ENCOUNTER — NON-APPOINTMENT (OUTPATIENT)
Age: 80
End: 2025-08-19

## 2025-08-20 ENCOUNTER — APPOINTMENT (OUTPATIENT)
Dept: ELECTROPHYSIOLOGY | Facility: CLINIC | Age: 80
End: 2025-08-20
Payer: MEDICARE

## 2025-08-20 VITALS
DIASTOLIC BLOOD PRESSURE: 68 MMHG | HEART RATE: 61 BPM | HEIGHT: 72 IN | SYSTOLIC BLOOD PRESSURE: 130 MMHG | WEIGHT: 246 LBS | BODY MASS INDEX: 33.32 KG/M2 | OXYGEN SATURATION: 95 %

## 2025-08-20 DIAGNOSIS — Z95.810 PRESENCE OF AUTOMATIC (IMPLANTABLE) CARDIAC DEFIBRILLATOR: ICD-10-CM

## 2025-08-20 DIAGNOSIS — I45.9 CONDUCTION DISORDER, UNSPECIFIED: ICD-10-CM

## 2025-08-20 PROCEDURE — 93289 INTERROG DEVICE EVAL HEART: CPT

## 2025-08-20 PROCEDURE — 93000 ELECTROCARDIOGRAM COMPLETE: CPT | Mod: 59

## 2025-08-20 PROCEDURE — 99214 OFFICE O/P EST MOD 30 MIN: CPT

## 2025-08-20 RX ORDER — CARVEDILOL 6.25 MG/1
6.25 TABLET, FILM COATED ORAL
Qty: 180 | Refills: 2 | Status: ACTIVE | COMMUNITY
Start: 2025-08-20 | End: 1900-01-01